# Patient Record
Sex: FEMALE | Race: WHITE | Employment: OTHER | URBAN - METROPOLITAN AREA
[De-identification: names, ages, dates, MRNs, and addresses within clinical notes are randomized per-mention and may not be internally consistent; named-entity substitution may affect disease eponyms.]

---

## 2017-11-22 ENCOUNTER — CARE COORDINATION (OUTPATIENT)
Dept: CARE COORDINATION | Age: 57
End: 2017-11-22

## 2017-11-30 ENCOUNTER — TELEPHONE (OUTPATIENT)
Dept: CASE MANAGEMENT | Age: 57
End: 2017-11-30

## 2017-11-30 NOTE — TELEPHONE ENCOUNTER
No call was made, encounter was opened for documentation in the lung nodule navigator flow sheet. Patient has suggested follow up per [facility] for management of incidental pulmonary nodules. Patients with nodules measuring under 0.8cm are not automatically enrolled into incidental pulmonary nodule program.   Notification of nodules letter faxed to [address] [place] [address] Street, MD 11/30/2017 1:25 PM.  Receipt verified. Letter mailed to patients home stating she should contact her primary care physician to discuss any follow up recommendations.    Myrle Phalen, Hipolito Maze., R.T.(R)(T), Radiology Patient Navigator

## 2017-11-30 NOTE — LETTER
CT Lung Screening/ Lung Nodule Program  [address] [place] [address]. [place], [address]       TO:   Selene Marte MD    FAX:   [phone]        FROM:   [place], [place] [facility] Scre[facility]    DATE:   11/30/2017    PHONE:  [phone]    FAX:    [phone]      Comments:  Incidental Pulmonary Nodule Notification    RE:  Maria Fernanda Melgar  1960        The information contained in this fax message is intended only for Personal and Confidential use of the designated recipient(s) names above. This information is to be handled as Privileged and Confidential. If the reader of this message is not the intended recipient, you are hereby notified that you have received this document in error, and that any review, dissemination, distribution, or copying of this message is strictly prohibited. If you receive this communication in error, please notify us immediately at the above number and return the original message to us by mail. Thank you. Member of [facility]. Medical Imaging Services                              11/30/2017           Selene Marte, [address] [place] [address], [address] [place] [address]  [address] [facility] [address] [address]  Fax: [phone]      Dear  Selene Marte MD :                                      Your patient, Maria Fernanda Melgar (1960) had an imaging study (11/21/17) that noted an incidental pulmonary nodule/s. Follow up was recommended according to [facility] for Management of Pulmonary Nodules If this patient is not currently active within your practice, please contact us immediately so we can update records. This patient will not be monitored by our incidental pulmonary nodule program this letter serves as a notification only. If you have any additional questions or concerns regarding our Lung Nodule Program or our CT Lung Cancer Screening Program,  please don't hesitate to call. Sincerely,    Pulmonary Nodule Program  [address]:  [phone]  F:  [phone]                            Medical Imaging Services            11/30/2017      [address] [place] [address] [address] [address]              Dear Alfred Ashford,    [facility] records indicate that over the past year you had an imaging study/studies done while a patient at a [facility] facility. Because your health is our primary concern, we want to make sure we have the correct primary care physician on file. We currently have Michael Soriano MD noted as your primary care physician. If this information is not accurate, please call [phone] and provide us with the correct information. If this information is correct, please make sure you have discussed your visit with your physician and understand all results and any follow up recommendations that may or may not be needed now or in the future.            Sincerely,    [address]:  [phone]  F:  [phone]

## 2018-01-22 ENCOUNTER — CARE COORDINATION (OUTPATIENT)
Dept: CARE COORDINATION | Age: 58
End: 2018-01-22

## 2018-03-13 ENCOUNTER — HOSPITAL ENCOUNTER (OUTPATIENT)
Age: 58
Discharge: HOME | End: 2018-03-13
Payer: MEDICARE

## 2018-03-13 LAB
ALBUMIN SERPL-MCNC: 4.4 G/DL (ref 3.5–5.2)
ALP BLD-CCNC: 106 U/L (ref 35–104)
ALT SERPL-CCNC: 18 U/L (ref 0–32)
AMYLASE: 60 U/L (ref 20–100)
ANION GAP SERPL CALCULATED.3IONS-SCNC: 11 MMOL/L (ref 7–16)
AST SERPL-CCNC: 20 U/L (ref 0–31)
BASOPHILS ABSOLUTE: 0.05 E9/L (ref 0–0.2)
BASOPHILS RELATIVE PERCENT: 0.6 % (ref 0–2)
BILIRUB SERPL-MCNC: 0.2 MG/DL (ref 0–1.2)
BUN BLDV-MCNC: 16 MG/DL (ref 6–20)
CALCIUM SERPL-MCNC: 9.2 MG/DL (ref 8.6–10.2)
CEA: 6.4 NG/ML (ref 0–5.2)
CHLORIDE BLD-SCNC: 101 MMOL/L (ref 98–107)
CO2: 28 MMOL/L (ref 22–29)
CREAT SERPL-MCNC: 0.7 MG/DL (ref 0.5–1)
EOSINOPHILS ABSOLUTE: 0.13 E9/L (ref 0.05–0.5)
EOSINOPHILS RELATIVE PERCENT: 1.5 % (ref 0–6)
GFR AFRICAN AMERICAN: >60
GFR NON-AFRICAN AMERICAN: >60 ML/MIN/1.73
GLUCOSE BLD-MCNC: 95 MG/DL (ref 74–109)
HCT VFR BLD CALC: 42.8 % (ref 34–48)
HEMOGLOBIN: 13.7 G/DL (ref 11.5–15.5)
IMMATURE GRANULOCYTES #: 0.09 E9/L
IMMATURE GRANULOCYTES %: 1 % (ref 0–5)
LIPASE: 29 U/L (ref 13–60)
LYMPHOCYTES ABSOLUTE: 3.23 E9/L (ref 1.5–4)
LYMPHOCYTES RELATIVE PERCENT: 36.5 % (ref 20–42)
MCH RBC QN AUTO: 30.2 PG (ref 26–35)
MCHC RBC AUTO-ENTMCNC: 32 % (ref 32–34.5)
MCV RBC AUTO: 94.3 FL (ref 80–99.9)
MONOCYTES ABSOLUTE: 0.62 E9/L (ref 0.1–0.95)
MONOCYTES RELATIVE PERCENT: 7 % (ref 2–12)
NEUTROPHILS ABSOLUTE: 4.73 E9/L (ref 1.8–7.3)
NEUTROPHILS RELATIVE PERCENT: 53.4 % (ref 43–80)
PDW BLD-RTO: 13.2 FL (ref 11.5–15)
PLATELET # BLD: 267 E9/L (ref 130–450)
PMV BLD AUTO: 10.8 FL (ref 7–12)
POTASSIUM SERPL-SCNC: 5.2 MMOL/L (ref 3.5–5)
RBC # BLD: 4.54 E12/L (ref 3.5–5.5)
SODIUM BLD-SCNC: 140 MMOL/L (ref 132–146)
TOTAL PROTEIN: 7.1 G/DL (ref 6.4–8.3)
WBC # BLD: 8.9 E9/L (ref 4.5–11.5)

## 2018-03-13 PROCEDURE — 86301 IMMUNOASSAY TUMOR CA 19-9: CPT

## 2018-03-13 PROCEDURE — 36415 COLL VENOUS BLD VENIPUNCTURE: CPT

## 2018-03-13 PROCEDURE — 80053 COMPREHEN METABOLIC PANEL: CPT

## 2018-03-13 PROCEDURE — 83690 ASSAY OF LIPASE: CPT

## 2018-03-13 PROCEDURE — 82105 ALPHA-FETOPROTEIN SERUM: CPT

## 2018-03-13 PROCEDURE — 82150 ASSAY OF AMYLASE: CPT

## 2018-03-13 PROCEDURE — 82378 CARCINOEMBRYONIC ANTIGEN: CPT

## 2018-03-13 PROCEDURE — 85025 COMPLETE CBC W/AUTO DIFF WBC: CPT

## 2018-03-14 ENCOUNTER — HOSPITAL ENCOUNTER (OUTPATIENT)
Age: 58
Discharge: HOME | End: 2018-03-14
Payer: MEDICARE

## 2018-03-14 PROCEDURE — 82705 FATS/LIPIDS FECES QUAL: CPT

## 2018-03-14 PROCEDURE — 87045 FECES CULTURE AEROBIC BACT: CPT

## 2018-03-14 PROCEDURE — 87324 CLOSTRIDIUM AG IA: CPT

## 2018-03-14 PROCEDURE — 83520 IMMUNOASSAY QUANT NOS NONAB: CPT

## 2018-03-15 LAB
AFP-TUMOR MARKER: 3 NG/ML (ref 0–9)
C DIFFICILE TOXIN, EIA: NORMAL
CA 19-9: 18 U/ML (ref 0–37)

## 2018-03-16 LAB
CULTURE, STOOL: NORMAL
FECAL NEUTRAL FAT: ABNORMAL
FECAL SPLIT FATS: ABNORMAL

## 2018-03-20 LAB
Lab: NORMAL
REPORT: NORMAL
THIS TEST SENT TO: NORMAL

## 2018-03-21 ENCOUNTER — OFFICE VISIT (OUTPATIENT)
Dept: FAMILY MEDICINE CLINIC | Age: 58
End: 2018-03-21
Payer: MEDICARE

## 2018-03-21 VITALS
RESPIRATION RATE: 18 BRPM | HEIGHT: 62 IN | OXYGEN SATURATION: 98 % | BODY MASS INDEX: 19.88 KG/M2 | WEIGHT: 108 LBS | TEMPERATURE: 101.9 F | SYSTOLIC BLOOD PRESSURE: 108 MMHG | HEART RATE: 68 BPM | DIASTOLIC BLOOD PRESSURE: 64 MMHG

## 2018-03-21 DIAGNOSIS — J11.1 INFLUENZA: Primary | ICD-10-CM

## 2018-03-21 PROCEDURE — 99213 OFFICE O/P EST LOW 20 MIN: CPT | Performed by: PHYSICIAN ASSISTANT

## 2018-03-21 RX ORDER — BROMPHENIRAMINE MALEATE, PSEUDOEPHEDRINE HYDROCHLORIDE, AND DEXTROMETHORPHAN HYDROBROMIDE 2; 30; 10 MG/5ML; MG/5ML; MG/5ML
10 SYRUP ORAL 4 TIMES DAILY PRN
Qty: 120 ML | Refills: 0 | Status: SHIPPED | OUTPATIENT
Start: 2018-03-21 | End: 2018-04-17 | Stop reason: ALTCHOICE

## 2018-03-21 RX ORDER — DICYCLOMINE HYDROCHLORIDE 10 MG/1
CAPSULE ORAL
Refills: 2 | COMMUNITY
Start: 2018-03-06 | End: 2021-03-09

## 2018-03-21 RX ORDER — OSELTAMIVIR PHOSPHATE 75 MG/1
75 CAPSULE ORAL 2 TIMES DAILY
Qty: 10 CAPSULE | Refills: 0 | Status: SHIPPED | OUTPATIENT
Start: 2018-03-21 | End: 2018-03-26

## 2018-03-21 NOTE — PROGRESS NOTES
HPI: Mat Jiménez is a 65 y.o. female with a past medical history of  has a past medical history of Anxiety; Arthritis; Blood circulation, collateral; Chronic back pain; Colitis; Complex regional pain syndrome type 1 of right lower extremity; Depression; Elevated liver function tests; GERD (gastroesophageal reflux disease); Headache(784.0); Hyperlipidemia; Kidney stone; Movement disorder; Neuromuscular disorder ([address] Osteoarthritis; Other disorders of kidney and ureter in diseases classified elsewhere; Pneumonia; Pre-diabetes; Psychiatric problem; RSD (reflex sympathetic dystrophy); and Spinal cord injuries. presenting with complaints of a cough with nasal congestion. The patient states that these symptoms began gradually. The history is obtained from the patient. The patient states that he has had some subjective chills at home. Patient's symptoms began before yesterday. Came on rapidly. Patient complains of head to toe body aches as well as cough. Patient does complain of a mild cough associated with it that is nonproductive The patient also denies difficulty breathing, hemoptysis, neck pain/stiffness, or blurry vision. Sx have persisted and are mildly worse which is what prompted the visit today. POSITIVE exposure to sick contacts        Review of Systems:   Pertinent positives and negatives are stated within HPI, all other systems reviewed and are negative.    --------------------------------------------- PAST HISTORY ---------------------------------------------  Past Medical History:  has a past medical history of Anxiety; Arthritis; Blood circulation, collateral; Chronic back pain; Colitis; Complex regional pain syndrome type 1 of right lower extremity; Depression; Elevated liver function tests; GERD (gastroesophageal reflux disease); Headache(784.0); Hyperlipidemia; Kidney stone; Movement disorder; Neuromuscular disorder ([address] Osteoarthritis;  Other disorders of kidney and ureter in diseases classified

## 2018-03-23 ENCOUNTER — OFFICE VISIT (OUTPATIENT)
Dept: FAMILY MEDICINE CLINIC | Age: 58
End: 2018-03-23
Payer: MEDICARE

## 2018-03-23 ENCOUNTER — HOSPITAL ENCOUNTER (OUTPATIENT)
Age: 58
Discharge: HOME | End: 2018-03-25
Payer: MEDICARE

## 2018-03-23 ENCOUNTER — HOSPITAL ENCOUNTER (OUTPATIENT)
Dept: GENERAL RADIOLOGY | Age: 58
Discharge: HOME | End: 2018-03-25
Payer: MEDICARE

## 2018-03-23 VITALS
SYSTOLIC BLOOD PRESSURE: 128 MMHG | HEART RATE: 84 BPM | RESPIRATION RATE: 12 BRPM | WEIGHT: 106 LBS | HEIGHT: 62 IN | OXYGEN SATURATION: 95 % | TEMPERATURE: 97.9 F | BODY MASS INDEX: 19.51 KG/M2 | DIASTOLIC BLOOD PRESSURE: 70 MMHG

## 2018-03-23 DIAGNOSIS — J20.9 ACUTE BRONCHITIS, UNSPECIFIED ORGANISM: Primary | ICD-10-CM

## 2018-03-23 DIAGNOSIS — J20.9 ACUTE BRONCHITIS, UNSPECIFIED ORGANISM: ICD-10-CM

## 2018-03-23 PROCEDURE — 99213 OFFICE O/P EST LOW 20 MIN: CPT | Performed by: PHYSICIAN ASSISTANT

## 2018-03-23 PROCEDURE — 96372 THER/PROPH/DIAG INJ SC/IM: CPT | Performed by: PHYSICIAN ASSISTANT

## 2018-03-23 PROCEDURE — 71046 X-RAY EXAM CHEST 2 VIEWS: CPT

## 2018-03-23 RX ORDER — PREDNISONE 10 MG/1
TABLET ORAL
Qty: 20 TABLET | Refills: 0 | Status: SHIPPED | OUTPATIENT
Start: 2018-03-23 | End: 2018-03-29

## 2018-03-23 RX ORDER — PREDNISONE 10 MG/1
TABLET ORAL
Qty: 20 TABLET | Refills: 0 | Status: SHIPPED | OUTPATIENT
Start: 2018-03-23 | End: 2018-03-23 | Stop reason: SDUPTHER

## 2018-03-23 RX ORDER — DEXAMETHASONE SODIUM PHOSPHATE 100 MG/10ML
10 INJECTION INTRAMUSCULAR; INTRAVENOUS ONCE
Status: COMPLETED | OUTPATIENT
Start: 2018-03-23 | End: 2018-03-23

## 2018-03-23 RX ORDER — DOXYCYCLINE HYCLATE 100 MG
100 TABLET ORAL 2 TIMES DAILY
Qty: 20 TABLET | Refills: 0 | Status: SHIPPED | OUTPATIENT
Start: 2018-03-23 | End: 2018-03-23 | Stop reason: SDUPTHER

## 2018-03-23 RX ORDER — DOXYCYCLINE HYCLATE 100 MG
100 TABLET ORAL 2 TIMES DAILY
Qty: 20 TABLET | Refills: 0 | Status: SHIPPED | OUTPATIENT
Start: 2018-03-23 | End: 2018-04-02

## 2018-03-23 RX ADMIN — DEXAMETHASONE SODIUM PHOSPHATE 10 MG: 100 INJECTION INTRAMUSCULAR; INTRAVENOUS at 15:46

## 2018-03-23 NOTE — PROGRESS NOTES
tonsillar hypertrophy. Airway patient. Neck:  Normal ROM. Supple. Lungs:  Clear to auscultation and breath sounds equal.  Heart:  Regular rate and rhythm, normal. Few scattered wheezes heard on expiration on lung exam  Abdomen:  Soft, nontender, good bowel sounds. No firm or pulsatile mass. Back:  No costovertebral tenderness. Skin:  Normal turgor. Warm, dry, without visible rash, unless noted elsewhere. Neurological:  Alert and oriented. Motor functions intact.      ------------------------------------------Test Results Section----------------------------------------------  (All laboratory and radiology results have been personally reviewed by myself)  Laboratory:      Radiology: All Radiology results interpreted by Radiologist unless otherwise noted. -------------------------------------Impression & Disposition Section-----------------------------------  Impression(s):  1. Acute bronchitis, unspecified organism      Disposition:  Disposition: Discharge to home      Vitals are stable. We'll send patient for outpatient chest x-ray. We'll treat with doxycycline and prednisone. Advised to continue the Tamiflu. Advised if she develops any worsening symptoms over the weekend she will need to go to the ER. She is in agreement with this plan. No chest pain. Pt advised to f/u with PCP in 5-7 days for continued management and further care. ER if changes or worse. Pt advised to take all medications as directed.     Administrations This Visit     dexamethasone (DECADRON) injection 10 mg     Admin Date  03/23/2018 Action  Given Dose  10 mg Route  Intramuscular Administered By  Rock Jain

## 2018-03-29 ENCOUNTER — APPOINTMENT (OUTPATIENT)
Dept: GENERAL RADIOLOGY | Age: 58
End: 2018-03-29
Payer: MEDICARE

## 2018-03-29 ENCOUNTER — HOSPITAL ENCOUNTER (EMERGENCY)
Age: 58
Discharge: HOME | End: 2018-03-29
Attending: EMERGENCY MEDICINE
Payer: MEDICARE

## 2018-03-29 ENCOUNTER — OFFICE VISIT (OUTPATIENT)
Dept: FAMILY MEDICINE CLINIC | Age: 58
End: 2018-03-29
Payer: MEDICARE

## 2018-03-29 VITALS
HEIGHT: 62 IN | BODY MASS INDEX: 21.16 KG/M2 | WEIGHT: 115 LBS | RESPIRATION RATE: 18 BRPM | OXYGEN SATURATION: 94 % | TEMPERATURE: 98 F | DIASTOLIC BLOOD PRESSURE: 61 MMHG | SYSTOLIC BLOOD PRESSURE: 133 MMHG | HEART RATE: 95 BPM

## 2018-03-29 VITALS
DIASTOLIC BLOOD PRESSURE: 86 MMHG | RESPIRATION RATE: 18 BRPM | WEIGHT: 107 LBS | BODY MASS INDEX: 19.69 KG/M2 | SYSTOLIC BLOOD PRESSURE: 98 MMHG | HEIGHT: 62 IN | OXYGEN SATURATION: 91 % | TEMPERATURE: 98.2 F | HEART RATE: 81 BPM

## 2018-03-29 DIAGNOSIS — J44.9 CHRONIC OBSTRUCTIVE PULMONARY DISEASE, UNSPECIFIED COPD TYPE (HCC): Primary | ICD-10-CM

## 2018-03-29 DIAGNOSIS — J06.9 VIRAL URI WITH COUGH: Primary | ICD-10-CM

## 2018-03-29 LAB
EKG ATRIAL RATE: 82 BPM
EKG P AXIS: 80 DEGREES
EKG P-R INTERVAL: 164 MS
EKG Q-T INTERVAL: 356 MS
EKG QRS DURATION: 80 MS
EKG QTC CALCULATION (BAZETT): 415 MS
EKG R AXIS: -27 DEGREES
EKG T AXIS: 59 DEGREES
EKG VENTRICULAR RATE: 82 BPM
INFLUENZA A BY PCR: NOT DETECTED
INFLUENZA B BY PCR: NOT DETECTED

## 2018-03-29 PROCEDURE — 71046 X-RAY EXAM CHEST 2 VIEWS: CPT

## 2018-03-29 PROCEDURE — 99284 EMERGENCY DEPT VISIT MOD MDM: CPT

## 2018-03-29 PROCEDURE — 94640 AIRWAY INHALATION TREATMENT: CPT | Performed by: PHYSICIAN ASSISTANT

## 2018-03-29 PROCEDURE — 93005 ELECTROCARDIOGRAM TRACING: CPT

## 2018-03-29 PROCEDURE — 87502 INFLUENZA DNA AMP PROBE: CPT

## 2018-03-29 PROCEDURE — 99214 OFFICE O/P EST MOD 30 MIN: CPT | Performed by: PHYSICIAN ASSISTANT

## 2018-03-29 RX ORDER — ALBUTEROL SULFATE 2.5 MG/3ML
2.5 SOLUTION RESPIRATORY (INHALATION) ONCE
Status: COMPLETED | OUTPATIENT
Start: 2018-03-29 | End: 2018-03-29

## 2018-03-29 RX ORDER — BENZONATATE 100 MG/1
100 CAPSULE ORAL 3 TIMES DAILY PRN
Qty: 21 CAPSULE | Refills: 0 | Status: SHIPPED | OUTPATIENT
Start: 2018-03-29 | End: 2018-04-05

## 2018-03-29 RX ADMIN — Medication 0.5 MG: at 13:40

## 2018-03-29 RX ADMIN — ALBUTEROL SULFATE 2.5 MG: 2.5 SOLUTION RESPIRATORY (INHALATION) at 13:39

## 2018-03-29 NOTE — PROGRESS NOTES
Chief Complaint:  Sinusitis; Chest Congestion; Cough (productive); and Fatigue      History of Present Illness:  Source of history provided by:  patient. Elen Guzman is a 65 y.o. female who  has a past medical history of Anxiety; Arthritis; Blood circulation, collateral; Chronic back pain; Colitis; Complex regional pain syndrome type 1 of right lower extremity; Depression; Elevated liver function tests; GERD (gastroesophageal reflux disease); Headache(784.0); Hyperlipidemia; Kidney stone; Movement disorder; Neuromuscular disorder ([address] Osteoarthritis; Other disorders of kidney and ureter in diseases classified elsewhere; Pneumonia; Pre-diabetes; Psychiatric problem; RSD (reflex sympathetic dystrophy); and Spinal cord injuries. , presents to the [facility] care for COPD exacerbation. Patient has been seen 3 times this month for bronchitis and COPD. She was seen in the ER on March 5, walk-in clinic on March 21 and the walk in clinic on March 23. Patient has been on azithromycin, doxycycline, and 2 rounds of prednisone with no improvement. She does have an albuterol inhaler at home. She is a smoker. Patient continues to have a cough and sinus congestion. She reports fatigue. She has a follow-up plan with her family doctor in one week. She states that none of these medicines have made her any better. She did have a chest x-ray done on March 23 that showed no pneumonia.     Review of Systems:     Unless otherwise stated in this report or unable to obtain because of the patient's clinical or mental status as evidenced by the medical record, this patients's positive and negative responses for Review of Systems, constitutional, psych, eyes, ENT, cardiovascular, respiratory, gastrointestinal, neurological, genitourinary, musculoskeletal, integument systems and systems related to the presenting problem are either stated in the preceding or were not pertinent or were negative for the symptoms and/or complaints related Throat:  Pharynx without injection, exudate, or tonsillar hypertrophy. Airway patient. Neck:  Normal ROM. Supple. Lungs:  Diffuse coarse breath sounds heard bilaterally  Heart:  Regular rate and rhythm, normal heart sounds, without pathological murmurs, ectopy, gallops, or rubs. Abdomen:  Soft, nontender, good bowel sounds. No firm or pulsatile mass. Back:  No costovertebral tenderness. Skin:  Normal turgor. Warm, dry, without visible rash, unless noted elsewhere. Neurological:  Alert and oriented. Motor functions intact.      ------------------------------------------Test Results Section----------------------------------------------  (All laboratory and radiology results have been personally reviewed by myself)  Laboratory:      Radiology: All Radiology results interpreted by Radiologist unless otherwise noted. -------------------------------------Impression & Disposition Section-----------------------------------  Impression(s):  1. Chronic obstructive pulmonary disease, unspecified COPD type ([facility]      Disposition:  Disposition: Go TO ED      In the last month patient has been on 2 rounds of prednisone, azithromycin, doxycycline and Tamiflu. She has had no improvement in her symptoms. She was given a breathing treatment here and her oxygen level is 90-91% at rest. I advised she has failed OP treatment and I advised she go to the ED for further management.  She is in agreement with this plan and will go directly there    Administrations This Visit     albuterol (PROVENTIL) nebulizer solution 2.5 mg     Admin Date  03/29/2018 Action  Given Dose  2.5 mg Route  Nebulization Administered By  Pablo Hodgkins, MA          ipratropium (ATROVENT) 0.02 % nebulizer solution 0.5 mg     Admin Date  03/29/2018 Action  Given Dose  0.5 mg Route  Nebulization Administered By  Pablo Hodgkins, MA

## 2018-04-18 ENCOUNTER — HOSPITAL ENCOUNTER (OUTPATIENT)
Age: 58
Discharge: HOME | End: 2018-04-20

## 2018-04-18 PROCEDURE — 87081 CULTURE SCREEN ONLY: CPT

## 2018-04-18 PROCEDURE — 88305 TISSUE EXAM BY PATHOLOGIST: CPT

## 2018-04-19 ENCOUNTER — HOSPITAL ENCOUNTER (OUTPATIENT)
Age: 58
Discharge: HOME | End: 2018-04-19
Payer: COMMERCIAL

## 2018-04-19 LAB
APTT: 32.8 SEC (ref 24.5–35.1)
BILIRUBIN URINE: NEGATIVE
BLOOD, URINE: NEGATIVE
CLARITY: CLEAR
CLOTEST: NORMAL
COLOR: YELLOW
GLUCOSE URINE: NEGATIVE MG/DL
HCT VFR BLD CALC: 40.7 % (ref 34–48)
HEMOGLOBIN: 13.4 G/DL (ref 11.5–15.5)
INR BLD: 1.1
KETONES, URINE: NEGATIVE MG/DL
LEUKOCYTE ESTERASE, URINE: NEGATIVE
MCH RBC QN AUTO: 30.9 PG (ref 26–35)
MCHC RBC AUTO-ENTMCNC: 32.9 % (ref 32–34.5)
MCV RBC AUTO: 94 FL (ref 80–99.9)
NITRITE, URINE: NEGATIVE
PDW BLD-RTO: 14.1 FL (ref 11.5–15)
PH UA: 6.5 (ref 5–9)
PLATELET # BLD: 177 E9/L (ref 130–450)
PMV BLD AUTO: 11.7 FL (ref 7–12)
PROTEIN UA: NEGATIVE MG/DL
PROTHROMBIN TIME: 12.5 SEC (ref 9.3–12.4)
RBC # BLD: 4.33 E12/L (ref 3.5–5.5)
SPECIFIC GRAVITY UA: 1.02 (ref 1–1.03)
UROBILINOGEN, URINE: 0.2 E.U./DL
WBC # BLD: 5.4 E9/L (ref 4.5–11.5)

## 2018-04-19 PROCEDURE — 85730 THROMBOPLASTIN TIME PARTIAL: CPT

## 2018-04-19 PROCEDURE — 87147 CULTURE TYPE IMMUNOLOGIC: CPT

## 2018-04-19 PROCEDURE — 85610 PROTHROMBIN TIME: CPT

## 2018-04-19 PROCEDURE — 85027 COMPLETE CBC AUTOMATED: CPT

## 2018-04-19 PROCEDURE — 87088 URINE BACTERIA CULTURE: CPT

## 2018-04-19 PROCEDURE — 81003 URINALYSIS AUTO W/O SCOPE: CPT

## 2018-04-19 PROCEDURE — 36415 COLL VENOUS BLD VENIPUNCTURE: CPT

## 2018-04-19 PROCEDURE — 87186 SC STD MICRODIL/AGAR DIL: CPT

## 2018-04-21 LAB
ORGANISM: ABNORMAL
URINE CULTURE, ROUTINE: ABNORMAL
URINE CULTURE, ROUTINE: ABNORMAL

## 2018-04-24 ENCOUNTER — ANESTHESIA EVENT (OUTPATIENT)
Dept: OPERATING ROOM | Age: 58
End: 2018-04-24
Payer: COMMERCIAL

## 2018-04-24 ASSESSMENT — LIFESTYLE VARIABLES: SMOKING_STATUS: 1

## 2018-04-25 ENCOUNTER — HOSPITAL ENCOUNTER (OUTPATIENT)
Dept: OPERATING ROOM | Age: 58
Setting detail: OUTPATIENT SURGERY
Discharge: HOME | End: 2018-04-25
Attending: ANESTHESIOLOGY
Payer: COMMERCIAL

## 2018-04-25 ENCOUNTER — ANESTHESIA (OUTPATIENT)
Dept: OPERATING ROOM | Age: 58
End: 2018-04-25
Payer: COMMERCIAL

## 2018-04-25 ENCOUNTER — HOSPITAL ENCOUNTER (OUTPATIENT)
Age: 58
Setting detail: OUTPATIENT SURGERY
Discharge: HOME | End: 2018-04-25
Attending: ANESTHESIOLOGY | Admitting: ANESTHESIOLOGY
Payer: COMMERCIAL

## 2018-04-25 VITALS
DIASTOLIC BLOOD PRESSURE: 76 MMHG | OXYGEN SATURATION: 97 % | SYSTOLIC BLOOD PRESSURE: 120 MMHG | HEIGHT: 62 IN | TEMPERATURE: 98 F | HEART RATE: 77 BPM | WEIGHT: 109 LBS | RESPIRATION RATE: 16 BRPM | BODY MASS INDEX: 20.06 KG/M2

## 2018-04-25 VITALS
SYSTOLIC BLOOD PRESSURE: 94 MMHG | DIASTOLIC BLOOD PRESSURE: 53 MMHG | TEMPERATURE: 97.2 F | OXYGEN SATURATION: 99 % | RESPIRATION RATE: 20 BRPM

## 2018-04-25 DIAGNOSIS — Z45.42 BATTERY END OF LIFE OF SPINAL CORD STIMULATOR: ICD-10-CM

## 2018-04-25 DIAGNOSIS — G90.511 COMPLEX REGIONAL PAIN SYNDROME TYPE 1 OF RIGHT UPPER EXTREMITY: Primary | Chronic | ICD-10-CM

## 2018-04-25 PROCEDURE — 7100000001 HC PACU RECOVERY - ADDTL 15 MIN: Performed by: ANESTHESIOLOGY

## 2018-04-25 PROCEDURE — C1787 PATIENT PROGR, NEUROSTIM: HCPCS | Performed by: ANESTHESIOLOGY

## 2018-04-25 PROCEDURE — 6360000002 HC RX W HCPCS: Performed by: NURSE ANESTHETIST, CERTIFIED REGISTERED

## 2018-04-25 PROCEDURE — C1820 GENERATOR NEURO RECHG BAT SY: HCPCS | Performed by: ANESTHESIOLOGY

## 2018-04-25 PROCEDURE — 7100000000 HC PACU RECOVERY - FIRST 15 MIN: Performed by: ANESTHESIOLOGY

## 2018-04-25 PROCEDURE — 7100000010 HC PHASE II RECOVERY - FIRST 15 MIN: Performed by: ANESTHESIOLOGY

## 2018-04-25 PROCEDURE — 2709999900 HC NON-CHARGEABLE SUPPLY: Performed by: ANESTHESIOLOGY

## 2018-04-25 PROCEDURE — 2580000003 HC RX 258: Performed by: ANESTHESIOLOGY

## 2018-04-25 PROCEDURE — 3209999900 FLUORO FOR SURGICAL PROCEDURES

## 2018-04-25 PROCEDURE — 3600000015 HC SURGERY LEVEL 5 ADDTL 15MIN: Performed by: ANESTHESIOLOGY

## 2018-04-25 PROCEDURE — 2500000003 HC RX 250 WO HCPCS: Performed by: ANESTHESIOLOGY

## 2018-04-25 PROCEDURE — 3600000005 HC SURGERY LEVEL 5 BASE: Performed by: ANESTHESIOLOGY

## 2018-04-25 PROCEDURE — L8689 EXTERNAL RECHARG SYS INTERN: HCPCS | Performed by: ANESTHESIOLOGY

## 2018-04-25 PROCEDURE — 6360000002 HC RX W HCPCS: Performed by: ANESTHESIOLOGY

## 2018-04-25 PROCEDURE — 7100000011 HC PHASE II RECOVERY - ADDTL 15 MIN: Performed by: ANESTHESIOLOGY

## 2018-04-25 PROCEDURE — 3700000001 HC ADD 15 MINUTES (ANESTHESIA): Performed by: ANESTHESIOLOGY

## 2018-04-25 PROCEDURE — 3700000000 HC ANESTHESIA ATTENDED CARE: Performed by: ANESTHESIOLOGY

## 2018-04-25 PROCEDURE — 2500000003 HC RX 250 WO HCPCS: Performed by: NURSE ANESTHETIST, CERTIFIED REGISTERED

## 2018-04-25 PROCEDURE — L0625 LO FLEX L1-BELOW L5 PRE OTS: HCPCS | Performed by: ANESTHESIOLOGY

## 2018-04-25 DEVICE — DEVICE NEUROSTIMULATOR 13.9CC W1.9XH2.2IN 29.1GM RECHRG: Type: IMPLANTABLE DEVICE | Site: HIP | Status: FUNCTIONAL

## 2018-04-25 RX ORDER — LIDOCAINE HYDROCHLORIDE 20 MG/ML
INJECTION, SOLUTION INFILTRATION; PERINEURAL PRN
Status: DISCONTINUED | OUTPATIENT
Start: 2018-04-25 | End: 2018-04-25 | Stop reason: SDUPTHER

## 2018-04-25 RX ORDER — HYDROMORPHONE HYDROCHLORIDE 4 MG/1
TABLET ORAL
Qty: 30 TABLET | Refills: 0 | Status: SHIPPED | OUTPATIENT
Start: 2018-04-25 | End: 2018-05-05

## 2018-04-25 RX ORDER — SODIUM CHLORIDE, SODIUM LACTATE, POTASSIUM CHLORIDE, CALCIUM CHLORIDE 600; 310; 30; 20 MG/100ML; MG/100ML; MG/100ML; MG/100ML
INJECTION, SOLUTION INTRAVENOUS CONTINUOUS
Status: DISCONTINUED | OUTPATIENT
Start: 2018-04-25 | End: 2018-04-25 | Stop reason: HOSPADM

## 2018-04-25 RX ORDER — FENTANYL CITRATE 50 UG/ML
INJECTION, SOLUTION INTRAMUSCULAR; INTRAVENOUS PRN
Status: DISCONTINUED | OUTPATIENT
Start: 2018-04-25 | End: 2018-04-25 | Stop reason: SDUPTHER

## 2018-04-25 RX ORDER — PROPOFOL 10 MG/ML
INJECTION, EMULSION INTRAVENOUS CONTINUOUS PRN
Status: DISCONTINUED | OUTPATIENT
Start: 2018-04-25 | End: 2018-04-25 | Stop reason: SDUPTHER

## 2018-04-25 RX ORDER — MIDAZOLAM HYDROCHLORIDE 1 MG/ML
INJECTION INTRAMUSCULAR; INTRAVENOUS PRN
Status: DISCONTINUED | OUTPATIENT
Start: 2018-04-25 | End: 2018-04-25 | Stop reason: SDUPTHER

## 2018-04-25 RX ORDER — KETAMINE HYDROCHLORIDE 50 MG/ML
INJECTION, SOLUTION, CONCENTRATE INTRAMUSCULAR; INTRAVENOUS PRN
Status: DISCONTINUED | OUTPATIENT
Start: 2018-04-25 | End: 2018-04-25 | Stop reason: SDUPTHER

## 2018-04-25 RX ORDER — LIDOCAINE HYDROCHLORIDE 10 MG/ML
INJECTION, SOLUTION EPIDURAL; INFILTRATION; INTRACAUDAL; PERINEURAL PRN
Status: DISCONTINUED | OUTPATIENT
Start: 2018-04-25 | End: 2018-04-25 | Stop reason: HOSPADM

## 2018-04-25 RX ADMIN — FENTANYL CITRATE 50 MCG: 50 INJECTION, SOLUTION INTRAMUSCULAR; INTRAVENOUS at 07:41

## 2018-04-25 RX ADMIN — SODIUM CHLORIDE, POTASSIUM CHLORIDE, SODIUM LACTATE AND CALCIUM CHLORIDE: 600; 310; 30; 20 INJECTION, SOLUTION INTRAVENOUS at 06:48

## 2018-04-25 RX ADMIN — FENTANYL CITRATE 50 MCG: 50 INJECTION, SOLUTION INTRAMUSCULAR; INTRAVENOUS at 07:43

## 2018-04-25 RX ADMIN — MIDAZOLAM HYDROCHLORIDE 2 MG: 1 INJECTION INTRAMUSCULAR; INTRAVENOUS at 07:40

## 2018-04-25 RX ADMIN — PROPOFOL 50 MCG/KG/MIN: 10 INJECTION, EMULSION INTRAVENOUS at 07:41

## 2018-04-25 RX ADMIN — LIDOCAINE HYDROCHLORIDE 50 MG: 20 INJECTION, SOLUTION INFILTRATION; PERINEURAL at 07:41

## 2018-04-25 RX ADMIN — KETAMINE HYDROCHLORIDE 50 MG: 50 INJECTION, SOLUTION INTRAMUSCULAR; INTRAVENOUS at 07:41

## 2018-04-25 RX ADMIN — VANCOMYCIN HYDROCHLORIDE 1000 MG: 1 INJECTION, POWDER, LYOPHILIZED, FOR SOLUTION INTRAVENOUS at 07:30

## 2018-04-25 ASSESSMENT — PULMONARY FUNCTION TESTS
PIF_VALUE: 0

## 2018-04-25 ASSESSMENT — PAIN DESCRIPTION - PAIN TYPE
TYPE: SURGICAL PAIN

## 2018-04-25 ASSESSMENT — PAIN SCALES - GENERAL
PAINLEVEL_OUTOF10: 4

## 2018-04-25 ASSESSMENT — PAIN DESCRIPTION - LOCATION
LOCATION: BACK

## 2018-04-25 ASSESSMENT — PAIN DESCRIPTION - DESCRIPTORS
DESCRIPTORS: ACHING
DESCRIPTORS: DISCOMFORT
DESCRIPTORS: SORE
DESCRIPTORS: SORE

## 2018-04-25 ASSESSMENT — PAIN - FUNCTIONAL ASSESSMENT: PAIN_FUNCTIONAL_ASSESSMENT: 0-10

## 2018-07-06 ENCOUNTER — HOSPITAL ENCOUNTER (OUTPATIENT)
Dept: CT IMAGING | Age: 58
Discharge: HOME | End: 2018-07-08
Payer: COMMERCIAL

## 2018-07-06 DIAGNOSIS — R52 PAIN: ICD-10-CM

## 2018-07-06 PROCEDURE — 72128 CT CHEST SPINE W/O DYE: CPT

## 2018-10-06 ENCOUNTER — HOSPITAL ENCOUNTER (OUTPATIENT)
Dept: CT IMAGING | Age: 58
Discharge: HOME | End: 2018-10-08
Payer: COMMERCIAL

## 2018-10-06 DIAGNOSIS — G90.511 COMPLEX REGIONAL PAIN SYNDROME TYPE 1 OF RIGHT UPPER EXTREMITY: ICD-10-CM

## 2018-10-06 PROCEDURE — 6360000004 HC RX CONTRAST MEDICATION: Performed by: RADIOLOGY

## 2018-10-06 PROCEDURE — 72132 CT LUMBAR SPINE W/DYE: CPT

## 2018-10-06 RX ADMIN — IOPAMIDOL 100 ML: 755 INJECTION, SOLUTION INTRAVENOUS at 10:55

## 2019-06-16 ENCOUNTER — HOSPITAL ENCOUNTER (EMERGENCY)
Age: 59
Discharge: HOME | End: 2019-06-16
Payer: MEDICARE

## 2019-06-16 ENCOUNTER — APPOINTMENT (OUTPATIENT)
Dept: GENERAL RADIOLOGY | Age: 59
End: 2019-06-16
Payer: MEDICARE

## 2019-06-16 VITALS
WEIGHT: 105 LBS | HEIGHT: 62 IN | OXYGEN SATURATION: 98 % | RESPIRATION RATE: 18 BRPM | SYSTOLIC BLOOD PRESSURE: 115 MMHG | DIASTOLIC BLOOD PRESSURE: 54 MMHG | TEMPERATURE: 99 F | BODY MASS INDEX: 19.32 KG/M2 | HEART RATE: 85 BPM

## 2019-06-16 DIAGNOSIS — S20.212A CONTUSION OF RIB ON LEFT SIDE, INITIAL ENCOUNTER: Primary | ICD-10-CM

## 2019-06-16 PROCEDURE — 71100 X-RAY EXAM RIBS UNI 2 VIEWS: CPT

## 2019-06-16 PROCEDURE — 6370000000 HC RX 637 (ALT 250 FOR IP): Performed by: NURSE PRACTITIONER

## 2019-06-16 PROCEDURE — 99284 EMERGENCY DEPT VISIT MOD MDM: CPT

## 2019-06-16 RX ORDER — OXYCODONE HYDROCHLORIDE AND ACETAMINOPHEN 5; 325 MG/1; MG/1
1 TABLET ORAL ONCE
Status: COMPLETED | OUTPATIENT
Start: 2019-06-16 | End: 2019-06-16

## 2019-06-16 RX ORDER — IBUPROFEN 800 MG/1
800 TABLET ORAL ONCE
Status: COMPLETED | OUTPATIENT
Start: 2019-06-16 | End: 2019-06-16

## 2019-06-16 RX ORDER — NAPROXEN 500 MG/1
500 TABLET ORAL 2 TIMES DAILY PRN
Qty: 28 TABLET | Refills: 0 | Status: SHIPPED | OUTPATIENT
Start: 2019-06-16 | End: 2021-03-26

## 2019-06-16 RX ADMIN — OXYCODONE HYDROCHLORIDE AND ACETAMINOPHEN 1 TABLET: 5; 325 TABLET ORAL at 18:17

## 2019-06-16 RX ADMIN — IBUPROFEN 800 MG: 800 TABLET, FILM COATED ORAL at 18:17

## 2019-06-16 ASSESSMENT — PAIN DESCRIPTION - LOCATION: LOCATION: RIB CAGE

## 2019-06-16 ASSESSMENT — PAIN SCALES - GENERAL
PAINLEVEL_OUTOF10: 10
PAINLEVEL_OUTOF10: 10

## 2019-06-16 NOTE — ED PROVIDER NOTES
independent    HPI:  6/16/19, Time: 6:07 PM         Betty Javier is a 63 y.o. female presenting to the ED for left rib pain. Patient reports she was carrying blankets and went to bend down and lost her balance while carrying the blankets and fell onto the couch striking the wooden couch armrest.  Patient expresses discomfort to left lower lateral ribs #7 and 8. Denies shortness of breath. There is equal chest wall excursion. No tracheal deviation. Review of Systems:   Pertinent positives and negatives are stated within HPI, all other systems reviewed and are negative.          --------------------------------------------- PAST HISTORY ---------------------------------------------  Past Medical History:  has a past medical history of Anxiety, Arthritis, Blood circulation, collateral, Cancer (HCC), Chronic back pain, Colitis, Complex regional pain syndrome type 1 of right lower extremity, COPD (chronic obstructive pulmonary disease) ([facility] Depression, Elevated liver function tests, GERD (gastroesophageal reflux disease), Headache(784.0), Hyperlipidemia, Kidney stone, Movement disorder, Neuromuscular disorder ([facility] Osteoarthritis, Other disorders of kidney and ureter in diseases classified elsewhere, Pneumonia, Psychiatric problem, and RSD (reflex sympathetic dystrophy). Past Surgical History:  has a past surgical history that includes Hysterectomy; laminectomy; Finger amputation; back surgery (2005); other surgical history (09/18/13); other surgical history (N/A, 2/3/2014); fracture surgery; Endoscopy, colon, diagnostic; Carpal tunnel release; Colonoscopy; other surgical history (Right, 11/11/14); other surgical history (04/08/15); Nerve Block (N/A, 8 19 15); Nerve Block (Left, 08 26 2015); Nerve Block (9 2 15); Nerve Block (Right, 9/17/15); other surgical history (04/25/2018); and pr revise/remove neurostim/ (N/A, 4/25/2018). Social History:  reports that she has been smoking cigarettes. She has a 8.75 pack-year smoking history. She has never used smokeless tobacco. She reports that she does not drink alcohol or use drugs. Family History: family history includes Other in her mother. The patients home medications have been reviewed. Allergies: Sulfamethazine and Ancef [cefazolin sodium]    -------------------------------------------------- RESULTS -------------------------------------------------  All laboratory and radiology results have been personally reviewed by myself   LABS:  No results found for this visit on 06/16/19. RADIOLOGY:  Interpreted by Radiologist.  XR RIBS LEFT (2 VIEWS)   Final Result      NO ACUTE FRACTURE OR BONY ABNORMALITY LEFT RIBS.          ------------------------- NURSING NOTES AND VITALS REVIEWED ---------------------------   The nursing notes within the ED encounter and vital signs as below have been reviewed. BP (!) 115/54   Pulse 85   Temp 99 °F (37.2 °C) (Oral)   Resp 18   Ht 5' 2\" (1.575 m)   Wt 105 lb (47.6 kg)   LMP  (LMP Unknown)   SpO2 98%   BMI 19.20 kg/m²   Oxygen Saturation Interpretation: Normal      ---------------------------------------------------PHYSICAL EXAM--------------------------------------      Constitutional/General: Alert and oriented x3, well appearing, non toxic in NAD  Head: Normocephalic and atraumatic  Eyes: PERRL, EOMI  Mouth: Oropharynx clear, handling secretions, no trismus  Neck: Supple, full ROM,   Pulmonary: Lungs clear to auscultation bilaterally, no wheezes, rales, or rhonchi. Not in respiratory distress, point tenderness to left lower lateral rib cage mid axilla #7 and 8 region. No tracheal deviation. Equal chest wall excursion. Cardiovascular:  Regular rate and rhythm, no murmurs, gallops, or rubs. 2+ distal pulses  Abdomen: Soft, non tender, non distended,   Extremities: Moves all extremities x 4.  Warm and well perfused  Skin: warm and dry without rash  Neurologic: GCS 15,  Psych: Normal

## 2019-06-17 ENCOUNTER — TELEPHONE (OUTPATIENT)
Dept: FAMILY MEDICINE CLINIC | Age: 59
End: 2019-06-17

## 2019-06-17 ENCOUNTER — CARE COORDINATION (OUTPATIENT)
Dept: CARE COORDINATION | Age: 59
End: 2019-06-17

## 2019-06-17 NOTE — TELEPHONE ENCOUNTER
Left message with , he will have patient call.   - per list: patient needs scheduled to establish with

## 2019-06-17 NOTE — CARE COORDINATION
Ambulatory Care Coordination ED Follow up Call       Reason for ED Visit:  Fall, rib pain  Care Management Risk Score: CMRS 2  How are you feeling? :     not changed  Patient Reports the following:  Patient reports she is hurting really bad. Hurts to breath. Patient declined to review medications at this time. Preparing to go to a doctor appointment. Did you call your PCP prior to going to the ED? No          Post Discharge Status:  What health concerns since you left the Emergency Room? None    Do you have wounds that you are caring for at home? No    Do you have a follow up appt scheduled? yes    Review of Instructions:                                 Do you have any questions regarding your discharge instructions?:  No  Medications:    What questions do you have about your medications? None  Are you taking your medications as directed? If not - why? Yes   Can you afford your medications? yes  ADLS:  Do you need assistance of any kind at home? No   What assistance is needed? None      FU appts/Provider:    No future appointments. Health Maintenance Due   Topic Date Due    DTaP/Tdap/Td vaccine (1 - Tdap) 06/01/1980    Shingles Vaccine (1 of 2) 06/01/2011    Pneumococcal 0-64 years Vaccine (1 of 1 - PPSV23) 11/13/2015    Cervical cancer screen  10/17/2017    Colon Cancer Screen FIT/FOBT  05/01/2018    A1C test (Diabetic or Prediabetic)  12/19/2018    Breast cancer screen  04/25/2019     Patient advised to contact PCP office to have HM items/records faxed to PCP Office directly?   N/A

## 2019-09-13 ENCOUNTER — TELEPHONE (OUTPATIENT)
Dept: ADMINISTRATIVE | Age: 59
End: 2019-09-13

## 2021-02-24 ENCOUNTER — APPOINTMENT (OUTPATIENT)
Dept: GENERAL RADIOLOGY | Age: 61
DRG: 948 | End: 2021-02-24
Payer: MEDICARE

## 2021-02-24 ENCOUNTER — HOSPITAL ENCOUNTER (INPATIENT)
Age: 61
LOS: 1 days | Discharge: HOME | DRG: 948 | End: 2021-02-25
Attending: EMERGENCY MEDICINE | Admitting: SURGERY
Payer: MEDICARE

## 2021-02-24 ENCOUNTER — APPOINTMENT (OUTPATIENT)
Dept: CT IMAGING | Age: 61
DRG: 948 | End: 2021-02-24
Payer: MEDICARE

## 2021-02-24 DIAGNOSIS — F41.9 ANXIETY: ICD-10-CM

## 2021-02-24 DIAGNOSIS — G90.521 COMPLEX REGIONAL PAIN SYNDROME TYPE 1 OF RIGHT LOWER EXTREMITY: Chronic | ICD-10-CM

## 2021-02-24 DIAGNOSIS — J98.11 ATELECTASIS OF BOTH LUNGS: Chronic | ICD-10-CM

## 2021-02-24 DIAGNOSIS — Z72.0 TOBACCO ABUSE: ICD-10-CM

## 2021-02-24 DIAGNOSIS — Z96.89 SPINAL CORD STIMULATOR STATUS: Chronic | ICD-10-CM

## 2021-02-24 DIAGNOSIS — F33.1 MAJOR DEPRESSIVE DISORDER, RECURRENT EPISODE, MODERATE (HCC): ICD-10-CM

## 2021-02-24 DIAGNOSIS — M54.2 NECK PAIN: ICD-10-CM

## 2021-02-24 DIAGNOSIS — M89.00 ALGODYSTROPHIC SYNDROME: Chronic | ICD-10-CM
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
## 2021-02-24 DIAGNOSIS — F41.1 GENERALIZED ANXIETY DISORDER: Chronic | ICD-10-CM

## 2021-02-24 PROCEDURE — 72170 X-RAY EXAM OF PELVIS: CPT

## 2021-02-24 PROCEDURE — 1200000000 HC SEMI PRIVATE

## 2021-02-24 PROCEDURE — 99222 1ST HOSP IP/OBS MODERATE 55: CPT | Performed by: NEUROLOGICAL SURGERY

## 2021-02-24 PROCEDURE — 99285 EMERGENCY DEPT VISIT HI MDM: CPT

## 2021-02-24 PROCEDURE — 72125 CT NECK SPINE W/O DYE: CPT

## 2021-02-24 PROCEDURE — 70450 CT HEAD/BRAIN W/O DYE: CPT

## 2021-02-24 PROCEDURE — 6360000002 HC RX W HCPCS: Performed by: STUDENT IN AN ORGANIZED HEALTH CARE EDUCATION/TRAINING PROGRAM

## 2021-02-24 PROCEDURE — 96374 THER/PROPH/DIAG INJ IV PUSH: CPT

## 2021-02-24 PROCEDURE — 6370000000 HC RX 637 (ALT 250 FOR IP): Performed by: STUDENT IN AN ORGANIZED HEALTH CARE EDUCATION/TRAINING PROGRAM

## 2021-02-24 PROCEDURE — 72131 CT LUMBAR SPINE W/O DYE: CPT

## 2021-02-24 PROCEDURE — 71045 X-RAY EXAM CHEST 1 VIEW: CPT

## 2021-02-24 PROCEDURE — 70486 CT MAXILLOFACIAL W/O DYE: CPT

## 2021-02-24 PROCEDURE — 73090 X-RAY EXAM OF FOREARM: CPT

## 2021-02-24 RX ORDER — METHOCARBAMOL 500 MG/1
1000 TABLET, FILM COATED ORAL 4 TIMES DAILY
Status: DISCONTINUED | OUTPATIENT
Start: 2021-02-24 | End: 2021-02-25 | Stop reason: HOSPADM

## 2021-02-24 RX ORDER — SODIUM CHLORIDE 0.9 % (FLUSH) 0.9 %
10 SYRINGE (ML) INJECTION EVERY 12 HOURS SCHEDULED
Status: DISCONTINUED | OUTPATIENT
Start: 2021-02-24 | End: 2021-02-25 | Stop reason: HOSPADM

## 2021-02-24 RX ORDER — SENNA AND DOCUSATE SODIUM 50; 8.6 MG/1; MG/1
2 TABLET, FILM COATED ORAL 2 TIMES DAILY
Status: DISCONTINUED | OUTPATIENT
Start: 2021-02-24 | End: 2021-02-25 | Stop reason: HOSPADM

## 2021-02-24 RX ORDER — FENTANYL CITRATE 50 UG/ML
50 INJECTION, SOLUTION INTRAMUSCULAR; INTRAVENOUS ONCE
Status: COMPLETED | OUTPATIENT
Start: 2021-02-24 | End: 2021-02-24

## 2021-02-24 RX ORDER — ONDANSETRON 4 MG/1
4 TABLET, ORALLY DISINTEGRATING ORAL EVERY 8 HOURS PRN
Status: DISCONTINUED | OUTPATIENT
Start: 2021-02-24 | End: 2021-02-25 | Stop reason: HOSPADM

## 2021-02-24 RX ORDER — OXYCODONE HYDROCHLORIDE 10 MG/1
10 TABLET ORAL EVERY 4 HOURS PRN
Status: DISCONTINUED | OUTPATIENT
Start: 2021-02-24 | End: 2021-02-25 | Stop reason: HOSPADM

## 2021-02-24 RX ORDER — SODIUM CHLORIDE 0.9 % (FLUSH) 0.9 %
10 SYRINGE (ML) INJECTION PRN
Status: DISCONTINUED | OUTPATIENT
Start: 2021-02-24 | End: 2021-02-25 | Stop reason: HOSPADM

## 2021-02-24 RX ORDER — OXYCODONE HYDROCHLORIDE 5 MG/1
5 TABLET ORAL EVERY 4 HOURS PRN
Status: DISCONTINUED | OUTPATIENT
Start: 2021-02-24 | End: 2021-02-25 | Stop reason: HOSPADM

## 2021-02-24 RX ORDER — ACETAMINOPHEN 325 MG/1
650 TABLET ORAL EVERY 4 HOURS
Status: DISCONTINUED | OUTPATIENT
Start: 2021-02-24 | End: 2021-02-25 | Stop reason: HOSPADM

## 2021-02-24 RX ORDER — ONDANSETRON 2 MG/ML
4 INJECTION INTRAMUSCULAR; INTRAVENOUS EVERY 6 HOURS PRN
Status: DISCONTINUED | OUTPATIENT
Start: 2021-02-24 | End: 2021-02-25 | Stop reason: HOSPADM

## 2021-02-24 RX ADMIN — ACETAMINOPHEN 650 MG: 325 TABLET ORAL at 20:14

## 2021-02-24 RX ADMIN — OXYCODONE HYDROCHLORIDE 10 MG: 10 TABLET ORAL at 20:14

## 2021-02-24 RX ADMIN — FENTANYL CITRATE 50 MCG: 50 INJECTION, SOLUTION INTRAMUSCULAR; INTRAVENOUS at 15:34

## 2021-02-24 ASSESSMENT — PAIN SCALES - GENERAL
PAINLEVEL_OUTOF10: 0
PAINLEVEL_OUTOF10: 9

## 2021-02-24 ASSESSMENT — PAIN DESCRIPTION - PAIN TYPE: TYPE: ACUTE PAIN
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
## 2021-02-24 NOTE — ED NOTES
Pt still at tests. Pt's  @ bed side.  RN brought pt's  some water     Johana Mo, [address] [facility]  02/24/21 [id number]
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
## 2021-02-25 VITALS
DIASTOLIC BLOOD PRESSURE: 57 MMHG | HEIGHT: 62 IN | TEMPERATURE: 96.9 F | RESPIRATION RATE: 18 BRPM | WEIGHT: 115 LBS | HEART RATE: 69 BPM | OXYGEN SATURATION: 93 % | BODY MASS INDEX: 21.16 KG/M2 | SYSTOLIC BLOOD PRESSURE: 117 MMHG

## 2021-02-25 LAB
ANION GAP SERPL CALCULATED.3IONS-SCNC: 8 MMOL/L (ref 7–16)
BASOPHILS ABSOLUTE: 0.04 E9/L (ref 0–0.2)
BASOPHILS RELATIVE PERCENT: 0.5 % (ref 0–2)
BUN BLDV-MCNC: 13 MG/DL (ref 6–20)
CALCIUM SERPL-MCNC: 9.2 MG/DL (ref 8.6–10.2)
CHLORIDE BLD-SCNC: 104 MMOL/L (ref 98–107)
CO2: 24 MMOL/L (ref 22–29)
CREAT SERPL-MCNC: 0.6 MG/DL (ref 0.5–1)
EOSINOPHILS ABSOLUTE: 0.05 E9/L (ref 0.05–0.5)
EOSINOPHILS RELATIVE PERCENT: 0.6 % (ref 0–6)
GFR AFRICAN AMERICAN: >60
GFR NON-AFRICAN AMERICAN: >60 ML/MIN/1.73
GLUCOSE BLD-MCNC: 107 MG/DL (ref 74–99)
HCT VFR BLD CALC: 42.2 % (ref 34–48)
HEMOGLOBIN: 13.7 G/DL (ref 11.5–15.5)
IMMATURE GRANULOCYTES #: 0.03 E9/L
IMMATURE GRANULOCYTES %: 0.4 % (ref 0–5)
LYMPHOCYTES ABSOLUTE: 1.12 E9/L (ref 1.5–4)
LYMPHOCYTES RELATIVE PERCENT: 14.2 % (ref 20–42)
MCH RBC QN AUTO: 29.7 PG (ref 26–35)
MCHC RBC AUTO-ENTMCNC: 32.5 % (ref 32–34.5)
MCV RBC AUTO: 91.5 FL (ref 80–99.9)
MONOCYTES ABSOLUTE: 0.54 E9/L (ref 0.1–0.95)
MONOCYTES RELATIVE PERCENT: 6.9 % (ref 2–12)
NEUTROPHILS ABSOLUTE: 6.09 E9/L (ref 1.8–7.3)
NEUTROPHILS RELATIVE PERCENT: 77.4 % (ref 43–80)
PDW BLD-RTO: 13.7 FL (ref 11.5–15)
PLATELET # BLD: 199 E9/L (ref 130–450)
PMV BLD AUTO: 10.9 FL (ref 7–12)
POTASSIUM REFLEX MAGNESIUM: 4.1 MMOL/L (ref 3.5–5)
RBC # BLD: 4.61 E12/L (ref 3.5–5.5)
SODIUM BLD-SCNC: 136 MMOL/L (ref 132–146)
WBC # BLD: 7.9 E9/L (ref 4.5–11.5)

## 2021-02-25 PROCEDURE — 99239 HOSP IP/OBS DSCHRG MGMT >30: CPT | Performed by: SURGERY

## 2021-02-25 PROCEDURE — 2580000003 HC RX 258: Performed by: STUDENT IN AN ORGANIZED HEALTH CARE EDUCATION/TRAINING PROGRAM

## 2021-02-25 PROCEDURE — 97535 SELF CARE MNGMENT TRAINING: CPT

## 2021-02-25 PROCEDURE — 85025 COMPLETE CBC W/AUTO DIFF WBC: CPT

## 2021-02-25 PROCEDURE — 96375 TX/PRO/DX INJ NEW DRUG ADDON: CPT

## 2021-02-25 PROCEDURE — 97530 THERAPEUTIC ACTIVITIES: CPT

## 2021-02-25 PROCEDURE — 36415 COLL VENOUS BLD VENIPUNCTURE: CPT

## 2021-02-25 PROCEDURE — 6370000000 HC RX 637 (ALT 250 FOR IP): Performed by: STUDENT IN AN ORGANIZED HEALTH CARE EDUCATION/TRAINING PROGRAM

## 2021-02-25 PROCEDURE — 97161 PT EVAL LOW COMPLEX 20 MIN: CPT

## 2021-02-25 PROCEDURE — L0172 CERV COL SR FOAM 2PC PRE OTS: HCPCS

## 2021-02-25 PROCEDURE — 6360000002 HC RX W HCPCS: Performed by: STUDENT IN AN ORGANIZED HEALTH CARE EDUCATION/TRAINING PROGRAM

## 2021-02-25 PROCEDURE — 80048 BASIC METABOLIC PNL TOTAL CA: CPT

## 2021-02-25 PROCEDURE — 97166 OT EVAL MOD COMPLEX 45 MIN: CPT

## 2021-02-25 RX ORDER — OXYCODONE HYDROCHLORIDE 5 MG/1
5 TABLET ORAL EVERY 6 HOURS PRN
Qty: 28 TABLET | Refills: 0 | Status: SHIPPED | OUTPATIENT
Start: 2021-02-25 | End: 2021-03-04

## 2021-02-25 RX ORDER — METHOCARBAMOL 500 MG/1
1000 TABLET, FILM COATED ORAL 4 TIMES DAILY
Qty: 80 TABLET | Refills: 0 | Status: SHIPPED | OUTPATIENT
Start: 2021-02-25 | End: 2021-03-07

## 2021-02-25 RX ADMIN — ONDANSETRON 4 MG: 2 INJECTION INTRAMUSCULAR; INTRAVENOUS at 11:32

## 2021-02-25 RX ADMIN — METHOCARBAMOL TABLETS 1000 MG: 500 TABLET, COATED ORAL at 09:03

## 2021-02-25 RX ADMIN — ACETAMINOPHEN 650 MG: 325 TABLET ORAL at 14:18

## 2021-02-25 RX ADMIN — ACETAMINOPHEN 650 MG: 325 TABLET ORAL at 05:52

## 2021-02-25 RX ADMIN — OXYCODONE HYDROCHLORIDE 10 MG: 10 TABLET ORAL at 03:50

## 2021-02-25 RX ADMIN — OXYCODONE HYDROCHLORIDE 10 MG: 10 TABLET ORAL at 09:03

## 2021-02-25 RX ADMIN — SODIUM CHLORIDE, PRESERVATIVE FREE 10 ML: 5 INJECTION INTRAVENOUS at 09:04

## 2021-02-25 RX ADMIN — OXYCODONE HYDROCHLORIDE 10 MG: 10 TABLET ORAL at 14:15

## 2021-02-25 RX ADMIN — METHOCARBAMOL TABLETS 1000 MG: 500 TABLET, COATED ORAL at 14:15

## 2021-02-25 ASSESSMENT — PAIN DESCRIPTION - PAIN TYPE
TYPE: ACUTE PAIN
TYPE: ACUTE PAIN

## 2021-02-25 ASSESSMENT — PAIN DESCRIPTION - LOCATION
LOCATION: HEAD
LOCATION: HEAD

## 2021-02-25 ASSESSMENT — PAIN DESCRIPTION - DESCRIPTORS
DESCRIPTORS: ACHING;DISCOMFORT;HEADACHE
DESCRIPTORS: HEADACHE;ACHING;SORE
DESCRIPTORS: ACHING;CONSTANT;DISCOMFORT

## 2021-02-25 ASSESSMENT — PAIN SCALES - GENERAL
PAINLEVEL_OUTOF10: 0
PAINLEVEL_OUTOF10: 8
PAINLEVEL_OUTOF10: 7

## 2021-02-25 ASSESSMENT — PAIN DESCRIPTION - ORIENTATION: ORIENTATION: POSTERIOR

## 2021-02-25 ASSESSMENT — PAIN DESCRIPTION - FREQUENCY
FREQUENCY: CONTINUOUS
FREQUENCY: CONTINUOUS

## 2021-02-25 ASSESSMENT — PAIN DESCRIPTION - PROGRESSION
CLINICAL_PROGRESSION: NOT CHANGED
CLINICAL_PROGRESSION: NOT CHANGED

## 2021-02-25 ASSESSMENT — PAIN DESCRIPTION - ONSET: ONSET: ON-GOING
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
## 2021-02-25 NOTE — CARE COORDINATION
2/25/2021 social work transition of care planning  Pt is from home with family and had been independent. Pt sees a pcp at [facility]. E's clinic. Pt pharmacy is [facility] on [address]. Explained sw role in transition of care planning. Pt plan is home,once medically stable. Pt will call for a ride at discharge.   Electronically signed by MARLON Hanson on 2/25/2021 at 12:14 PM
Subjective Complaints:  Historian:       Consult requested by ER doctor:                  Attending:DR Rivera      HPI:  80 year old male presents today stephanie accompanied with his daughter who reports that he did have seizure activitiy this morning, she states that he was sitting at the table when he started to lean over, his hands started shaking and his eyes twitching lasting ten minutes, during this he was making sense at times other times not, (-) urinary or bowel incontinence (-) tongue bites (13 May 2019 13:49)    JOSÉ MIGUEL CABAN    PAST MEDICAL & SURGICAL HISTORY:  Ulcer of foot  Hyperlipidemia  BPH (benign prostatic hypertrophy)  Obesity  GERD (gastroesophageal reflux disease)  Hypertension  Diabetes mellitus: Type 2 NIDDM  PVD (peripheral vascular disease)  Varicose veins of left lower extremity with ulcer: laser surgery right LE  80yMale    MEDICATIONS  (STANDING):  aspirin  chewable 81 milliGRAM(s) Oral daily  ATENolol  Tablet 50 milliGRAM(s) Oral daily  cilostazol 100 milliGRAM(s) Oral two times a day  dextrose 5%. 1000 milliLiter(s) (50 mL/Hr) IV Continuous <Continuous>  dextrose 50% Injectable 12.5 Gram(s) IV Push once  dextrose 50% Injectable 25 Gram(s) IV Push once  dextrose 50% Injectable 25 Gram(s) IV Push once  donepezil 10 milliGRAM(s) Oral at bedtime  furosemide    Tablet 40 milliGRAM(s) Oral two times a day  heparin  Injectable 5000 Unit(s) SubCutaneous every 12 hours  insulin glargine Injectable (LANTUS) 15 Unit(s) SubCutaneous at bedtime  insulin lispro (HumaLOG) corrective regimen sliding scale   SubCutaneous three times a day before meals  insulin lispro (HumaLOG) corrective regimen sliding scale   SubCutaneous at bedtime  losartan 100 milliGRAM(s) Oral daily  oxybutynin 5 milliGRAM(s) Oral two times a day  pantoprazole    Tablet 40 milliGRAM(s) Oral before breakfast  tamsulosin 0.4 milliGRAM(s) Oral at bedtime    MEDICATIONS  (PRN):  dextrose 40% Gel 15 Gram(s) Oral once PRN Blood Glucose LESS THAN 70 milliGRAM(s)/deciliter  glucagon  Injectable 1 milliGRAM(s) IntraMuscular once PRN Glucose LESS THAN 70 milligrams/deciliter      Allergies    Cipro (Hives)  codeine (Other)  penicillins (Unknown)    Intolerances      FAMILY HISTORY:  Family history of schizophrenia (Mother)      REVIEW OF SYSTEMS:  General:  No wt loss, fevers, chills, night sweats  Eyes:  Good vision, no reported pain  ENT:  No sore throat, pain, runny nose, dysphagia  CV:  No pain, palpitatioins, hypo/hypertension  Resp:  No dyspnea, cough, tachypnea, wheezing  GI:  No pain, nausea, vomiting, diarrhea, constipatiion  :  No pain, bleeding, incontinence, nocturia  Muscle:  No pain, weakness  Breast:  No pain, abscess, mass, discharge  Neuro:  No weakness, tingling, memory problems  Psych:  No fatigue, insomnia, mood problems, depression  Endocrine:  No polyuria, polydypsia, cold/heat intolerance  Heme:  No petechiae, ecchymosis, easy bruisability  Skin:  No rash, tattoos, scars, edema      Vital Signs Last 24 Hrs  T(C): 36.2 (14 May 2019 05:09), Max: 36.8 (13 May 2019 11:23)  T(F): 97.1 (14 May 2019 05:09), Max: 98.3 (13 May 2019 11:23)  HR: 56 (14 May 2019 05:09) (50 - 74)  BP: 127/65 (14 May 2019 05:09) (110/58 - 143/84)  BP(mean): --  RR: 16 (14 May 2019 05:09) (15 - 24)  SpO2: 98% (14 May 2019 05:09) (97% - 99%)    GENERAL PHYSICAL EXAM:  General:  Appears stated age, well-groomed, well-nourished, no distress  HEENT:  NC/AT, patent nares w/ pink mucosa, OP clear w/o lesions, PERRL, EOMI, conjunctivae clear, no thyromegaly, nodules, adenopathy, no JVD  Chest:  Full & symmetric excursion, no increased effort, breath sounds clear  Cardiovascular:  Regular rhythm, S1, S2, no murmur/rub/S3/S4, no carotid/femoral/abdominal bruit, radial/pedal pulses 2+, no edema  Abdomen:  Soft, non-tender, non-distended, normoactive bowel sounds, no HSM  Extremities:  Gait & station:   Digits:   Nails:   Joints, Bones, Muscles:   ROM:   Stability:  Skin:  No rash/erythema/ecchymoses/petechiae/wounds/abscess/warm/dry  Musculoskeletal:  Full ROM in all joints w/o swelling/tenderness/effusion    NEUROLOGICAL EXAM:  HENT:  Normocephalic head; atraumatic head.  Neck supple.  ENT: normal looking.  Mental State:    Alert.  Fully oriented to person, place .  Speech clear and intact.  Cooperative.  Responds appropriately.    Cranial Nerves:  II-XII:   Pupils round and reactive to light and accommodation.  Extraocular movements full.  Visual fields full (no homonymous hemianopsia).  Visual acuity wnl.  Facial symmetry intact.  Tongue midline.  Motor Functions:  Moves all extremities. motor strength is 4/5   Sensory Functions:   Intact to touch and pinprick to face and extremities.    Reflexes:  Deep tendon reflexes normoactive to biceps, knees and ankles.  Babinski absent (present).  Cerebellar Testing:    Finger to nose intact.  Nystagmus absent.  Gait : hesitant     LABS:                        11.4   6.43  )-----------( 275      ( 14 May 2019 06:19 )             33.6         141  |  103  |  27<H>  ----------------------------<  67<L>  3.8   |  30  |  2.25<H>    Ca    9.8      14 May 2019 06:19  Phos  4.2       Mg     2.1         TPro  8.0  /  Alb  3.3  /  TBili  0.3  /  DBili  x   /  AST  13<L>  /  ALT  13  /  AlkPhos  85          Urinalysis Basic - ( 13 May 2019 16:01 )    Color: Yellow / Appearance: Clear / S.005 / pH: x  Gluc: x / Ketone: Negative  / Bili: Negative / Urobili: Negative mg/dL   Blood: x / Protein: Negative mg/dL / Nitrite: Negative   Leuk Esterase: Negative / RBC: x / WBC x   Sq Epi: x / Non Sq Epi: x / Bacteria: x        RADIOLOGY & ADDITIONAL STUDIES:    Consult- ED Provider Requested:    Consultant Specialty: Family Medicine   Team Name: Private MD   Status: Attempted   Initial Date/Time Called: 13-May-2019 at 11:35   Provider Notified: Dr Rivera ( @ 11:39)  Admit to Inpatient Level of Care:     Service:  TELEMETRY    Physician:  Kenneth Rivera    Additional Instructions:  Diagnosis: Seizure  Isolation: None  Special Consideration: None ( @ 13:38)  Admit to Inpatient Level of Care:     LVS 2D    Service:  Telemetry    Physician:  Dr Rivera (:49)  heparin  Injectable:   5000 Unit(s), SubCutaneous, every 12 hours  Provider's Contact #: [phone] ( 13:49)  Notify Provider For:     Additional Instructions:  Seizure. (:49)  Seizure Precautions:     Time/Priority:  Routine ( @ :49)  Fall Risk Protocol:     Time/Priority:  Routine (:49)  Pulse Oximetry:   Frequency: <Continuous>    Additional Instructions:  Monitor pulse oximetry after seizure occurence until oxygen saturation is greater than 95%. ( @ 13:49)  Diet, Regular (:49)  Complete Blood Count: AM Sched. Collection: 14-May-2019 05:00 ( @ 13:49)  Basic Metabolic Panel: AM Sched. Collection: 14-May-2019 05:00 ( @ 13:49)  Admit from ED ( @ 13:52)  aspirin  chewable:   81 milliGRAM(s), Oral, daily  Provider's Contact #: [phone] ( @ 13:53)  losartan: [Known as COZAAR]  100 milliGRAM(s), Oral, daily  Special Instructions: hold for SBP<120  Provider's Contact #: [phone] ( @ 13:53)  insulin glargine Injectable (LANTUS):   15 Unit(s), SubCutaneous, at bedtime  Administration Instructions: NOT to be mixed with other insulins  Dispose unused medication in BLACK bin.  Provider's Contact #: [phone] ( @ 13:53)  ATENolol  Tablet: [Known as TENORMIN]  50 milliGRAM(s), Oral, daily  Special Instructions: hold for sbp < 120 and HR < 60  Administration Instructions: This is a Look-alike/Sound-alike Medication  Provider's Contact #: [phone] ( @ 13:53)  tamsulosin: [Known as FLOMAX]  0.4 milliGRAM(s), Oral, at bedtime  Indication: bph  Administration Instructions: Swallow whole * don't crush/chew  This is a Look-alike/Sound-alike Medication  Provider's Contact #: [phone] ( @ 13:53)  donepezil: [Known as ARICEPT]  10 milliGRAM(s), Oral, at bedtime  Provider's Contact #: [phone] ( @ 13:53)  furosemide    Tablet: [Known as LASIX]  40 milliGRAM(s), Oral, two times a day  Special Instructions: hold for SBP<120  Provider's Contact #: [phone] ( @ 13:53)  cilostazol: [Known as PLETAL]  100 milliGRAM(s), Oral, two times a day  Provider's Contact #: [phone] ( @ 13:53)  pantoprazole    Tablet: [Known as PROTONIX   DR]  40 milliGRAM(s), Oral, before breakfast  Administration Instructions: swallow whole * don't crush/chew ( @ 13:53)  oxybutynin: [Known as DITROPAN]  10 milliGRAM(s), Oral, daily  Provider's Contact #: [phone] ( @ 13:53)  Remote Telemetry/EKG EXCEPT Off Unit Tests:     Time/Priority:  Routine ( @ 13:56)  Provider to RN:       pt can go to the floor ( @ 13:56)  oxybutynin: [Known as DITROPAN]  5 milliGRAM(s), Oral, two times a day  Provider's Contact #: [phone] ( @ 15:11)  Hemoglobin A1C, Whole Blood: AM Sched. Collection: 14-May-2019 05:00 ( @ 19:36)  Blood Glucose Point Of Care Testing:     Frequency:  Before meals and at bedtime    Additional Instructions:  Before meals and at bedtime ( @ 19:36)  Blood Glucose Point Of Care Testing:     Frequency:  every 15 minutes    Additional Instructions:  After carbohydrate administration for hypoglycemia, repeat every 15 minute(s) until blood glucose is GREATER THAN or EQUAL  milliGRAM(s)/deciLiter twice consecutively ( @ 19:36)  Notify Provider For:     Additional Instructions:  Blood glucose LESS THAN 70 milliGRAM(s)/deciLiter or GREATER THAN 400 milliGRAM(s)/deciLiter ( @ 19:36)  Education:     Diabetes    Other: Diet, Exercise    Additional Instructions: Diet, Exercise, Diabetes ( @ 19:36)  insulin lispro (HumaLOG) corrective regimen sliding scale:       2 Unit(s) if Glucose 151 - 200      4 Unit(s) if Glucose 201 - 250      6 Unit(s) if Glucose 251 - 300      8 Unit(s) if Glucose 301 - 350      10 Unit(s) if Glucose 351 - 400      12 Unit(s) if Glucose Greater Than 400 + Contact MD  SubCutaneous, three times a day before meals  Special Instructions: Give correctional scale insulin REGARDLESS of PO status NOTIFY Provider for blood glucose LESS THAN 70 milliGRAM(s)/deciLiter or above 400 milliGRAM(s)/deciLiter.  Administration Instructions: *Per Sliding Scale*  Dispose unused medication in BLACK bin.  This is a Look-alike/Sound-alike Medication  Provider's Contact #: [phone] ( @ 19:36)  insulin lispro (HumaLOG) corrective regimen sliding scale:       0 Unit(s) if Glucose 61 - 250      2 Unit(s) if Glucose 251 - 300      4 Unit(s) if Glucose 301 - 350      6 Unit(s) if Glucose 351 - 400      8 Unit(s) if Glucose Greater Than 400 + Contact MD  SubCutaneous, at bedtime  Special Instructions: Give correctional scale insulin REGARDLESS of PO status NOTIFY Provider for blood glucose LESS THAN 70 milliGRAM(s)/deciLiter or above 400 milliGRAM(s)/deciLiter.  Administration Instructions: *Per Sliding Scale*  Dispose unused medication in BLACK bin.  This is a Look-alike/Sound-alike Medication  Provider's Contact #: [phone] ( @ 19:36)  dextrose 5%.: Solution, 1000 milliLiter(s) infuse at 50 mL/Hr  Special Instructions: Conditional Order: HYPOGLYCEMIA PROTOCOL  Provider's Contact #: [phone] ( @ 19:36)  Administer Carbohydrates:     Additional Instructions:  STAT RESPONSIVE patient and Blood Glucose is LESS THAN 70 milliGRAM(s)/deciLiter: Administer 15 grams of fast acting carbohydrate [e.g., Give 4 ounces of APPLE Juice OR 6 ounces of NON-DIET soda OR 1 tube (15 grams) oral glucose gel (available in Automated Dispensing Machine)] and recheck capillary blood glucose in 15 minutes.  Repeat treatment and recheck glucose every 15 minutes until Blood Glucose is GREATER THAN or EQUAL  milliGRAM(s)/deciLiter and NOTIFY Provider.  HYPOGLYCEMIA PROTOCOL ( 19:36)  dextrose 40% Gel: [Known as GLUTOSE 15]  15 Gram(s), Oral, once, PRN for Blood Glucose LESS THAN 70 milliGRAM(s)/deciliter, Stop After 1 Doses  Special Instructions: Conditional Order: HYPOGLYCEMIA PROTOCOL  Administration Instructions: Contents of 37.5 Gram(s) tube delivers 15 Gram(s) dextrose  Provider's Contact #: [phone] ( @ 19:36)  dextrose 50% Injectable:   12.5 Gram(s), IV Push, once, Stop After 1 Doses  Special Instructions: Conditional Order: HYPOGLYCEMIA PROTOCOL  Provider's Contact #: [phone] ( @ 19:36)  dextrose 50% Injectable:   25 Gram(s), IV Push, once, Stop After 1 Doses  Special Instructions: Conditional Order: HYPOGLYCEMIA PROTOCOL  Provider's Contact #: [phone] ( @ 19:36)  dextrose 50% Injectable:   25 Gram(s), IV Push, once, Stop After 1 Doses  Special Instructions: Conditional Order: HYPOGLYCEMIA PROTOCOL  Provider's Contact #: [phone] ( @ 19:36)  glucagon  Injectable:   1 milliGRAM(s), IntraMuscular, once, PRN for Glucose LESS THAN 70 milligrams/deciliter, Stop After 1 Doses  Special Instructions: Conditional Order: HYPOGLYCEMIA PROTOCOL  Provider's Contact #: [phone] ( @ 19:36)  Provider to RN:       UNRESPONSIVE patient and Blood Glucose LESS THAN 70 milliGRAM(s)/deciLiter call Rapid Response.  HYPOGLYCEMIA PROTOCOL ( 19:36)  Diet, Regular:   Consistent Carbohydrate Evening Snack  DASH/TLC Sodium & Cholesterol Restricted ( 19:36)  POCT  Blood Glucose: 21:21 ( @ 21:23)  Thyroid Stimulating Hormone, Serum: Routine ( @ 01:28)  Phosphorus Level, Serum: Routine ( @ 01:28)  Magnesium, Serum: Routine ( @ :28)  dextrose 50% Injectable:   12.5 Gram(s), IV Push, once, Stop After 1 Doses  Special Instructions: Conditional Order: HYPOGLYCEMIA PROTOCOL  Provider's Contact #: [phone] ( @ 08:04)  POCT  Blood Glucose: 08:03 ( @ 08:05)  POCT  Blood Glucose: 08:06 ( @ 08:07)  (ADM OVERRIDE):   Qty Removed: 1 each  Route - Dose Given <see task> ( @ 08:08)  POCT  Blood Glucose: 08:12 ( @ 08:34)      Assessment & Opinion:80 y o man seen for evaluation because of an episode of seizure   DX Seizure disorder     Recommendations:  Brain MRI.  EEG.   DVT prophylaxis as ordered.  Medications:  keppra

## 2021-02-25 NOTE — PROGRESS NOTES
Reviewed AVS with patient and her . Reviewed viewed new medication, robaxin and oxy. Discussed what each was for, when and how much to take, possible side effects.

## 2021-02-26 ASSESSMENT — ENCOUNTER SYMPTOMS
PHOTOPHOBIA: 0
SHORTNESS OF BREATH: 0
ABDOMINAL DISTENTION: 0
NAUSEA: 0
BACK PAIN: 0
DIARRHEA: 0
ABDOMINAL PAIN: 0
VOMITING: 0

## 2021-02-26 NOTE — PROGRESS NOTES
Dr Brianna Chaves notified of admission    Electronically signed by AYESHA Gómez CNP on 2/26/2021 at 7:48 AM

## 2021-03-01 ENCOUNTER — TELEPHONE (OUTPATIENT)
Dept: SURGERY | Age: 61
End: 2021-03-01

## 2021-03-01 DIAGNOSIS — M54.2 NECK PAIN: Primary | ICD-10-CM

## 2021-03-05 ENCOUNTER — IMMUNIZATION (OUTPATIENT)
Dept: PRIMARY CARE CLINIC | Age: 61
End: 2021-03-05
Payer: MEDICARE

## 2021-03-05 PROCEDURE — 91300 COVID-19, PFIZER VACCINE 30MCG/0.3ML DOSE: CPT | Performed by: PHYSICIAN ASSISTANT

## 2021-03-05 PROCEDURE — 0001A COVID-19, PFIZER VACCINE 30MCG/0.3ML DOSE: CPT | Performed by: PHYSICIAN ASSISTANT

## 2021-03-08 ENCOUNTER — HOSPITAL ENCOUNTER (OUTPATIENT)
Dept: GENERAL RADIOLOGY | Age: 61
Discharge: HOME | End: 2021-03-10
Payer: MEDICARE

## 2021-03-08 ENCOUNTER — HOSPITAL ENCOUNTER (OUTPATIENT)
Age: 61
Discharge: HOME | End: 2021-03-10
Payer: MEDICARE

## 2021-03-08 DIAGNOSIS — M54.2 NECK PAIN: ICD-10-CM

## 2021-03-08 PROCEDURE — 72040 X-RAY EXAM NECK SPINE 2-3 VW: CPT

## 2021-03-09 ENCOUNTER — OFFICE VISIT (OUTPATIENT)
Dept: SURGERY | Age: 61
End: 2021-03-09
Payer: MEDICARE

## 2021-03-09 VITALS
WEIGHT: 115 LBS | SYSTOLIC BLOOD PRESSURE: 99 MMHG | HEART RATE: 94 BPM | RESPIRATION RATE: 16 BRPM | HEIGHT: 62 IN | OXYGEN SATURATION: 94 % | BODY MASS INDEX: 21.16 KG/M2 | TEMPERATURE: 98.1 F | DIASTOLIC BLOOD PRESSURE: 63 MMHG

## 2021-03-09 DIAGNOSIS — S06.0X1D CONCUSSION WITH LOSS OF CONSCIOUSNESS OF 30 MINUTES OR LESS, SUBSEQUENT ENCOUNTER: Primary | ICD-10-CM

## 2021-03-09 PROCEDURE — 99212 OFFICE O/P EST SF 10 MIN: CPT | Performed by: NURSE PRACTITIONER

## 2021-03-09 RX ORDER — MECLIZINE HCL 12.5 MG/1
12.5 TABLET ORAL 3 TIMES DAILY PRN
Qty: 15 TABLET | Refills: 1 | Status: SHIPPED
Start: 2021-03-09 | End: 2021-05-14 | Stop reason: SDUPTHER

## 2021-03-09 RX ORDER — METHOCARBAMOL 500 MG/1
500 TABLET, FILM COATED ORAL 4 TIMES DAILY
COMMUNITY
End: 2021-03-11 | Stop reason: ALTCHOICE
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
## 2021-03-09 NOTE — PATIENT INSTRUCTIONS
Any questions or concerns, please contact Ayanna at [phone].   Follow up with Dr. Mag Cuevas (Concussion Specialist)

## 2021-03-11 ENCOUNTER — TELEPHONE (OUTPATIENT)
Dept: PHARMACY | Facility: CLINIC | Age: 61
End: 2021-03-11
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
## 2021-03-11 PROCEDURE — 1111F DSCHRG MED/CURRENT MED MERGE: CPT | Performed by: PHARMACIST

## 2021-03-11 NOTE — TELEPHONE ENCOUNTER
CLINICAL PHARMACY NOTE  Post-Discharge Transitions of Care (CÉSAR)    Patient appears to have been discharged from [facility]   on 2/25/21. Please follow-up with patient to review medications.       30 days since discharge = 3/26/21     [address]  [phone] or [phone] (Option 7)
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
## 2021-03-15 ENCOUNTER — OFFICE VISIT (OUTPATIENT)
Dept: NEUROSURGERY | Age: 61
End: 2021-03-15
Payer: MEDICARE

## 2021-03-15 VITALS
WEIGHT: 115 LBS | TEMPERATURE: 96.1 F | SYSTOLIC BLOOD PRESSURE: 108 MMHG | BODY MASS INDEX: 21.16 KG/M2 | HEART RATE: 93 BPM | HEIGHT: 62 IN | DIASTOLIC BLOOD PRESSURE: 72 MMHG

## 2021-03-15 DIAGNOSIS — M54.2 NECK PAIN: Primary | ICD-10-CM

## 2021-03-15 PROCEDURE — 99213 OFFICE O/P EST LOW 20 MIN: CPT | Performed by: PHYSICIAN ASSISTANT
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
## 2021-03-19 ENCOUNTER — TELEPHONE (OUTPATIENT)
Dept: NEUROSURGERY | Age: 61
End: 2021-03-19

## 2021-03-19 ENCOUNTER — TELEPHONE (OUTPATIENT)
Dept: ADMINISTRATIVE | Age: 61
End: 2021-03-19

## 2021-03-19 NOTE — TELEPHONE ENCOUNTER
Patient requested to speak with CAMRYN Sanchez regarding removal of collar and pain.   Pt#  [phone]

## 2021-03-19 NOTE — TELEPHONE ENCOUNTER
Pt called in to schedule appt for concussion. She has a referral in the que, that has been called on twice, but the number was wrong.   She asked that you please call her @ the number updated in her chart.  ([phone])

## 2021-03-26 ENCOUNTER — OFFICE VISIT (OUTPATIENT)
Dept: FAMILY MEDICINE CLINIC | Age: 61
End: 2021-03-26
Payer: MEDICARE

## 2021-03-26 VITALS
RESPIRATION RATE: 14 BRPM | HEART RATE: 85 BPM | DIASTOLIC BLOOD PRESSURE: 74 MMHG | WEIGHT: 114.2 LBS | BODY MASS INDEX: 21.02 KG/M2 | OXYGEN SATURATION: 98 % | TEMPERATURE: 97.2 F | SYSTOLIC BLOOD PRESSURE: 112 MMHG | HEIGHT: 62 IN

## 2021-03-26 DIAGNOSIS — M54.42 ACUTE MIDLINE LOW BACK PAIN WITH LEFT-SIDED SCIATICA: ICD-10-CM

## 2021-03-26 DIAGNOSIS — M54.2 NECK PAIN: Primary | ICD-10-CM

## 2021-03-26 DIAGNOSIS — F07.81 POST CONCUSSION SYNDROME: ICD-10-CM

## 2021-03-26 DIAGNOSIS — F80.81 STUTTERING: ICD-10-CM

## 2021-03-26 PROCEDURE — 99213 OFFICE O/P EST LOW 20 MIN: CPT | Performed by: FAMILY MEDICINE

## 2021-03-26 RX ORDER — ORPHENADRINE CITRATE 100 MG/1
100 TABLET, EXTENDED RELEASE ORAL 2 TIMES DAILY
Qty: 20 TABLET | Refills: 0 | Status: SHIPPED
Start: 2021-03-26 | End: 2021-03-26 | Stop reason: ALTCHOICE

## 2021-03-26 RX ORDER — CYCLOBENZAPRINE HCL 10 MG
10 TABLET ORAL 3 TIMES DAILY PRN
Qty: 30 TABLET | Refills: 0 | Status: SHIPPED | OUTPATIENT
Start: 2021-03-26 | End: 2021-04-05

## 2021-03-26 ASSESSMENT — ENCOUNTER SYMPTOMS
DIARRHEA: 0
CONSTIPATION: 0
SHORTNESS OF BREATH: 0
BACK PAIN: 1
RHINORRHEA: 0
WHEEZING: 0
ABDOMINAL PAIN: 0
SORE THROAT: 0
NAUSEA: 0
VOMITING: 0
COUGH: 0
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
## 2021-03-31 ENCOUNTER — IMMUNIZATION (OUTPATIENT)
Dept: PRIMARY CARE CLINIC | Age: 61
End: 2021-03-31
Payer: MEDICARE

## 2021-03-31 PROCEDURE — 0002A COVID-19, PFIZER VACCINE 30MCG/0.3ML DOSE: CPT | Performed by: NURSE PRACTITIONER

## 2021-03-31 PROCEDURE — 91300 COVID-19, PFIZER VACCINE 30MCG/0.3ML DOSE: CPT | Performed by: NURSE PRACTITIONER

## 2021-04-06 ENCOUNTER — HOSPITAL ENCOUNTER (OUTPATIENT)
Dept: SPEECH THERAPY | Age: 61
Setting detail: THERAPIES SERIES
Discharge: HOME | End: 2021-04-06
Payer: MEDICARE

## 2021-04-06 PROCEDURE — 92523 SPEECH SOUND LANG COMPREHEN: CPT
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
## 2021-04-08 ENCOUNTER — OFFICE VISIT (OUTPATIENT)
Dept: FAMILY MEDICINE CLINIC | Age: 61
End: 2021-04-08
Payer: MEDICARE

## 2021-04-08 VITALS
BODY MASS INDEX: 20.54 KG/M2 | DIASTOLIC BLOOD PRESSURE: 67 MMHG | TEMPERATURE: 97 F | HEART RATE: 60 BPM | HEIGHT: 62 IN | RESPIRATION RATE: 16 BRPM | OXYGEN SATURATION: 98 % | SYSTOLIC BLOOD PRESSURE: 96 MMHG | WEIGHT: 111.6 LBS

## 2021-04-08 DIAGNOSIS — M54.2 NECK PAIN: Primary | ICD-10-CM

## 2021-04-08 DIAGNOSIS — G89.29 CHRONIC INTRACTABLE HEADACHE, UNSPECIFIED HEADACHE TYPE: ICD-10-CM

## 2021-04-08 DIAGNOSIS — R51.9 CHRONIC INTRACTABLE HEADACHE, UNSPECIFIED HEADACHE TYPE: ICD-10-CM

## 2021-04-08 DIAGNOSIS — F80.81 STUTTERING: ICD-10-CM

## 2021-04-08 DIAGNOSIS — F07.81 POST CONCUSSION SYNDROME: ICD-10-CM

## 2021-04-08 PROCEDURE — 99213 OFFICE O/P EST LOW 20 MIN: CPT | Performed by: FAMILY MEDICINE

## 2021-04-08 RX ORDER — ORPHENADRINE CITRATE 100 MG/1
100 TABLET, EXTENDED RELEASE ORAL 2 TIMES DAILY
Qty: 30 TABLET | Refills: 0 | Status: SHIPPED | OUTPATIENT
Start: 2021-04-08 | End: 2021-04-23

## 2021-04-08 NOTE — PROGRESS NOTES
S: 62 y.o. female with   Chief Complaint   Patient presents with    Neck Pain     per patient not better     Back Pain     not better        Neck and back pains - she has seen speech therapy. Planing on seeing Dr Roberto Carlos Jaquez for post concussive sx    O: VS:  height is 5' 2\" (1.575 m) and weight is 111 lb 9.6 oz (50.6 kg). Her temporal temperature is 97 °F (36.1 °C). Her blood pressure is 96/67 and her pulse is 60. Her respiration is 16 and oxygen saturation is 98%. BP Readings from Last 3 Encounters:   04/08/21 96/67   03/26/21 112/74   03/15/21 108/72     See resident note      Impression/Plan:   1) Neck/Back Pain - refer to neuro          Health Maintenance Due   Topic Date Due    Shingles Vaccine (1 of 2) Never done    Pneumococcal 0-64 years Vaccine (1 of 1 - PPSV23) 11/13/2015    Cervical cancer screen  10/17/2017    Breast cancer screen  04/25/2018    Colon Cancer Screen FIT/FOBT  05/01/2018    A1C test (Diabetic or Prediabetic)  12/19/2018         Attending Physician Statement  I have discussed the case, including pertinent history and exam findings with the resident. I agree with the documented assessment and plan.       Augusto Tomlinson MD
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
## 2021-04-09 ASSESSMENT — ENCOUNTER SYMPTOMS
SHORTNESS OF BREATH: 0
DIARRHEA: 0
CONSTIPATION: 0
VOMITING: 0
COUGH: 0
BACK PAIN: 1
ABDOMINAL PAIN: 0
NAUSEA: 0

## 2021-04-15 ENCOUNTER — TELEPHONE (OUTPATIENT)
Dept: FAMILY MEDICINE CLINIC | Age: 61
End: 2021-04-15

## 2021-04-15 DIAGNOSIS — F98.5 ADULT STUTTERING: Primary | ICD-10-CM

## 2021-04-15 DIAGNOSIS — R41.841 COGNITIVE COMMUNICATION DEFICIT: ICD-10-CM

## 2021-04-22 ENCOUNTER — HOSPITAL ENCOUNTER (OUTPATIENT)
Dept: SPEECH THERAPY | Age: 61
Setting detail: THERAPIES SERIES
Discharge: HOME | End: 2021-04-22
Payer: MEDICARE
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
## 2021-04-29 ENCOUNTER — APPOINTMENT (OUTPATIENT)
Dept: SPEECH THERAPY | Age: 61
End: 2021-04-29
Payer: MEDICARE

## 2021-04-30 ENCOUNTER — OFFICE VISIT (OUTPATIENT)
Dept: PHYSICAL MEDICINE AND REHAB | Age: 61
End: 2021-04-30
Payer: MEDICARE

## 2021-04-30 VITALS
BODY MASS INDEX: 20.98 KG/M2 | DIASTOLIC BLOOD PRESSURE: 66 MMHG | HEIGHT: 62 IN | HEART RATE: 66 BPM | WEIGHT: 114 LBS | SYSTOLIC BLOOD PRESSURE: 108 MMHG

## 2021-04-30 DIAGNOSIS — S06.0X1S CONCUSSION WITH LOSS OF CONSCIOUSNESS OF 30 MINUTES OR LESS, SEQUELA (HCC): Primary | ICD-10-CM

## 2021-04-30 PROCEDURE — 96116 NUBHVL XM PHYS/QHP 1ST HR: CPT | Performed by: PHYSICAL MEDICINE & REHABILITATION

## 2021-04-30 PROCEDURE — 99204 OFFICE O/P NEW MOD 45 MIN: CPT | Performed by: PHYSICAL MEDICINE & REHABILITATION

## 2021-04-30 RX ORDER — NORTRIPTYLINE HYDROCHLORIDE 10 MG/1
10 CAPSULE ORAL NIGHTLY
Qty: 30 CAPSULE | Refills: 0 | Status: SHIPPED
Start: 2021-04-30 | End: 2021-06-01

## 2021-04-30 RX ORDER — SUMATRIPTAN 50 MG/1
50 TABLET, FILM COATED ORAL
Qty: 9 TABLET | Refills: 2 | Status: SHIPPED | OUTPATIENT
Start: 2021-04-30 | End: 2021-06-15
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
## 2021-05-04 ENCOUNTER — HOSPITAL ENCOUNTER (OUTPATIENT)
Dept: PHYSICAL THERAPY | Age: 61
Setting detail: THERAPIES SERIES
Discharge: HOME | End: 2021-05-04
Payer: MEDICARE

## 2021-05-04 PROCEDURE — 97161 PT EVAL LOW COMPLEX 20 MIN: CPT | Performed by: PHYSICAL THERAPIST

## 2021-05-04 ASSESSMENT — PAIN DESCRIPTION - PAIN TYPE: TYPE: ACUTE PAIN

## 2021-05-04 ASSESSMENT — PAIN - FUNCTIONAL ASSESSMENT: PAIN_FUNCTIONAL_ASSESSMENT: PREVENTS OR INTERFERES WITH ALL ACTIVE AND SOME PASSIVE ACTIVITIES

## 2021-05-04 ASSESSMENT — PAIN SCALES - GENERAL: PAINLEVEL_OUTOF10: 8
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
## 2021-05-04 NOTE — PROGRESS NOTES
[address] [facility]                Phone: [phone]   Fax: [phone]    Physical Therapy Daily Treatment Note  Date:  2021    Patient Name:  Anthony Stanton    :  1960  MRN: [id number]    Evaluating therapist:  LAWRENCE Knowles                (21)  Restrictions/Precautions:    Diagnosis:  concussion  Treatment Diagnosis:    Insurance/Certification information:  [employer]                cert dates:  05  to  21            ICD-10:  O76.3R0Z  Referring Physician:  Monica Neely. Plan of care signed (Y/N):    Visit# / total visits:  -  Pain level: 8/10   Time In:  Time Out:    Subjective:      Exercises:  Exercise/Equipment Resistance/Repetitions Other comments   UBE              cervical AROM:  flex/ext                                rot     shrugs      scap ret            smooth pursuits     saccades     gaze     VOR              trunk sway:  A/P                         M/L                                                        Other Therapeutic Activities:      Home Exercise Program:      Manual Treatments:      Modalities:  IFC/MH B sides neck/upper back    Timed Code Treatment Minutes: Total Treatment Minutes:      Treatment/Activity Tolerance:  [] Patient tolerated treatment well [] Patient limited by fatique  [] Patient limited by pain  [] Patient limited by other medical complications  [] Other:     Prognosis: [] Good [] Fair  [] Poor    Patient Requires Follow-up: [] Yes  [] No    Plan:   [] Continue per plan of care [] Alter current plan (see comments)  [] Plan of care initiated [] Hold pending MD visit [] Discharge  Plan for Next Session:      See Weekly Progress Note: []  Yes  []  No  Next due:        Electronically signed by:   Cassidy Springer

## 2021-05-06 ENCOUNTER — APPOINTMENT (OUTPATIENT)
Dept: SPEECH THERAPY | Age: 61
End: 2021-05-06
Payer: MEDICARE

## 2021-05-10 ENCOUNTER — HOSPITAL ENCOUNTER (OUTPATIENT)
Dept: PHYSICAL THERAPY | Age: 61
Setting detail: THERAPIES SERIES
Discharge: HOME | End: 2021-05-10
Payer: MEDICARE

## 2021-05-10 PROCEDURE — 97110 THERAPEUTIC EXERCISES: CPT | Performed by: PHYSICAL THERAPIST

## 2021-05-10 PROCEDURE — 97530 THERAPEUTIC ACTIVITIES: CPT | Performed by: PHYSICAL THERAPIST

## 2021-05-10 NOTE — PROGRESS NOTES
S:  pt presents to therapy for only scheduled visit for the week; at this time she continues to c/o dizziness and aching across her neck and upper back; reports having a migraine today and did not take her MEDS; no c/o buckling or LOB over last week's time and she continues to use st cane to get around    O:  performed the exercises/tasks/treatments as written in the flowsheet for the week ending; 5/14/21; initiated HEP for home management of condition; cervical AROM grossly 20%WNL for all ranges; L UE strength grossly 5/5 and 3/5 across  R UE for all planes     A:  brandie tx well; pt able to perform all requested tasks with good form and very slow pacing, requiring extended rest periods at times;cervical AROM/B UE strength grossly stable since eval; gait slow/guarded with st cane with short equal strides and occassional listing off R side; static/dynamic balance is FAIR+; eye are stable and pt c/o light sensitivity; c/o dizziness with all prolonged movement of head and trunk     P:  cont with POC of stretching/strengthening for neck/upper back, vestibular activities, and modalities as needed

## 2021-05-10 NOTE — PROGRESS NOTES
[address] [facility]                Phone: [phone]   Fax: [phone]    Physical Therapy Daily Treatment Note  Date:  5/10/2021    Patient Name:  Sun Reyez    :  1960  MRN: [id number]    Evaluating therapist:  LAWRENCE Ramirez                (21)  Restrictions/Precautions:    Diagnosis:  concussion  Treatment Diagnosis:    Insurance/Certification information:  [facility] Medicare                cert dates:  78  to  21            ICD-10:  E64.1C9L  Referring Physician:  Addy Blue. Plan of care signed (Y/N):    Visit# / total visits:  -  Pain level: 8/10   Time In:  1050  Time Out:  1157    Subjective:      Exercises:  Exercise/Equipment Resistance/Repetitions Other comments   cervical AROM:  flex/ext 15x3s                               rot 15x3s    shrugs  15x3s    scap ret 15x3s           smooth pursuits 3x10    saccades 3x10    gaze 3x20s    VOR 3x10             trunk sway:  A/P 3x10                        M/L 3x10                                                       Other Therapeutic Activities:      Home Exercise Program:      Manual Treatments:      Modalities:  MH B sides neck/upper back 15 min     Timed Code Treatment Minutes: Total Treatment Minutes:      Treatment/Activity Tolerance:  [] Patient tolerated treatment well [] Patient limited by fatique  [] Patient limited by pain  [] Patient limited by other medical complications  [] Other:     Prognosis: [] Good [] Fair  [] Poor    Patient Requires Follow-up: [] Yes  [] No    Plan:   [] Continue per plan of care [] Alter current plan (see comments)  [] Plan of care initiated [] Hold pending MD visit [] Discharge  Plan for Next Session:      See Weekly Progress Note: []  Yes  []  No  Next due:        Electronically signed by:   Arelis Sanchez

## 2021-05-13 ENCOUNTER — APPOINTMENT (OUTPATIENT)
Dept: SPEECH THERAPY | Age: 61
End: 2021-05-13
Payer: MEDICARE

## 2021-05-13 ENCOUNTER — HOSPITAL ENCOUNTER (OUTPATIENT)
Dept: SPEECH THERAPY | Age: 61
Setting detail: THERAPIES SERIES
Discharge: HOME | End: 2021-05-13
Payer: MEDICARE

## 2021-05-13 NOTE — PROGRESS NOTES
SPEECH CANCELLATION / NO SHOW          [x] Cancelled by Patient/Family  [] Cancelled by SLP  [] Rescheduled  [] No Show    Appointment Status:Reason:  [x] Illness  [] Conflicting Appointment  [] No Transportation  [] Conflict with Work  [] Insurance Pending  [] [address] [place] [address]  [] No Reason Given  [x] Other  Comments:pt called to cancel due to uti, confirmed for next week. SEAN Cevallos [phone]  Speech Language Pathologist

## 2021-05-14 ENCOUNTER — OFFICE VISIT (OUTPATIENT)
Dept: FAMILY MEDICINE CLINIC | Age: 61
End: 2021-05-14
Payer: MEDICARE

## 2021-05-14 VITALS
OXYGEN SATURATION: 96 % | TEMPERATURE: 98.7 F | HEART RATE: 94 BPM | SYSTOLIC BLOOD PRESSURE: 120 MMHG | HEIGHT: 62 IN | DIASTOLIC BLOOD PRESSURE: 67 MMHG | BODY MASS INDEX: 20.43 KG/M2 | WEIGHT: 111 LBS | RESPIRATION RATE: 16 BRPM

## 2021-05-14 DIAGNOSIS — R31.9 URINARY TRACT INFECTION WITH HEMATURIA, SITE UNSPECIFIED: ICD-10-CM

## 2021-05-14 DIAGNOSIS — H83.2X9 VESTIBULAR DISEQUILIBRIUM, UNSPECIFIED LATERALITY: ICD-10-CM

## 2021-05-14 DIAGNOSIS — R30.0 DYSURIA: Primary | ICD-10-CM

## 2021-05-14 DIAGNOSIS — N39.0 URINARY TRACT INFECTION WITH HEMATURIA, SITE UNSPECIFIED: ICD-10-CM

## 2021-05-14 DIAGNOSIS — R11.0 NAUSEA: ICD-10-CM

## 2021-05-14 DIAGNOSIS — E46 PROTEIN MALNUTRITION (HCC): ICD-10-CM

## 2021-05-14 LAB
BILIRUBIN, POC: NEGATIVE
BLOOD URINE, POC: NORMAL
CLARITY, POC: NORMAL
COLOR, POC: YELLOW
GLUCOSE URINE, POC: NEGATIVE
KETONES, POC: NEGATIVE
LEUKOCYTE EST, POC: NORMAL
NITRITE, POC: NEGATIVE
PH, POC: 5.5
PROTEIN, POC: 100
SPECIFIC GRAVITY, POC: 1.02
UROBILINOGEN, POC: 0.2

## 2021-05-14 PROCEDURE — 99213 OFFICE O/P EST LOW 20 MIN: CPT | Performed by: FAMILY MEDICINE

## 2021-05-14 PROCEDURE — 81002 URINALYSIS NONAUTO W/O SCOPE: CPT | Performed by: FAMILY MEDICINE

## 2021-05-14 RX ORDER — ONDANSETRON 4 MG/1
4 TABLET, ORALLY DISINTEGRATING ORAL 3 TIMES DAILY PRN
Qty: 21 TABLET | Refills: 0 | Status: SHIPPED | OUTPATIENT
Start: 2021-05-14

## 2021-05-14 RX ORDER — NITROFURANTOIN 25; 75 MG/1; MG/1
100 CAPSULE ORAL 2 TIMES DAILY
Qty: 6 CAPSULE | Refills: 0 | Status: SHIPPED | OUTPATIENT
Start: 2021-05-14 | End: 2021-05-17

## 2021-05-14 RX ORDER — MECLIZINE HCL 12.5 MG/1
12.5 TABLET ORAL 3 TIMES DAILY PRN
Qty: 15 TABLET | Refills: 1 | Status: SHIPPED | OUTPATIENT
Start: 2021-05-14 | End: 2021-05-24

## 2021-05-14 NOTE — PROGRESS NOTES
[facility]  Precepting Note    Subjective:  Concussion  Seeing concussion clinic  ongoign SSx    UTI  Increased frequencyand incontinence  Dysuria  No hematuria     ROS otherwise negative     Past medical, surgical, family and social history were reviewed, non-contributory, and unchanged unless otherwise stated. Objective:    /67   Pulse 94   Temp 98.7 °F (37.1 °C) (Temporal)   Resp 16   Ht 5' 2\" (1.575 m)   Wt 111 lb (50.3 kg)   LMP  (LMP Unknown)   SpO2 96%   BMI 20.30 kg/m²     Exam is as noted by resident with the following changes, additions or corrections:    General:  NAD; alert & oriented x 3   Cervical tone increased  No CVAT     Data: UA in chart suggestive of infeciton    Assessment/Plan:  Post concussion syndrome   Per neuro  UTI   macrobid empirically   Check Ucx      Attending Physician Statement  I have reviewed the chart, including any radiology or labs. I have discussed the case, including pertinent history and exam findings with the resident. I agree with the assessment, plan and orders as documented by the resident. Please refer to the resident note for additional information.       Electronically signed by Kylee Connor MD on 5/14/2021 at 3:35 PM

## 2021-05-14 NOTE — PROGRESS NOTES
5/14/21    Sajan Ahumada is a 62 y.o. female here for:    HPI:    UTI symptoms  Patient states that she started experiencing symptoms 1 week ago. She states that she has been having accidents due to increased urinary frequency. She states that with the increased frequency she has been having smaller urine amounts as well as abdominal pressure and burning with urination. She denies any overt blood. She denies any fevers or chills. Has been having some intermittent nausea with her symptoms. BP Readings from Last 3 Encounters:   05/14/21 120/67   04/30/21 108/66   04/08/21 96/67     Current Outpatient Medications   Medication Sig Dispense Refill    meclizine (ANTIVERT) 12.5 MG tablet Take 1 tablet by mouth 3 times daily as needed for Dizziness or Nausea 15 tablet 1    ondansetron (ZOFRAN-ODT) 4 MG disintegrating tablet Take 1 tablet by mouth 3 times daily as needed for Nausea or Vomiting 21 tablet 0    Nutritional Supplements (ENSURE COMPLETE SHAKE) LIQD Take 1 Can by mouth 2 times daily 60 Bottle 0    nitrofurantoin, macrocrystal-monohydrate, (MACROBID) 100 MG capsule Take 1 capsule by mouth 2 times daily for 3 days 6 capsule 0    nortriptyline (PAMELOR) 10 MG capsule Take 1 capsule by mouth nightly 30 capsule 0    sertraline (ZOLOFT) 100 MG tablet Take 100 mg by mouth nightly   0    vitamin D (CHOLECALCIFEROL) 1000 UNIT TABS tablet Take 1,000 Units by mouth daily      Multiple Vitamins-Minerals (MULTIVITAMIN PO) Take by mouth      clonazePAM (KLONOPIN) 1 MG tablet Take 1 mg by mouth 2 times daily as needed. Yoselin Patel 0    pregabalin (LYRICA) 75 MG capsule Take 75 mg by mouth 3 times daily. Leonidas Moritz zolpidem (AMBIEN) 10 MG tablet   Take by mouth nightly       HYDROmorphone (DILAUDID) 4 MG tablet Take 4 mg by mouth every 4 hours as needed for Pain.       SUMAtriptan (IMITREX) 50 MG tablet Take 1 tablet by mouth once as needed for Migraine 9 tablet 2     No current facility-administered medications for this visit.       Allergies   Allergen Reactions    Sulfamethazine Anaphylaxis    Ancef [Cefazolin Sodium] Other (See Comments)     convulsions       Past Medical & Surgical History:      Diagnosis Date    Anxiety     Arthritis     Blood circulation, collateral     Cancer (HCC)     SKIN    Chronic back pain     Colitis     recent    Complex regional pain syndrome type 1 of right lower extremity 9/11/2015    COPD [facility]ch[facility]     Depression     Diabetes mellitus ([facility]     Elevated liver function tests 10/17/2014    GERD (gastroesophageal reflux disease)     Headache(784.0)     Hyperlipidemia 10/17/2014    Kidney stone     Movement disorder     Neuromuscular disorder ([facility]     Osteoarthritis     Other disorders of kidney and ureter in diseases classified elsewhere     Pneumonia     Psychiatric problem     RSD (reflex sympathetic dystrophy)      Past Surgical History:   Procedure Laterality Date    BACK SURGERY  2005    had cerv SCS at [facility] then had staph infec 2003  then it was changed to a dual SCS by Dr Kate Dias 2005 approx then has had several battery changes and rechargeable put in 2009   [address]      bryant hands    COLONOSCOPY      ENDOSCOPY, COLON, DIAGNOSTIC      FINGER AMPUTATION      index right hand multiple surgeries    FRACTURE SURGERY      HYSTERECTOMY      LAMINECTOMY      cervical    NERVE BLOCK N/A 8 19 15    cerv facet #1 with iv anesthesia    NERVE BLOCK Left 08 26 2015    left cervical paravertebral facet block #2 c4 5 c5 6 c6 7 under iv sedation    NERVE BLOCK  9 2 15    lumbar parasympathetic #1    NERVE BLOCK Right 9/17/15    right sympathetic block #2    OTHER SURGICAL HISTORY  09/18/13    surgical replacement of medtronic cervical spinal cord stimulator electrode and connect to existing battery right hip    OTHER SURGICAL HISTORY N/A 2/3/2014    Surgical revision spinal cord stimulator scar at anchor site due to wound  dehisence    OTHER SURGICAL HISTORY Right 11/11/14    EXCISION OF CYST RIGHT SHOULDER    OTHER SURGICAL HISTORY  04/08/15    surgical revision medtronic cervical spinal cord stimulator scar at anchor site due to wound dehisance    OTHER SURGICAL HISTORY  04/25/2018    spinal cord stimulator battery replacement due to end of life    NE REVISE/REMOVE NEUROSTIM/ N/A 4/25/2018    SPINAL CORD STIMULATOR BATTERY REPLACEMENT DUE TO END OF LIFE performed by Harlan Altman DO at [address] [place] [address]       Family History:      Problem Relation Age of Onset    Other Mother        Social History:  Social History     Tobacco Use    Smoking status: Current Every Day Smoker     Packs/day: 0.50     Years: 42.00     Pack years: 21.00     Types: Cigarettes    Smokeless tobacco: Never Used   Substance Use Topics    Alcohol use: No     Alcohol/week: 0.0 standard drinks       Immunization History   Administered Date(s) Administered    COVID-19, Pfizer, PF, 30mcg/0.3mL 03/05/2021, 03/31/2021    Influenza A (G3W1-20) Vaccine PF IM 10/26/2009    Influenza Virus Vaccine 08/31/2013, 09/17/2014, 09/08/2015, 09/06/2016, 11/01/2017, 11/15/2017, 10/15/2020    Influenza, MDCK Quadv, IM, PF (Flucelvax 4 yrs and older) 09/03/2019    Influenza, Quadv, IM, PF (6 mo and older Fluzone, Flulaval, Fluarix, and 3 yrs and older Afluria) 09/23/2018, 10/15/2020    Pneumococcal Conjugate 13-valent ([id number]) 09/18/2015    Tdap (Boostrix, Adacel) 01/23/2013       Review of Systems   Constitutional: Negative for activity change, appetite change and fatigue. HENT: Negative for congestion, rhinorrhea, sneezing and sore throat. Respiratory: Negative for cough, shortness of breath and wheezing. Cardiovascular: Negative for chest pain. Gastrointestinal: Positive for nausea. Negative for abdominal pain, constipation, diarrhea and vomiting. Endocrine: Negative for cold intolerance.    Genitourinary: Positive for decreased urine volume, dysuria, frequency and urgency. Musculoskeletal: Negative for arthralgias and back pain. Neurological: Negative for dizziness, numbness and headaches. Psychiatric/Behavioral: The patient is not nervous/anxious. VS:  /67   Pulse 94   Temp 98.7 °F (37.1 °C) (Temporal)   Resp 16   Ht 5' 2\" (1.575 m)   Wt 111 lb (50.3 kg)   LMP  (LMP Unknown)   SpO2 96%   BMI 20.30 kg/m²     Physical Exam  Vitals reviewed. Constitutional:       Appearance: Normal appearance. She is cachectic. She is not ill-appearing. HENT:      Mouth/Throat:      Mouth: Mucous membranes are moist.   Eyes:      Pupils: Pupils are equal, round, and reactive to light. Cardiovascular:      Rate and Rhythm: Normal rate and regular rhythm. Heart sounds: Normal heart sounds. No murmur heard. Pulmonary:      Effort: Pulmonary effort is normal.      Breath sounds: Normal breath sounds. No wheezing or rhonchi. Abdominal:      General: Bowel sounds are normal.      Palpations: Abdomen is soft. Tenderness: There is abdominal tenderness in the suprapubic area. Musculoskeletal:      Right lower leg: No edema. Left lower leg: No edema. Skin:     General: Skin is warm. Capillary Refill: Capillary refill takes less than 2 seconds. Coloration: Skin is not pale. Neurological:      Mental Status: She is alert. Mental status is at baseline. Comments: Stuttering present     Psychiatric:         Mood and Affect: Mood normal.         Behavior: Behavior normal.         Thought Content: Thought content normal.         Judgment: Judgment normal.         Assessment/Plan:  1. Dysuria  - POCT Urinalysis no Micro showed moderate blood and LE    2. Urinary tract infection with hematuria, site unspecified  Will treat for UTI  - nitrofurantoin, macrocrystal-monohydrate, (MACROBID) 100 MG capsule; Take 1 capsule by mouth 2 times daily for 3 days  Dispense: 6 capsule;  Refill: 0  - Culture, Urine; Future    3. Protein malnutrition ([facility] [address] [facility]  Patient is cachetic; will start ensure. - Nutritional Supplements (ENSURE COMPLETE SHAKE) LIQD; Take 1 Can by mouth 2 times daily  Dispense: 60 Bottle; Refill: 0    4. Nausea  - ondansetron (ZOFRAN-ODT) 4 MG disintegrating tablet; Take 1 tablet by mouth 3 times daily as needed for Nausea or Vomiting  Dispense: 21 tablet; Refill: 0    5. Vestibular disequilibrium, unspecified laterality  Due to concussion  - meclizine (ANTIVERT) 12.5 MG tablet; Take 1 tablet by mouth 3 times daily as needed for Dizziness or Nausea  Dispense: 15 tablet; Refill: 1      Return to office: Return in about 4 weeks (around 6/11/2021). Patient agrees with the above stated plan. Patt Ball received counseling on the following healthy behaviors: medication adherence. As above. Call or go to ED immediately if symptoms worsen or persist.  Follow up in 4 weeks, or sooner if necessary. Counseled regarding above diagnosis, including possible risks and complications, especially if left uncontrolled. I encourage you to do some reading and education about your health. The American Academy of [employer] provides information on many health conditions:http://familydoctor. org     Counseled regarding the possible side effects, risks, benefits and alternatives to treatment; patient and/or guardian verbalizes understanding, agrees, feels comfortable with and wishes to proceed with above treatment plan. Advised patient to call with any new medication issues, and read all Rx info from pharmacy to assure aware of all possible risks and side effects of medication before taking. Reviewed age and gender appropriate health screening exams and vaccinations.   Advised patient regarding importance of keeping up with recommended health maintenance and to schedule as soon as possible if overdue, as this is important in assessing for undiagnosed pathology, especially cancer, as well as protecting against potentially harmful/life threatening disease. Patient and/or guardian verbalizes understanding and agrees with above counseling, assessment and plan.      Nevaehl Sana RINCON  PGY-2 Family Medicine

## 2021-05-16 ASSESSMENT — ENCOUNTER SYMPTOMS
RHINORRHEA: 0
SORE THROAT: 0
VOMITING: 0
ABDOMINAL PAIN: 0
BACK PAIN: 0
DIARRHEA: 0
CONSTIPATION: 0
NAUSEA: 1
SHORTNESS OF BREATH: 0
WHEEZING: 0
COUGH: 0

## 2021-05-17 ENCOUNTER — HOSPITAL ENCOUNTER (OUTPATIENT)
Dept: PHYSICAL THERAPY | Age: 61
Setting detail: THERAPIES SERIES
Discharge: HOME | End: 2021-05-17
Payer: MEDICARE

## 2021-05-17 LAB
ORGANISM: ABNORMAL
URINE CULTURE, ROUTINE: ABNORMAL

## 2021-05-17 NOTE — PROGRESS NOTES
[address] [facility]                Phone: [phone]  Fax: [phone]    Physical Therapy  Cancellation/No-show Note  Patient Name:  Kesha Ling  :  1960   Date:  2021    For today's appointment patient:  [x]  Cancelled  []  Rescheduled appointment  []  No-show     Reason given by patient:  [x]  Patient ill  []  Conflicting appointment  []  No transportation    []  Conflict with work  []  No reason given  []  Other:     Comments:      Electronically signed by:   Moncho Barrett, PT

## 2021-05-20 ENCOUNTER — HOSPITAL ENCOUNTER (OUTPATIENT)
Dept: SPEECH THERAPY | Age: 61
Setting detail: THERAPIES SERIES
Discharge: HOME | End: 2021-05-20
Payer: MEDICARE

## 2021-05-20 PROCEDURE — 97129 THER IVNTJ 1ST 15 MIN: CPT

## 2021-05-20 PROCEDURE — 97130 THER IVNTJ EA ADDL 15 MIN: CPT

## 2021-05-20 NOTE — PROGRESS NOTES
SPEECH/LANGUAGE PATHOLOGY  DAILY PROGRESS NOTE      SUMMARY OF SESSION:  Session in minutes:  60+        Family present/Observed:          Home practice given:  Relaxation exercises with laryngeal message and diaphragmatic breathing with no initiation of voice. SUBJECTIVE:   Pt is emotional at times about her memory disorder and the social stigma of stuttering. OBJECTIVE:   Training and instruction provided to improve cognition and speech fluency. Patient is making gains towards short term goals. Patient demonstrates improving ability to use visualization to assist with short term memory as a result of skilled intervention as evidenced by her ability to remember 1/6 trials. Education provided on remembering instructions for home assignment using social media for additional practice. Pt is to do laryngeal message with Teena Sheets/ you tube and abdominal breathing exercises. Difficulty continues for memory for directions and recalling information in a narrative. Recommended continued skilled therapy to return patient to prior level of functioning and/or maximum level of rehabilitation potential prior to d/c.       ASSESSMENT:   Client continues to make progress toward achieving goals. PLAN:   Continue plan of care.      Ana Laura Vale M.Ed   Speech Language Pathologist        CPT CODE:       13739  therapeutic interventions that focus on cognitive function , initial  15 min  [id number]  therapeutic interventions that focus on cognitive function, each additional 15 minx3

## 2021-06-01 RX ORDER — NORTRIPTYLINE HYDROCHLORIDE 10 MG/1
CAPSULE ORAL
Qty: 30 CAPSULE | Refills: 1 | Status: SHIPPED
Start: 2021-06-01 | End: 2022-08-29

## 2021-06-03 ENCOUNTER — HOSPITAL ENCOUNTER (OUTPATIENT)
Dept: SPEECH THERAPY | Age: 61
Setting detail: THERAPIES SERIES
Discharge: HOME | End: 2021-06-03
Payer: MEDICARE

## 2021-06-03 NOTE — PROGRESS NOTES
SPEECH CANCELLATION / NO SHOW          [] Cancelled by Patient/Family  [] Cancelled by SLP  [] Rescheduled  [x] No Show    Appointment Status:Reason:  [] Illness  [] Conflicting Appointment  [] No Transportation  [] Conflict with Work  [] Insurance Pending  [] [facility]  [] No Reason Given  [x] Other  Comments:called pt.  answered phone.  stated that pt did not remember that she had an appointment. He wrote down the time for the next visits and stated that he will get her to the appt. The time has been changed to 1 pm for the next approved visits. ERMA Galicia. [address] [employer] [phone]  Speech Language Pathologist

## 2021-06-10 ENCOUNTER — HOSPITAL ENCOUNTER (OUTPATIENT)
Dept: SPEECH THERAPY | Age: 61
Setting detail: THERAPIES SERIES
Discharge: HOME | End: 2021-06-10
Payer: MEDICARE

## 2021-06-10 ENCOUNTER — APPOINTMENT (OUTPATIENT)
Dept: SPEECH THERAPY | Age: 61
End: 2021-06-10
Payer: MEDICARE

## 2021-06-10 PROCEDURE — 92507 TX SP LANG VOICE COMM INDIV: CPT
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
## 2021-06-15 ENCOUNTER — OFFICE VISIT (OUTPATIENT)
Dept: FAMILY MEDICINE CLINIC | Age: 61
End: 2021-06-15
Payer: MEDICARE

## 2021-06-15 VITALS
RESPIRATION RATE: 16 BRPM | SYSTOLIC BLOOD PRESSURE: 135 MMHG | DIASTOLIC BLOOD PRESSURE: 74 MMHG | TEMPERATURE: 98.7 F | HEIGHT: 62 IN | BODY MASS INDEX: 20.61 KG/M2 | WEIGHT: 112 LBS | HEART RATE: 88 BPM | OXYGEN SATURATION: 96 %

## 2021-06-15 DIAGNOSIS — G44.309 POST-CONCUSSION HEADACHE: Primary | ICD-10-CM

## 2021-06-15 PROCEDURE — 99212 OFFICE O/P EST SF 10 MIN: CPT | Performed by: FAMILY MEDICINE

## 2021-06-15 SDOH — ECONOMIC STABILITY: FOOD INSECURITY: WITHIN THE PAST 12 MONTHS, YOU WORRIED THAT YOUR FOOD WOULD RUN OUT BEFORE YOU GOT MONEY TO BUY MORE.: NEVER TRUE

## 2021-06-15 SDOH — ECONOMIC STABILITY: FOOD INSECURITY: WITHIN THE PAST 12 MONTHS, THE FOOD YOU BOUGHT JUST DIDN'T LAST AND YOU DIDN'T HAVE MONEY TO GET MORE.: NEVER TRUE

## 2021-06-15 ASSESSMENT — SOCIAL DETERMINANTS OF HEALTH (SDOH): HOW HARD IS IT FOR YOU TO PAY FOR THE VERY BASICS LIKE FOOD, HOUSING, MEDICAL CARE, AND HEATING?: NOT HARD AT ALL

## 2021-06-15 NOTE — PROGRESS NOTES
S: 62 y.o. female with   Chief Complaint   Patient presents with    Headache     patient states she is still having       Headache - imtrex helping this. This was started from concussion management. O: VS:  height is 5' 2\" (1.575 m) and weight is 112 lb (50.8 kg). Her temporal temperature is 98.7 °F (37.1 °C). Her blood pressure is 135/74 and her pulse is 88. Her respiration is 16 and oxygen saturation is 96%. BP Readings from Last 3 Encounters:   06/15/21 135/74   05/14/21 120/67   04/30/21 108/66     See resident note      Impression/Plan:   1) Headache - cont with specialists - obtain records - there is a significant amount of polypharmacy here as well. Health Maintenance Due   Topic Date Due    Shingles Vaccine (1 of 2) Never done    Pneumococcal 0-64 years Vaccine (1 of 2 - PPSV23) 11/13/2015    Cervical cancer screen  10/17/2017    Breast cancer screen  04/25/2018    Colon Cancer Screen FIT/FOBT  05/01/2018    A1C test (Diabetic or Prediabetic)  12/19/2018         Attending Physician Statement  I have discussed the case, including pertinent history and exam findings with the resident. I agree with the documented assessment and plan.       Hernan Loredo MD
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
## 2021-06-16 ASSESSMENT — ENCOUNTER SYMPTOMS
NAUSEA: 0
ABDOMINAL PAIN: 0
VOMITING: 0
DIARRHEA: 0
CONSTIPATION: 0
SHORTNESS OF BREATH: 0
COUGH: 0

## 2021-06-17 ENCOUNTER — APPOINTMENT (OUTPATIENT)
Dept: SPEECH THERAPY | Age: 61
End: 2021-06-17
Payer: MEDICARE

## 2021-06-17 ENCOUNTER — HOSPITAL ENCOUNTER (OUTPATIENT)
Dept: SPEECH THERAPY | Age: 61
Setting detail: THERAPIES SERIES
Discharge: HOME | End: 2021-06-17
Payer: MEDICARE

## 2021-06-17 ENCOUNTER — HOSPITAL ENCOUNTER (OUTPATIENT)
Dept: PHYSICAL THERAPY | Age: 61
Setting detail: THERAPIES SERIES
Discharge: HOME | End: 2021-06-17
Payer: COMMERCIAL

## 2021-06-17 PROCEDURE — 92507 TX SP LANG VOICE COMM INDIV: CPT

## 2021-06-17 NOTE — PROGRESS NOTES
SPEECH/LANGUAGE PATHOLOGY  DAILY PROGRESS NOTE      SUMMARY OF SESSION:  Session in minutes:  39        Family present/Observed:          Home practice given:  Sentence practice with easy onset, elongate vowels, practice pull outs when needed, sing song speech if needed    SUBJECTIVE:   Good motivation and cooperation. OBJECTIVE:   Training and instruction provided to improve fluency for neurogenic stuttering. Patient is making gains towards short term goals. Patient demonstrates improving use of gentle onset and pull outs as well as laryngeal message as a result of skilled intervention as evidenced by her ability to speak in more phrases that include repetitions, but less blocks and extreme tension. Education provided on using a /h/ to assist with gentle onset and elongate vowels. Difficulty continues for \"strangled speech\" due to hard glottal attack. Recommended continued skilled therapy to return patient to prior level of functioning and/or maximum level of rehabilitation potential prior to d/c.       ASSESSMENT:   Client continues to make progress toward achieving goals. PLAN:   Continue plan of care.      Kina Lopez M.Ed   Speech Language Pathologist    CPT CODE:       [id number]  speech/language tx

## 2021-06-17 NOTE — PROGRESS NOTES
Date:  6/17/2021          Dear Dr. Anaya Snowball: This is to inform you that, as per [facility] Physical Therapy department policy, your patient Mirna Winter is as of todays date being discharged from Physical Therapy secondary to the following reasons:    1. If a Physical Therapy outpatient misses three consecutive scheduled appointments   without any prior notification, he or she will be discharged from physical therapy services. A new prescription will be necessary to resume physical therapy. 2. If a client is absent for 50% of scheduled visits during a one-month period, he or she will be discharged from physical therapy services. A new prescription will be necessary to resume physical therapy. Last date of service:  5/10/21      If you have any questions, please feel free to call at [phone]      Thank you          Chaya Ventura, PT    [phone]    The information contained in this Fax the designated recipients intend message only for the personal and confidential use named above. This information is to be handled as privileged and confidential.  If the reader of this message is not the intended recipient, you are hereby notified that you have received this document in error and that any review, dissemination, distribution or copying of this message is strictly prohibited. If you receive this communication in error, please notify us immediately at the above number and return the original to us by mail.   Thank you      [address] [place] [address] [phone]

## 2021-06-24 ENCOUNTER — APPOINTMENT (OUTPATIENT)
Dept: SPEECH THERAPY | Age: 61
End: 2021-06-24
Payer: MEDICARE

## 2021-06-24 ENCOUNTER — HOSPITAL ENCOUNTER (OUTPATIENT)
Dept: SPEECH THERAPY | Age: 61
Setting detail: THERAPIES SERIES
Discharge: HOME | End: 2021-06-24
Payer: MEDICARE

## 2021-06-24 PROCEDURE — 92507 TX SP LANG VOICE COMM INDIV: CPT

## 2021-07-01 ENCOUNTER — HOSPITAL ENCOUNTER (OUTPATIENT)
Dept: SPEECH THERAPY | Age: 61
Setting detail: THERAPIES SERIES
Discharge: HOME | End: 2021-07-01
Payer: MEDICARE

## 2021-07-01 NOTE — PROGRESS NOTES
SPEECH LANGUAGE PATHOLOGY  CANCELLATION / NO SHOW NOTE      For today's appointment patient:  Reason given by patient:  []  Cancelled                []  Patient ill  []  Rescheduled appointment  []  Conflicting appointment  []  No show     []  No transportation        []  Conflict with work        []  No reason given        [x]  Other  Pending insurance approval  Comments:      Continue as per established POC

## 2021-07-08 ENCOUNTER — HOSPITAL ENCOUNTER (OUTPATIENT)
Dept: SPEECH THERAPY | Age: 61
Setting detail: THERAPIES SERIES
Discharge: HOME | End: 2021-07-08
Payer: MEDICARE

## 2021-07-08 NOTE — PROGRESS NOTES
SPEECH CANCELLATION / NO SHOW          [] Cancelled by Patient/Family  [] Cancelled by SLP  [] Rescheduled  [x] No Show    Appointment Status:Reason:  [] Illness  [] Conflicting Appointment  [] No Transportation  [] Conflict with Work  [x] Insurance Pending? [] Inclement Weather  [] No Reason Given  [] Other  Comments: slp left message last week that insurance was approved for additional visits. Possible that pt did not get message? SEAN Olivo [address] [employer] [phone]  Speech Language Pathologist

## 2021-07-15 ENCOUNTER — HOSPITAL ENCOUNTER (OUTPATIENT)
Dept: SPEECH THERAPY | Age: 61
Setting detail: THERAPIES SERIES
Discharge: HOME | End: 2021-07-15
Payer: MEDICARE

## 2021-07-15 PROCEDURE — 92507 TX SP LANG VOICE COMM INDIV: CPT

## 2021-07-22 ENCOUNTER — HOSPITAL ENCOUNTER (OUTPATIENT)
Dept: SPEECH THERAPY | Age: 61
Setting detail: THERAPIES SERIES
Discharge: HOME | End: 2021-07-22
Payer: MEDICARE

## 2021-07-22 NOTE — PROGRESS NOTES
SPEECH LANGUAGE PATHOLOGY  CANCELLATION / NO SHOW NOTE      For today's appointment patient:  Reason given by patient:  [x]  Cancelled                []  Patient ill  []  Rescheduled appointment  []  Conflicting appointment  []  No show     []  No transportation        []  Conflict with work        []  No reason given        [x]  Other      Comments: pt's daughter currently has Covid 23 and is living in the home with the pt. tx cancelled on the side of caution.       Continue as per established POC

## 2021-07-29 ENCOUNTER — HOSPITAL ENCOUNTER (OUTPATIENT)
Dept: SPEECH THERAPY | Age: 61
Setting detail: THERAPIES SERIES
Discharge: HOME | End: 2021-07-29
Payer: MEDICARE

## 2021-07-29 NOTE — PROGRESS NOTES
SPEECH CANCELLATION / NO SHOW          [] Cancelled by Patient/Family  [] Cancelled by SLP  [] Rescheduled  [x] No Show    Appointment Status:Reason:  [] Illness  [] Conflicting Appointment  [] No Transportation  [] Conflict with Work  [] Insurance Pending  [] [facility]  [] No Reason Given  [x] Other- slp callled to check on patient. Patient's daughter was ill with Covid 19 last week. Pt is  for daughter's young children. There was no answer. slp left message voicing concern for pt's daughter and to inform pt about a time change for tx with Taylor Lozano. The times offered were w 830, th 3 pm. Explained that new slp will be using a new technique/DAF that has had good results in the past with other pts. Pt is to call to reschedule. Comments:    SEAN Khanna [phone]  Speech Language Pathologist

## 2021-08-05 NOTE — TELEPHONE ENCOUNTER
2ND attempt to contact the patient. Called and left message on patient voicemail that I was calling to see if she needed refill on medication. Will wait for return call from patient.

## 2021-08-06 RX ORDER — NORTRIPTYLINE HYDROCHLORIDE 10 MG/1
CAPSULE ORAL
Qty: 30 CAPSULE | Refills: 1 | OUTPATIENT
Start: 2021-08-06 | End: 2021-09-05

## 2022-07-24 ENCOUNTER — HOSPITAL ENCOUNTER (EMERGENCY)
Age: 62
Discharge: HOME | End: 2022-07-24
Attending: STUDENT IN AN ORGANIZED HEALTH CARE EDUCATION/TRAINING PROGRAM | Admitting: STUDENT IN AN ORGANIZED HEALTH CARE EDUCATION/TRAINING PROGRAM
Payer: MEDICARE

## 2022-07-24 ENCOUNTER — APPOINTMENT (OUTPATIENT)
Dept: CT IMAGING | Age: 62
End: 2022-07-24
Payer: MEDICARE

## 2022-07-24 ENCOUNTER — APPOINTMENT (OUTPATIENT)
Dept: GENERAL RADIOLOGY | Age: 62
End: 2022-07-24
Payer: MEDICARE

## 2022-07-24 VITALS
HEART RATE: 98 BPM | DIASTOLIC BLOOD PRESSURE: 60 MMHG | OXYGEN SATURATION: 95 % | BODY MASS INDEX: 20.61 KG/M2 | WEIGHT: 112 LBS | SYSTOLIC BLOOD PRESSURE: 118 MMHG | TEMPERATURE: 98.7 F | HEIGHT: 62 IN | RESPIRATION RATE: 14 BRPM

## 2022-07-24 DIAGNOSIS — J18.9 PNEUMONIA OF RIGHT MIDDLE LOBE DUE TO INFECTIOUS ORGANISM: Primary | ICD-10-CM

## 2022-07-24 LAB
ACETAMINOPHEN LEVEL: <5 MCG/ML (ref 10–30)
ALBUMIN SERPL-MCNC: 4.1 G/DL (ref 3.5–5.2)
ALP BLD-CCNC: 92 U/L (ref 35–104)
ALT SERPL-CCNC: 19 U/L (ref 0–32)
AMMONIA: 26.4 UMOL/L (ref 11–51)
AMPHETAMINE SCREEN, URINE: NOT DETECTED
ANION GAP SERPL CALCULATED.3IONS-SCNC: 12 MMOL/L (ref 7–16)
APTT: 29.5 SEC (ref 24.5–35.1)
AST SERPL-CCNC: 26 U/L (ref 0–31)
BACTERIA: ABNORMAL /HPF
BARBITURATE SCREEN URINE: NOT DETECTED
BASOPHILS ABSOLUTE: 0.05 E9/L (ref 0–0.2)
BASOPHILS RELATIVE PERCENT: 0.6 % (ref 0–2)
BENZODIAZEPINE SCREEN, URINE: NOT DETECTED
BILIRUB SERPL-MCNC: 0.6 MG/DL (ref 0–1.2)
BILIRUBIN URINE: NEGATIVE
BLOOD, URINE: NEGATIVE
BUN BLDV-MCNC: 13 MG/DL (ref 6–23)
CALCIUM SERPL-MCNC: 9.6 MG/DL (ref 8.6–10.2)
CANNABINOID SCREEN URINE: NOT DETECTED
CHLORIDE BLD-SCNC: 101 MMOL/L (ref 98–107)
CLARITY: CLEAR
CO2: 24 MMOL/L (ref 22–29)
COCAINE METABOLITE SCREEN URINE: NOT DETECTED
COLOR: YELLOW
CREAT SERPL-MCNC: 0.5 MG/DL (ref 0.5–1)
EKG ATRIAL RATE: 75 BPM
EKG P AXIS: 86 DEGREES
EKG P-R INTERVAL: 182 MS
EKG Q-T INTERVAL: 370 MS
EKG QRS DURATION: 82 MS
EKG QTC CALCULATION (BAZETT): 413 MS
EKG R AXIS: -47 DEGREES
EKG T AXIS: 61 DEGREES
EKG VENTRICULAR RATE: 75 BPM
EOSINOPHILS ABSOLUTE: 0.05 E9/L (ref 0.05–0.5)
EOSINOPHILS RELATIVE PERCENT: 0.6 % (ref 0–6)
EPITHELIAL CELLS, UA: ABNORMAL /HPF
ETHANOL: <10 MG/DL (ref 0–0.08)
FENTANYL SCREEN, URINE: NOT DETECTED
GFR AFRICAN AMERICAN: >60
GFR NON-AFRICAN AMERICAN: >60 ML/MIN/1.73
GLUCOSE BLD-MCNC: 110 MG/DL (ref 74–99)
GLUCOSE BLD-MCNC: 116 MG/DL
GLUCOSE URINE: NEGATIVE MG/DL
HCT VFR BLD CALC: 39.7 % (ref 34–48)
HEMOGLOBIN: 13.1 G/DL (ref 11.5–15.5)
IMMATURE GRANULOCYTES #: 0.02 E9/L
IMMATURE GRANULOCYTES %: 0.2 % (ref 0–5)
INR BLD: 1.1
KETONES, URINE: NEGATIVE MG/DL
LACTIC ACID: 0.8 MMOL/L (ref 0.5–2.2)
LEUKOCYTE ESTERASE, URINE: ABNORMAL
LIPASE: 22 U/L (ref 13–60)
LYMPHOCYTES ABSOLUTE: 1.14 E9/L (ref 1.5–4)
LYMPHOCYTES RELATIVE PERCENT: 13.9 % (ref 20–42)
Lab: ABNORMAL
MAGNESIUM: 2 MG/DL (ref 1.6–2.6)
MCH RBC QN AUTO: 31.3 PG (ref 26–35)
MCHC RBC AUTO-ENTMCNC: 33 % (ref 32–34.5)
MCV RBC AUTO: 94.7 FL (ref 80–99.9)
METER GLUCOSE: 116 MG/DL (ref 74–99)
METHADONE SCREEN, URINE: NOT DETECTED
MONOCYTES ABSOLUTE: 0.51 E9/L (ref 0.1–0.95)
MONOCYTES RELATIVE PERCENT: 6.2 % (ref 2–12)
NEUTROPHILS ABSOLUTE: 6.46 E9/L (ref 1.8–7.3)
NEUTROPHILS RELATIVE PERCENT: 78.5 % (ref 43–80)
NITRITE, URINE: NEGATIVE
OPIATE SCREEN URINE: POSITIVE
OXYCODONE URINE: NOT DETECTED
PDW BLD-RTO: 14 FL (ref 11.5–15)
PH UA: 6.5 (ref 5–9)
PHENCYCLIDINE SCREEN URINE: NOT DETECTED
PLATELET # BLD: 226 E9/L (ref 130–450)
PMV BLD AUTO: 10.7 FL (ref 7–12)
POTASSIUM SERPL-SCNC: 4.3 MMOL/L (ref 3.5–5)
PROTEIN UA: NEGATIVE MG/DL
PROTHROMBIN TIME: 12.3 SEC (ref 9.3–12.4)
RBC # BLD: 4.19 E12/L (ref 3.5–5.5)
RBC UA: ABNORMAL /HPF (ref 0–2)
SALICYLATE, SERUM: <0.3 MG/DL (ref 0–30)
SODIUM BLD-SCNC: 137 MMOL/L (ref 132–146)
SPECIFIC GRAVITY UA: 1.01 (ref 1–1.03)
TOTAL CK: 180 U/L (ref 20–180)
TOTAL PROTEIN: 6.9 G/DL (ref 6.4–8.3)
TRICYCLIC ANTIDEPRESSANTS SCREEN SERUM: NEGATIVE NG/ML
TROPONIN, HIGH SENSITIVITY: 18 NG/L (ref 0–9)
TROPONIN, HIGH SENSITIVITY: 19 NG/L (ref 0–9)
UROBILINOGEN, URINE: 0.2 E.U./DL
WBC # BLD: 8.2 E9/L (ref 4.5–11.5)
WBC UA: ABNORMAL /HPF (ref 0–5)
YEAST: PRESENT /HPF

## 2022-07-24 PROCEDURE — 85610 PROTHROMBIN TIME: CPT

## 2022-07-24 PROCEDURE — 70450 CT HEAD/BRAIN W/O DYE: CPT

## 2022-07-24 PROCEDURE — 81001 URINALYSIS AUTO W/SCOPE: CPT

## 2022-07-24 PROCEDURE — 85025 COMPLETE CBC W/AUTO DIFF WBC: CPT

## 2022-07-24 PROCEDURE — 6360000004 HC RX CONTRAST MEDICATION: Performed by: RADIOLOGY

## 2022-07-24 PROCEDURE — 6370000000 HC RX 637 (ALT 250 FOR IP): Performed by: STUDENT IN AN ORGANIZED HEALTH CARE EDUCATION/TRAINING PROGRAM

## 2022-07-24 PROCEDURE — 82550 ASSAY OF CK (CPK): CPT

## 2022-07-24 PROCEDURE — 85730 THROMBOPLASTIN TIME PARTIAL: CPT

## 2022-07-24 PROCEDURE — 83605 ASSAY OF LACTIC ACID: CPT

## 2022-07-24 PROCEDURE — 80053 COMPREHEN METABOLIC PANEL: CPT

## 2022-07-24 PROCEDURE — 82962 GLUCOSE BLOOD TEST: CPT

## 2022-07-24 PROCEDURE — 93005 ELECTROCARDIOGRAM TRACING: CPT | Performed by: PHYSICIAN ASSISTANT

## 2022-07-24 PROCEDURE — 83690 ASSAY OF LIPASE: CPT

## 2022-07-24 PROCEDURE — 84484 ASSAY OF TROPONIN QUANT: CPT

## 2022-07-24 PROCEDURE — 80179 DRUG ASSAY SALICYLATE: CPT

## 2022-07-24 PROCEDURE — 2580000003 HC RX 258: Performed by: STUDENT IN AN ORGANIZED HEALTH CARE EDUCATION/TRAINING PROGRAM

## 2022-07-24 PROCEDURE — 80307 DRUG TEST PRSMV CHEM ANLYZR: CPT

## 2022-07-24 PROCEDURE — 82077 ASSAY SPEC XCP UR&BREATH IA: CPT

## 2022-07-24 PROCEDURE — 99285 EMERGENCY DEPT VISIT HI MDM: CPT

## 2022-07-24 PROCEDURE — 74177 CT ABD & PELVIS W/CONTRAST: CPT

## 2022-07-24 PROCEDURE — 82140 ASSAY OF AMMONIA: CPT

## 2022-07-24 PROCEDURE — 71045 X-RAY EXAM CHEST 1 VIEW: CPT

## 2022-07-24 PROCEDURE — 80143 DRUG ASSAY ACETAMINOPHEN: CPT

## 2022-07-24 PROCEDURE — 83735 ASSAY OF MAGNESIUM: CPT

## 2022-07-24 RX ORDER — DOXYCYCLINE HYCLATE 100 MG
100 TABLET ORAL 2 TIMES DAILY
Qty: 14 TABLET | Refills: 0 | Status: SHIPPED | OUTPATIENT
Start: 2022-07-24 | End: 2022-07-31

## 2022-07-24 RX ORDER — 0.9 % SODIUM CHLORIDE 0.9 %
1000 INTRAVENOUS SOLUTION INTRAVENOUS ONCE
Status: COMPLETED | OUTPATIENT
Start: 2022-07-24 | End: 2022-07-24

## 2022-07-24 RX ORDER — DOXYCYCLINE HYCLATE 100 MG/1
100 CAPSULE ORAL ONCE
Status: COMPLETED | OUTPATIENT
Start: 2022-07-24 | End: 2022-07-24

## 2022-07-24 RX ADMIN — SODIUM CHLORIDE 1000 ML: 9 INJECTION, SOLUTION INTRAVENOUS at 05:22

## 2022-07-24 RX ADMIN — DOXYCYCLINE HYCLATE 100 MG: 100 CAPSULE ORAL at 09:35

## 2022-07-24 RX ADMIN — IOPAMIDOL 50 ML: 755 INJECTION, SOLUTION INTRAVENOUS at 07:08

## 2022-07-24 NOTE — ED NOTES
was notified that patient is on discharge,  states he will be here in 15 minutes.      Anirudh Prince RN  07/24/22 1125

## 2022-07-24 NOTE — ED PROVIDER NOTES
Department of Emergency Medicine   ED  Provider Note  Admit Date/RoomTime: 7/24/2022  3:16 AM  ED Room: 06/06          History of Present Illness:  7/24/22, Time: 7:48 AM EDT  Chief Complaint   Patient presents with    Altered Mental Status     Per ems they were called for altered mental,  didn't stay to talk to them. Pt states she is impacted and just needs to get it out before all her organs shut down          Anabella Andersen is a 65 y.o. female presenting to the ED for altered mental status, beginning today. The complaint has been persistent, moderate in severity, and worsened by nothing. The patient is a 61-year-old female who presents to the emergency department complaining of altered mental status. Patient arrives to the emergency department from home via EMS for altered mentation. The patient symptoms are sudden onset today, persistent, moderate in severity, nothing makes it better or worse. Hemostasis were called for altered mental status and the  did not stay to talk to him so history is somewhat limited. Patient states that she feels like she is impacted and just needs to get it out is with a triage complaint states and that her organs are going to shut down. However, when I went to evaluate the patient she did not complain of any of these things and states that she was just not feeling well in general.  She denies any fever, chills, lightheadedness, dizziness, syncope, chest pain, shortness of breath, hemoptysis, dysuria, hematuria, fall, trauma, or other acute symptoms or concerns. Did call her  he states that she just was acting like she not feel well this morning she was really not confused from her baseline upon further discussion with him.     Review of Systems:   A complete review of systems was performed and pertinent positives and negatives are stated within HPI, all other systems reviewed and are negative.        --------------------------------------------- PAST HISTORY ---------------------------------------------  Past Medical History:  has a past medical history of Anxiety, Arthritis, Blood circulation, collateral, Cancer (HCC), Chronic back pain, Colitis, Complex regional pain syndrome type 1 of right lower extremity, COPD (chronic obstructive pulmonary disease) ([facility] Depression, Diabetes mellitus ([facility] Elevated liver function tests, GERD (gastroesophageal reflux disease), Headache(784.0), Hyperlipidemia, Kidney stone, Movement disorder, Neuromuscular disorder ([facility] [address] Osteoarthritis, Other disorders of kidney and ureter in diseases classified elsewhere, Pneumonia, Psychiatric problem, and RSD (reflex sympathetic dystrophy). Past Surgical History:  has a past surgical history that includes Hysterectomy; laminectomy; Finger amputation; back surgery (2005); other surgical history (09/18/13); other surgical history (N/A, 2/3/2014); fracture surgery; Endoscopy, colon, diagnostic; Carpal tunnel release; Colonoscopy; other surgical history (Right, 11/11/14); other surgical history (04/08/15); Nerve Block (N/A, 8 19 15); Nerve Block (Left, 08 26 2015); Nerve Block (9 2 15); Nerve Block (Right, 9/17/15); other surgical history (04/25/2018); and pr revise/remove neurostim/ (N/A, 4/25/2018). Social History:  reports that she has been smoking cigarettes. She started smoking about 52 years ago. She has a 21.00 pack-year smoking history. She has been exposed to tobacco smoke. She has never used smokeless tobacco. She reports that she does not drink alcohol and does not use drugs. Family History: family history includes Other in her mother. . Unless otherwise noted, family history is non contributory    The patients home medications have been reviewed.     Allergies: Sulfamethazine and Ancef [cefazolin sodium]    I have reviewed the past medical history, past surgical history, social history, and family history    ---------------------------------------------------PHYSICAL EXAM--------------------------------------    Constitutional/General: Alert and oriented x3  Head: Normocephalic and atraumatic  Eyes:  EOMI, sclera non icteric  ENT: Oropharynx clear, handling secretions, no trismus, no asymmetry of the posterior oropharynx or uvular edema  Neck: Supple, full ROM, no stridor, no meningeal signs  Respiratory: Lungs clear to auscultation bilaterally, no wheezes, rales, or rhonchi. Not in respiratory distress  Cardiovascular:  Regular rate. Regular rhythm. No murmurs, no gallops, no rubs. 2+ distal pulses. Equal extremity pulses. Gastrointestinal:  Abdomen Soft, Non tender, Non distended. No rebound, guarding, or rigidity. No pulsatile masses. Musculoskeletal: Moves all extremities x 4. Warm and well perfused, no clubbing, no cyanosis, no edema. Capillary refill <3 seconds  Skin: skin warm and dry. No rashes. Neurologic: GCS 15, no focal deficits, symmetric strength 5/5 in the upper and lower extremities bilaterally  Psychiatric: Normal Affect    -------------------------------------------------- RESULTS -------------------------------------------------  I have personally reviewed all laboratory and imaging results for this patient. Results are listed below.      LABS: (Lab results interpreted by me)  Results for orders placed or performed during the hospital encounter of 07/24/22   CBC with Auto Differential   Result Value Ref Range    WBC 8.2 4.5 - 11.5 E9/L    RBC 4.19 3.50 - 5.50 E12/L    Hemoglobin 13.1 11.5 - 15.5 g/dL    Hematocrit 39.7 34.0 - 48.0 %    MCV 94.7 80.0 - 99.9 fL    MCH 31.3 26.0 - 35.0 pg    MCHC 33.0 32.0 - 34.5 %    RDW 14.0 11.5 - 15.0 fL    Platelets 529 377 - 189 E9/L    MPV 10.7 7.0 - 12.0 fL    Neutrophils % 78.5 43.0 - 80.0 %    Immature Granulocytes % 0.2 0.0 - 5.0 %    Lymphocytes % 13.9 (L) 20.0 - 42.0 %    Monocytes % 6.2 2.0 - 12.0 %    Eosinophils % 0.6 0.0 - 6.0 % Basophils % 0.6 0.0 - 2.0 %    Neutrophils Absolute 6.46 1.80 - 7.30 E9/L    Immature Granulocytes # 0.02 E9/L    Lymphocytes Absolute 1.14 (L) 1.50 - 4.00 E9/L    Monocytes Absolute 0.51 0.10 - 0.95 E9/L    Eosinophils Absolute 0.05 0.05 - 0.50 E9/L    Basophils Absolute 0.05 0.00 - 0.20 E9/L   Comprehensive Metabolic Panel   Result Value Ref Range    Sodium 137 132 - 146 mmol/L    Potassium 4.3 3.5 - 5.0 mmol/L    Chloride 101 98 - 107 mmol/L    CO2 24 22 - 29 mmol/L    Anion Gap 12 7 - 16 mmol/L    Glucose 110 (H) 74 - 99 mg/dL    BUN 13 6 - 23 mg/dL    Creatinine 0.5 0.5 - 1.0 mg/dL    GFR Non-African American >60 >=60 mL/min/1.73    GFR African American >60     Calcium 9.6 8.6 - 10.2 mg/dL    Total Protein 6.9 6.4 - 8.3 g/dL    Albumin 4.1 3.5 - 5.2 g/dL    Total Bilirubin 0.6 0.0 - 1.2 mg/dL    Alkaline Phosphatase 92 35 - 104 U/L    ALT 19 0 - 32 U/L    AST 26 0 - 31 U/L   URINE DRUG SCREEN   Result Value Ref Range    Amphetamine Screen, Urine NOT DETECTED Negative <1000 ng/mL    Barbiturate Screen, Ur NOT DETECTED Negative < 200 ng/mL    Benzodiazepine Screen, Urine NOT DETECTED Negative < 200 ng/mL    Cannabinoid Scrn, Ur NOT DETECTED Negative < 50ng/mL    Cocaine Metabolite Screen, Urine NOT DETECTED Negative < 300 ng/mL    Opiate Scrn, Ur POSITIVE (A) Negative < 300ng/mL    PCP Screen, Urine NOT DETECTED Negative < 25 ng/mL    Methadone Screen, Urine NOT DETECTED Negative <300 ng/mL    Oxycodone Urine NOT DETECTED Negative <100 ng/mL    FENTANYL SCREEN, URINE NOT DETECTED Negative <1 ng/mL    Drug Screen Comment: see below    Serum Drug Screen   Result Value Ref Range    Ethanol Lvl <10 mg/dL    Acetaminophen Level <5.0 (L) 10.0 - 14.3 mcg/mL    Salicylate, Serum <5.4 0.0 - 30.0 mg/dL    TCA Scrn NEGATIVE Cutoff:300 ng/mL   Urinalysis with Microscopic   Result Value Ref Range    Color, UA Yellow Straw/Yellow    Clarity, UA Clear Clear    Glucose, Ur Negative Negative mg/dL    Bilirubin Urine Negative Negative    Ketones, Urine Negative Negative mg/dL    Specific Gravity, UA 1.010 1.005 - 1.030    Blood, Urine Negative Negative    pH, UA 6.5 5.0 - 9.0    Protein, UA Negative Negative mg/dL    Urobilinogen, Urine 0.2 <2.0 E.U./dL    Nitrite, Urine Negative Negative    Leukocyte Esterase, Urine SMALL (A) Negative    WBC, UA 5-10 (A) 0 - 5 /HPF    RBC, UA NONE 0 - 2 /HPF    Epithelial Cells, UA MODERATE /HPF    Bacteria, UA FEW (A) None Seen /HPF    Yeast, UA Present (A) None Seen /HPF   CK   Result Value Ref Range    Total  20 - 180 U/L   Ammonia   Result Value Ref Range    Ammonia 26.4 11.0 - 51.0 umol/L   Troponin   Result Value Ref Range    Troponin, High Sensitivity 19 (H) 0 - 9 ng/L   Protime-INR   Result Value Ref Range    Protime 12.3 9.3 - 12.4 sec    INR 1.1    APTT   Result Value Ref Range    aPTT 29.5 24.5 - 35.1 sec   Lactic Acid   Result Value Ref Range    Lactic Acid 0.8 0.5 - 2.2 mmol/L   Lipase   Result Value Ref Range    Lipase 22 13 - 60 U/L   Magnesium   Result Value Ref Range    Magnesium 2.0 1.6 - 2.6 mg/dL   Troponin   Result Value Ref Range    Troponin, High Sensitivity 18 (H) 0 - 9 ng/L   POCT glucose   Result Value Ref Range    Glucose 116 mg/dL   POCT Glucose   Result Value Ref Range    Meter Glucose 116 (H) 74 - 99 mg/dL   EKG 12 Lead   Result Value Ref Range    Ventricular Rate 75 BPM    Atrial Rate 75 BPM    P-R Interval 182 ms    QRS Duration 82 ms    Q-T Interval 370 ms    QTc Calculation (Bazett) 413 ms    P Axis 86 degrees    R Axis -47 degrees    T Axis 61 degrees   ,       RADIOLOGY:  Interpreted by Radiologist unless otherwise specified  CT HEAD WO CONTRAST   Final Result   No acute intracranial abnormality. CT ABDOMEN PELVIS W IV CONTRAST Additional Contrast? None   Final Result   No obvious etiology for the patient's abdominal pain. Large volume of stool throughout the colon.       Minimal intrahepatic biliary ductal dilatation without obvious obstructing process. Correlation with liver function test suggested. Bilateral nonobstructing renal calculi. XR CHEST PORTABLE   Final Result   Hazy right basilar opacity concerning for developing pneumonia. EKG Interpretation  Interpreted by emergency department physician, Dr. Emi Kay     EKG: This EKG is signed and interpreted by me. Rate: 75  Rhythm: Sinus  Interpretation: Normal sinus rhythm, left axis deviation left atrial enlargement, nonspecific ST changes throughout, QTC is 413  Comparison: stable as compared to patient's most recent EKG         ------------------------- NURSING NOTES AND VITALS REVIEWED ---------------------------   The nursing notes within the ED encounter and vital signs as below have been reviewed by myself  /60   Pulse 98   Temp 98.7 °F (37.1 °C) (Oral)   Resp 14   Ht 5' 2\" (1.575 m)   Wt 112 lb (50.8 kg)   LMP  (LMP Unknown)   SpO2 95%   BMI 20.49 kg/m²     Oxygen Saturation Interpretation: Normal    The patients available past medical records and past encounters were reviewed. ------------------------------ ED COURSE/MEDICAL DECISION MAKING----------------------  Medications   0.9 % sodium chloride bolus (0 mLs IntraVENous Stopped 7/24/22 0701)   iopamidol (ISOVUE-370) 76 % injection 50 mL (50 mLs IntraVENous Given 7/24/22 0708)   doxycycline hyclate (VIBRAMYCIN) capsule 100 mg (100 mg Oral Given 7/24/22 0935)           The cardiac monitor revealed NSR with a heart rate in the 90s as interpreted by me. The cardiac monitor was ordered secondary to the patient's altered mentation and to monitor the patient for dysrhythmia. CPT [id number]       I, Dr. Emi Kay, am the primary provider of record    Medical Decision Making:   The patient is a 28-year-old female presents the emergency department complaining of altered mentation. However, the patient is alert and oriented x3 and there are no focal neurological deficits on assessment.   She is at her baseline mentation after further discussion with the . X-ray does show evidence of pneumonia. Labs are reassuring. Patient ambulated without any difficulties. Discussed results in depth with . He states that she is at her baseline mentation and patient was discharged home when he came to pick her up with strict return precautions given. Patient and  verbalized understanding agreement to treatment plan and discharge. Oxygen Saturation Interpretation: 95 % on room air. Re-Evaluations:  ED Course as of 08/07/22 1331   Sun Jul 24, 2022   8102 Confirm with  she is at her baseline mentation. Used shared decision making and she wants to go home. [KG]      ED Course User Index  [KG] Fay Cogan, DO       Patient was resting comfortably on reassessment and in no distress. This patient's ED course included: a personal history and physicial examination, re-evaluation prior to disposition, multiple bedside re-evaluations, IV medications, cardiac monitoring, continuous pulse oximetry, and complex medical decision making and emergency management    This patient has remained hemodynamically stable during their ED course. Counseling: The emergency provider has spoken with the patient and discussed todays results, in addition to providing specific details for the plan of care and counseling regarding the diagnosis and prognosis. Questions are answered at this time and they are agreeable with the plan.       --------------------------------- IMPRESSION AND DISPOSITION ---------------------------------    IMPRESSION  1. Pneumonia of right middle lobe due to infectious organism        DISPOSITION  Disposition: Discharge to home  Patient condition is stable        NOTE: This report was transcribed using voice recognition software.  Every effort was made to ensure accuracy; however, inadvertent computerized transcription errors may be present       Yohan Calero DO Tammy  08/07/22 6780

## 2022-07-24 NOTE — ED NOTES
Pt will not stay in bed or room, pt keeps walking around stating that \"it has to come out or all my organs will shut down\" unable to obtain labs or EKG at this time      Edmund Tracey, DARSHANA  07/24/22 0359

## 2022-08-01 ENCOUNTER — OFFICE VISIT (OUTPATIENT)
Dept: FAMILY MEDICINE CLINIC | Age: 62
End: 2022-08-01
Payer: MEDICARE

## 2022-08-01 VITALS
OXYGEN SATURATION: 99 % | BODY MASS INDEX: 16.23 KG/M2 | WEIGHT: 88.2 LBS | TEMPERATURE: 98.8 F | DIASTOLIC BLOOD PRESSURE: 74 MMHG | HEART RATE: 88 BPM | HEIGHT: 62 IN | SYSTOLIC BLOOD PRESSURE: 136 MMHG | RESPIRATION RATE: 18 BRPM

## 2022-08-01 DIAGNOSIS — R63.4 UNINTENTIONAL WEIGHT LOSS: ICD-10-CM

## 2022-08-01 DIAGNOSIS — U07.1 PNEUMONIA DUE TO COVID-19 VIRUS: Primary | ICD-10-CM

## 2022-08-01 DIAGNOSIS — Z72.0 TOBACCO ABUSE: ICD-10-CM

## 2022-08-01 DIAGNOSIS — J12.82 PNEUMONIA DUE TO COVID-19 VIRUS: Primary | ICD-10-CM

## 2022-08-01 PROCEDURE — 99213 OFFICE O/P EST LOW 20 MIN: CPT

## 2022-08-01 RX ORDER — NICOTINE 21 MG/24HR
1 PATCH, TRANSDERMAL 24 HOURS TRANSDERMAL DAILY
Qty: 42 PATCH | Refills: 0 | Status: ON HOLD
Start: 2022-08-01 | End: 2022-08-29 | Stop reason: HOSPADM

## 2022-08-01 SDOH — ECONOMIC STABILITY: FOOD INSECURITY: WITHIN THE PAST 12 MONTHS, YOU WORRIED THAT YOUR FOOD WOULD RUN OUT BEFORE YOU GOT MONEY TO BUY MORE.: NEVER TRUE

## 2022-08-01 SDOH — ECONOMIC STABILITY: FOOD INSECURITY: WITHIN THE PAST 12 MONTHS, THE FOOD YOU BOUGHT JUST DIDN'T LAST AND YOU DIDN'T HAVE MONEY TO GET MORE.: NEVER TRUE

## 2022-08-01 ASSESSMENT — PATIENT HEALTH QUESTIONNAIRE - PHQ9
4. FEELING TIRED OR HAVING LITTLE ENERGY: 0
9. THOUGHTS THAT YOU WOULD BE BETTER OFF DEAD, OR OF HURTING YOURSELF: 0
7. TROUBLE CONCENTRATING ON THINGS, SUCH AS READING THE NEWSPAPER OR WATCHING TELEVISION: 0
1. LITTLE INTEREST OR PLEASURE IN DOING THINGS: 0
5. POOR APPETITE OR OVEREATING: 0
SUM OF ALL RESPONSES TO PHQ QUESTIONS 1-9: 0
3. TROUBLE FALLING OR STAYING ASLEEP: 0
8. MOVING OR SPEAKING SO SLOWLY THAT OTHER PEOPLE COULD HAVE NOTICED. OR THE OPPOSITE, BEING SO FIGETY OR RESTLESS THAT YOU HAVE BEEN MOVING AROUND A LOT MORE THAN USUAL: 0
6. FEELING BAD ABOUT YOURSELF - OR THAT YOU ARE A FAILURE OR HAVE LET YOURSELF OR YOUR FAMILY DOWN: 0
SUM OF ALL RESPONSES TO PHQ QUESTIONS 1-9: 0
SUM OF ALL RESPONSES TO PHQ QUESTIONS 1-9: 0
2. FEELING DOWN, DEPRESSED OR HOPELESS: 0
SUM OF ALL RESPONSES TO PHQ9 QUESTIONS 1 & 2: 0
SUM OF ALL RESPONSES TO PHQ QUESTIONS 1-9: 0
10. IF YOU CHECKED OFF ANY PROBLEMS, HOW DIFFICULT HAVE THESE PROBLEMS MADE IT FOR YOU TO DO YOUR WORK, TAKE CARE OF THINGS AT HOME, OR GET ALONG WITH OTHER PEOPLE: 0

## 2022-08-01 ASSESSMENT — ENCOUNTER SYMPTOMS
SORE THROAT: 1
SHORTNESS OF BREATH: 1
COUGH: 1
WHEEZING: 1

## 2022-08-01 ASSESSMENT — SOCIAL DETERMINANTS OF HEALTH (SDOH): HOW HARD IS IT FOR YOU TO PAY FOR THE VERY BASICS LIKE FOOD, HOUSING, MEDICAL CARE, AND HEATING?: NOT HARD AT ALL

## 2022-08-01 NOTE — PROGRESS NOTES
[facility]  [facility]    Subjective:  ER follow up   Pneumonia- on course of Doxycycline, had covid prior  Lost over 20lbs since end of July due to poor appetite  Tobacco use- interested in quitting     ROS otherwise negative    Past medical, surgical, family and social history were reviewed, non-contributory, and unchanged unless otherwise stated. Objective:    /74 (Site: Left Lower Arm, Position: Sitting, Cuff Size: Child)   Pulse 88   Temp 98.8 °F (37.1 °C) (Temporal)   Resp 18   Ht 5' 2\" (1.575 m)   Wt 88 lb 3.2 oz (40 kg)   LMP  (LMP Unknown)   SpO2 99%   BMI 16.13 kg/m²     Exam is as noted by resident with the following changes, additions or corrections:    General:  NAD; alert & oriented x 3   Heart:  RRR, no murmurs, gallops, or rubs. Lungs:  CTA bilaterally, no wheeze, rales or rhonchi  Abd: bowel sounds present, nontender, nondistended, no masses  Extrem:  No clubbing, cyanosis, or edema    Assessment/Plan:    Pneumonia- Superimposed after Covid infxn. Completed course of Doxycyline. Improving, however recommend close follow given recent weight loss  2. Unintentional weight loss- likely secondary to recent illness. Follow up at next visit  3. Tobacco abuse- Agrees to start Nicotine patch      Attending Physician Statement  I have reviewed the chart, including any radiology or labs, and have seen the patient with the resident(s). I personally reviewed and performed key elements of the history and exam.  I agree with the assessment, plan and orders as documented by the resident. Please refer to the resident note for additional information.       Electronically signed by Lucio Rocha MD on 8/1/2022 at 11:14 AM

## 2022-08-01 NOTE — PROGRESS NOTES
Subjective:  Yvon Reaves is a 65 y.o. female with chief complaint of Follow-Up from Hospital, Chest Congestion, and Cough      HPI:  Patient is following up today after presenting to ED with Pneumonia. Patient went to ED on 7/24 after developing a Covid infection and presented with AMS. Patient was worked up and discharged with a Pneumonia diagnosis and was given Doxycycline 100 mg BID for 7 days. Patient finished antibiotic course yesterday 7/31. Patient came today feeling better. She reports there is still some congestion and sputum production that is whitish in color and teaspoonfuls are experienced daily. Patient reports of sore throat. Patient had stool impaction during course, and was managed with stool softeners. Reports feeling better after BM. Cough  This is a new problem. Episode onset: 2 weeks. The problem has been gradually improving. The cough is Productive of sputum (daily teaspoonful of whitish sputum). Associated symptoms include chills, a fever, a sore throat, shortness of breath and wheezing. The symptoms are aggravated by cold air. She has tried nothing for the symptoms. ROS:  Review of Systems   Constitutional:  Positive for chills and fever. HENT:  Positive for sore throat. Respiratory:  Positive for cough, shortness of breath and wheezing. Objective:  Vitals:    08/01/22 1045   BP: 136/74   Site: Left Lower Arm   Position: Sitting   Cuff Size: Child   Pulse: 88   Resp: 18   Temp: 98.8 °F (37.1 °C)   TempSrc: Temporal   SpO2: 99%   Weight: 88 lb 3.2 oz (40 kg)   Height: 5' 2\" (1.575 m)     Physical Exam  Vitals and nursing note reviewed. Constitutional:       General: She is not in acute distress. Appearance: Normal appearance. HENT:      Head: Normocephalic and atraumatic. Mouth/Throat:      Pharynx: No posterior oropharyngeal erythema. Eyes:      General:         Right eye: No discharge. Left eye: No discharge.    Cardiovascular:      Rate and Rhythm: Normal rate and regular rhythm. Heart sounds: No murmur heard. Pulmonary:      Effort: Pulmonary effort is normal.      Breath sounds: Normal breath sounds. No wheezing. Abdominal:      General: Bowel sounds are normal.      Palpations: Abdomen is soft. Tenderness: There is no abdominal tenderness. Musculoskeletal:         General: No swelling. Normal range of motion. Right lower leg: No edema. Left lower leg: No edema. Skin:     General: Skin is warm and dry. Neurological:      General: No focal deficit present. Mental Status: She is oriented to person, place, and time. Assessment:  1. Pneumonia due to COVID-19 virus     Patient presented to the ED with fever and AMS on 7/24. Patient was diagnosed with Pneumonia and discharged home with Doxycycline 100 mg BID for 7 days. Finished antibiotics on 7/31. Patient came in today showing improvement of condition. She still experiences a cough that is productive for whitish sputum in the amount of teaspoonfuls daily. Patient had stool impaction during course, which has resolved with stool softeners. 2.  Tobacco Abuse     Patient has a 21 pack year smoking history. She was counseled on smoking cessation. Patient is interested in quitting. 3.  Unintentional weight loss    Patient reported losing up to 20 lbs. She is attributing the weight loss to the Covid/Pneumonia course of infection. Patient reports not eating or having an appetite during infection and noticed weight changes then. Today, patient's appetite has returned and is eager to gain back the weight that she has lost.        Plan:   Diagnosis Orders   1. Pneumonia due to COVID-19 virus       - resolved  - counseled on symptomatic relief for cough  - follow up in 1 month for annual well visit     2. Tobacco Abuse      - patient education   - counseled on smoking cessation  - Nicoderm CQ 21 mg/24hr  - follow up in 1 month     3.   Unintentional weight loss - BMI 16.13  - Patient counseled on weight gain and nutrition  - Patient will follow up in 1 month       Medication indications,directions, and side effects were discussed. Return in about 4 weeks (around 8/29/2022) for Dr. Monie Mckeon.     Electronically signed by Keke Agustin MD on 8/1/22 at 10:56 AM EDT

## 2022-08-25 ENCOUNTER — HOSPITAL ENCOUNTER (INPATIENT)
Age: 62
LOS: 4 days | Discharge: HOME | DRG: 885 | End: 2022-08-29
Attending: STUDENT IN AN ORGANIZED HEALTH CARE EDUCATION/TRAINING PROGRAM | Admitting: PSYCHIATRY & NEUROLOGY
Payer: MEDICARE

## 2022-08-25 DIAGNOSIS — F48.9 MENTAL HEALTH PROBLEM: Primary | ICD-10-CM

## 2022-08-25 DIAGNOSIS — F22 PARANOID BEHAVIOR (HCC): ICD-10-CM

## 2022-08-25 LAB
ACETAMINOPHEN LEVEL: <5 MCG/ML (ref 10–30)
ALBUMIN SERPL-MCNC: 4 G/DL (ref 3.5–5.2)
ALP BLD-CCNC: 126 U/L (ref 35–104)
ALT SERPL-CCNC: 55 U/L (ref 0–32)
AMORPHOUS: ABNORMAL
AMPHETAMINE SCREEN, URINE: NOT DETECTED
ANION GAP SERPL CALCULATED.3IONS-SCNC: 11 MMOL/L (ref 7–16)
AST SERPL-CCNC: 34 U/L (ref 0–31)
BACTERIA: ABNORMAL /HPF
BARBITURATE SCREEN URINE: NOT DETECTED
BASOPHILS ABSOLUTE: 0.05 E9/L (ref 0–0.2)
BASOPHILS RELATIVE PERCENT: 0.7 % (ref 0–2)
BENZODIAZEPINE SCREEN, URINE: POSITIVE
BILIRUB SERPL-MCNC: 0.2 MG/DL (ref 0–1.2)
BILIRUBIN URINE: NEGATIVE
BLOOD, URINE: NEGATIVE
BUN BLDV-MCNC: 15 MG/DL (ref 6–23)
CALCIUM SERPL-MCNC: 9.3 MG/DL (ref 8.6–10.2)
CANNABINOID SCREEN URINE: NOT DETECTED
CHLORIDE BLD-SCNC: 106 MMOL/L (ref 98–107)
CLARITY: CLEAR
CO2: 24 MMOL/L (ref 22–29)
COCAINE METABOLITE SCREEN URINE: NOT DETECTED
COLOR: YELLOW
CREAT SERPL-MCNC: 0.5 MG/DL (ref 0.5–1)
EKG ATRIAL RATE: 89 BPM
EKG P AXIS: 83 DEGREES
EKG P-R INTERVAL: 168 MS
EKG Q-T INTERVAL: 352 MS
EKG QRS DURATION: 88 MS
EKG QTC CALCULATION (BAZETT): 428 MS
EKG R AXIS: -45 DEGREES
EKG T AXIS: 71 DEGREES
EKG VENTRICULAR RATE: 89 BPM
EOSINOPHILS ABSOLUTE: 0.06 E9/L (ref 0.05–0.5)
EOSINOPHILS RELATIVE PERCENT: 0.8 % (ref 0–6)
EPITHELIAL CELLS, UA: ABNORMAL /HPF
ETHANOL: <10 MG/DL (ref 0–0.08)
FENTANYL SCREEN, URINE: NOT DETECTED
GFR AFRICAN AMERICAN: >60
GFR NON-AFRICAN AMERICAN: >60 ML/MIN/1.73
GLUCOSE BLD-MCNC: 93 MG/DL (ref 74–99)
GLUCOSE URINE: NEGATIVE MG/DL
HCT VFR BLD CALC: 37.9 % (ref 34–48)
HEMOGLOBIN: 12.6 G/DL (ref 11.5–15.5)
IMMATURE GRANULOCYTES #: 0.03 E9/L
IMMATURE GRANULOCYTES %: 0.4 % (ref 0–5)
INFLUENZA A: NOT DETECTED
INFLUENZA B: NOT DETECTED
KETONES, URINE: NEGATIVE MG/DL
LEUKOCYTE ESTERASE, URINE: ABNORMAL
LYMPHOCYTES ABSOLUTE: 1.91 E9/L (ref 1.5–4)
LYMPHOCYTES RELATIVE PERCENT: 25.4 % (ref 20–42)
Lab: ABNORMAL
MCH RBC QN AUTO: 31.9 PG (ref 26–35)
MCHC RBC AUTO-ENTMCNC: 33.2 % (ref 32–34.5)
MCV RBC AUTO: 95.9 FL (ref 80–99.9)
METHADONE SCREEN, URINE: NOT DETECTED
MONOCYTES ABSOLUTE: 0.46 E9/L (ref 0.1–0.95)
MONOCYTES RELATIVE PERCENT: 6.1 % (ref 2–12)
NEUTROPHILS ABSOLUTE: 5.02 E9/L (ref 1.8–7.3)
NEUTROPHILS RELATIVE PERCENT: 66.6 % (ref 43–80)
NITRITE, URINE: NEGATIVE
OPIATE SCREEN URINE: POSITIVE
OXYCODONE URINE: NOT DETECTED
PDW BLD-RTO: 14.1 FL (ref 11.5–15)
PH UA: 6 (ref 5–9)
PHENCYCLIDINE SCREEN URINE: NOT DETECTED
PLATELET # BLD: 233 E9/L (ref 130–450)
PMV BLD AUTO: 10.8 FL (ref 7–12)
POTASSIUM REFLEX MAGNESIUM: 4 MMOL/L (ref 3.5–5)
PROTEIN UA: NEGATIVE MG/DL
RBC # BLD: 3.95 E12/L (ref 3.5–5.5)
RBC UA: ABNORMAL /HPF (ref 0–2)
SALICYLATE, SERUM: <0.3 MG/DL (ref 0–30)
SARS-COV-2 RNA, RT PCR: NOT DETECTED
SODIUM BLD-SCNC: 141 MMOL/L (ref 132–146)
SPECIFIC GRAVITY UA: 1.02 (ref 1–1.03)
TOTAL PROTEIN: 6.7 G/DL (ref 6.4–8.3)
TRICYCLIC ANTIDEPRESSANTS SCREEN SERUM: NEGATIVE NG/ML
UROBILINOGEN, URINE: 0.2 E.U./DL
WBC # BLD: 7.5 E9/L (ref 4.5–11.5)
WBC UA: ABNORMAL /HPF (ref 0–5)

## 2022-08-25 PROCEDURE — 1240000000 HC EMOTIONAL WELLNESS R&B

## 2022-08-25 PROCEDURE — 80179 DRUG ASSAY SALICYLATE: CPT

## 2022-08-25 PROCEDURE — 82077 ASSAY SPEC XCP UR&BREATH IA: CPT

## 2022-08-25 PROCEDURE — 80143 DRUG ASSAY ACETAMINOPHEN: CPT

## 2022-08-25 PROCEDURE — 36415 COLL VENOUS BLD VENIPUNCTURE: CPT

## 2022-08-25 PROCEDURE — 87636 SARSCOV2 & INF A&B AMP PRB: CPT

## 2022-08-25 PROCEDURE — 99285 EMERGENCY DEPT VISIT HI MDM: CPT

## 2022-08-25 PROCEDURE — 80307 DRUG TEST PRSMV CHEM ANLYZR: CPT

## 2022-08-25 PROCEDURE — 85025 COMPLETE CBC W/AUTO DIFF WBC: CPT

## 2022-08-25 PROCEDURE — 81001 URINALYSIS AUTO W/SCOPE: CPT

## 2022-08-25 PROCEDURE — 80053 COMPREHEN METABOLIC PANEL: CPT

## 2022-08-25 PROCEDURE — 93005 ELECTROCARDIOGRAM TRACING: CPT | Performed by: STUDENT IN AN ORGANIZED HEALTH CARE EDUCATION/TRAINING PROGRAM

## 2022-08-25 RX ORDER — MAGNESIUM HYDROXIDE/ALUMINUM HYDROXICE/SIMETHICONE 120; 1200; 1200 MG/30ML; MG/30ML; MG/30ML
30 SUSPENSION ORAL PRN
Status: DISCONTINUED | OUTPATIENT
Start: 2022-08-25 | End: 2022-08-29 | Stop reason: HOSPADM

## 2022-08-25 RX ORDER — ACETAMINOPHEN 325 MG/1
650 TABLET ORAL EVERY 4 HOURS PRN
Status: DISCONTINUED | OUTPATIENT
Start: 2022-08-25 | End: 2022-08-29 | Stop reason: HOSPADM

## 2022-08-25 RX ORDER — HYDROXYZINE PAMOATE 50 MG/1
50 CAPSULE ORAL 3 TIMES DAILY PRN
Status: DISCONTINUED | OUTPATIENT
Start: 2022-08-25 | End: 2022-08-29 | Stop reason: HOSPADM

## 2022-08-25 RX ORDER — HALOPERIDOL 5 MG/ML
3 INJECTION INTRAMUSCULAR EVERY 6 HOURS PRN
Status: DISCONTINUED | OUTPATIENT
Start: 2022-08-25 | End: 2022-08-29 | Stop reason: HOSPADM

## 2022-08-25 RX ORDER — NICOTINE 21 MG/24HR
1 PATCH, TRANSDERMAL 24 HOURS TRANSDERMAL DAILY
Status: DISCONTINUED | OUTPATIENT
Start: 2022-08-26 | End: 2022-08-29 | Stop reason: HOSPADM

## 2022-08-25 RX ORDER — ENOXAPARIN SODIUM 100 MG/ML
30 INJECTION SUBCUTANEOUS DAILY
Status: DISCONTINUED | OUTPATIENT
Start: 2022-08-26 | End: 2022-08-26

## 2022-08-25 RX ORDER — LANOLIN ALCOHOL/MO/W.PET/CERES
3 CREAM (GRAM) TOPICAL NIGHTLY
Status: DISCONTINUED | OUTPATIENT
Start: 2022-08-26 | End: 2022-08-26

## 2022-08-25 RX ORDER — HALOPERIDOL 1 MG/1
3 TABLET ORAL EVERY 6 HOURS PRN
Status: DISCONTINUED | OUTPATIENT
Start: 2022-08-25 | End: 2022-08-29 | Stop reason: HOSPADM

## 2022-08-25 ASSESSMENT — ENCOUNTER SYMPTOMS
ABDOMINAL PAIN: 0
DIARRHEA: 0
PHOTOPHOBIA: 0
ABDOMINAL DISTENTION: 0
VOMITING: 0
CHEST TIGHTNESS: 0
COUGH: 0
SHORTNESS OF BREATH: 0
NAUSEA: 0

## 2022-08-25 NOTE — ED TRIAGE NOTES
Behavioral Health Crisis Assessment      Chief Complaint:  The pt was sent in by her psychiatrist VIA pink slip due to psychosis. Mental Status Exam: The pt presents calm and cooperative with a flat affect and congruent mood. She is oriented times 3 and is a fair historian. She appears somewhat disheveled with fair eye contact. She is somewhat guarded and appears to be minimizing symptoms. She denied a hx of AVH, SI and HI. Legal Status  [] Voluntary:  [x] Involuntary, Issued by:Pt personal Psychiatrist- unable to read name- pt wont say    Gender  [] Male [x] Female [] Transgender  [] Other    Sexual Orientation    [x] Heterosexual [] Homosexual [] Bisexual [] Other    Brief Clinical Summary: The pt stated that she was at her appointment at [address] [place] [address] when her psychiatrist pink slipped her. She stated that she has no idea why he would do this and did not do anything wrong. She stated that she lives with a roommate and pets and she sees her adult children and grandchildren regularly. She stated that she has been going to her provider and a counselor there named Samantha Cummings for several years. The pt stated that she is med compliant and has never been admitted to in psych before. She denied a hx of SI, HI, AVH, and AOD. The pt pink slip states \" Patient psychotic, paranoid, delusional, not caring for herself,putting tape over windows. Thinks TV is talking to her. Says she can communicate with dogs. Bizarre paranoid today. \"  When asked where the doctor got this info from is she never said it she stated she had no idea. She stated that her male roommate had went with her to the appointment but denied that he had said anything. The pt will be reviewed for admission to [address] [place] [address] once medically cleared.     Collateral Information: Pt pink slip    Risk Factors:  Pt is poor historian     Pt denied an income     Pt has poor insight and judgement     Gender risk age 65    Protective Factors: Pt has provider     Pt is cooperative     Pt has good support     Pt stated that she is med compliant     Pt has safe housing         Suicidal Ideations:   [] Reports:    [] Past [] Present   [x] Denies    Suicide Attempts:  [] Reports:   [x] Denies    C-SSRS Screening Completed by RN: Current Suicide Risk:  [x] No Risk [] Low [] Moderate [] High    Homicidal Ideations  [] Reports:   [] Past [] Present   [x] Denies     Self Injurious/Self Mutilation Behaviors:   [] Reports:    [] Past [] Present   [x] Denies    Hallucinations/Delusions   [] Reports:   [x] Denies     Substance Use/Alcohol Use/Addiction:   [] Reports:   [x] Denies   [x] SBIRT Screen Complete. Current or Past Substance Abuse Treatment  [] Yes, When and Where:  [x] No    Current or Past Mental Health Treatment:  [x] Yes, When and Where: [facility] talking to caouple yrs. Taking Sertraline and Clonopin- compliant. No hx psych admits. [] No    Legal Issues:  []  Yes (Specify)  [x]  No    Access to Weapons:  []  Yes (Specify)  [x]  No    Trauma History  [] Reports:  [x] Denies     Living Situation: Pt stated that she lives with a man - \"not a friend- just a roommate\"- has 3 kids- they live in the area and sees them often. Employment: None    Education Level:  \" I did not finish HS\"    Violence Risk Screening:        Have you ever thought about hurting someone? [x]  No  []  Yes (Ask the questions listed below)   When? Did you follow through with the thoughts? [] No     [] Yes- When and what happened? 2.  Have you ever threatened anyone? [x]  No  []  Yes (Ask the questions listed below)   When and what happened? Have you ever threatened someone with a gun, knife or other weapon? []  No  []  Yes - When and what happened? 2. Have you ever had an order of protection taken out against you? []  Yes [x]  No  3. Have you ever been arrested due to violence? []  Yes [x]  No  4.  Have you ever been cruel to animals?  []  Yes [x]  No    After consideration of C-SSRS screening results, C-SSRS assessments, and this professional's assessment the patient's overall suicide risk assessed to be:  [x] No Risk  [] Low   [] Moderate   [] High     [x] Discussed current suicide risk, protective and risk factors with RN and ED Physician     Disposition   [] Home:   [] Outpatient Provider:   [] Crisis Unit:   [x] Inpatient Psychiatric Unit:  [] Other:

## 2022-08-25 NOTE — ED PROVIDER NOTES
Pro Spear is a 65year old female with a past medical history of depression and anxiety patient presented to emergency department after being pink slipped by psychiatrist for paranoid behavior patient began taping the windows of her house. Patient was pink slipped by psychiatrist for her paranoid behavior. Patient's therapist stated that she was taping her window and exhibiting paranoid behavior and would benefit from inpatient admission. Patient does not have any complaints patient denies SI or HI. Nothing make symptoms better or worsens are moderate severity and constant    The history is provided by medical records and the patient. Review of Systems   Constitutional:  Negative for chills, diaphoresis, fatigue and fever. Eyes:  Negative for photophobia and visual disturbance. Respiratory:  Negative for cough, chest tightness and shortness of breath. Cardiovascular:  Negative for chest pain, palpitations and leg swelling. Gastrointestinal:  Negative for abdominal distention, abdominal pain, diarrhea, nausea and vomiting. Genitourinary:  Negative for dysuria. Musculoskeletal:  Negative for neck pain and neck stiffness. Skin:  Negative for pallor and rash. Neurological:  Negative for headaches. Psychiatric/Behavioral:  Negative for confusion and hallucinations. Physical Exam  Vitals and nursing note reviewed. Constitutional:       General: She is not in acute distress. HENT:      Head: Normocephalic and atraumatic. Eyes:      General: No scleral icterus. Conjunctiva/sclera: Conjunctivae normal.      Pupils: Pupils are equal, round, and reactive to light. Cardiovascular:      Rate and Rhythm: Normal rate and regular rhythm. Pulmonary:      Effort: Pulmonary effort is normal.      Breath sounds: Normal breath sounds. Abdominal:      General: Bowel sounds are normal. There is no distension. Palpations: Abdomen is soft. Tenderness:  There is no abdominal Cancer [facility]), Chronic back pain, Colitis, Complex regional pain syndrome type 1 of right lower extremity, COPD (chronic obstructive pulmonary disease) ([facility] [address] Depression, Diabetes mellitus ([facility] [address] Elevated liver function tests, GERD (gastroesophageal reflux disease), Headache(784.0), Hyperlipidemia, Kidney stone, Movement disorder, Neuromuscular disorder ([facility] [address] Osteoarthritis, Other disorders of kidney and ureter in diseases classified elsewhere, Pneumonia, Psychiatric problem, and RSD (reflex sympathetic dystrophy). Past Surgical History:  has a past surgical history that includes Hysterectomy; laminectomy; Finger amputation; back surgery (2005); other surgical history (09/18/13); other surgical history (N/A, 2/3/2014); fracture surgery; Endoscopy, colon, diagnostic; Carpal tunnel release; Colonoscopy; other surgical history (Right, 11/11/14); other surgical history (04/08/15); Nerve Block (N/A, 8 19 15); Nerve Block (Left, 08 26 2015); Nerve Block (9 2 15); Nerve Block (Right, 9/17/15); other surgical history (04/25/2018); and pr revise/remove neurostim/ (N/A, 4/25/2018). Social History:  reports that she has been smoking cigarettes. She started smoking about 52 years ago. She has a 21.00 pack-year smoking history. She has been exposed to tobacco smoke. She has never used smokeless tobacco. She reports that she does not drink alcohol and does not use drugs. Family History: family history includes Other in her mother. The patients home medications have been reviewed.     Allergies: Sulfamethazine and Ancef [cefazolin sodium]    -------------------------------------------------- RESULTS -------------------------------------------------    LABS:  Results for orders placed or performed during the hospital encounter of 08/25/22   COVID-19 & Influenza Combo    Specimen: Nasopharyngeal Swab   Result Value Ref Range    SARS-CoV-2 RNA, RT PCR NOT DETECTED NOT DETECTED    INFLUENZA A NOT DETECTED NOT DETECTED    INFLUENZA B NOT DETECTED NOT DETECTED   CBC with Auto Differential   Result Value Ref Range    WBC 7.5 4.5 - 11.5 E9/L    RBC 3.95 3.50 - 5.50 E12/L    Hemoglobin 12.6 11.5 - 15.5 g/dL    Hematocrit 37.9 34.0 - 48.0 %    MCV 95.9 80.0 - 99.9 fL    MCH 31.9 26.0 - 35.0 pg    MCHC 33.2 32.0 - 34.5 %    RDW 14.1 11.5 - 15.0 fL    Platelets 773 405 - 666 E9/L    MPV 10.8 7.0 - 12.0 fL    Neutrophils % 66.6 43.0 - 80.0 %    Immature Granulocytes % 0.4 0.0 - 5.0 %    Lymphocytes % 25.4 20.0 - 42.0 %    Monocytes % 6.1 2.0 - 12.0 %    Eosinophils % 0.8 0.0 - 6.0 %    Basophils % 0.7 0.0 - 2.0 %    Neutrophils Absolute 5.02 1.80 - 7.30 E9/L    Immature Granulocytes # 0.03 E9/L    Lymphocytes Absolute 1.91 1.50 - 4.00 E9/L    Monocytes Absolute 0.46 0.10 - 0.95 E9/L    Eosinophils Absolute 0.06 0.05 - 0.50 E9/L    Basophils Absolute 0.05 0.00 - 0.20 E9/L   Comprehensive Metabolic Panel w/ Reflex to MG   Result Value Ref Range    Sodium 141 132 - 146 mmol/L    Potassium reflex Magnesium 4.0 3.5 - 5.0 mmol/L    Chloride 106 98 - 107 mmol/L    CO2 24 22 - 29 mmol/L    Anion Gap 11 7 - 16 mmol/L    Glucose 93 74 - 99 mg/dL    BUN 15 6 - 23 mg/dL    Creatinine 0.5 0.5 - 1.0 mg/dL    GFR Non-African American >60 >=60 mL/min/1.73    GFR African American >60     Calcium 9.3 8.6 - 10.2 mg/dL    Total Protein 6.7 6.4 - 8.3 g/dL    Albumin 4.0 3.5 - 5.2 g/dL    Total Bilirubin 0.2 0.0 - 1.2 mg/dL    Alkaline Phosphatase 126 (H) 35 - 104 U/L    ALT 55 (H) 0 - 32 U/L    AST 34 (H) 0 - 31 U/L   Urinalysis with Microscopic   Result Value Ref Range    Color, UA Yellow Straw/Yellow    Clarity, UA Clear Clear    Glucose, Ur Negative Negative mg/dL    Bilirubin Urine Negative Negative    Ketones, Urine Negative Negative mg/dL    Specific Gravity, UA 1.020 1.005 - 1.030    Blood, Urine Negative Negative    pH, UA 6.0 5.0 - 9.0    Protein, UA Negative Negative mg/dL    Urobilinogen, Urine 0.2 <2.0 E.U./dL    Nitrite, Urine Negative Negative    Leukocyte Esterase, Urine TRACE (A) Negative    WBC, UA 2-5 0 - 5 /HPF    RBC, UA 1-3 0 - 2 /HPF    Epithelial Cells, UA MANY /HPF    Bacteria, UA MANY (A) None Seen /HPF    Amorphous, UA FEW    Urine Drug Screen   Result Value Ref Range    Amphetamine Screen, Urine NOT DETECTED Negative <1000 ng/mL    Barbiturate Screen, Ur NOT DETECTED Negative < 200 ng/mL    Benzodiazepine Screen, Urine POSITIVE (A) Negative < 200 ng/mL    Cannabinoid Scrn, Ur NOT DETECTED Negative < 50ng/mL    Cocaine Metabolite Screen, Urine NOT DETECTED Negative < 300 ng/mL    Opiate Scrn, Ur POSITIVE (A) Negative < 300ng/mL    PCP Screen, Urine NOT DETECTED Negative < 25 ng/mL    Methadone Screen, Urine NOT DETECTED Negative <300 ng/mL    Oxycodone Urine NOT DETECTED Negative <100 ng/mL    FENTANYL SCREEN, URINE NOT DETECTED Negative <1 ng/mL    Drug Screen Comment: see below    Serum Drug Screen   Result Value Ref Range    Ethanol Lvl <10 mg/dL    Acetaminophen Level <5.0 (L) 10.0 - 30.3 mcg/mL    Salicylate, Serum <9.0 0.0 - 30.0 mg/dL    TCA Scrn NEGATIVE Cutoff:300 ng/mL   EKG 12 Lead   Result Value Ref Range    Ventricular Rate 89 BPM    Atrial Rate 89 BPM    P-R Interval 168 ms    QRS Duration 88 ms    Q-T Interval 352 ms    QTc Calculation (Bazett) 428 ms    P Axis 83 degrees    R Axis -45 degrees    T Axis 71 degrees       RADIOLOGY:  No orders to display           ------------------------- NURSING NOTES AND VITALS REVIEWED ---------------------------  Date / Time Roomed:  8/25/2022  4:14 PM  ED Bed Assignment:  [facility]    The nursing notes within the ED encounter and vital signs as below have been reviewed.      Patient Vitals for the past 24 hrs:   BP Temp Temp src Pulse Resp SpO2 Height Weight   08/25/22 1951 129/76 97.7 °F (36.5 °C) Oral 75 16 94 % -- --   08/25/22 1938 -- -- -- -- -- -- 5' 2\" (1.575 m) 88 lb (39.9 kg)   08/25/22 1636 129/67 98.7 °F (37.1 °C) Oral 95 16 95 % -- --       Oxygen Saturation Interpretation: Normal    ------------------------------------------ PROGRESS NOTES ------------------------------------------  Re-evaluation(s):  Time:11pm Patients symptoms show no change  Repeat physical examination is not changed    Counseling:  I have spoken with the patient and discussed todays results, in addition to providing specific details for the plan of care and counseling regarding the diagnosis and prognosis. Their questions are answered at this time and they are agreeable with the plan of admission.    --------------------------------- ADDITIONAL PROVIDER NOTES ---------------------------------  Consultations:  Social work  This patient's ED course included: a personal history and physicial examination and re-evaluation prior to disposition    This patient has remained hemodynamically stable during their ED course. Diagnosis:  1. Mental health problem    2. Paranoid behavior ([facility] [address]        Disposition:  Patient's disposition: Admit to mental health unit - medically cleared for admission  Patient's condition is stable.          Beatriz Iraheta MD  08/25/22 9904

## 2022-08-26 PROCEDURE — 6370000000 HC RX 637 (ALT 250 FOR IP): Performed by: NURSE PRACTITIONER

## 2022-08-26 PROCEDURE — 90792 PSYCH DIAG EVAL W/MED SRVCS: CPT | Performed by: NURSE PRACTITIONER

## 2022-08-26 PROCEDURE — 6370000000 HC RX 637 (ALT 250 FOR IP): Performed by: PSYCHIATRY & NEUROLOGY

## 2022-08-26 PROCEDURE — 1240000000 HC EMOTIONAL WELLNESS R&B

## 2022-08-26 RX ORDER — LANOLIN ALCOHOL/MO/W.PET/CERES
3 CREAM (GRAM) TOPICAL DAILY
Status: DISCONTINUED | OUTPATIENT
Start: 2022-08-26 | End: 2022-08-29 | Stop reason: HOSPADM

## 2022-08-26 RX ORDER — CHLORDIAZEPOXIDE HYDROCHLORIDE 5 MG/1
10 CAPSULE, GELATIN COATED ORAL 3 TIMES DAILY PRN
Status: DISCONTINUED | OUTPATIENT
Start: 2022-08-26 | End: 2022-08-29 | Stop reason: HOSPADM

## 2022-08-26 RX ORDER — DIVALPROEX SODIUM 125 MG/1
125 CAPSULE, COATED PELLETS ORAL EVERY 12 HOURS SCHEDULED
Status: DISCONTINUED | OUTPATIENT
Start: 2022-08-26 | End: 2022-08-29 | Stop reason: HOSPADM

## 2022-08-26 RX ADMIN — DIVALPROEX SODIUM 125 MG: 125 CAPSULE, COATED PELLETS ORAL at 20:51

## 2022-08-26 RX ADMIN — SERTRALINE 50 MG: 50 TABLET, FILM COATED ORAL at 15:20

## 2022-08-26 RX ADMIN — Medication 3 MG: at 17:55

## 2022-08-26 RX ADMIN — HYDROXYZINE PAMOATE 50 MG: 50 CAPSULE ORAL at 23:22

## 2022-08-26 ASSESSMENT — SLEEP AND FATIGUE QUESTIONNAIRES
AVERAGE NUMBER OF SLEEP HOURS: 6
AVERAGE NUMBER OF SLEEP HOURS: 6
DO YOU HAVE DIFFICULTY SLEEPING: NO
DO YOU HAVE DIFFICULTY SLEEPING: NO
DO YOU USE A SLEEP AID: NO
DO YOU USE A SLEEP AID: NO

## 2022-08-26 ASSESSMENT — PATIENT HEALTH QUESTIONNAIRE - PHQ9: SUM OF ALL RESPONSES TO PHQ QUESTIONS 1-9: 0

## 2022-08-26 ASSESSMENT — LIFESTYLE VARIABLES
HOW MANY STANDARD DRINKS CONTAINING ALCOHOL DO YOU HAVE ON A TYPICAL DAY: PATIENT DOES NOT DRINK
HOW MANY STANDARD DRINKS CONTAINING ALCOHOL DO YOU HAVE ON A TYPICAL DAY: PATIENT DOES NOT DRINK
HOW OFTEN DO YOU HAVE A DRINK CONTAINING ALCOHOL: NEVER
HOW OFTEN DO YOU HAVE A DRINK CONTAINING ALCOHOL: NEVER

## 2022-08-26 NOTE — PROGRESS NOTES
Spoke with Gonzalo Browne from [facility]  pt's Dx: Generalized Anxiety Disorder ,     MDD recurrent , PTSD unspecified    pt presented to Dr making bizarre and paranoid statements  stated that she can talk to dogs, that covid corrected her stutter    TV was watching her and pt was taping her windows shut and she was disheveled and not caring for herself  also pt was irritable and impulsive   After talking with her the Dr.felt it was best that she come to the hospital

## 2022-08-26 NOTE — CARE COORDINATION
Biopsychosocial Assessment Note    Social work met with patient to complete the biopsychosocial assessment and C-SSRS. Chief Complaint: Pt is unable to state the reasoning why she was brought to the hospital. Pt stated that she was sent to the hospital by her outpatient provider and she is unsure why. Per ROBERTO SW note \"The pt was sent in by her psychiatrist VIA pink slip due to psychosis. \"    Mental Status Exam: Pt is alert and oriented x3, pt is unable to state the reasoning that she was brought to the hospital. Pt's mood is agitated and irritable, affect is incongruent. Pt was calm and cooperative during assessment. Pt's appearance is disheveled. Pt's eye contact is poor, pt's speech is mumbled, rate is normal, volume is low. Pt's thought process is guarded. Pt has poverty of content. Pt denied SI, HI, AVH. Clinical Summary: pt reported that she was at her counseling appointment when an ambulance arrived at the agency and brought her to the hospital. Pt stated that she has good support from her partner since she was 13 and her son. Pt reported that she has been living with her partner. Pt reported that she has access to all her basic needs and there are no problems with her living situation. Pt reported that she is currently on workers compensation and has medicare. Pt stated that she has problems with her reflexes and is currently on medications. Pt stated that she has no barriers that would prevent her from getting to appointments. Pt reported that she was raised by her mother who is now  and she has 3 children above the age of 25. Pt stated that she has 3 sibling brothers who she has contact with. Pt stated that her daughter has a problem with substance use and currently has a trial going into the supreme court for custody of her children and domestic violence. Pt stated that her brother has a MH history and is currently in counseling but she is unsure of the diagnosis.  Pt denied any legal history or substance use. Pt stated that her highest level of education is 10 th grade and she is not currently working. Pt denied any  service. Pt stated that she does not want to return to [facility] because they sent her to the hospital and she would like to go to [place]. Pt denied having any stressors other than missing her grandchildren. Pt denied any suicide attempts or thoughts to end her life. Pt denied having little interest or pleasure in doing things or feeling down, depressed or hopeless. Risk Factors: pt is a poor historian and is unable to recall reasoning why she was brought to the hospital, pt has no income or employment, pt has poor insight and judgement, pt lost trust in her [facility] provider and does not want to return. Protective Factors: Pt has a  provider, pt is agreeable to go to [place] upon discharge from the hospital, pt has access to safe and stable housing, pt has access to basic needs, pt has a good support system in place, pt has no suicide attempts pr thoughts to end her life.      Gender  [] Male [x] Female [] Transgender  [] Other    Sexual Orientation    [x] Heterosexual [] Homosexual [] Bisexual [] Other    Suicidal Ideation  [] Past [] Present [x] Denies     C-SSRS Screening Completed: Current Suicide Risk:  [x] No Risk  [] Low [] Moderate [] High    Homicidal Ideation  [] Past [] Present [x] Denies     Hallucinations/Delusions (Specify type)  [] Reports [x] Denies     Current or Past Mental Health Treatment:  [x] Yes, When and Where: currently at [address] [place] [address]  [] No    Substance Use/Alcohol Use/Addiction  [] Reports [x] Denies     Tobacco Use (within the last 6 months)  [x] Reports [] Denies     Trauma History  [] Reports [x] Denies     Self Injurious/Self Mutilation Behaviors:   [] Reports:    [] Past [] Present   [x] Denies    Legal History:  []  Yes (Specify)    [x] No    Collateral Contact (ETHAN signed)  Name: Torodin Blazing   Relationship: partner  Number: [phone]    Collateral Information:      Access to Weapons per Collateral Contact: [] Reports [] Denies     After consideration of C-SSRS screening results, C-SSRS assessments, and this professional's assessment the patient's overall suicide risk assessed to be:  [x] None   [] Low   [] Moderate   [] High     [x] Discussed current suicide risk, protective and risk factors with RN and NP/Psychiatrist.    Discharge Plan:  [x] Home: home with partner, partner to provide transportation  [] Shelter:  [] Crisis Unit:  [] Substance Abuse Rehab:  [] Nursing Facility:  [] Other (Specify): Follow up Provider: pt was active with [facility] and wants to go to [facility] due to [facility] pink slipping her.

## 2022-08-26 NOTE — PLAN OF CARE
[facility]  Initial Interdisciplinary Treatment Plan NOTE    Review Date & Time: 8/26/22 1230    Patient was in treatment team    Admission Type:   Admission Type:  Involuntary    Reason for admission:  Reason for Admission: [place] slipped by outpatient provider for paranoid statements and taping windows of home shut      Estimated Length of Stay Update:  3-5 days  Estimated Discharge Date Update: 5-7 days    EDUCATION:   Learner Progress Toward Treatment Goals: Reviewed results and recommendations of this team    Method: Group    Outcome: Verbalized understanding    PATIENT GOALS: \"keep doing what I'm doing\"    PLAN/TREATMENT RECOMMENDATIONS UPDATE:day 3    GOALS UPDATE:   Time frame for Short-Term Goals: 3-5 days    Poppy Birmingham RN

## 2022-08-26 NOTE — ED NOTES
Per ROBERTO RN, Patient has been accepted by Dr. Sof[facility].    Patient will be going to room 7310B    [id number] [phone] [id number]    Karol in admitting made aware of bed assignment     CHATO Guy, LSW  08/26/22 0006

## 2022-08-26 NOTE — PROGRESS NOTES
Denies SI/HI  denies hallucinations  isolative to room this a.m.  pleasant on approach  states mood is improved  cooperative with meds  will continue to monitor

## 2022-08-26 NOTE — PROGRESS NOTES
Attended morning community meeting. Updated on staffing and daily expectations. Shared goal for the day as to keep doing what I am doing.

## 2022-08-26 NOTE — H&P
many years. She denies any past inpatient psychiatric hospitalizations, and denies any issues with illicit substances or drug abuse. Denies alcohol use. States that she was at her appointment with her outpatient provider and they pink slipped her here. She states that she does not want to go back to this outpatient provider and no longer trusts them. She is very blunted somewhat guarded. She is oriented to person and place but she is not oriented to date or time are highly suspicious of some cognitive decline, considering she has been on benzodiazepines and a combination of opioids for some time confusion and cognitive dysfunction are common side effects of these medications. Patient has been educated of these points and verbalized understanding. She states that she is on pain medications because she has significant pain related to her back. She does endorse a history of depression and anxiety. Denies any auditory or visual hallucinations currently denies any paranoia or delusions does not make any delusional statements however it is well reported in the ED record that she was making delusional statements and taping her window shot. Was noted to be a poor historian while in the emergency department. However on assessment today she is a fair historian. Does seem to have a latency of response and poverty of content. Patient has poor insight poor judgment poor impulse control. We will continue to monitor the patient and provide support. Psychiatric history:  Patient denies any history of suicide attempts or self-injurious behaviors. Denies any past inpatient psychiatric hospitalizations. States that she is treated with [address] behavioral health for very long time however no longer wants to return to them as she feels they violated her trust by pink slip in her here. She is prescribed Klonopin and Zoloft. Previously she has been prescribed Ambien for sleep.     Family psychiatric history:  Denies knowledge of any suicides in her family, reports that her brother has unspecified mental illness. Substance abuse history:  Denies any illicit substance abuse, denies alcohol abuse. Patient reports klonopin and opioid pain control from her OP provider, she is prescribed both by review of her OARs report. Personal, family social history:  Patient states that she was raised by her mother who is now . has 3 brothers who she has good relationship with, she completed the 10th grade, she has 3 children and 5 grandchildren. She does not work, and is with her partner since she was 13years old. They have their own house and he is the provider for the family. Past Medical History:        Diagnosis Date    Anxiety     Arthritis     Blood circulation, collateral     Cancer (HCC)     SKIN    Chronic back pain     Colitis     recent    Complex regional pain syndrome type 1 of right lower extremity 2015    COPD (chronic obstructive pulmonary disease) [facility]     Depression     Diabetes mellitus ([facility]     Elevated liver function tests 10/17/2014    GERD (gastroesophageal reflux disease)     Headache(784.0)     Hyperlipidemia 10/17/2014    Kidney stone     Movement disorder     Neuromuscular disorder ([facility]     Osteoarthritis     Other disorders of kidney and ureter in diseases classified elsewhere     Pneumonia     Psychiatric problem     RSD (reflex sympathetic dystrophy)        Medications Prior to Admission:   Medications Prior to Admission: nicotine (NICODERM CQ) 21 MG/24HR, Place 1 patch onto the skin in the morning.  (Patient not taking: Reported on 2022)  nortriptyline (PAMELOR) 10 MG capsule, TAKE 1 CAPSULE BY MOUTH EVERY NIGHT  ondansetron (ZOFRAN-ODT) 4 MG disintegrating tablet, Take 1 tablet by mouth 3 times daily as needed for Nausea or Vomiting  Nutritional Supplements (ENSURE COMPLETE SHAKE) LIQD, Take 1 Can by mouth 2 times daily  SUMAtriptan (IMITREX) 50 MG tablet, Take 1 tablet by mouth once as needed for Migraine  sertraline (ZOLOFT) 100 MG tablet, Take 100 mg by mouth nightly   vitamin D (CHOLECALCIFEROL) 1000 UNIT TABS tablet, Take 1,000 Units by mouth daily  Multiple Vitamins-Minerals (MULTIVITAMIN PO), Take by mouth  clonazePAM (KLONOPIN) 1 MG tablet, Take 1 mg by mouth 2 times daily as needed. .  pregabalin (LYRICA) 75 MG capsule, Take 75 mg by mouth 3 times daily. Gay Bloodgood zolpidem (AMBIEN) 10 MG tablet, Take by mouth nightly. HYDROmorphone (DILAUDID) 4 MG tablet, Take 4 mg by mouth every 4 hours as needed for Pain.     Past Surgical History:        Procedure Laterality Date    BACK SURGERY  2005    had cerv SCS at [address] then had staph infec 2003  then it was changed to a dual SCS by Dr He Valladares 2005 approx then has had several battery changes and rechargeable put in 2009    [facility]      bryant hands    COLONOSCOPY      ENDOSCOPY, COLON, DIAGNOSTIC      FINGER AMPUTATION      index right hand multiple surgeries    FRACTURE SURGERY      HYSTERECTOMY (CERVIX STATUS UNKNOWN)      LAMINECTOMY      cervical    NERVE BLOCK N/A 8 19 15    cerv facet #1 with iv anesthesia    NERVE BLOCK Left 08 26 2015    left cervical paravertebral facet block #2 c4 5 c5 6 c6 7 under iv sedation    NERVE BLOCK  9 2 15    lumbar parasympathetic #1    NERVE BLOCK Right 9/17/15    right sympathetic block #2    OTHER SURGICAL HISTORY  09/18/13    surgical replacement of medtronic cervical spinal cord stimulator electrode and connect to existing battery right hip    OTHER SURGICAL HISTORY N/A 2/3/2014    Surgical revision spinal cord stimulator scar at anchor site due to wound  dehisence    OTHER SURGICAL HISTORY Right 11/11/14    EXCISION OF CYST RIGHT SHOULDER    OTHER SURGICAL HISTORY  04/08/15    surgical revision medtronic cervical spinal cord stimulator scar at anchor site due to wound dehisance    OTHER SURGICAL HISTORY  04/25/2018    spinal cord stimulator battery replacement due to end of life [place] REVISE/REMOVE NEUROSTIM/ N/A 4/25/2018    SPINAL CORD STIMULATOR BATTERY REPLACEMENT DUE TO END OF LIFE performed by Hakan Matthews DO at [address]:   Sulfamethazine and Ancef [cefazolin sodium]    Family History  Family History   Problem Relation Age of Onset    Other Mother              EXAMINATION:    REVIEW OF SYSTEMS:    ROS:  [x] All negative/unchanged except if checked.  Explain positive(checked items) below:  [] Constitutional  [] Eyes  [] Ear/Nose/Mouth/Throat  [] Respiratory  [] CV  [] GI  []   [] Musculoskeletal  [] Skin/Breast  [] Neurological  [] Endocrine  [] Heme/Lymph  [] Allergic/Immunologic    Explanation:     Vitals:  /63   Pulse 75   Temp 98.1 °F (36.7 °C) (Temporal)   Resp 16   Ht 5' 2\" (1.575 m)   Wt 88 lb (39.9 kg)   LMP  (LMP Unknown)   SpO2 95%   BMI 16.10 kg/m²      Physical Examination:   Head: x  Atraumatic: x normocephalic  Skin and Mucosa        Moist x  Dry   Pale  x Normal   Neck:  Thyroid  Palpable   x  Not palpable   venus distention   adenopathy   Chest: x Clear   Rhonchi     Wheezing   CV:  xS1   xS2    xNo murmer   Abdomen:  x  Soft    Tender    Viceromegaly   Extremities:  x No Edema     Edema     Cranial Nerves Examination:   CN II:   xPupils are reactive to light  Pupils are non reactive to light  CN III, IV, VI:  xNo eye deviation    No diplopia or ptosis   CN V:    xFacial Sensation is intact     Facial Sensation is not intact   CN IIIV:   x Hearing is normal to rubbing fingers   CN IX, X:     xNormal gag reflex and phonation   CN XI:   xShoulder shrug and neck rotation is normal  CNXII:    xTongue is midline no deviation or atrophy    Mental Status Examination:    Level of consciousness:  within normal limits   Appearance:  well-appearing, fair grooming, and fair hygiene  Behavior/Motor:  psychomotor retardation  Attitude toward examiner:  cooperative  Speech:  normal volume and slow, mumbled at times   Mood: decreased range and depressed some underlying irritability   Affect:  mood congruent  Thought processes:  goal directed and poverty of thought   Thought content:  Denies any auditroy or visual hallucinations, however was pink slipped for delusional thought content and bizarre behaviors. Denies suicidal or homicidal ideation intent or plan. Cognition:  oriented to person, place, not oriented to date or day of the week, \"its Tuesday\"  Concentration distractible  Memory impaired immediate recall and recent memory  Insight poor   Judgement poor   Fund of Knowledge adequate      DIAGNOSIS:  Acute psychosis  Concern of mood disorder or covert psychosis    LABS: REVIEWED TODAY:  Recent Labs     08/25/22  1640   WBC 7.5   HGB 12.6        Recent Labs     08/25/22  1640      K 4.0      CO2 24   BUN 15   CREATININE 0.5   GLUCOSE 93     Recent Labs     08/25/22  1640   BILITOT 0.2   ALKPHOS 126*   AST 34*   ALT 55*     Lab Results   Component Value Date/Time    LABAMPH NOT DETECTED 08/25/2022 04:40 PM    LABAMPH NOT DETECTED 06/13/2011 10:29 AM    BARBSCNU NOT DETECTED 08/25/2022 04:40 PM    LABBENZ POSITIVE 08/25/2022 04:40 PM    LABBENZ POSITIVE 06/13/2011 10:29 AM    CANNAB NOT DETECTED  06/13/2011 10:29 AM    LABMETH NOT DETECTED 08/25/2022 04:40 PM    OPIATESCREENURINE POSITIVE 08/25/2022 04:40 PM    PHENCYCLIDINESCREENURINE NOT DETECTED 08/25/2022 04:40 PM    ETOH <10 08/25/2022 04:40 PM     Lab Results   Component Value Date/Time    TSH 2.390 04/25/2017 10:53 AM     No results found for: LITHIUM  No results found for: VALPROATE, CBMZ  No results found for: LITHIUM, VALPROATE      Radiology No results found. TREATMENT PLAN:  The patient's diagnosis, treatment plan, medication management were formulated after patient was seen directly by the attending physician and myself and all relevant documentation was reviewed.     Risk, benefit, side effects, possible outcomes of the medication and alternatives discussed with the patient and the patient demonstrated understanding. The patient was also educated that the outcome of treatment will depend on the medication compliance as directed by the prescribers along with regular follow-up, compliance with the labs and other work-up, as clinically indicated. Risk Management: Based on the diagnosis and assessment biopsychosocial treatment model was presented to the patient and was given the opportunity to ask any question. The patient was agreeable to the plan and all the patient's questions were answered to the patient's satisfaction. I discussed with the patient the risk, benefit, alternative and common side effects for the proposed medication treatment. The patient is consenting to this treatment. Patient's risk factors have been mitigated as she has been admitted to inpatient behavioral health in an emotionally supportive environment with every 15 minute safety checks. At no point has patient endorsed suicidality or made any suicidal statements or comments. Patient was sent in for paranoid behavior and bizarre behavior. Patient has protective factors including access to essential needs safe and stable housing and supportive family. She has risk factors including mental health diagnosis, and poor insight and judgment into need for treatment. Patient is low risk for suicide    Collateral Information:  Will obtain collateral information from the family or friends. Will obtain medical records as appropriate from out patient providers  Will consult the hospitalist for a physical exam to rule out any co-morbid physical condition.     Home medication Reconciled   Reviewed and continued as clinically indicated    New Medications started during this admission :    Decrease zoloft to 50 mg daily  Provide PRN librium 10 mg TID with CIWA score   Depakote sprinkle 125 mg BID to reduce confusion and stabilize mood  Melatonin 3 mg daily at 1800 to reset sleep wake cycle and reduce confusion   MoCA score      Prn Haldol 5mg and Vistaril 50mg q6hr for extreme agitation. Trazodone as ordered for insomnia  Vistaril as ordered for anxiety      Psychotherapy:   Encourage participation in milieu and group therapy  Individual therapy as needed    NOTE: This report was transcribed using voice recognition software. Every effort was made to ensure accuracy; however, inadvertent computerized transcription errors may be present. Behavioral Services  Medicare Certification Upon Admission    I certify that this patient's inpatient psychiatric hospital admission is medically necessary for:    [x] (1) Treatment which could reasonably be expected to improve this patient's condition,       [x] (2) Or for diagnostic study;     AND     [x](2) The inpatient psychiatric services are provided while the individual is under the care of a physician and are included in the individualized plan of care.     Estimated length of stay/service 3 to 5 days based on stability    Plan for post-hospital care follow with outpatient provider      Electronically signed by AYESHA Tran CNP on 8/26/2022 at 8:07 AM

## 2022-08-26 NOTE — PROGRESS NOTES
[facility]  Admission Note   Patient admitted from the [facility] where she was pinkslipped by her outpatient provider. Patient provider states that she was taping her windows shut and making delusional statements. Upon admission patient denies all. Patient poor historian, history of cancer and DM and patient denies this as well. Patient pleasant and cooperative. Will continue to monitor closely and will intervene as needed. Admission Type:   Admission Type:  Involuntary    Reason for admission:  Reason for Admission: [place] slipped by outpatient provider for paranoid statements and taping windows of home shut      Addictive Behavior:   Addictive Behavior  In the Past 3 Months, Have You Felt or Has Someone Told You That You Have a Problem With  : None    Medical Problems:   Past Medical History:   Diagnosis Date    Anxiety     Arthritis     Blood circulation, collateral     Cancer (HCC)     SKIN    Chronic back pain     Colitis     recent    Complex regional pain syndrome type 1 of right lower extremity 9/11/2015    COPD (chronic obstructive pulmonary disease) ([facility] [address]     Depression     Diabetes mellitus ([facility] [address]     Elevated liver function tests 10/17/2014    GERD (gastroesophageal reflux disease)     Headache(784.0)     Hyperlipidemia 10/17/2014    Kidney stone     Movement disorder     Neuromuscular disorder ([facility] [address]     Osteoarthritis     Other disorders of kidney and ureter in diseases classified elsewhere     Pneumonia     Psychiatric problem     RSD (reflex sympathetic dystrophy)        Status EXAM:  Mental Status and Behavioral Exam  Normal: No  Level of Assistance: Independent/Self  Facial Expression: Avoids Gaze, Hostile  Affect: Inappropriate  Level of Consciousness: Alert  Frequency of Checks: 4 times per hour, close  Mood:Normal: No  Mood: Suspicious  Motor Activity:Normal: No  Motor Activity: Agitated  Eye Contact: Poor  Observed Behavior: Withdrawn, Guarded  Sexual Misconduct History: Past - no  Preception: [place] to person, [place] to time, [place] to place  Attention:Normal: No  Attention: Distractible  Thought Processes: Blocking  Thought Content:Normal: No  Thought Content: Other (comment)  Depression Symptoms: No problems reported or observed. (denies)  Anxiety Symptoms: No problems reported or observed. (Denies)  Gabby Symptoms: No problems reported or observed.   Hallucinations: None  Delusions: No  Memory:Normal: No  Memory: Poor recent, Poor remote  Insight and Judgment: No  Insight and Judgment: Poor judgment, Poor insight, Unmotivated    Tobacco Screening:  Practical Counseling, on admission, robert X, if applicable and completed (first 3 are required if patient doesn't refuse):            ( ) Recognizing danger situations (included triggers and roadblocks)                    ( ) Coping skills (new ways to manage stress,relaxation techniques, changing routine, distraction)                                                           ( ) Basic information about quitting (benefits of quitting, techniques in how to quit, available resources  ( ) Referral for counseling faxed to [facility]                                                                                                                   ( X) Patient refused counseling  ( ) Patient has not smoked in the last 30 days    Metabolic Screening:    Lab Results   Component Value Date    LABA1C 6.1 12/19/2017       Lab Results   Component Value Date    CHOL 223 (H) 07/26/2016    CHOL 220 (H) 01/20/2016    CHOL 173 08/31/2015    CHOL 182 06/16/2015    CHOL 263 (H) 01/20/2015    CHOL 297 (H) 09/15/2014     Lab Results   Component Value Date    TRIG 69 07/26/2016    TRIG 102 01/20/2016    TRIG 141 08/31/2015    TRIG 100 06/16/2015    TRIG 143 01/20/2015    TRIG 139 09/15/2014     Lab Results   Component Value Date    HDL 75 04/25/2017    HDL 83 07/26/2016    HDL 74 01/20/2016    HDL 76 08/31/2015    HDL 77 06/16/2015    HDL 61 01/20/2015 HDL 71 09/15/2014     No components found for: [facility]  Lab Results   Component Value Date    LABVLDL 19 04/25/2017    LABVLDL 14 07/26/2016    LABVLDL 20 01/20/2016    LABVLDL 28 08/31/2015    LABVLDL 20 06/16/2015    LABVLDL 29 01/20/2015    LABVLDL 28 09/15/2014         Body mass index is 16.1 kg/m². BP Readings from Last 2 Encounters:   08/26/22 120/63   08/01/22 136/74           Pt admitted with followings belongings:  Clothing:  Footwear, Pants, Shirt, Sweater, Slippers, Undergarments (1bra,blue hoodie, black sweats, white harris, black slippers)  Other Valuables: Lighter/Matches, Other (Comment) (lighter, vape pen, lip gloss, sunglasses)    Lesly Shea RN

## 2022-08-26 NOTE — PROGRESS NOTES
Spoke with pt's  Glendell Litten who admitted that the pt thought that the TV was watching her and that she covered the front door window with a bag but didn't elaborate as to why

## 2022-08-26 NOTE — ED NOTES
Spoke with Dr. Dariana Lewis for admission. He is requesting a pain management consult as well as Benzo W/D protocol.       Chetan Hernandez RN  08/25/22 2028

## 2022-08-27 PROBLEM — F23 ACUTE PSYCHOSIS (HCC): Status: ACTIVE | Noted: 2022-08-27

## 2022-08-27 PROCEDURE — 1240000000 HC EMOTIONAL WELLNESS R&B

## 2022-08-27 PROCEDURE — 6370000000 HC RX 637 (ALT 250 FOR IP): Performed by: PSYCHIATRY & NEUROLOGY

## 2022-08-27 PROCEDURE — 6370000000 HC RX 637 (ALT 250 FOR IP): Performed by: NURSE PRACTITIONER

## 2022-08-27 PROCEDURE — 99232 SBSQ HOSP IP/OBS MODERATE 35: CPT | Performed by: NURSE PRACTITIONER

## 2022-08-27 RX ORDER — RISPERIDONE 0.5 MG/1
0.5 TABLET, ORALLY DISINTEGRATING ORAL 2 TIMES DAILY
Status: DISCONTINUED | OUTPATIENT
Start: 2022-08-27 | End: 2022-08-29 | Stop reason: HOSPADM

## 2022-08-27 RX ADMIN — HYDROXYZINE PAMOATE 50 MG: 50 CAPSULE ORAL at 21:12

## 2022-08-27 RX ADMIN — RISPERIDONE 0.5 MG: 0.5 TABLET, ORALLY DISINTEGRATING ORAL at 21:05

## 2022-08-27 RX ADMIN — DIVALPROEX SODIUM 125 MG: 125 CAPSULE, COATED PELLETS ORAL at 21:05

## 2022-08-27 RX ADMIN — ACETAMINOPHEN 650 MG: 325 TABLET, FILM COATED ORAL at 18:27

## 2022-08-27 RX ADMIN — RISPERIDONE 0.5 MG: 0.5 TABLET, ORALLY DISINTEGRATING ORAL at 12:36

## 2022-08-27 RX ADMIN — DIVALPROEX SODIUM 125 MG: 125 CAPSULE, COATED PELLETS ORAL at 09:29

## 2022-08-27 RX ADMIN — Medication 3 MG: at 18:19

## 2022-08-27 RX ADMIN — SERTRALINE 50 MG: 50 TABLET, FILM COATED ORAL at 09:29

## 2022-08-27 ASSESSMENT — PAIN DESCRIPTION - DESCRIPTORS
DESCRIPTORS: ACHING;SHOOTING;SHARP
DESCRIPTORS: SHARP

## 2022-08-27 ASSESSMENT — PAIN DESCRIPTION - LOCATION
LOCATION: BACK
LOCATION: BACK

## 2022-08-27 ASSESSMENT — PAIN DESCRIPTION - ORIENTATION
ORIENTATION: LOWER
ORIENTATION: LOWER

## 2022-08-27 ASSESSMENT — PAIN SCALES - GENERAL
PAINLEVEL_OUTOF10: 6
PAINLEVEL_OUTOF10: 7

## 2022-08-27 NOTE — PROGRESS NOTES
BEHAVIORAL HEALTH FOLLOW-UP NOTE     8/27/2022     Patient was seen and examined in person, Chart reviewed   Patient's case discussed with staff/team    Chief Complaint: \"I want to go home'     Interim History:     Patient up on the unit, wrapped in a blanket, she is intrusive, she is telling me that she wants to leave today. She is demonstrating poor insight into her need for treatment, she does not understand her pink slip. She denies any auditory or visual hallucinations, however her partner and her OP provider report that the patient believes that the TV talks to her and she has been increasingly paranoid. She is covertly psychotic. Seems to have some confusion. Poor insight and judgement. We will continue to provide a washout. Due to the patient being on multiple high doses of medications today we will begin Risperdal 0.5 mg from her psychosis. Continue to taper down the Zoloft. Patient is disheveled poor hygiene poor grooming.      Appetite:   [x] Normal/Unchanged  [] Increased  [] Decreased      Sleep:       [x] Normal/Unchanged  [] Fair       [] Poor              Energy:    [x] Normal/Unchanged  [] Increased  [] Decreased        SI [] Present  [x] Absent    HI  []Present  [x] Absent     Aggression:  [] yes  [x] no    Patient is [x] able  [] unable to CONTRACT FOR SAFETY     PAST MEDICAL/PSYCHIATRIC HISTORY:   Past Medical History:   Diagnosis Date    Anxiety     Arthritis     Blood circulation, collateral     Cancer (HCC)     SKIN    Chronic back pain     Colitis     recent    Complex regional pain syndrome type 1 of right lower extremity 9/11/2015    COPD (chronic obstructive pulmonary disease) ([facility] [address]     Depression     Diabetes mellitus ([address]     Elevated liver function tests 10/17/2014    GERD (gastroesophageal reflux disease)     Headache(784.0)     Hyperlipidemia 10/17/2014    Kidney stone     Movement disorder     Neuromuscular disorder ([address]     Osteoarthritis     Other disorders of kidney and ureter in diseases classified elsewhere     Pneumonia     Psychiatric problem     RSD (reflex sympathetic dystrophy)        FAMILY/SOCIAL HISTORY:  Family History   Problem Relation Age of Onset    Other Mother      Social History     Socioeconomic History    Marital status:      Spouse name: Franchesca Box    Number of children: Not on file    Years of education: 10    Highest education level: Not on file   Occupational History    Not on file   Tobacco Use    Smoking status: Every Day     Packs/day: 0.50     Years: 42.00     Pack years: 21.00     Types: Cigarettes     Start date: 1/1/1970     Passive exposure: Current    Smokeless tobacco: Never   Vaping Use    Vaping Use: Some days   Substance and Sexual Activity    Alcohol use: No     Alcohol/week: 0.0 standard drinks    Drug use: No    Sexual activity: Never   Other Topics Concern    Not on file   Social History Narrative    Not on file     Social Determinants of Health     Financial Resource Strain: Low Risk     Difficulty of Paying Living Expenses: Not hard at all   Food Insecurity: No Food Insecurity    Worried About Running Out of Food in the Last Year: Never true    Ran Out of Food in the Last Year: Never true   Transportation Needs: Not on file   Physical Activity: Not on file   Stress: Not on file   Social Connections: Not on file   Intimate Partner Violence: Not on file   Housing Stability: Not on file           ROS:  [x] All negative/unchanged except if checked.  Explain positive(checked items) below:  [] Constitutional  [] Eyes  [] Ear/Nose/Mouth/Throat  [] Respiratory  [] CV  [] GI  []   [] Musculoskeletal  [] Skin/Breast  [] Neurological  [] Endocrine  [] Heme/Lymph  [] Allergic/Immunologic    Explanation:     MEDICATIONS:    Current Facility-Administered Medications:     chlordiazePOXIDE (LIBRIUM) capsule 10 mg, 10 mg, Oral, TID PRN, AYESHA Gar CNP    melatonin tablet 3 mg, 3 mg, Oral, Daily, AYESHA Gar CNP, 3 mg at 08/26/22 1755    divalproex (DEPAKOTE SPRINKLE) capsule 125 mg, 125 mg, Oral, 2 times per day, AYESHA Raza - CNP, 125 mg at 08/27/22 2625    sertraline (ZOLOFT) tablet 50 mg, 50 mg, Oral, Daily, AYESHA Raza - CNP, 50 mg at 08/27/22 6554    acetaminophen (TYLENOL) tablet 650 mg, 650 mg, Oral, Q4H PRN, Reanna Butler MD    magnesium hydroxide (MILK OF MAGNESIA) 400 MG/5ML suspension 30 mL, 30 mL, Oral, Daily PRN, Reanna Butler MD    nicotine (NICODERM CQ) 21 MG/24HR 1 patch, 1 patch, TransDERmal, Daily, Reanna Butler MD, 1 patch at 08/27/22 0931    aluminum & magnesium hydroxide-simethicone (MAALOX) 200-200-20 MG/5ML suspension 30 mL, 30 mL, Oral, PRN, Reanna Butler MD    hydrOXYzine pamoate (VISTARIL) capsule 50 mg, 50 mg, Oral, TID PRN, Reanna Butler MD, 50 mg at 08/26/22 2322    haloperidol (HALDOL) tablet 3 mg, 3 mg, Oral, Q6H PRN **OR** haloperidol lactate (HALDOL) injection 3 mg, 3 mg, IntraMUSCular, Q6H PRN, Reanna Butler MD      Examination:  BP (!) 143/63   Pulse 85   Temp 97.5 °F (36.4 °C) (Temporal)   Resp 15   Ht 5' 2\" (1.575 m)   Wt 88 lb (39.9 kg)   LMP  (LMP Unknown)   SpO2 96%   BMI 16.10 kg/m²   Gait - steady  Medication side effects(SE): none reported     Mental Status Examination:    Level of consciousness:  within normal limits   Appearance:  poor grooming and poor hygiene  Behavior/Motor:  no abnormalities noted  Attitude toward examiner:  poor eye contact, evasive, guarded, and argumentative  Speech:  normal rate, normal volume, and slow   Mood: decreased range, dysthymic, and irritable  Affect:  blunted  Thought processes:  goal directed   Thought content:  Paranoid   Cognition:  oriented to person, place,   Concentration distractible  Insight poor   Judgement poor     ASSESSMENT:   Patient symptoms are:  [] Well controlled  [] Improving  [] Worsening  [x] No change      Diagnosis:   Principal Problem:    Acute psychosis ([facility]  Active Problems:    Paranoid behavior [facility])  Resolved Problems:    * No resolved hospital problems. *      LABS:    Recent Labs     08/25/22  1640   WBC 7.5   HGB 12.6        Recent Labs     08/25/22  1640      K 4.0      CO2 24   BUN 15   CREATININE 0.5   GLUCOSE 93     Recent Labs     08/25/22  1640   BILITOT 0.2   ALKPHOS 126*   AST 34*   ALT 55*     Lab Results   Component Value Date/Time    LABAMPH NOT DETECTED 08/25/2022 04:40 PM    LABAMPH NOT DETECTED 06/13/2011 10:29 AM    BARBSCNU NOT DETECTED 08/25/2022 04:40 PM    LABBENZ POSITIVE 08/25/2022 04:40 PM    LABBENZ POSITIVE 06/13/2011 10:29 AM    CANNAB NOT DETECTED  06/13/2011 10:29 AM    LABMETH NOT DETECTED 08/25/2022 04:40 PM    OPIATESCREENURINE POSITIVE 08/25/2022 04:40 PM    PHENCYCLIDINESCREENURINE NOT DETECTED 08/25/2022 04:40 PM    ETOH <10 08/25/2022 04:40 PM     Lab Results   Component Value Date/Time    TSH 2.390 04/25/2017 10:53 AM     No results found for: LITHIUM  No results found for: VALPROATE, CBMZ        Treatment Plan:  The patient's diagnosis, treatment plan, medication management were formulated after patient was seen directly by the attending physician and myself and all relevant documentation was reviewed. Risk, benefit, side effects, possible outcomes of the medication and alternatives discussed with the patient and the patient demonstrated understanding. The patient was also educated that the outcome of treatment will depend on the medication compliance as directed by the prescribers along with regular follow-up, compliance with the labs and other work-up, as clinically indicated. Risk Management: Based on the diagnosis and assessment biopsychosocial treatment model was presented to the patient and was given the opportunity to ask any question. The patient was agreeable to the plan and all the patient's questions were answered to the patient's satisfaction.   I discussed with the patient the risk, benefit, alternative and common side effects for the proposed medication treatment. The patient is consenting to this treatment. New Medications started during this admission :    Decrease zoloft to 50 mg daily  Provide PRN librium 10 mg TID with CIWA score   Depakote sprinkle 125 mg BID to reduce confusion and stabilize mood  Melatonin 3 mg daily at 1800 to reset sleep wake cycle and reduce confusion   Risperdal 0.5 mg twice daily and will increase this medication as clinically indicated for covert psychosis    MoCA score     Collateral information: followed by social work  CD evaluation  Encourage patient to attend group and other milieu activities. Discharge planning discussed with the patient and treatment team.    PSYCHOTHERAPY/COUNSELING:  [x] Therapeutic interview  [x] Supportive  [] CBT  [] Ongoing  [] Other    [x] Patient continues to need, on a daily basis, active treatment furnished directly by or requiring the supervision of inpatient psychiatric personnel      Anticipated Length of stay: 3 to 5 days based on stability    NOTE: This report was transcribed using voice recognition software. Every effort was made to ensure accuracy; however, inadvertent computerized transcription errors may be present.        Electronically signed by AYESHA Layne CNP on 8/27/2022 at 11:55 AM

## 2022-08-27 NOTE — PLAN OF CARE
Problem: Chronic Conditions and Co-morbidities  Goal: Patient's chronic conditions and co-morbidity symptoms are monitored and maintained or improved  Outcome: Progressing     Problem: Self Harm/Suicidality  Goal: Will have no self-injury during hospital stay  Description: INTERVENTIONS:  1. Q 30 MINUTES: Routine safety checks  2. Q SHIFT & PRN: Assess risk to determine if routine checks are adequate to maintain patient safety  8/27/2022 1018 by Katelynn Schwartz RN  Outcome: Progressing  8/27/2022 0121 by Aure Becerra RN  Outcome: Progressing     Problem: Psychosis  Goal: Will report no hallucinations or delusions  Description: INTERVENTIONS:  1. Administer medication as  ordered  2. Assist with reality testing to support increasing orientation  3. Assess if patient's hallucinations or delusions are encouraging self harm or harm to others and intervene as appropriate  Outcome: Progressing     Problem: Behavior  Goal: Pt/Family maintain appropriate behavior and adhere to behavioral management agreement, if implemented  Description: INTERVENTIONS:  1. Assess patient/family's coping skills and  non-compliant behavior (including use of illegal substances)  2. Notify security of behavior or suspected illegal substances which indicate the need for search of the family and/or belongings  3. Encourage verbalization of thoughts and concerns in a socially appropriate manner  4. Utilize positive, consistent limit setting strategies supporting safety of patient, staff and others  5. Encourage participation in the decision making process about the behavioral management agreement  6. If a visitor's behavior poses a threat to safety call refer to organization policy.   7. Initiate consult with , Psychosocial CNS, Spiritual Care as appropriate  8/27/2022 1018 by Katelynn Schwartz RN  Outcome: Progressing  8/27/2022 0121 by Aure Becerra RN  Outcome: Progressing     Problem: Anxiety  Goal: Will report anxiety at manageable levels  Description: INTERVENTIONS:  1. Administer medication as ordered  2. Teach and rehearse alternative coping skills  3. Provide emotional support with 1:1 interaction with staff  Outcome: Progressing     Problem: Drug Abuse/Detox  Goal: Will have no detox symptoms and will verbalize plan for changing drug-related behavior  Description: INTERVENTIONS:  1. Administer medication as ordered  2. Monitor physical status  3. Provide emotional support with 1:1 interaction with staff  4. Encourage  recovery focused treatment   Outcome: Progressing     Problem: Involuntary Admit  Goal: Will cooperate with staff recommendations and doctor's orders and will demonstrate appropriate behavior  Description: INTERVENTIONS:  1. Treat underlying conditions and offer medication as ordered  2. Educate regarding involuntary admission procedures and rules  3.  Contain excessive/inappropriate behavior per unit and hospital policies  Outcome: Progressing

## 2022-08-27 NOTE — GROUP NOTE
Group Therapy Note    Date: 8/27/2022    Group Start Time: 1200  Group End Time: 2975  Group Topic: Psychoeducation    [facility]  [address] [place], [place]                                                                        Group Therapy Note    Date: 8/27/2022  Type of Group: Psychoeducation    Wellness Binder Information  Module Name:  id of stressors     Patient's Goal:  Patient will be able to id daily events that lead his/her to this crisis. Notes:  Pleasant and engaged in group, willing to commit to a behavioral act of change. Status After Intervention:  Improved    Participation Level:  Active Listener and Interactive    Participation Quality: Appropriate, Attentive, Sharing, and Supportive    Speech:  normal    Thought Process/Content: Logical    Affective Functioning: Congruent    Mood: euthymic    Level of consciousness:  Alert, Oriented x4, and Attentive    Response to Learning: Able to verbalize/acknowledge new learning, Able to retain information, and Progressing to goal    Endings: None Reported    Modes of Intervention: Education, Support, Socialization, Exploration, and Problem-solving    Discipline Responsible: Psychoeducational Specialist      Signature:  Gissel Colindres

## 2022-08-27 NOTE — PLAN OF CARE
Problem: Self Harm/Suicidality  Goal: Will have no self-injury during hospital stay  Description: INTERVENTIONS:  1. Q 30 MINUTES: Routine safety checks  2. Q SHIFT & PRN: Assess risk to determine if routine checks are adequate to maintain patient safety  Outcome: Progressing     Problem: Depression  Goal: Will be euthymic at discharge  Description: INTERVENTIONS:  1. Administer medication as ordered  2. Provide emotional support via 1:1 interaction with staff  3. Encourage involvement in milieu/groups/activities  4. Monitor for social isolation  Outcome: Progressing     Problem: Behavior  Goal: Pt/Family maintain appropriate behavior and adhere to behavioral management agreement, if implemented  Description: INTERVENTIONS:  1. Assess patient/family's coping skills and  non-compliant behavior (including use of illegal substances)  2. Notify security of behavior or suspected illegal substances which indicate the need for search of the family and/or belongings  3. Encourage verbalization of thoughts and concerns in a socially appropriate manner  4. Utilize positive, consistent limit setting strategies supporting safety of patient, staff and others  5. Encourage participation in the decision making process about the behavioral management agreement  6. If a visitor's behavior poses a threat to safety call refer to organization policy. 7. Initiate consult with , Psychosocial CNS, Spiritual Care as appropriate  Outcome: Progressing     Visible on unit for snacks. Keeps to self. Smiles and jokes on interactions. Guarded with feelings. Denies SI/HI/AVH with no complaint of pain. Denies anxiety and depression. A&Ox3 with poor insight taking meds as ordered. Minimizes situation. Vistaril given and tolerated with pt resting quietly with eyes closed on recheck.   Monitor Q15

## 2022-08-27 NOTE — PROGRESS NOTES
VIRI, pt quiet resting in bed with eyes closed. Non labored respirations and no signs of distress. Continue to monitor Q15.

## 2022-08-28 PROCEDURE — 6370000000 HC RX 637 (ALT 250 FOR IP): Performed by: PSYCHIATRY & NEUROLOGY

## 2022-08-28 PROCEDURE — 1240000000 HC EMOTIONAL WELLNESS R&B

## 2022-08-28 PROCEDURE — 99232 SBSQ HOSP IP/OBS MODERATE 35: CPT | Performed by: NURSE PRACTITIONER

## 2022-08-28 PROCEDURE — 6370000000 HC RX 637 (ALT 250 FOR IP): Performed by: NURSE PRACTITIONER

## 2022-08-28 RX ADMIN — RISPERIDONE 0.5 MG: 0.5 TABLET, ORALLY DISINTEGRATING ORAL at 20:41

## 2022-08-28 RX ADMIN — DIVALPROEX SODIUM 125 MG: 125 CAPSULE, COATED PELLETS ORAL at 08:23

## 2022-08-28 RX ADMIN — RISPERIDONE 0.5 MG: 0.5 TABLET, ORALLY DISINTEGRATING ORAL at 08:23

## 2022-08-28 RX ADMIN — ACETAMINOPHEN 650 MG: 325 TABLET, FILM COATED ORAL at 08:23

## 2022-08-28 RX ADMIN — DIVALPROEX SODIUM 125 MG: 125 CAPSULE, COATED PELLETS ORAL at 20:41

## 2022-08-28 RX ADMIN — SERTRALINE 25 MG: 50 TABLET, FILM COATED ORAL at 08:23

## 2022-08-28 RX ADMIN — Medication 3 MG: at 17:16

## 2022-08-28 ASSESSMENT — PAIN DESCRIPTION - LOCATION
LOCATION: HEAD
LOCATION: HEAD

## 2022-08-28 ASSESSMENT — PAIN SCALES - GENERAL
PAINLEVEL_OUTOF10: 8
PAINLEVEL_OUTOF10: 8

## 2022-08-28 ASSESSMENT — PAIN DESCRIPTION - DESCRIPTORS
DESCRIPTORS: ACHING;SHARP;SHOOTING;STABBING
DESCRIPTORS: ACHING;SHARP;SHOOTING

## 2022-08-28 ASSESSMENT — PAIN DESCRIPTION - ORIENTATION
ORIENTATION: UPPER
ORIENTATION: UPPER

## 2022-08-28 NOTE — PROGRESS NOTES
[facility]  Day 3 Interdisciplinary Treatment Plan NOTE    Review Date & Time: 08/28/2022 0900    Patient was in treatment team    Estimated Length of Stay Update:  3-5  Estimated Discharge Date Update: 08/30/2022    EDUCATION:   Learner Progress Toward Treatment Goals: Reviewed results and recommendations of this team    Method: Small group    Outcome: Verbalized understanding    PATIENT GOALS: None at this time. PLAN/TREATMENT RECOMMENDATIONS UPDATE: Encourage patient to attend and participate in groups. Take medications as prescribed. GOALS UPDATE:   Time frame for Short-Term Goals: Prior to discharge.       Kailey Ureña RN

## 2022-08-28 NOTE — PROGRESS NOTES
Pt up and pleasant. Smiles and laughs and jokes with RN. Denies SI/HI/AVH. Denies Depression and anxiety. No signs of Distress.

## 2022-08-28 NOTE — PLAN OF CARE
Problem: Chronic Conditions and Co-morbidities  Goal: Patient's chronic conditions and co-morbidity symptoms are monitored and maintained or improved  Outcome: Progressing     Problem: Self Harm/Suicidality  Goal: Will have no self-injury during hospital stay  Description: INTERVENTIONS:  1. Q 30 MINUTES: Routine safety checks  2. Q SHIFT & PRN: Assess risk to determine if routine checks are adequate to maintain patient safety  8/28/2022 1058 by Ace Cornell RN  Outcome: Progressing     Problem: Depression  Goal: Will be euthymic at discharge  Description: INTERVENTIONS:  1. Administer medication as ordered  2. Provide emotional support via 1:1 interaction with staff  3. Encourage involvement in milieu/groups/activities  4. Monitor for social isolation  8/28/2022 1058 by Ace Cornell RN  Outcome: Progressing    Patient denies SI/HI and hallucinations. Patient can become irritable at times and is discharge focused. Patient is out on the unit and social with select peers. Patient takes prescribed medications without issue. Will continue to offer support and comfort to patient.

## 2022-08-28 NOTE — PLAN OF CARE
Visible on unit, interacts well with others, impulsive with needs. Constricted affect. Smiles on interactions. Denies SI/HI/AVH with pain 7/10 in her back not requesting prn analgesic. Denies anxiety and depression. No signs of distress, monitor Q15. Problem: Self Harm/Suicidality  Goal: Will have no self-injury during hospital stay  Description: INTERVENTIONS:  1. Q 30 MINUTES: Routine safety checks  2. Q SHIFT & PRN: Assess risk to determine if routine checks are adequate to maintain patient safety  8/27/2022 2217 by Nori Case RN  Outcome: Progressing     Problem: Depression  Goal: Will be euthymic at discharge  Description: INTERVENTIONS:  1. Administer medication as ordered  2. Provide emotional support via 1:1 interaction with staff  3. Encourage involvement in milieu/groups/activities  4. Monitor for social isolation  Outcome: Progressing     Problem: Anxiety  Goal: Will report anxiety at manageable levels  Description: INTERVENTIONS:  1. Administer medication as ordered  2. Teach and rehearse alternative coping skills  3.  Provide emotional support with 1:1 interaction with staff  8/27/2022 2217 by Nori Case RN  Outcome: Progressing

## 2022-08-28 NOTE — PROGRESS NOTES
Attended community meeting. Updated on staffing and daily expectations. Shared goal for the day as to go home if I could.

## 2022-08-28 NOTE — CARE COORDINATION
Collateral Contact (ETHAN signed)  Name: Marine Strong             Relationship: partner  Number: [phone]     Collateral Information: SW left a voicemail requesting a call back.           Access to Weapons per Collateral Contact: [] Reports [] Denies

## 2022-08-28 NOTE — GROUP NOTE
Group Therapy Note    Date: 8/28/2022    Group Start Time: 1200  Group End Time: 1955  Group Topic: Psychoeducation    [facility]    [place], [address]                                                                        Group Therapy Note    Date: 8/28/2022  Type of Group: Psychoeducation    Wellness Binder Information  Module Name:  coping skills for stress management  Patient's Goal:  Patient will be able to id what skills can help manage daily stressors. Notes:  Patient pleasant and engaged in group willing to share what helps them on a daily basis when he/she is well. Status After Intervention:  Improved    Participation Level:  Active Listener and Interactive    Participation Quality: Appropriate, Attentive, and Sharing      Speech:  normal      Thought Process/Content: Logical      Affective Functioning: Congruent      Mood: euthymic      Level of consciousness:  Alert, Oriented x4, and Attentive      Response to Learning: Able to verbalize/acknowledge new learning, Able to retain information, and Progressing to goal      Endings: None Reported    Modes of Intervention: Education, Support, Socialization, Exploration, and Problem-solving      Discipline Responsible: Psychoeducational Specialist      Signature:  Misty Mary

## 2022-08-29 VITALS
HEART RATE: 98 BPM | SYSTOLIC BLOOD PRESSURE: 118 MMHG | TEMPERATURE: 98.3 F | HEIGHT: 62 IN | RESPIRATION RATE: 16 BRPM | WEIGHT: 88 LBS | OXYGEN SATURATION: 98 % | DIASTOLIC BLOOD PRESSURE: 57 MMHG | BODY MASS INDEX: 16.2 KG/M2

## 2022-08-29 PROCEDURE — 6370000000 HC RX 637 (ALT 250 FOR IP): Performed by: PSYCHIATRY & NEUROLOGY

## 2022-08-29 PROCEDURE — 99239 HOSP IP/OBS DSCHRG MGMT >30: CPT | Performed by: NURSE PRACTITIONER

## 2022-08-29 PROCEDURE — 6370000000 HC RX 637 (ALT 250 FOR IP): Performed by: NURSE PRACTITIONER

## 2022-08-29 RX ORDER — RISPERIDONE 1 MG/1
0.5 TABLET, FILM COATED ORAL 2 TIMES DAILY
Qty: 30 TABLET | Refills: 0 | Status: SHIPPED | OUTPATIENT
Start: 2022-08-29 | End: 2022-10-28

## 2022-08-29 RX ORDER — DIVALPROEX SODIUM 125 MG/1
125 CAPSULE, COATED PELLETS ORAL EVERY 12 HOURS SCHEDULED
Qty: 60 CAPSULE | Refills: 0 | Status: SHIPPED | OUTPATIENT
Start: 2022-08-29 | End: 2022-09-28

## 2022-08-29 RX ORDER — NICOTINE 21 MG/24HR
1 PATCH, TRANSDERMAL 24 HOURS TRANSDERMAL DAILY
Qty: 30 PATCH | Refills: 0 | Status: SHIPPED | OUTPATIENT
Start: 2022-08-30 | End: 2022-09-29

## 2022-08-29 RX ORDER — LANOLIN ALCOHOL/MO/W.PET/CERES
3 CREAM (GRAM) TOPICAL DAILY
Refills: 3 | COMMUNITY
Start: 2022-08-29 | End: 2022-10-28

## 2022-08-29 RX ORDER — SERTRALINE HYDROCHLORIDE 25 MG/1
25 TABLET, FILM COATED ORAL DAILY
Qty: 30 TABLET | Refills: 0 | Status: SHIPPED | OUTPATIENT
Start: 2022-08-30 | End: 2022-10-28

## 2022-08-29 RX ADMIN — HYDROXYZINE PAMOATE 50 MG: 50 CAPSULE ORAL at 02:37

## 2022-08-29 RX ADMIN — RISPERIDONE 0.5 MG: 0.5 TABLET, ORALLY DISINTEGRATING ORAL at 10:20

## 2022-08-29 RX ADMIN — ACETAMINOPHEN 650 MG: 325 TABLET, FILM COATED ORAL at 02:37

## 2022-08-29 RX ADMIN — DIVALPROEX SODIUM 125 MG: 125 CAPSULE, COATED PELLETS ORAL at 10:19

## 2022-08-29 RX ADMIN — SERTRALINE 25 MG: 50 TABLET, FILM COATED ORAL at 10:19

## 2022-08-29 ASSESSMENT — PAIN DESCRIPTION - DESCRIPTORS: DESCRIPTORS: ACHING

## 2022-08-29 ASSESSMENT — PAIN SCALES - GENERAL: PAINLEVEL_OUTOF10: 10

## 2022-08-29 ASSESSMENT — PAIN DESCRIPTION - LOCATION: LOCATION: BACK

## 2022-08-29 ASSESSMENT — PAIN DESCRIPTION - ORIENTATION: ORIENTATION: LOWER

## 2022-08-29 NOTE — PROGRESS NOTES
[facility]  Discharge Note    Pt discharged with followings belongings:   Clothing: Footwear, Pants, Shirt, Sweater, Slippers, Undergarments (1bra,blue hoodie, black sweats, white harris, black slippers)  Other Valuables: Lighter/Matches, Other (Comment) (lighter, vape pen, lip gloss, sunglasses)   Valuables returned to patient. Patient education on aftercare instructions . Patient verbalize understanding of AVS:  .    Status EXAM upon discharge:  Mental Status and Behavioral Exam  Normal: No  Level of Assistance: Independent/Self  Facial Expression: Flat  Affect: Blunt  Level of Consciousness: Alert  Frequency of Checks: 4 times per hour, close  Mood:Normal: No  Mood: Sad, Anxious, Irritable  Motor Activity:Normal: No  Motor Activity: Decreased  Eye Contact: Good  Observed Behavior: Guarded  Sexual Misconduct History: Current - no  Preception: [place] to person, [place] to time, [place] to place, [place] to situation  Attention:Normal: No  Attention: Distractible  Thought Processes: Circumstantial  Thought Content:Normal: No  Thought Content: Preoccupations  Depression Symptoms: Impaired concentration  Anxiety Symptoms: Generalized  Gabby Symptoms: No problems reported or observed. Hallucinations: None  Delusions: No  Memory:Normal: No  Memory: Poor recent, Poor remote  Insight and Judgment: No  Insight and Judgment: Poor judgment, Poor insight    Tobacco Screening:  Practical Counseling, on admission, robert X, if applicable and completed (first 3 are required if patient doesn't refuse):             ( x) Recognizing danger situations (included triggers and roadblocks)                    ( x) Coping skills (new ways to manage stress,relaxation techniques, changing routine, distraction)                                                           (x ) Basic information about quitting (benefits of quitting, techniques in how to quit, available resources  (x ) [facility]                                                                                                                   ( ) Patient refused counseling  ( ) Patient refused referral  ( ) Patient refused prescription upon discharge  ( ) Patient has not smoked in the last 30 days    Metabolic Screening:    Lab Results   Component Value Date    LABA1C 6.1 12/19/2017       Lab Results   Component Value Date    CHOL 223 (H) 07/26/2016    CHOL 220 (H) 01/20/2016    CHOL 173 08/31/2015    CHOL 182 06/16/2015    CHOL 263 (H) 01/20/2015    CHOL 297 (H) 09/15/2014     Lab Results   Component Value Date    TRIG 69 07/26/2016    TRIG 102 01/20/2016    TRIG 141 08/31/2015    TRIG 100 06/16/2015    TRIG 143 01/20/2015    TRIG 139 09/15/2014     Lab Results   Component Value Date    HDL 75 04/25/2017    HDL 83 07/26/2016    HDL 74 01/20/2016    HDL 76 08/31/2015    HDL 77 06/16/2015    HDL 61 01/20/2015    HDL 71 09/15/2014     No components found for: LDLCAL  Lab Results   Component Value Date    LABVLDL 19 04/25/2017    LABVLDL 14 07/26/2016    LABVLDL 20 01/20/2016    LABVLDL 28 08/31/2015    LABVLDL 20 06/16/2015    LABVLDL 29 01/20/2015    LABVLDL 28 09/15/2014       Veronica Garcia RN

## 2022-08-29 NOTE — CARE COORDINATION
Griffin scheduled follow up appointment for pt at [facility] on 9/9 at [address].  Jae Silvestre    Phone: [phone]   Fax [phone]     In order to ensure appropriate transition and discharge planning is in place, the following documents have been transmitted to [facility], as the new outpatient provider:    The d/c diagnosis was transmitted to the next care provider  The reason for hospitalization was transmitted to the next care provider  The d/c medications (dosage and indication) were transmitted to the next care provider   The continuing care plan was transmitted to the next care provider

## 2022-08-29 NOTE — PROGRESS NOTES
BEHAVIORAL HEALTH FOLLOW-UP NOTE     8/29/2022     Patient was seen and examined in person, Chart reviewed   Patient's case discussed with staff/team    Chief Complaint: \"I want to go home'     Interim History:     Patient seen up on the unit argumentative easily agitated very irritable affect she is focused on discharge she is difficult to assess because the conversation only focuses around discharge not believe that she should be here has no insight or judgment she angrily denies SI/HI intent or plan denies auditory or visual hallucinations        Appetite:   [x] Normal/Unchanged  [] Increased  [] Decreased      Sleep:       [x] Normal/Unchanged  [] Fair       [] Poor              Energy:    [x] Normal/Unchanged  [] Increased  [] Decreased        SI [] Present  [x] Absent    HI  []Present  [x] Absent     Aggression:  [] yes  [x] no    Patient is [x] able  [] unable to CONTRACT FOR SAFETY     PAST MEDICAL/PSYCHIATRIC HISTORY:   Past Medical History:   Diagnosis Date    Anxiety     Arthritis     Blood circulation, collateral     Cancer ([facility]     SKIN    Chronic back pain     Colitis     recent    Complex regional pain syndrome type 1 of right lower extremity 9/11/2015    COPD (chronic obstructive pulmonary disease) ([facility]     Depression     Diabetes mellitus ([facility]     Elevated liver function tests 10/17/2014    GERD (gastroesophageal reflux disease)     Headache(784.0)     Hyperlipidemia 10/17/2014    Kidney stone     Movement disorder     Neuromuscular disorder ([facility]     Osteoarthritis     Other disorders of kidney and ureter in diseases classified elsewhere     Pneumonia     Psychiatric problem     RSD (reflex sympathetic dystrophy)        FAMILY/SOCIAL HISTORY:  Family History   Problem Relation Age of Onset    Other Mother      Social History     Socioeconomic History    Marital status:      Spouse name: Alanna Dom    Number of children: Not on file    Years of education: 10    Highest education level: Not on file   Occupational History    Not on file   Tobacco Use    Smoking status: Every Day     Packs/day: 0.50     Years: 42.00     Pack years: 21.00     Types: Cigarettes     Start date: 1/1/1970     Passive exposure: Current    Smokeless tobacco: Never   Vaping Use    Vaping Use: Some days   Substance and Sexual Activity    Alcohol use: No     Alcohol/week: 0.0 standard drinks    Drug use: No    Sexual activity: Never   Other Topics Concern    Not on file   Social History Narrative    Not on file     Social Determinants of Health     Financial Resource Strain: Low Risk     Difficulty of Paying Living Expenses: Not hard at all   Food Insecurity: No Food Insecurity    Worried About Running Out of Food in the Last Year: Never true    Ran Out of Food in the Last Year: Never true   Transportation Needs: Not on file   Physical Activity: Not on file   Stress: Not on file   Social Connections: Not on file   Intimate Partner Violence: Not on file   Housing Stability: Not on file           ROS:  [x] All negative/unchanged except if checked.  Explain positive(checked items) below:  [] Constitutional  [] Eyes  [] Ear/Nose/Mouth/Throat  [] Respiratory  [] CV  [] GI  []   [] Musculoskeletal  [] Skin/Breast  [] Neurological  [] Endocrine  [] Heme/Lymph  [] Allergic/Immunologic    Explanation:     MEDICATIONS:    Current Facility-Administered Medications:     sertraline (ZOLOFT) tablet 25 mg, 25 mg, Oral, Daily, AYESHA Díaz CNP, 25 mg at 08/28/22 1060    risperiDONE (RISPERDAL M-TABS) disintegrating tablet 0.5 mg, 0.5 mg, Oral, BID, AYESHA Díaz CNP, 0.5 mg at 08/28/22 2041    chlordiazePOXIDE (LIBRIUM) capsule 10 mg, 10 mg, Oral, TID PRN, AYESHA Díaz CNP    melatonin tablet 3 mg, 3 mg, Oral, Daily, AYESHA Díaz CNP, 3 mg at 08/28/22 1716    divalproex (DEPAKOTE SPRINKLE) capsule 125 mg, 125 mg, Oral, 2 times per day, AYESHA Díaz - CNP, 125 mg at 08/28/22 2041    acetaminophen (TYLENOL) tablet 650 mg, 650 mg, Oral, Q4H PRN, Walt Cox MD, 650 mg at 08/29/22 3187    magnesium hydroxide (MILK OF MAGNESIA) 400 MG/5ML suspension 30 mL, 30 mL, Oral, Daily PRN, Walt Cox MD    nicotine (NICODERM CQ) 21 MG/24HR 1 patch, 1 patch, TransDERmal, Daily, Walt Cox MD, 1 patch at 08/28/22 5775    aluminum & magnesium hydroxide-simethicone (MAALOX) 200-200-20 MG/5ML suspension 30 mL, 30 mL, Oral, PRN, Walt Cox MD    hydrOXYzine pamoate (VISTARIL) capsule 50 mg, 50 mg, Oral, TID PRN, Walt Cox MD, 50 mg at 08/29/22 3312    haloperidol (HALDOL) tablet 3 mg, 3 mg, Oral, Q6H PRN **OR** haloperidol lactate (HALDOL) injection 3 mg, 3 mg, IntraMUSCular, Q6H PRN, Walt Cox MD      Examination:  /78   Pulse 82   Temp 97.6 °F (36.4 °C)   Resp 17   Ht 5' 2\" (1.575 m)   Wt 88 lb (39.9 kg)   LMP  (LMP Unknown)   SpO2 97%   BMI 16.10 kg/m²   Gait - steady  Medication side effects(SE): none reported     Mental Status Examination:    Level of consciousness:  within normal limits   Appearance:  poor grooming and poor hygiene  Behavior/Motor:  no abnormalities noted  Attitude toward examiner:  poor eye contact, evasive, guarded, and argumentative  Speech:  normal rate, normal volume, and slow   Mood: decreased range, dysthymic, and irritable  Affect:  blunted  Thought processes:  goal directed   Thought content:  Paranoid   Cognition:  oriented to person, place,   Concentration distractible  Insight poor   Judgement poor     ASSESSMENT:   Patient symptoms are:  [] Well controlled  [] Improving  [] Worsening  [x] No change      Diagnosis:   Principal Problem:    Acute psychosis ([facility]  Active Problems:    Paranoid behavior ([facility]  Resolved Problems:    * No resolved hospital problems. *      LABS:    No results for input(s): WBC, HGB, PLT in the last 72 hours. No results for input(s): NA, K, CL, CO2, BUN, CREATININE, GLUCOSE in the last 72 hours.     No results for input(s): BILITOT, ALKPHOS, AST, ALT in the last 72 hours. Lab Results   Component Value Date/Time    LABAMPH NOT DETECTED 08/25/2022 04:40 PM    LABAMPH NOT DETECTED 06/13/2011 10:29 AM    BARBSCNU NOT DETECTED 08/25/2022 04:40 PM    LABBENZ POSITIVE 08/25/2022 04:40 PM    LABBENZ POSITIVE 06/13/2011 10:29 AM    CANNAB NOT DETECTED  06/13/2011 10:29 AM    LABMETH NOT DETECTED 08/25/2022 04:40 PM    OPIATESCREENURINE POSITIVE 08/25/2022 04:40 PM    PHENCYCLIDINESCREENURINE NOT DETECTED 08/25/2022 04:40 PM    ETOH <10 08/25/2022 04:40 PM     Lab Results   Component Value Date/Time    TSH 2.390 04/25/2017 10:53 AM     No results found for: LITHIUM  No results found for: VALPROATE, CBMZ        Treatment Plan:  The patient's diagnosis, treatment plan, medication management were formulated after patient was seen directly by the attending physician and myself and all relevant documentation was reviewed. Risk, benefit, side effects, possible outcomes of the medication and alternatives discussed with the patient and the patient demonstrated understanding. The patient was also educated that the outcome of treatment will depend on the medication compliance as directed by the prescribers along with regular follow-up, compliance with the labs and other work-up, as clinically indicated. Risk Management: Based on the diagnosis and assessment biopsychosocial treatment model was presented to the patient and was given the opportunity to ask any question. The patient was agreeable to the plan and all the patient's questions were answered to the patient's satisfaction. I discussed with the patient the risk, benefit, alternative and common side effects for the proposed medication treatment. The patient is consenting to this treatment.      New Medications started during this admission :    Decrease zoloft to 50 mg daily  Provide PRN librium 10 mg TID with CIWA score   Depakote sprinkle 125 mg BID to reduce confusion and stabilize mood  Melatonin 3 mg daily at 1800 to reset sleep wake cycle and reduce confusion   Risperdal 0.5 mg twice daily and will increase this medication as clinically indicated for covert psychosis    MoCA score     Collateral information: followed by social work  CD evaluation  Encourage patient to attend group and other milieu activities. Discharge planning discussed with the patient and treatment team.    PSYCHOTHERAPY/COUNSELING:  [x] Therapeutic interview  [x] Supportive  [] CBT  [] Ongoing  [] Other    [x] Patient continues to need, on a daily basis, active treatment furnished directly by or requiring the supervision of inpatient psychiatric personnel      Anticipated Length of stay: 3 to 5 days based on stability    NOTE: This report was transcribed using voice recognition software. Every effort was made to ensure accuracy; however, inadvertent computerized transcription errors may be present.        Electronically signed by AYESHA Hill CNP on 7/09/4436 at 7:46 AM

## 2022-08-29 NOTE — PLAN OF CARE
Problem: Depression  Goal: Will be euthymic at discharge  Description: INTERVENTIONS:  1. Administer medication as ordered  2. Provide emotional support via 1:1 interaction with staff  3. Encourage involvement in milieu/groups/activities  4. Monitor for social isolation  8/28/2022 2137 by Robert Grayson RN  Outcome: Progressing     Problem: Psychosis  Goal: Will report no hallucinations or delusions  Description: INTERVENTIONS:  1. Administer medication as  ordered  2. Assist with reality testing to support increasing orientation  3. Assess if patient's hallucinations or delusions are encouraging self harm or harm to others and intervene as appropriate  Outcome: Progressing     Goal: Will be euthymic at discharge  Description: INTERVENTIONS:  1. Administer medication as ordered  2. Provide emotional support via 1:1 interaction with staff  3. Encourage involvement in milieu/groups/activities  4. Monitor for social isolation  8/28/2022 2137 by Robert Grayson RN  Outcome: Progressing     Problem: Psychosis  Goal: Will report no hallucinations or delusions  Description: INTERVENTIONS:  1. Administer medication as  ordered  2. Assist with reality testing to support increasing orientation  3. Assess if patient's hallucinations or delusions are encouraging self harm or harm to others and intervene as appropriate  Outcome: Progressing    Pt is compliant, pleasant and social. Denies SI/HI and AVH and rates anxiety and depression as a 3/10. No s/s of distress noted, will continue to monitor.

## 2022-08-29 NOTE — PLAN OF CARE
alternative coping skills  3.  Provide emotional support with 1:1 interaction with staff  Outcome: Progressing

## 2022-08-29 NOTE — CARE COORDINATION
SW met with pt to discuss discharge for today. Pt stated that she is feeling really good and she is ready to go home. Pt stated that she is looking forward to going to [employer] to get a fish sandwich and a milkshake on her way home from the hospital. Pt stated that she will be returning home with Glennrock Norris and he will be the one providing her with transportation. Pt is alert and oriented x4. Pt's mood is brightened, affect is congruent. Pt's eye contact is good, Pt's speech is clear, rate and volume is normal, pt's thought process is logical. Pt's insight and judgement is improved. Pt denied any anxiety or depression. Pt denied SI, HI, AVH. Pt reported that she would like to have follow up appointmetns scheduled at [facility] for outpatient services. Collateral Contact (ETHAN signed)  Name: Keily Norris             Relationship: partner  Number: [phone]     Collateral Information: Keily Norris stated that when the pt was at home the pt was thinking that people on the TV having been watching her and this was not normal behavior for her. Glennrock Norris stated that he called and scheduled an appointment at the outpatient provider and informed them what was going on and requested medications to be changed and told them they didn't want her to get admitted. When pt was at the appointment  she was pink slipped the brought to the hospital which upset the pt and [place]. He spoke with pt this morning and he stated that she is doing better and she sounds like herself. Glennsathishsukhjinder Jr stated that the pt is ready to come home and he will be able to provide transportation. Keily Norris stated that his main concern is the prescriptions and what to give her/ time to give her medications. Glennsathishsukhjinder Norris has been managing all medications for the pt and ensures that the pt takes the correct medications. Pt has an appointment at a pain clinic tomorrow. Glennsathishsukhjinder Jr denied access to any weapons.  Keily Norris reported that he would like the pt to be scheduled with a new outpatient provider due to the circumstances that happened that brought the pt to the hospital.        Access to Weapons per Collateral Contact: [] Reports [x] Denies    SW called [facility] to schedule follow up appointments. LAVELL spoke with Jerald Lam who stated that they are unable to accept clients who have Medicare at this time. LAVELL met with pt to discuss alternative options, pt reported that she would like to go to [facility] for follow up services if her insurance is accepted. LAVELL called [facility] [phone], SW left a voicemail requesting a call back.

## 2022-09-06 ENCOUNTER — OFFICE VISIT (OUTPATIENT)
Dept: FAMILY MEDICINE CLINIC | Age: 62
End: 2022-09-06
Payer: MEDICARE

## 2022-09-06 VITALS
HEART RATE: 95 BPM | RESPIRATION RATE: 14 BRPM | WEIGHT: 96.6 LBS | BODY MASS INDEX: 17.78 KG/M2 | TEMPERATURE: 98 F | DIASTOLIC BLOOD PRESSURE: 60 MMHG | OXYGEN SATURATION: 97 % | SYSTOLIC BLOOD PRESSURE: 108 MMHG | HEIGHT: 62 IN

## 2022-09-06 DIAGNOSIS — R73.03 PRE-DIABETES: ICD-10-CM

## 2022-09-06 DIAGNOSIS — E78.5 HYPERLIPIDEMIA, UNSPECIFIED HYPERLIPIDEMIA TYPE: ICD-10-CM

## 2022-09-06 DIAGNOSIS — Z72.0 TOBACCO ABUSE: Primary | ICD-10-CM

## 2022-09-06 DIAGNOSIS — K59.03 DRUG-INDUCED CONSTIPATION: ICD-10-CM

## 2022-09-06 PROCEDURE — 99214 OFFICE O/P EST MOD 30 MIN: CPT

## 2022-09-06 RX ORDER — PREGABALIN 75 MG/1
CAPSULE ORAL
COMMUNITY
Start: 2022-08-30

## 2022-09-06 RX ORDER — HYDROMORPHONE HYDROCHLORIDE 4 MG/1
TABLET ORAL
COMMUNITY
Start: 2022-08-30

## 2022-09-06 RX ORDER — DOCUSATE SODIUM 100 MG/1
100 CAPSULE, LIQUID FILLED ORAL 2 TIMES DAILY
Qty: 60 CAPSULE | Refills: 0 | Status: SHIPPED | OUTPATIENT
Start: 2022-09-06 | End: 2022-10-06

## 2022-09-06 RX ORDER — POLYETHYLENE GLYCOL 3350 17 G/17G
17 POWDER, FOR SOLUTION ORAL DAILY
Qty: 1530 G | Refills: 1 | Status: CANCELLED | OUTPATIENT
Start: 2022-09-06 | End: 2022-10-06

## 2022-09-06 ASSESSMENT — ENCOUNTER SYMPTOMS
SORE THROAT: 0
SHORTNESS OF BREATH: 0
EYE PAIN: 0
WHEEZING: 0
DIARRHEA: 0
CONSTIPATION: 1
NAUSEA: 0
RHINORRHEA: 0

## 2022-09-06 NOTE — Clinical Note
Looks like she refused blood work and vaccine as per nurse. I documented and will offer it again on follow up.

## 2022-09-06 NOTE — PROGRESS NOTES
Subjective:  Kay Irby is a 65 y.o. female with chief complaint of Post-COVID Symptoms, Ear Fullness, and Constipation (MOM not effective)      HPI:  Patient presented for follow-up  Last visit discussed tobacco abuse and low BMI  Today her tobacco use is decreasing from 2-3 packs to 1/2 ppd. She is using an e-pen, using the patches but is seldom working. Today her weight is 17.67 BMI She is taking Boost shakes. She also was recently in the hospital after being pink slipped by her Psychiatrist on 8/25 for acute psychosis and paranoia behavior. She was admitted and remained there until 8/29 being discharged on stable condition  She is following up with new Psychiatrist next month. Patient has complained about constipation the last 4 days and has been intermittent and increasing in frequency this past year. Patient has chronic opioid use for chronic pain. Last colonoscopy was 4/18 by Dr. Alma Rosa Smith, repeat in 5 years. ROS:  Review of Systems   Constitutional:  Negative for chills and fever. HENT:  Negative for congestion, rhinorrhea and sore throat. Eyes:  Negative for pain and visual disturbance. Respiratory:  Negative for shortness of breath and wheezing. Cardiovascular:  Negative for chest pain and palpitations. Gastrointestinal:  Positive for constipation (x 4 days). Negative for diarrhea and nausea. Genitourinary:  Negative for dysuria and hematuria. Musculoskeletal:  Negative for arthralgias and myalgias. Skin:  Negative for rash. Neurological:  Negative for dizziness and headaches. Objective:  Vitals:    09/06/22 1304   BP: 108/60   Site: Left Upper Arm   Position: Sitting   Cuff Size: Small Adult   Pulse: 95   Resp: 14   Temp: 98 °F (36.7 °C)   TempSrc: Temporal   SpO2: 97%   Weight: 96 lb 9.6 oz (43.8 kg)   Height: 5' 2\" (1.575 m)     Physical Exam  Vitals and nursing note reviewed. Constitutional:       General: She is not in acute distress.      Appearance: Normal appearance. HENT:      Head: Normocephalic and atraumatic. Ears:      Comments: Cerumen bilaterally, not impacted  Eyes:      General:         Right eye: No discharge. Left eye: No discharge. Cardiovascular:      Rate and Rhythm: Normal rate and regular rhythm. Heart sounds: No murmur heard. Pulmonary:      Effort: Pulmonary effort is normal.      Breath sounds: No wheezing. Abdominal:      General: Bowel sounds are normal.      Palpations: Abdomen is soft. Tenderness: There is no abdominal tenderness. Musculoskeletal:         General: No swelling. Normal range of motion. Right lower leg: No edema. Left lower leg: No edema. Skin:     General: Skin is warm and dry. Neurological:      General: No focal deficit present. Mental Status: She is oriented to person, place, and time. Psychiatric:         Mood and Affect: Mood normal.         Behavior: Behavior normal.         Thought Content: Thought content normal.         Judgment: Judgment normal.       Assessment:  1. Drug-induced constipation     Patient has opioid induced constipation and has increasing discomfort for the last 4 days due to the constipation. This has been an intermittent issue this past year. Patient has been eating more vegetables and better meals, but has not been drinking enough water. She has tried milk of mag but has not helped improve her bowel function     2. Tobacco abuse     Patient has improved her smoking habits, changing from 2-3 ppd to 1/2 ppd. She is still attempting to use the nicotine patches but is inconsistent with its use. She also tried using the e-pen in exchange from the cigarettes. Patient is living with partner who is a heavy smoker and reports it has been challenging resisting going out to have a smoke with him. 3. Adult body mass index less than 19     Patient is gaining weight by eating healthier meals, less junk food. She also has boost shakes.  She is walking everyday for exercise. Patient is motivated to keep gaining weight. 4. Pre-diabetes    5. Hyperlipidemia, unspecified hyperlipidemia type         Plan:   Diagnosis Orders   1. Drug-induced constipation  docusate sodium (COLACE) 100 MG capsule              - Patient education on OIC           - Will try bowel regimen of Colace and Miralax           - Increase fiber, increase water intake           - RTC in 1-2 months to monitor progression     2. Tobacco abuse                - Patient encouraged to continue quitting smoking           - Nicotine patches more consistently           - Discontinue e-pen            3. Adult body mass index less than 19                - Patient encouraged to continue lifestyle modifications           - Boost shakes     4. Pre-diabetes  Hemoglobin A1C                   - patient refused blood work - will try again on follow-up      5. Hyperlipidemia, unspecified hyperlipidemia type  LIPID PANEL              - patient refused blood work - will try again on follow-up           - Offered Pneumovax - patient refused        Counseled regarding above diagnosis, including possible risks and complications,  especially if left uncontrolled. Counseled regarding the possible side effects, risks, benefits and alternatives to treatment;patient and/or guardian verbalizes understanding, agrees, feels comfortable with and wishes to proceed with above treatment plan. Call or go to ED immediately if symptoms worsen or persist. Advised patient to call with any new medication issues, and, as applicable, read all Rx info from pharmacy to assure aware of all possible risks and side effects of medication before taking. Patient and/or guardian given opportunity to ask questions/raise concerns. The patient verbalized comfort and understanding of instructions. Reviewed age and gender appropriate health screening exams and vaccinations.   Advised patient regarding importance of keeping up with recommended health maintenance and to schedule as soon as possible if overdue, as this is important in assessing for undiagnosed pathology, especially cancer, as well as protecting against potentially harmful/life threatening disease. I encourage further reading and education about your health conditions . Information on many healthconditions is provided by the American Academy of Family Physicians: https://familydoctor. org/  Please bring any questions to me at your next visit. This document may have been prepared at least partially through the use of voice recognition software. Although effort is taken to assure the accuracy of this document, it is possible that grammatical, syntax,  or spelling errors may occur. Return in about 2 months (around 11/6/2022) for Dr. Edgar Ricks.     Electronically signed by Afia Sotelo MD on 9/6/22 at 9:51 AM EDT

## 2022-09-06 NOTE — PROGRESS NOTES
S: 65 y.o. female with   Chief Complaint   Patient presents with    Post-COVID Symptoms    Ear Fullness    Constipation     MOM not effective       Seen 1 month ago for f/u BMI  Weight gain and smoking cessation encouraged last visit  Gained 8 pounds, +eating more, +boost  Down from 2-3 ppd to 0.5 ppd  Hospitalized 2 weeks ago by psych, +AMS and paranoia, +med changes, +f/u [facility]  Pain management, +opiates, +constipation (trying MOM, not taking anything consistent)  Colonoscopy 4/18 (repeat 5 years, Dr. Rupali Chu)    O: VS:  height is 5' 2\" (1.575 m) and weight is 96 lb 9.6 oz (43.8 kg). Her temporal temperature is 98 °F (36.7 °C). Her blood pressure is 108/60 and her pulse is 95. Her respiration is 14 and oxygen saturation is 97%. BP Readings from Last 3 Encounters:   09/06/22 108/60   08/01/22 136/74   07/24/22 118/60     See resident note  Lungs CTAB    Impression/Plan:   1) Underweight: Continue increase in caloric intake and decrease in smoking  2) Tobacco use: Continue with goal of cessation, doing great job! 3) Constipation: +chronic opiates, start bowel regimen with miralax and colace, prn mag citrate/MOM  4) HM: Screening lipids and hgb A1C, +pneumovax    RTC 1-2 months--continue to work on [employer] Maintenance Due   Topic Date Due    Shingles vaccine (1 of 2) Never done    Low dose CT lung screening  Never done    Pneumococcal 0-64 years Vaccine (2 - PPSV23 or PCV20) 09/18/2016    Breast cancer screen  04/25/2018    A1C test (Diabetic or Prediabetic)  12/19/2018    COVID-19 Vaccine (3 - Booster for [employer] series) 08/31/2021    Lipids  04/25/2022    Flu vaccine (1) 09/01/2022         Attending Physician Statement  I have discussed the case, including pertinent history and exam findings with the resident. I also have seen the patient and performed key portions of the examination. I agree with the documented assessment and plan.       Jama Wolfe MD

## 2022-09-15 ENCOUNTER — TELEPHONE (OUTPATIENT)
Dept: FAMILY MEDICINE CLINIC | Age: 62
End: 2022-09-15

## 2022-09-15 NOTE — TELEPHONE ENCOUNTER
Patient called in with c/o feet swelling. Advised no availability today but suggested Walk In. States will wait to see how they are the next day or two. Did advise if symptoms worse then needs to go to Walk In. Also states ears are clogged. Appt made with you 9/20.

## 2022-09-20 ENCOUNTER — OFFICE VISIT (OUTPATIENT)
Dept: FAMILY MEDICINE CLINIC | Age: 62
End: 2022-09-20
Payer: MEDICARE

## 2022-09-20 ENCOUNTER — HOSPITAL ENCOUNTER (OUTPATIENT)
Age: 62
Discharge: HOME | End: 2022-09-20
Payer: MEDICARE

## 2022-09-20 ENCOUNTER — TELEPHONE (OUTPATIENT)
Dept: FAMILY MEDICINE CLINIC | Age: 62
End: 2022-09-20

## 2022-09-20 VITALS
RESPIRATION RATE: 18 BRPM | DIASTOLIC BLOOD PRESSURE: 76 MMHG | OXYGEN SATURATION: 98 % | HEART RATE: 83 BPM | WEIGHT: 105 LBS | HEIGHT: 62 IN | BODY MASS INDEX: 19.32 KG/M2 | SYSTOLIC BLOOD PRESSURE: 118 MMHG

## 2022-09-20 DIAGNOSIS — E07.9 THYROID CONDITION: ICD-10-CM

## 2022-09-20 DIAGNOSIS — H60.501 ACUTE OTITIS EXTERNA OF RIGHT EAR, UNSPECIFIED TYPE: ICD-10-CM

## 2022-09-20 DIAGNOSIS — R22.43 LOCALIZED SWELLING OF BOTH LOWER LEGS: Primary | ICD-10-CM

## 2022-09-20 DIAGNOSIS — R22.43 LOCALIZED SWELLING OF BOTH LOWER LEGS: ICD-10-CM

## 2022-09-20 LAB
ALBUMIN SERPL-MCNC: 4.3 G/DL (ref 3.5–5.2)
ALP BLD-CCNC: 118 U/L (ref 35–104)
ALT SERPL-CCNC: 35 U/L (ref 0–32)
ANION GAP SERPL CALCULATED.3IONS-SCNC: 10 MMOL/L (ref 7–16)
AST SERPL-CCNC: 48 U/L (ref 0–31)
BILIRUB SERPL-MCNC: 0.3 MG/DL (ref 0–1.2)
BUN BLDV-MCNC: 14 MG/DL (ref 6–23)
CALCIUM SERPL-MCNC: 9.1 MG/DL (ref 8.6–10.2)
CHLORIDE BLD-SCNC: 101 MMOL/L (ref 98–107)
CO2: 27 MMOL/L (ref 22–29)
CREAT SERPL-MCNC: 0.5 MG/DL (ref 0.5–1)
D DIMER: <200 NG/ML DDU
GFR AFRICAN AMERICAN: >60
GFR NON-AFRICAN AMERICAN: >60 ML/MIN/1.73
GLUCOSE BLD-MCNC: 101 MG/DL (ref 74–99)
POTASSIUM SERPL-SCNC: 4.7 MMOL/L (ref 3.5–5)
SODIUM BLD-SCNC: 138 MMOL/L (ref 132–146)
TOTAL PROTEIN: 7 G/DL (ref 6.4–8.3)
TSH SERPL DL<=0.05 MIU/L-ACNC: 0.87 UIU/ML (ref 0.27–4.2)

## 2022-09-20 PROCEDURE — 80053 COMPREHEN METABOLIC PANEL: CPT

## 2022-09-20 PROCEDURE — 99214 OFFICE O/P EST MOD 30 MIN: CPT

## 2022-09-20 PROCEDURE — 85378 FIBRIN DEGRADE SEMIQUANT: CPT

## 2022-09-20 PROCEDURE — 36415 COLL VENOUS BLD VENIPUNCTURE: CPT

## 2022-09-20 PROCEDURE — 84443 ASSAY THYROID STIM HORMONE: CPT

## 2022-09-20 RX ORDER — CIPROFLOXACIN/HYDROCORTISONE 0.2 %-1 %
3 SUSPENSION, DROPS(FINAL DOSAGE FORM)(ML) OTIC (EAR) 2 TIMES DAILY
Qty: 1 EACH | Refills: 0 | Status: SHIPPED | OUTPATIENT
Start: 2022-09-20 | End: 2022-09-20

## 2022-09-20 RX ORDER — OFLOXACIN 3 MG/ML
5 SOLUTION AURICULAR (OTIC) 2 TIMES DAILY
Qty: 3.5 ML | Refills: 0 | Status: SHIPPED | OUTPATIENT
Start: 2022-09-20 | End: 2022-09-21

## 2022-09-20 ASSESSMENT — ENCOUNTER SYMPTOMS
DIARRHEA: 0
WHEEZING: 0
SORE THROAT: 0
RHINORRHEA: 0
SHORTNESS OF BREATH: 0
EYE PAIN: 0
NAUSEA: 0

## 2022-09-20 NOTE — PROGRESS NOTES
Subjective:  Renita Del Cid is a 65 y.o. female with chief complaint of Ear Fullness and Leg Swelling (Started 5 days ago, elevated at night while sleeping, left foot ad ankle more swollen )      HPI:  Patient is presenting today complaining about leg swelling. Started 5 days ago. Patient states she is walking much more and has noticed the swelling since then. Progression is the same  Duration - constant  Location - bilateral leg  Intensity - none  Quality - swollen  Radiation - none   Aggravated - moving on feet  Alleviated - lying down  Associated - no CP, no SOB, no wheezing      Ear Fullness   There is pain in the right ear. This is a chronic problem. The current episode started more than 1 month ago. The problem occurs constantly. The problem has been gradually worsening. There has been no fever. The pain is at a severity of 8/10. The pain is moderate. Pertinent negatives include no diarrhea, headaches, hearing loss, rash, rhinorrhea or sore throat. She has tried nothing for the symptoms. ROS:  Review of Systems   Constitutional:  Negative for chills and fever. HENT:  Positive for ear pain (Right ear pain). Negative for congestion, hearing loss, rhinorrhea and sore throat. Eyes:  Negative for pain and visual disturbance. Respiratory:  Negative for shortness of breath and wheezing. Cardiovascular:  Positive for leg swelling (bilateral). Negative for chest pain and palpitations. Gastrointestinal:  Negative for diarrhea and nausea. Genitourinary:  Negative for dysuria and hematuria. Musculoskeletal:  Negative for arthralgias and myalgias. Skin:  Negative for rash. Neurological:  Negative for dizziness and headaches. Objective:  Vitals:    09/20/22 0935   BP: 118/76   Pulse: 83   Resp: 18   SpO2: 98%   Weight: 105 lb (47.6 kg)   Height: 5' 2\" (1.575 m)     Physical Exam  Vitals and nursing note reviewed. Constitutional:       General: She is not in acute distress.      Appearance: Normal appearance. HENT:      Head: Normocephalic and atraumatic. Ears:      Comments: Right ear tenderness on manipulation. Ear canal is erythematous. TM intact, no perforations, no discharge. Eyes:      General:         Right eye: No discharge. Left eye: No discharge. Cardiovascular:      Rate and Rhythm: Normal rate and regular rhythm. Heart sounds: Murmur (S3 noted) heard. Pulmonary:      Effort: Pulmonary effort is normal. No respiratory distress. Breath sounds: Normal breath sounds. No stridor. No wheezing or rhonchi. Abdominal:      General: Bowel sounds are normal.      Palpations: Abdomen is soft. Tenderness: There is no abdominal tenderness. Musculoskeletal:         General: Tenderness (left calf mildy tender to palpation) present. No swelling. Normal range of motion. Right lower leg: No edema. Left lower leg: No edema. Comments: Left calf 33 cm  Right calf 30.5 cm  +1 pitting edema bilaterally   Skin:     General: Skin is warm and dry. Neurological:      General: No focal deficit present. Mental Status: She is oriented to person, place, and time. Assessment:  1. Localized swelling of both lower legs     Patient presents with bilateral leg swelling that began 5 days ago. Since then, the progression has stayed the same. There is some limited ROM. Patient complained of some calf tenderness on the left side. There is a leg discrepancy of 33 cm on the left calf and 31 cm on the right calf. Patient has not complained of chest pain or SOB. The swelling may be due to medication side effects. 2. Acute otitis externa of right ear, unspecified type       Patient has had right ear pain over 1 month now. Progressively gotten worse. Tender to manipulation during physical exam. Ear canal looks erythematous. TM is intact without perforation or discharge. Plan:   Diagnosis Orders   1.  Localized swelling of both lower legs  Compression Stockings MISC    D-Dimer, Quantitative    Comprehensive Metabolic Panel  TSH                - Counseled on leg swelling              - Will try compression stocking             - limit salt, elevate legs at night             - D-dimer, to rule out clot             - CMP, TSH             - Go to ER if sxs worsens     2. Acute otitis externa of right ear, unspecified type Ofloxacin (Floxin Otic) 0.3% otic solution         - 5 drops in the right ear BID x 7 days  - RTC in one week to reassess           Counseled regarding above diagnosis, including possible risks and complications,  especially if left uncontrolled. Counseled regarding the possible side effects, risks, benefits and alternatives to treatment;patient and/or guardian verbalizes understanding, agrees, feels comfortable with and wishes to proceed with above treatment plan. Call or go to ED immediately if symptoms worsen or persist. Advised patient to call with any new medication issues, and, as applicable, read all Rx info from pharmacy to assure aware of all possible risks and side effects of medicationbefore taking. Patient and/or guardian given opportunity to ask questions/raise concerns. The patient verbalized comfort and understanding ofinstructions. Reviewed age and gender appropriate health screening exams and vaccinations. Advised patient regarding importance of keeping up with recommended health maintenance and to schedule as soon as possible if overdue, as this is important in assessing for undiagnosed pathology, especially cancer, as well as protecting against potentially harmful/life threatening disease. I encourage further reading and education about your health conditions . Information on many healthconditions is provided by the American Academy of [employer]icians: https://familydoctor. org/  Please bring any questions to me at your next visit.     This document may have been prepared at least partially through the use of voice recognition software. Although effort is taken to assure the accuracy of this document, it is possible that grammatical, syntax,  or spelling errors may occur. Return in about 1 week (around 9/27/2022) for Dr. Ronit Beasley.     Electronically signed by Lionel Camilo MD on 9/20/22 at 9:43 AM EDT

## 2022-09-20 NOTE — PROGRESS NOTES
[facility]  Precepting Note    Subjective:  66 yo F with leg swellng, started 5 days ago  Has been walking more often. Wt Readings from Last 3 Encounters:   09/20/22 105 lb (47.6 kg)   09/06/22 96 lb 9.6 oz (43.8 kg)   08/01/22 88 lb 3.2 oz (40 kg)   No associated pain  Aggravated by standing on her feet    ROS otherwise as per resident note    Past medical, surgical, family and social history were reviewed, non-contributory, and unchanged unless otherwise stated. Objective:    /76   Pulse 83   Resp 18   Ht 5' 2\" (1.575 m)   Wt 105 lb (47.6 kg)   LMP  (LMP Unknown)   SpO2 98%   BMI 19.20 kg/m²     Exam is as noted by resident     Assessment/Plan:    Leg swelling - ddx includes venous insufficiency, cardiomyopathy; medication side effect, renal/ liver disease, dvt  Asymmetry of calves but negative renetta - check d dimer to r/o DVT; repeat CMP - note abnormal lfts with last check  Start compression stockings, limit salt   Suspect depakote and lyrica may be contributing     Attending Physician Statement  I have reviewed the chart, including any radiology or labs, and have seen the patient with the resident(s). I personally reviewed and performed key elements of the history and exam.  I agree with the assessment, plan and orders as documented by the resident. Please refer to the resident note for additional information.       Electronically signed by Ingrid Clark MD on 9/20/2022 at 10:05 AM

## 2022-09-21 ENCOUNTER — TELEPHONE (OUTPATIENT)
Dept: FAMILY MEDICINE CLINIC | Age: 62
End: 2022-09-21

## 2022-09-21 DIAGNOSIS — H60.501 ACUTE OTITIS EXTERNA OF RIGHT EAR, UNSPECIFIED TYPE: Primary | ICD-10-CM

## 2022-09-21 RX ORDER — CIPROFLOXACIN AND DEXAMETHASONE 3; 1 MG/ML; MG/ML
4 SUSPENSION/ DROPS AURICULAR (OTIC) 2 TIMES DAILY
Qty: 1 EACH | Refills: 0 | Status: SHIPPED | OUTPATIENT
Start: 2022-09-21 | End: 2022-09-28

## 2022-09-21 NOTE — TELEPHONE ENCOUNTER
Ofloxacin non covered they can try cipro dex, has dexa instead of hydrocordzone, if that is ok please send it in  Phone #[phone]

## 2022-09-22 ENCOUNTER — TELEPHONE (OUTPATIENT)
Dept: FAMILY MEDICINE CLINIC | Age: 62
End: 2022-09-22

## 2022-09-22 NOTE — TELEPHONE ENCOUNTER
----- Message from [facility] sent at 9/22/2022  8:40 AM EDT -----  Subject: Medication Problem    Medication: ciprofloxacin-dexamethasone (CIPRODEX) 0.3-0.1 % otic   suspension  Dosage: Place 4 drops into the right ear 2 times daily for 7 days  Ordering Provider: Jerry Harding    Question/Problem: patient got this medication for an ear infection but her   insurance wont cover it so it is $400. She would like a different   medication for her insurance to cover it.   Additional Information for Provider:     [facility]  [address] [place] [address]   [phone] Shon Iraheta [phone]    ---------------------------------------------------------------------------  --------------  Alexander CRAIG  [phone]; OK to leave message on voicemail  ---------------------------------------------------------------------------  --------------    SCRIPT ANSWERS  Relationship to Patient: Self

## 2022-09-27 ENCOUNTER — OFFICE VISIT (OUTPATIENT)
Dept: FAMILY MEDICINE CLINIC | Age: 62
End: 2022-09-27
Payer: MEDICARE

## 2022-09-27 VITALS
SYSTOLIC BLOOD PRESSURE: 92 MMHG | WEIGHT: 102 LBS | DIASTOLIC BLOOD PRESSURE: 56 MMHG | HEIGHT: 62 IN | OXYGEN SATURATION: 93 % | TEMPERATURE: 98 F | HEART RATE: 80 BPM | RESPIRATION RATE: 14 BRPM | BODY MASS INDEX: 18.77 KG/M2

## 2022-09-27 DIAGNOSIS — R11.0 NAUSEA WITHOUT VOMITING: ICD-10-CM

## 2022-09-27 DIAGNOSIS — R22.43 LOCALIZED SWELLING OF BOTH LOWER LEGS: Primary | ICD-10-CM

## 2022-09-27 DIAGNOSIS — H60.501 ACUTE OTITIS EXTERNA OF RIGHT EAR, UNSPECIFIED TYPE: ICD-10-CM

## 2022-09-27 PROCEDURE — 99214 OFFICE O/P EST MOD 30 MIN: CPT

## 2022-09-27 RX ORDER — OMEPRAZOLE 20 MG/1
20 CAPSULE, DELAYED RELEASE ORAL
Qty: 90 CAPSULE | Refills: 0 | Status: SHIPPED
Start: 2022-09-27 | End: 2022-10-28

## 2022-09-27 ASSESSMENT — ENCOUNTER SYMPTOMS
EYE PAIN: 0
SORE THROAT: 0
WHEEZING: 0
COUGH: 1
VOMITING: 0
ABDOMINAL PAIN: 1
NAUSEA: 1
DIARRHEA: 0
CHANGE IN BOWEL HABIT: 0
RHINORRHEA: 0
SHORTNESS OF BREATH: 0

## 2022-09-27 NOTE — PROGRESS NOTES
[facility]  Precepting Note    Subjective:  1 week fu on BL leg swelling. Worse over past month, better with elevation, worse with walking/legs down. Thought maybe SE of Depakote and lyrica   Labs - d-dimer negative. LFTs initially high, normal on repeat. TSH also normal.   Compression stockings rec. Was too tight. Using diabetic socks. Has seen improvement in the swelling. No CP or SOB previously or now. Ear pain - last week  Canal red   Tm intact. On day 5 of abx drops - ciprodex. Ear pain worsened. On exam appears worse today. Nausea  Dec appetite. Only drinking water and boost.   No vomiting. + burning sensation, + chronic epigastric pain. Duration for about 1 month. Worse over the past week. Lost about 10 pounds concurrently with psychiatric deterioration. ROS otherwise negative    Past medical, surgical, family and social history were reviewed, non-contributory, and unchanged unless otherwise stated. Objective:    BP (!) 92/56 (Site: Left Upper Arm, Position: Sitting, Cuff Size: Medium Adult)   Pulse 80   Temp 98 °F (36.7 °C) (Temporal)   Resp 14   Ht 5' 2\" (1.575 m)   Wt 102 lb (46.3 kg)   LMP  (LMP Unknown)   SpO2 93%   BMI 18.66 kg/m²   Wt Readings from Last 3 Encounters:   09/27/22 102 lb (46.3 kg)   09/20/22 105 lb (47.6 kg)   09/06/22 96 lb 9.6 oz (43.8 kg)       Exam is as noted by resident with the following changes, additions or corrections:    General:  NAD; alert & oriented x 3   Heart:  RRR, no murmurs, gallops, or rubs. Lungs:  CTA bilaterally, no wheeze, rales or rhonchi  Abd: bowel sounds present, nontender, nondistended, no masses  Extrem:  No clubbing, cyanosis, or edema    Assessment/Plan:    BL leg swelling, improving. Elevate as possible, limit salt. Compression as tolerated. AOE  Complete ciprodex drops as originally prescribed previously.    My exam is essentially normal. She reports this pain has been going on for a long time - months to years. Doubt AOM. ? TMJ. Doubt mastoiditis given long history. Ref to ENT for further opinion. Nausea, epigastric pain  Start PPI. Attending Physician Statement  I have reviewed the chart, including any radiology or labs, and have seen the patient with the resident(s). I personally reviewed and performed key elements of the history and exam.  I agree with the assessment, plan and orders as documented by the resident. Please refer to the resident note for additional information.       Electronically signed by Hanna Bautista MD on 9/27/2022 at 11:11 AM

## 2022-09-27 NOTE — PROGRESS NOTES
Subjective:  Lilliana Dumont is a 65 y.o. female with chief complaint of Otalgia and Taste Change (Times 1+ month)      HPI:  Patient has 1 week follow up    Bilateral leg swelling most likely due to medication side effect profile  Onset - 2 weeks ago  Progression - improving  Duration - most of the day  Frequency - Daily  Location - bilateral lower extremities  Quality - swelling  Radiation - none  Aggravated - walking and staying on feet   Alleviated - compression stockings, elevating legs at night, limiting salt  Asymmetry in legs (~2.5 cm) but negative renetta's -   D-dimer ordered - negative  Previous abnormal LFT's - reordered labs unremarkable  TSH - normal      Otitis externa - prescribed abx about five days in  Progression - unchanged  Intensity -  9/10  Quality - ache  Aggravated - ear manipulation  Alleviated - ear drops  Associated - some nausea      Nausea & Vomiting  This is a new problem. The current episode started 1 to 4 weeks ago. The problem occurs daily. The problem has been gradually worsening. Associated symptoms include abdominal pain, coughing and nausea. Pertinent negatives include no arthralgias, change in bowel habit, chest pain, chills, congestion, fever, headaches, myalgias, rash, sore throat, vertigo or vomiting. The symptoms are aggravated by eating. She has tried nothing for the symptoms. The treatment provided no relief. ROS:  Review of Systems   Constitutional:  Negative for chills and fever. HENT:  Positive for ear pain (in and around the ear). Negative for congestion, rhinorrhea and sore throat. Eyes:  Negative for pain and visual disturbance. Respiratory:  Positive for cough. Negative for shortness of breath and wheezing. Cardiovascular:  Negative for chest pain and palpitations. Gastrointestinal:  Positive for abdominal pain and nausea. Negative for change in bowel habit, diarrhea and vomiting. Genitourinary:  Negative for dysuria and hematuria. Musculoskeletal:  Negative for arthralgias and myalgias. Skin:  Negative for rash. Neurological:  Negative for dizziness, vertigo and headaches. Objective:  Vitals:    09/27/22 1040   BP: (!) 92/56   Site: Left Upper Arm   Position: Sitting   Cuff Size: Medium Adult   Pulse: 80   Resp: 14   Temp: 98 °F (36.7 °C)   TempSrc: Temporal   SpO2: 93%   Weight: 102 lb (46.3 kg)   Height: 5' 2\" (1.575 m)     Physical Exam  Vitals and nursing note reviewed. Constitutional:       General: She is not in acute distress. Appearance: Normal appearance. HENT:      Head: Normocephalic and atraumatic. Ears:      Comments: Right ear slightly erythematous. TM intact. No discharge. Mastoid slightly tender to palpation. TMJ has some discomfort  Eyes:      General:         Right eye: No discharge. Left eye: No discharge. Cardiovascular:      Rate and Rhythm: Normal rate and regular rhythm. Heart sounds: No murmur heard. Pulmonary:      Effort: Pulmonary effort is normal.      Breath sounds: No wheezing. Abdominal:      General: Bowel sounds are normal.      Palpations: Abdomen is soft. Tenderness: There is no abdominal tenderness. Musculoskeletal:         General: No swelling. Normal range of motion. Right lower leg: No edema. Left lower leg: No edema. Skin:     General: Skin is warm and dry. Neurological:      General: No focal deficit present. Mental Status: She is oriented to person, place, and time. Assessment:  1. Localized swelling of both lower legs     Patient had a 1 week follow-up for bilateral leg swelling. May mostly be contributed with Depakote and Lyrica side effects. Ruled out clot with D-dimer, and repeat LFT's and TSH were unremarkable. Patient was prescribed compression stockings. Patient did not tolerate the stockings and returned them. Patient is now using Diabetic socks which she reports is improving her symptoms of swelling.  She has also began elevating her legs at night and limiting her salt intake. 2. Acute otitis externa of right ear, unspecified type     Patient was prescribed Ciprodex and was on day 5 during her follow-up visit. There was no improvement with the pain and that it has gotten worse. On physical exam, the right ear canal looks erythematous but not severe. The TM is intact, not bulging, and without discharge. Patient complains of intense pain on manipulation. Initially, we were going to try switching to fungal coverage after the trial of abx but the symptoms do not correspond with patient presentation. 3. Nausea without vomiting     Patient complains of epigastric pain that has affected her desire to eat. Patient has not been able to tolerate solid foods and has only tolerated liquids like Boost. Patient has noticed the pain for a few months now and is worse at night and lying down. Plan:   Diagnosis Orders   1. Localized swelling of both lower legs               - Improved          - Continue to monitor          - Elevate legs at night, limit salt intake, diabetic socks     2. Acute otitis externa of right ear, unspecified type  [facility] - Damari Cisneros MD, Otolaryngology, [place]             - Getting worse without relief of abx          - Refer to ENT           - Go to ER if sxs worsens     3. Nausea without vomiting               - Trial of omeprazole (PRILOSEC) 20 MG delayed release capsule          - RTC in 1 month for reassessment         Counseled regarding above diagnosis, including possible risks and complications,  especially if left uncontrolled. Counseled regarding the possible side effects, risks, benefits and alternatives to treatment;patient and/or guardian verbalizes understanding, agrees, feels comfortable with and wishes to proceed with above treatment plan.     Call or go to ED immediately if symptoms worsen or persist. Advised patient to call with any new medication issues, and, as applicable, read all Rx info from pharmacy to assure aware of all possible risks and side effects of medicationbefore taking. Patient and/or guardian given opportunity to ask questions/raise concerns. The patient verbalized comfort and understanding ofinstructions. Reviewed age and gender appropriate health screening exams and vaccinations. Advised patient regarding importance of keeping up with recommended health maintenance and to schedule as soon as possible if overdue, as this is important in assessing for undiagnosed pathology, especially cancer, as well as protecting against potentially harmful/life threatening disease. I encourage further reading and education about your health conditions . Information on many healthconditions is provided by the [employer] Academy [employer]icians: https://familydoctor. org/  Please bring any questions to me at your next visit. This document may have been prepared at least partially through the use of voice recognition software. Although effort is taken to assure the accuracy of this document, it is possible that grammatical, syntax,  or spelling errors may occur. Return in about 4 weeks (around 10/25/2022) for Dr. Stephani Cuadra.     Electronically signed by Deepak Woodward MD on 9/27/22 at 10:10 AM EDT

## 2022-10-04 RX ORDER — OMEPRAZOLE 20 MG/1
CAPSULE, DELAYED RELEASE ORAL
Qty: 90 CAPSULE | Refills: 0 | OUTPATIENT
Start: 2022-10-04

## 2022-10-13 ENCOUNTER — APPOINTMENT (OUTPATIENT)
Dept: CT IMAGING | Age: 62
End: 2022-10-13
Payer: MEDICARE

## 2022-10-13 ENCOUNTER — APPOINTMENT (OUTPATIENT)
Dept: GENERAL RADIOLOGY | Age: 62
End: 2022-10-13
Payer: MEDICARE

## 2022-10-13 ENCOUNTER — HOSPITAL ENCOUNTER (EMERGENCY)
Age: 62
Discharge: HOME | End: 2022-10-13
Attending: EMERGENCY MEDICINE
Payer: MEDICARE

## 2022-10-13 VITALS
TEMPERATURE: 97.7 F | DIASTOLIC BLOOD PRESSURE: 55 MMHG | BODY MASS INDEX: 18.58 KG/M2 | RESPIRATION RATE: 16 BRPM | OXYGEN SATURATION: 90 % | WEIGHT: 101 LBS | HEIGHT: 62 IN | HEART RATE: 82 BPM | SYSTOLIC BLOOD PRESSURE: 118 MMHG

## 2022-10-13 DIAGNOSIS — R06.00 DYSPNEA, UNSPECIFIED TYPE: ICD-10-CM

## 2022-10-13 DIAGNOSIS — J44.1 COPD EXACERBATION (HCC): Primary | ICD-10-CM

## 2022-10-13 LAB
ALBUMIN SERPL-MCNC: 4.3 G/DL (ref 3.5–5.2)
ALP BLD-CCNC: 121 U/L (ref 35–104)
ALT SERPL-CCNC: 35 U/L (ref 0–32)
ANION GAP SERPL CALCULATED.3IONS-SCNC: 8 MMOL/L (ref 7–16)
AST SERPL-CCNC: 33 U/L (ref 0–31)
BACTERIA: NORMAL /HPF
BASOPHILS ABSOLUTE: 0.06 E9/L (ref 0–0.2)
BASOPHILS RELATIVE PERCENT: 0.8 % (ref 0–2)
BILIRUB SERPL-MCNC: 0.2 MG/DL (ref 0–1.2)
BILIRUBIN URINE: NEGATIVE
BLOOD, URINE: NEGATIVE
BUN BLDV-MCNC: 20 MG/DL (ref 6–23)
CALCIUM SERPL-MCNC: 10 MG/DL (ref 8.6–10.2)
CHLORIDE BLD-SCNC: 99 MMOL/L (ref 98–107)
CLARITY: CLEAR
CO2: 30 MMOL/L (ref 22–29)
COLOR: YELLOW
CREAT SERPL-MCNC: 0.5 MG/DL (ref 0.5–1)
EKG ATRIAL RATE: 80 BPM
EKG P AXIS: 81 DEGREES
EKG P-R INTERVAL: 164 MS
EKG Q-T INTERVAL: 358 MS
EKG QRS DURATION: 82 MS
EKG QTC CALCULATION (BAZETT): 412 MS
EKG R AXIS: -58 DEGREES
EKG T AXIS: 68 DEGREES
EKG VENTRICULAR RATE: 80 BPM
EOSINOPHILS ABSOLUTE: 0.16 E9/L (ref 0.05–0.5)
EOSINOPHILS RELATIVE PERCENT: 2 % (ref 0–6)
EPITHELIAL CELLS, UA: NORMAL /HPF
GFR AFRICAN AMERICAN: >60
GFR NON-AFRICAN AMERICAN: >60 ML/MIN/1.73
GLUCOSE BLD-MCNC: 103 MG/DL (ref 74–99)
GLUCOSE URINE: NEGATIVE MG/DL
HCT VFR BLD CALC: 41.8 % (ref 34–48)
HEMOGLOBIN: 13.8 G/DL (ref 11.5–15.5)
IMMATURE GRANULOCYTES #: 0.03 E9/L
IMMATURE GRANULOCYTES %: 0.4 % (ref 0–5)
KETONES, URINE: NEGATIVE MG/DL
LACTIC ACID: 1.5 MMOL/L (ref 0.5–2.2)
LEUKOCYTE ESTERASE, URINE: NEGATIVE
LIPASE: 34 U/L (ref 13–60)
LYMPHOCYTES ABSOLUTE: 1.37 E9/L (ref 1.5–4)
LYMPHOCYTES RELATIVE PERCENT: 17.5 % (ref 20–42)
MCH RBC QN AUTO: 31.9 PG (ref 26–35)
MCHC RBC AUTO-ENTMCNC: 33 % (ref 32–34.5)
MCV RBC AUTO: 96.5 FL (ref 80–99.9)
MONOCYTES ABSOLUTE: 0.7 E9/L (ref 0.1–0.95)
MONOCYTES RELATIVE PERCENT: 9 % (ref 2–12)
NEUTROPHILS ABSOLUTE: 5.49 E9/L (ref 1.8–7.3)
NEUTROPHILS RELATIVE PERCENT: 70.3 % (ref 43–80)
NITRITE, URINE: NEGATIVE
PDW BLD-RTO: 13.3 FL (ref 11.5–15)
PH UA: 7 (ref 5–9)
PLATELET # BLD: 251 E9/L (ref 130–450)
PMV BLD AUTO: 10.6 FL (ref 7–12)
POTASSIUM REFLEX MAGNESIUM: 4.3 MMOL/L (ref 3.5–5)
PRO-BNP: 59 PG/ML (ref 0–125)
PROTEIN UA: NEGATIVE MG/DL
RBC # BLD: 4.33 E12/L (ref 3.5–5.5)
RBC UA: NORMAL /HPF (ref 0–2)
SODIUM BLD-SCNC: 137 MMOL/L (ref 132–146)
SPECIFIC GRAVITY UA: 1.02 (ref 1–1.03)
TOTAL PROTEIN: 7.4 G/DL (ref 6.4–8.3)
TROPONIN, HIGH SENSITIVITY: 18 NG/L (ref 0–9)
TROPONIN, HIGH SENSITIVITY: 20 NG/L (ref 0–9)
UROBILINOGEN, URINE: 0.2 E.U./DL
WBC # BLD: 7.8 E9/L (ref 4.5–11.5)
WBC UA: NORMAL /HPF (ref 0–5)

## 2022-10-13 PROCEDURE — 81001 URINALYSIS AUTO W/SCOPE: CPT

## 2022-10-13 PROCEDURE — 94664 DEMO&/EVAL PT USE INHALER: CPT

## 2022-10-13 PROCEDURE — 99285 EMERGENCY DEPT VISIT HI MDM: CPT

## 2022-10-13 PROCEDURE — 6370000000 HC RX 637 (ALT 250 FOR IP)

## 2022-10-13 PROCEDURE — 36415 COLL VENOUS BLD VENIPUNCTURE: CPT

## 2022-10-13 PROCEDURE — 71045 X-RAY EXAM CHEST 1 VIEW: CPT

## 2022-10-13 PROCEDURE — 93005 ELECTROCARDIOGRAM TRACING: CPT

## 2022-10-13 PROCEDURE — 80053 COMPREHEN METABOLIC PANEL: CPT

## 2022-10-13 PROCEDURE — 6360000004 HC RX CONTRAST MEDICATION: Performed by: RADIOLOGY

## 2022-10-13 PROCEDURE — 83605 ASSAY OF LACTIC ACID: CPT

## 2022-10-13 PROCEDURE — 84484 ASSAY OF TROPONIN QUANT: CPT

## 2022-10-13 PROCEDURE — 94640 AIRWAY INHALATION TREATMENT: CPT

## 2022-10-13 PROCEDURE — 71275 CT ANGIOGRAPHY CHEST: CPT

## 2022-10-13 PROCEDURE — 83690 ASSAY OF LIPASE: CPT

## 2022-10-13 PROCEDURE — 85025 COMPLETE CBC W/AUTO DIFF WBC: CPT

## 2022-10-13 PROCEDURE — 83880 ASSAY OF NATRIURETIC PEPTIDE: CPT

## 2022-10-13 RX ORDER — PREDNISONE 50 MG/1
50 TABLET ORAL DAILY
Qty: 5 TABLET | Refills: 0 | Status: SHIPPED | OUTPATIENT
Start: 2022-10-13 | End: 2022-10-18

## 2022-10-13 RX ORDER — IPRATROPIUM BROMIDE AND ALBUTEROL SULFATE 2.5; .5 MG/3ML; MG/3ML
3 SOLUTION RESPIRATORY (INHALATION) ONCE
Status: COMPLETED | OUTPATIENT
Start: 2022-10-13 | End: 2022-10-13

## 2022-10-13 RX ORDER — IPRATROPIUM BROMIDE AND ALBUTEROL SULFATE 2.5; .5 MG/3ML; MG/3ML
1 SOLUTION RESPIRATORY (INHALATION) ONCE
Status: COMPLETED | OUTPATIENT
Start: 2022-10-13 | End: 2022-10-13

## 2022-10-13 RX ADMIN — IPRATROPIUM BROMIDE AND ALBUTEROL SULFATE 1 AMPULE: .5; 2.5 SOLUTION RESPIRATORY (INHALATION) at 05:45

## 2022-10-13 RX ADMIN — IPRATROPIUM BROMIDE AND ALBUTEROL SULFATE 3 AMPULE: .5; 2.5 SOLUTION RESPIRATORY (INHALATION) at 03:54

## 2022-10-13 RX ADMIN — IOPAMIDOL 60 ML: 755 INJECTION, SOLUTION INTRAVENOUS at 07:07

## 2022-10-13 ASSESSMENT — ENCOUNTER SYMPTOMS
CHEST TIGHTNESS: 0
BACK PAIN: 0
COUGH: 0
SHORTNESS OF BREATH: 1
NAUSEA: 0
BLOOD IN STOOL: 0
WHEEZING: 0
VOMITING: 0
CONSTIPATION: 0
DIARRHEA: 0
ABDOMINAL PAIN: 1

## 2022-10-13 ASSESSMENT — PAIN - FUNCTIONAL ASSESSMENT: PAIN_FUNCTIONAL_ASSESSMENT: NONE - DENIES PAIN

## 2022-10-13 NOTE — ED PROVIDER NOTES
[address]      Pt Name: Ruiz Horton  MRN: [id number]  [place] 1960  Date of evaluation: 10/13/2022      CHIEF COMPLAINT       Chief Complaint   Patient presents with    Shortness of Breath     Going on for 2 hours - sob  Denies oxygen use at home-current smoker        HPI  Ruiz Horton is a 65 y.o. female  with PMHx of Depression,  presents with SOB onset 2 hours ago. Patient states she was sitting down when symptoms started. Associated with abdominal pain, burning,constant, rated 6 out 10, periumbilical region. Describes symptoms moderate in severity with no alleviating or exacerbating factors. Denies any fever, chills, n/v, headache, dizziness, vision changes, neck tenderness or stiffness, weakness, cp, palpitations, leg swelling/tenderness, abd pain, dysuria, hematuria, diarrhea, constipation. Smoker: since 12 yo, 1 pack per day. Except as noted above the remainder of the review of systems was reviewed and negative. Review of Systems   Constitutional:  Negative for activity change, appetite change, chills, fatigue and fever. HENT:  Negative for congestion, nosebleeds and tinnitus. Eyes:  Negative for visual disturbance. Respiratory:  Positive for shortness of breath. Negative for cough, chest tightness and wheezing. Cardiovascular:  Negative for chest pain, palpitations and leg swelling. Gastrointestinal:  Positive for abdominal pain. Negative for blood in stool, constipation, diarrhea, nausea and vomiting. Endocrine: Negative for polydipsia and polyphagia. Genitourinary:  Negative for dysuria, flank pain, hematuria, vaginal bleeding, vaginal discharge and vaginal pain. Musculoskeletal:  Negative for back pain, gait problem, joint swelling and myalgias. Skin:  Negative for rash. Allergic/Immunologic: Negative for immunocompromised state.    Neurological:  Negative for dizziness, syncope, weakness, numbness and headaches. Hematological:  Negative for adenopathy. Psychiatric/Behavioral:  Negative for behavioral problems, confusion and hallucinations. Physical Exam  Constitutional:       General: She is not in acute distress. Appearance: Normal appearance. She is normal weight. She is not ill-appearing, toxic-appearing or diaphoretic. HENT:      Head: Normocephalic and atraumatic. Right Ear: External ear normal.      Left Ear: External ear normal.      Nose: Nose normal. No congestion or rhinorrhea. Mouth/Throat:      Mouth: Mucous membranes are moist.      Pharynx: Oropharynx is clear. No oropharyngeal exudate or posterior oropharyngeal erythema. Eyes:      Conjunctiva/sclera: Conjunctivae normal.      Pupils: Pupils are equal, round, and reactive to light. Cardiovascular:      Rate and Rhythm: Normal rate and regular rhythm. Pulses: Normal pulses. Heart sounds: Normal heart sounds. Pulmonary:      Effort: Pulmonary effort is normal.      Breath sounds: Normal breath sounds. Abdominal:      General: Abdomen is flat. Bowel sounds are normal. There is no distension. Palpations: Abdomen is soft. There is no mass. Tenderness: There is no abdominal tenderness. There is no right CVA tenderness, left CVA tenderness or guarding. Hernia: No hernia is present. Musculoskeletal:      Cervical back: Normal range of motion. Skin:     General: Skin is warm and dry. Capillary Refill: Capillary refill takes less than 2 seconds. Neurological:      General: No focal deficit present. Mental Status: She is alert and oriented to person, place, and time. Mental status is at baseline. Psychiatric:         Mood and Affect: Mood normal.         Behavior: Behavior normal.         Thought Content:  Thought content normal.        Procedures     MDM     Amount and/or Complexity of Data Reviewed  Decide to obtain previous medical records or to obtain history from someone other than the patient: yes           65 y.o. female  with PMHx of Depression,  presents with SOB onset 2 hours ago. While in the ED patient was hemodynamically stable, afebrile, nontoxic-appearing, in no respiratory distress. EKG  Normal sinus rhythm  Possible Left atrial enlargement Left anterior fascicular block, Incomplete right bundle branch block with no sign of acute ischemia. CXR negative for any acute pathologies. CTA No evidence of pulmonary embolism or acute pulmonary abnormality. Patient received duonebs with significant symptomatic relief. She ambulated with no oxygen requirement. Patient feels comfortable going home with prednisone prescription and will follow up outpatient with PCP. Patient understands to return to the ED in case of worsening symptoms. ED Course as of 10/15/22 1534   Thu Oct 13, 2022   0535    IMPRESSION:  No acute process. [TC]   Sat Oct 15, 2022   1531 EKG: This EKG is signed by emergency department physician.     Rate: 80  Rhythm: Sinus  Interpretation: Normal sinus rhythm  Possible Left atrial enlargement  Left anterior fascicular block  Incomplete right bundle branch block  Comparison: stable as compared to patient's most recent EKG      [TC]      ED Course User Index  [TC] Rj Dominguez MD       --------------------------------------------- PAST HISTORY ---------------------------------------------  Past Medical History:  has a past medical history of Anxiety, Arthritis, Blood circulation, collateral, Cancer ([address] Chronic back pain, Colitis, Complex regional pain syndrome type 1 of right lower extremity, COPD (chronic obstructive pulmonary disease) ([address] Depression, Diabetes mellitus ([address] Elevated liver function tests, GERD (gastroesophageal reflux disease), Headache(784.0), Hyperlipidemia, Kidney stone, Movement disorder, Neuromuscular disorder ([address] Osteoarthritis, Other disorders of kidney and ureter in diseases classified elsewhere, Pneumonia, Psychiatric problem, and RSD (reflex sympathetic dystrophy). Past Surgical History:  has a past surgical history that includes Hysterectomy; laminectomy; Finger amputation; back surgery (2005); other surgical history (09/18/13); other surgical history (N/A, 2/3/2014); fracture surgery; Endoscopy, colon, diagnostic; Carpal tunnel release; Colonoscopy; other surgical history (Right, 11/11/14); other surgical history (04/08/15); Nerve Block (N/A, 8 19 15); Nerve Block (Left, 08 26 2015); Nerve Block (9 2 15); Nerve Block (Right, 9/17/15); other surgical history (04/25/2018); and pr revise/remove neurostim/ (N/A, 4/25/2018). Social History:  reports that she has been smoking cigarettes. She started smoking about 52 years ago. She has a 42.00 pack-year smoking history. She has been exposed to tobacco smoke. She has never used smokeless tobacco. She reports that she does not drink alcohol and does not use drugs. Family History: family history includes Other in her mother. The patients home medications have been reviewed.     Allergies: Sulfamethazine and Ancef [cefazolin sodium]    -------------------------------------------------- RESULTS -------------------------------------------------  Labs:  Results for orders placed or performed during the hospital encounter of 10/13/22   CBC with Auto Differential   Result Value Ref Range    WBC 7.8 4.5 - 11.5 E9/L    RBC 4.33 3.50 - 5.50 E12/L    Hemoglobin 13.8 11.5 - 15.5 g/dL    Hematocrit 41.8 34.0 - 48.0 %    MCV 96.5 80.0 - 99.9 fL    MCH 31.9 26.0 - 35.0 pg    MCHC 33.0 32.0 - 34.5 %    RDW 13.3 11.5 - 15.0 fL    Platelets 368 587 - 086 E9/L    MPV 10.6 7.0 - 12.0 fL    Neutrophils % 70.3 43.0 - 80.0 %    Immature Granulocytes % 0.4 0.0 - 5.0 %    Lymphocytes % 17.5 (L) 20.0 - 42.0 %    Monocytes % 9.0 2.0 - 12.0 %    Eosinophils % 2.0 0.0 - 6.0 %    Basophils % 0.8 0.0 - 2.0 %    Neutrophils Absolute 5.49 1.80 - 7.30 E9/L    Immature Granulocytes # 0.03 E9/L    Lymphocytes Absolute 1.37 (L) 1.50 - 4.00 E9/L    Monocytes Absolute 0.70 0.10 - 0.95 E9/L    Eosinophils Absolute 0.16 0.05 - 0.50 E9/L    Basophils Absolute 0.06 0.00 - 0.20 E9/L   Comprehensive Metabolic Panel w/ Reflex to MG   Result Value Ref Range    Sodium 137 132 - 146 mmol/L    Potassium reflex Magnesium 4.3 3.5 - 5.0 mmol/L    Chloride 99 98 - 107 mmol/L    CO2 30 (H) 22 - 29 mmol/L    Anion Gap 8 7 - 16 mmol/L    Glucose 103 (H) 74 - 99 mg/dL    BUN 20 6 - 23 mg/dL    Creatinine 0.5 0.5 - 1.0 mg/dL    GFR Non-African American >60 >=60 mL/min/1.73    GFR African American >60     Calcium 10.0 8.6 - 10.2 mg/dL    Total Protein 7.4 6.4 - 8.3 g/dL    Albumin 4.3 3.5 - 5.2 g/dL    Total Bilirubin 0.2 0.0 - 1.2 mg/dL    Alkaline Phosphatase 121 (H) 35 - 104 U/L    ALT 35 (H) 0 - 32 U/L    AST 33 (H) 0 - 31 U/L   Lactic Acid   Result Value Ref Range    Lactic Acid 1.5 0.5 - 2.2 mmol/L   Troponin   Result Value Ref Range    Troponin, High Sensitivity 20 (H) 0 - 9 ng/L   Lipase   Result Value Ref Range    Lipase 34 13 - 60 U/L   Brain Natriuretic Peptide   Result Value Ref Range    Pro-BNP 59 0 - 125 pg/mL   Urinalysis with Microscopic   Result Value Ref Range    Color, UA Yellow Straw/Yellow    Clarity, UA Clear Clear    Glucose, Ur Negative Negative mg/dL    Bilirubin Urine Negative Negative    Ketones, Urine Negative Negative mg/dL    Specific Gravity, UA 1.020 1.005 - 1.030    Blood, Urine Negative Negative    pH, UA 7.0 5.0 - 9.0    Protein, UA Negative Negative mg/dL    Urobilinogen, Urine 0.2 <2.0 E.U./dL    Nitrite, Urine Negative Negative    Leukocyte Esterase, Urine Negative Negative    WBC, UA 0-1 0 - 5 /HPF    RBC, UA 0-1 0 - 2 /HPF    Epithelial Cells, UA FEW /HPF    Bacteria, UA NONE SEEN None Seen /HPF   Troponin   Result Value Ref Range    Troponin, High Sensitivity 18 (H) 0 - 9 ng/L   EKG 12 Lead   Result Value Ref Range    Ventricular Rate 80 BPM    Atrial Rate 80 BPM taking these medications    Details   predniSONE (DELTASONE) 50 MG tablet Take 1 tablet by mouth daily for 5 days, Disp-5 tablet, R-0Print             Diagnosis:  1. COPD exacerbation ([facility] [address]    2. Dyspnea, unspecified type        Disposition:  Patient's disposition: Discharge to home  Patient's condition is stable.          Era Gallegos MD  Resident  10/15/22 2128

## 2022-10-13 NOTE — ED NOTES
Pt ambulated with pulse ox. SpO2 remained at 97% for duration of walk, and her HR was 106. Pt yelled out \"those aren't right! \" Pt reported she didn't know what the numbers meant, but that they couldn't be normal based on how she feels. Dr. Nusrat Lopez was notified.      Stacey Ortiz RN  10/13/22 0806

## 2022-10-13 NOTE — ED PROVIDER NOTES
8:55 AM EDT  I received this patient at sign out from Dr. Johanna Good  I have discussed the patient's initial exam, treatment and plan of care with the out going physician. I have introduced my self to the patient / family and have answered their questions to this point. I have examined the patient myself and reviewed ordered tests / medications and  reviewed any available results to this point. If a resident is involved in the Emergency Department care, I have discussed my findings and plan with them as well. CTA showed no PE, she was in no respiratory distress.  Ambulated and had oxygen above 90%, given steroids for home, return precautions and discussed importance of tobacco cessation     Mika Hodge, DO  10/13/22 8722

## 2022-10-28 ENCOUNTER — OFFICE VISIT (OUTPATIENT)
Dept: FAMILY MEDICINE CLINIC | Age: 62
End: 2022-10-28
Payer: MEDICARE

## 2022-10-28 VITALS
SYSTOLIC BLOOD PRESSURE: 117 MMHG | TEMPERATURE: 97.1 F | HEART RATE: 98 BPM | BODY MASS INDEX: 20.02 KG/M2 | OXYGEN SATURATION: 97 % | HEIGHT: 62 IN | WEIGHT: 108.8 LBS | DIASTOLIC BLOOD PRESSURE: 56 MMHG

## 2022-10-28 DIAGNOSIS — Z12.2 ENCOUNTER FOR SCREENING FOR LUNG CANCER: ICD-10-CM

## 2022-10-28 DIAGNOSIS — Z12.39 ENCOUNTER FOR SCREENING FOR MALIGNANT NEOPLASM OF BREAST, UNSPECIFIED SCREENING MODALITY: ICD-10-CM

## 2022-10-28 DIAGNOSIS — F17.210 SMOKING GREATER THAN 40 PACK YEARS: ICD-10-CM

## 2022-10-28 DIAGNOSIS — K21.9 GASTROESOPHAGEAL REFLUX DISEASE WITHOUT ESOPHAGITIS: Primary | ICD-10-CM

## 2022-10-28 DIAGNOSIS — Z12.31 ENCOUNTER FOR SCREENING MAMMOGRAM FOR MALIGNANT NEOPLASM OF BREAST: ICD-10-CM

## 2022-10-28 DIAGNOSIS — Z87.891 PERSONAL HISTORY OF TOBACCO USE: ICD-10-CM

## 2022-10-28 PROCEDURE — 99213 OFFICE O/P EST LOW 20 MIN: CPT

## 2022-10-28 RX ORDER — OMEPRAZOLE 40 MG/1
40 CAPSULE, DELAYED RELEASE ORAL
Qty: 30 CAPSULE | Refills: 0 | Status: ON HOLD
Start: 2022-10-28 | End: 2022-11-08 | Stop reason: HOSPADM

## 2022-10-28 RX ORDER — ALBUTEROL SULFATE 90 UG/1
2 AEROSOL, METERED RESPIRATORY (INHALATION) 4 TIMES DAILY PRN
Qty: 18 G | Refills: 0 | Status: ON HOLD
Start: 2022-10-28 | End: 2022-11-08 | Stop reason: SDUPTHER

## 2022-10-28 ASSESSMENT — ENCOUNTER SYMPTOMS
SHORTNESS OF BREATH: 0
SORE THROAT: 0
WHEEZING: 0
NAUSEA: 0
EYE PAIN: 0
DIARRHEA: 0
RHINORRHEA: 0

## 2022-10-28 NOTE — PATIENT INSTRUCTIONS

## 2022-10-28 NOTE — PROGRESS NOTES
S: 65 y.o. female with   Chief Complaint   Patient presents with    1 Month Follow-Up     Legs are better        Follow up for LE edema - improved. Ear pain improved with debrox  GERD has been on H2RA and poor diet habits. Still has sx. O: VS:  height is 5' 2\" (1.575 m) and weight is 108 lb 12.8 oz (49.4 kg). Her temporal temperature is 97.1 °F (36.2 °C). Her blood pressure is 117/56 (abnormal) and her pulse is 98. Her oxygen saturation is 97%. BP Readings from Last 3 Encounters:   10/28/22 (!) 117/56   10/13/22 (!) 118/55   09/27/22 (!) 92/56     See resident note      Impression/Plan:   1) Edema - improving   2) Otalgia - improved   3) GERD - recommend upgrading to PPI      Health Maintenance Due   Topic Date Due    Shingles vaccine (1 of 2) Never done    Low dose CT lung screening  Never done    Pneumococcal 0-64 years Vaccine (2 - PPSV23 if available, else PCV20) 09/18/2016    Breast cancer screen  04/25/2018    A1C test (Diabetic or Prediabetic)  12/19/2018    COVID-19 Vaccine (3 - Booster for Pfizer series) 05/26/2021    Lipids  04/25/2022    Flu vaccine (1) 08/01/2022    Annual Wellness Visit (AWV)  10/13/2022         Attending Physician Statement  I have discussed the case, including pertinent history and exam findings with the resident. I also have seen the patient and performed key portions of the examination. I agree with the documented assessment and plan.       Theresa Angel MD

## 2022-10-28 NOTE — PROGRESS NOTES
Subjective:  Wanda Young is a [address] 89 y.o. female with chief complaint of 1 Month Follow-Up (Legs are better )    Swelling - improved with stockings. Otitis Externa - improved with Debrox  Nausea and epigastric pain - prescribed omeprazole but did not take it. Complaining for a year now. Progressively getting worse. Last EGD 2018 - no pathology    ROS:  Review of Systems   Constitutional:  Negative for chills and fever. HENT:  Negative for congestion, rhinorrhea and sore throat. Eyes:  Negative for pain and visual disturbance. Respiratory:  Negative for shortness of breath and wheezing. Cardiovascular:  Negative for chest pain and palpitations. Gastrointestinal:  Negative for diarrhea and nausea. Genitourinary:  Negative for dysuria and hematuria. Musculoskeletal:  Negative for arthralgias and myalgias. Skin:  Negative for rash. Neurological:  Negative for dizziness and headaches. Objective:  Vitals:    10/28/22 1512 10/28/22 1517   BP: (!) 127/56 (!) 117/56   Pulse: 100 98   Temp: 97.1 °F (36.2 °C)    TempSrc: Temporal    SpO2: 97%    Weight: 108 lb 12.8 oz (49.4 kg)    Height: 5' 2\" (1.575 m)      Physical Exam  Vitals and nursing note reviewed. Constitutional:       General: She is not in acute distress. Appearance: Normal appearance. HENT:      Head: Normocephalic and atraumatic. Eyes:      General:         Right eye: No discharge. Left eye: No discharge. Cardiovascular:      Rate and Rhythm: Normal rate and regular rhythm. Heart sounds: No murmur heard. Pulmonary:      Effort: Pulmonary effort is normal.      Breath sounds: No stridor. No wheezing or rhonchi. Abdominal:      General: Bowel sounds are normal.      Palpations: Abdomen is soft. Tenderness: There is no abdominal tenderness. Comments: Epigastric region mildly tender to palpation   Musculoskeletal:         General: No swelling. Normal range of motion. Right lower leg: No edema. Left lower leg: No edema. Skin:     General: Skin is warm and dry. Neurological:      General: No focal deficit present. Mental Status: She is oriented to person, place, and time. Assessment/Plan:   Diagnosis Orders   1. Gastroesophageal reflux disease without esophagitis  omeprazole (PRILOSEC) 40 MG delayed release capsule    Evonne Mendoza Asp, MD, General Surgery, [place]                 -Epigastric pain for the past year progressively getting worse. -Trial omeprazole 40 mg once a day for the next month            -Return to clinic in 4 to 6 weeks to reassess            -Referral to surgery to schedule for EGD     2. Encounter for screening mammogram for malignant neoplasm of breast  BRI DIGITAL SCREEN BILATERAL PER PROTOCOL              -Last mammogram [id number]        4. Personal history of tobacco use                 - Encouraged smoking cessation.            - Down to 1ppd            - offered pneumo vaccine/flu vaccine - pt refused           5. Smoking greater than 40 pack years  Full PFT Study With Bronchodilator    albuterol sulfate HFA (VENTOLIN HFA) 108 (90 Base) MCG/ACT inhaler                - Complaint of nausea, some SOB, epigastric discomfort             - PFT ordered to establish baseline             - Albuterol for relief prn               Counseled regarding above diagnosis, including possible risks and complications,  especially if left uncontrolled. Counseled regarding the possible side effects, risks, benefits and alternatives to treatment;patient and/or guardian verbalizes understanding, agrees, feels comfortable with and wishes to proceed with above treatment plan. Call or go to ED immediately if symptoms worsen or persist. Advised patient to call with any new medication issues, and, as applicable, read all Rx info from pharmacy to assure aware of all possible risks and side effects of medicationbefore taking.     Patient and/or guardian given opportunity to ask questions/raise concerns. The patient verbalized comfort and understanding of instructions. Reviewed age and gender appropriate health screening exams and vaccinations. Advised patient regarding importance of keeping up with recommended health maintenance and to schedule as soon as possible if overdue, as this is important in assessing for undiagnosed pathology, especially cancer, as well as protecting against potentially harmful/life threatening disease. I encourage further reading and education about your health conditions . Information on many healthconditions is provided by the [employer] Academy [employer]: https://familydoctor. org/  Please bring any questions to me at your next visit. This document may have been prepared at least partially through the use of voice recognition software. Although effort is taken to assure the accuracy of this document, it is possible that grammatical, syntax,  or spelling errors may occur. No follow-ups on file.     Electronically signed by Hilda Rodriguez MD on 10/28/22 at 3:24 PM EDT

## 2022-11-06 ENCOUNTER — HOSPITAL ENCOUNTER (EMERGENCY)
Age: 62
Discharge: HOME | DRG: 191 | End: 2022-11-06
Attending: STUDENT IN AN ORGANIZED HEALTH CARE EDUCATION/TRAINING PROGRAM
Payer: MEDICARE

## 2022-11-06 ENCOUNTER — APPOINTMENT (OUTPATIENT)
Dept: GENERAL RADIOLOGY | Age: 62
DRG: 191 | End: 2022-11-06
Payer: MEDICARE

## 2022-11-06 VITALS
TEMPERATURE: 97.8 F | OXYGEN SATURATION: 97 % | HEIGHT: 62 IN | BODY MASS INDEX: 19.88 KG/M2 | SYSTOLIC BLOOD PRESSURE: 127 MMHG | WEIGHT: 108 LBS | DIASTOLIC BLOOD PRESSURE: 74 MMHG | HEART RATE: 90 BPM | RESPIRATION RATE: 16 BRPM

## 2022-11-06 DIAGNOSIS — R07.9 CHEST PAIN, UNSPECIFIED TYPE: ICD-10-CM

## 2022-11-06 DIAGNOSIS — J44.1 COPD EXACERBATION (HCC): Primary | ICD-10-CM

## 2022-11-06 DIAGNOSIS — J06.9 VIRAL URI WITH COUGH: ICD-10-CM

## 2022-11-06 LAB
ALBUMIN SERPL-MCNC: 4.1 G/DL (ref 3.5–5.2)
ALP BLD-CCNC: 124 U/L (ref 35–104)
ALT SERPL-CCNC: 29 U/L (ref 0–32)
ANION GAP SERPL CALCULATED.3IONS-SCNC: 8 MMOL/L (ref 7–16)
AST SERPL-CCNC: 30 U/L (ref 0–31)
BILIRUB SERPL-MCNC: <0.2 MG/DL (ref 0–1.2)
BUN BLDV-MCNC: 13 MG/DL (ref 6–23)
CALCIUM SERPL-MCNC: 9.3 MG/DL (ref 8.6–10.2)
CHLORIDE BLD-SCNC: 97 MMOL/L (ref 98–107)
CO2: 28 MMOL/L (ref 22–29)
CREAT SERPL-MCNC: 0.5 MG/DL (ref 0.5–1)
EKG ATRIAL RATE: 90 BPM
EKG P AXIS: 61 DEGREES
EKG P-R INTERVAL: 156 MS
EKG Q-T INTERVAL: 336 MS
EKG QRS DURATION: 84 MS
EKG QTC CALCULATION (BAZETT): 411 MS
EKG R AXIS: -50 DEGREES
EKG T AXIS: 61 DEGREES
EKG VENTRICULAR RATE: 90 BPM
GFR SERPL CREATININE-BSD FRML MDRD: >60 ML/MIN/1.73
GLUCOSE BLD-MCNC: 130 MG/DL (ref 74–99)
INFLUENZA A BY PCR: NOT DETECTED
INFLUENZA B BY PCR: NOT DETECTED
LACTIC ACID: 1.6 MMOL/L (ref 0.5–2.2)
LIPASE: 58 U/L (ref 13–60)
POTASSIUM SERPL-SCNC: 4.3 MMOL/L (ref 3.5–5)
PRO-BNP: 126 PG/ML (ref 0–125)
RSV BY PCR: NEGATIVE
SARS-COV-2, NAAT: NOT DETECTED
SODIUM BLD-SCNC: 133 MMOL/L (ref 132–146)
TOTAL PROTEIN: 6.5 G/DL (ref 6.4–8.3)
TROPONIN, HIGH SENSITIVITY: 15 NG/L (ref 0–9)
TROPONIN, HIGH SENSITIVITY: 17 NG/L (ref 0–9)
TROPONIN, HIGH SENSITIVITY: 17 NG/L (ref 0–9)

## 2022-11-06 PROCEDURE — 6360000002 HC RX W HCPCS: Performed by: STUDENT IN AN ORGANIZED HEALTH CARE EDUCATION/TRAINING PROGRAM

## 2022-11-06 PROCEDURE — 6370000000 HC RX 637 (ALT 250 FOR IP): Performed by: STUDENT IN AN ORGANIZED HEALTH CARE EDUCATION/TRAINING PROGRAM

## 2022-11-06 PROCEDURE — 83690 ASSAY OF LIPASE: CPT

## 2022-11-06 PROCEDURE — 2580000003 HC RX 258: Performed by: STUDENT IN AN ORGANIZED HEALTH CARE EDUCATION/TRAINING PROGRAM

## 2022-11-06 PROCEDURE — 94640 AIRWAY INHALATION TREATMENT: CPT

## 2022-11-06 PROCEDURE — 96374 THER/PROPH/DIAG INJ IV PUSH: CPT

## 2022-11-06 PROCEDURE — 87502 INFLUENZA DNA AMP PROBE: CPT

## 2022-11-06 PROCEDURE — 93005 ELECTROCARDIOGRAM TRACING: CPT | Performed by: STUDENT IN AN ORGANIZED HEALTH CARE EDUCATION/TRAINING PROGRAM

## 2022-11-06 PROCEDURE — 80053 COMPREHEN METABOLIC PANEL: CPT

## 2022-11-06 PROCEDURE — 71045 X-RAY EXAM CHEST 1 VIEW: CPT

## 2022-11-06 PROCEDURE — 96375 TX/PRO/DX INJ NEW DRUG ADDON: CPT

## 2022-11-06 PROCEDURE — 99285 EMERGENCY DEPT VISIT HI MDM: CPT

## 2022-11-06 PROCEDURE — 2500000003 HC RX 250 WO HCPCS: Performed by: STUDENT IN AN ORGANIZED HEALTH CARE EDUCATION/TRAINING PROGRAM

## 2022-11-06 PROCEDURE — 83880 ASSAY OF NATRIURETIC PEPTIDE: CPT

## 2022-11-06 PROCEDURE — A4216 STERILE WATER/SALINE, 10 ML: HCPCS | Performed by: STUDENT IN AN ORGANIZED HEALTH CARE EDUCATION/TRAINING PROGRAM

## 2022-11-06 PROCEDURE — 87807 RSV ASSAY W/OPTIC: CPT

## 2022-11-06 PROCEDURE — 94664 DEMO&/EVAL PT USE INHALER: CPT

## 2022-11-06 PROCEDURE — 36415 COLL VENOUS BLD VENIPUNCTURE: CPT

## 2022-11-06 PROCEDURE — 87635 SARS-COV-2 COVID-19 AMP PRB: CPT

## 2022-11-06 PROCEDURE — 83605 ASSAY OF LACTIC ACID: CPT

## 2022-11-06 PROCEDURE — 84484 ASSAY OF TROPONIN QUANT: CPT

## 2022-11-06 RX ORDER — KETOROLAC TROMETHAMINE 30 MG/ML
15 INJECTION, SOLUTION INTRAMUSCULAR; INTRAVENOUS ONCE
Status: COMPLETED | OUTPATIENT
Start: 2022-11-06 | End: 2022-11-06

## 2022-11-06 RX ORDER — IPRATROPIUM BROMIDE AND ALBUTEROL SULFATE 2.5; .5 MG/3ML; MG/3ML
3 SOLUTION RESPIRATORY (INHALATION) ONCE
Status: COMPLETED | OUTPATIENT
Start: 2022-11-06 | End: 2022-11-06

## 2022-11-06 RX ORDER — METHYLPREDNISOLONE 4 MG/1
2 TABLET ORAL DAILY
Qty: 3 TABLET | Refills: 0 | Status: ON HOLD | OUTPATIENT
Start: 2022-11-06 | End: 2022-11-07

## 2022-11-06 RX ORDER — SOFT LENS DISINFECTANT
1 SOLUTION, NON-ORAL MISCELLANEOUS DAILY
Qty: 1 EACH | Refills: 0 | Status: SHIPPED | OUTPATIENT
Start: 2022-11-06

## 2022-11-06 RX ORDER — GUAIFENESIN 400 MG/1
400 TABLET ORAL ONCE
Status: COMPLETED | OUTPATIENT
Start: 2022-11-06 | End: 2022-11-06

## 2022-11-06 RX ORDER — METHYLPREDNISOLONE SODIUM SUCCINATE 125 MG/2ML
125 INJECTION, POWDER, LYOPHILIZED, FOR SOLUTION INTRAMUSCULAR; INTRAVENOUS ONCE
Status: COMPLETED | OUTPATIENT
Start: 2022-11-06 | End: 2022-11-06

## 2022-11-06 RX ADMIN — KETOROLAC TROMETHAMINE 15 MG: 30 INJECTION, SOLUTION INTRAMUSCULAR at 18:47

## 2022-11-06 RX ADMIN — GUAIFENESIN 400 MG: 400 TABLET ORAL at 19:26

## 2022-11-06 RX ADMIN — IPRATROPIUM BROMIDE AND ALBUTEROL SULFATE 3 AMPULE: .5; 2.5 SOLUTION RESPIRATORY (INHALATION) at 17:46

## 2022-11-06 RX ADMIN — FAMOTIDINE 20 MG: 10 INJECTION INTRAVENOUS at 18:46

## 2022-11-06 RX ADMIN — LIDOCAINE HYDROCHLORIDE: 20 SOLUTION ORAL; TOPICAL at 18:08

## 2022-11-06 RX ADMIN — ALBUTEROL SULFATE 2.5 MG: 2.5 SOLUTION RESPIRATORY (INHALATION) at 21:24

## 2022-11-06 RX ADMIN — METHYLPREDNISOLONE SODIUM SUCCINATE 125 MG: 125 INJECTION, POWDER, FOR SOLUTION INTRAMUSCULAR; INTRAVENOUS at 19:26

## 2022-11-06 ASSESSMENT — ENCOUNTER SYMPTOMS
NAUSEA: 0
VOMITING: 0
EYE PAIN: 0
COUGH: 1
ABDOMINAL PAIN: 1
CHOKING: 1
SHORTNESS OF BREATH: 1
DIARRHEA: 0
BACK PAIN: 0
SORE THROAT: 0

## 2022-11-06 ASSESSMENT — PAIN DESCRIPTION - LOCATION
LOCATION: CHEST
LOCATION: CHEST
LOCATION: ABDOMEN
LOCATION: CHEST
LOCATION: CHEST

## 2022-11-06 ASSESSMENT — PAIN - FUNCTIONAL ASSESSMENT
PAIN_FUNCTIONAL_ASSESSMENT: 0-10

## 2022-11-06 ASSESSMENT — PAIN SCALES - GENERAL
PAINLEVEL_OUTOF10: 9
PAINLEVEL_OUTOF10: 8
PAINLEVEL_OUTOF10: 9
PAINLEVEL_OUTOF10: 9
PAINLEVEL_OUTOF10: 10
PAINLEVEL_OUTOF10: 9

## 2022-11-06 ASSESSMENT — PAIN DESCRIPTION - DESCRIPTORS
DESCRIPTORS: BURNING
DESCRIPTORS: BURNING;SHARP
DESCRIPTORS: BURNING;SHARP
DESCRIPTORS: BURNING

## 2022-11-06 ASSESSMENT — PAIN DESCRIPTION - ORIENTATION
ORIENTATION: MID;ANTERIOR
ORIENTATION: MID

## 2022-11-06 NOTE — ED PROVIDER NOTES
Chief Complaint   Patient presents with    Shortness of Breath    Chest Pain    Abdominal Pain       HPI   Radha Houston is a 65 y.o. old female presenting to the emergency department by EMS with a complaint of shortness of breath, chest pain, and abdominal pain that started 2 to 3 hours prior to arrival.  Her chest pain is substernal and described as a burning sensation with no radiation. She has burning in her abdomen located in the epigastric region. She denies any previous similar episodes. Symptoms are moderate in severity, persistent, with no relieving or provoking factors. Patient is a daily smoker. She reported improvement of her shortness of breath with her inhaler. She is not on oxygen at home. Patient was not hypoxic on arrival of EMS. She reports a nonproductive cough. She denies any myalgias, fevers, chills, nausea, vomiting, back pain, numbness, tingling, diarrhea, constipation, dysuria, or leg swelling. Review of Systems   Constitutional:  Negative for chills and fever. HENT:  Negative for ear pain and sore throat. Eyes:  Negative for pain. Respiratory:  Positive for cough, choking and shortness of breath. Cardiovascular:  Positive for chest pain. Negative for palpitations and leg swelling. Gastrointestinal:  Positive for abdominal pain. Negative for diarrhea, nausea and vomiting. Genitourinary:  Negative for flank pain. Musculoskeletal:  Negative for back pain and neck pain. Skin:  Negative for rash. Neurological:  Negative for headaches. Psychiatric/Behavioral:  Negative for behavioral problems. The patient is not nervous/anxious. Physical Exam  Constitutional:       General: She is not in acute distress. Appearance: Normal appearance. She is not ill-appearing. HENT:      Head: Normocephalic and atraumatic.       Right Ear: External ear normal.      Left Ear: External ear normal.      Nose: Nose normal.      Mouth/Throat:      Mouth: Mucous membranes are moist.      Pharynx: No oropharyngeal exudate. Eyes:      Extraocular Movements: Extraocular movements intact. Conjunctiva/sclera: Conjunctivae normal.      Pupils: Pupils are equal, round, and reactive to light. Cardiovascular:      Rate and Rhythm: Normal rate. Pulses: Normal pulses. Pulmonary:      Effort: No respiratory distress. Breath sounds: Examination of the right-lower field reveals rales. Examination of the left-lower field reveals rales. Rales present. Chest:      Chest wall: No tenderness. Abdominal:      Palpations: Abdomen is soft. Tenderness: There is no abdominal tenderness. There is no guarding or rebound. Musculoskeletal:         General: No deformity or signs of injury. Cervical back: Neck supple. No rigidity. Right lower leg: No edema. Left lower leg: No edema. Skin:     General: Skin is warm and dry. Neurological:      General: No focal deficit present. Mental Status: She is alert and oriented to person, place, and time. Mental status is at baseline. Psychiatric:         Mood and Affect: Mood normal.        Procedures     MDM     Amount and/or Complexity of Data Reviewed  Clinical lab tests: reviewed  Tests in the radiology section of CPT®: reviewed  Tests in the medicine section of CPT®: reviewed  Decide to obtain previous medical records or to obtain history from someone other than the patient: yes       This is a 59-year-old female with shortness of breath and chest pain. Patient seen in no acute distress on arrival.  Cardiac work-up was initiated. Troponin was 17 with repeat of 17 that appear to be at her baseline. COVID and influenza were negative. Her lab work showed no abnormalities. Chest x-ray showed signs of COPD without pneumonia. She was treated with duo nebs, GI cocktail, Pepcid, Toradol, Solu-Medrol, and Mucinex. Low risk heart score. Patient agreeable with plan for discharge home.     ED Course as of 11/07/22 1407   Sun Nov 06, 2022   1856 EKG read and interpreted by me. Rate 98, normal sinus rhythm, left axis deviation, left anterior fascicular block, no ST elevation, stable compared to previous EKG. [TO]   1915 Delta troponin of 0 [TO]   2052 Patient seen and reevaluated. She reports continued burning sensation in her chest and postnasal drip. She is seen resting comfortably in no acute distress. [TO]   2230 Patient re-evaluated. Laying in bed In no acute distress. States that she feels improved at this time. Dissused today's results and reasons to return. [BB]      ED Course User Index  [BB] Yanelis Paredes,   [TO] Kristin Brush, DO       --------------------------------------------- PAST HISTORY ---------------------------------------------  Past Medical History:  has a past medical history of Anxiety, Arthritis, Blood circulation, collateral, Cancer (HCC), Chronic back pain, Colitis, Complex regional pain syndrome type 1 of right lower extremity, COPD (chronic obstructive pulmonary disease) ([facility] [address] Depression, Diabetes mellitus ([facility] [address] Elevated liver function tests, GERD (gastroesophageal reflux disease), Headache(784.0), Hyperlipidemia, Kidney stone, Movement disorder, Neuromuscular disorder ([facility] [address] Osteoarthritis, Other disorders of kidney and ureter in diseases classified elsewhere, Pneumonia, Psychiatric problem, and RSD (reflex sympathetic dystrophy). Past Surgical History:  has a past surgical history that includes Hysterectomy; laminectomy; Finger amputation; back surgery (2005); other surgical history (09/18/13); other surgical history (N/A, 2/3/2014); fracture surgery; Endoscopy, colon, diagnostic; Carpal tunnel release; Colonoscopy; other surgical history (Right, 11/11/14); other surgical history (04/08/15); Nerve Block (N/A, 8 19 15); Nerve Block (Left, 08 26 2015); Nerve Block (9 2 15);  Nerve Block (Right, 9/17/15); other surgical history (04/25/2018); and pr revise/remove neurostim/ (N/A, 4/25/2018). Social History:  reports that she has been smoking cigarettes. She started smoking about 52 years ago. She has a 42.00 pack-year smoking history. She has been exposed to tobacco smoke. She has never used smokeless tobacco. She reports that she does not drink alcohol and does not use drugs. Family History: family history includes Other in her mother. The patients home medications have been reviewed.     Allergies: Sulfamethazine and Ancef [cefazolin sodium]    -------------------------------------------------- RESULTS -------------------------------------------------  Labs:  Results for orders placed or performed during the hospital encounter of 11/06/22   COVID-19, Rapid    Specimen: Nasopharyngeal Swab   Result Value Ref Range    SARS-CoV-2, NAAT Not Detected Not Detected   Rapid RSV Antigen    Specimen: Nasopharyngeal Swab   Result Value Ref Range    RSV by PCR Negative Negative   RAPID INFLUENZA A/B ANTIGENS    Specimen: Nasopharyngeal   Result Value Ref Range    Influenza A by PCR Not Detected Not Detected    Influenza B by PCR Not Detected Not Detected   Comprehensive Metabolic Panel   Result Value Ref Range    Sodium 133 132 - 146 mmol/L    Potassium 4.3 3.5 - 5.0 mmol/L    Chloride 97 (L) 98 - 107 mmol/L    CO2 28 22 - 29 mmol/L    Anion Gap 8 7 - 16 mmol/L    Glucose 130 (H) 74 - 99 mg/dL    BUN 13 6 - 23 mg/dL    Creatinine 0.5 0.5 - 1.0 mg/dL    Est, Glom Filt Rate >60 >=60 mL/min/1.73    Calcium 9.3 8.6 - 10.2 mg/dL    Total Protein 6.5 6.4 - 8.3 g/dL    Albumin 4.1 3.5 - 5.2 g/dL    Total Bilirubin <0.2 0.0 - 1.2 mg/dL    Alkaline Phosphatase 124 (H) 35 - 104 U/L    ALT 29 0 - 32 U/L    AST 30 0 - 31 U/L   Troponin   Result Value Ref Range    Troponin, High Sensitivity 17 (H) 0 - 9 ng/L   Brain Natriuretic Peptide   Result Value Ref Range    Pro- (H) 0 - 125 pg/mL   Troponin   Result Value Ref Range    Troponin, High Sensitivity 17 (H) 0 - 9 ng/L Lipase   Result Value Ref Range    Lipase 58 13 - 60 U/L   Lactic Acid   Result Value Ref Range    Lactic Acid 1.6 0.5 - 2.2 mmol/L   Troponin   Result Value Ref Range    Troponin, High Sensitivity 15 (H) 0 - 9 ng/L   EKG 12 Lead   Result Value Ref Range    Ventricular Rate 90 BPM    Atrial Rate 90 BPM    P-R Interval 156 ms    QRS Duration 84 ms    Q-T Interval 336 ms    QTc Calculation (Bazett) 411 ms    P Axis 61 degrees    R Axis -50 degrees    T Axis 61 degrees       Radiology:  XR CHEST PORTABLE   Final Result   COPD. There is no evidence of pneumonia or failure.             ------------------------- NURSING NOTES AND VITALS REVIEWED ---------------------------  Date / Time Roomed:  11/6/2022  4:57 PM  ED Bed Assignment:  01/01    The nursing notes within the ED encounter and vital signs as below have been reviewed. /74   Pulse 90   Temp 97.8 °F (36.6 °C)   Resp 16   Ht 5' 2\" (1.575 m)   Wt 108 lb (49 kg)   LMP  (LMP Unknown)   SpO2 97%   BMI 19.75 kg/m²   Oxygen Saturation Interpretation: Normal      ------------------------------------------ PROGRESS NOTES ------------------------------------------  2:07 PM EST  I have spoken with the Patient and/or Family and discussed todays results, in addition to providing specific details for the plan of care and counseling regarding the diagnosis and prognosis. Labs and Imaging discussed with patient including appropriate follow- up and re-evaluation. Their questions are answered at this time and they are agreeable with the plan. I discussed at length with them reasons for immediate return here for re evaluation. They will followup with PCP by calling their office Next Business Day      --------------------------------- ADDITIONAL PROVIDER NOTES ---------------------------------  At this time the patient is without objective evidence of an acute process requiring hospitalization or inpatient management.   They have remained hemodynamically stable throughout their entire ED visit and are stable for discharge with outpatient follow-up. The plan has been discussed in detail and they are aware of the specific conditions for emergent return, as well as the importance of follow-up. Discharge Medication List as of 11/6/2022 10:35 PM        START taking these medications    Details   albuterol (PROVENTIL) (5 MG/ML) 0.5% nebulizer solution Take 0.5 mLs by nebulization every 6 hours as needed for Wheezing, Disp-24 each, R-0Print      Respiratory Therapy Supplies (NEBULIZER) TANIKA DAILY Starting Sun 11/6/2022, Disp-1 each, R-0, Print      methylPREDNISolone (MEDROL) 4 MG tablet Take 0.5 tablets by mouth daily for 6 days, Disp-3 tablet, R-0Print             Diagnosis:  1. COPD exacerbation (HCC)    2. Chest pain, unspecified type    3. Viral URI with cough        Disposition:  Patient's disposition: Discharge to home  Patient's condition is stable.          Yoan Pak, DO  Resident  11/07/22 4504

## 2022-11-06 NOTE — ED NOTES
PT ambulated while monitoring her pulse/ox. Her O2 % remains consistent at 96%. Her pulse goes up from 88 to 96 BPM.  She ambulates well without assistance and has a steady gait.        Ivonne Bennett RN  11/06/22 [id number]

## 2022-11-07 ENCOUNTER — APPOINTMENT (OUTPATIENT)
Dept: CT IMAGING | Age: 62
DRG: 191 | End: 2022-11-07
Payer: MEDICARE

## 2022-11-07 ENCOUNTER — HOSPITAL ENCOUNTER (INPATIENT)
Age: 62
LOS: 1 days | Discharge: HOME | DRG: 191 | End: 2022-11-08
Attending: EMERGENCY MEDICINE | Admitting: FAMILY MEDICINE
Payer: MEDICARE

## 2022-11-07 DIAGNOSIS — J44.1 COPD EXACERBATION (HCC): Primary | ICD-10-CM

## 2022-11-07 DIAGNOSIS — J44.1 CHRONIC OBSTRUCTIVE PULMONARY DISEASE WITH ACUTE EXACERBATION (HCC): ICD-10-CM

## 2022-11-07 DIAGNOSIS — F17.210 SMOKING GREATER THAN 40 PACK YEARS: ICD-10-CM

## 2022-11-07 PROBLEM — R10.13 EPIGASTRIC PAIN: Status: ACTIVE | Noted: 2022-11-07

## 2022-11-07 PROBLEM — R07.89 CHEST PAIN, ATYPICAL: Status: ACTIVE | Noted: 2022-11-07

## 2022-11-07 LAB
ANION GAP SERPL CALCULATED.3IONS-SCNC: 8 MMOL/L (ref 7–16)
BASOPHILS ABSOLUTE: 0.03 E9/L (ref 0–0.2)
BASOPHILS RELATIVE PERCENT: 0.2 % (ref 0–2)
BUN BLDV-MCNC: 16 MG/DL (ref 6–23)
CALCIUM SERPL-MCNC: 9.5 MG/DL (ref 8.6–10.2)
CHLORIDE BLD-SCNC: 98 MMOL/L (ref 98–107)
CO2: 28 MMOL/L (ref 22–29)
CREAT SERPL-MCNC: 0.5 MG/DL (ref 0.5–1)
EOSINOPHILS ABSOLUTE: 0 E9/L (ref 0.05–0.5)
EOSINOPHILS RELATIVE PERCENT: 0 % (ref 0–6)
GFR SERPL CREATININE-BSD FRML MDRD: >60 ML/MIN/1.73
GLUCOSE BLD-MCNC: 126 MG/DL (ref 74–99)
HCT VFR BLD CALC: 39.5 % (ref 34–48)
HEMOGLOBIN: 12.9 G/DL (ref 11.5–15.5)
IMMATURE GRANULOCYTES #: 0.06 E9/L
IMMATURE GRANULOCYTES %: 0.4 % (ref 0–5)
LYMPHOCYTES ABSOLUTE: 0.93 E9/L (ref 1.5–4)
LYMPHOCYTES RELATIVE PERCENT: 6.8 % (ref 20–42)
MCH RBC QN AUTO: 31.3 PG (ref 26–35)
MCHC RBC AUTO-ENTMCNC: 32.7 % (ref 32–34.5)
MCV RBC AUTO: 95.9 FL (ref 80–99.9)
MONOCYTES ABSOLUTE: 1.09 E9/L (ref 0.1–0.95)
MONOCYTES RELATIVE PERCENT: 8 % (ref 2–12)
NEUTROPHILS ABSOLUTE: 11.58 E9/L (ref 1.8–7.3)
NEUTROPHILS RELATIVE PERCENT: 84.6 % (ref 43–80)
PDW BLD-RTO: 13.9 FL (ref 11.5–15)
PLATELET # BLD: 318 E9/L (ref 130–450)
PMV BLD AUTO: 9.8 FL (ref 7–12)
POTASSIUM REFLEX MAGNESIUM: 4.5 MMOL/L (ref 3.5–5)
RBC # BLD: 4.12 E12/L (ref 3.5–5.5)
SODIUM BLD-SCNC: 134 MMOL/L (ref 132–146)
WBC # BLD: 13.7 E9/L (ref 4.5–11.5)

## 2022-11-07 PROCEDURE — 2580000003 HC RX 258

## 2022-11-07 PROCEDURE — 94640 AIRWAY INHALATION TREATMENT: CPT

## 2022-11-07 PROCEDURE — 36415 COLL VENOUS BLD VENIPUNCTURE: CPT

## 2022-11-07 PROCEDURE — G0378 HOSPITAL OBSERVATION PER HR: HCPCS

## 2022-11-07 PROCEDURE — 99222 1ST HOSP IP/OBS MODERATE 55: CPT | Performed by: FAMILY MEDICINE

## 2022-11-07 PROCEDURE — 99285 EMERGENCY DEPT VISIT HI MDM: CPT

## 2022-11-07 PROCEDURE — 6360000004 HC RX CONTRAST MEDICATION: Performed by: RADIOLOGY

## 2022-11-07 PROCEDURE — 2580000003 HC RX 258: Performed by: RADIOLOGY

## 2022-11-07 PROCEDURE — C9113 INJ PANTOPRAZOLE SODIUM, VIA: HCPCS

## 2022-11-07 PROCEDURE — 6370000000 HC RX 637 (ALT 250 FOR IP)

## 2022-11-07 PROCEDURE — 96374 THER/PROPH/DIAG INJ IV PUSH: CPT

## 2022-11-07 PROCEDURE — 96375 TX/PRO/DX INJ NEW DRUG ADDON: CPT

## 2022-11-07 PROCEDURE — 71275 CT ANGIOGRAPHY CHEST: CPT

## 2022-11-07 PROCEDURE — 94664 DEMO&/EVAL PT USE INHALER: CPT

## 2022-11-07 PROCEDURE — 80048 BASIC METABOLIC PNL TOTAL CA: CPT

## 2022-11-07 PROCEDURE — 85025 COMPLETE CBC W/AUTO DIFF WBC: CPT

## 2022-11-07 PROCEDURE — 84145 PROCALCITONIN (PCT): CPT

## 2022-11-07 PROCEDURE — 2060000000 HC ICU INTERMEDIATE R&B

## 2022-11-07 PROCEDURE — 74177 CT ABD & PELVIS W/CONTRAST: CPT

## 2022-11-07 PROCEDURE — 6360000002 HC RX W HCPCS

## 2022-11-07 RX ORDER — IPRATROPIUM BROMIDE AND ALBUTEROL SULFATE 2.5; .5 MG/3ML; MG/3ML
1 SOLUTION RESPIRATORY (INHALATION)
Status: DISCONTINUED | OUTPATIENT
Start: 2022-11-07 | End: 2022-11-08 | Stop reason: HOSPADM

## 2022-11-07 RX ORDER — ACETAMINOPHEN 325 MG/1
650 TABLET ORAL EVERY 6 HOURS PRN
Status: DISCONTINUED | OUTPATIENT
Start: 2022-11-07 | End: 2022-11-08 | Stop reason: HOSPADM

## 2022-11-07 RX ORDER — POLYETHYLENE GLYCOL 3350 17 G/17G
17 POWDER, FOR SOLUTION ORAL DAILY PRN
Status: DISCONTINUED | OUTPATIENT
Start: 2022-11-07 | End: 2022-11-08 | Stop reason: HOSPADM

## 2022-11-07 RX ORDER — ONDANSETRON 2 MG/ML
4 INJECTION INTRAMUSCULAR; INTRAVENOUS ONCE
Status: COMPLETED | OUTPATIENT
Start: 2022-11-07 | End: 2022-11-07

## 2022-11-07 RX ORDER — PREGABALIN 75 MG/1
75 CAPSULE ORAL 3 TIMES DAILY
Status: DISCONTINUED | OUTPATIENT
Start: 2022-11-07 | End: 2022-11-08 | Stop reason: HOSPADM

## 2022-11-07 RX ORDER — ACETAMINOPHEN 650 MG/1
650 SUPPOSITORY RECTAL EVERY 6 HOURS PRN
Status: DISCONTINUED | OUTPATIENT
Start: 2022-11-07 | End: 2022-11-08 | Stop reason: HOSPADM

## 2022-11-07 RX ORDER — ONDANSETRON 2 MG/ML
4 INJECTION INTRAMUSCULAR; INTRAVENOUS EVERY 6 HOURS PRN
Status: DISCONTINUED | OUTPATIENT
Start: 2022-11-07 | End: 2022-11-08 | Stop reason: HOSPADM

## 2022-11-07 RX ORDER — NICOTINE 21 MG/24HR
1 PATCH, TRANSDERMAL 24 HOURS TRANSDERMAL DAILY
Status: DISCONTINUED | OUTPATIENT
Start: 2022-11-07 | End: 2022-11-08 | Stop reason: HOSPADM

## 2022-11-07 RX ORDER — SODIUM CHLORIDE 0.9 % (FLUSH) 0.9 %
5-40 SYRINGE (ML) INJECTION PRN
Status: DISCONTINUED | OUTPATIENT
Start: 2022-11-07 | End: 2022-11-08 | Stop reason: HOSPADM

## 2022-11-07 RX ORDER — DIVALPROEX SODIUM 125 MG/1
125 CAPSULE, COATED PELLETS ORAL 2 TIMES DAILY
Status: ON HOLD | COMMUNITY
End: 2022-11-08 | Stop reason: SDUPTHER

## 2022-11-07 RX ORDER — RISPERIDONE 1 MG/1
1 TABLET ORAL 2 TIMES DAILY
Status: DISCONTINUED | OUTPATIENT
Start: 2022-11-07 | End: 2022-11-08 | Stop reason: HOSPADM

## 2022-11-07 RX ORDER — AZITHROMYCIN 250 MG/1
500 TABLET, FILM COATED ORAL DAILY
Status: COMPLETED | OUTPATIENT
Start: 2022-11-07 | End: 2022-11-07

## 2022-11-07 RX ORDER — SERTRALINE HYDROCHLORIDE 25 MG/1
25 TABLET, FILM COATED ORAL DAILY
COMMUNITY

## 2022-11-07 RX ORDER — PREDNISONE 20 MG/1
40 TABLET ORAL
Status: DISCONTINUED | OUTPATIENT
Start: 2022-11-07 | End: 2022-11-07

## 2022-11-07 RX ORDER — PANTOPRAZOLE SODIUM 40 MG/10ML
40 INJECTION, POWDER, LYOPHILIZED, FOR SOLUTION INTRAVENOUS DAILY
Status: DISCONTINUED | OUTPATIENT
Start: 2022-11-07 | End: 2022-11-08 | Stop reason: HOSPADM

## 2022-11-07 RX ORDER — SODIUM CHLORIDE 9 MG/ML
INJECTION, SOLUTION INTRAVENOUS PRN
Status: DISCONTINUED | OUTPATIENT
Start: 2022-11-07 | End: 2022-11-08 | Stop reason: HOSPADM

## 2022-11-07 RX ORDER — NICOTINE 21 MG/24HR
1 PATCH, TRANSDERMAL 24 HOURS TRANSDERMAL EVERY 24 HOURS
COMMUNITY

## 2022-11-07 RX ORDER — ONDANSETRON 4 MG/1
4 TABLET, ORALLY DISINTEGRATING ORAL EVERY 8 HOURS PRN
Status: DISCONTINUED | OUTPATIENT
Start: 2022-11-07 | End: 2022-11-08 | Stop reason: HOSPADM

## 2022-11-07 RX ORDER — PREDNISONE 20 MG/1
40 TABLET ORAL
Status: DISCONTINUED | OUTPATIENT
Start: 2022-11-07 | End: 2022-11-08 | Stop reason: HOSPADM

## 2022-11-07 RX ORDER — AZITHROMYCIN 250 MG/1
250 TABLET, FILM COATED ORAL DAILY
Status: DISCONTINUED | OUTPATIENT
Start: 2022-11-08 | End: 2022-11-08 | Stop reason: HOSPADM

## 2022-11-07 RX ORDER — ENOXAPARIN SODIUM 100 MG/ML
30 INJECTION SUBCUTANEOUS DAILY
Status: DISCONTINUED | OUTPATIENT
Start: 2022-11-07 | End: 2022-11-08 | Stop reason: HOSPADM

## 2022-11-07 RX ORDER — RISPERIDONE 1 MG/1
1 TABLET ORAL 2 TIMES DAILY
COMMUNITY

## 2022-11-07 RX ORDER — SODIUM CHLORIDE 0.9 % (FLUSH) 0.9 %
5-40 SYRINGE (ML) INJECTION EVERY 12 HOURS SCHEDULED
Status: DISCONTINUED | OUTPATIENT
Start: 2022-11-07 | End: 2022-11-08 | Stop reason: HOSPADM

## 2022-11-07 RX ORDER — DIVALPROEX SODIUM 125 MG/1
250 CAPSULE, COATED PELLETS ORAL 2 TIMES DAILY
Status: DISCONTINUED | OUTPATIENT
Start: 2022-11-07 | End: 2022-11-08 | Stop reason: HOSPADM

## 2022-11-07 RX ORDER — SODIUM CHLORIDE 0.9 % (FLUSH) 0.9 %
10 SYRINGE (ML) INJECTION PRN
Status: COMPLETED | OUTPATIENT
Start: 2022-11-07 | End: 2022-11-07

## 2022-11-07 RX ADMIN — ONDANSETRON 4 MG: 4 TABLET, ORALLY DISINTEGRATING ORAL at 21:30

## 2022-11-07 RX ADMIN — Medication 10 ML: at 10:06

## 2022-11-07 RX ADMIN — SODIUM CHLORIDE, PRESERVATIVE FREE 10 ML: 5 INJECTION INTRAVENOUS at 06:03

## 2022-11-07 RX ADMIN — DIVALPROEX SODIUM 250 MG: 125 CAPSULE, COATED PELLETS ORAL at 12:41

## 2022-11-07 RX ADMIN — PANTOPRAZOLE SODIUM 40 MG: 40 INJECTION, POWDER, FOR SOLUTION INTRAVENOUS at 11:21

## 2022-11-07 RX ADMIN — PREDNISONE 40 MG: 20 TABLET ORAL at 11:22

## 2022-11-07 RX ADMIN — DIVALPROEX SODIUM 250 MG: 125 CAPSULE, COATED PELLETS ORAL at 21:29

## 2022-11-07 RX ADMIN — IPRATROPIUM BROMIDE AND ALBUTEROL SULFATE 1 AMPULE: .5; 2.5 SOLUTION RESPIRATORY (INHALATION) at 12:12

## 2022-11-07 RX ADMIN — PREGABALIN 75 MG: 75 CAPSULE ORAL at 14:23

## 2022-11-07 RX ADMIN — IPRATROPIUM BROMIDE AND ALBUTEROL SULFATE 1 AMPULE: .5; 2.5 SOLUTION RESPIRATORY (INHALATION) at 20:37

## 2022-11-07 RX ADMIN — AZITHROMYCIN 500 MG: 250 TABLET, FILM COATED ORAL at 11:22

## 2022-11-07 RX ADMIN — ONDANSETRON 4 MG: 2 INJECTION INTRAMUSCULAR; INTRAVENOUS at 06:30

## 2022-11-07 RX ADMIN — Medication 10 ML: at 21:31

## 2022-11-07 RX ADMIN — RISPERIDONE 1 MG: 1 TABLET ORAL at 21:29

## 2022-11-07 RX ADMIN — ACETAMINOPHEN 650 MG: 325 TABLET ORAL at 21:30

## 2022-11-07 RX ADMIN — RISPERIDONE 1 MG: 1 TABLET ORAL at 12:41

## 2022-11-07 RX ADMIN — IPRATROPIUM BROMIDE AND ALBUTEROL SULFATE 1 AMPULE: .5; 2.5 SOLUTION RESPIRATORY (INHALATION) at 16:06

## 2022-11-07 RX ADMIN — PREGABALIN 75 MG: 75 CAPSULE ORAL at 10:06

## 2022-11-07 RX ADMIN — PREGABALIN 75 MG: 75 CAPSULE ORAL at 21:30

## 2022-11-07 RX ADMIN — IOPAMIDOL 60 ML: 755 INJECTION, SOLUTION INTRAVENOUS at 06:08

## 2022-11-07 RX ADMIN — SERTRALINE HYDROCHLORIDE 25 MG: 50 TABLET ORAL at 12:41

## 2022-11-07 ASSESSMENT — ENCOUNTER SYMPTOMS
DIARRHEA: 0
SHORTNESS OF BREATH: 1
TROUBLE SWALLOWING: 1
BLOOD IN STOOL: 0
WHEEZING: 0
ABDOMINAL PAIN: 1
EYE DISCHARGE: 0
COUGH: 1
ABDOMINAL DISTENTION: 0
TROUBLE SWALLOWING: 0
RHINORRHEA: 0
NAUSEA: 0
NAUSEA: 1
SORE THROAT: 0
EYE REDNESS: 0
PHOTOPHOBIA: 0
SINUS PRESSURE: 0
CHOKING: 1
ABDOMINAL PAIN: 0
VOMITING: 0
CONSTIPATION: 0
COLOR CHANGE: 0
BACK PAIN: 0

## 2022-11-07 ASSESSMENT — PAIN DESCRIPTION - ORIENTATION: ORIENTATION: MID

## 2022-11-07 ASSESSMENT — PAIN DESCRIPTION - LOCATION
LOCATION: ABDOMEN
LOCATION: ABDOMEN

## 2022-11-07 ASSESSMENT — PAIN DESCRIPTION - DESCRIPTORS
DESCRIPTORS: ACHING
DESCRIPTORS: ACHING

## 2022-11-07 ASSESSMENT — PAIN - FUNCTIONAL ASSESSMENT: PAIN_FUNCTIONAL_ASSESSMENT: 0-10

## 2022-11-07 ASSESSMENT — PAIN SCALES - GENERAL
PAINLEVEL_OUTOF10: 8
PAINLEVEL_OUTOF10: 8

## 2022-11-07 NOTE — CARE COORDINATION
Social Work / Discharge PLanning :Patient  admitted with COPD . SW met with patient and explained role as discharge planner/ transition of care. Patient currently on RA. Patient independent from HOME with spouse and plan to return. . Patient has PCP  that is DR Hammad Sagastume and she uses [facility] in [place]. Patient currently denies any home needs for SW. Await treatment plan. Patient spouse can transport at discharge. SW to follow.  Electronically signed by MARLON Ling on 11/7/22 at 11:21 AM EST

## 2022-11-07 NOTE — Clinical Note
Discharge Plan[de-identified] Other/[facility])   Telemetry/Cardiac Monitoring Required?: No

## 2022-11-07 NOTE — PROGRESS NOTES
[facility]  Progress Note    Chief complaint :  Chief Complaint   Patient presents with    Shortness of Breath     Pt states she is having a hard time breathing and that \"fluid\" keeps building up in her mouth that she has to spit out        Subjective:    Patient attempted to leave the floor several times overnight in an attempt to smoke. She is currently on 2L O2 which helps with her shortness of breath. She continues to have sputum production. Patient describes feeling improved otherwise. Tolerating diet. Past medical, surgical, family and social history were reviewed, non-contributory, and unchanged unless otherwise stated. Review of Systems   Constitutional:  Negative for chills and fever. Respiratory:  Positive for cough and shortness of breath. Cardiovascular:  Negative for chest pain and palpitations. Gastrointestinal:  Positive for abdominal pain (epigastric). Negative for constipation and diarrhea. Genitourinary:  Negative for difficulty urinating and dysuria. Neurological:  Negative for dizziness and headaches. Objective:  /60   Pulse (!) 104   Temp 98.3 °F (36.8 °C) (Oral)   Resp 18   Ht 5' 2\" (1.575 m)   Wt 112 lb 6 oz (51 kg)   LMP  (LMP Unknown)   SpO2 93%   BMI 20.55 kg/m²     Physical Exam  Constitutional:       Appearance: Normal appearance. She is normal weight. HENT:      Head: Normocephalic and atraumatic. Mouth/Throat:      Mouth: Mucous membranes are moist.      Pharynx: Oropharynx is clear. Eyes:      Extraocular Movements: Extraocular movements intact. Pupils: Pupils are equal, round, and reactive to light. Cardiovascular:      Rate and Rhythm: Normal rate and regular rhythm. Pulses: Normal pulses. Heart sounds: No murmur heard. Pulmonary:      Effort: Pulmonary effort is normal.      Breath sounds: Normal breath sounds. No wheezing. Abdominal:      General: Abdomen is flat.  Bowel sounds are normal.      Tenderness: There is no abdominal tenderness. Musculoskeletal:      Right lower leg: No edema. Left lower leg: No edema. Skin:     General: Skin is warm and dry. Coloration: Skin is not jaundiced. Neurological:      General: No focal deficit present. Mental Status: She is alert. Mental status is at baseline.        Labs:  Recent Results (from the past 24 hour(s))   Basic Metabolic Panel w/ Reflex to MG    Collection Time: 11/07/22 10:15 AM   Result Value Ref Range    Sodium 134 132 - 146 mmol/L    Potassium reflex Magnesium 4.5 3.5 - 5.0 mmol/L    Chloride 98 98 - 107 mmol/L    CO2 28 22 - 29 mmol/L    Anion Gap 8 7 - 16 mmol/L    Glucose 126 (H) 74 - 99 mg/dL    BUN 16 6 - 23 mg/dL    Creatinine 0.5 0.5 - 1.0 mg/dL    Est, Glom Filt Rate >60 >=60 mL/min/1.73    Calcium 9.5 8.6 - 10.2 mg/dL   CBC with Auto Differential    Collection Time: 11/07/22 10:15 AM   Result Value Ref Range    WBC 13.7 (H) 4.5 - 11.5 E9/L    RBC 4.12 3.50 - 5.50 E12/L    Hemoglobin 12.9 11.5 - 15.5 g/dL    Hematocrit 39.5 34.0 - 48.0 %    MCV 95.9 80.0 - 99.9 fL    MCH 31.3 26.0 - 35.0 pg    MCHC 32.7 32.0 - 34.5 %    RDW 13.9 11.5 - 15.0 fL    Platelets 520 344 - 416 E9/L    MPV 9.8 7.0 - 12.0 fL    Neutrophils % 84.6 (H) 43.0 - 80.0 %    Immature Granulocytes % 0.4 0.0 - 5.0 %    Lymphocytes % 6.8 (L) 20.0 - 42.0 %    Monocytes % 8.0 2.0 - 12.0 %    Eosinophils % 0.0 0.0 - 6.0 %    Basophils % 0.2 0.0 - 2.0 %    Neutrophils Absolute 11.58 (H) 1.80 - 7.30 E9/L    Immature Granulocytes # 0.06 E9/L    Lymphocytes Absolute 0.93 (L) 1.50 - 4.00 E9/L    Monocytes Absolute 1.09 (H) 0.10 - 0.95 E9/L    Eosinophils Absolute 0.00 (L) 0.05 - 0.50 E9/L    Basophils Absolute 0.03 0.00 - 0.20 U0/C   Basic Metabolic Panel w/ Reflex to MG    Collection Time: 11/08/22  1:45 AM   Result Value Ref Range    Sodium 135 132 - 146 mmol/L    Potassium reflex Magnesium 4.6 3.5 - 5.0 mmol/L    Chloride 97 (L) 98 - 107 mmol/L CO2 28 22 - 29 mmol/L    Anion Gap 10 7 - 16 mmol/L    Glucose 106 (H) 74 - 99 mg/dL    BUN 21 6 - 23 mg/dL    Creatinine 0.5 0.5 - 1.0 mg/dL    Est, Glom Filt Rate >60 >=60 mL/min/1.73    Calcium 9.6 8.6 - 10.2 mg/dL   CBC with Auto Differential    Collection Time: 11/08/22  1:45 AM   Result Value Ref Range    WBC 11.6 (H) 4.5 - 11.5 E9/L    RBC 3.90 3.50 - 5.50 E12/L    Hemoglobin 12.2 11.5 - 15.5 g/dL    Hematocrit 37.3 34.0 - 48.0 %    MCV 95.6 80.0 - 99.9 fL    MCH 31.3 26.0 - 35.0 pg    MCHC 32.7 32.0 - 34.5 %    RDW 14.1 11.5 - 15.0 fL    Platelets 941 369 - 301 E9/L    MPV 9.7 7.0 - 12.0 fL    Neutrophils % 72.4 43.0 - 80.0 %    Immature Granulocytes % 0.6 0.0 - 5.0 %    Lymphocytes % 18.8 (L) 20.0 - 42.0 %    Monocytes % 7.6 2.0 - 12.0 %    Eosinophils % 0.3 0.0 - 6.0 %    Basophils % 0.3 0.0 - 2.0 %    Neutrophils Absolute 8.37 (H) 1.80 - 7.30 E9/L    Immature Granulocytes # 0.07 E9/L    Lymphocytes Absolute 2.17 1.50 - 4.00 E9/L    Monocytes Absolute 0.88 0.10 - 0.95 E9/L    Eosinophils Absolute 0.03 (L) 0.05 - 0.50 E9/L    Basophils Absolute 0.03 0.00 - 0.20 E9/L   Lipid Panel    Collection Time: 11/08/22  1:45 AM   Result Value Ref Range    Cholesterol, Total 190 0 - 199 mg/dL    Triglycerides 96 0 - 149 mg/dL    HDL 80 >40 mg/dL    LDL Calculated 91 0 - 99 mg/dL    VLDL Cholesterol Calculated 19 mg/dL       Radiology and other tests reviewed:  CTA PULMONARY W CONTRAST   Final Result   No evidence of pulmonary embolism or acute pulmonary abnormality. RECOMMENDATIONS:   Careful clinical correlation and follow up recommended. Unavailable         CT ABDOMEN PELVIS W IV CONTRAST Additional Contrast? None   Final Result   No acute disease. RECOMMENDATIONS:   Careful clinical correlation and follow up recommended.              Assessment:  Active Hospital Problems    Diagnosis Date Noted    [facility] exacerbation [facility] [J44.1] 11/07/2022     Priority: Medium    Epigastric pain [R10.13] 11/07/2022 Priority: Medium    Chest pain, atypical [R07.89] 11/07/2022     Priority: Medium    Degenerative Osteoarthritis of cervical spine with facet syndrome [M47.812] 08/19/2015     Priority: Medium    Complex regional pain syndrome type 1 of right lower extremity [G90.521] 09/17/2015    Tobacco abuse [Z72.0] 11/07/2014    Spinal cord stimulator status post cervical [Z96.89] 09/18/2013       Plan:  COPD Exacerbation  PFT study ordered and pending by PCP, therefore no certain diagnosis of COPD but it is likely. 42 year 1 PPD smoking history. Previous exacerbation 10/13, seen in ED and discharged with steroids. Started from vaping 2 months ago.  Meets 2/3 GOLD criteria for COPD exacerbation: increased dyspnea and sputum purulence.   -Azithromycin  250 mg daily for 4 days, today day 1   -Prednisone 40 mg day 2 of 5 day taper  -Duonebs Q5H while awake  -O2 2L as needed     GERD  Maalox did not alleviate in ED  -Protonix 40 mg IV QD  -Zofran PRN for nausea  -Will continue to monitor  -Prednisone will exacerbate    Paranoid Schizophrenia  - Continue home Depakote 250 mg BID  - Continue home Risperidone 1 mg BID     Mood Disorder  - Continue home Zoloft 25 mg QD     Tobacco Dependence   Currently smokes 1 PPD.  - Nicotine patch 21 mg/24hr QD        DVT ppx: Lovenox  GI ppx: Protonix  Code Status: Full         Amy Lance MD MD  Family Medicine Resident PGY-1  11/08/22   6:03 AM

## 2022-11-07 NOTE — H&P
[facility]  Resident History and Physical      Chief Complaint:    Chief Complaint   Patient presents with    Shortness of Breath     Pt states she is having a hard time breathing and that \"fluid\" keeps building up in her mouth that she has to spit out         History of Present Illness:   Mraio Navarro  is a 65 y.o. female patient of Rekha Razo MD with a pertinent PMHx of COPD, GERD, regional pain syndrome, reflex sympathetic dystrophy, cervical spinal stimulator who presented to the ED from home with chief complaint of SOB, chest pain, nausea. The symptoms started last night and progressively worsened. She states that she has had increased sputum production described as brown occasionally chokes due to it. She was recently prescribed Albuterol and she used it for the first time last night, but was not able to alleviate her symptoms continue to have trouble breathing. Presented 10/13 for COPD exacerbation but had no sputum production at that time; was discharged with steroids. he states that she feels that her stomach burning with radiation up to her throat, has been present for the past couple days. States that she does not take any antacids at home. States that this is the burning sensation she feels in her chest. She has had loss of appetite for 1 month with decreased PO intake. She does state that she occasionally gets sweaty and feels hot has occurred intermittently for the past month. Denies fevers, chills, bloody BM, changes in urination. In the ED vitals were remarkable for . Lab work remarkable for . Troponin 17>17>15. Influenza, COVID, RSV testing negative. CXR, CTA Pulm not indicative of any acute disease process. CT Abd negative for acute process. EKG negative for acute process. Patient was admitted under ED observation with decision to admit due to patient's inadequate improvement.  Patient was treated with Duonebs x1 and stated that it significantly improved her breathing. Maalox did not alleviate GERD. Not documented but ED note states patient received Pepcid, Toradol, Solu-Medrol, Mucinex.       Past Medical History:   Diagnosis Date    Anxiety     Arthritis     Blood circulation, collateral     Cancer (HCC)     SKIN    Chronic back pain     Colitis     recent    Complex regional pain syndrome type 1 of right lower extremity 9/11/2015    COPD (chronic obstructive pulmonary disease) ([facility] [address]     Depression     Diabetes mellitus ([facility] [address]     Elevated liver function tests 10/17/2014    GERD (gastroesophageal reflux disease)     Headache(784.0)     Hyperlipidemia 10/17/2014    Kidney stone     Movement disorder     Neuromuscular disorder ([facility] [address]     Osteoarthritis     Other disorders of kidney and ureter in diseases classified elsewhere     Pneumonia     Psychiatric problem     RSD (reflex sympathetic dystrophy)          Past Surgical History:   Procedure Laterality Date    BACK SURGERY  2005    had cerv SCS at [address] then had staph infec 2003  then it was changed to a dual SCS by Dr Bandar Jordan 2005 approx then has had several battery changes and rechargeable put in 2009    [address]      bryant hands    COLONOSCOPY      ENDOSCOPY, COLON, DIAGNOSTIC      FINGER AMPUTATION      index right hand multiple surgeries    FRACTURE SURGERY      HYSTERECTOMY (CERVIX STATUS UNKNOWN)      LAMINECTOMY      cervical    NERVE BLOCK N/A 8 19 15    cerv facet #1 with iv anesthesia    NERVE BLOCK Left 08 26 2015    left cervical paravertebral facet block #2 c4 5 c5 6 c6 7 under iv sedation    NERVE BLOCK  9 2 15    lumbar parasympathetic #1    NERVE BLOCK Right 9/17/15    right sympathetic block #2    OTHER SURGICAL HISTORY  09/18/13    surgical replacement of medtronic cervical spinal cord stimulator electrode and connect to existing battery right hip    OTHER SURGICAL HISTORY N/A 2/3/2014    Surgical revision spinal cord stimulator scar at anchor site due to wound  dehisence    OTHER SURGICAL HISTORY Right 11/11/14    EXCISION OF CYST RIGHT SHOULDER    OTHER SURGICAL HISTORY  04/08/15    surgical revision medtronic cervical spinal cord stimulator scar at anchor site due to wound dehisance    OTHER SURGICAL HISTORY  04/25/2018    spinal cord stimulator battery replacement due to end of life    SD REVISE/REMOVE NEUROSTIM/ N/A 4/25/2018    SPINAL CORD STIMULATOR BATTERY REPLACEMENT DUE TO END OF LIFE performed by Alex Ahn DO at [address] [place] [address]       Medications Prior to Admission:    Prior to Admission medications    Medication Sig Start Date End Date Taking?  Authorizing Provider   divalproex (DEPAKOTE SPRINKLE) 125 MG capsule Take 125 mg by mouth 2 times daily   Yes Historical Provider, MD   risperiDONE (RISPERDAL) 1 MG tablet Take 1 mg by mouth 2 times daily   Yes Historical Provider, MD   sertraline (ZOLOFT) 25 MG tablet Take 25 mg by mouth daily   Yes Historical Provider, MD   nicotine (NICODERM CQ) 21 MG/24HR Place 1 patch onto the skin every 24 hours   Yes Historical Provider, MD   albuterol sulfate HFA (VENTOLIN HFA) 108 (90 Base) MCG/ACT inhaler Inhale 2 puffs into the lungs 4 times daily as needed for Wheezing Substitute as needed 10/28/22  Yes Lidia Guillaume MD   HYDROmorphone (DILAUDID) 4 MG tablet TAKE 1 TABLET BY MOUTH EVERY 6 HOURS AS NEEDED FOR PAIN 8/30/22  Yes Historical Provider, MD   pregabalin (LYRICA) 75 MG capsule TAKE 1 CAPSULE BY MOUTH THREE TIMES DAILY 8/30/22  Yes Historical Provider, MD   albuterol (PROVENTIL) (5 MG/ML) 0.5% nebulizer solution Take 0.5 mLs by nebulization every 6 hours as needed for Wheezing 11/6/22 11/13/22  Irina Parra DO   Respiratory Therapy Supplies (NEBULIZER) TANIKA 1 Device by Does not apply route daily  Patient not taking: Reported on 11/7/2022 11/6/22   Irina Parra DO   omeprazole (PRILOSEC) 40 MG delayed release capsule Take 1 capsule by mouth every morning (before breakfast)  Patient not taking: Reported on 11/7/2022 10/28/22   Pamella Lacy MD   Compression Stockings MISC by Does not apply route Below knee, 20 mmHg bilaterally 9/20/22   Pamella Lacy MD   melatonin 3 MG TABS tablet Take 1 tablet by mouth daily 8/29/22 10/28/22  AYESHA Tidwell - CNP   nortriptyline (PAMELOR) 10 MG capsule TAKE 1 CAPSULE BY MOUTH EVERY NIGHT 6/1/21 8/29/22  Anita Perez DO   ondansetron (ZOFRAN-ODT) 4 MG disintegrating tablet Take 1 tablet by mouth 3 times daily as needed for Nausea or Vomiting  Patient not taking: Reported on 11/7/2022 5/14/21   Steve Villareal MD   Nutritional Supplements (ENSURE COMPLETE SHAKE) LIQD Take 1 Can by mouth 2 times daily 5/14/21   Steve Villareal MD   SUMAtriptan (IMITREX) 50 MG tablet Take 1 tablet by mouth once as needed for Migraine 4/30/21 6/15/21  Arturo Perez DO   vitamin D (CHOLECALCIFEROL) 1000 UNIT TABS tablet Take 1,000 Units by mouth daily  Patient not taking: No sig reported    Historical Provider, MD   Multiple Vitamins-Minerals (MULTIVITAMIN PO) Take by mouth    Historical Provider, MD        Allergies:   Sulfamethazine and Ancef [cefazolin sodium]    Social History:    reports that she has been smoking cigarettes. She started smoking about 52 years ago. She has a 42.00 pack-year smoking history. She has been exposed to tobacco smoke. She has never used smokeless tobacco. She reports that she does not drink alcohol and does not use drugs. Family History:   family history includes Other in her mother. ROS:   Review of Systems   Constitutional:  Positive for appetite change. Negative for fatigue. HENT:  Negative for sore throat and trouble swallowing. Respiratory:  Positive for cough, choking and shortness of breath. Cardiovascular:  Negative for chest pain, palpitations and leg swelling. Gastrointestinal:  Positive for abdominal pain and nausea. Negative for constipation, diarrhea and vomiting.    Genitourinary:  Negative for difficulty urinating and dysuria. Musculoskeletal:  Positive for myalgias. Negative for arthralgias. Skin:  Negative for color change and rash. Neurological:  Negative for dizziness and headaches. Psychiatric/Behavioral:  Negative for confusion and decreased concentration. Physical Exam:    Vitals:  BP (!) 119/58   Pulse 76   Temp 99 °F (37.2 °C) (Oral)   Resp 18   Ht 5' 2\" (1.575 m)   Wt 112 lb 6 oz (51 kg)   LMP  (LMP Unknown)   SpO2 96%   BMI 20.55 kg/m²     Physical Exam  Constitutional:       Appearance: Normal appearance. HENT:      Head: Normocephalic and atraumatic. Mouth/Throat:      Mouth: Mucous membranes are moist.   Eyes:      Extraocular Movements: Extraocular movements intact. Conjunctiva/sclera: Conjunctivae normal.   Cardiovascular:      Rate and Rhythm: Normal rate and regular rhythm. Pulses: Normal pulses. Heart sounds: Normal heart sounds. No murmur heard. Pulmonary:      Effort: Pulmonary effort is normal.      Breath sounds: No stridor. No wheezing. Abdominal:      General: Abdomen is flat. Bowel sounds are normal.      Palpations: Abdomen is soft. Tenderness: There is abdominal tenderness (epigastric, LLQ). Musculoskeletal:         General: No swelling. Normal range of motion. Cervical back: Normal range of motion and neck supple. Skin:     General: Skin is warm and dry. Neurological:      General: No focal deficit present. Mental Status: She is alert and oriented to person, place, and time.    Psychiatric:         Mood and Affect: Mood normal.         Behavior: Behavior normal.        Labs:  Recent Results (from the past 24 hour(s))   EKG 12 Lead    Collection Time: 11/06/22  5:20 PM   Result Value Ref Range    Ventricular Rate 90 BPM    Atrial Rate 90 BPM    P-R Interval 156 ms    QRS Duration 84 ms    Q-T Interval 336 ms    QTc Calculation (Bazett) 411 ms    P Axis 61 degrees    R Axis -50 degrees    T Axis 61 degrees   COVID-19, Rapid Collection Time: 11/06/22  5:23 PM    Specimen: Nasopharyngeal Swab   Result Value Ref Range    SARS-CoV-2, NAAT Not Detected Not Detected   Rapid RSV Antigen    Collection Time: 11/06/22  5:23 PM    Specimen: Nasopharyngeal Swab   Result Value Ref Range    RSV by PCR Negative Negative   RAPID INFLUENZA A/B ANTIGENS    Collection Time: 11/06/22  5:23 PM    Specimen: Nasopharyngeal   Result Value Ref Range    Influenza A by PCR Not Detected Not Detected    Influenza B by PCR Not Detected Not Detected   Comprehensive Metabolic Panel    Collection Time: 11/06/22  5:23 PM   Result Value Ref Range    Sodium 133 132 - 146 mmol/L    Potassium 4.3 3.5 - 5.0 mmol/L    Chloride 97 (L) 98 - 107 mmol/L    CO2 28 22 - 29 mmol/L    Anion Gap 8 7 - 16 mmol/L    Glucose 130 (H) 74 - 99 mg/dL    BUN 13 6 - 23 mg/dL    Creatinine 0.5 0.5 - 1.0 mg/dL    Est, Glom Filt Rate >60 >=60 mL/min/1.73    Calcium 9.3 8.6 - 10.2 mg/dL    Total Protein 6.5 6.4 - 8.3 g/dL    Albumin 4.1 3.5 - 5.2 g/dL    Total Bilirubin <0.2 0.0 - 1.2 mg/dL    Alkaline Phosphatase 124 (H) 35 - 104 U/L    ALT 29 0 - 32 U/L    AST 30 0 - 31 U/L   Troponin    Collection Time: 11/06/22  5:23 PM   Result Value Ref Range    Troponin, High Sensitivity 17 (H) 0 - 9 ng/L   Brain Natriuretic Peptide    Collection Time: 11/06/22  5:23 PM   Result Value Ref Range    Pro- (H) 0 - 125 pg/mL   Troponin    Collection Time: 11/06/22  6:23 PM   Result Value Ref Range    Troponin, High Sensitivity 17 (H) 0 - 9 ng/L   Lipase    Collection Time: 11/06/22  6:23 PM   Result Value Ref Range    Lipase 58 13 - 60 U/L   Lactic Acid    Collection Time: 11/06/22  6:23 PM   Result Value Ref Range    Lactic Acid 1.6 0.5 - 2.2 mmol/L   Troponin    Collection Time: 11/06/22  9:18 PM   Result Value Ref Range    Troponin, High Sensitivity 15 (H) 0 - 9 ng/L   Basic Metabolic Panel w/ Reflex to MG    Collection Time: 11/07/22 10:15 AM   Result Value Ref Range    Sodium 134 132 - 146 mmol/L Potassium reflex Magnesium 4.5 3.5 - 5.0 mmol/L    Chloride 98 98 - 107 mmol/L    CO2 28 22 - 29 mmol/L    Anion Gap 8 7 - 16 mmol/L    Glucose 126 (H) 74 - 99 mg/dL    BUN 16 6 - 23 mg/dL    Creatinine 0.5 0.5 - 1.0 mg/dL    Est, Glom Filt Rate >60 >=60 mL/min/1.73    Calcium 9.5 8.6 - 10.2 mg/dL   CBC with Auto Differential    Collection Time: 11/07/22 10:15 AM   Result Value Ref Range    WBC 13.7 (H) 4.5 - 11.5 E9/L    RBC 4.12 3.50 - 5.50 E12/L    Hemoglobin 12.9 11.5 - 15.5 g/dL    Hematocrit 39.5 34.0 - 48.0 %    MCV 95.9 80.0 - 99.9 fL    MCH 31.3 26.0 - 35.0 pg    MCHC 32.7 32.0 - 34.5 %    RDW 13.9 11.5 - 15.0 fL    Platelets 310 395 - 283 E9/L    MPV 9.8 7.0 - 12.0 fL    Neutrophils % 84.6 (H) 43.0 - 80.0 %    Immature Granulocytes % 0.4 0.0 - 5.0 %    Lymphocytes % 6.8 (L) 20.0 - 42.0 %    Monocytes % 8.0 2.0 - 12.0 %    Eosinophils % 0.0 0.0 - 6.0 %    Basophils % 0.2 0.0 - 2.0 %    Neutrophils Absolute 11.58 (H) 1.80 - 7.30 E9/L    Immature Granulocytes # 0.06 E9/L    Lymphocytes Absolute 0.93 (L) 1.50 - 4.00 E9/L    Monocytes Absolute 1.09 (H) 0.10 - 0.95 E9/L    Eosinophils Absolute 0.00 (L) 0.05 - 0.50 E9/L    Basophils Absolute 0.03 0.00 - 0.20 E9/L       CTA PULMONARY W CONTRAST   Final Result   No evidence of pulmonary embolism or acute pulmonary abnormality. RECOMMENDATIONS:   Careful clinical correlation and follow up recommended. Unavailable         CT ABDOMEN PELVIS W IV CONTRAST Additional Contrast? None   Final Result   No acute disease. RECOMMENDATIONS:   Careful clinical correlation and follow up recommended.              Assessment/Plan:      Active Hospital Problems    Diagnosis Date Noted    COPD exacerbation ([address] [J44.1] 11/07/2022    Complex regional pain syndrome type 1 of right lower extremity [G90.521] 09/17/2015    Degenerative Osteoarthritis of cervical spine with facet syndrome [M47.812] 08/19/2015    Tobacco abuse [Z72.0] 11/07/2014    Spinal cord stimulator status post cervical [Z96.89] 09/18/2013         COPD Exacerbation  PFT study ordered and pending by PCP, therefore no certain diagnosis of COPD but it is likely. 42 year 1 PPD smoking history. Previous exacerbation 10/13, seen in ED and discharged with steroids. Started from vaping 2 months ago. Meets 2/3 GOLD criteria for COPD exacerbation: increased dyspnea and sputum purulence. - Start Azithromycin 500 mg, followed by 250 mg daily for 4 days, 5 days total  - Start Prednisone 40 mg, 5 day course  - Duonebs Q4H while awake    GERD  Maalox did not alleviate in ED.  - Protonix 40 mg IV QD  - Zofran PRN for nausea  - Will continue to monitor  - Prednisone will exacerbate    Paranoid Schizophrenia  - Continue home Depakote 250 mg BID  - Continue home Risperidone 1 mg BID    Mood Disorder  - Continue home Zoloft 25 mg QD    Tobacco Dependence   Currently smokes 1 PPD.  - Nicotine patch 21 mg/24hr QD      DVT ppx: Lovenox  GI ppx: Protonix  Code Status: Full      Case discussed with attending on call Dr. Ender Flores.     Ainsley Castro MD   Family Medicine Resident PGY-2  11/7/2022

## 2022-11-07 NOTE — ED PROVIDER NOTES
[facility]  Department of Emergency Medicine     Written by: Sharath Mauricio MD  Patient Name: Lester Norris  Attending Provider: Joselin Pickard DO  Admit Date: 2022  4:28 AM  MRN: [id number]                   : 1960        Chief Complaint   Patient presents with    Shortness of Breath     Pt states she is having a hard time breathing and that \"fluid\" keeps building up in her mouth that she has to spit out     - Chief complaint    Patient is a 61-year-old female with a past medical history of COPD not on home oxygen, depression, generalized anxiety disorder, diabetes, hyperlipidemia, and multiple prior surgeries presenting with shortness of breath and \"fluid buildup in her esophagus\". Patient symptoms include shortness of breath worse with exertion and somewhat improved with rest with associated nonproductive cough. Patient has a sensation which she is unable to swallow her saliva. Patient was seen in the emergency room earlier this evening. Patient states that she had no improvement of her symptoms and that she never left the emergency department waiting room because she did not have a ride home as her  was sleeping. Eventually, the patient had resurgence of her symptoms and felt worse and ultimately decided to check back into the emergency room. Chart review reveals that the patient was treated with breathing treatments and steroids for her symptoms. Troponins were stable x3, chest x-ray revealed evidence of COPD without pneumonia or congestive heart failure. Patient's viral panel was negative. Patient denies anticoagulant use. Patient is a current everyday smoker of a pack a day and admits to vaping, denies alcohol use, denies drug use.   Patient is denies headache, fever, sore throat, chest pain, lightheadedness, dizziness, numbness, weakness, tingling, loss of sensation, vomiting, abdominal pain, hematuria, dysuria, increasing or decreasing urinary frequency, blood in the stool, constipation, diarrhea, vaginal bleeding/discharge, leg pain, leg swelling, recent travel, recent illness, recent surgery, or sick contacts. Review of Systems   Constitutional:  Negative for activity change, chills, fatigue and fever. HENT:  Positive for trouble swallowing. Negative for congestion, postnasal drip, rhinorrhea, sinus pressure and sore throat. Eyes:  Negative for photophobia, discharge, redness and visual disturbance. Respiratory:  Positive for cough and shortness of breath. Negative for wheezing. Cardiovascular:  Negative for chest pain, palpitations and leg swelling. Gastrointestinal:  Negative for abdominal distention, abdominal pain, blood in stool, constipation, diarrhea, nausea and vomiting. Endocrine: Negative for cold intolerance and heat intolerance. Genitourinary:  Negative for decreased urine volume, difficulty urinating, dysuria, flank pain, frequency, hematuria and urgency. Musculoskeletal:  Negative for arthralgias, back pain, joint swelling and myalgias. Skin:  Negative for rash and wound. Allergic/Immunologic: Negative for immunocompromised state. Neurological:  Negative for dizziness, syncope, facial asymmetry, weakness, light-headedness, numbness and headaches. Hematological:  Does not bruise/bleed easily. Psychiatric/Behavioral:  Negative for behavioral problems and hallucinations. Physical Exam  Constitutional:       General: She is not in acute distress. Appearance: Normal appearance. She is not ill-appearing, toxic-appearing or diaphoretic. HENT:      Head: Normocephalic and atraumatic. Right Ear: Hearing normal.      Left Ear: Hearing normal.      Nose: Nose normal.      Mouth/Throat:      Lips: Pink. Mouth: Mucous membranes are moist.      Pharynx: Oropharynx is clear. Eyes:      Extraocular Movements: Extraocular movements intact.       Conjunctiva/sclera: Conjunctivae normal.      Pupils: Pupils are equal, round, and reactive to light. Cardiovascular:      Rate and Rhythm: Normal rate and regular rhythm. Pulses: Normal pulses. Radial pulses are 2+ on the right side and 2+ on the left side. Posterior tibial pulses are 2+ on the right side and 2+ on the left side. Heart sounds: S1 normal and S2 normal.   Pulmonary:      Effort: Pulmonary effort is normal. No tachypnea, accessory muscle usage or respiratory distress. Breath sounds: Normal air entry. Examination of the right-middle field reveals decreased breath sounds. Examination of the left-middle field reveals decreased breath sounds. Examination of the right-lower field reveals decreased breath sounds. Examination of the left-lower field reveals decreased breath sounds. Decreased breath sounds present. No wheezing, rhonchi or rales. Chest:      Chest wall: No mass, deformity, tenderness, crepitus or edema. Abdominal:      General: Abdomen is flat. Bowel sounds are normal. There is no distension. Palpations: Abdomen is soft. There is no fluid wave or mass. Tenderness: There is no abdominal tenderness. There is no right CVA tenderness, left CVA tenderness or guarding. Musculoskeletal:         General: No swelling or tenderness. Normal range of motion. Cervical back: Normal range of motion and neck supple. No rigidity. Right lower leg: No edema. Left lower leg: No edema. Lymphadenopathy:      Cervical: No cervical adenopathy. Skin:     General: Skin is warm and dry. Capillary Refill: Capillary refill takes less than 2 seconds. Findings: No bruising, lesion or rash. Neurological:      General: No focal deficit present. Mental Status: She is alert and oriented to person, place, and time. Mental status is at baseline. Motor: No weakness.    Psychiatric:         Attention and Perception: Attention normal.         Mood and Affect: Mood and affect normal. Behavior: Behavior is cooperative. Procedures       MDM  Number of Diagnoses or Management Options  COPD exacerbation ([facility] [address]  Diagnosis management comments: Patient is a 71-year-old female with a past medical history of COPD not on home oxygen, depression, generalized anxiety disorder, diabetes, hyperlipidemia, and multiple prior surgeries presenting with shortness of breath and \"fluid buildup in her esophagus\". At the time of initial examination the patient is tachycardic but otherwise hemodynamically stable. Given patient's prior work-up and current symptomatology, we will obtain CT imaging to rule out having a embolism, esophageal pathology, or abdominal pathology. Patient was given Zofran for her nausea. Imaging reviewed as below. Patient was reevaluated and her heart rate has improved and she has not become hypoxic during her emergency room visit. Patient was explained the results of imaging and demonstrated good understanding. Patient is experiencing relief of her nausea at this time. Given patient's recent emergency room visits and symptoms, decision was made to admit the patient for COPD exacerbation. Case was discussed with Dr. April Apple, hospitalist, who agrees to admit the patient for observation.   At the time of admission, patient was hemodynamically stable, demonstrated good understanding of her condition, and had no questions.                --------------------------------------------- PAST HISTORY ---------------------------------------------  Past Medical History:  has a past medical history of Anxiety, Arthritis, Blood circulation, collateral, Cancer (HCC), Chronic back pain, Colitis, Complex regional pain syndrome type 1 of right lower extremity, COPD (chronic obstructive pulmonary disease) ([facility] [address] Depression, Diabetes mellitus ([facility] [address] Elevated liver function tests, GERD (gastroesophageal reflux disease), Headache(784.0), Hyperlipidemia, Kidney stone, Movement disorder, Neuromuscular disorder ([facility] [address] Osteoarthritis, Other disorders of kidney and ureter in diseases classified elsewhere, Pneumonia, Psychiatric problem, and RSD (reflex sympathetic dystrophy). Past Surgical History:  has a past surgical history that includes Hysterectomy; laminectomy; Finger amputation; back surgery (2005); other surgical history (09/18/13); other surgical history (N/A, 2/3/2014); fracture surgery; Endoscopy, colon, diagnostic; Carpal tunnel release; Colonoscopy; other surgical history (Right, 11/11/14); other surgical history (04/08/15); Nerve Block (N/A, 8 19 15); Nerve Block (Left, 08 26 2015); Nerve Block (9 2 15); Nerve Block (Right, 9/17/15); other surgical history (04/25/2018); and pr revise/remove neurostim/ (N/A, 4/25/2018). Social History:  reports that she has been smoking cigarettes. She started smoking about 52 years ago. She has a 42.00 pack-year smoking history. She has been exposed to tobacco smoke. She has never used smokeless tobacco. She reports that she does not drink alcohol and does not use drugs. Family History: family history includes Other in her mother. The patients home medications have been reviewed.     Allergies: Sulfamethazine and Ancef [cefazolin sodium]    -------------------------------------------------- RESULTS -------------------------------------------------    LABS:  Results for orders placed or performed during the hospital encounter of 54/19/11   Basic Metabolic Panel w/ Reflex to MG   Result Value Ref Range    Sodium 134 132 - 146 mmol/L    Potassium reflex Magnesium 4.5 3.5 - 5.0 mmol/L    Chloride 98 98 - 107 mmol/L    CO2 28 22 - 29 mmol/L    Anion Gap 8 7 - 16 mmol/L    Glucose 126 (H) 74 - 99 mg/dL    BUN 16 6 - 23 mg/dL    Creatinine 0.5 0.5 - 1.0 mg/dL    Est, Glom Filt Rate >60 >=60 mL/min/1.73    Calcium 9.5 8.6 - 10.2 mg/dL   CBC with Auto Differential   Result Value Ref Range    WBC 13.7 (H) 4.5 - 11.5 E9/L    RBC 4.12 3.50 - 5.50 E12/L Hemoglobin 12.9 11.5 - 15.5 g/dL    Hematocrit 39.5 34.0 - 48.0 %    MCV 95.9 80.0 - 99.9 fL    MCH 31.3 26.0 - 35.0 pg    MCHC 32.7 32.0 - 34.5 %    RDW 13.9 11.5 - 15.0 fL    Platelets 257 803 - 574 E9/L    MPV 9.8 7.0 - 12.0 fL    Neutrophils % 84.6 (H) 43.0 - 80.0 %    Immature Granulocytes % 0.4 0.0 - 5.0 %    Lymphocytes % 6.8 (L) 20.0 - 42.0 %    Monocytes % 8.0 2.0 - 12.0 %    Eosinophils % 0.0 0.0 - 6.0 %    Basophils % 0.2 0.0 - 2.0 %    Neutrophils Absolute 11.58 (H) 1.80 - 7.30 E9/L    Immature Granulocytes # 0.06 E9/L    Lymphocytes Absolute 0.93 (L) 1.50 - 4.00 E9/L    Monocytes Absolute 1.09 (H) 0.10 - 0.95 E9/L    Eosinophils Absolute 0.00 (L) 0.05 - 0.50 E9/L    Basophils Absolute 0.03 0.00 - 0.20 E9/L       RADIOLOGY:  CTA PULMONARY W CONTRAST   Final Result   No evidence of pulmonary embolism or acute pulmonary abnormality. RECOMMENDATIONS:   Careful clinical correlation and follow up recommended. Unavailable         CT ABDOMEN PELVIS W IV CONTRAST Additional Contrast? None   Final Result   No acute disease. RECOMMENDATIONS:   Careful clinical correlation and follow up recommended. ------------------------- NURSING NOTES AND VITALS REVIEWED ---------------------------  Date / Time Roomed:  11/7/2022  4:28 AM  ED Bed Assignment:  26/26    The nursing notes within the ED encounter and vital signs as below have been reviewed.      Patient Vitals for the past 24 hrs:   BP Temp Temp src Pulse Resp SpO2 Height Weight   11/07/22 0800 (!) 119/58 99 °F (37.2 °C) Oral 76 18 96 % 5' 2\" (1.575 m) 112 lb 6 oz (51 kg)   11/07/22 0642 132/62 97.4 °F (36.3 °C) -- 87 16 92 % -- --   11/07/22 0426 127/64 97 °F (36.1 °C) Tympanic (!) 106 16 98 % 5' 2\" (1.575 m) 108 lb (49 kg)       Oxygen Saturation Interpretation: Normal    ------------------------------------------ PROGRESS NOTES ------------------------------------------  Re-evaluation(s):  Time: multiple  Patients symptoms are improving  Repeat physical examination is not changed    Counseling:  I have spoken with the patient and discussed todays results, in addition to providing specific details for the plan of care and counseling regarding the diagnosis and prognosis. Their questions are answered at this time and they are agreeable with the plan of admission.    --------------------------------- ADDITIONAL PROVIDER NOTES ---------------------------------  Consultations:  Spoke with Dr. Barbara Noyola, hospitalist.  Discussed case. They will admit the patient. This patient's ED course included: a personal history and physicial examination, re-evaluation prior to disposition, multiple bedside re-evaluations, cardiac monitoring, and continuous pulse oximetry    This patient has remained hemodynamically stable during their ED course. Diagnosis:  1. COPD exacerbation ([address]        Disposition:  Patient's disposition: Admit to med/surg floor  Patient's condition is stable. Patient was seen and evaluated by myself and my attending Claudia Gongora DO. Assessment and Plan discussed with attending provider, please see attestation for final plan of care.      MD Fabian Mcdonald MD  Resident  11/07/22 5763

## 2022-11-08 VITALS
RESPIRATION RATE: 20 BRPM | HEART RATE: 96 BPM | HEIGHT: 62 IN | DIASTOLIC BLOOD PRESSURE: 54 MMHG | SYSTOLIC BLOOD PRESSURE: 111 MMHG | OXYGEN SATURATION: 98 % | TEMPERATURE: 97.5 F | WEIGHT: 112.38 LBS | BODY MASS INDEX: 20.68 KG/M2

## 2022-11-08 PROBLEM — R06.02 SHORTNESS OF BREATH: Status: ACTIVE | Noted: 2022-11-08

## 2022-11-08 PROBLEM — R06.02 SHORTNESS OF BREATH: Status: RESOLVED | Noted: 2022-11-08 | Resolved: 2022-11-08

## 2022-11-08 PROBLEM — J44.1 COPD EXACERBATION (HCC): Status: RESOLVED | Noted: 2022-11-07 | Resolved: 2022-11-08

## 2022-11-08 PROBLEM — R07.89 CHEST PAIN, ATYPICAL: Status: RESOLVED | Noted: 2022-11-07 | Resolved: 2022-11-08

## 2022-11-08 PROBLEM — J44.1 COPD EXACERBATION (HCC): Status: ACTIVE | Noted: 2022-11-08

## 2022-11-08 LAB
ANION GAP SERPL CALCULATED.3IONS-SCNC: 10 MMOL/L (ref 7–16)
BASOPHILS ABSOLUTE: 0.03 E9/L (ref 0–0.2)
BASOPHILS RELATIVE PERCENT: 0.3 % (ref 0–2)
BUN BLDV-MCNC: 21 MG/DL (ref 6–23)
CALCIUM SERPL-MCNC: 9.6 MG/DL (ref 8.6–10.2)
CHLORIDE BLD-SCNC: 97 MMOL/L (ref 98–107)
CHOLESTEROL, TOTAL: 190 MG/DL (ref 0–199)
CO2: 28 MMOL/L (ref 22–29)
CREAT SERPL-MCNC: 0.5 MG/DL (ref 0.5–1)
EOSINOPHILS ABSOLUTE: 0.03 E9/L (ref 0.05–0.5)
EOSINOPHILS RELATIVE PERCENT: 0.3 % (ref 0–6)
GFR SERPL CREATININE-BSD FRML MDRD: >60 ML/MIN/1.73
GLUCOSE BLD-MCNC: 106 MG/DL (ref 74–99)
HCT VFR BLD CALC: 37.3 % (ref 34–48)
HDLC SERPL-MCNC: 80 MG/DL
HEMOGLOBIN: 12.2 G/DL (ref 11.5–15.5)
IMMATURE GRANULOCYTES #: 0.07 E9/L
IMMATURE GRANULOCYTES %: 0.6 % (ref 0–5)
LDL CHOLESTEROL CALCULATED: 91 MG/DL (ref 0–99)
LYMPHOCYTES ABSOLUTE: 2.17 E9/L (ref 1.5–4)
LYMPHOCYTES RELATIVE PERCENT: 18.8 % (ref 20–42)
MCH RBC QN AUTO: 31.3 PG (ref 26–35)
MCHC RBC AUTO-ENTMCNC: 32.7 % (ref 32–34.5)
MCV RBC AUTO: 95.6 FL (ref 80–99.9)
MONOCYTES ABSOLUTE: 0.88 E9/L (ref 0.1–0.95)
MONOCYTES RELATIVE PERCENT: 7.6 % (ref 2–12)
NEUTROPHILS ABSOLUTE: 8.37 E9/L (ref 1.8–7.3)
NEUTROPHILS RELATIVE PERCENT: 72.4 % (ref 43–80)
PDW BLD-RTO: 14.1 FL (ref 11.5–15)
PLATELET # BLD: 292 E9/L (ref 130–450)
PMV BLD AUTO: 9.7 FL (ref 7–12)
POTASSIUM REFLEX MAGNESIUM: 4.6 MMOL/L (ref 3.5–5)
PROCALCITONIN: 0.05 NG/ML (ref 0–0.08)
RBC # BLD: 3.9 E12/L (ref 3.5–5.5)
SODIUM BLD-SCNC: 135 MMOL/L (ref 132–146)
TRIGL SERPL-MCNC: 96 MG/DL (ref 0–149)
VLDLC SERPL CALC-MCNC: 19 MG/DL
WBC # BLD: 11.6 E9/L (ref 4.5–11.5)

## 2022-11-08 PROCEDURE — G0378 HOSPITAL OBSERVATION PER HR: HCPCS

## 2022-11-08 PROCEDURE — 85025 COMPLETE CBC W/AUTO DIFF WBC: CPT

## 2022-11-08 PROCEDURE — 94640 AIRWAY INHALATION TREATMENT: CPT

## 2022-11-08 PROCEDURE — 36415 COLL VENOUS BLD VENIPUNCTURE: CPT

## 2022-11-08 PROCEDURE — 80061 LIPID PANEL: CPT

## 2022-11-08 PROCEDURE — 99238 HOSP IP/OBS DSCHRG MGMT 30/<: CPT | Performed by: FAMILY MEDICINE

## 2022-11-08 PROCEDURE — 6360000002 HC RX W HCPCS

## 2022-11-08 PROCEDURE — C9113 INJ PANTOPRAZOLE SODIUM, VIA: HCPCS

## 2022-11-08 PROCEDURE — 6370000000 HC RX 637 (ALT 250 FOR IP)

## 2022-11-08 PROCEDURE — 80048 BASIC METABOLIC PNL TOTAL CA: CPT

## 2022-11-08 PROCEDURE — 2580000003 HC RX 258

## 2022-11-08 PROCEDURE — 96376 TX/PRO/DX INJ SAME DRUG ADON: CPT

## 2022-11-08 RX ORDER — ALBUTEROL SULFATE 2.5 MG/3ML
2.5 SOLUTION RESPIRATORY (INHALATION) EVERY 6 HOURS PRN
Qty: 120 EACH | Refills: 0 | Status: SHIPPED | OUTPATIENT
Start: 2022-11-08

## 2022-11-08 RX ORDER — ALBUTEROL SULFATE 90 UG/1
2 AEROSOL, METERED RESPIRATORY (INHALATION) 4 TIMES DAILY PRN
Qty: 18 G | Refills: 0 | Status: SHIPPED | OUTPATIENT
Start: 2022-11-08 | End: 2022-11-18 | Stop reason: SDUPTHER

## 2022-11-08 RX ORDER — AZITHROMYCIN 250 MG/1
250 TABLET, FILM COATED ORAL DAILY
Qty: 3 TABLET | Refills: 0 | Status: SHIPPED | OUTPATIENT
Start: 2022-11-09 | End: 2022-11-12

## 2022-11-08 RX ORDER — ALBUTEROL SULFATE 90 UG/1
2 AEROSOL, METERED RESPIRATORY (INHALATION) 4 TIMES DAILY PRN
Qty: 18 G | Refills: 0 | Status: SHIPPED | OUTPATIENT
Start: 2022-11-08 | End: 2022-11-08 | Stop reason: SDUPTHER

## 2022-11-08 RX ORDER — PREDNISONE 20 MG/1
20 TABLET ORAL DAILY
Qty: 3 TABLET | Refills: 0 | Status: SHIPPED | OUTPATIENT
Start: 2022-11-09 | End: 2022-11-12

## 2022-11-08 RX ORDER — IPRATROPIUM BROMIDE AND ALBUTEROL SULFATE 2.5; .5 MG/3ML; MG/3ML
3 SOLUTION RESPIRATORY (INHALATION)
Qty: 360 ML | Refills: 0 | Status: SHIPPED | OUTPATIENT
Start: 2022-11-08 | End: 2022-11-18 | Stop reason: SDUPTHER

## 2022-11-08 RX ORDER — AZITHROMYCIN 250 MG/1
250 TABLET, FILM COATED ORAL DAILY
Qty: 3 TABLET | Refills: 0 | Status: SHIPPED | OUTPATIENT
Start: 2022-11-09 | End: 2022-11-08 | Stop reason: SDUPTHER

## 2022-11-08 RX ORDER — ALBUTEROL SULFATE 2.5 MG/3ML
2.5 SOLUTION RESPIRATORY (INHALATION) EVERY 6 HOURS PRN
Qty: 120 EACH | Refills: 0 | Status: SHIPPED | OUTPATIENT
Start: 2022-11-08 | End: 2022-11-08 | Stop reason: SDUPTHER

## 2022-11-08 RX ORDER — DIVALPROEX SODIUM 125 MG/1
250 CAPSULE, COATED PELLETS ORAL 2 TIMES DAILY
Qty: 60 CAPSULE | Refills: 3 | Status: SHIPPED
Start: 2022-11-08

## 2022-11-08 RX ORDER — PANTOPRAZOLE SODIUM 40 MG/1
40 TABLET, DELAYED RELEASE ORAL
Qty: 30 TABLET | Refills: 0 | Status: SHIPPED | OUTPATIENT
Start: 2022-11-08 | End: 2022-11-18 | Stop reason: SDUPTHER

## 2022-11-08 RX ADMIN — DIVALPROEX SODIUM 250 MG: 125 CAPSULE, COATED PELLETS ORAL at 07:56

## 2022-11-08 RX ADMIN — PREGABALIN 75 MG: 75 CAPSULE ORAL at 07:56

## 2022-11-08 RX ADMIN — AZITHROMYCIN 250 MG: 250 TABLET, FILM COATED ORAL at 07:56

## 2022-11-08 RX ADMIN — RISPERIDONE 1 MG: 1 TABLET ORAL at 07:56

## 2022-11-08 RX ADMIN — PANTOPRAZOLE SODIUM 40 MG: 40 INJECTION, POWDER, FOR SOLUTION INTRAVENOUS at 07:55

## 2022-11-08 RX ADMIN — Medication 10 ML: at 07:59

## 2022-11-08 RX ADMIN — IPRATROPIUM BROMIDE AND ALBUTEROL SULFATE 1 AMPULE: .5; 2.5 SOLUTION RESPIRATORY (INHALATION) at 09:01

## 2022-11-08 RX ADMIN — SERTRALINE HYDROCHLORIDE 25 MG: 50 TABLET ORAL at 07:56

## 2022-11-08 RX ADMIN — PREDNISONE 40 MG: 20 TABLET ORAL at 11:10

## 2022-11-08 ASSESSMENT — ENCOUNTER SYMPTOMS
CONSTIPATION: 0
SHORTNESS OF BREATH: 1
ABDOMINAL PAIN: 1
DIARRHEA: 0
COUGH: 1

## 2022-11-08 NOTE — PROGRESS NOTES
CLINICAL PHARMACY NOTE: MEDS TO BEDS    Total # of Prescriptions Filled: 5   The following medications were delivered to the patient:  Prednisone 20mg  Azithromycin 250mg  Pantoprazole 40mg  Albuterol sulfate   Duo-neb     Additional Documentation:

## 2022-11-08 NOTE — DISCHARGE SUMMARY
Discharge Summary    Date:11/8/2022  Patient Name: Hang Mg     YOB: 1960     Age: 65 y.o. MRN: [id number]    Admit Date:11/7/2022    Discharge Date: 11/08/22  Discharged Condition: stable    Discharge Diagnoses   Principal Problem:    Chronic obstructive pulmonary disease with acute exacerbation ([address]  Active Problems:    Degenerative Osteoarthritis of cervical spine with facet syndrome    Epigastric pain    COPD exacerbation (HCC)    Spinal cord stimulator status post cervical    Tobacco abuse    Complex regional pain syndrome type 1 of right lower extremity  Resolved Problems:    Chest pain, atypical    Shortness of breath      Hospital Stay   Narrative of Hospital Course:  Hang Mg was admitted on 11/7/2022 with shortness of breath and abdominal pain. She has a past medical history of COPD GERD regional pain syndrome and mood disorders. The shortness of breath started the night prior to her admission and worsened over the day. She had been recently prescribed albuterol which she took; this did not help. The abdominal pain started several days prior to admission; she did not take anything for this pain. In the emergency room she tested negative for influenza COVID and RSV. CTA of the lungs did not show any acute process. She was treated with 1 round of DuoNeb's which did not significantly improve her breathing. Maalox did not improve the epigastric pain. During her hospital stay she was treated with duo nebs prednisone and a Z-Cyril. She also received supplemental oxygen for comfort. Treatment did improve her symptoms. She was started on Protonix. Ms. Mica Pearson was discharged in stable condition and told to follow-up with her PCP. She was advised that she may need an EGD in the near future for her epigastric pain.     Consultants:   None    Surgeries/Procedures Performed:       Treatments:   antibiotics: azithromycin, steroids: prednisone, and respiratory therapy: O2 and albuterol/atropine nebulizer    Significant Diagnostic Studies:   Recent Labs:  CBC:   Lab Results   Component Value Date/Time    WBC 11.6 11/08/2022 01:45 AM    RBC 3.90 11/08/2022 01:45 AM    HGB 12.2 11/08/2022 01:45 AM    HCT 37.3 11/08/2022 01:45 AM    MCV 95.6 11/08/2022 01:45 AM    MCH 31.3 11/08/2022 01:45 AM    MCHC 32.7 11/08/2022 01:45 AM    RDW 14.1 11/08/2022 01:45 AM     11/08/2022 01:45 AM     CMP:    Lab Results   Component Value Date/Time    GLUCOSE 106 11/08/2022 01:45 AM    GLUCOSE 130 06/13/2011 09:18 AM     11/08/2022 01:45 AM    K 4.6 11/08/2022 01:45 AM    CL 97 11/08/2022 01:45 AM    CO2 28 11/08/2022 01:45 AM    BUN 21 11/08/2022 01:45 AM    CREATININE 0.5 11/08/2022 01:45 AM    ANIONGAP 10 11/08/2022 01:45 AM    ALKPHOS 124 11/06/2022 05:23 PM    ALT 29 11/06/2022 05:23 PM    AST 30 11/06/2022 05:23 PM    BILITOT <0.2 11/06/2022 05:23 PM    LABALBU 4.1 11/06/2022 05:23 PM    LABGLOM >60 11/08/2022 01:45 AM    GFRAA >60 10/13/2022 03:07 AM    PROT 6.5 11/06/2022 05:23 PM    CALCIUM 9.6 11/08/2022 01:45 AM       Radiology Last 7 Days:  CT ABDOMEN PELVIS W IV CONTRAST Additional Contrast? None    Result Date: 11/7/2022  No acute disease. RECOMMENDATIONS: Careful clinical correlation and follow up recommended. XR CHEST PORTABLE    Result Date: 11/6/2022  COPD. There is no evidence of pneumonia or failure. CTA PULMONARY W CONTRAST    Result Date: 11/7/2022  No evidence of pulmonary embolism or acute pulmonary abnormality. RECOMMENDATIONS: Careful clinical correlation and follow up recommended. Unavailable       Discharge Physical Exam:   BP (!) 111/54   Pulse 96   Temp 97.5 °F (36.4 °C) (Oral)   Resp 20   Ht 5' 2\" (1.575 m)   Wt 112 lb 6 oz (51 kg)   LMP  (LMP Unknown)   SpO2 98%   BMI 20.55 kg/m²    Physical Exam   Constitutional:       Appearance: Normal appearance. She is normal weight. HENT:      Head: Normocephalic and atraumatic.       Mouth/Throat: Mouth: Mucous membranes are moist.      Pharynx: Oropharynx is clear. Eyes:      Extraocular Movements: Extraocular movements intact. Pupils: Pupils are equal, round, and reactive to light. Cardiovascular:      Rate and Rhythm: Normal rate and regular rhythm. Pulses: Normal pulses. Heart sounds: No murmur heard. Pulmonary:      Effort: Pulmonary effort is normal.      Breath sounds: Normal breath sounds. No wheezing. Abdominal:      General: Abdomen is flat. Bowel sounds are normal.      Tenderness: There is no abdominal tenderness. Musculoskeletal:      Right lower leg: No edema. Left lower leg: No edema. Skin:     General: Skin is warm and dry. Coloration: Skin is not jaundiced. Neurological:      General: No focal deficit present. Mental Status: She is alert. Mental status is at baseline.       Discharge Plan   Disposition: Home    Provider Follow-Up:   Deena Jackson DO  [address] [place] [address] [address]  [phone]    Go on 11/16/2022  at 2:00 pm, Hosptial Follow-up       Hospital/Incidental Findings Requiring Follow-Up:  None    Patient Instructions   Diet: regular diet    Activity: activity as tolerated    Other Instructions:   None    Discharge Medications         Medication List        START taking these medications      azithromycin 250 MG tablet  Commonly known as: ZITHROMAX  Take 1 tablet by mouth daily for 3 doses  Start taking on: November 9, 2022     ipratropium-albuterol 0.5-2.5 (3) MG/3ML Soln nebulizer solution  Commonly known as: DUONEB  Inhale 3 mLs into the lungs every 4 hours (while awake)     pantoprazole 40 MG tablet  Commonly known as: PROTONIX  Take 1 tablet by mouth every morning (before breakfast)     predniSONE 20 MG tablet  Commonly known as: DELTASONE  Take 1 tablet by mouth daily for 3 days  Start taking on: November 9, 2022            CHANGE how you take these medications      * albuterol sulfate  (90 Base) MCG/ACT inhaler  Commonly known as: Ventolin HFA  Inhale 2 puffs into the lungs 4 times daily as needed for Wheezing Substitute as needed  What changed:   Another medication with the same name was added. Make sure you understand how and when to take each. Another medication with the same name was removed. Continue taking this medication, and follow the directions you see here. * albuterol (2.5 MG/3ML) 0.083% nebulizer solution  Commonly known as: PROVENTIL  Take 3 mLs by nebulization every 6 hours as needed for Wheezing  What changed: You were already taking a medication with the same name, and this prescription was added. Make sure you understand how and when to take each. Replaces: albuterol (5 MG/ML) 0.5% nebulizer solution     divalproex 125 MG capsule  Commonly known as: DEPAKOTE SPRINKLE  Take 2 capsules by mouth 2 times daily  What changed: how much to take           * This list has 2 medication(s) that are the same as other medications prescribed for you. Read the directions carefully, and ask your doctor or other care provider to review them with you.                 CONTINUE taking these medications      Compression Stockings Misc  by Does not apply route Below knee, 20 mmHg bilaterally     Ensure Complete Shake Liqd  Take 1 Can by mouth 2 times daily     HYDROmorphone 4 MG tablet  Commonly known as: DILAUDID     melatonin 3 MG Tabs tablet  Take 1 tablet by mouth daily     MULTIVITAMIN PO     Nebulizer Suzanne  1 Device by Does not apply route daily     nicotine 21 MG/24HR  Commonly known as: NICODERM CQ     pregabalin 75 MG capsule  Commonly known as: LYRICA     risperiDONE 1 MG tablet  Commonly known as: RISPERDAL     sertraline 25 MG tablet  Commonly known as: ZOLOFT     vitamin D 1000 UNIT Tabs tablet  Commonly known as: CHOLECALCIFEROL            STOP taking these medications      albuterol (5 MG/ML) 0.5% nebulizer solution  Commonly known as: PROVENTIL  Replaced by: albuterol (2.5 MG/3ML) 0.083% nebulizer solution  You also have another medication with the same name that you need to continue taking as instructed.      nortriptyline 10 MG capsule  Commonly known as: PAMELOR     omeprazole 40 MG delayed release capsule  Commonly known as: PRILOSEC     SUMAtriptan 50 MG tablet  Commonly known as: Imitrex            ASK your doctor about these medications      ondansetron 4 MG disintegrating tablet  Commonly known as: ZOFRAN-ODT  Take 1 tablet by mouth 3 times daily as needed for Nausea or Vomiting               Where to Get Your Medications        These medications were sent to [facility] [phone]  [facility], [address] [address]      Phone: [phone]   albuterol sulfate  (90 Base) MCG/ACT inhaler  ipratropium-albuterol 0.5-2.5 (3) MG/3ML Soln nebulizer solution  pantoprazole 40 MG tablet  predniSONE 20 MG tablet       You can get these medications from any pharmacy    Bring a paper prescription for each of these medications  albuterol (2.5 MG/3ML) 0.083% nebulizer solution  azithromycin 250 MG tablet       Information about where to get these medications is not yet available    Ask your nurse or doctor about these medications  divalproex 125 MG capsule         Electronically signed by Elizabeth Lee MD on 11/8/22 at 1:47 PM EST

## 2022-11-08 NOTE — PROGRESS NOTES
During shift report, day shift stated patient had left unit to go to the ED to attempt to smoke. At time of shift assessment, this RN educated patient on safety risks of leaving floor. Patient expressed understanding and stated if she felt she wanted to leave the unit, she would speak with floor staff first. At approx 0030, patient left room, and entered nutrition room. Shortly after, call from ED triage stated patient was downstairs wanting to go outside. Patient returned to room safely, remains aaox4. Patient again educated on the need to remain on the unit, and patient again expressed clear understanding that she can not leave the unit. Patient ensured RN that she would not leave the unit again. charge RN notified, Scottie Garcia placed in patients room to further ensure patient safety. 0150 Patient resting comfortably in bed, no attempts to leave the unit made since diaz initiation. No c/o or requests voiced at this time.

## 2022-11-10 ENCOUNTER — HOSPITAL ENCOUNTER (OUTPATIENT)
Age: 62
Discharge: HOME | End: 2022-11-10
Payer: MEDICARE

## 2022-11-10 LAB
ALBUMIN SERPL-MCNC: 4.1 G/DL (ref 3.5–5.2)
ALP BLD-CCNC: 111 U/L (ref 35–104)
ALT SERPL-CCNC: 30 U/L (ref 0–32)
ANION GAP SERPL CALCULATED.3IONS-SCNC: 10 MMOL/L (ref 7–16)
AST SERPL-CCNC: 30 U/L (ref 0–31)
BASOPHILS ABSOLUTE: 0.02 E9/L (ref 0–0.2)
BASOPHILS RELATIVE PERCENT: 0.2 % (ref 0–2)
BILIRUB SERPL-MCNC: 0.3 MG/DL (ref 0–1.2)
BUN BLDV-MCNC: 13 MG/DL (ref 6–23)
CALCIUM SERPL-MCNC: 9.4 MG/DL (ref 8.6–10.2)
CHLORIDE BLD-SCNC: 97 MMOL/L (ref 98–107)
CHOLESTEROL, FASTING: 194 MG/DL (ref 0–199)
CO2: 29 MMOL/L (ref 22–29)
CREAT SERPL-MCNC: 0.5 MG/DL (ref 0.5–1)
EOSINOPHILS ABSOLUTE: 0.15 E9/L (ref 0.05–0.5)
EOSINOPHILS RELATIVE PERCENT: 1.9 % (ref 0–6)
GFR SERPL CREATININE-BSD FRML MDRD: >60 ML/MIN/1.73
GLUCOSE BLD-MCNC: 108 MG/DL (ref 74–99)
HCT VFR BLD CALC: 43.9 % (ref 34–48)
HDLC SERPL-MCNC: 76 MG/DL
HEMOGLOBIN: 14.3 G/DL (ref 11.5–15.5)
IMMATURE GRANULOCYTES #: 0.03 E9/L
IMMATURE GRANULOCYTES %: 0.4 % (ref 0–5)
LDL CHOLESTEROL CALCULATED: 92 MG/DL (ref 0–99)
LYMPHOCYTES ABSOLUTE: 1.29 E9/L (ref 1.5–4)
LYMPHOCYTES RELATIVE PERCENT: 16.1 % (ref 20–42)
MCH RBC QN AUTO: 30.8 PG (ref 26–35)
MCHC RBC AUTO-ENTMCNC: 32.6 % (ref 32–34.5)
MCV RBC AUTO: 94.6 FL (ref 80–99.9)
MONOCYTES ABSOLUTE: 0.63 E9/L (ref 0.1–0.95)
MONOCYTES RELATIVE PERCENT: 7.8 % (ref 2–12)
NEUTROPHILS ABSOLUTE: 5.91 E9/L (ref 1.8–7.3)
NEUTROPHILS RELATIVE PERCENT: 73.6 % (ref 43–80)
PDW BLD-RTO: 13.6 FL (ref 11.5–15)
PLATELET # BLD: 306 E9/L (ref 130–450)
PMV BLD AUTO: 9.5 FL (ref 7–12)
POTASSIUM SERPL-SCNC: 4.3 MMOL/L (ref 3.5–5)
RBC # BLD: 4.64 E12/L (ref 3.5–5.5)
SODIUM BLD-SCNC: 136 MMOL/L (ref 132–146)
T3 TOTAL: 90.72 NG/DL (ref 80–200)
T4 FREE: 1.32 NG/DL (ref 0.93–1.7)
TOTAL PROTEIN: 6.8 G/DL (ref 6.4–8.3)
TRIGLYCERIDE, FASTING: 131 MG/DL (ref 0–149)
TSH SERPL DL<=0.05 MIU/L-ACNC: 1.29 UIU/ML (ref 0.27–4.2)
VALPROIC ACID LEVEL: 24 MCG/ML (ref 50–100)
VLDLC SERPL CALC-MCNC: 26 MG/DL
WBC # BLD: 8 E9/L (ref 4.5–11.5)

## 2022-11-10 PROCEDURE — 36415 COLL VENOUS BLD VENIPUNCTURE: CPT

## 2022-11-10 PROCEDURE — 85025 COMPLETE CBC W/AUTO DIFF WBC: CPT

## 2022-11-10 PROCEDURE — 80164 ASSAY DIPROPYLACETIC ACD TOT: CPT

## 2022-11-10 PROCEDURE — 84443 ASSAY THYROID STIM HORMONE: CPT

## 2022-11-10 PROCEDURE — 80053 COMPREHEN METABOLIC PANEL: CPT

## 2022-11-10 PROCEDURE — 84439 ASSAY OF FREE THYROXINE: CPT

## 2022-11-10 PROCEDURE — 84480 ASSAY TRIIODOTHYRONINE (T3): CPT

## 2022-11-10 PROCEDURE — 80061 LIPID PANEL: CPT

## 2022-11-11 ENCOUNTER — HOSPITAL ENCOUNTER (EMERGENCY)
Age: 62
Discharge: HOME | End: 2022-11-11
Attending: EMERGENCY MEDICINE
Payer: MEDICARE

## 2022-11-11 ENCOUNTER — APPOINTMENT (OUTPATIENT)
Dept: GENERAL RADIOLOGY | Age: 62
End: 2022-11-11
Payer: MEDICARE

## 2022-11-11 ENCOUNTER — TELEPHONE (OUTPATIENT)
Dept: OTHER | Facility: CLINIC | Age: 62
End: 2022-11-11

## 2022-11-11 VITALS
RESPIRATION RATE: 14 BRPM | WEIGHT: 115 LBS | BODY MASS INDEX: 21.16 KG/M2 | OXYGEN SATURATION: 95 % | TEMPERATURE: 98.6 F | HEIGHT: 62 IN | SYSTOLIC BLOOD PRESSURE: 114 MMHG | HEART RATE: 87 BPM | DIASTOLIC BLOOD PRESSURE: 67 MMHG

## 2022-11-11 DIAGNOSIS — R06.02 SHORTNESS OF BREATH: Primary | ICD-10-CM

## 2022-11-11 LAB
ANION GAP SERPL CALCULATED.3IONS-SCNC: 9 MMOL/L (ref 7–16)
BASOPHILS ABSOLUTE: 0.04 E9/L (ref 0–0.2)
BASOPHILS RELATIVE PERCENT: 0.3 % (ref 0–2)
BUN BLDV-MCNC: 18 MG/DL (ref 6–23)
CALCIUM SERPL-MCNC: 9.2 MG/DL (ref 8.6–10.2)
CHLORIDE BLD-SCNC: 95 MMOL/L (ref 98–107)
CO2: 30 MMOL/L (ref 22–29)
CREAT SERPL-MCNC: 0.5 MG/DL (ref 0.5–1)
EKG ATRIAL RATE: 81 BPM
EKG P AXIS: 72 DEGREES
EKG P-R INTERVAL: 182 MS
EKG Q-T INTERVAL: 348 MS
EKG QRS DURATION: 58 MS
EKG QTC CALCULATION (BAZETT): 404 MS
EKG R AXIS: -50 DEGREES
EKG T AXIS: 51 DEGREES
EKG VENTRICULAR RATE: 81 BPM
EOSINOPHILS ABSOLUTE: 0.11 E9/L (ref 0.05–0.5)
EOSINOPHILS RELATIVE PERCENT: 0.9 % (ref 0–6)
GFR SERPL CREATININE-BSD FRML MDRD: >60 ML/MIN/1.73
GLUCOSE BLD-MCNC: 124 MG/DL (ref 74–99)
HCT VFR BLD CALC: 39.8 % (ref 34–48)
HEMOGLOBIN: 12.8 G/DL (ref 11.5–15.5)
IMMATURE GRANULOCYTES #: 0.09 E9/L
IMMATURE GRANULOCYTES %: 0.8 % (ref 0–5)
LYMPHOCYTES ABSOLUTE: 1.21 E9/L (ref 1.5–4)
LYMPHOCYTES RELATIVE PERCENT: 10.3 % (ref 20–42)
MCH RBC QN AUTO: 30.6 PG (ref 26–35)
MCHC RBC AUTO-ENTMCNC: 32.2 % (ref 32–34.5)
MCV RBC AUTO: 95.2 FL (ref 80–99.9)
MONOCYTES ABSOLUTE: 0.83 E9/L (ref 0.1–0.95)
MONOCYTES RELATIVE PERCENT: 7.1 % (ref 2–12)
NEUTROPHILS ABSOLUTE: 9.48 E9/L (ref 1.8–7.3)
NEUTROPHILS RELATIVE PERCENT: 80.6 % (ref 43–80)
PDW BLD-RTO: 13.6 FL (ref 11.5–15)
PLATELET # BLD: 302 E9/L (ref 130–450)
PMV BLD AUTO: 9.9 FL (ref 7–12)
POTASSIUM SERPL-SCNC: 3.9 MMOL/L (ref 3.5–5)
RBC # BLD: 4.18 E12/L (ref 3.5–5.5)
SODIUM BLD-SCNC: 134 MMOL/L (ref 132–146)
TROPONIN, HIGH SENSITIVITY: 22 NG/L (ref 0–9)
TROPONIN, HIGH SENSITIVITY: 23 NG/L (ref 0–9)
WBC # BLD: 11.8 E9/L (ref 4.5–11.5)

## 2022-11-11 PROCEDURE — 71045 X-RAY EXAM CHEST 1 VIEW: CPT

## 2022-11-11 PROCEDURE — 80048 BASIC METABOLIC PNL TOTAL CA: CPT

## 2022-11-11 PROCEDURE — 6370000000 HC RX 637 (ALT 250 FOR IP): Performed by: EMERGENCY MEDICINE

## 2022-11-11 PROCEDURE — 94664 DEMO&/EVAL PT USE INHALER: CPT

## 2022-11-11 PROCEDURE — 93010 ELECTROCARDIOGRAM REPORT: CPT | Performed by: INTERNAL MEDICINE

## 2022-11-11 PROCEDURE — 6360000002 HC RX W HCPCS: Performed by: EMERGENCY MEDICINE

## 2022-11-11 PROCEDURE — 96374 THER/PROPH/DIAG INJ IV PUSH: CPT

## 2022-11-11 PROCEDURE — 93005 ELECTROCARDIOGRAM TRACING: CPT | Performed by: EMERGENCY MEDICINE

## 2022-11-11 PROCEDURE — 99285 EMERGENCY DEPT VISIT HI MDM: CPT

## 2022-11-11 PROCEDURE — 36415 COLL VENOUS BLD VENIPUNCTURE: CPT

## 2022-11-11 PROCEDURE — 84484 ASSAY OF TROPONIN QUANT: CPT

## 2022-11-11 PROCEDURE — 85025 COMPLETE CBC W/AUTO DIFF WBC: CPT

## 2022-11-11 RX ORDER — IPRATROPIUM BROMIDE AND ALBUTEROL SULFATE 2.5; .5 MG/3ML; MG/3ML
3 SOLUTION RESPIRATORY (INHALATION) ONCE
Status: COMPLETED | OUTPATIENT
Start: 2022-11-11 | End: 2022-11-11

## 2022-11-11 RX ORDER — METHYLPREDNISOLONE SODIUM SUCCINATE 125 MG/2ML
125 INJECTION, POWDER, LYOPHILIZED, FOR SOLUTION INTRAMUSCULAR; INTRAVENOUS ONCE
Status: COMPLETED | OUTPATIENT
Start: 2022-11-11 | End: 2022-11-11

## 2022-11-11 RX ADMIN — LIDOCAINE HYDROCHLORIDE: 20 SOLUTION ORAL; TOPICAL at 04:32

## 2022-11-11 RX ADMIN — METHYLPREDNISOLONE SODIUM SUCCINATE 125 MG: 125 INJECTION, POWDER, FOR SOLUTION INTRAMUSCULAR; INTRAVENOUS at 04:32

## 2022-11-11 RX ADMIN — IPRATROPIUM BROMIDE AND ALBUTEROL SULFATE 3 ML: .5; 2.5 SOLUTION RESPIRATORY (INHALATION) at 03:02

## 2022-11-11 ASSESSMENT — ENCOUNTER SYMPTOMS
VOMITING: 0
ABDOMINAL PAIN: 0
SHORTNESS OF BREATH: 1
EYE REDNESS: 0
NAUSEA: 0

## 2022-11-11 ASSESSMENT — PAIN - FUNCTIONAL ASSESSMENT: PAIN_FUNCTIONAL_ASSESSMENT: NONE - DENIES PAIN

## 2022-11-11 NOTE — ED NOTES
The following labs were labeled with appropriate pt sticker and tubed to lab:     [] Blue     [] Lavender   [] on ice  [x] Green/yellow  [] Green/black [] on ice  [] Cathren Aland  [] on ice  [] Yellow  [] Red  [] Type/ Screen  [] ABG  [] VBG    [] COVID-19 swab    [] Rapid  [] PCR  [] Flu swab  [] Peds Viral Panel     [] Urine Sample  [] Pelvic Cultures  [] Blood Cultures  [] X 2  [] STREP Cultures         Beverly Carpenter RN  11/11/22 7264

## 2022-11-11 NOTE — ED PROVIDER NOTES
Chief complaint: Shortness of breath      HPI:  11/11/22, Time: 4:04 AM EST    HPI       Cruz Sicard is a 65 y.o. female presenting to the ED for shortness of breath. The history is obtained from the patient as well as the patient's medical record. The patient is presenting emergency department the chief complaint of shortness of breath. This began 2 days ago. Moderate in severity. Nothing makes better. Nothing makes worse. Patient did try her albuterol prior to arrival.  She states that she feels that she is \"choking on spit. \"She denies any fevers, chills, nausea, vomiting, abdominal pain, dysuria, diarrhea or constipation. ROS:   Review of Systems   Constitutional:  Negative for chills and fatigue. HENT:  Negative for congestion. Eyes:  Negative for redness. Respiratory:  Positive for shortness of breath. Cardiovascular:  Negative for chest pain. Gastrointestinal:  Negative for abdominal pain, nausea and vomiting. Genitourinary:  Negative for dysuria. Musculoskeletal:  Negative for arthralgias. Skin:  Negative for rash. Neurological:  Negative for light-headedness. Psychiatric/Behavioral:  Negative for confusion.     All other systems reviewed and are negative.    --------------------------------------------- PAST HISTORY ---------------------------------------------  Past Medical History:  has a past medical history of Anxiety, Arthritis, Blood circulation, collateral, Cancer (HCC), Chronic back pain, Colitis, Complex regional pain syndrome type 1 of right lower extremity, COPD (chronic obstructive pulmonary disease) ([facility] [address] Depression, Diabetes mellitus ([facility] [address] Elevated liver function tests, GERD (gastroesophageal reflux disease), Headache(784.0), Hyperlipidemia, Kidney stone, Movement disorder, Neuromuscular disorder ([facility] [address] Osteoarthritis, Other disorders of kidney and ureter in diseases classified elsewhere, Pneumonia, Psychiatric problem, and RSD (reflex sympathetic dystrophy). Past Surgical History:  has a past surgical history that includes Hysterectomy; laminectomy; Finger amputation; back surgery (2005); other surgical history (09/18/13); other surgical history (N/A, 2/3/2014); fracture surgery; Endoscopy, colon, diagnostic; Carpal tunnel release; Colonoscopy; other surgical history (Right, 11/11/14); other surgical history (04/08/15); Nerve Block (N/A, 8 19 15); Nerve Block (Left, 08 26 2015); Nerve Block (9 2 15); Nerve Block (Right, 9/17/15); other surgical history (04/25/2018); and pr revise/remove neurostim/ (N/A, 4/25/2018). Social History:  reports that she has been smoking cigarettes. She started smoking about 52 years ago. She has a 42.00 pack-year smoking history. She has been exposed to tobacco smoke. She has never used smokeless tobacco. She reports that she does not drink alcohol and does not use drugs. Family History: family history includes Other in her mother. The patients home medications have been reviewed. Allergies: Sulfamethazine and Ancef [cefazolin sodium]    ---------------------------------------------------PHYSICAL EXAM--------------------------------------  Constitutional/General: Alert and oriented x3, well appearing, non toxic in NAD  Head: Normocephalic and atraumatic  Mouth: Oropharynx clear, handling secretions, no trismus  Neck: Supple, full ROM,  Pulmonary: Lungs clear to auscultation bilaterally, no wheezes, rales, or rhonchi. Not in respiratory distress  Cardiovascular:  Regular rate. Regular rhythm. No murmurs  Chest: no chest wall tenderness  Abdomen: Soft. Non tender. Non distended. No rebound, guarding, or rigidity. No pulsatile masses appreciated. Musculoskeletal: Moves all extremities x 4. Warm and well perfused, no clubbing, cyanosis, or edema. Capillary refill <3 seconds  Skin: warm and dry. No rashes.    Neurologic: GCS 15, no gross focal neurologic deficits  Psych: Normal Affect    -------------------------------------------------- RESULTS -------------------------------------------------  I have personally reviewed all laboratory and imaging results for this patient. Results are listed below.      LABS:  Results for orders placed or performed during the hospital encounter of 11/11/22   CBC with Auto Differential   Result Value Ref Range    WBC 11.8 (H) 4.5 - 11.5 E9/L    RBC 4.18 3.50 - 5.50 E12/L    Hemoglobin 12.8 11.5 - 15.5 g/dL    Hematocrit 39.8 34.0 - 48.0 %    MCV 95.2 80.0 - 99.9 fL    MCH 30.6 26.0 - 35.0 pg    MCHC 32.2 32.0 - 34.5 %    RDW 13.6 11.5 - 15.0 fL    Platelets 338 008 - 448 E9/L    MPV 9.9 7.0 - 12.0 fL    Neutrophils % 80.6 (H) 43.0 - 80.0 %    Immature Granulocytes % 0.8 0.0 - 5.0 %    Lymphocytes % 10.3 (L) 20.0 - 42.0 %    Monocytes % 7.1 2.0 - 12.0 %    Eosinophils % 0.9 0.0 - 6.0 %    Basophils % 0.3 0.0 - 2.0 %    Neutrophils Absolute 9.48 (H) 1.80 - 7.30 E9/L    Immature Granulocytes # 0.09 E9/L    Lymphocytes Absolute 1.21 (L) 1.50 - 4.00 E9/L    Monocytes Absolute 0.83 0.10 - 0.95 E9/L    Eosinophils Absolute 0.11 0.05 - 0.50 E9/L    Basophils Absolute 0.04 0.00 - 0.20 E9/L   BMP   Result Value Ref Range    Sodium 134 132 - 146 mmol/L    Potassium 3.9 3.5 - 5.0 mmol/L    Chloride 95 (L) 98 - 107 mmol/L    CO2 30 (H) 22 - 29 mmol/L    Anion Gap 9 7 - 16 mmol/L    Glucose 124 (H) 74 - 99 mg/dL    BUN 18 6 - 23 mg/dL    Creatinine 0.5 0.5 - 1.0 mg/dL    Est, Glom Filt Rate >60 >=60 mL/min/1.73    Calcium 9.2 8.6 - 10.2 mg/dL   Troponin   Result Value Ref Range    Troponin, High Sensitivity 23 (H) 0 - 9 ng/L   Troponin   Result Value Ref Range    Troponin, High Sensitivity 22 (H) 0 - 9 ng/L   EKG 12 Lead   Result Value Ref Range    Ventricular Rate 81 BPM    Atrial Rate 81 BPM    P-R Interval 182 ms    QRS Duration 58 ms    Q-T Interval 348 ms    QTc Calculation (Bazett) 404 ms    P Axis 72 degrees    R Axis -50 degrees    T Axis 51 degrees RADIOLOGY:  Interpreted by Radiologist.  XR CHEST PORTABLE   Final Result   No acute disease. RECOMMENDATION:   Careful clinical correlation and follow up recommended. EKG:  This EKG is signed and interpreted by me. Normal sinus rhythm rate of 81, no ST segment elevation or depression, MN interval 118 MS, QRS 88 MS, QTc 404 MS  Interpreted by me      ------------------------- NURSING NOTES AND VITALS REVIEWED ---------------------------   The nursing notes within the ED encounter and vital signs as below have been reviewed by myself. /67   Pulse 87   Temp 98.6 °F (37 °C)   Resp 14   Ht 5' 2\" (1.575 m)   Wt 115 lb (52.2 kg)   LMP  (LMP Unknown)   SpO2 95%   BMI 21.03 kg/m²   Oxygen Saturation Interpretation: Normal    The patients available past medical records and past encounters were reviewed. ------------------------------ ED COURSE/MEDICAL DECISION MAKING----------------------  Medications   ipratropium-albuterol (DUONEB) nebulizer solution 3 mL (3 mLs Inhalation Given 11/11/22 0302)   methylPREDNISolone sodium (SOLU-MEDROL) injection 125 mg (125 mg IntraVENous Given 11/11/22 0432)   aluminum & magnesium hydroxide-simethicone (MAALOX) 30 mL, lidocaine viscous hcl (XYLOCAINE) 5 mL (GI COCKTAIL) ( Oral Given 11/11/22 0432)             Medical Decision Making:   IDr. Ambika am the primary physician of record. Talat Kamara is a 65 y.o. female who presents to the ED for shortness of breath. The patient did have labs and imaging which were reviewed. Work-up reassuring. The patient was able to be ambulated without difficulty. Patient will be discharged and follow-up outpatient. Re-Evaluations/Consultations: The patient is in the bed results of today were discussed. The patient will be discharged and follow-up outpatient.             This patient's ED course included: History, physical examination, reevaluation prior to disposition, labs, imaging, telemetry monitoring, EKG      This patient has remained hemodynamically stable during their ED course. Counseling: The emergency provider has spoken with the patient and discussed todays results, in addition to providing specific details for the plan of care and counseling regarding the diagnosis and prognosis. Questions are answered at this time and they are agreeable with the plan.       --------------------------------- IMPRESSION AND DISPOSITION ---------------------------------    IMPRESSION  1. Shortness of breath        DISPOSITION  Disposition: Discharge to home  Patient condition is stable        NOTE: This report was transcribed using voice recognition software.  Every effort was made to ensure accuracy; however, inadvertent computerized transcription errors may be present          Fe Gonzales DO  11/12/22 1975

## 2022-11-17 ENCOUNTER — OFFICE VISIT (OUTPATIENT)
Dept: SURGERY | Age: 62
End: 2022-11-17
Payer: MEDICARE

## 2022-11-17 VITALS
HEART RATE: 74 BPM | OXYGEN SATURATION: 98 % | SYSTOLIC BLOOD PRESSURE: 128 MMHG | HEIGHT: 62 IN | BODY MASS INDEX: 20.7 KG/M2 | DIASTOLIC BLOOD PRESSURE: 66 MMHG | TEMPERATURE: 97.3 F | WEIGHT: 112.5 LBS | RESPIRATION RATE: 18 BRPM

## 2022-11-17 DIAGNOSIS — R10.13 EPIGASTRIC PAIN: ICD-10-CM

## 2022-11-17 DIAGNOSIS — K29.00 OTHER ACUTE GASTRITIS WITHOUT HEMORRHAGE: Primary | ICD-10-CM

## 2022-11-17 PROCEDURE — 99204 OFFICE O/P NEW MOD 45 MIN: CPT | Performed by: SURGERY

## 2022-11-17 RX ORDER — SUCRALFATE 1 G/1
1 TABLET ORAL 3 TIMES DAILY
Qty: 120 TABLET | Refills: 3 | Status: SHIPPED | OUTPATIENT
Start: 2022-11-17

## 2022-11-17 NOTE — PROGRESS NOTES
[address] [facility] Note    Assessment/Plan:      Diagnosis Orders   1. Other acute gastritis without hemorrhage  sucralfate (CARAFATE) 1 GM tablet    Recommend continue her PPI. We will add Carafate. We will plan for EGD evaluation. 2. Epigastric pain  US GALLBLADDER RUQ    As above. We will also check a gallbladder ultrasound although it does not sound biliary            Return for EGD. Chief Complaint   Patient presents with    New Patient     GERD       PCP: Hilda Rodriguez MD    HPI: Keven Bolanos is a 65 y.o. female who presents in consultation for dilation of GI issues. She says there are \"so many foods that I cannot eat. \"  She says \"I think it is a mental issue. \"  She says she can only take mostly just shakes and things like mashed potatoes. She says she does not like \"the taste of stuff like meat and other foods. \"  She denies any nausea or vomiting but says she will \"spit up\" her saliva. She does complain of some epigastric pain. This been ongoing for several months. She says it is \"just there. \"  It is not food related. She is 1/2 pack/day smoker. She is under alcohol. She is never had EGD previously. She denies any prior abdominal surgeries. She does have PPI listed in her medications but she does not know what she is actually taking she states. She says her  gives her her pills. She went to the ER last week she says because she was \"choking on spit. \"  Suggested she try some daily antihistamine. For some reason she had a CT of her abdomen presumably she was complaining of abdominal pain. That was negative on review.       Past Medical History:   Diagnosis Date    Anxiety     Arthritis     Blood circulation, collateral     Cancer (HCC)     SKIN    Chronic back pain     Colitis     recent    Complex regional pain syndrome type 1 of right lower extremity 9/11/2015    COPD (chronic obstructive pulmonary disease) ([facility] [address]     Depression     Diabetes mellitus ([facility] [address] Elevated liver function tests 10/17/2014    GERD (gastroesophageal reflux disease)     Headache(784.0)     Hyperlipidemia 10/17/2014    Kidney stone     Movement disorder     Neuromuscular disorder ([facility] [address]     Osteoarthritis     Other disorders of kidney and ureter in diseases classified elsewhere     Pneumonia     Psychiatric problem     RSD (reflex sympathetic dystrophy)        Past Surgical History:   Procedure Laterality Date    BACK SURGERY  2005    had cerv SCS at [address] then had staph infec 2003  then it was changed to a dual SCS by Dr Rashard Sánchez 2005 approx then has had several battery changes and rechargeable put in 2009    [address]      bryant hands    COLONOSCOPY      ENDOSCOPY, COLON, DIAGNOSTIC      FINGER AMPUTATION      index right hand multiple surgeries    FRACTURE SURGERY      HYSTERECTOMY (CERVIX STATUS UNKNOWN)      LAMINECTOMY      cervical    NERVE BLOCK N/A 8 19 15    cerv facet #1 with iv anesthesia    NERVE BLOCK Left 08 26 2015    left cervical paravertebral facet block #2 c4 5 c5 6 c6 7 under iv sedation    NERVE BLOCK  9 2 15    lumbar parasympathetic #1    NERVE BLOCK Right 9/17/15    right sympathetic block #2    OTHER SURGICAL HISTORY  09/18/13    surgical replacement of medtronic cervical spinal cord stimulator electrode and connect to existing battery right hip    OTHER SURGICAL HISTORY N/A 2/3/2014    Surgical revision spinal cord stimulator scar at anchor site due to wound  dehisence    OTHER SURGICAL HISTORY Right 11/11/14    EXCISION OF CYST RIGHT SHOULDER    OTHER SURGICAL HISTORY  04/08/15    surgical revision medtronic cervical spinal cord stimulator scar at anchor site due to wound dehisance    OTHER SURGICAL HISTORY  04/25/2018    spinal cord stimulator battery replacement due to end of life    PA REVISE/REMOVE NEUROSTIM/ N/A 4/25/2018    SPINAL CORD STIMULATOR BATTERY REPLACEMENT DUE TO END OF LIFE performed by João Milan DO at [address] [place] [address] Prior to Admission medications    Medication Sig Start Date End Date Taking?  Authorizing Provider   sucralfate (CARAFATE) 1 GM tablet Take 1 tablet by mouth in the morning, at noon, and at bedtime 11/17/22  Yes Rony Negrete MD   divalproex (DEPAKOTE SPRINKLE) 125 MG capsule Take 2 capsules by mouth 2 times daily 11/8/22  Yes Harmony Olivera MD   albuterol (PROVENTIL) (2.5 MG/3ML) 0.083% nebulizer solution Take 3 mLs by nebulization every 6 hours as needed for Wheezing 11/8/22  Yes Harmony Olivera MD   risperiDONE (RISPERDAL) 1 MG tablet Take 1 mg by mouth 2 times daily   Yes Historical Provider, MD   sertraline (ZOLOFT) 25 MG tablet Take 25 mg by mouth daily   Yes Historical Provider, MD   nicotine (NICODERM CQ) 21 MG/24HR Place 1 patch onto the skin every 24 hours   Yes Historical Provider, MD   Respiratory Therapy Supplies (NEBULIZER) TANIKA 1 Device by Does not apply route daily 11/6/22  Yes Sai Bansal, DO   Compression Stockings [address] [facility] by Does not apply route Below knee, 20 mmHg bilaterally 9/20/22  Yes Boo Morales MD   HYDROmorphone (DILAUDID) 4 MG tablet TAKE 1 TABLET BY MOUTH EVERY 6 HOURS AS NEEDED FOR PAIN 8/30/22  Yes Historical Provider, MD   pregabalin (LYRICA) 75 MG capsule TAKE 1 CAPSULE BY MOUTH THREE TIMES DAILY 8/30/22  Yes Historical Provider, MD   ondansetron (ZOFRAN-ODT) 4 MG disintegrating tablet Take 1 tablet by mouth 3 times daily as needed for Nausea or Vomiting 5/14/21  Yes Chelsea Mendiola MD   Nutritional Supplements (ENSURE COMPLETE SHAKE) LIQD Take 1 Can by mouth 2 times daily 5/14/21  Yes Chelsea Mendiola MD   vitamin D (CHOLECALCIFEROL) 1000 UNIT TABS tablet Take 1,000 Units by mouth daily   Yes Historical Provider, MD   Multiple Vitamins-Minerals (MULTIVITAMIN PO) Take by mouth   Yes Historical Provider, MD   sertraline (ZOLOFT) 100 MG tablet  9/28/22   Historical Provider, MD   zolpidem (AMBIEN) 10 MG tablet  11/4/22   Historical Provider, MD   divalproex (DEPAKOTE) 250 MG  tablet  11/15/22   Historical Provider, MD   pantoprazole (PROTONIX) 40 MG tablet Take 1 tablet by mouth every morning (before breakfast) 11/18/22   Joaquin Dolan MD   albuterol sulfate HFA (VENTOLIN HFA) 108 (90 Base) MCG/ACT inhaler Inhale 2 puffs into the lungs 4 times daily as needed for Wheezing Substitute as needed 11/18/22   Joaquin Dolan MD   ipratropium-albuterol (DUONEB) 0.5-2.5 (3) MG/3ML SOLN nebulizer solution Inhale 3 mLs into the lungs every 4 hours (while awake) 11/18/22   Joaquin Dolan MD   melatonin 3 MG TABS tablet Take 1 tablet by mouth daily 8/29/22 10/28/22  AYESHA Oden - CNP   nortriptyline (PAMELOR) 10 MG capsule TAKE 1 CAPSULE BY MOUTH EVERY NIGHT 6/1/21 8/29/22  Anita Perez,        Allergies   Allergen Reactions    Sulfamethazine Anaphylaxis    Ancef [Cefazolin Sodium] Other (See Comments)     convulsions       Social History     Socioeconomic History    Marital status:      Spouse name: Ladonna Jimenez    Number of children: None    Years of education: 10    Highest education level: None   Tobacco Use    Smoking status: Every Day     Packs/day: 0.50     Years: 42.00     Pack years: 21.00     Types: Cigarettes     Start date: 1/1/1970     Passive exposure: Current    Smokeless tobacco: Never   Vaping Use    Vaping Use: Some days   Substance and Sexual Activity    Alcohol use: No     Alcohol/week: 0.0 standard drinks    Drug use: No    Sexual activity: Not Currently     Social Determinants of Health     Financial Resource Strain: Low Risk     Difficulty of Paying Living Expenses: Not hard at all   Food Insecurity: No Food Insecurity    Worried About Running Out of Food in the Last Year: Never true    Ran Out of Food in the Last Year: Never true       Family History   Problem Relation Age of Onset    Other Mother        Review of Systems   All other systems reviewed and are negative.             Objective:  Vitals:    11/17/22 1435   BP: 128/66   Pulse: 74   Resp: 18   Temp: 97.3 °F (36.3 °C)   TempSrc: Temporal   SpO2: 98%   Weight: 112 lb 8 oz (51 kg)   Height: 5' 2\" (1.575 m)          Physical Exam  HENT:      Head: Normocephalic and atraumatic. Eyes:      General:         Right eye: No discharge. Left eye: No discharge. Neck:      Trachea: No tracheal deviation. Cardiovascular:      Rate and Rhythm: Normal rate. Pulmonary:      Effort: Pulmonary effort is normal. No respiratory distress. Abdominal:      General: There is no distension. Palpations: Abdomen is soft. Tenderness: There is no guarding or rebound. Skin:     General: Skin is warm and dry. Neurological:      Mental Status: She is alert and oriented to person, place, and time. Ananya Grady MD    I have examined the patient and performed the key aspects of physical exam, reviewed the record (including all pertinent and new radiology images and laboratory findings, referring provider notes and results), and discussed the case with the primary and referring provider where applicable. NOTE: This report, in part or full,may have been transcribed using voice recognition software. Every effort was made to ensure accuracy; however, inadvertent computerized transcription errors may be present. Please excuse any transcriptional grammatical or spelling errors that may have escaped my editorial review.     CC: Dai Rmaos MD

## 2022-11-18 ENCOUNTER — OFFICE VISIT (OUTPATIENT)
Dept: FAMILY MEDICINE CLINIC | Age: 62
End: 2022-11-18
Payer: MEDICARE

## 2022-11-18 ENCOUNTER — TELEPHONE (OUTPATIENT)
Dept: SURGERY | Age: 62
End: 2022-11-18

## 2022-11-18 VITALS
HEIGHT: 62 IN | SYSTOLIC BLOOD PRESSURE: 112 MMHG | RESPIRATION RATE: 16 BRPM | HEART RATE: 104 BPM | BODY MASS INDEX: 20.43 KG/M2 | TEMPERATURE: 98.2 F | WEIGHT: 111 LBS | DIASTOLIC BLOOD PRESSURE: 68 MMHG | OXYGEN SATURATION: 98 %

## 2022-11-18 DIAGNOSIS — K29.50 CHRONIC GASTRITIS WITHOUT BLEEDING, UNSPECIFIED GASTRITIS TYPE: ICD-10-CM

## 2022-11-18 DIAGNOSIS — F17.210 SMOKING GREATER THAN 40 PACK YEARS: ICD-10-CM

## 2022-11-18 DIAGNOSIS — Z09 HOSPITAL DISCHARGE FOLLOW-UP: ICD-10-CM

## 2022-11-18 DIAGNOSIS — J44.9 CHRONIC OBSTRUCTIVE PULMONARY DISEASE, UNSPECIFIED COPD TYPE (HCC): Primary | ICD-10-CM

## 2022-11-18 PROCEDURE — 1111F DSCHRG MED/CURRENT MED MERGE: CPT

## 2022-11-18 PROCEDURE — 99214 OFFICE O/P EST MOD 30 MIN: CPT

## 2022-11-18 RX ORDER — ZOLPIDEM TARTRATE 10 MG/1
TABLET ORAL
COMMUNITY
Start: 2022-11-04

## 2022-11-18 RX ORDER — PANTOPRAZOLE SODIUM 40 MG/1
40 TABLET, DELAYED RELEASE ORAL
Qty: 30 TABLET | Refills: 0 | Status: SHIPPED | OUTPATIENT
Start: 2022-11-18

## 2022-11-18 RX ORDER — SERTRALINE HYDROCHLORIDE 100 MG/1
TABLET, FILM COATED ORAL
COMMUNITY
Start: 2022-09-28

## 2022-11-18 RX ORDER — DIVALPROEX SODIUM 250 MG/1
TABLET, DELAYED RELEASE ORAL
COMMUNITY
Start: 2022-11-15

## 2022-11-18 RX ORDER — ALBUTEROL SULFATE 90 UG/1
2 AEROSOL, METERED RESPIRATORY (INHALATION) 4 TIMES DAILY PRN
Qty: 18 G | Refills: 0 | Status: SHIPPED | OUTPATIENT
Start: 2022-11-18

## 2022-11-18 RX ORDER — IPRATROPIUM BROMIDE AND ALBUTEROL SULFATE 2.5; .5 MG/3ML; MG/3ML
3 SOLUTION RESPIRATORY (INHALATION)
Qty: 360 ML | Refills: 0 | Status: CANCELLED | OUTPATIENT
Start: 2022-11-18

## 2022-11-18 RX ORDER — IPRATROPIUM BROMIDE AND ALBUTEROL SULFATE 2.5; .5 MG/3ML; MG/3ML
3 SOLUTION RESPIRATORY (INHALATION)
Qty: 360 ML | Refills: 0 | Status: SHIPPED | OUTPATIENT
Start: 2022-11-18

## 2022-11-18 ASSESSMENT — ENCOUNTER SYMPTOMS
SORE THROAT: 0
RHINORRHEA: 0
SHORTNESS OF BREATH: 1
CONSTIPATION: 0
CHEST TIGHTNESS: 0
DIARRHEA: 0
VOMITING: 0
NAUSEA: 0
COUGH: 1
EYE PAIN: 0
ABDOMINAL PAIN: 0

## 2022-11-18 NOTE — PROGRESS NOTES
[facility]  Precepting Note    Subjective:  64 yo F here for hospital f/u. She went in with dyspnea and atypical chest pain. Was treated with azthromycin and prednisone. She is now using nebx 4 times daily plus inhaler 3 times daliy. She reports long history of COPD with no improvement in spiriva though she has not had PFts done. She is still getting a sourc taste in hre mouth. Has EGD planned. Recent visit with Dr Jamie Rosas. ROS otherwise negative    Past medical, surgical, family and social history were reviewed, non-contributory, and unchanged unless otherwise stated. Objective:    /68   Pulse (!) 104   Temp 98.2 °F (36.8 °C) (Temporal)   Resp 16   Ht 5' 2\" (1.575 m)   Wt 111 lb (50.3 kg)   LMP  (LMP Unknown)   SpO2 98%   BMI 20.30 kg/m²     Exam is as noted by resident with the following changes, additions or corrections:    General:  NAD; alert & oriented x 3   Heart:  RRR, no murmurs, gallops, or rubs. Lungs:  CTA bilaterally, no wheeze, rales or rhonchi  Extrem:  No clubbing, cyanosis, or edema    Assessment/Plan:    Dyspnea - COPD likely though poor respiratory effort and no wheezing noted; okay to cont duonebs, needs PFts, hyperinflation noted on CXR; anxiety may be contributing. Encouraged smoking cessation  GERD - with age and smokign history needsEGD, cont PPI - take daily before a meal     Attending Physician Statement  I have reviewed the chart, including any radiology or labs, and have seen the patient with the resident(s). I personally reviewed and performed key elements of the history and exam.  I agree with the assessment, plan and orders as documented by the resident. Please refer to the resident note for additional information.       Electronically signed by Phi Canada MD on 11/18/2022 at 3:38 PM

## 2022-11-18 NOTE — TELEPHONE ENCOUNTER
MA scheduled patient US for 12/5/22 at 11:30am. Patient needs to arrive at 11am. Patient is to be npo 8 hours prior. This is at [facility].   Electronically signed by Silvia Bansal on 11/18/2022 at 2:20 PM

## 2022-11-18 NOTE — PROGRESS NOTES
[facility]  Department of Family Medicine  Family Medicine Residency Program      Patient: Sera Calderon 65 y.o. female     Date of Service: 11/18/22      Chief complaint:   Chief Complaint   Patient presents with    ED Follow-up     Copd. Health Maintenance     Declined flu shot       HISTORY OF PRESENTING ILLNESS     65 y.o. female presented to the clinic for a follow up after ED visit. She is taking Duoneb 4 times a day and she is taking inhaler 3 times a a day. She is still complaining of having shortness of breath sometimes. Denies loss of consciousness, Cyanosis. She has cut down her smoking from 1 pack a day to half pack a day but is not willing to quit her smoking at all. She is also complaining of having epigastric pain and heartburn. She is taking pantoprazole occasionally and is taking with meals or after meals.   She is also complaining of having bitter taste in mouth in the morning    Health Maintenance:  Health Maintenance Due   Topic Date Due    Shingles vaccine (1 of 2) Never done    Low dose CT lung screening  Never done    Pneumococcal 0-64 years Vaccine (2 - PPSV23 if available, else PCV20) 09/18/2016    Breast cancer screen  04/25/2018    A1C test (Diabetic or Prediabetic)  12/19/2018    COVID-19 Vaccine (3 - Booster for Pfizer series) 05/26/2021    Flu vaccine (1) 08/01/2022    Annual Wellness Visit (AWV)  11/11/2022     Past Medical History:      Diagnosis Date    Anxiety     Arthritis     Blood circulation, collateral     Cancer (HCC)     SKIN    Chronic back pain     Colitis     recent    Complex regional pain syndrome type 1 of right lower extremity 9/11/2015    COPD (chronic obstructive pulmonary disease) ([address]     Depression     Diabetes mellitus (HCC)     Elevated liver function tests 10/17/2014    GERD (gastroesophageal reflux disease)     Headache(784.0)     Hyperlipidemia 10/17/2014    Kidney stone     Movement disorder     Neuromuscular disorder ([facility] [address]     Osteoarthritis     Other disorders of kidney and ureter in diseases classified elsewhere     Pneumonia     Psychiatric problem     RSD (reflex sympathetic dystrophy)      Past Surgical History:        Procedure Laterality Date    BACK SURGERY  2005    had cerv SCS at [address] then had staph infec 2003  then it was changed to a dual SCS by Dr Brina Kuhn 2005 approx then has had several battery changes and rechargeable put in 2009    [address]      bryant hands    COLONOSCOPY      ENDOSCOPY, COLON, DIAGNOSTIC      FINGER AMPUTATION      index right hand multiple surgeries    FRACTURE SURGERY      HYSTERECTOMY (CERVIX STATUS UNKNOWN)      LAMINECTOMY      cervical    NERVE BLOCK N/A 8 19 15    cerv facet #1 with iv anesthesia    NERVE BLOCK Left 08 26 2015    left cervical paravertebral facet block #2 c4 5 c5 6 c6 7 under iv sedation    NERVE BLOCK  9 2 15    lumbar parasympathetic #1    NERVE BLOCK Right 9/17/15    right sympathetic block #2    OTHER SURGICAL HISTORY  09/18/13    surgical replacement of medtronic cervical spinal cord stimulator electrode and connect to existing battery right hip    OTHER SURGICAL HISTORY N/A 2/3/2014    Surgical revision spinal cord stimulator scar at anchor site due to wound  dehisence    OTHER SURGICAL HISTORY Right 11/11/14    EXCISION OF CYST RIGHT SHOULDER    OTHER SURGICAL HISTORY  04/08/15    surgical revision medtronic cervical spinal cord stimulator scar at anchor site due to wound dehisance    OTHER SURGICAL HISTORY  04/25/2018    spinal cord stimulator battery replacement due to end of life    MS REVISE/REMOVE NEUROSTIM/ N/A 4/25/2018    SPINAL CORD STIMULATOR BATTERY REPLACEMENT DUE TO END OF LIFE performed by Alejandra Lindsey DO at [facility]     Allergies:    Sulfamethazine and Ancef [cefazolin sodium]  Social History:   Social History     Socioeconomic History    Marital status:      Spouse name: Krupa Cardenas    Number of children: Not on file    Years of education: 10    Highest education level: Not on file   Occupational History    Not on file   Tobacco Use    Smoking status: Every Day     Packs/day: 0.50     Years: 42.00     Pack years: 21.00     Types: Cigarettes     Start date: 1/1/1970     Passive exposure: Current    Smokeless tobacco: Never   Vaping Use    Vaping Use: Some days   Substance and Sexual Activity    Alcohol use: No     Alcohol/week: 0.0 standard drinks    Drug use: No    Sexual activity: Not Currently   Other Topics Concern    Not on file   Social History Narrative    Not on file     Social Determinants of Health     Financial Resource Strain: Low Risk     Difficulty of Paying Living Expenses: Not hard at all   Food Insecurity: No Food Insecurity    Worried About Running Out of Food in the Last Year: Never true    Ran Out of Food in the Last Year: Never true   Transportation Needs: Not on file   Physical Activity: Not on file   Stress: Not on file   Social Connections: Not on file   Intimate Partner Violence: Not on file   Housing Stability: Not on file      Family History:       Problem Relation Age of Onset    Other Mother      Review of Systems:   Review of Systems   Constitutional:  Negative for chills, fatigue and fever. HENT:  Negative for congestion, ear discharge, ear pain, rhinorrhea and sore throat. Eyes:  Negative for pain. Respiratory:  Positive for cough and shortness of breath. Negative for chest tightness. Cardiovascular:  Negative for chest pain and palpitations. Gastrointestinal:  Negative for abdominal pain, constipation, diarrhea, nausea and vomiting. Heart burn   Genitourinary:  Negative for dysuria and frequency. Neurological:  Negative for dizziness and light-headedness. All other systems reviewed and are negative.     PHYSICAL EXAM   Vitals: /68   Pulse (!) 104   Temp 98.2 °F (36.8 °C) (Temporal)   Resp 16   Ht 5' 2\" (1.575 m)   Wt 111 lb (50.3 kg)   LMP (LMP Unknown)   SpO2 98%   BMI 20.30 kg/m²   Physical Exam  Constitutional:       General: She is not in acute distress. Appearance: Normal appearance. HENT:      Head: Normocephalic and atraumatic. Mouth/Throat:      Mouth: Mucous membranes are moist.      Pharynx: Oropharynx is clear. Eyes:      Extraocular Movements: Extraocular movements intact. Conjunctiva/sclera: Conjunctivae normal.   Cardiovascular:      Rate and Rhythm: Normal rate and regular rhythm. Pulses: Normal pulses. Heart sounds: Normal heart sounds. No murmur heard. Pulmonary:      Effort: Pulmonary effort is normal.      Breath sounds: Normal breath sounds. No wheezing. Abdominal:      Tenderness: There is no abdominal tenderness. Musculoskeletal:      Cervical back: Normal range of motion. Right lower leg: No edema. Left lower leg: No edema. Skin:     General: Skin is warm and dry. Neurological:      General: No focal deficit present. Mental Status: She is alert and oriented to person, place, and time. Psychiatric:         Attention and Perception: Attention normal.      Comments: Flat affect         ASSESSMENT AND PLAN     1. Chronic obstructive pulmonary disease, unspecified COPD type ([facility] [address]  -Educated her about smoking cessation and encouraged her to quit smoking. Also encouraged her to use inhaler and nebulizer as needed. She has a referral for pulmonary function test in past but she did not go to therapy, encouraged her to do pulmonary function test.    - albuterol sulfate HFA (VENTOLIN HFA) 108 (90 Base) MCG/ACT inhaler; Inhale 2 puffs into the lungs 4 times daily as needed for Wheezing Substitute as needed  Dispense: 18 g; Refill: 0  - Full PFT Study With Bronchodilator; Future  - ipratropium-albuterol (DUONEB) 0.5-2.5 (3) MG/3ML SOLN nebulizer solution; Inhale 3 mLs into the lungs every 4 hours (while awake)  Dispense: 360 mL; Refill: 0    2.  Chronic gastritis without bleeding, unspecified gastritis type  -Was prescribed with Protonix but she was not taking it regularly and as prescribed. Educated her about to take medications 30 minutes before breakfast and take regularly. Handed over her some handouts regarding that also and educated her about healthy diet and avoid spicy and fatty foods. - pantoprazole (PROTONIX) 40 MG tablet; Take 1 tablet by mouth every morning (before breakfast)  Dispense: 30 tablet; Refill: 0    3. Smoking greater than 40 pack years    - albuterol sulfate HFA (VENTOLIN HFA) 108 (90 Base) MCG/ACT inhaler; Inhale 2 puffs into the lungs 4 times daily as needed for Wheezing Substitute as needed  Dispense: 18 g; Refill: 0  - Full PFT Study With Bronchodilator; Future  - ipratropium-albuterol (DUONEB) 0.5-2.5 (3) MG/3ML SOLN nebulizer solution; Inhale 3 mLs into the lungs every 4 hours (while awake)  Dispense: 360 mL; Refill: 0    Counseled regarding above diagnosis, including possible risks and complications, especially if left uncontrolled. Counseled regarding the possible side effects, risks, benefits and alternatives to treatment; patient and/or guardian verbalizes understanding, agrees, feels comfortable with and wishes to proceed with above treatment plan. Call or go to ED immediately if symptoms worsen or persist. Advised patient to call with any new medication issues, and, as applicable, read all Rx info from pharmacy to assure aware of all possible risks and side effects of medication before taking. Patient and/or guardian given opportunity to ask questions/raise concerns. The patient verbalized comfort and understanding of instructions. I encourage further reading and education about your health conditions. Information on many health conditions is provided by the American Academy of [employer]icians: https://familydoctor. org/  Please bring any questions to me at your next visit.     Medication List:    Current Outpatient Medications   Medication Sig Dispense Refill    pantoprazole (PROTONIX) 40 MG tablet Take 1 tablet by mouth every morning (before breakfast) 30 tablet 0    albuterol sulfate HFA (VENTOLIN HFA) 108 (90 Base) MCG/ACT inhaler Inhale 2 puffs into the lungs 4 times daily as needed for Wheezing Substitute as needed 18 g 0    ipratropium-albuterol (DUONEB) 0.5-2.5 (3) MG/3ML SOLN nebulizer solution Inhale 3 mLs into the lungs every 4 hours (while awake) 360 mL 0    sertraline (ZOLOFT) 100 MG tablet       zolpidem (AMBIEN) 10 MG tablet       divalproex (DEPAKOTE) 250 MG DR tablet       sucralfate (CARAFATE) 1 GM tablet Take 1 tablet by mouth in the morning, at noon, and at bedtime 120 tablet 3    divalproex (DEPAKOTE SPRINKLE) 125 MG capsule Take 2 capsules by mouth 2 times daily 60 capsule 3    albuterol (PROVENTIL) (2.5 MG/3ML) 0.083% nebulizer solution Take 3 mLs by nebulization every 6 hours as needed for Wheezing 120 each 0    risperiDONE (RISPERDAL) 1 MG tablet Take 1 mg by mouth 2 times daily      sertraline (ZOLOFT) 25 MG tablet Take 25 mg by mouth daily      nicotine (NICODERM CQ) 21 MG/24HR Place 1 patch onto the skin every 24 hours      Respiratory Therapy Supplies (NEBULIZER) TANIKA 1 Device by Does not apply route daily 1 each 0    Compression Stockings MISC by Does not apply route Below knee, 20 mmHg bilaterally 1 each 0    HYDROmorphone (DILAUDID) 4 MG tablet TAKE 1 TABLET BY MOUTH EVERY 6 HOURS AS NEEDED FOR PAIN      pregabalin (LYRICA) 75 MG capsule TAKE 1 CAPSULE BY MOUTH THREE TIMES DAILY      melatonin 3 MG TABS tablet Take 1 tablet by mouth daily  3    ondansetron (ZOFRAN-ODT) 4 MG disintegrating tablet Take 1 tablet by mouth 3 times daily as needed for Nausea or Vomiting 21 tablet 0    Nutritional Supplements (ENSURE COMPLETE SHAKE) LIQD Take 1 Can by mouth 2 times daily 60 Bottle 0    vitamin D (CHOLECALCIFEROL) 1000 UNIT TABS tablet Take 1,000 Units by mouth daily      Multiple Vitamins-Minerals (MULTIVITAMIN PO) Take by mouth No current facility-administered medications for this visit. Return to Office: Return in about 1 month (around 12/18/2022) for COPD. This document may have been prepared at least partially through the use of voice recognition software. Although effort is taken to assure the accuracy of this document, it is possible that grammatical, syntax,  or spelling errors may occur.     Mayank Godinez MD

## 2022-11-23 ENCOUNTER — TELEPHONE (OUTPATIENT)
Dept: SURGERY | Age: 62
End: 2022-11-23

## 2022-11-23 ENCOUNTER — PREP FOR PROCEDURE (OUTPATIENT)
Dept: SURGERY | Age: 62
End: 2022-11-23

## 2022-11-23 PROBLEM — K29.00 ACUTE GASTRITIS: Status: ACTIVE | Noted: 2022-11-23

## 2022-11-23 NOTE — TELEPHONE ENCOUNTER
Héctor Batista is scheduled for EGD with Dr Durga Jarquin on 01-13-23 at [facility] at 1:30 pm. Patient was told to arrive at 12:30 pm. Patient needs to be NPO after midnight the night before procedure. All surgery instructions were explained to the patient and a surgery letter was also mailed out. MA informed patient that PAT will also be calling to review pre-op instructions and medications. Patient verbalized understanding.   Electronically signed by Shashi Joiner MA on 11/23/2022 at 2:00 PM

## 2022-11-30 DIAGNOSIS — K21.9 GASTROESOPHAGEAL REFLUX DISEASE WITHOUT ESOPHAGITIS: ICD-10-CM

## 2022-11-30 RX ORDER — OMEPRAZOLE 40 MG/1
CAPSULE, DELAYED RELEASE ORAL
Qty: 30 CAPSULE | Refills: 0 | OUTPATIENT
Start: 2022-11-30

## 2022-12-13 ENCOUNTER — HOSPITAL ENCOUNTER (EMERGENCY)
Age: 62
Discharge: HOME | End: 2022-12-13
Payer: MEDICARE

## 2022-12-13 ENCOUNTER — APPOINTMENT (OUTPATIENT)
Dept: GENERAL RADIOLOGY | Age: 62
DRG: 190 | End: 2022-12-13
Payer: MEDICARE

## 2022-12-13 ENCOUNTER — APPOINTMENT (OUTPATIENT)
Dept: CT IMAGING | Age: 62
DRG: 190 | End: 2022-12-13
Payer: MEDICARE

## 2022-12-13 ENCOUNTER — HOSPITAL ENCOUNTER (INPATIENT)
Age: 62
LOS: 5 days | Discharge: HOME | DRG: 190 | End: 2022-12-18
Attending: EMERGENCY MEDICINE | Admitting: INTERNAL MEDICINE
Payer: MEDICARE

## 2022-12-13 VITALS
TEMPERATURE: 97.1 F | OXYGEN SATURATION: 96 % | HEART RATE: 100 BPM | SYSTOLIC BLOOD PRESSURE: 121 MMHG | RESPIRATION RATE: 16 BRPM | DIASTOLIC BLOOD PRESSURE: 57 MMHG

## 2022-12-13 DIAGNOSIS — R09.02 HYPOXIA: Primary | ICD-10-CM

## 2022-12-13 DIAGNOSIS — J18.9 PNEUMONIA DUE TO INFECTIOUS ORGANISM, UNSPECIFIED LATERALITY, UNSPECIFIED PART OF LUNG: ICD-10-CM

## 2022-12-13 PROBLEM — R06.02 SOB (SHORTNESS OF BREATH): Status: ACTIVE | Noted: 2022-12-13

## 2022-12-13 LAB
ACETAMINOPHEN LEVEL: <5 MCG/ML (ref 10–30)
ALBUMIN SERPL-MCNC: 4 G/DL (ref 3.5–5.2)
ALP BLD-CCNC: 92 U/L (ref 35–104)
ALT SERPL-CCNC: 31 U/L (ref 0–32)
AMPHETAMINE SCREEN, URINE: NOT DETECTED
ANION GAP SERPL CALCULATED.3IONS-SCNC: 17 MMOL/L (ref 7–16)
ANISOCYTOSIS: ABNORMAL
AST SERPL-CCNC: 50 U/L (ref 0–31)
B.E.: 3.6 MMOL/L (ref -3–3)
BACTERIA: ABNORMAL /HPF
BARBITURATE SCREEN URINE: NOT DETECTED
BASOPHILS ABSOLUTE: 0 E9/L (ref 0–0.2)
BASOPHILS RELATIVE PERCENT: 0.3 % (ref 0–2)
BENZODIAZEPINE SCREEN, URINE: NOT DETECTED
BILIRUB SERPL-MCNC: 0.4 MG/DL (ref 0–1.2)
BILIRUBIN URINE: NEGATIVE
BLOOD, URINE: ABNORMAL
BUN BLDV-MCNC: 17 MG/DL (ref 6–23)
CALCIUM SERPL-MCNC: 9.1 MG/DL (ref 8.6–10.2)
CANNABINOID SCREEN URINE: NOT DETECTED
CHLORIDE BLD-SCNC: 99 MMOL/L (ref 98–107)
CLARITY: CLEAR
CO2: 19 MMOL/L (ref 22–29)
COCAINE METABOLITE SCREEN URINE: NOT DETECTED
COHB: 4.3 % (ref 0–1.5)
COLOR: YELLOW
COMMENT: ABNORMAL
CREAT SERPL-MCNC: 0.6 MG/DL (ref 0.5–1)
CRITICAL: ABNORMAL
D DIMER: 428 NG/ML DDU
DATE ANALYZED: ABNORMAL
DATE OF COLLECTION: ABNORMAL
EOSINOPHILS ABSOLUTE: 0 E9/L (ref 0.05–0.5)
EOSINOPHILS RELATIVE PERCENT: 0 % (ref 0–6)
ETHANOL: <10 MG/DL (ref 0–0.08)
FENTANYL SCREEN, URINE: NOT DETECTED
GFR SERPL CREATININE-BSD FRML MDRD: >60 ML/MIN/1.73
GLUCOSE BLD-MCNC: 120 MG/DL (ref 74–99)
GLUCOSE URINE: NEGATIVE MG/DL
HCO3: 30.6 MMOL/L (ref 22–26)
HCT VFR BLD CALC: 40.9 % (ref 34–48)
HEMOGLOBIN: 13 G/DL (ref 11.5–15.5)
HHB: 2.7 % (ref 0–5)
INFLUENZA A BY PCR: NOT DETECTED
INFLUENZA B BY PCR: NOT DETECTED
KETONES, URINE: NEGATIVE MG/DL
LAB: ABNORMAL
LEUKOCYTE ESTERASE, URINE: NEGATIVE
LYMPHOCYTES ABSOLUTE: 0.58 E9/L (ref 1.5–4)
LYMPHOCYTES RELATIVE PERCENT: 4.4 % (ref 20–42)
Lab: ABNORMAL
Lab: ABNORMAL
MAGNESIUM: 2.1 MG/DL (ref 1.6–2.6)
MCH RBC QN AUTO: 31.3 PG (ref 26–35)
MCHC RBC AUTO-ENTMCNC: 31.8 % (ref 32–34.5)
MCV RBC AUTO: 98.6 FL (ref 80–99.9)
METHADONE SCREEN, URINE: NOT DETECTED
METHB: 0.3 % (ref 0–1.5)
MODE: ABNORMAL
MONOCYTES ABSOLUTE: 0.72 E9/L (ref 0.1–0.95)
MONOCYTES RELATIVE PERCENT: 5.2 % (ref 2–12)
NEUTROPHILS ABSOLUTE: 12.96 E9/L (ref 1.8–7.3)
NEUTROPHILS RELATIVE PERCENT: 90.4 % (ref 43–80)
NITRITE, URINE: NEGATIVE
O2 SATURATION: 97.2 % (ref 92–98.5)
O2HB: 92.7 % (ref 94–97)
OPERATOR ID: 2067
OPIATE SCREEN URINE: POSITIVE
OVALOCYTES: ABNORMAL
OXYCODONE URINE: NOT DETECTED
PATIENT TEMP: 37 C
PCO2: 56.6 MMHG (ref 35–45)
PDW BLD-RTO: 13.8 FL (ref 11.5–15)
PH BLOOD GAS: 7.35 (ref 7.35–7.45)
PH UA: 5.5 (ref 5–9)
PHENCYCLIDINE SCREEN URINE: NOT DETECTED
PLATELET # BLD: 284 E9/L (ref 130–450)
PMV BLD AUTO: 10.2 FL (ref 7–12)
PO2: 105.1 MMHG (ref 75–100)
POIKILOCYTES: ABNORMAL
POTASSIUM SERPL-SCNC: 4.6 MMOL/L (ref 3.5–5)
PRO-BNP: 426 PG/ML (ref 0–125)
PROTEIN UA: ABNORMAL MG/DL
RBC # BLD: 4.15 E12/L (ref 3.5–5.5)
RBC UA: ABNORMAL /HPF (ref 0–2)
SALICYLATE, SERUM: <0.3 MG/DL (ref 0–30)
SARS-COV-2, NAAT: NOT DETECTED
SODIUM BLD-SCNC: 135 MMOL/L (ref 132–146)
SOURCE, BLOOD GAS: ABNORMAL
SPECIFIC GRAVITY UA: >=1.03 (ref 1–1.03)
THB: 14 G/DL (ref 11.5–16.5)
TIME ANALYZED: 1304
TOTAL PROTEIN: 7.1 G/DL (ref 6.4–8.3)
TRICYCLIC ANTIDEPRESSANTS SCREEN SERUM: NEGATIVE NG/ML
TROPONIN, HIGH SENSITIVITY: 32 NG/L (ref 0–9)
TROPONIN, HIGH SENSITIVITY: 36 NG/L (ref 0–9)
UROBILINOGEN, URINE: 0.2 E.U./DL
WBC # BLD: 14.4 E9/L (ref 4.5–11.5)
WBC UA: ABNORMAL /HPF (ref 0–5)

## 2022-12-13 PROCEDURE — 80143 DRUG ASSAY ACETAMINOPHEN: CPT

## 2022-12-13 PROCEDURE — 2060000000 HC ICU INTERMEDIATE R&B

## 2022-12-13 PROCEDURE — 96361 HYDRATE IV INFUSION ADD-ON: CPT

## 2022-12-13 PROCEDURE — 2580000003 HC RX 258: Performed by: EMERGENCY MEDICINE

## 2022-12-13 PROCEDURE — 80179 DRUG ASSAY SALICYLATE: CPT

## 2022-12-13 PROCEDURE — 71045 X-RAY EXAM CHEST 1 VIEW: CPT

## 2022-12-13 PROCEDURE — 85378 FIBRIN DEGRADE SEMIQUANT: CPT

## 2022-12-13 PROCEDURE — 96365 THER/PROPH/DIAG IV INF INIT: CPT

## 2022-12-13 PROCEDURE — 87502 INFLUENZA DNA AMP PROBE: CPT

## 2022-12-13 PROCEDURE — 80053 COMPREHEN METABOLIC PANEL: CPT

## 2022-12-13 PROCEDURE — 87635 SARS-COV-2 COVID-19 AMP PRB: CPT

## 2022-12-13 PROCEDURE — 84484 ASSAY OF TROPONIN QUANT: CPT

## 2022-12-13 PROCEDURE — 82077 ASSAY SPEC XCP UR&BREATH IA: CPT

## 2022-12-13 PROCEDURE — 80307 DRUG TEST PRSMV CHEM ANLYZR: CPT

## 2022-12-13 PROCEDURE — 71275 CT ANGIOGRAPHY CHEST: CPT

## 2022-12-13 PROCEDURE — 85025 COMPLETE CBC W/AUTO DIFF WBC: CPT

## 2022-12-13 PROCEDURE — 6360000002 HC RX W HCPCS: Performed by: EMERGENCY MEDICINE

## 2022-12-13 PROCEDURE — 83880 ASSAY OF NATRIURETIC PEPTIDE: CPT

## 2022-12-13 PROCEDURE — 99285 EMERGENCY DEPT VISIT HI MDM: CPT

## 2022-12-13 PROCEDURE — 2500000003 HC RX 250 WO HCPCS: Performed by: EMERGENCY MEDICINE

## 2022-12-13 PROCEDURE — 93005 ELECTROCARDIOGRAM TRACING: CPT | Performed by: PHYSICIAN ASSISTANT

## 2022-12-13 PROCEDURE — 94640 AIRWAY INHALATION TREATMENT: CPT

## 2022-12-13 PROCEDURE — 83735 ASSAY OF MAGNESIUM: CPT

## 2022-12-13 PROCEDURE — 99281 EMR DPT VST MAYX REQ PHY/QHP: CPT

## 2022-12-13 PROCEDURE — 96375 TX/PRO/DX INJ NEW DRUG ADDON: CPT

## 2022-12-13 PROCEDURE — 82805 BLOOD GASES W/O2 SATURATION: CPT

## 2022-12-13 PROCEDURE — 2580000003 HC RX 258: Performed by: INTERNAL MEDICINE

## 2022-12-13 PROCEDURE — 6370000000 HC RX 637 (ALT 250 FOR IP): Performed by: EMERGENCY MEDICINE

## 2022-12-13 PROCEDURE — 6360000004 HC RX CONTRAST MEDICATION: Performed by: RADIOLOGY

## 2022-12-13 PROCEDURE — 96366 THER/PROPH/DIAG IV INF ADDON: CPT

## 2022-12-13 PROCEDURE — 81001 URINALYSIS AUTO W/SCOPE: CPT

## 2022-12-13 RX ORDER — SODIUM CHLORIDE 9 MG/ML
INJECTION, SOLUTION INTRAVENOUS PRN
Status: DISCONTINUED | OUTPATIENT
Start: 2022-12-13 | End: 2022-12-18 | Stop reason: HOSPADM

## 2022-12-13 RX ORDER — ENOXAPARIN SODIUM 100 MG/ML
30 INJECTION SUBCUTANEOUS DAILY
Status: DISCONTINUED | OUTPATIENT
Start: 2022-12-14 | End: 2022-12-15

## 2022-12-13 RX ORDER — LANOLIN ALCOHOL/MO/W.PET/CERES
3 CREAM (GRAM) TOPICAL DAILY
Status: DISCONTINUED | OUTPATIENT
Start: 2022-12-14 | End: 2022-12-18 | Stop reason: HOSPADM

## 2022-12-13 RX ORDER — METHYLPREDNISOLONE SODIUM SUCCINATE 40 MG/ML
40 INJECTION, POWDER, LYOPHILIZED, FOR SOLUTION INTRAMUSCULAR; INTRAVENOUS DAILY
Status: DISCONTINUED | OUTPATIENT
Start: 2022-12-14 | End: 2022-12-14

## 2022-12-13 RX ORDER — SUCRALFATE 1 G/1
1 TABLET ORAL
Status: DISCONTINUED | OUTPATIENT
Start: 2022-12-13 | End: 2022-12-18 | Stop reason: HOSPADM

## 2022-12-13 RX ORDER — HYDROMORPHONE HYDROCHLORIDE 2 MG/1
4 TABLET ORAL EVERY 6 HOURS PRN
Status: DISCONTINUED | OUTPATIENT
Start: 2022-12-13 | End: 2022-12-18 | Stop reason: HOSPADM

## 2022-12-13 RX ORDER — IPRATROPIUM BROMIDE AND ALBUTEROL SULFATE 2.5; .5 MG/3ML; MG/3ML
1 SOLUTION RESPIRATORY (INHALATION)
Status: DISCONTINUED | OUTPATIENT
Start: 2022-12-14 | End: 2022-12-18 | Stop reason: HOSPADM

## 2022-12-13 RX ORDER — DIVALPROEX SODIUM 250 MG/1
250 TABLET, DELAYED RELEASE ORAL 3 TIMES DAILY
Status: DISCONTINUED | OUTPATIENT
Start: 2022-12-13 | End: 2022-12-18 | Stop reason: HOSPADM

## 2022-12-13 RX ORDER — 0.9 % SODIUM CHLORIDE 0.9 %
1000 INTRAVENOUS SOLUTION INTRAVENOUS ONCE
Status: COMPLETED | OUTPATIENT
Start: 2022-12-13 | End: 2022-12-13

## 2022-12-13 RX ORDER — IPRATROPIUM BROMIDE AND ALBUTEROL SULFATE 2.5; .5 MG/3ML; MG/3ML
3 SOLUTION RESPIRATORY (INHALATION) ONCE
Status: COMPLETED | OUTPATIENT
Start: 2022-12-13 | End: 2022-12-13

## 2022-12-13 RX ORDER — ACETAMINOPHEN 650 MG/1
650 SUPPOSITORY RECTAL EVERY 6 HOURS PRN
Status: DISCONTINUED | OUTPATIENT
Start: 2022-12-13 | End: 2022-12-18 | Stop reason: HOSPADM

## 2022-12-13 RX ORDER — SODIUM CHLORIDE 0.9 % (FLUSH) 0.9 %
10 SYRINGE (ML) INJECTION EVERY 12 HOURS SCHEDULED
Status: DISCONTINUED | OUTPATIENT
Start: 2022-12-13 | End: 2022-12-18 | Stop reason: HOSPADM

## 2022-12-13 RX ORDER — RISPERIDONE 1 MG/1
1 TABLET ORAL 3 TIMES DAILY
Status: DISCONTINUED | OUTPATIENT
Start: 2022-12-13 | End: 2022-12-18 | Stop reason: HOSPADM

## 2022-12-13 RX ORDER — METHYLPREDNISOLONE SODIUM SUCCINATE 125 MG/2ML
125 INJECTION, POWDER, LYOPHILIZED, FOR SOLUTION INTRAMUSCULAR; INTRAVENOUS ONCE
Status: COMPLETED | OUTPATIENT
Start: 2022-12-13 | End: 2022-12-13

## 2022-12-13 RX ORDER — PREGABALIN 75 MG/1
75 CAPSULE ORAL 3 TIMES DAILY
Status: DISCONTINUED | OUTPATIENT
Start: 2022-12-13 | End: 2022-12-18 | Stop reason: HOSPADM

## 2022-12-13 RX ORDER — PANTOPRAZOLE SODIUM 40 MG/1
40 TABLET, DELAYED RELEASE ORAL
Status: DISCONTINUED | OUTPATIENT
Start: 2022-12-14 | End: 2022-12-18 | Stop reason: HOSPADM

## 2022-12-13 RX ORDER — ONDANSETRON 2 MG/ML
4 INJECTION INTRAMUSCULAR; INTRAVENOUS EVERY 6 HOURS PRN
Status: DISCONTINUED | OUTPATIENT
Start: 2022-12-13 | End: 2022-12-18 | Stop reason: HOSPADM

## 2022-12-13 RX ORDER — SODIUM CHLORIDE 9 MG/ML
INJECTION, SOLUTION INTRAVENOUS CONTINUOUS
Status: DISCONTINUED | OUTPATIENT
Start: 2022-12-13 | End: 2022-12-14

## 2022-12-13 RX ORDER — ONDANSETRON 4 MG/1
4 TABLET, ORALLY DISINTEGRATING ORAL EVERY 8 HOURS PRN
Status: DISCONTINUED | OUTPATIENT
Start: 2022-12-13 | End: 2022-12-18 | Stop reason: HOSPADM

## 2022-12-13 RX ORDER — SODIUM CHLORIDE 0.9 % (FLUSH) 0.9 %
10 SYRINGE (ML) INJECTION PRN
Status: DISCONTINUED | OUTPATIENT
Start: 2022-12-13 | End: 2022-12-18 | Stop reason: HOSPADM

## 2022-12-13 RX ORDER — ACETAMINOPHEN 325 MG/1
650 TABLET ORAL EVERY 6 HOURS PRN
Status: DISCONTINUED | OUTPATIENT
Start: 2022-12-13 | End: 2022-12-18 | Stop reason: HOSPADM

## 2022-12-13 RX ADMIN — IPRATROPIUM BROMIDE AND ALBUTEROL SULFATE 3 AMPULE: .5; 3 SOLUTION RESPIRATORY (INHALATION) at 13:00

## 2022-12-13 RX ADMIN — DOXYCYCLINE 100 MG: 100 INJECTION, POWDER, LYOPHILIZED, FOR SOLUTION INTRAVENOUS at 15:00

## 2022-12-13 RX ADMIN — CEFTRIAXONE 1000 MG: 1 INJECTION, POWDER, FOR SOLUTION INTRAMUSCULAR; INTRAVENOUS at 14:54

## 2022-12-13 RX ADMIN — METHYLPREDNISOLONE SODIUM SUCCINATE 125 MG: 125 INJECTION, POWDER, FOR SOLUTION INTRAMUSCULAR; INTRAVENOUS at 13:31

## 2022-12-13 RX ADMIN — SODIUM CHLORIDE: 9 INJECTION, SOLUTION INTRAVENOUS at 13:36

## 2022-12-13 RX ADMIN — IPRATROPIUM BROMIDE AND ALBUTEROL SULFATE 3 AMPULE: .5; 3 SOLUTION RESPIRATORY (INHALATION) at 14:21

## 2022-12-13 RX ADMIN — SODIUM CHLORIDE 1000 ML: 9 INJECTION, SOLUTION INTRAVENOUS at 13:32

## 2022-12-13 RX ADMIN — IOPAMIDOL 70 ML: 755 INJECTION, SOLUTION INTRAVENOUS at 16:29

## 2022-12-13 RX ADMIN — SODIUM CHLORIDE, PRESERVATIVE FREE 10 ML: 5 INJECTION INTRAVENOUS at 22:05

## 2022-12-13 ASSESSMENT — PAIN DESCRIPTION - DESCRIPTORS: DESCRIPTORS: STABBING

## 2022-12-13 ASSESSMENT — PAIN DESCRIPTION - ORIENTATION: ORIENTATION: MID

## 2022-12-13 ASSESSMENT — PAIN - FUNCTIONAL ASSESSMENT
PAIN_FUNCTIONAL_ASSESSMENT: PREVENTS OR INTERFERES SOME ACTIVE ACTIVITIES AND ADLS
PAIN_FUNCTIONAL_ASSESSMENT: NONE - DENIES PAIN

## 2022-12-13 ASSESSMENT — PAIN DESCRIPTION - PAIN TYPE: TYPE: ACUTE PAIN

## 2022-12-13 ASSESSMENT — PAIN SCALES - GENERAL: PAINLEVEL_OUTOF10: 8

## 2022-12-13 ASSESSMENT — PAIN DESCRIPTION - LOCATION: LOCATION: CHEST

## 2022-12-13 ASSESSMENT — PAIN DESCRIPTION - FREQUENCY: FREQUENCY: CONTINUOUS

## 2022-12-13 ASSESSMENT — PAIN DESCRIPTION - ONSET: ONSET: ON-GOING

## 2022-12-13 NOTE — H&P
Hospitalist History & Physical      PATIENT NAME:  Lilliana Dumont    MRN:  [id number]  SERVICE DATE:  12/13/22    Primary Care Physician: Vincent Coley MD       SUBJECTIVE  CHIEF COMPLAINT:  had concerns including Shortness of Breath (Sob, anxiety, not sleeping for 2 days). HPI:  Ms. Lilliana Dumont, a 65y.o. year old female  who  has a past medical history of Anxiety, Arthritis, Blood circulation, collateral, Cancer ([address] Chronic back pain, Colitis, Complex regional pain syndrome type 1 of right lower extremity, COPD (chronic obstructive pulmonary disease) ([address] Depression, Diabetes mellitus ([address] Elevated liver function tests, GERD (gastroesophageal reflux disease), Headache(784.0), Hyperlipidemia, Kidney stone, Movement disorder, Neuromuscular disorder ([address] Osteoarthritis, Other disorders of kidney and ureter in diseases classified elsewhere, Pneumonia, Psychiatric problem, and RSD (reflex sympathetic dystrophy). presents with SOB and anxiety for couple days, was given duoneb x3 and solumedrol in ER but still labored breathing. No chest pain or nausea or vomiting no fever or chills, abdominal pain or diarrhea.      PAST MEDICAL HISTORY:    Past Medical History:   Diagnosis Date    Anxiety     Arthritis     Blood circulation, collateral     Cancer (HCC)     SKIN    Chronic back pain     Colitis     recent    Complex regional pain syndrome type 1 of right lower extremity 9/11/2015    COPD (chronic obstructive pulmonary disease) ([address]     Depression     Diabetes mellitus ([address]     Elevated liver function tests 10/17/2014    GERD (gastroesophageal reflux disease)     Headache(784.0)     Hyperlipidemia 10/17/2014    Kidney stone     Movement disorder     Neuromuscular disorder (HCC)     Osteoarthritis     Other disorders of kidney and ureter in diseases classified elsewhere     Pneumonia     Psychiatric problem     RSD (reflex sympathetic dystrophy)      PAST SURGICAL HISTORY:    Past Surgical History:   Procedure Laterality Date    BACK SURGERY  2005    had cerv SCS at [address] then had staph infec 2003  then it was changed to a dual SCS by Dr Jodi Kilgore 2005 approx then has had several battery changes and rechargeable put in 2009    [address] hands    COLONOSCOPY      ENDOSCOPY, COLON, DIAGNOSTIC      FINGER AMPUTATION      index right hand multiple surgeries    FRACTURE SURGERY      HYSTERECTOMY (CERVIX STATUS UNKNOWN)      LAMINECTOMY      cervical    NERVE BLOCK N/A 8 19 15    cerv facet #1 with iv anesthesia    NERVE BLOCK Left 08 26 2015    left cervical paravertebral facet block #2 c4 5 c5 6 c6 7 under iv sedation    NERVE BLOCK  9 2 15    lumbar parasympathetic #1    NERVE BLOCK Right 9/17/15    right sympathetic block #2    OTHER SURGICAL HISTORY  09/18/13    surgical replacement of medtronic cervical spinal cord stimulator electrode and connect to existing battery right hip    OTHER SURGICAL HISTORY N/A 2/3/2014    Surgical revision spinal cord stimulator scar at anchor site due to wound  dehisence    OTHER SURGICAL HISTORY Right 11/11/14    EXCISION OF CYST RIGHT SHOULDER    OTHER SURGICAL HISTORY  04/08/15    surgical revision medtronic cervical spinal cord stimulator scar at anchor site due to wound dehisance    OTHER SURGICAL HISTORY  04/25/2018    spinal cord stimulator battery replacement due to end of life    NC REVISE/REMOVE NEUROSTIM/ N/A 4/25/2018    SPINAL CORD STIMULATOR BATTERY REPLACEMENT DUE TO END OF LIFE performed by Christel Ash DO at [address]:    Family History   Problem Relation Age of Onset    Other Mother      SOCIAL HISTORY:    Social History     Socioeconomic History    Marital status:      Spouse name: Harshal Carrasco    Number of children: Not on file    Years of education: 10    Highest education level: Not on file   Occupational History    Not on file   Tobacco Use    Smoking status: Every Day     Packs/day: 0.50     Years: 42.00     Pack years: 21.00     Types: Cigarettes     Start date: 1/1/1970     Passive exposure: Current    Smokeless tobacco: Never   Vaping Use    Vaping Use: Some days   Substance and Sexual Activity    Alcohol use: No     Alcohol/week: 0.0 standard drinks    Drug use: No    Sexual activity: Not Currently   Other Topics Concern    Not on file   Social History Narrative    Not on file     Social Determinants of Health     Financial Resource Strain: Low Risk     Difficulty of Paying Living Expenses: Not hard at all   Food Insecurity: No Food Insecurity    Worried About Running Out of Food in the Last Year: Never true    Ran Out of Food in the Last Year: Never true   Transportation Needs: Not on file   Physical Activity: Not on file   Stress: Not on file   Social Connections: Not on file   Intimate Partner Violence: Not on file   Housing Stability: Not on file    TOBACCO:   reports that she has been smoking cigarettes. She started smoking about 52 years ago. She has a 21.00 pack-year smoking history. She has been exposed to tobacco smoke. She has never used smokeless tobacco.  ETOH:   reports no history of alcohol use. MEDICATIONS:   Prior to Admission medications    Medication Sig Start Date End Date Taking?  Authorizing Provider   sertraline (ZOLOFT) 100 MG tablet  9/28/22   Historical Provider, MD   zolpidem (AMBIEN) 10 MG tablet  11/4/22   Historical Provider, MD   divalproex (DEPAKOTE) 250 MG DR tablet  11/15/22   Historical Provider, MD   pantoprazole (PROTONIX) 40 MG tablet Take 1 tablet by mouth every morning (before breakfast) 11/18/22   Tamera Ling MD   albuterol sulfate HFA (VENTOLIN HFA) 108 (90 Base) MCG/ACT inhaler Inhale 2 puffs into the lungs 4 times daily as needed for Wheezing Substitute as needed 11/18/22   Tamera Ling MD   ipratropium-albuterol (DUONEB) 0.5-2.5 (3) MG/3ML SOLN nebulizer solution Inhale 3 mLs into the lungs every 4 hours (while awake) 11/18/22   Martha Matos MD   sucralfate (CARAFATE) 1 GM tablet Take 1 tablet by mouth in the morning, at noon, and at bedtime 11/17/22   Airam Joseph MD   divalproex (DEPAKOTE SPRINKLE) 125 MG capsule Take 2 capsules by mouth 2 times daily 11/8/22   Elizabeth Lee MD   albuterol (PROVENTIL) (2.5 MG/3ML) 0.083% nebulizer solution Take 3 mLs by nebulization every 6 hours as needed for Wheezing 11/8/22   Elizabeth Lee MD   risperiDONE (RISPERDAL) 1 MG tablet Take 1 mg by mouth 2 times daily    Historical Provider, MD   sertraline (ZOLOFT) 25 MG tablet Take 25 mg by mouth daily    Historical Provider, MD   nicotine (NICODERM CQ) 21 MG/24HR Place 1 patch onto the skin every 24 hours    Historical Provider, MD   Respiratory Therapy Supplies (NEBULIZER) TANIKA 1 Device by Does not apply route daily 11/6/22   Steve Caldwell, DO   Compression Stockings MISC by Does not apply route Below knee, 20 mmHg bilaterally 9/20/22   Anthony Perez MD   HYDROmorphone (DILAUDID) 4 MG tablet TAKE 1 TABLET BY MOUTH EVERY 6 HOURS AS NEEDED FOR PAIN 8/30/22   Historical Provider, MD   pregabalin (LYRICA) 75 MG capsule TAKE 1 CAPSULE BY MOUTH THREE TIMES DAILY 8/30/22   Historical Provider, MD   melatonin 3 MG TABS tablet Take 1 tablet by mouth daily 8/29/22 10/28/22  AYESHA Naik - CNP   nortriptyline (PAMELOR) 10 MG capsule TAKE 1 CAPSULE BY MOUTH EVERY NIGHT 6/1/21 8/29/22  Anita Perez,    ondansetron (ZOFRAN-ODT) 4 MG disintegrating tablet Take 1 tablet by mouth 3 times daily as needed for Nausea or Vomiting 5/14/21   Maria L Sosa MD   Nutritional Supplements (ENSURE COMPLETE SHAKE) LIQD Take 1 Can by mouth 2 times daily 5/14/21   Maria L Sosa MD   vitamin D (CHOLECALCIFEROL) 1000 UNIT TABS tablet Take 1,000 Units by mouth daily    Historical Provider, MD   Multiple Vitamins-Minerals (MULTIVITAMIN PO) Take by mouth    Historical Provider, MD        ALLERGIES: Sulfamethazine and Ancef [cefazolin sodium]    REVIEW OF SYSTEM:   ROS as noted in HPI, 12 point ROS reviewed and otherwise negative. OBJECTIVE  PHYSICAL EXAM:   Vitals:    12/13/22 1041 12/13/22 1228 12/13/22 1336 12/13/22 1500   BP: 112/68 (!) 96/59 117/71 (!) 133/59   Pulse:  100 99 100   Resp:  20 22 20   Temp:       SpO2: 93% 93% 98% 95%   Weight:           General appearance: alert, appears stated age and cooperative  CONSTITUTIONAL:  no apparent distress  ENT:  normocephalic, without obvious abnormality, atraumatic  NECK:  supple, symmetrical, trachea midline  Heart: regular rate and rhythm, S1, S2 normal   Lungs: diminished and scattered wheezes. Abdomen: soft lax, not tender, not distended, positive bowel sounds  Extremities: extremities normal, atraumatic, no cyanosis, edema  Skin: Normal skin color. No rashes or lesions. Neurologic:  Neurovascularly intact without any focal sensory/motor deficits. Cranial nerves: II-XII intact, grossly non-focal.  Psychiatric: Alert and oriented, thought content appropriate, normal insight, affect      DATA:     Diagnostic tests reviewed for today's visit:    Most recent labs and imaging results reviewed.    Labs:   Recent Results (from the past 72 hour(s))   CBC with Auto Differential    Collection Time: 12/13/22 11:01 AM   Result Value Ref Range    WBC 14.4 (H) 4.5 - 11.5 E9/L    RBC 4.15 3.50 - 5.50 E12/L    Hemoglobin 13.0 11.5 - 15.5 g/dL    Hematocrit 40.9 34.0 - 48.0 %    MCV 98.6 80.0 - 99.9 fL    MCH 31.3 26.0 - 35.0 pg    MCHC 31.8 (L) 32.0 - 34.5 %    RDW 13.8 11.5 - 15.0 fL    Platelets 419 951 - 291 E9/L    MPV 10.2 7.0 - 12.0 fL    Neutrophils % 90.4 (H) 43.0 - 80.0 %    Lymphocytes % 4.4 (L) 20.0 - 42.0 %    Monocytes % 5.2 2.0 - 12.0 %    Eosinophils % 0.0 0.0 - 6.0 %    Basophils % 0.3 0.0 - 2.0 %    Neutrophils Absolute 12.96 (H) 1.80 - 7.30 E9/L    Lymphocytes Absolute 0.58 (L) 1.50 - 4.00 E9/L    Monocytes Absolute 0.72 0.10 - 0.95 E9/L    Eosinophils Absolute 0.00 (L) 0.05 - 0.50 E9/L    Basophils Absolute 0.00 0.00 - 0.20 E9/L    Anisocytosis 1+     Poikilocytes 1+     Ovalocytes 1+    Comprehensive Metabolic Panel    Collection Time: 12/13/22 11:01 AM   Result Value Ref Range    Sodium 135 132 - 146 mmol/L    Potassium 4.6 3.5 - 5.0 mmol/L    Chloride 99 98 - 107 mmol/L    CO2 19 (L) 22 - 29 mmol/L    Anion Gap 17 (H) 7 - 16 mmol/L    Glucose 120 (H) 74 - 99 mg/dL    BUN 17 6 - 23 mg/dL    Creatinine 0.6 0.5 - 1.0 mg/dL    Est, Glom Filt Rate >60 >=60 mL/min/1.73    Calcium 9.1 8.6 - 10.2 mg/dL    Total Protein 7.1 6.4 - 8.3 g/dL    Albumin 4.0 3.5 - 5.2 g/dL    Total Bilirubin 0.4 0.0 - 1.2 mg/dL    Alkaline Phosphatase 92 35 - 104 U/L    ALT 31 0 - 32 U/L    AST 50 (H) 0 - 31 U/L   Troponin    Collection Time: 12/13/22 11:01 AM   Result Value Ref Range    Troponin, High Sensitivity 32 (H) 0 - 9 ng/L   Urinalysis with Microscopic    Collection Time: 12/13/22 11:01 AM   Result Value Ref Range    Color, UA Yellow Straw/Yellow    Clarity, UA Clear Clear    Glucose, Ur Negative Negative mg/dL    Bilirubin Urine Negative Negative    Ketones, Urine Negative Negative mg/dL    Specific Gravity, UA >=1.030 1.005 - 1.030    Blood, Urine SMALL (A) Negative    pH, UA 5.5 5.0 - 9.0    Protein, UA TRACE Negative mg/dL    Urobilinogen, Urine 0.2 <2.0 E.U./dL    Nitrite, Urine Negative Negative    Leukocyte Esterase, Urine Negative Negative    WBC, UA NONE 0 - 5 /HPF    RBC, UA 2-5 0 - 2 /HPF    Bacteria, UA NONE SEEN None Seen /HPF   URINE DRUG SCREEN    Collection Time: 12/13/22 11:01 AM   Result Value Ref Range    Amphetamine Screen, Urine NOT DETECTED Negative <1000 ng/mL    Barbiturate Screen, Ur NOT DETECTED Negative < 200 ng/mL    Benzodiazepine Screen, Urine NOT DETECTED Negative < 200 ng/mL    Cannabinoid Scrn, Ur NOT DETECTED Negative < 50ng/mL    Cocaine Metabolite Screen, Urine NOT DETECTED Negative < 300 ng/mL    Opiate Scrn, Ur POSITIVE (A) Negative < 300ng/mL    PCP Screen, Urine NOT DETECTED Negative < 25 ng/mL    Methadone Screen, Urine NOT DETECTED Negative <300 ng/mL    Oxycodone Urine NOT DETECTED Negative <100 ng/mL    FENTANYL SCREEN, URINE NOT DETECTED Negative <1 ng/mL    Drug Screen Comment: see below    Serum Drug Screen    Collection Time: 12/13/22 11:01 AM   Result Value Ref Range    Ethanol Lvl <10 mg/dL    Acetaminophen Level <5.0 (L) 10.0 - 18.1 mcg/mL    Salicylate, Serum <6.7 0.0 - 30.0 mg/dL    TCA Scrn NEGATIVE Cutoff:300 ng/mL   COVID-19, Rapid    Collection Time: 12/13/22 11:01 AM    Specimen: Nasopharyngeal Swab   Result Value Ref Range    SARS-CoV-2, NAAT Not Detected Not Detected   RAPID INFLUENZA A/B ANTIGENS    Collection Time: 12/13/22 11:01 AM    Specimen: Nasopharyngeal   Result Value Ref Range    Influenza A by PCR Not Detected Not Detected    Influenza B by PCR Not Detected Not Detected   EKG 12 Lead    Collection Time: 12/13/22 11:23 AM   Result Value Ref Range    Ventricular Rate 99 BPM    Atrial Rate 99 BPM    P-R Interval 180 ms    QRS Duration 72 ms    Q-T Interval 316 ms    QTc Calculation (Bazett) 405 ms    P Axis 80 degrees    R Axis -51 degrees    T Axis 52 degrees   Brain Natriuretic Peptide    Collection Time: 12/13/22 12:56 PM   Result Value Ref Range    Pro- (H) 0 - 125 pg/mL   Magnesium    Collection Time: 12/13/22 12:56 PM   Result Value Ref Range    Magnesium 2.1 1.6 - 2.6 mg/dL   D-Dimer, Quantitative    Collection Time: 12/13/22 12:56 PM   Result Value Ref Range    D-Dimer, Quant 428 ng/mL DDU   Blood Gas, Arterial    Collection Time: 12/13/22  1:04 PM   Result Value Ref Range    Date Analyzed [id number]     Time Analyzed 1084     Source: Blood Arterial     pH, Blood Gas 7.351 7.350 - 7.450    PCO2 56.6 (H) 35.0 - 45.0 mmHg    PO2 105.1 (H) 75.0 - 100.0 mmHg    HCO3 30.6 (H) 22.0 - 26.0 mmol/L    B.E. 3.6 (H) -3.0 - 3.0 mmol/L    O2 Sat 97.2 92.0 - 98.5 %    O2Hb 92.7 (L) 94.0 - 97.0 %    COHb 4.3 (H) 0.0 - 1.5 %    MetHb 0.3 0.0 - 1.5 %    HHb 2.7 0.0 - 5.0 %    tHb (est) 14.0 11.5 - 16.5 g/dL    Mode NC-6  L     Comment       Edited to add no critical values and sent to patient chart 12/13/22 @ 13:16 (mms)    Date Of Collection      Time Collected      Pt Temp 37.0 C     ID [id number]     Lab [id number]     Critical(s) Notified . No Critical Values    Troponin    Collection Time: 12/13/22  2:31 PM   Result Value Ref Range    Troponin, High Sensitivity 36 (H) 0 - 9 ng/L     Oupatient labs:  Lab Results   Component Value Date    CHOL 190 11/08/2022    TRIG 96 11/08/2022    HDL 76 11/10/2022    LDLCALC 92 11/10/2022    TSH 1.290 11/10/2022    INR 1.1 07/24/2022    LABA1C 6.1 12/19/2017       Urinalysis:    Lab Results   Component Value Date/Time    NITRU Negative 12/13/2022 11:01 AM    WBCUA NONE 12/13/2022 11:01 AM    WBCUA 1-3 06/13/2011 10:29 AM    BACTERIA NONE SEEN 12/13/2022 11:01 AM    RBCUA 2-5 12/13/2022 11:01 AM    RBCUA 0-1 01/29/2014 02:50 PM    BLOODU SMALL 12/13/2022 11:01 AM    SPECGRAV >=1.030 12/13/2022 11:01 AM    GLUCOSEU Negative 12/13/2022 11:01 AM    GLUCOSEU NEGATIVE 06/13/2011 10:29 AM       Imaging:  XR CHEST PORTABLE    Result Date: 12/13/2022  EXAMINATION: ONE XRAY VIEW OF THE CHEST 12/13/2022 12:58 pm COMPARISON: None. HISTORY: ORDERING SYSTEM PROVIDED HISTORY: shortness of breath TECHNOLOGIST PROVIDED HISTORY: Reason for exam:->shortness of breath What reading provider will be dictating this exam?->CRC FINDINGS: Mild opacity in the right mid lung and left lung base. The heart is not enlarged. There is no pleural effusion or pneumothorax. Mild patchy infiltrates in the right mid and left lower lung. CTA PULMONARY W CONTRAST    Result Date: 12/13/2022  EXAMINATION: CTA OF THE CHEST 12/13/2022 4:16 pm TECHNIQUE: CTA of the chest was performed after the administration of intravenous contrast.  Multiplanar reformatted images are provided for review. MIP images are provided for review.  Automated exposure control, iterative reconstruction, and/or weight based adjustment of the mA/kV was utilized to reduce the radiation dose to as low as reasonably achievable. COMPARISON: None. HISTORY: ORDERING SYSTEM PROVIDED HISTORY: pe TECHNOLOGIST PROVIDED HISTORY: Reason for exam:->pe Decision Support Exception - unselect if not a suspected or confirmed emergency medical condition->Emergency Medical Condition (MA) What reading provider will be dictating this exam?->CRC FINDINGS: Pulmonary Arteries: Pulmonary arteries are adequately opacified for evaluation. No evidence of intraluminal filling defect to suggest pulmonary embolism. Main pulmonary artery is normal in caliber. Mediastinum: No evidence of mediastinal lymphadenopathy. Cardiomegaly. There is no acute abnormality of the thoracic aorta. Lungs/pleura: Diffuse right lung opacities. Probable left lower lobe atelectasis. .  No evidence of pleural effusion or pneumothorax. Upper Abdomen: Limited images of the upper abdomen are unremarkable. Soft Tissues/Bones: No acute bone or soft tissue abnormality. Degenerative changes thoracic spine. No evidence of pulmonary embolism. Diffuse right lung opacities worrisome for pneumonia. Cardiomegaly. CTA PULMONARY W CONTRAST   Final Result   No evidence of pulmonary embolism. Diffuse right lung opacities worrisome for pneumonia. Cardiomegaly. XR CHEST PORTABLE   Final Result   Mild patchy infiltrates in the right mid and left lower lung. ASSESSMENT AND PLAN  Active Problems:    * No active hospital problems. *  Resolved Problems:    * No resolved hospital problems.  *    - COPD exacerbation  - Pneumonia   - Elevated troponin   - GERD     Plan:  - admit to tele monitoring   - serial troponin and EKG  - check Echo  - started IV ceftriaxone/Doxycycline in ER, will continue the same and follow cultures   - solumedrol   - Duoneb   - Oxygen supplement        VTE Prophylaxis: low molecular weight heparin -    DVT Prophylaxis: [x]Lovenox []Heparin []PCD [] [address] []Encouraged ambulation    Diet: No diet orders on file  Code Status: Prior  Surrogate decision maker confirmed with patient:  Primary Emergency Contact: Lisa Schmidt, Home Phone: [phone]      Disposition: []Med/Surg [x] Intermediate [] ICU/CCU   Admit status: [] Observation [x] Inpatient       Additional work up or/and treatment plan may be added today or thereafter based on clinical progression. I am managing a portion of pt care. Some medical issues are handled by other specialists. Additional work up and treatment should be done by my colleague hospitalist and at out pt setting by pt PCP and other out pt providers.      Bg Pedraza MD  DATE: December 13, 2022

## 2022-12-13 NOTE — ED NOTES
Department of Emergency Medicine  FIRST PROVIDER TRIAGE NOTE             Independent MLP           12/13/22  10:38 AM EST    Date of Encounter: 12/13/22   MRN: [id number]      HPI: Le Hu is a 65 y.o. female who presents to the ED for Shortness of Breath (Sob, anxiety, not sleeping for 2 days)     Pt presenting with AMS, sob, anxiety, multiple falls, unable to sleep x 2 days. New psych med last week    ROS: Negative for cp or abd pain. PE: Gen Appearance/Constitutional: alert  HEENT: NC/NT. PERRLA,  Airway patent. Initial Plan of Care: All treatment areas with department are currently occupied. Plan to order/Initiate the following while awaiting opening in ED: labs, EKG, and imaging studies.   Initiate Treatment-Testing, Proceed toTreatment Area When Bed Available for ED Attending/MLP to Continue Care    Electronically signed by Jose Luis Uribe PA-C   DD: 12/13/22      Jose Luis Uribe PA-C  12/13/22 1046

## 2022-12-14 DIAGNOSIS — F17.210 SMOKING GREATER THAN 40 PACK YEARS: ICD-10-CM

## 2022-12-14 DIAGNOSIS — J44.9 CHRONIC OBSTRUCTIVE PULMONARY DISEASE, UNSPECIFIED COPD TYPE (HCC): ICD-10-CM

## 2022-12-14 LAB
ALBUMIN SERPL-MCNC: 3.4 G/DL (ref 3.5–5.2)
ALP BLD-CCNC: 74 U/L (ref 35–104)
ALT SERPL-CCNC: 28 U/L (ref 0–32)
ANION GAP SERPL CALCULATED.3IONS-SCNC: 12 MMOL/L (ref 7–16)
ANISOCYTOSIS: ABNORMAL
AST SERPL-CCNC: 50 U/L (ref 0–31)
BASOPHILS ABSOLUTE: 0.03 E9/L (ref 0–0.2)
BASOPHILS RELATIVE PERCENT: 0.2 % (ref 0–2)
BILIRUB SERPL-MCNC: 0.4 MG/DL (ref 0–1.2)
BUN BLDV-MCNC: 14 MG/DL (ref 6–23)
BURR CELLS: ABNORMAL
CALCIUM SERPL-MCNC: 8.6 MG/DL (ref 8.6–10.2)
CHLORIDE BLD-SCNC: 101 MMOL/L (ref 98–107)
CO2: 26 MMOL/L (ref 22–29)
CREAT SERPL-MCNC: 0.5 MG/DL (ref 0.5–1)
EOSINOPHILS ABSOLUTE: 0 E9/L (ref 0.05–0.5)
EOSINOPHILS RELATIVE PERCENT: 0 % (ref 0–6)
GFR SERPL CREATININE-BSD FRML MDRD: >60 ML/MIN/1.73
GLUCOSE BLD-MCNC: 116 MG/DL (ref 74–99)
HCT VFR BLD CALC: 36.5 % (ref 34–48)
HEMOGLOBIN: 11.7 G/DL (ref 11.5–15.5)
IMMATURE GRANULOCYTES #: 0.04 E9/L
IMMATURE GRANULOCYTES %: 0.3 % (ref 0–5)
LYMPHOCYTES ABSOLUTE: 0.44 E9/L (ref 1.5–4)
LYMPHOCYTES RELATIVE PERCENT: 3.6 % (ref 20–42)
MCH RBC QN AUTO: 31.3 PG (ref 26–35)
MCHC RBC AUTO-ENTMCNC: 32.1 % (ref 32–34.5)
MCV RBC AUTO: 97.6 FL (ref 80–99.9)
MONOCYTES ABSOLUTE: 0.7 E9/L (ref 0.1–0.95)
MONOCYTES RELATIVE PERCENT: 5.8 % (ref 2–12)
NEUTROPHILS ABSOLUTE: 10.86 E9/L (ref 1.8–7.3)
NEUTROPHILS RELATIVE PERCENT: 90.1 % (ref 43–80)
OVALOCYTES: ABNORMAL
PDW BLD-RTO: 14 FL (ref 11.5–15)
PLATELET # BLD: 240 E9/L (ref 130–450)
PMV BLD AUTO: 10.5 FL (ref 7–12)
POIKILOCYTES: ABNORMAL
POTASSIUM REFLEX MAGNESIUM: 4.3 MMOL/L (ref 3.5–5)
RBC # BLD: 3.74 E12/L (ref 3.5–5.5)
SODIUM BLD-SCNC: 139 MMOL/L (ref 132–146)
TOTAL PROTEIN: 6 G/DL (ref 6.4–8.3)
TROPONIN, HIGH SENSITIVITY: 36 NG/L (ref 0–9)
TROPONIN, HIGH SENSITIVITY: 41 NG/L (ref 0–9)
WBC # BLD: 12.1 E9/L (ref 4.5–11.5)

## 2022-12-14 PROCEDURE — 6370000000 HC RX 637 (ALT 250 FOR IP): Performed by: FAMILY MEDICINE

## 2022-12-14 PROCEDURE — 94640 AIRWAY INHALATION TREATMENT: CPT

## 2022-12-14 PROCEDURE — 80053 COMPREHEN METABOLIC PANEL: CPT

## 2022-12-14 PROCEDURE — 6360000002 HC RX W HCPCS: Performed by: FAMILY MEDICINE

## 2022-12-14 PROCEDURE — 85025 COMPLETE CBC W/AUTO DIFF WBC: CPT

## 2022-12-14 PROCEDURE — 93306 TTE W/DOPPLER COMPLETE: CPT

## 2022-12-14 PROCEDURE — 6360000002 HC RX W HCPCS: Performed by: INTERNAL MEDICINE

## 2022-12-14 PROCEDURE — 6370000000 HC RX 637 (ALT 250 FOR IP): Performed by: INTERNAL MEDICINE

## 2022-12-14 PROCEDURE — 36415 COLL VENOUS BLD VENIPUNCTURE: CPT

## 2022-12-14 PROCEDURE — 93005 ELECTROCARDIOGRAM TRACING: CPT | Performed by: INTERNAL MEDICINE

## 2022-12-14 PROCEDURE — 2500000003 HC RX 250 WO HCPCS: Performed by: INTERNAL MEDICINE

## 2022-12-14 PROCEDURE — 2060000000 HC ICU INTERMEDIATE R&B

## 2022-12-14 PROCEDURE — 2700000000 HC OXYGEN THERAPY PER DAY

## 2022-12-14 PROCEDURE — 2580000003 HC RX 258: Performed by: INTERNAL MEDICINE

## 2022-12-14 PROCEDURE — 84484 ASSAY OF TROPONIN QUANT: CPT

## 2022-12-14 RX ORDER — GUAIFENESIN 600 MG/1
600 TABLET, EXTENDED RELEASE ORAL 2 TIMES DAILY
Status: DISCONTINUED | OUTPATIENT
Start: 2022-12-14 | End: 2022-12-14 | Stop reason: CLARIF

## 2022-12-14 RX ORDER — METHYLPREDNISOLONE SODIUM SUCCINATE 40 MG/ML
40 INJECTION, POWDER, LYOPHILIZED, FOR SOLUTION INTRAMUSCULAR; INTRAVENOUS EVERY 8 HOURS
Status: DISCONTINUED | OUTPATIENT
Start: 2022-12-14 | End: 2022-12-16

## 2022-12-14 RX ORDER — ALBUTEROL SULFATE 90 UG/1
AEROSOL, METERED RESPIRATORY (INHALATION)
Qty: 18 G | Refills: 0 | Status: SHIPPED | OUTPATIENT
Start: 2022-12-14

## 2022-12-14 RX ORDER — GUAIFENESIN 400 MG/1
400 TABLET ORAL 3 TIMES DAILY
Status: DISCONTINUED | OUTPATIENT
Start: 2022-12-14 | End: 2022-12-18 | Stop reason: HOSPADM

## 2022-12-14 RX ADMIN — SUCRALFATE 1 G: 1 TABLET ORAL at 11:58

## 2022-12-14 RX ADMIN — ONDANSETRON 4 MG: 4 TABLET, ORALLY DISINTEGRATING ORAL at 23:53

## 2022-12-14 RX ADMIN — RISPERIDONE 1 MG: 1 TABLET, FILM COATED ORAL at 08:12

## 2022-12-14 RX ADMIN — RISPERIDONE 1 MG: 1 TABLET, FILM COATED ORAL at 21:01

## 2022-12-14 RX ADMIN — PANTOPRAZOLE SODIUM 40 MG: 40 TABLET, DELAYED RELEASE ORAL at 05:30

## 2022-12-14 RX ADMIN — PREGABALIN 75 MG: 75 CAPSULE ORAL at 14:26

## 2022-12-14 RX ADMIN — RISPERIDONE 1 MG: 1 TABLET, FILM COATED ORAL at 14:26

## 2022-12-14 RX ADMIN — PREGABALIN 75 MG: 75 CAPSULE ORAL at 08:11

## 2022-12-14 RX ADMIN — DOXYCYCLINE 100 MG: 100 INJECTION, POWDER, LYOPHILIZED, FOR SOLUTION INTRAVENOUS at 05:32

## 2022-12-14 RX ADMIN — SUCRALFATE 1 G: 1 TABLET ORAL at 15:44

## 2022-12-14 RX ADMIN — METHYLPREDNISOLONE SODIUM SUCCINATE 40 MG: 40 INJECTION, POWDER, FOR SOLUTION INTRAMUSCULAR; INTRAVENOUS at 21:01

## 2022-12-14 RX ADMIN — DIVALPROEX SODIUM 250 MG: 250 TABLET, DELAYED RELEASE ORAL at 21:01

## 2022-12-14 RX ADMIN — IPRATROPIUM BROMIDE AND ALBUTEROL SULFATE 1 AMPULE: .5; 2.5 SOLUTION RESPIRATORY (INHALATION) at 15:55

## 2022-12-14 RX ADMIN — HYDROMORPHONE HYDROCHLORIDE 4 MG: 2 TABLET ORAL at 15:44

## 2022-12-14 RX ADMIN — IPRATROPIUM BROMIDE AND ALBUTEROL SULFATE 1 AMPULE: .5; 2.5 SOLUTION RESPIRATORY (INHALATION) at 11:34

## 2022-12-14 RX ADMIN — PREGABALIN 75 MG: 75 CAPSULE ORAL at 21:01

## 2022-12-14 RX ADMIN — SODIUM CHLORIDE, PRESERVATIVE FREE 10 ML: 5 INJECTION INTRAVENOUS at 08:15

## 2022-12-14 RX ADMIN — SODIUM CHLORIDE, PRESERVATIVE FREE 10 ML: 5 INJECTION INTRAVENOUS at 21:01

## 2022-12-14 RX ADMIN — DOXYCYCLINE 100 MG: 100 INJECTION, POWDER, LYOPHILIZED, FOR SOLUTION INTRAVENOUS at 14:40

## 2022-12-14 RX ADMIN — HYDROMORPHONE HYDROCHLORIDE 4 MG: 2 TABLET ORAL at 23:47

## 2022-12-14 RX ADMIN — SERTRALINE 25 MG: 50 TABLET, FILM COATED ORAL at 08:11

## 2022-12-14 RX ADMIN — METHYLPREDNISOLONE SODIUM SUCCINATE 40 MG: 40 INJECTION, POWDER, FOR SOLUTION INTRAMUSCULAR; INTRAVENOUS at 08:14

## 2022-12-14 RX ADMIN — SUCRALFATE 1 G: 1 TABLET ORAL at 05:30

## 2022-12-14 RX ADMIN — GUAIFENESIN 400 MG: 400 TABLET ORAL at 21:01

## 2022-12-14 RX ADMIN — MELATONIN 3 MG ORAL TABLET 3 MG: 3 TABLET ORAL at 21:01

## 2022-12-14 RX ADMIN — DIVALPROEX SODIUM 250 MG: 250 TABLET, DELAYED RELEASE ORAL at 08:13

## 2022-12-14 RX ADMIN — DIVALPROEX SODIUM 250 MG: 250 TABLET, DELAYED RELEASE ORAL at 14:26

## 2022-12-14 RX ADMIN — IPRATROPIUM BROMIDE AND ALBUTEROL SULFATE 1 AMPULE: .5; 2.5 SOLUTION RESPIRATORY (INHALATION) at 20:08

## 2022-12-14 RX ADMIN — IPRATROPIUM BROMIDE AND ALBUTEROL SULFATE 1 AMPULE: .5; 2.5 SOLUTION RESPIRATORY (INHALATION) at 07:37

## 2022-12-14 RX ADMIN — CEFTRIAXONE 1000 MG: 1 INJECTION, POWDER, FOR SOLUTION INTRAMUSCULAR; INTRAVENOUS at 14:29

## 2022-12-14 ASSESSMENT — PAIN DESCRIPTION - FREQUENCY
FREQUENCY: CONTINUOUS
FREQUENCY: INTERMITTENT

## 2022-12-14 ASSESSMENT — PAIN DESCRIPTION - LOCATION
LOCATION: CHEST
LOCATION: ABDOMEN;CHEST

## 2022-12-14 ASSESSMENT — PAIN DESCRIPTION - DESCRIPTORS
DESCRIPTORS: STABBING
DESCRIPTORS: STABBING;ACHING;BURNING

## 2022-12-14 ASSESSMENT — PAIN SCALES - GENERAL
PAINLEVEL_OUTOF10: 9
PAINLEVEL_OUTOF10: 8
PAINLEVEL_OUTOF10: 9
PAINLEVEL_OUTOF10: 9

## 2022-12-14 ASSESSMENT — PAIN - FUNCTIONAL ASSESSMENT: PAIN_FUNCTIONAL_ASSESSMENT: ACTIVITIES ARE NOT PREVENTED

## 2022-12-14 ASSESSMENT — PAIN DESCRIPTION - ONSET: ONSET: ON-GOING

## 2022-12-14 ASSESSMENT — PAIN DESCRIPTION - PAIN TYPE: TYPE: ACUTE PAIN

## 2022-12-14 NOTE — CARE COORDINATION
Spoke with patient. She lives at home with spouse. She uses a cane. She also tells me that her spouse provides hands on assistance for mobility at times. She has 4-5 steps to enter the home. She has a counselor named Atif Barrera unsure what agency he is at, previous documentation noted appointments at [facility]. Spouse is able to provide 24 hour support. PCP is Dr. Kiha Felipe. She plans to return home when released. For questions I can be reached at [phone].  Kristi Snowden, [place]

## 2022-12-14 NOTE — ACP (ADVANCE CARE PLANNING)
Advance Care Planning   Healthcare Decision Maker:    Primary Decision Maker: Shamar Davenportll - [phone]    Click here to complete Healthcare Decision Makers including selection of the Healthcare Decision Maker Relationship (ie \"Primary\").

## 2022-12-14 NOTE — PROGRESS NOTES
Hospitalist Progress Note      SYNOPSIS: Patient admitted on 2022 for SOB (shortness of breath)  65y.o. year old female  who  has a past medical history of Anxiety, Arthritis, Blood circulation, collateral, Cancer (HCC), Chronic back pain, Colitis, Complex regional pain syndrome type 1 of right lower extremity, COPD (chronic obstructive pulmonary disease) ([facility] [address] Depression, Diabetes mellitus ([facility] [address] Elevated liver function tests, GERD (gastroesophageal reflux disease), Headache(784.0), Hyperlipidemia, Kidney stone, Movement disorder, Neuromuscular disorder ([facility] [address] Osteoarthritis, Other disorders of kidney and ureter in diseases classified elsewhere, Pneumonia, Psychiatric problem, and RSD (reflex sympathetic dystrophy). presents with SOB     SUBJECTIVE:  Patient still reports still coughing greenish sputum    Temp (24hrs), Av.4 °F (36.9 °C), Min:97.9 °F (36.6 °C), Max:98.9 °F (37.2 °C)    DIET: ADULT DIET; Dysphagia - Soft and Bite Sized  ADULT ORAL NUTRITION SUPPLEMENT; Breakfast, Lunch, Dinner; Standard High Calorie/High Protein Oral Supplement  CODE: Full Code    Intake/Output Summary (Last 24 hours) at 2022 1841  Last data filed at 2022 0807  Gross per 24 hour   Intake 1724.09 ml   Output --   Net 1724.09 ml       OBJECTIVE:    BP (!) 118/57   Pulse 96   Temp 98.7 °F (37.1 °C) (Temporal)   Resp 18   Ht 5' 2\" (1.575 m)   Wt 121 lb 4.1 oz (55 kg)   LMP  (LMP Unknown)   SpO2 95%   BMI 22.18 kg/m²     General appearance: No apparent distress, appears stated age and cooperative. HEENT:  Conjunctivae/corneas clear. Neck: Supple. No jugular venous distention. Respiratory: Coarse breath sounds bilaterally with rhonchi, no wheezes or rales appreciated. Cardiovascular: Regular rate rhythm, normal S1-S2  Abdomen: Soft, nontender, nondistended  Musculoskeletal: No clubbing, cyanosis, no bilateral lower extremity edema. Brisk capillary refill.    Skin:  No rashes  on visible skin  Neurologic: awake, alert and following commands     ASSESSMENT:  Acute COPD with exacerbation  Bacterial pneumonia  Elevated troponin  Gastroesophageal reflux disease         PLAN:  Patient continues to have cough with congestion. I will increase IV Solu-Medrol to every 8 hours. I will add Mucinex 600 mg twice daily. Keep oxygen saturation above 92%. Continue IV Rocephin and doxycycline. Continue Lovenox for DVT prophylaxis  Continue Protonix for GI prophylaxis. DISPOSITION: Plan is to discharge home when clinically stable. Anticipate in 48 to 72 hours. Medications:  REVIEWED DAILY    Infusion Medications    sodium chloride 100 mL/hr at 12/14/22 0807    sodium chloride       Scheduled Medications    ipratropium-albuterol  1 ampule Inhalation Q4H WA    divalproex  250 mg Oral TID    melatonin  3 mg Oral Daily    pantoprazole  40 mg Oral QAM AC    pregabalin  75 mg Oral TID    risperiDONE  1 mg Oral TID    sertraline  25 mg Oral Daily    sucralfate  1 g Oral TID AC    sodium chloride flush  10 mL IntraVENous 2 times per day    enoxaparin  30 mg SubCUTAneous Daily    cefTRIAXone (ROCEPHIN) IV  1,000 mg IntraVENous Q24H    doxycycline (VIBRAMYCIN) IV  100 mg IntraVENous Q12H    methylPREDNISolone  40 mg IntraVENous Daily     PRN Meds: HYDROmorphone, sodium chloride flush, sodium chloride, ondansetron **OR** ondansetron, magnesium hydroxide, acetaminophen **OR** acetaminophen    Labs:     Recent Labs     12/13/22  1101 12/14/22  0449   WBC 14.4* 12.1*   HGB 13.0 11.7   HCT 40.9 36.5    240       Recent Labs     12/13/22  1101 12/14/22  0449    139   K 4.6 4.3   CL 99 101   CO2 19* 26   BUN 17 14   CREATININE 0.6 0.5   CALCIUM 9.1 8.6       Recent Labs     12/13/22  1101 12/14/22  0449   PROT 7.1 6.0*   ALKPHOS 92 74   ALT 31 28   AST 50* 50*   BILITOT 0.4 0.4       No results for input(s): INR in the last 72 hours.     No results for input(s): Pamla Clines in the last 72 hours.    Chronic labs:    Lab Results   Component Value Date    CHOL 190 11/08/2022    TRIG 96 11/08/2022    HDL 76 11/10/2022    LDLCALC 92 11/10/2022    TSH 1.290 11/10/2022    INR 1.1 07/24/2022    LABA1C 6.1 12/19/2017       Radiology: REVIEWED DAILY    +++++++++++++++++++++++++++++++++++++++++++++++++  Chris Ledesma MD  [address]  +++++++++++++++++++++++++++++++++++++++++++++++++  NOTE: This report was transcribed using voice recognition software. Every effort was made to ensure accuracy; however, inadvertent computerized transcription errors may be present.

## 2022-12-14 NOTE — PROGRESS NOTES
Pt refused all PO HS medications. Stated she will take PO medications tomorrow. She will also take all IV antibiotics as scheduled.  Electronically signed by Xiomara Hill RN on 22 at 10:06 PM EST

## 2022-12-15 LAB
ANION GAP SERPL CALCULATED.3IONS-SCNC: 8 MMOL/L (ref 7–16)
BASOPHILS ABSOLUTE: 0.01 E9/L (ref 0–0.2)
BASOPHILS RELATIVE PERCENT: 0.1 % (ref 0–2)
BUN BLDV-MCNC: 11 MG/DL (ref 6–23)
CALCIUM SERPL-MCNC: 9.1 MG/DL (ref 8.6–10.2)
CHLORIDE BLD-SCNC: 97 MMOL/L (ref 98–107)
CO2: 30 MMOL/L (ref 22–29)
CREAT SERPL-MCNC: 0.4 MG/DL (ref 0.5–1)
EKG ATRIAL RATE: 77 BPM
EKG ATRIAL RATE: 99 BPM
EKG P AXIS: 100 DEGREES
EKG P AXIS: 80 DEGREES
EKG P-R INTERVAL: 176 MS
EKG P-R INTERVAL: 180 MS
EKG Q-T INTERVAL: 316 MS
EKG Q-T INTERVAL: 350 MS
EKG QRS DURATION: 60 MS
EKG QRS DURATION: 72 MS
EKG QTC CALCULATION (BAZETT): 396 MS
EKG QTC CALCULATION (BAZETT): 405 MS
EKG R AXIS: -26 DEGREES
EKG R AXIS: -51 DEGREES
EKG T AXIS: 44 DEGREES
EKG T AXIS: 52 DEGREES
EKG VENTRICULAR RATE: 77 BPM
EKG VENTRICULAR RATE: 99 BPM
EOSINOPHILS ABSOLUTE: 0 E9/L (ref 0.05–0.5)
EOSINOPHILS RELATIVE PERCENT: 0 % (ref 0–6)
GFR SERPL CREATININE-BSD FRML MDRD: >60 ML/MIN/1.73
GLUCOSE BLD-MCNC: 151 MG/DL (ref 74–99)
HCT VFR BLD CALC: 36.1 % (ref 34–48)
HEMOGLOBIN: 12.2 G/DL (ref 11.5–15.5)
IMMATURE GRANULOCYTES #: 0.05 E9/L
IMMATURE GRANULOCYTES %: 0.5 % (ref 0–5)
LYMPHOCYTES ABSOLUTE: 0.32 E9/L (ref 1.5–4)
LYMPHOCYTES RELATIVE PERCENT: 3 % (ref 20–42)
MCH RBC QN AUTO: 31.4 PG (ref 26–35)
MCHC RBC AUTO-ENTMCNC: 33.8 % (ref 32–34.5)
MCV RBC AUTO: 92.8 FL (ref 80–99.9)
MONOCYTES ABSOLUTE: 0.15 E9/L (ref 0.1–0.95)
MONOCYTES RELATIVE PERCENT: 1.4 % (ref 2–12)
NEUTROPHILS ABSOLUTE: 9.98 E9/L (ref 1.8–7.3)
NEUTROPHILS RELATIVE PERCENT: 95 % (ref 43–80)
OVALOCYTES: ABNORMAL
PDW BLD-RTO: 13.8 FL (ref 11.5–15)
PLATELET # BLD: 255 E9/L (ref 130–450)
PMV BLD AUTO: 10.1 FL (ref 7–12)
POIKILOCYTES: ABNORMAL
POLYCHROMASIA: ABNORMAL
POTASSIUM SERPL-SCNC: 4.6 MMOL/L (ref 3.5–5)
RBC # BLD: 3.89 E12/L (ref 3.5–5.5)
SODIUM BLD-SCNC: 135 MMOL/L (ref 132–146)
WBC # BLD: 10.5 E9/L (ref 4.5–11.5)

## 2022-12-15 PROCEDURE — 6370000000 HC RX 637 (ALT 250 FOR IP): Performed by: FAMILY MEDICINE

## 2022-12-15 PROCEDURE — 2580000003 HC RX 258: Performed by: INTERNAL MEDICINE

## 2022-12-15 PROCEDURE — 85025 COMPLETE CBC W/AUTO DIFF WBC: CPT

## 2022-12-15 PROCEDURE — 2500000003 HC RX 250 WO HCPCS: Performed by: INTERNAL MEDICINE

## 2022-12-15 PROCEDURE — 6370000000 HC RX 637 (ALT 250 FOR IP): Performed by: INTERNAL MEDICINE

## 2022-12-15 PROCEDURE — 6360000002 HC RX W HCPCS: Performed by: INTERNAL MEDICINE

## 2022-12-15 PROCEDURE — 94640 AIRWAY INHALATION TREATMENT: CPT

## 2022-12-15 PROCEDURE — 2060000000 HC ICU INTERMEDIATE R&B

## 2022-12-15 PROCEDURE — 2700000000 HC OXYGEN THERAPY PER DAY

## 2022-12-15 PROCEDURE — 36415 COLL VENOUS BLD VENIPUNCTURE: CPT

## 2022-12-15 PROCEDURE — 6360000002 HC RX W HCPCS: Performed by: FAMILY MEDICINE

## 2022-12-15 PROCEDURE — 80048 BASIC METABOLIC PNL TOTAL CA: CPT

## 2022-12-15 PROCEDURE — 93010 ELECTROCARDIOGRAM REPORT: CPT | Performed by: INTERNAL MEDICINE

## 2022-12-15 RX ORDER — ENOXAPARIN SODIUM 100 MG/ML
40 INJECTION SUBCUTANEOUS DAILY
Status: DISCONTINUED | OUTPATIENT
Start: 2022-12-16 | End: 2022-12-18 | Stop reason: HOSPADM

## 2022-12-15 RX ADMIN — SERTRALINE 25 MG: 50 TABLET, FILM COATED ORAL at 08:38

## 2022-12-15 RX ADMIN — RISPERIDONE 1 MG: 1 TABLET, FILM COATED ORAL at 14:47

## 2022-12-15 RX ADMIN — SODIUM CHLORIDE, PRESERVATIVE FREE 10 ML: 5 INJECTION INTRAVENOUS at 21:12

## 2022-12-15 RX ADMIN — DIVALPROEX SODIUM 250 MG: 250 TABLET, DELAYED RELEASE ORAL at 08:38

## 2022-12-15 RX ADMIN — RISPERIDONE 1 MG: 1 TABLET, FILM COATED ORAL at 08:38

## 2022-12-15 RX ADMIN — PREGABALIN 75 MG: 75 CAPSULE ORAL at 08:38

## 2022-12-15 RX ADMIN — IPRATROPIUM BROMIDE AND ALBUTEROL SULFATE 1 AMPULE: .5; 2.5 SOLUTION RESPIRATORY (INHALATION) at 15:42

## 2022-12-15 RX ADMIN — SODIUM CHLORIDE, PRESERVATIVE FREE 10 ML: 5 INJECTION INTRAVENOUS at 09:00

## 2022-12-15 RX ADMIN — METHYLPREDNISOLONE SODIUM SUCCINATE 40 MG: 40 INJECTION, POWDER, FOR SOLUTION INTRAMUSCULAR; INTRAVENOUS at 11:23

## 2022-12-15 RX ADMIN — GUAIFENESIN 400 MG: 400 TABLET ORAL at 21:10

## 2022-12-15 RX ADMIN — GUAIFENESIN 400 MG: 400 TABLET ORAL at 08:38

## 2022-12-15 RX ADMIN — DIVALPROEX SODIUM 250 MG: 250 TABLET, DELAYED RELEASE ORAL at 21:11

## 2022-12-15 RX ADMIN — CEFTRIAXONE 1000 MG: 1 INJECTION, POWDER, FOR SOLUTION INTRAMUSCULAR; INTRAVENOUS at 14:47

## 2022-12-15 RX ADMIN — DIVALPROEX SODIUM 250 MG: 250 TABLET, DELAYED RELEASE ORAL at 14:47

## 2022-12-15 RX ADMIN — IPRATROPIUM BROMIDE AND ALBUTEROL SULFATE 1 AMPULE: .5; 2.5 SOLUTION RESPIRATORY (INHALATION) at 07:57

## 2022-12-15 RX ADMIN — SUCRALFATE 1 G: 1 TABLET ORAL at 11:23

## 2022-12-15 RX ADMIN — PANTOPRAZOLE SODIUM 40 MG: 40 TABLET, DELAYED RELEASE ORAL at 05:07

## 2022-12-15 RX ADMIN — PREGABALIN 75 MG: 75 CAPSULE ORAL at 14:47

## 2022-12-15 RX ADMIN — METHYLPREDNISOLONE SODIUM SUCCINATE 40 MG: 40 INJECTION, POWDER, FOR SOLUTION INTRAMUSCULAR; INTRAVENOUS at 21:10

## 2022-12-15 RX ADMIN — GUAIFENESIN 400 MG: 400 TABLET ORAL at 14:47

## 2022-12-15 RX ADMIN — IPRATROPIUM BROMIDE AND ALBUTEROL SULFATE 1 AMPULE: .5; 2.5 SOLUTION RESPIRATORY (INHALATION) at 20:08

## 2022-12-15 RX ADMIN — IPRATROPIUM BROMIDE AND ALBUTEROL SULFATE 1 AMPULE: .5; 2.5 SOLUTION RESPIRATORY (INHALATION) at 12:09

## 2022-12-15 RX ADMIN — METHYLPREDNISOLONE SODIUM SUCCINATE 40 MG: 40 INJECTION, POWDER, FOR SOLUTION INTRAMUSCULAR; INTRAVENOUS at 02:53

## 2022-12-15 RX ADMIN — DOXYCYCLINE 100 MG: 100 INJECTION, POWDER, LYOPHILIZED, FOR SOLUTION INTRAVENOUS at 02:51

## 2022-12-15 RX ADMIN — DOXYCYCLINE 100 MG: 100 INJECTION, POWDER, LYOPHILIZED, FOR SOLUTION INTRAVENOUS at 14:51

## 2022-12-15 RX ADMIN — PREGABALIN 75 MG: 75 CAPSULE ORAL at 21:10

## 2022-12-15 RX ADMIN — MELATONIN 3 MG ORAL TABLET 3 MG: 3 TABLET ORAL at 21:10

## 2022-12-15 RX ADMIN — SUCRALFATE 1 G: 1 TABLET ORAL at 05:07

## 2022-12-15 RX ADMIN — RISPERIDONE 1 MG: 1 TABLET, FILM COATED ORAL at 21:11

## 2022-12-15 ASSESSMENT — PAIN SCALES - GENERAL: PAINLEVEL_OUTOF10: 0

## 2022-12-15 NOTE — PROGRESS NOTES
DVT Prophylaxis Adjustment Policy (DVT Prophylaxis)     This patient is on DVT Prophylaxis medication that requires a dose adjustment      Date Body Weight IBW  Adjusted BW SCr  CrCl Dialysis status   12/15/2022 121 lb 4.1 oz (55 kg) Ideal body weight: 50.1 kg (110 lb 7.2 oz)  Adjusted ideal body weight: 52.1 kg (114 lb 12.3 oz) Serum creatinine: 0.4 mg/dL (L) 12/15/22 0557  Estimated creatinine clearance: 117 mL/min (A) N/a       Pharmacy has dose-adjusted the DVT Prophylaxis regimen to match   the recommendations from the following table        Ordered Medication:Lovenox 30mg daily    Order Changed/converted to: Lovenox 40mg daily      These changes were made per protocol according to the [facility] Pharmacist   Review for Appropriate Use and Automatic Dose Adjustments of   Subcutaneous Anticoagulants Policy     *Please note this dose may need readjusted if patient's condition changes. Please contact pharmacy with any questions regarding these changes.     Cheryle Skeen, [address] [place] [address]  12/15/2022  2:47 PM

## 2022-12-15 NOTE — ED PROVIDER NOTES
Department of Emergency Medicine   ED  Provider Note  Admit Date/RoomTime: 12/13/2022 12:04 PM  ED Room: 4504/4504-B          History of Present Illness:  12/14/22, Time: 7:19 PM EST  Chief Complaint   Patient presents with    Shortness of Breath     Sob, anxiety, not sleeping for 2 days                Sera Calderon is a 65 y.o. female presenting to the ED for shortness of breath. Patient's respiratory past couple of days. Came on gradually, the makes better worse, no associated pain. Does not wear oxygen chronically. Also complains of a cough. Denies any fevers or chills. Denies any change in bowel or bladder. Denies any neck pain, back pain,    Review of Systems:   Pertinent positives and negatives are stated within HPI, all other systems reviewed and are negative.        --------------------------------------------- PAST HISTORY ---------------------------------------------  Past Medical History:  has a past medical history of Anxiety, Arthritis, Blood circulation, collateral, Cancer (HCC), Chronic back pain, Colitis, Complex regional pain syndrome type 1 of right lower extremity, COPD (chronic obstructive pulmonary disease) ([facility] [address] Depression, Diabetes mellitus ([facility] [address] Elevated liver function tests, GERD (gastroesophageal reflux disease), Headache(784.0), Hyperlipidemia, Kidney stone, Movement disorder, Neuromuscular disorder ([address] Osteoarthritis, Other disorders of kidney and ureter in diseases classified elsewhere, Pneumonia, Psychiatric problem, and RSD (reflex sympathetic dystrophy). Past Surgical History:  has a past surgical history that includes Hysterectomy; laminectomy; Finger amputation; back surgery (2005); other surgical history (09/18/13); other surgical history (N/A, 2/3/2014); fracture surgery; Endoscopy, colon, diagnostic; Carpal tunnel release; Colonoscopy; other surgical history (Right, 11/11/14); other surgical history (04/08/15); Nerve Block (N/A, 8 19 15);  Nerve Block (Left, 08 26 2015); Nerve Block (9 2 15); Nerve Block (Right, 9/17/15); other surgical history (04/25/2018); and pr revise/remove neurostim/ (N/A, 4/25/2018). Social History:  reports that she has been smoking cigarettes. She started smoking about 52 years ago. She has a 21.00 pack-year smoking history. She has been exposed to tobacco smoke. She has never used smokeless tobacco. She reports that she does not drink alcohol and does not use drugs. Family History: family history includes Other in her mother. . Unless otherwise noted, family history is non contributory    The patients home medications have been reviewed. Allergies: Sulfamethazine and Ancef [cefazolin sodium]        ---------------------------------------------------PHYSICAL EXAM--------------------------------------    Constitutional/General: Alert and oriented x3  Head: Normocephalic and atraumatic  Eyes: PERRL, EOMI, sclera non icteric  Mouth: Oropharynx clear, handling secretions, no trismus, no asymmetry of the posterior oropharynx or uvular edema  Neck: Supple, full ROM, no stridor, no meningeal signs  Respiratory: Lungs clear to auscultation bilaterally, no wheezes, rales, or rhonchi. Not in respiratory distress  Cardiovascular:  Regular tachycardia. 2+ distal pulses. Equal extremity pulses. Chest: No chest wall tenderness  GI:  Abdomen Soft, Non tender, Non distended. No rebound, guarding, or rigidity. No pulsatile masses. Musculoskeletal: Moves all extremities x 4. Warm and well perfused, no clubbing, cyanosis, or edema. Capillary refill <3 seconds  Integument: skin warm and dry. No rashes. Neurologic: GCS 15, no focal deficits, symmetric strength 5/5 in the upper and lower extremities bilaterally  Psychiatric: Normal Affect          -------------------------------------------------- RESULTS -------------------------------------------------  I have personally reviewed all laboratory and imaging results for this patient.  Results are listed below.      LABS: (Lab results interpreted by me)  Results for orders placed or performed during the hospital encounter of 12/13/22   COVID-19, Rapid    Specimen: Nasopharyngeal Swab   Result Value Ref Range    SARS-CoV-2, NAAT Not Detected Not Detected   RAPID INFLUENZA A/B ANTIGENS    Specimen: Nasopharyngeal   Result Value Ref Range    Influenza A by PCR Not Detected Not Detected    Influenza B by PCR Not Detected Not Detected   CBC with Auto Differential   Result Value Ref Range    WBC 14.4 (H) 4.5 - 11.5 E9/L    RBC 4.15 3.50 - 5.50 E12/L    Hemoglobin 13.0 11.5 - 15.5 g/dL    Hematocrit 40.9 34.0 - 48.0 %    MCV 98.6 80.0 - 99.9 fL    MCH 31.3 26.0 - 35.0 pg    MCHC 31.8 (L) 32.0 - 34.5 %    RDW 13.8 11.5 - 15.0 fL    Platelets 083 253 - 406 E9/L    MPV 10.2 7.0 - 12.0 fL    Neutrophils % 90.4 (H) 43.0 - 80.0 %    Lymphocytes % 4.4 (L) 20.0 - 42.0 %    Monocytes % 5.2 2.0 - 12.0 %    Eosinophils % 0.0 0.0 - 6.0 %    Basophils % 0.3 0.0 - 2.0 %    Neutrophils Absolute 12.96 (H) 1.80 - 7.30 E9/L    Lymphocytes Absolute 0.58 (L) 1.50 - 4.00 E9/L    Monocytes Absolute 0.72 0.10 - 0.95 E9/L    Eosinophils Absolute 0.00 (L) 0.05 - 0.50 E9/L    Basophils Absolute 0.00 0.00 - 0.20 E9/L    Anisocytosis 1+     Poikilocytes 1+     Ovalocytes 1+    Comprehensive Metabolic Panel   Result Value Ref Range    Sodium 135 132 - 146 mmol/L    Potassium 4.6 3.5 - 5.0 mmol/L    Chloride 99 98 - 107 mmol/L    CO2 19 (L) 22 - 29 mmol/L    Anion Gap 17 (H) 7 - 16 mmol/L    Glucose 120 (H) 74 - 99 mg/dL    BUN 17 6 - 23 mg/dL    Creatinine 0.6 0.5 - 1.0 mg/dL    Est, Glom Filt Rate >60 >=60 mL/min/1.73    Calcium 9.1 8.6 - 10.2 mg/dL    Total Protein 7.1 6.4 - 8.3 g/dL    Albumin 4.0 3.5 - 5.2 g/dL    Total Bilirubin 0.4 0.0 - 1.2 mg/dL    Alkaline Phosphatase 92 35 - 104 U/L    ALT 31 0 - 32 U/L    AST 50 (H) 0 - 31 U/L   Troponin   Result Value Ref Range    Troponin, High Sensitivity 32 (H) 0 - 9 ng/L   Urinalysis with Microscopic   Result Value Ref Range    Color, UA Yellow Straw/Yellow    Clarity, UA Clear Clear    Glucose, Ur Negative Negative mg/dL    Bilirubin Urine Negative Negative    Ketones, Urine Negative Negative mg/dL    Specific Gravity, UA >=1.030 1.005 - 1.030    Blood, Urine SMALL (A) Negative    pH, UA 5.5 5.0 - 9.0    Protein, UA TRACE Negative mg/dL    Urobilinogen, Urine 0.2 <2.0 E.U./dL    Nitrite, Urine Negative Negative    Leukocyte Esterase, Urine Negative Negative    WBC, UA NONE 0 - 5 /HPF    RBC, UA 2-5 0 - 2 /HPF    Bacteria, UA NONE SEEN None Seen /HPF   URINE DRUG SCREEN   Result Value Ref Range    Amphetamine Screen, Urine NOT DETECTED Negative <1000 ng/mL    Barbiturate Screen, Ur NOT DETECTED Negative < 200 ng/mL    Benzodiazepine Screen, Urine NOT DETECTED Negative < 200 ng/mL    Cannabinoid Scrn, Ur NOT DETECTED Negative < 50ng/mL    Cocaine Metabolite Screen, Urine NOT DETECTED Negative < 300 ng/mL    Opiate Scrn, Ur POSITIVE (A) Negative < 300ng/mL    PCP Screen, Urine NOT DETECTED Negative < 25 ng/mL    Methadone Screen, Urine NOT DETECTED Negative <300 ng/mL    Oxycodone Urine NOT DETECTED Negative <100 ng/mL    FENTANYL SCREEN, URINE NOT DETECTED Negative <1 ng/mL    Drug Screen Comment: see below    Serum Drug Screen   Result Value Ref Range    Ethanol Lvl <10 mg/dL    Acetaminophen Level <5.0 (L) 10.0 - 03.9 mcg/mL    Salicylate, Serum <2.0 0.0 - 30.0 mg/dL    TCA Scrn NEGATIVE Cutoff:300 ng/mL   Brain Natriuretic Peptide   Result Value Ref Range    Pro- (H) 0 - 125 pg/mL   Magnesium   Result Value Ref Range    Magnesium 2.1 1.6 - 2.6 mg/dL   D-Dimer, Quantitative   Result Value Ref Range    D-Dimer, Quant 428 ng/mL DDU   Blood Gas, Arterial   Result Value Ref Range    Date Analyzed 20221213     Time Analyzed 1304     Source: Blood Arterial     pH, Blood Gas 7.351 7.350 - 7.450    PCO2 56.6 (H) 35.0 - 45.0 mmHg    PO2 105.1 (H) 75.0 - 100.0 mmHg    HCO3 30.6 (H) 22.0 - 26.0 mmol/L B.E. 3.6 (H) -3.0 - 3.0 mmol/L    O2 Sat 97.2 92.0 - 98.5 %    O2Hb 92.7 (L) 94.0 - 97.0 %    COHb 4.3 (H) 0.0 - 1.5 %    MetHb 0.3 0.0 - 1.5 %    HHb 2.7 0.0 - 5.0 %    tHb (est) 14.0 11.5 - 16.5 g/dL    Mode NC-6  L     Comment       Edited to add no critical values and sent to patient chart 12/13/22 @ 13:16 ([facility])    Date Of Collection      Time Collected      Pt Temp 37.0 C     ID [id number]     Lab [id number]     Critical(s) Notified .  No Critical Values    Troponin   Result Value Ref Range    Troponin, High Sensitivity 36 (H) 0 - 9 ng/L   Comprehensive Metabolic Panel w/ Reflex to MG   Result Value Ref Range    Sodium 139 132 - 146 mmol/L    Potassium reflex Magnesium 4.3 3.5 - 5.0 mmol/L    Chloride 101 98 - 107 mmol/L    CO2 26 22 - 29 mmol/L    Anion Gap 12 7 - 16 mmol/L    Glucose 116 (H) 74 - 99 mg/dL    BUN 14 6 - 23 mg/dL    Creatinine 0.5 0.5 - 1.0 mg/dL    Est, Glom Filt Rate >60 >=60 mL/min/1.73    Calcium 8.6 8.6 - 10.2 mg/dL    Total Protein 6.0 (L) 6.4 - 8.3 g/dL    Albumin 3.4 (L) 3.5 - 5.2 g/dL    Total Bilirubin 0.4 0.0 - 1.2 mg/dL    Alkaline Phosphatase 74 35 - 104 U/L    ALT 28 0 - 32 U/L    AST 50 (H) 0 - 31 U/L   CBC auto differential   Result Value Ref Range    WBC 12.1 (H) 4.5 - 11.5 E9/L    RBC 3.74 3.50 - 5.50 E12/L    Hemoglobin 11.7 11.5 - 15.5 g/dL    Hematocrit 36.5 34.0 - 48.0 %    MCV 97.6 80.0 - 99.9 fL    MCH 31.3 26.0 - 35.0 pg    MCHC 32.1 32.0 - 34.5 %    RDW 14.0 11.5 - 15.0 fL    Platelets 875 281 - 151 E9/L    MPV 10.5 7.0 - 12.0 fL    Neutrophils % 90.1 (H) 43.0 - 80.0 %    Immature Granulocytes % 0.3 0.0 - 5.0 %    Lymphocytes % 3.6 (L) 20.0 - 42.0 %    Monocytes % 5.8 2.0 - 12.0 %    Eosinophils % 0.0 0.0 - 6.0 %    Basophils % 0.2 0.0 - 2.0 %    Neutrophils Absolute 10.86 (H) 1.80 - 7.30 E9/L    Immature Granulocytes # 0.04 E9/L    Lymphocytes Absolute 0.44 (L) 1.50 - 4.00 E9/L    Monocytes Absolute 0.70 0.10 - 0.95 E9/L    Eosinophils Absolute 0.00 (L) 0.05 - 0.50 E9/L    Basophils Absolute 0.03 0.00 - 0.20 E9/L    Anisocytosis 1+     Poikilocytes 1+     [place] Cells 1+     Ovalocytes 1+    Troponin   Result Value Ref Range    Troponin, High Sensitivity 36 (H) 0 - 9 ng/L   Troponin   Result Value Ref Range    Troponin, High Sensitivity 41 (H) 0 - 9 ng/L   EKG 12 Lead   Result Value Ref Range    Ventricular Rate 99 BPM    Atrial Rate 99 BPM    P-R Interval 180 ms    QRS Duration 72 ms    Q-T Interval 316 ms    QTc Calculation (Bazett) 405 ms    P Axis 80 degrees    R Axis -51 degrees    T Axis 52 degrees   EKG 12 Lead   Result Value Ref Range    Ventricular Rate 77 BPM    Atrial Rate 77 BPM    P-R Interval 176 ms    QRS Duration 60 ms    Q-T Interval 350 ms    QTc Calculation (Bazett) 396 ms    P Axis 100 degrees    R Axis -26 degrees    T Axis 44 degrees   ,       RADIOLOGY:  Interpreted by Radiologist unless otherwise specified  CTA PULMONARY W CONTRAST   Final Result   No evidence of pulmonary embolism. Diffuse right lung opacities worrisome for pneumonia. Cardiomegaly. XR CHEST PORTABLE   Final Result   Mild patchy infiltrates in the right mid and left lower lung. EKG Interpretation  Interpreted by emergency department physician, Dr. Amy France     Sinus rhythm, rate 9 9, no STEMI, no ischemic change      ------------------------- NURSING NOTES AND VITALS REVIEWED ---------------------------   The nursing notes within the ED encounter and vital signs as below have been reviewed by myself  BP (!) 118/57   Pulse 96   Temp 98.7 °F (37.1 °C) (Temporal)   Resp 18   Ht 5' 2\" (1.575 m)   Wt 121 lb 4.1 oz (55 kg)   LMP  (LMP Unknown)   SpO2 95%   BMI 22.18 kg/m²     Oxygen Saturation Interpretation: Improved after treatment    The patients available past medical records and past encounters were reviewed.         ------------------------------ ED COURSE/MEDICAL DECISION MAKING----------------------  Medications   ipratropium-albuterol (DUONEB) nebulizer solution 1 ampule (1 ampule Inhalation Given 12/14/22 1555)   divalproex (DEPAKOTE) DR tablet 250 mg (250 mg Oral Given 12/14/22 1426)   HYDROmorphone (DILAUDID) tablet 4 mg (4 mg Oral Given 12/14/22 1544)   melatonin tablet 3 mg (3 mg Oral Not Given 12/14/22 0811)   pantoprazole (PROTONIX) tablet 40 mg (40 mg Oral Given 12/14/22 0530)   pregabalin (LYRICA) capsule 75 mg (75 mg Oral Given 12/14/22 1426)   risperiDONE (RISPERDAL) tablet 1 mg (1 mg Oral Given 12/14/22 1426)   sertraline (ZOLOFT) tablet 25 mg (25 mg Oral Given 12/14/22 0811)   sucralfate (CARAFATE) tablet 1 g (1 g Oral Given 12/14/22 1544)   sodium chloride flush 0.9 % injection 10 mL (10 mLs IntraVENous Given 12/14/22 0815)   sodium chloride flush 0.9 % injection 10 mL (has no administration in time range)   0.9 % sodium chloride infusion (has no administration in time range)   enoxaparin Sodium (LOVENOX) injection 30 mg (30 mg SubCUTAneous Not Given 12/14/22 0811)   ondansetron (ZOFRAN-ODT) disintegrating tablet 4 mg (has no administration in time range)     Or   ondansetron (ZOFRAN) injection 4 mg (has no administration in time range)   magnesium hydroxide (MILK OF MAGNESIA) 400 MG/5ML suspension 30 mL (has no administration in time range)   acetaminophen (TYLENOL) tablet 650 mg (has no administration in time range)     Or   acetaminophen (TYLENOL) suppository 650 mg (has no administration in time range)   cefTRIAXone (ROCEPHIN) 1,000 mg in sterile water 10 mL IV syringe (1,000 mg IntraVENous Given 12/14/22 1429)   doxycycline (VIBRAMYCIN) 100 mg in dextrose 5 % 100 mL IVPB ([id number]) (0 mg IntraVENous Stopped 12/14/22 1540)   methylPREDNISolone sodium (SOLU-MEDROL) injection 40 mg (has no administration in time range)   guaiFENesin (MUCINEX) extended release tablet 600 mg (has no administration in time range)   methylPREDNISolone sodium (SOLU-MEDROL) injection 125 mg (125 mg IntraVENous Given 12/13/22 1331)   ipratropium-albuterol (DUONEB) nebulizer solution 3 ampule (3 ampules Inhalation Given 12/13/22 1300)   0.9 % sodium chloride bolus (0 mLs IntraVENous Stopped 12/13/22 1942)   cefTRIAXone (ROCEPHIN) 1,000 mg in sterile water 10 mL IV syringe (1,000 mg IntraVENous Given 12/13/22 1454)   doxycycline (VIBRAMYCIN) 100 mg in dextrose 5 % 100 mL IVPB ([id number]) (0 mg IntraVENous Stopped 12/13/22 1942)   ipratropium-albuterol (DUONEB) nebulizer solution 3 ampule (3 ampules Inhalation Given 12/13/22 1421)   iopamidol (ISOVUE-370) 76 % injection 70 mL (70 mLs IntraVENous Given 12/13/22 1629)           The cardiac monitor revealed sinus with a heart rate in the 80s as interpreted by me. The cardiac monitor was ordered secondary to the patient's sob and to monitor the patient for dysrhythmia. CPT [id number]         Medical Decision Making:     patient was hypoxic on arrival.  Labs and imaging reviewed. She was placed on nasal cannula and responded. Blood cultures obtained, she did receive antibiotics. Discussed with the hospitalist, patient be admitted. Re-Evaluations:  ED Course as of 12/14/22 1919   Tue Dec 13, 2022   1354 Discussed plan for admission with patient and she is understanding, starting antibiotics for pneumonia. Breath sounds have improved bilaterally, not as diminished but she is diffusely wheezing, will do second rounds of duo nebs. [MM]   0427 Patient signed out to oncoming resident pending CTA. She will be admitted regardless for COPD exacerbation as well as community-acquired pneumonia. [MM]      ED Course User Index  [MM] Aron Flannery, DO       Please note that the withdrawal or failure to initiate urgent interventions for this patient would likely result in a life threatening deterioration or permanent disability. Accordingly this patient received 30 minutes of critical care time, excluding separately billable procedures. Counseling:    The emergency provider has spoken with the patient and discussed todays results, in addition to providing specific details for the plan of care and counseling regarding the diagnosis and prognosis. Questions are answered at this time and they are agreeable with the plan.       --------------------------------- IMPRESSION AND DISPOSITION ---------------------------------    IMPRESSION  1. Hypoxia    2. Pneumonia due to infectious organism, unspecified laterality, unspecified part of lung        DISPOSITION  Disposition: Admit to telemetry  Patient condition is stable        NOTE: This report was transcribed using voice recognition software.  Every effort was made to ensure accuracy; however, inadvertent computerized transcription errors may be present        Aleks Beltre MD  12/14/22 1924

## 2022-12-15 NOTE — PLAN OF CARE
Problem: Discharge Planning  Goal: Discharge to home or other facility with appropriate resources  Outcome: Progressing  Flowsheets (Taken 12/14/2022 1600)  Discharge to home or other facility with appropriate resources: Identify barriers to discharge with patient and caregiver     Problem: Pain  Goal: Verbalizes/displays adequate comfort level or baseline comfort level  Outcome: Not Progressing     Problem: Safety - Adult  Goal: Free from fall injury  Outcome: Progressing     Problem: Skin/Tissue Integrity  Goal: Absence of new skin breakdown  Description: 1. Monitor for areas of redness and/or skin breakdown  2. Assess vascular access sites hourly  3. Every 4-6 hours minimum:  Change oxygen saturation probe site  4. Every 4-6 hours:  If on nasal continuous positive airway pressure, respiratory therapy assess nares and determine need for appliance change or resting period.   Outcome: Progressing     Problem: Pain  Goal: Verbalizes/displays adequate comfort level or baseline comfort level  Outcome: Not Progressing

## 2022-12-15 NOTE — PROGRESS NOTES
Hospitalist Progress Note      SYNOPSIS: Patient admitted on 2022 for SOB (shortness of breath)  65y.o. year old female  who  has a past medical history of Anxiety, Arthritis, Blood circulation, collateral, Cancer (HCC), Chronic back pain, Colitis, Complex regional pain syndrome type 1 of right lower extremity, COPD (chronic obstructive pulmonary disease) ([facility] [address] Depression, Diabetes mellitus ([facility] [address] Elevated liver function tests, GERD (gastroesophageal reflux disease), Headache(784.0), Hyperlipidemia, Kidney stone, Movement disorder, Neuromuscular disorder ([facility] [address] Osteoarthritis, Other disorders of kidney and ureter in diseases classified elsewhere, Pneumonia, Psychiatric problem, and RSD (reflex sympathetic dystrophy). presents with SOB     SUBJECTIVE:  Patient still reports still coughing but improving. Temp (24hrs), Av.6 °F (37 °C), Min:98 °F (36.7 °C), Max:99.3 °F (37.4 °C)    DIET: ADULT DIET; Dysphagia - Soft and Bite Sized  ADULT ORAL NUTRITION SUPPLEMENT; Breakfast, Lunch, Dinner; Standard High Calorie/High Protein Oral Supplement  CODE: Full Code    Intake/Output Summary (Last 24 hours) at 12/15/2022 1444  Last data filed at 12/15/2022 0923  Gross per 24 hour   Intake 960 ml   Output --   Net 960 ml         OBJECTIVE:    /65   Pulse 99   Temp 98.5 °F (36.9 °C)   Resp 18   Ht 5' 2\" (1.575 m)   Wt 121 lb 4.1 oz (55 kg)   LMP  (LMP Unknown)   SpO2 91%   BMI 22.18 kg/m²     General appearance: No apparent distress, appears stated age and cooperative. HEENT:  Conjunctivae/corneas clear. Neck: Supple. No jugular venous distention. Respiratory: Coarse breath sounds bilaterally with rhonchi, no wheezes or rales appreciated. Cardiovascular: Regular rate rhythm, normal S1-S2  Abdomen: Soft, nontender, nondistended  Musculoskeletal: No clubbing, cyanosis, no bilateral lower extremity edema. Brisk capillary refill.    Skin:  No rashes  on visible skin  Neurologic: awake, alert and following commands     ASSESSMENT:  Acute COPD with exacerbation  Bacterial pneumonia  Elevated troponin  Gastroesophageal reflux disease         PLAN:  Patient continues to have cough with congestion. Continue IV Solu-Medrol to every 8 hours. Mucinex 600 mg twice daily. Keep oxygen saturation above 92%. Continue IV Rocephin and doxycycline. Continue Lovenox for DVT prophylaxis  Continue Protonix for GI prophylaxis. DISPOSITION: Plan is to discharge home when clinically stable. Anticipate in 48 to 72 hours. Medications:  REVIEWED DAILY    Infusion Medications    sodium chloride       Scheduled Medications    methylPREDNISolone  40 mg IntraVENous Q8H    guaiFENesin  400 mg Oral TID    ipratropium-albuterol  1 ampule Inhalation Q4H WA    divalproex  250 mg Oral TID    melatonin  3 mg Oral Daily    pantoprazole  40 mg Oral QAM AC    pregabalin  75 mg Oral TID    risperiDONE  1 mg Oral TID    sertraline  25 mg Oral Daily    sucralfate  1 g Oral TID AC    sodium chloride flush  10 mL IntraVENous 2 times per day    enoxaparin  30 mg SubCUTAneous Daily    cefTRIAXone (ROCEPHIN) IV  1,000 mg IntraVENous Q24H    doxycycline (VIBRAMYCIN) IV  100 mg IntraVENous Q12H     PRN Meds: HYDROmorphone, sodium chloride flush, sodium chloride, ondansetron **OR** ondansetron, magnesium hydroxide, acetaminophen **OR** acetaminophen    Labs:     Recent Labs     12/13/22  1101 12/14/22  0449 12/15/22  0557   WBC 14.4* 12.1* 10.5   HGB 13.0 11.7 12.2   HCT 40.9 36.5 36.1    240 255         Recent Labs     12/13/22  1101 12/14/22  0449 12/15/22  0557    139 135   K 4.6 4.3 4.6   CL 99 101 97*   CO2 19* 26 30*   BUN 17 14 11   CREATININE 0.6 0.5 0.4*   CALCIUM 9.1 8.6 9.1         Recent Labs     12/13/22  1101 12/14/22  0449   PROT 7.1 6.0*   ALKPHOS 92 74   ALT 31 28   AST 50* 50*   BILITOT 0.4 0.4         No results for input(s): INR in the last 72 hours.     No results for input(s): Lilliana Bañuelos in the last 72 hours. Chronic labs:    Lab Results   Component Value Date    CHOL 190 11/08/2022    TRIG 96 11/08/2022    HDL 76 11/10/2022    LDLCALC 92 11/10/2022    TSH 1.290 11/10/2022    INR 1.1 07/24/2022    LABA1C 6.1 12/19/2017       Radiology: REVIEWED DAILY    +++++++++++++++++++++++++++++++++++++++++++++++++  MD DALLAS Rhodes/ Efe Rudd [address] [place] [address]  +++++++++++++++++++++++++++++++++++++++++++++++++  NOTE: This report was transcribed using voice recognition software. Every effort was made to ensure accuracy; however, inadvertent computerized transcription errors may be present.

## 2022-12-16 LAB
ANION GAP SERPL CALCULATED.3IONS-SCNC: 11 MMOL/L (ref 7–16)
BASOPHILS ABSOLUTE: 0 E9/L (ref 0–0.2)
BASOPHILS RELATIVE PERCENT: 0.1 % (ref 0–2)
BUN BLDV-MCNC: 16 MG/DL (ref 6–23)
CALCIUM SERPL-MCNC: 9.5 MG/DL (ref 8.6–10.2)
CHLORIDE BLD-SCNC: 99 MMOL/L (ref 98–107)
CO2: 27 MMOL/L (ref 22–29)
CREAT SERPL-MCNC: 0.5 MG/DL (ref 0.5–1)
EOSINOPHILS ABSOLUTE: 0 E9/L (ref 0.05–0.5)
EOSINOPHILS RELATIVE PERCENT: 0 % (ref 0–6)
GFR SERPL CREATININE-BSD FRML MDRD: >60 ML/MIN/1.73
GLUCOSE BLD-MCNC: 134 MG/DL (ref 74–99)
HCT VFR BLD CALC: 39 % (ref 34–48)
HEMOGLOBIN: 12.9 G/DL (ref 11.5–15.5)
HYPOCHROMIA: ABNORMAL
LYMPHOCYTES ABSOLUTE: 0.77 E9/L (ref 1.5–4)
LYMPHOCYTES RELATIVE PERCENT: 6.1 % (ref 20–42)
MCH RBC QN AUTO: 31.2 PG (ref 26–35)
MCHC RBC AUTO-ENTMCNC: 33.1 % (ref 32–34.5)
MCV RBC AUTO: 94.4 FL (ref 80–99.9)
MONOCYTES ABSOLUTE: 0.51 E9/L (ref 0.1–0.95)
MONOCYTES RELATIVE PERCENT: 3.5 % (ref 2–12)
NEUTROPHILS ABSOLUTE: 11.52 E9/L (ref 1.8–7.3)
NEUTROPHILS RELATIVE PERCENT: 90.4 % (ref 43–80)
OVALOCYTES: ABNORMAL
PDW BLD-RTO: 13.5 FL (ref 11.5–15)
PLATELET # BLD: 283 E9/L (ref 130–450)
PMV BLD AUTO: 10.4 FL (ref 7–12)
POIKILOCYTES: ABNORMAL
POTASSIUM SERPL-SCNC: 4.3 MMOL/L (ref 3.5–5)
RBC # BLD: 4.13 E12/L (ref 3.5–5.5)
SODIUM BLD-SCNC: 137 MMOL/L (ref 132–146)
WBC # BLD: 12.8 E9/L (ref 4.5–11.5)

## 2022-12-16 PROCEDURE — 80048 BASIC METABOLIC PNL TOTAL CA: CPT

## 2022-12-16 PROCEDURE — 2700000000 HC OXYGEN THERAPY PER DAY

## 2022-12-16 PROCEDURE — 2580000003 HC RX 258: Performed by: INTERNAL MEDICINE

## 2022-12-16 PROCEDURE — 94640 AIRWAY INHALATION TREATMENT: CPT

## 2022-12-16 PROCEDURE — 1200000000 HC SEMI PRIVATE

## 2022-12-16 PROCEDURE — 6370000000 HC RX 637 (ALT 250 FOR IP): Performed by: FAMILY MEDICINE

## 2022-12-16 PROCEDURE — 2500000003 HC RX 250 WO HCPCS: Performed by: INTERNAL MEDICINE

## 2022-12-16 PROCEDURE — 6370000000 HC RX 637 (ALT 250 FOR IP): Performed by: INTERNAL MEDICINE

## 2022-12-16 PROCEDURE — 6360000002 HC RX W HCPCS: Performed by: INTERNAL MEDICINE

## 2022-12-16 PROCEDURE — 36415 COLL VENOUS BLD VENIPUNCTURE: CPT

## 2022-12-16 PROCEDURE — 6360000002 HC RX W HCPCS: Performed by: FAMILY MEDICINE

## 2022-12-16 PROCEDURE — 85025 COMPLETE CBC W/AUTO DIFF WBC: CPT

## 2022-12-16 RX ORDER — METHYLPREDNISOLONE SODIUM SUCCINATE 40 MG/ML
40 INJECTION, POWDER, LYOPHILIZED, FOR SOLUTION INTRAMUSCULAR; INTRAVENOUS EVERY 12 HOURS
Status: DISCONTINUED | OUTPATIENT
Start: 2022-12-16 | End: 2022-12-18 | Stop reason: HOSPADM

## 2022-12-16 RX ADMIN — MELATONIN 3 MG ORAL TABLET 3 MG: 3 TABLET ORAL at 20:38

## 2022-12-16 RX ADMIN — IPRATROPIUM BROMIDE AND ALBUTEROL SULFATE 1 AMPULE: .5; 2.5 SOLUTION RESPIRATORY (INHALATION) at 16:42

## 2022-12-16 RX ADMIN — GUAIFENESIN 400 MG: 400 TABLET ORAL at 20:38

## 2022-12-16 RX ADMIN — DIVALPROEX SODIUM 250 MG: 250 TABLET, DELAYED RELEASE ORAL at 10:38

## 2022-12-16 RX ADMIN — PREGABALIN 75 MG: 75 CAPSULE ORAL at 10:38

## 2022-12-16 RX ADMIN — DOXYCYCLINE 100 MG: 100 INJECTION, POWDER, LYOPHILIZED, FOR SOLUTION INTRAVENOUS at 04:13

## 2022-12-16 RX ADMIN — CEFTRIAXONE 1000 MG: 1 INJECTION, POWDER, FOR SOLUTION INTRAMUSCULAR; INTRAVENOUS at 15:22

## 2022-12-16 RX ADMIN — RISPERIDONE 1 MG: 1 TABLET, FILM COATED ORAL at 20:38

## 2022-12-16 RX ADMIN — GUAIFENESIN 400 MG: 400 TABLET ORAL at 15:21

## 2022-12-16 RX ADMIN — DIVALPROEX SODIUM 250 MG: 250 TABLET, DELAYED RELEASE ORAL at 15:21

## 2022-12-16 RX ADMIN — DIVALPROEX SODIUM 250 MG: 250 TABLET, DELAYED RELEASE ORAL at 20:38

## 2022-12-16 RX ADMIN — RISPERIDONE 1 MG: 1 TABLET, FILM COATED ORAL at 10:40

## 2022-12-16 RX ADMIN — IPRATROPIUM BROMIDE AND ALBUTEROL SULFATE 1 AMPULE: .5; 2.5 SOLUTION RESPIRATORY (INHALATION) at 20:20

## 2022-12-16 RX ADMIN — SERTRALINE 25 MG: 50 TABLET, FILM COATED ORAL at 10:38

## 2022-12-16 RX ADMIN — ENOXAPARIN SODIUM 40 MG: 100 INJECTION SUBCUTANEOUS at 10:39

## 2022-12-16 RX ADMIN — DOXYCYCLINE 100 MG: 100 INJECTION, POWDER, LYOPHILIZED, FOR SOLUTION INTRAVENOUS at 15:26

## 2022-12-16 RX ADMIN — SODIUM CHLORIDE, PRESERVATIVE FREE 10 ML: 5 INJECTION INTRAVENOUS at 10:43

## 2022-12-16 RX ADMIN — GUAIFENESIN 400 MG: 400 TABLET ORAL at 10:38

## 2022-12-16 RX ADMIN — PREGABALIN 75 MG: 75 CAPSULE ORAL at 15:21

## 2022-12-16 RX ADMIN — METHYLPREDNISOLONE SODIUM SUCCINATE 40 MG: 40 INJECTION, POWDER, FOR SOLUTION INTRAMUSCULAR; INTRAVENOUS at 10:39

## 2022-12-16 RX ADMIN — PREGABALIN 75 MG: 75 CAPSULE ORAL at 20:38

## 2022-12-16 RX ADMIN — METHYLPREDNISOLONE SODIUM SUCCINATE 40 MG: 40 INJECTION, POWDER, FOR SOLUTION INTRAMUSCULAR; INTRAVENOUS at 04:15

## 2022-12-16 RX ADMIN — IPRATROPIUM BROMIDE AND ALBUTEROL SULFATE 1 AMPULE: .5; 2.5 SOLUTION RESPIRATORY (INHALATION) at 13:59

## 2022-12-16 RX ADMIN — IPRATROPIUM BROMIDE AND ALBUTEROL SULFATE 1 AMPULE: .5; 2.5 SOLUTION RESPIRATORY (INHALATION) at 10:15

## 2022-12-16 RX ADMIN — SODIUM CHLORIDE, PRESERVATIVE FREE 10 ML: 5 INJECTION INTRAVENOUS at 20:38

## 2022-12-16 RX ADMIN — SUCRALFATE 1 G: 1 TABLET ORAL at 10:38

## 2022-12-16 NOTE — CARE COORDINATION
12/16/2022 social work transition of care planning  Pt plan is return home,family to provide transport home at discharge. Electronically signed by MARLON Sr on 12/16/2022 at 9:01 AM    Addendum: Sw spoke with pt's spouse,plan is home. Please call spouse when pt is discharged.   Electronically signed by MARLON Sr on 12/16/2022 at 11:24 AM

## 2022-12-17 LAB
ANION GAP SERPL CALCULATED.3IONS-SCNC: 16 MMOL/L (ref 7–16)
BASOPHILS ABSOLUTE: 0.01 E9/L (ref 0–0.2)
BASOPHILS RELATIVE PERCENT: 0.1 % (ref 0–2)
BUN BLDV-MCNC: 22 MG/DL (ref 6–23)
CALCIUM SERPL-MCNC: 9.5 MG/DL (ref 8.6–10.2)
CHLORIDE BLD-SCNC: 100 MMOL/L (ref 98–107)
CO2: 26 MMOL/L (ref 22–29)
CREAT SERPL-MCNC: 0.5 MG/DL (ref 0.5–1)
EOSINOPHILS ABSOLUTE: 0 E9/L (ref 0.05–0.5)
EOSINOPHILS RELATIVE PERCENT: 0 % (ref 0–6)
GFR SERPL CREATININE-BSD FRML MDRD: >60 ML/MIN/1.73
GLUCOSE BLD-MCNC: 142 MG/DL (ref 74–99)
HCT VFR BLD CALC: 40.7 % (ref 34–48)
HEMOGLOBIN: 13.9 G/DL (ref 11.5–15.5)
IMMATURE GRANULOCYTES #: 0.06 E9/L
IMMATURE GRANULOCYTES %: 0.6 % (ref 0–5)
LYMPHOCYTES ABSOLUTE: 1.03 E9/L (ref 1.5–4)
LYMPHOCYTES RELATIVE PERCENT: 9.5 % (ref 20–42)
MCH RBC QN AUTO: 31 PG (ref 26–35)
MCHC RBC AUTO-ENTMCNC: 34.2 % (ref 32–34.5)
MCV RBC AUTO: 90.8 FL (ref 80–99.9)
MONOCYTES ABSOLUTE: 0.78 E9/L (ref 0.1–0.95)
MONOCYTES RELATIVE PERCENT: 7.2 % (ref 2–12)
NEUTROPHILS ABSOLUTE: 8.99 E9/L (ref 1.8–7.3)
NEUTROPHILS RELATIVE PERCENT: 82.6 % (ref 43–80)
PDW BLD-RTO: 13.7 FL (ref 11.5–15)
PLATELET # BLD: 331 E9/L (ref 130–450)
PMV BLD AUTO: 9.9 FL (ref 7–12)
POTASSIUM SERPL-SCNC: 4.5 MMOL/L (ref 3.5–5)
PROCALCITONIN: 0.08 NG/ML (ref 0–0.08)
RBC # BLD: 4.48 E12/L (ref 3.5–5.5)
SODIUM BLD-SCNC: 142 MMOL/L (ref 132–146)
WBC # BLD: 10.9 E9/L (ref 4.5–11.5)

## 2022-12-17 PROCEDURE — 6370000000 HC RX 637 (ALT 250 FOR IP): Performed by: STUDENT IN AN ORGANIZED HEALTH CARE EDUCATION/TRAINING PROGRAM

## 2022-12-17 PROCEDURE — 6370000000 HC RX 637 (ALT 250 FOR IP): Performed by: FAMILY MEDICINE

## 2022-12-17 PROCEDURE — 6370000000 HC RX 637 (ALT 250 FOR IP): Performed by: INTERNAL MEDICINE

## 2022-12-17 PROCEDURE — 85025 COMPLETE CBC W/AUTO DIFF WBC: CPT

## 2022-12-17 PROCEDURE — 6360000002 HC RX W HCPCS: Performed by: INTERNAL MEDICINE

## 2022-12-17 PROCEDURE — 94640 AIRWAY INHALATION TREATMENT: CPT

## 2022-12-17 PROCEDURE — 2700000000 HC OXYGEN THERAPY PER DAY

## 2022-12-17 PROCEDURE — 2580000003 HC RX 258: Performed by: INTERNAL MEDICINE

## 2022-12-17 PROCEDURE — 36415 COLL VENOUS BLD VENIPUNCTURE: CPT

## 2022-12-17 PROCEDURE — 84145 PROCALCITONIN (PCT): CPT

## 2022-12-17 PROCEDURE — 1200000000 HC SEMI PRIVATE

## 2022-12-17 PROCEDURE — 2500000003 HC RX 250 WO HCPCS: Performed by: INTERNAL MEDICINE

## 2022-12-17 PROCEDURE — 6360000002 HC RX W HCPCS: Performed by: STUDENT IN AN ORGANIZED HEALTH CARE EDUCATION/TRAINING PROGRAM

## 2022-12-17 PROCEDURE — 80048 BASIC METABOLIC PNL TOTAL CA: CPT

## 2022-12-17 RX ORDER — PREDNISONE 20 MG/1
40 TABLET ORAL DAILY
Status: DISCONTINUED | OUTPATIENT
Start: 2022-12-17 | End: 2022-12-18 | Stop reason: HOSPADM

## 2022-12-17 RX ADMIN — ACETAMINOPHEN 650 MG: 325 TABLET ORAL at 03:44

## 2022-12-17 RX ADMIN — IPRATROPIUM BROMIDE AND ALBUTEROL SULFATE 1 AMPULE: .5; 2.5 SOLUTION RESPIRATORY (INHALATION) at 09:39

## 2022-12-17 RX ADMIN — RISPERIDONE 1 MG: 1 TABLET, FILM COATED ORAL at 20:18

## 2022-12-17 RX ADMIN — SERTRALINE 25 MG: 50 TABLET, FILM COATED ORAL at 08:39

## 2022-12-17 RX ADMIN — PREDNISONE 40 MG: 20 TABLET ORAL at 17:12

## 2022-12-17 RX ADMIN — SUCRALFATE 1 G: 1 TABLET ORAL at 14:56

## 2022-12-17 RX ADMIN — IPRATROPIUM BROMIDE AND ALBUTEROL SULFATE 1 AMPULE: .5; 2.5 SOLUTION RESPIRATORY (INHALATION) at 21:44

## 2022-12-17 RX ADMIN — PREGABALIN 75 MG: 75 CAPSULE ORAL at 08:39

## 2022-12-17 RX ADMIN — IPRATROPIUM BROMIDE AND ALBUTEROL SULFATE 1 AMPULE: .5; 2.5 SOLUTION RESPIRATORY (INHALATION) at 12:36

## 2022-12-17 RX ADMIN — DIVALPROEX SODIUM 250 MG: 250 TABLET, DELAYED RELEASE ORAL at 08:38

## 2022-12-17 RX ADMIN — ACETAMINOPHEN 650 MG: 325 TABLET ORAL at 20:18

## 2022-12-17 RX ADMIN — RISPERIDONE 1 MG: 1 TABLET, FILM COATED ORAL at 08:38

## 2022-12-17 RX ADMIN — GUAIFENESIN 400 MG: 400 TABLET ORAL at 14:56

## 2022-12-17 RX ADMIN — DIVALPROEX SODIUM 250 MG: 250 TABLET, DELAYED RELEASE ORAL at 14:56

## 2022-12-17 RX ADMIN — DOXYCYCLINE 100 MG: 100 INJECTION, POWDER, LYOPHILIZED, FOR SOLUTION INTRAVENOUS at 03:43

## 2022-12-17 RX ADMIN — DIVALPROEX SODIUM 250 MG: 250 TABLET, DELAYED RELEASE ORAL at 20:18

## 2022-12-17 RX ADMIN — RISPERIDONE 1 MG: 1 TABLET, FILM COATED ORAL at 17:13

## 2022-12-17 RX ADMIN — CEFTRIAXONE 1000 MG: 1 INJECTION, POWDER, FOR SOLUTION INTRAMUSCULAR; INTRAVENOUS at 14:56

## 2022-12-17 RX ADMIN — PANTOPRAZOLE SODIUM 40 MG: 40 TABLET, DELAYED RELEASE ORAL at 05:17

## 2022-12-17 RX ADMIN — SUCRALFATE 1 G: 1 TABLET ORAL at 05:17

## 2022-12-17 RX ADMIN — SODIUM CHLORIDE, PRESERVATIVE FREE 10 ML: 5 INJECTION INTRAVENOUS at 20:21

## 2022-12-17 RX ADMIN — SODIUM CHLORIDE, PRESERVATIVE FREE 10 ML: 5 INJECTION INTRAVENOUS at 08:39

## 2022-12-17 RX ADMIN — GUAIFENESIN 400 MG: 400 TABLET ORAL at 08:39

## 2022-12-17 RX ADMIN — PREGABALIN 75 MG: 75 CAPSULE ORAL at 14:56

## 2022-12-17 RX ADMIN — MELATONIN 3 MG ORAL TABLET 3 MG: 3 TABLET ORAL at 20:17

## 2022-12-17 RX ADMIN — DOXYCYCLINE 100 MG: 100 INJECTION, POWDER, LYOPHILIZED, FOR SOLUTION INTRAVENOUS at 15:02

## 2022-12-17 RX ADMIN — IPRATROPIUM BROMIDE AND ALBUTEROL SULFATE 1 AMPULE: .5; 2.5 SOLUTION RESPIRATORY (INHALATION) at 16:47

## 2022-12-17 RX ADMIN — GUAIFENESIN 400 MG: 400 TABLET ORAL at 20:18

## 2022-12-17 RX ADMIN — SUCRALFATE 1 G: 1 TABLET ORAL at 10:39

## 2022-12-17 RX ADMIN — METHYLPREDNISOLONE SODIUM SUCCINATE 40 MG: 40 INJECTION, POWDER, FOR SOLUTION INTRAMUSCULAR; INTRAVENOUS at 00:41

## 2022-12-17 RX ADMIN — PREGABALIN 75 MG: 75 CAPSULE ORAL at 20:17

## 2022-12-17 RX ADMIN — HYDROMORPHONE HYDROCHLORIDE 4 MG: 2 TABLET ORAL at 10:39

## 2022-12-17 ASSESSMENT — PAIN SCALES - GENERAL
PAINLEVEL_OUTOF10: 4
PAINLEVEL_OUTOF10: 9
PAINLEVEL_OUTOF10: 2

## 2022-12-17 ASSESSMENT — PAIN DESCRIPTION - ORIENTATION: ORIENTATION: RIGHT;LEFT

## 2022-12-17 ASSESSMENT — PAIN DESCRIPTION - DESCRIPTORS: DESCRIPTORS: ACHING;DISCOMFORT;TENDER

## 2022-12-17 ASSESSMENT — PAIN DESCRIPTION - LOCATION: LOCATION: CHEST

## 2022-12-17 ASSESSMENT — PAIN - FUNCTIONAL ASSESSMENT: PAIN_FUNCTIONAL_ASSESSMENT: PREVENTS OR INTERFERES SOME ACTIVE ACTIVITIES AND ADLS

## 2022-12-17 NOTE — PROGRESS NOTES
Hospitalist Progress Note      SYNOPSIS: Patient admitted on 2022 for SOB (shortness of breath)  65y.o. year old female  who  has a past medical history of Anxiety, Arthritis, Blood circulation, collateral, Cancer (HCC), Chronic back pain, Colitis, Complex regional pain syndrome type 1 of right lower extremity, COPD (chronic obstructive pulmonary disease) ([facility] [address] Depression, Diabetes mellitus ([address] Elevated liver function tests, GERD (gastroesophageal reflux disease), Headache(784.0), Hyperlipidemia, Kidney stone, Movement disorder, Neuromuscular disorder ([address] Osteoarthritis, Other disorders of kidney and ureter in diseases classified elsewhere, Pneumonia, Psychiatric problem, and RSD (reflex sympathetic dystrophy). presents with SOB     SUBJECTIVE:    Patient seen at bedside. No acute events overnight. Feeling much better today. Requesting for possible discharge. Still on 4 L nasal cannula oxygen supplementation. Temp (24hrs), Av.1 °F (36.2 °C), Min:96.9 °F (36.1 °C), Max:97.3 °F (36.3 °C)    DIET: ADULT DIET; Dysphagia - Soft and Bite Sized  ADULT ORAL NUTRITION SUPPLEMENT; Breakfast, Lunch, Dinner; Standard High Calorie/High Protein Oral Supplement  CODE: Full Code    Intake/Output Summary (Last 24 hours) at 2022 1421  Last data filed at 2022 0930  Gross per 24 hour   Intake 120 ml   Output --   Net 120 ml       OBJECTIVE:    /71   Pulse 86   Temp 97.3 °F (36.3 °C) (Temporal)   Resp 16   Ht 5' 2\" (1.575 m)   Wt 121 lb 4.1 oz (55 kg)   LMP  (LMP Unknown)   SpO2 94%   BMI 22.18 kg/m²     General appearance: No apparent distress, appears stated age and cooperative. HEENT:  Conjunctivae/corneas clear. Neck: Supple. No jugular venous distention. Respiratory: Coarse breath sounds bilaterally with rhonchi, no wheezes or rales appreciated.     Cardiovascular: Regular rate rhythm, normal S1-S2  Abdomen: Soft, nontender, nondistended  Musculoskeletal: No clubbing, 12/15/22  0557 12/16/22  0507 12/17/22  0538   WBC 10.5 12.8* 10.9   HGB 12.2 12.9 13.9   HCT 36.1 39.0 40.7    283 331       Recent Labs     12/15/22  0557 12/16/22  0507 12/17/22  0538    137 142   K 4.6 4.3 4.5   CL 97* 99 100   CO2 30* 27 26   BUN 11 16 22   CREATININE 0.4* 0.5 0.5   CALCIUM 9.1 9.5 9.5       No results for input(s): PROT, ALB, ALKPHOS, ALT, AST, BILITOT, AMYLASE, LIPASE in the last 72 hours. No results for input(s): INR in the last 72 hours. No results for input(s): Doyne Knock in the last 72 hours. Chronic labs:    Lab Results   Component Value Date    CHOL 190 11/08/2022    TRIG 96 11/08/2022    HDL 76 11/10/2022    LDLCALC 92 11/10/2022    TSH 1.290 11/10/2022    INR 1.1 07/24/2022    LABA1C 6.1 12/19/2017       Radiology: REVIEWED DAILY    +++++++++++++++++++++++++++++++++++++++++++++++++  Viviane Graham MD  [address]  +++++++++++++++++++++++++++++++++++++++++++++++++  NOTE: This report was transcribed using voice recognition software. Every effort was made to ensure accuracy; however, inadvertent computerized transcription errors may be present.

## 2022-12-18 VITALS
SYSTOLIC BLOOD PRESSURE: 119 MMHG | HEART RATE: 110 BPM | RESPIRATION RATE: 17 BRPM | TEMPERATURE: 97.4 F | WEIGHT: 121.25 LBS | BODY MASS INDEX: 22.31 KG/M2 | DIASTOLIC BLOOD PRESSURE: 79 MMHG | OXYGEN SATURATION: 96 % | HEIGHT: 62 IN

## 2022-12-18 PROCEDURE — 6370000000 HC RX 637 (ALT 250 FOR IP): Performed by: INTERNAL MEDICINE

## 2022-12-18 PROCEDURE — 2500000003 HC RX 250 WO HCPCS: Performed by: INTERNAL MEDICINE

## 2022-12-18 PROCEDURE — 2700000000 HC OXYGEN THERAPY PER DAY

## 2022-12-18 PROCEDURE — 2580000003 HC RX 258: Performed by: INTERNAL MEDICINE

## 2022-12-18 PROCEDURE — 94640 AIRWAY INHALATION TREATMENT: CPT

## 2022-12-18 PROCEDURE — 6370000000 HC RX 637 (ALT 250 FOR IP): Performed by: FAMILY MEDICINE

## 2022-12-18 PROCEDURE — 6370000000 HC RX 637 (ALT 250 FOR IP): Performed by: STUDENT IN AN ORGANIZED HEALTH CARE EDUCATION/TRAINING PROGRAM

## 2022-12-18 RX ORDER — PREDNISONE 20 MG/1
40 TABLET ORAL DAILY
Qty: 6 TABLET | Refills: 0 | Status: SHIPPED | OUTPATIENT
Start: 2022-12-19 | End: 2022-12-22

## 2022-12-18 RX ORDER — GUAIFENESIN 400 MG/1
400 TABLET ORAL 3 TIMES DAILY
Qty: 56 TABLET | Refills: 0 | Status: SHIPPED | OUTPATIENT
Start: 2022-12-18

## 2022-12-18 RX ORDER — AMOXICILLIN AND CLAVULANATE POTASSIUM 875; 125 MG/1; MG/1
1 TABLET, FILM COATED ORAL 2 TIMES DAILY
Qty: 8 TABLET | Refills: 0 | Status: SHIPPED | OUTPATIENT
Start: 2022-12-18 | End: 2022-12-22

## 2022-12-18 RX ORDER — DOXYCYCLINE HYCLATE 100 MG
100 TABLET ORAL 2 TIMES DAILY
Qty: 8 TABLET | Refills: 0 | Status: SHIPPED | OUTPATIENT
Start: 2022-12-18 | End: 2022-12-22

## 2022-12-18 RX ORDER — DIVALPROEX SODIUM 250 MG/1
250 TABLET, DELAYED RELEASE ORAL 3 TIMES DAILY
Qty: 90 TABLET | Refills: 3 | Status: SHIPPED | OUTPATIENT
Start: 2022-12-18

## 2022-12-18 RX ADMIN — SERTRALINE 25 MG: 50 TABLET, FILM COATED ORAL at 09:48

## 2022-12-18 RX ADMIN — SODIUM CHLORIDE, PRESERVATIVE FREE 10 ML: 5 INJECTION INTRAVENOUS at 09:49

## 2022-12-18 RX ADMIN — ACETAMINOPHEN 650 MG: 325 TABLET ORAL at 05:53

## 2022-12-18 RX ADMIN — PREGABALIN 75 MG: 75 CAPSULE ORAL at 09:48

## 2022-12-18 RX ADMIN — RISPERIDONE 1 MG: 1 TABLET, FILM COATED ORAL at 14:37

## 2022-12-18 RX ADMIN — RISPERIDONE 1 MG: 1 TABLET, FILM COATED ORAL at 09:49

## 2022-12-18 RX ADMIN — DOXYCYCLINE 100 MG: 100 INJECTION, POWDER, LYOPHILIZED, FOR SOLUTION INTRAVENOUS at 05:53

## 2022-12-18 RX ADMIN — DIVALPROEX SODIUM 250 MG: 250 TABLET, DELAYED RELEASE ORAL at 14:37

## 2022-12-18 RX ADMIN — PREGABALIN 75 MG: 75 CAPSULE ORAL at 14:37

## 2022-12-18 RX ADMIN — GUAIFENESIN 400 MG: 400 TABLET ORAL at 09:48

## 2022-12-18 RX ADMIN — SUCRALFATE 1 G: 1 TABLET ORAL at 12:32

## 2022-12-18 RX ADMIN — IPRATROPIUM BROMIDE AND ALBUTEROL SULFATE 1 AMPULE: .5; 2.5 SOLUTION RESPIRATORY (INHALATION) at 09:06

## 2022-12-18 RX ADMIN — PANTOPRAZOLE SODIUM 40 MG: 40 TABLET, DELAYED RELEASE ORAL at 05:53

## 2022-12-18 RX ADMIN — IPRATROPIUM BROMIDE AND ALBUTEROL SULFATE 1 AMPULE: .5; 2.5 SOLUTION RESPIRATORY (INHALATION) at 13:32

## 2022-12-18 RX ADMIN — DIVALPROEX SODIUM 250 MG: 250 TABLET, DELAYED RELEASE ORAL at 09:48

## 2022-12-18 RX ADMIN — SUCRALFATE 1 G: 1 TABLET ORAL at 05:53

## 2022-12-18 RX ADMIN — HYDROMORPHONE HYDROCHLORIDE 4 MG: 2 TABLET ORAL at 09:52

## 2022-12-18 RX ADMIN — PREDNISONE 40 MG: 20 TABLET ORAL at 09:49

## 2022-12-18 ASSESSMENT — PAIN DESCRIPTION - ONSET: ONSET: ON-GOING

## 2022-12-18 ASSESSMENT — PAIN DESCRIPTION - DESCRIPTORS: DESCRIPTORS: ACHING;DISCOMFORT;SORE

## 2022-12-18 ASSESSMENT — PAIN DESCRIPTION - PAIN TYPE: TYPE: CHRONIC PAIN

## 2022-12-18 ASSESSMENT — PAIN SCALES - GENERAL
PAINLEVEL_OUTOF10: 7
PAINLEVEL_OUTOF10: 0
PAINLEVEL_OUTOF10: 4

## 2022-12-18 ASSESSMENT — PAIN DESCRIPTION - LOCATION: LOCATION: GENERALIZED

## 2022-12-18 ASSESSMENT — PAIN DESCRIPTION - FREQUENCY: FREQUENCY: CONTINUOUS

## 2022-12-18 ASSESSMENT — PAIN - FUNCTIONAL ASSESSMENT: PAIN_FUNCTIONAL_ASSESSMENT: ACTIVITIES ARE NOT PREVENTED

## 2022-12-18 NOTE — DISCHARGE INSTRUCTIONS
Patient is hemodynamically stable today and will be discharged home  Please follow-up with primary care provider within a week of discharge  Please continue medications as prescribed including oral antibiotics for possible pneumonia  Please go to the nearest ER for worsening of symptoms

## 2022-12-18 NOTE — PROGRESS NOTES
Discharge instructions reviewed with patient and  at bedside. All questions answered. Prescriptions given. Patient 92-93% on room air.

## 2022-12-18 NOTE — DISCHARGE SUMMARY
Hospital Medicine Discharge Summary    Patient ID: Eri Gaming      Patient's PCP: Debby Bassett MD    Admit Date: 12/13/2022     Discharge Date:   12/18/2022    Admitting Physician: Vipul Arroyo MD     Discharge Physician: Femi Johnson MD     Discharge condition: Stable    Discharge Diagnoses:    Possible community-acquired pneumonia [right lung infiltrate]         Active Hospital Problems    Diagnosis Date Noted    SOB (shortness of breath) [R06.02] 12/13/2022     Priority: Medium       The patient was seen and examined on day of discharge and this discharge summary is in conjunction with any daily progress note from day of discharge. Hospital Course:   62-year-old female who presented to the hospital with shortness of breath. Imaging showing right lung infiltrates. Started on oxygen supplementation on IV Rocephin and doxycycline on steroids. Bronchodilators initiated. Patient's monitored closely on the floors. CBC BMP within normal limits prior to discharge. Patient is hemodynamically stable. She has achieved maximum benefit and will be discharged home today on p.o. antibiotics and steroids. Plan of care discussed in detail with patient. She will follow-up with primary care physician on discharge. Exam:     /79   Pulse (!) 110   Temp 97.4 °F (36.3 °C) (Temporal)   Resp 17   Ht 5' 2\" (1.575 m)   Wt 121 lb 4.1 oz (55 kg)   LMP  (LMP Unknown)   SpO2 96%   BMI 22.18 kg/m²     General appearance: No apparent distress, appears stated age and cooperative. HEENT:  Conjunctivae/corneas clear. Neck: Supple. No jugular venous distention. Respiratory: Coarse breath sounds bilaterally with rhonchi, no wheezes or rales appreciated. Cardiovascular: Regular rate rhythm, normal S1-S2  Abdomen: Soft, nontender, nondistended  Musculoskeletal: No clubbing, cyanosis, no bilateral lower extremity edema. Brisk capillary refill.    Skin:  No rashes  on visible skin  Neurologic: awake, alert and following commands       Consults:     IP CONSULT TO HOSPITALIST    Significant Diagnostic Studies:   Reviewed    Disposition: Home    Discharge Instructions/Follow-up:    Follow-up with primary care provider    Code Status:  Full Code     Activity: activity as tolerated    Diet: cardiac diet    Labs: For convenience and continuity at follow-up the following most recent labs are provided:      CBC:    Lab Results   Component Value Date/Time    WBC 10.9 12/17/2022 05:38 AM    HGB 13.9 12/17/2022 05:38 AM    HCT 40.7 12/17/2022 05:38 AM     12/17/2022 05:38 AM       Renal:    Lab Results   Component Value Date/Time     12/17/2022 05:38 AM    K 4.5 12/17/2022 05:38 AM    K 4.3 12/14/2022 04:49 AM     12/17/2022 05:38 AM    CO2 26 12/17/2022 05:38 AM    BUN 22 12/17/2022 05:38 AM    CREATININE 0.5 12/17/2022 05:38 AM    CALCIUM 9.5 12/17/2022 05:38 AM       Discharge Medications:     Current Discharge Medication List             Details   predniSONE (DELTASONE) 20 MG tablet Take 2 tablets by mouth daily for 3 doses  Qty: 6 tablet, Refills: 0      guaiFENesin 400 MG tablet Take 1 tablet by mouth in the morning, at noon, and at bedtime  Qty: 56 tablet, Refills: 0      amoxicillin-clavulanate (AUGMENTIN) 875-125 MG per tablet Take 1 tablet by mouth 2 times daily for 4 days  Qty: 8 tablet, Refills: 0      doxycycline hyclate (VIBRA-TABS) 100 MG tablet Take 1 tablet by mouth 2 times daily for 4 days  Qty: 8 tablet, Refills: 0                Details   divalproex (DEPAKOTE) 250 MG DR tablet Take 1 tablet by mouth 3 times daily  Qty: 90 tablet, Refills: 3                Details   albuterol sulfate HFA (PROVENTIL;VENTOLIN;PROAIR) 108 (90 Base) MCG/ACT inhaler INHALE 2 PUFFS INTO THE LUNGS FOUR TIMES DAILY AS NEEDED FOR WHEEZING  Qty: 18 g, Refills: 0    Associated Diagnoses: Chronic obstructive pulmonary disease, unspecified COPD type ([facility] [address]  Smoking greater than 40 pack years zolpidem (AMBIEN) 10 MG tablet       pantoprazole (PROTONIX) 40 MG tablet Take 1 tablet by mouth every morning (before breakfast)  Qty: 30 tablet, Refills: 0    Associated Diagnoses: Chronic gastritis without bleeding, unspecified gastritis type      ipratropium-albuterol (DUONEB) 0.5-2.5 (3) MG/3ML SOLN nebulizer solution Inhale 3 mLs into the lungs every 4 hours (while awake)  Qty: 360 mL, Refills: 0    Associated Diagnoses: Chronic obstructive pulmonary disease, unspecified [facility] type [facility] Smoking greater than 40 pack years      sucralfate (CARAFATE) 1 GM tablet Take 1 tablet by mouth in the morning, at noon, and at bedtime  Qty: 120 tablet, Refills: 3    Associated Diagnoses: Other acute gastritis without hemorrhage      albuterol (PROVENTIL) (2.5 MG/3ML) 0.083% nebulizer solution Take 3 mLs by nebulization every 6 hours as needed for Wheezing  Qty: 120 each, Refills: 0      risperiDONE (RISPERDAL) 1 MG tablet Take 1 mg by mouth 3 times daily      sertraline (ZOLOFT) 25 MG tablet Take 25 mg by mouth daily      nicotine (NICODERM CQ) 21 MG/24HR Place 1 patch onto the skin every 24 hours      HYDROmorphone (DILAUDID) 4 MG tablet Take 4 mg by mouth every 6 hours as needed. pregabalin (LYRICA) 75 MG capsule Take 75 mg by mouth 3 times daily.       melatonin 3 MG TABS tablet Take 1 tablet by mouth daily  Refills: 3      ondansetron (ZOFRAN-ODT) 4 MG disintegrating tablet Take 1 tablet by mouth 3 times daily as needed for Nausea or Vomiting  Qty: 21 tablet, Refills: 0    Associated Diagnoses: Nausea      Nutritional Supplements (ENSURE COMPLETE SHAKE) LIQD Take 1 Can by mouth 2 times daily  Qty: 60 Bottle, Refills: 0    Associated Diagnoses: Protein malnutrition (HCC)      vitamin D (CHOLECALCIFEROL) 1000 UNIT TABS tablet Take 1,000 Units by mouth daily      Multiple Vitamins-Minerals (MULTIVITAMIN PO) Take by mouth             Time Spent on discharge is more than 45 minutes in the examination, evaluation, counseling and review of medications and discharge plan. Signed:     Nikki Gutierrez MD   12/18/2022

## 2022-12-18 NOTE — PLAN OF CARE
Problem: Discharge Planning  Goal: Discharge to home or other facility with appropriate resources  12/18/2022 1615 by Bozena Chaves RN  Outcome: Adequate for Discharge  12/18/2022 1615 by Bozena Chaves RN  Outcome: Progressing     Problem: Pain  Goal: Verbalizes/displays adequate comfort level or baseline comfort level  12/18/2022 1615 by Bozena Chaves RN  Outcome: Adequate for Discharge  12/18/2022 1615 by Bozena Chaves RN  Outcome: Progressing     Problem: Safety - Adult  Goal: Free from fall injury  12/18/2022 1615 by Bozena Chaves RN  Outcome: Adequate for Discharge  12/18/2022 1615 by Bozena Chaves RN  Outcome: Progressing     Problem: Skin/Tissue Integrity  Goal: Absence of new skin breakdown  Description: 1. Monitor for areas of redness and/or skin breakdown  2. Assess vascular access sites hourly  3. Every 4-6 hours minimum:  Change oxygen saturation probe site  4. Every 4-6 hours:  If on nasal continuous positive airway pressure, respiratory therapy assess nares and determine need for appliance change or resting period.   12/18/2022 1615 by Bozena Chaves RN  Outcome: Adequate for Discharge  12/18/2022 1615 by Bozena Chaves RN  Outcome: Progressing     Problem: Chronic Conditions and Co-morbidities  Goal: Patient's chronic conditions and co-morbidity symptoms are monitored and maintained or improved  12/18/2022 1615 by Bozena Chaves RN  Outcome: Adequate for Discharge  12/18/2022 1615 by Bozena Chaves RN  Outcome: Progressing

## 2022-12-27 ENCOUNTER — TELEPHONE (OUTPATIENT)
Dept: SURGERY | Age: 62
End: 2022-12-27

## 2022-12-27 ENCOUNTER — APPOINTMENT (OUTPATIENT)
Dept: CT IMAGING | Age: 62
DRG: 190 | End: 2022-12-27
Payer: MEDICARE

## 2022-12-27 ENCOUNTER — HOSPITAL ENCOUNTER (INPATIENT)
Age: 62
LOS: 3 days | Discharge: HOME | DRG: 190 | End: 2022-12-30
Attending: EMERGENCY MEDICINE | Admitting: FAMILY MEDICINE
Payer: MEDICARE

## 2022-12-27 ENCOUNTER — APPOINTMENT (OUTPATIENT)
Dept: GENERAL RADIOLOGY | Age: 62
DRG: 190 | End: 2022-12-27
Payer: MEDICARE

## 2022-12-27 ENCOUNTER — TELEPHONE (OUTPATIENT)
Dept: OTHER | Facility: CLINIC | Age: 62
End: 2022-12-27

## 2022-12-27 DIAGNOSIS — F51.04 CHRONIC INSOMNIA: ICD-10-CM

## 2022-12-27 DIAGNOSIS — J42 ACUTE EXACERBATION OF CHRONIC BRONCHITIS (HCC): Primary | ICD-10-CM

## 2022-12-27 DIAGNOSIS — K29.50 CHRONIC GASTRITIS WITHOUT BLEEDING, UNSPECIFIED GASTRITIS TYPE: ICD-10-CM

## 2022-12-27 DIAGNOSIS — R06.89 DYSPNEA AND RESPIRATORY ABNORMALITIES: ICD-10-CM

## 2022-12-27 DIAGNOSIS — G47.00 INSOMNIA, UNSPECIFIED TYPE: ICD-10-CM

## 2022-12-27 DIAGNOSIS — J20.9 ACUTE EXACERBATION OF CHRONIC BRONCHITIS (HCC): Primary | ICD-10-CM

## 2022-12-27 DIAGNOSIS — R06.00 DYSPNEA AND RESPIRATORY ABNORMALITIES: ICD-10-CM

## 2022-12-27 PROBLEM — R77.8 ELEVATED TROPONIN: Status: ACTIVE | Noted: 2022-12-27

## 2022-12-27 PROBLEM — R79.89 ELEVATED TROPONIN: Status: ACTIVE | Noted: 2022-12-27

## 2022-12-27 LAB
ADENOVIRUS BY PCR: NOT DETECTED
ALBUMIN SERPL-MCNC: 3.5 G/DL (ref 3.5–5.2)
ALP BLD-CCNC: 141 U/L (ref 35–104)
ALT SERPL-CCNC: 46 U/L (ref 0–32)
ANION GAP SERPL CALCULATED.3IONS-SCNC: 11 MMOL/L (ref 7–16)
AST SERPL-CCNC: 105 U/L (ref 0–31)
BACTERIA: ABNORMAL /HPF
BASOPHILS ABSOLUTE: 0.04 E9/L (ref 0–0.2)
BASOPHILS RELATIVE PERCENT: 0.2 % (ref 0–2)
BILIRUB SERPL-MCNC: 0.3 MG/DL (ref 0–1.2)
BILIRUBIN URINE: NEGATIVE
BLOOD, URINE: NEGATIVE
BORDETELLA PARAPERTUSSIS BY PCR: NOT DETECTED
BORDETELLA PERTUSSIS BY PCR: NOT DETECTED
BUN BLDV-MCNC: 15 MG/DL (ref 6–23)
C-REACTIVE PROTEIN: 14.5 MG/DL (ref 0–0.4)
CALCIUM SERPL-MCNC: 9.1 MG/DL (ref 8.6–10.2)
CHLAMYDOPHILIA PNEUMONIAE BY PCR: NOT DETECTED
CHLORIDE BLD-SCNC: 96 MMOL/L (ref 98–107)
CLARITY: CLEAR
CO2: 29 MMOL/L (ref 22–29)
COLOR: YELLOW
CORONAVIRUS 229E BY PCR: NOT DETECTED
CORONAVIRUS HKU1 BY PCR: NOT DETECTED
CORONAVIRUS NL63 BY PCR: NOT DETECTED
CORONAVIRUS OC43 BY PCR: NOT DETECTED
CREAT SERPL-MCNC: 0.5 MG/DL (ref 0.5–1)
EKG ATRIAL RATE: 95 BPM
EKG P AXIS: 61 DEGREES
EKG P-R INTERVAL: 124 MS
EKG Q-T INTERVAL: 336 MS
EKG QRS DURATION: 68 MS
EKG QTC CALCULATION (BAZETT): 422 MS
EKG R AXIS: -46 DEGREES
EKG T AXIS: 53 DEGREES
EKG VENTRICULAR RATE: 95 BPM
EOSINOPHILS ABSOLUTE: 0.04 E9/L (ref 0.05–0.5)
EOSINOPHILS RELATIVE PERCENT: 0.2 % (ref 0–6)
EPITHELIAL CELLS, UA: ABNORMAL /HPF
GFR SERPL CREATININE-BSD FRML MDRD: >60 ML/MIN/1.73
GLUCOSE BLD-MCNC: 117 MG/DL (ref 74–99)
GLUCOSE URINE: NEGATIVE MG/DL
HCT VFR BLD CALC: 39.2 % (ref 34–48)
HEMOGLOBIN: 13.3 G/DL (ref 11.5–15.5)
HUMAN METAPNEUMOVIRUS BY PCR: NOT DETECTED
HUMAN RHINOVIRUS/ENTEROVIRUS BY PCR: NOT DETECTED
IMMATURE GRANULOCYTES #: 0.1 E9/L
IMMATURE GRANULOCYTES %: 0.6 % (ref 0–5)
INFLUENZA A BY PCR: NOT DETECTED
INFLUENZA B BY PCR: NOT DETECTED
KETONES, URINE: 15 MG/DL
LEUKOCYTE ESTERASE, URINE: ABNORMAL
LYMPHOCYTES ABSOLUTE: 0.72 E9/L (ref 1.5–4)
LYMPHOCYTES RELATIVE PERCENT: 4.1 % (ref 20–42)
MCH RBC QN AUTO: 31.4 PG (ref 26–35)
MCHC RBC AUTO-ENTMCNC: 33.9 % (ref 32–34.5)
MCV RBC AUTO: 92.7 FL (ref 80–99.9)
MONOCYTES ABSOLUTE: 1.08 E9/L (ref 0.1–0.95)
MONOCYTES RELATIVE PERCENT: 6.2 % (ref 2–12)
MYCOPLASMA PNEUMONIAE BY PCR: NOT DETECTED
NEUTROPHILS ABSOLUTE: 15.54 E9/L (ref 1.8–7.3)
NEUTROPHILS RELATIVE PERCENT: 88.7 % (ref 43–80)
NITRITE, URINE: NEGATIVE
PARAINFLUENZA VIRUS 1 BY PCR: NOT DETECTED
PARAINFLUENZA VIRUS 2 BY PCR: NOT DETECTED
PARAINFLUENZA VIRUS 3 BY PCR: NOT DETECTED
PARAINFLUENZA VIRUS 4 BY PCR: NOT DETECTED
PDW BLD-RTO: 13.3 FL (ref 11.5–15)
PH UA: 7 (ref 5–9)
PLATELET # BLD: 258 E9/L (ref 130–450)
PMV BLD AUTO: 9.5 FL (ref 7–12)
POTASSIUM REFLEX MAGNESIUM: 4 MMOL/L (ref 3.5–5)
PRO-BNP: 278 PG/ML (ref 0–125)
PROCALCITONIN: 0.32 NG/ML (ref 0–0.08)
PROTEIN UA: NEGATIVE MG/DL
RBC # BLD: 4.23 E12/L (ref 3.5–5.5)
RBC UA: ABNORMAL /HPF (ref 0–2)
RESPIRATORY SYNCYTIAL VIRUS BY PCR: NOT DETECTED
SARS-COV-2, PCR: NOT DETECTED
SEDIMENTATION RATE, ERYTHROCYTE: 19 MM/HR (ref 0–20)
SODIUM BLD-SCNC: 136 MMOL/L (ref 132–146)
SPECIFIC GRAVITY UA: 1.02 (ref 1–1.03)
TOTAL PROTEIN: 6.4 G/DL (ref 6.4–8.3)
TROPONIN, HIGH SENSITIVITY: 32 NG/L (ref 0–9)
TROPONIN, HIGH SENSITIVITY: 36 NG/L (ref 0–9)
TROPONIN, HIGH SENSITIVITY: 55 NG/L (ref 0–9)
UROBILINOGEN, URINE: 0.2 E.U./DL
WBC # BLD: 17.5 E9/L (ref 4.5–11.5)
WBC UA: ABNORMAL /HPF (ref 0–5)

## 2022-12-27 PROCEDURE — 93005 ELECTROCARDIOGRAM TRACING: CPT | Performed by: EMERGENCY MEDICINE

## 2022-12-27 PROCEDURE — 81001 URINALYSIS AUTO W/SCOPE: CPT

## 2022-12-27 PROCEDURE — 6370000000 HC RX 637 (ALT 250 FOR IP): Performed by: FAMILY MEDICINE

## 2022-12-27 PROCEDURE — 99222 1ST HOSP IP/OBS MODERATE 55: CPT | Performed by: FAMILY MEDICINE

## 2022-12-27 PROCEDURE — 6370000000 HC RX 637 (ALT 250 FOR IP)

## 2022-12-27 PROCEDURE — 87449 NOS EACH ORGANISM AG IA: CPT

## 2022-12-27 PROCEDURE — 71275 CT ANGIOGRAPHY CHEST: CPT

## 2022-12-27 PROCEDURE — 36415 COLL VENOUS BLD VENIPUNCTURE: CPT

## 2022-12-27 PROCEDURE — 6360000002 HC RX W HCPCS: Performed by: NURSE PRACTITIONER

## 2022-12-27 PROCEDURE — 93005 ELECTROCARDIOGRAM TRACING: CPT

## 2022-12-27 PROCEDURE — 6360000004 HC RX CONTRAST MEDICATION: Performed by: RADIOLOGY

## 2022-12-27 PROCEDURE — 80053 COMPREHEN METABOLIC PANEL: CPT

## 2022-12-27 PROCEDURE — 99285 EMERGENCY DEPT VISIT HI MDM: CPT

## 2022-12-27 PROCEDURE — 85651 RBC SED RATE NONAUTOMATED: CPT

## 2022-12-27 PROCEDURE — 6370000000 HC RX 637 (ALT 250 FOR IP): Performed by: INTERNAL MEDICINE

## 2022-12-27 PROCEDURE — 0202U NFCT DS 22 TRGT SARS-COV-2: CPT

## 2022-12-27 PROCEDURE — 2060000000 HC ICU INTERMEDIATE R&B

## 2022-12-27 PROCEDURE — 71045 X-RAY EXAM CHEST 1 VIEW: CPT

## 2022-12-27 PROCEDURE — 2580000003 HC RX 258: Performed by: FAMILY MEDICINE

## 2022-12-27 PROCEDURE — 85025 COMPLETE CBC W/AUTO DIFF WBC: CPT

## 2022-12-27 PROCEDURE — 84145 PROCALCITONIN (PCT): CPT

## 2022-12-27 PROCEDURE — 87040 BLOOD CULTURE FOR BACTERIA: CPT

## 2022-12-27 PROCEDURE — 87206 SMEAR FLUORESCENT/ACID STAI: CPT

## 2022-12-27 PROCEDURE — 83880 ASSAY OF NATRIURETIC PEPTIDE: CPT

## 2022-12-27 PROCEDURE — 94640 AIRWAY INHALATION TREATMENT: CPT

## 2022-12-27 PROCEDURE — 86140 C-REACTIVE PROTEIN: CPT

## 2022-12-27 PROCEDURE — 6360000002 HC RX W HCPCS: Performed by: EMERGENCY MEDICINE

## 2022-12-27 PROCEDURE — 6360000002 HC RX W HCPCS: Performed by: INTERNAL MEDICINE

## 2022-12-27 PROCEDURE — 96374 THER/PROPH/DIAG INJ IV PUSH: CPT

## 2022-12-27 PROCEDURE — 84484 ASSAY OF TROPONIN QUANT: CPT

## 2022-12-27 PROCEDURE — 6370000000 HC RX 637 (ALT 250 FOR IP): Performed by: EMERGENCY MEDICINE

## 2022-12-27 PROCEDURE — 94664 DEMO&/EVAL PT USE INHALER: CPT

## 2022-12-27 PROCEDURE — 87070 CULTURE OTHR SPECIMN AEROBIC: CPT

## 2022-12-27 PROCEDURE — 93010 ELECTROCARDIOGRAM REPORT: CPT | Performed by: INTERNAL MEDICINE

## 2022-12-27 RX ORDER — METHYLPREDNISOLONE SODIUM SUCCINATE 125 MG/2ML
125 INJECTION, POWDER, LYOPHILIZED, FOR SOLUTION INTRAMUSCULAR; INTRAVENOUS ONCE
Status: COMPLETED | OUTPATIENT
Start: 2022-12-27 | End: 2022-12-27

## 2022-12-27 RX ORDER — VITAMIN B COMPLEX
1000 TABLET ORAL DAILY
Status: DISCONTINUED | OUTPATIENT
Start: 2022-12-27 | End: 2022-12-30 | Stop reason: HOSPADM

## 2022-12-27 RX ORDER — GUAIFENESIN 400 MG/1
400 TABLET ORAL 4 TIMES DAILY PRN
Status: DISCONTINUED | OUTPATIENT
Start: 2022-12-27 | End: 2022-12-30 | Stop reason: HOSPADM

## 2022-12-27 RX ORDER — PREGABALIN 75 MG/1
75 CAPSULE ORAL 3 TIMES DAILY
Status: CANCELLED | OUTPATIENT
Start: 2022-12-27

## 2022-12-27 RX ORDER — LEVOFLOXACIN 5 MG/ML
750 INJECTION, SOLUTION INTRAVENOUS EVERY 24 HOURS
Status: DISCONTINUED | OUTPATIENT
Start: 2022-12-27 | End: 2022-12-30 | Stop reason: HOSPADM

## 2022-12-27 RX ORDER — SUCRALFATE 1 G/1
1 TABLET ORAL 3 TIMES DAILY
Status: DISCONTINUED | OUTPATIENT
Start: 2022-12-27 | End: 2022-12-30 | Stop reason: HOSPADM

## 2022-12-27 RX ORDER — ACETAMINOPHEN 325 MG/1
650 TABLET ORAL EVERY 6 HOURS PRN
Status: DISCONTINUED | OUTPATIENT
Start: 2022-12-27 | End: 2022-12-30 | Stop reason: HOSPADM

## 2022-12-27 RX ORDER — SODIUM CHLORIDE 9 MG/ML
INJECTION, SOLUTION INTRAVENOUS PRN
Status: DISCONTINUED | OUTPATIENT
Start: 2022-12-27 | End: 2022-12-30 | Stop reason: HOSPADM

## 2022-12-27 RX ORDER — ARFORMOTEROL TARTRATE 15 UG/2ML
15 SOLUTION RESPIRATORY (INHALATION) 2 TIMES DAILY
Status: DISCONTINUED | OUTPATIENT
Start: 2022-12-27 | End: 2022-12-30 | Stop reason: HOSPADM

## 2022-12-27 RX ORDER — NALOXONE HYDROCHLORIDE 0.4 MG/ML
0.4 INJECTION, SOLUTION INTRAMUSCULAR; INTRAVENOUS; SUBCUTANEOUS PRN
Status: DISCONTINUED | OUTPATIENT
Start: 2022-12-27 | End: 2022-12-30 | Stop reason: HOSPADM

## 2022-12-27 RX ORDER — ONDANSETRON 2 MG/ML
4 INJECTION INTRAMUSCULAR; INTRAVENOUS EVERY 6 HOURS PRN
Status: DISCONTINUED | OUTPATIENT
Start: 2022-12-27 | End: 2022-12-30 | Stop reason: HOSPADM

## 2022-12-27 RX ORDER — METHYLPREDNISOLONE SODIUM SUCCINATE 125 MG/2ML
60 INJECTION, POWDER, LYOPHILIZED, FOR SOLUTION INTRAMUSCULAR; INTRAVENOUS DAILY
Status: DISCONTINUED | OUTPATIENT
Start: 2022-12-28 | End: 2022-12-27

## 2022-12-27 RX ORDER — ACETAMINOPHEN 650 MG/1
650 SUPPOSITORY RECTAL EVERY 6 HOURS PRN
Status: DISCONTINUED | OUTPATIENT
Start: 2022-12-27 | End: 2022-12-30 | Stop reason: HOSPADM

## 2022-12-27 RX ORDER — SODIUM CHLORIDE 0.9 % (FLUSH) 0.9 %
5-40 SYRINGE (ML) INJECTION EVERY 12 HOURS SCHEDULED
Status: DISCONTINUED | OUTPATIENT
Start: 2022-12-27 | End: 2022-12-30 | Stop reason: HOSPADM

## 2022-12-27 RX ORDER — RISPERIDONE 1 MG/1
1 TABLET ORAL 3 TIMES DAILY
Status: CANCELLED | OUTPATIENT
Start: 2022-12-27

## 2022-12-27 RX ORDER — NICOTINE 21 MG/24HR
1 PATCH, TRANSDERMAL 24 HOURS TRANSDERMAL DAILY
Status: DISCONTINUED | OUTPATIENT
Start: 2022-12-27 | End: 2022-12-30 | Stop reason: HOSPADM

## 2022-12-27 RX ORDER — BENZONATATE 100 MG/1
100 CAPSULE ORAL 3 TIMES DAILY PRN
Status: DISCONTINUED | OUTPATIENT
Start: 2022-12-27 | End: 2022-12-30 | Stop reason: HOSPADM

## 2022-12-27 RX ORDER — IPRATROPIUM BROMIDE AND ALBUTEROL SULFATE 2.5; .5 MG/3ML; MG/3ML
1 SOLUTION RESPIRATORY (INHALATION)
Status: DISCONTINUED | OUTPATIENT
Start: 2022-12-27 | End: 2022-12-30 | Stop reason: HOSPADM

## 2022-12-27 RX ORDER — ONDANSETRON 4 MG/1
4 TABLET, ORALLY DISINTEGRATING ORAL EVERY 8 HOURS PRN
Status: DISCONTINUED | OUTPATIENT
Start: 2022-12-27 | End: 2022-12-30 | Stop reason: HOSPADM

## 2022-12-27 RX ORDER — PREGABALIN 75 MG/1
75 CAPSULE ORAL 3 TIMES DAILY
Status: DISCONTINUED | OUTPATIENT
Start: 2022-12-27 | End: 2022-12-30 | Stop reason: HOSPADM

## 2022-12-27 RX ORDER — SODIUM CHLORIDE 0.9 % (FLUSH) 0.9 %
5-40 SYRINGE (ML) INJECTION PRN
Status: DISCONTINUED | OUTPATIENT
Start: 2022-12-27 | End: 2022-12-30 | Stop reason: HOSPADM

## 2022-12-27 RX ORDER — BUDESONIDE 0.5 MG/2ML
0.5 INHALANT ORAL 2 TIMES DAILY
Status: DISCONTINUED | OUTPATIENT
Start: 2022-12-27 | End: 2022-12-30 | Stop reason: HOSPADM

## 2022-12-27 RX ORDER — METHYLPREDNISOLONE SODIUM SUCCINATE 40 MG/ML
40 INJECTION, POWDER, LYOPHILIZED, FOR SOLUTION INTRAMUSCULAR; INTRAVENOUS EVERY 8 HOURS
Status: DISCONTINUED | OUTPATIENT
Start: 2022-12-28 | End: 2022-12-28

## 2022-12-27 RX ORDER — ENOXAPARIN SODIUM 100 MG/ML
30 INJECTION SUBCUTANEOUS DAILY
Status: DISCONTINUED | OUTPATIENT
Start: 2022-12-27 | End: 2022-12-29

## 2022-12-27 RX ORDER — ZOLPIDEM TARTRATE 5 MG/1
10 TABLET ORAL ONCE
Status: CANCELLED | OUTPATIENT
Start: 2022-12-27

## 2022-12-27 RX ORDER — ZOLPIDEM TARTRATE 5 MG/1
5 TABLET ORAL NIGHTLY
Status: DISCONTINUED | OUTPATIENT
Start: 2022-12-27 | End: 2022-12-30 | Stop reason: HOSPADM

## 2022-12-27 RX ORDER — PANTOPRAZOLE SODIUM 40 MG/1
40 TABLET, DELAYED RELEASE ORAL
Status: DISCONTINUED | OUTPATIENT
Start: 2022-12-28 | End: 2022-12-28

## 2022-12-27 RX ORDER — HYDROMORPHONE HYDROCHLORIDE 2 MG/1
4 TABLET ORAL EVERY 6 HOURS PRN
Status: DISCONTINUED | OUTPATIENT
Start: 2022-12-27 | End: 2022-12-30 | Stop reason: HOSPADM

## 2022-12-27 RX ORDER — IPRATROPIUM BROMIDE AND ALBUTEROL SULFATE 2.5; .5 MG/3ML; MG/3ML
1 SOLUTION RESPIRATORY (INHALATION)
Status: COMPLETED | OUTPATIENT
Start: 2022-12-27 | End: 2022-12-27

## 2022-12-27 RX ORDER — POLYETHYLENE GLYCOL 3350 17 G/17G
17 POWDER, FOR SOLUTION ORAL DAILY PRN
Status: DISCONTINUED | OUTPATIENT
Start: 2022-12-27 | End: 2022-12-30 | Stop reason: HOSPADM

## 2022-12-27 RX ORDER — DIVALPROEX SODIUM 250 MG/1
250 TABLET, DELAYED RELEASE ORAL 3 TIMES DAILY
Status: DISCONTINUED | OUTPATIENT
Start: 2022-12-27 | End: 2022-12-30 | Stop reason: HOSPADM

## 2022-12-27 RX ORDER — RISPERIDONE 1 MG/1
1 TABLET ORAL 3 TIMES DAILY
Status: DISCONTINUED | OUTPATIENT
Start: 2022-12-27 | End: 2022-12-30 | Stop reason: HOSPADM

## 2022-12-27 RX ADMIN — BUDESONIDE 500 MCG: 0.5 SUSPENSION RESPIRATORY (INHALATION) at 21:42

## 2022-12-27 RX ADMIN — RISPERIDONE 1 MG: 1 TABLET ORAL at 20:47

## 2022-12-27 RX ADMIN — SUCRALFATE 1 G: 1 TABLET ORAL at 20:47

## 2022-12-27 RX ADMIN — LEVOFLOXACIN 750 MG: 5 INJECTION, SOLUTION INTRAVENOUS at 12:12

## 2022-12-27 RX ADMIN — LIDOCAINE HYDROCHLORIDE: 20 SOLUTION ORAL; TOPICAL at 22:48

## 2022-12-27 RX ADMIN — IPRATROPIUM BROMIDE AND ALBUTEROL SULFATE 1 AMPULE: .5; 2.5 SOLUTION RESPIRATORY (INHALATION) at 07:31

## 2022-12-27 RX ADMIN — IOPAMIDOL 75 ML: 755 INJECTION, SOLUTION INTRAVENOUS at 08:48

## 2022-12-27 RX ADMIN — PREGABALIN 75 MG: 75 CAPSULE ORAL at 16:36

## 2022-12-27 RX ADMIN — IPRATROPIUM BROMIDE AND ALBUTEROL SULFATE 1 AMPULE: .5; 2.5 SOLUTION RESPIRATORY (INHALATION) at 07:30

## 2022-12-27 RX ADMIN — NYSTATIN 500000 UNITS: 500000 SUSPENSION ORAL at 20:47

## 2022-12-27 RX ADMIN — ZOLPIDEM TARTRATE 5 MG: 5 TABLET ORAL at 20:47

## 2022-12-27 RX ADMIN — DIVALPROEX SODIUM 250 MG: 250 TABLET, DELAYED RELEASE ORAL at 16:32

## 2022-12-27 RX ADMIN — IPRATROPIUM BROMIDE AND ALBUTEROL SULFATE 1 AMPULE: .5; 2.5 SOLUTION RESPIRATORY (INHALATION) at 07:32

## 2022-12-27 RX ADMIN — METHYLPREDNISOLONE SODIUM SUCCINATE 40 MG: 40 INJECTION, POWDER, FOR SOLUTION INTRAMUSCULAR; INTRAVENOUS at 22:48

## 2022-12-27 RX ADMIN — RISPERIDONE 1 MG: 1 TABLET ORAL at 16:37

## 2022-12-27 RX ADMIN — METHYLPREDNISOLONE SODIUM SUCCINATE 125 MG: 125 INJECTION, POWDER, FOR SOLUTION INTRAMUSCULAR; INTRAVENOUS at 08:32

## 2022-12-27 RX ADMIN — ARFORMOTEROL TARTRATE 15 MCG: 15 SOLUTION RESPIRATORY (INHALATION) at 21:42

## 2022-12-27 RX ADMIN — HYDROMORPHONE HYDROCHLORIDE 4 MG: 2 TABLET ORAL at 22:19

## 2022-12-27 RX ADMIN — SERTRALINE HYDROCHLORIDE 25 MG: 50 TABLET ORAL at 16:34

## 2022-12-27 RX ADMIN — SODIUM CHLORIDE, PRESERVATIVE FREE 10 ML: 5 INJECTION INTRAVENOUS at 20:49

## 2022-12-27 RX ADMIN — DIVALPROEX SODIUM 250 MG: 250 TABLET, DELAYED RELEASE ORAL at 20:47

## 2022-12-27 RX ADMIN — IPRATROPIUM BROMIDE AND ALBUTEROL SULFATE 1 AMPULE: .5; 2.5 SOLUTION RESPIRATORY (INHALATION) at 21:42

## 2022-12-27 RX ADMIN — PREGABALIN 75 MG: 75 CAPSULE ORAL at 20:47

## 2022-12-27 ASSESSMENT — PAIN SCALES - GENERAL
PAINLEVEL_OUTOF10: 6
PAINLEVEL_OUTOF10: 9

## 2022-12-27 ASSESSMENT — ENCOUNTER SYMPTOMS
DIARRHEA: 0
SORE THROAT: 0
ABDOMINAL PAIN: 0
NAUSEA: 0
RHINORRHEA: 0
VOMITING: 0
EYE REDNESS: 0
SHORTNESS OF BREATH: 1
CHEST TIGHTNESS: 0
COUGH: 1

## 2022-12-27 ASSESSMENT — PAIN - FUNCTIONAL ASSESSMENT
PAIN_FUNCTIONAL_ASSESSMENT: NONE - DENIES PAIN
PAIN_FUNCTIONAL_ASSESSMENT: NONE - DENIES PAIN
PAIN_FUNCTIONAL_ASSESSMENT: 0-10

## 2022-12-27 ASSESSMENT — PAIN DESCRIPTION - LOCATION: LOCATION: CHEST

## 2022-12-27 ASSESSMENT — PAIN DESCRIPTION - ORIENTATION: ORIENTATION: LOWER;MID

## 2022-12-27 NOTE — PROGRESS NOTES
[facility] [address]  Progress Note    Chief complaint :  Chief Complaint   Patient presents with    Shortness of Breath     States she was 60% at home   pt 93% on room air in triage. Pt has Hx COPD, presents to ED with a styrofoam cup with yellow sputum she spit up       Subjective:  Overnight patient continued to complain of chest pain. A page was sent out to the resident who would cycle troponins and ordered an EKG. First transient opponent of the cycle was 36 followed by a repeat of 32 ~3 hours later. EKG showed a possible anterior infarct that was unchanged compared to her EKG yesterday. This morning patient states that this pain has been there for about 3 days. She states that the pain is a stabbing in the middle of her chest that radiates up to her jaw and out towards both of her shoulders. She states that there may be some relief by leaning forward but otherwise she has not found any pain relief. He has never experienced anything like this before and denies history of heart attack. She denies tachycardia, palpitations, shortness of breath. She states that her cough is at baseline and her sputum production is somewhat decreased from yesterday. She denies nausea, vomiting, and diaphoresis. She otherwise has no concerns or complaints this morning. States that her breathing is improved. She is tolerating diet although has a limited appetite. She is voiding and has had a bowel movement without issues. She denies fever, chill, and headaches. Past medical, surgical, family and social history were reviewed, non-contributory, and unchanged unless otherwise stated. Review of systems: Negative except for stated above. Objective:  /78   Pulse 90   Temp 98.3 °F (36.8 °C) (Oral)   Resp 18   Ht 5' 2\" (1.575 m)   Wt 119 lb 3.2 oz (54.1 kg) Comment: pcd hanging on bed  LMP  (LMP Unknown)   SpO2 96%   BMI 21.80 kg/m²     Physical Exam  Vitals reviewed. Constitutional:       General: She is in acute distress. Appearance: Normal appearance. She is ill-appearing. Comments: Patient did appear red and flushed   HENT:      Head: Normocephalic and atraumatic. Nose: Nose normal. No congestion or rhinorrhea. Eyes:      Extraocular Movements: Extraocular movements intact. Conjunctiva/sclera: Conjunctivae normal.   Cardiovascular:      Rate and Rhythm: Normal rate and regular rhythm. Heart sounds: Normal heart sounds. No murmur heard. Pulmonary:      Effort: Pulmonary effort is normal.      Breath sounds: Normal breath sounds. No wheezing. Abdominal:      General: Abdomen is flat. There is no distension. Palpations: Abdomen is soft. Tenderness: There is no abdominal tenderness. Musculoskeletal:         General: No swelling or deformity. Normal range of motion. Cervical back: Normal range of motion and neck supple. No tenderness. Skin:     General: Skin is warm and dry. Findings: No rash. Neurological:      General: No focal deficit present. Mental Status: She is alert and oriented to person, place, and time. Motor: No weakness.    Psychiatric:         Mood and Affect: Mood normal.         Behavior: Behavior normal.      Comments: Anxious       Labs:  Recent Results (from the past 24 hour(s))   Respiratory Panel, Molecular, with COVID-19 (Restricted: peds pts or suitable admitted adults)    Collection Time: 12/27/22  6:32 AM    Specimen: Nasopharyngeal   Result Value Ref Range    Adenovirus by PCR Not Detected Not Detected    Bordetella parapertussis by PCR Not Detected Not Detected    Bordetella pertussis by PCR Not Detected Not Detected    Chlamydophilia pneumoniae by PCR Not Detected Not Detected    Coronavirus 229E by PCR Not Detected Not Detected    Coronavirus HKU1 by PCR Not Detected Not Detected    Coronavirus NL63 by PCR Not Detected Not Detected    Coronavirus OC43 by PCR Not Detected Not Detected SARS-CoV-2, PCR Not Detected Not Detected    Human Metapneumovirus by PCR Not Detected Not Detected    Human Rhinovirus/Enterovirus by PCR Not Detected Not Detected    Influenza A by PCR Not Detected Not Detected    Influenza B by PCR Not Detected Not Detected    Mycoplasma pneumoniae by PCR Not Detected Not Detected    Parainfluenza Virus 1 by PCR Not Detected Not Detected    Parainfluenza Virus 2 by PCR Not Detected Not Detected    Parainfluenza Virus 3 by PCR Not Detected Not Detected    Parainfluenza Virus 4 by PCR Not Detected Not Detected    Respiratory Syncytial Virus by PCR Not Detected Not Detected   Urinalysis    Collection Time: 12/27/22  8:41 AM   Result Value Ref Range    Color, UA Yellow Straw/Yellow    Clarity, UA Clear Clear    Glucose, Ur Negative Negative mg/dL    Bilirubin Urine Negative Negative    Ketones, Urine 15 (A) Negative mg/dL    Specific Gravity, UA 1.020 1.005 - 1.030    Blood, Urine Negative Negative    pH, UA 7.0 5.0 - 9.0    Protein, UA Negative Negative mg/dL    Urobilinogen, Urine 0.2 <2.0 E.U./dL    Nitrite, Urine Negative Negative    Leukocyte Esterase, Urine SMALL (A) Negative   Microscopic Urinalysis    Collection Time: 12/27/22  8:41 AM   Result Value Ref Range    WBC, UA 1-3 0 - 5 /HPF    RBC, UA 0-1 0 - 2 /HPF    Epithelial Cells, UA MODERATE /HPF    Bacteria, UA MODERATE (A) None Seen /HPF   Troponin    Collection Time: 12/27/22  4:35 PM   Result Value Ref Range    Troponin, High Sensitivity 32 (H) 0 - 9 ng/L   EKG 12 Lead    Collection Time: 12/27/22  9:21 PM   Result Value Ref Range    Ventricular Rate 73 BPM    Atrial Rate 73 BPM    P-R Interval 184 ms    QRS Duration 78 ms    Q-T Interval 374 ms    QTc Calculation (Bazett) 412 ms    P Axis 71 degrees    R Axis -39 degrees    T Axis 51 degrees   Troponin    Collection Time: 12/27/22 10:56 PM   Result Value Ref Range    Troponin, High Sensitivity 36 (H) 0 - 9 ng/L   Comprehensive Metabolic Panel    Collection Time: 12/27/22 10:56 PM   Result Value Ref Range    Sodium 135 132 - 146 mmol/L    Potassium 4.0 3.5 - 5.0 mmol/L    Chloride 96 (L) 98 - 107 mmol/L    CO2 27 22 - 29 mmol/L    Anion Gap 12 7 - 16 mmol/L    Glucose 105 (H) 74 - 99 mg/dL    BUN 21 6 - 23 mg/dL    Creatinine 0.6 0.5 - 1.0 mg/dL    Est, Glom Filt Rate >60 >=60 mL/min/1.73    Calcium 9.3 8.6 - 10.2 mg/dL    Total Protein 6.5 6.4 - 8.3 g/dL    Albumin 3.5 3.5 - 5.2 g/dL    Total Bilirubin 0.3 0.0 - 1.2 mg/dL    Alkaline Phosphatase 113 (H) 35 - 104 U/L    ALT 45 (H) 0 - 32 U/L    AST 87 (H) 0 - 31 U/L   CBC with Auto Differential    Collection Time: 12/28/22  3:13 AM   Result Value Ref Range    WBC 12.9 (H) 4.5 - 11.5 E9/L    RBC 4.19 3.50 - 5.50 E12/L    Hemoglobin 12.9 11.5 - 15.5 g/dL    Hematocrit 39.4 34.0 - 48.0 %    MCV 94.0 80.0 - 99.9 fL    MCH 30.8 26.0 - 35.0 pg    MCHC 32.7 32.0 - 34.5 %    RDW 13.4 11.5 - 15.0 fL    Platelets 177 377 - 678 E9/L    MPV 9.8 7.0 - 12.0 fL   Comprehensive Metabolic Panel    Collection Time: 12/28/22  3:13 AM   Result Value Ref Range    Sodium 135 132 - 146 mmol/L    Potassium 4.7 3.5 - 5.0 mmol/L    Chloride 97 (L) 98 - 107 mmol/L    CO2 28 22 - 29 mmol/L    Anion Gap 10 7 - 16 mmol/L    Glucose 134 (H) 74 - 99 mg/dL    BUN 23 6 - 23 mg/dL    Creatinine 0.6 0.5 - 1.0 mg/dL    Est, Glom Filt Rate >60 >=60 mL/min/1.73    Calcium 9.1 8.6 - 10.2 mg/dL    Total Protein 6.5 6.4 - 8.3 g/dL    Albumin 3.3 (L) 3.5 - 5.2 g/dL    Total Bilirubin 0.3 0.0 - 1.2 mg/dL    Alkaline Phosphatase 120 (H) 35 - 104 U/L    ALT 45 (H) 0 - 32 U/L    AST 78 (H) 0 - 31 U/L   Troponin    Collection Time: 12/28/22  3:13 AM   Result Value Ref Range    Troponin, High Sensitivity 32 (H) 0 - 9 ng/L   EKG 12 Lead    Collection Time: 12/28/22  5:24 AM   Result Value Ref Range    Ventricular Rate 93 BPM    Atrial Rate 93 BPM    P-R Interval 158 ms    QRS Duration 86 ms    Q-T Interval 340 ms    QTc Calculation (Bazett) 422 ms    P Axis 74 degrees    R Axis -44 degrees    T Axis 56 degrees       Radiology and other tests reviewed:  CTA PULMONARY W CONTRAST   Final Result   No evidence of pulmonary embolism. Mildly increased consolidations in the lung bases, favoring worsened   atelectasis although difficult to exclude superimposed infection. Subtle new ground-glass opacities in the right perihilar region, may reflect   mild infectious or inflammatory process. Background of moderate emphysema. XR CHEST 1 VIEW   Final Result   No acute cardiopulmonary abnormality. Assessment:  Active Hospital Problems    Diagnosis Date Noted    Acute exacerbation of chronic bronchitis ([facility] [address] [J20.9, J42] 12/27/2022     Priority: Medium    Elevated troponin [R77.8] 12/27/2022     Priority: Medium    COPD exacerbation ([facility] [address] [J44.1] 11/08/2022     Priority: Medium       Plan:  Chest Pain vs Epigastric Pain:    Patient with constant radiating chest pain for 3 days. EKG in the ER showed possible lateral infarct with cycle troponins of 55 with a repeat of 32. May also be a component of epigastric pain as the symptoms resolved yesterday with GI cocktail.  - Troponins this morning returned 36 and 32 respectively. A.m. EKG again showed a possible lateral infarct with no changes from yesterday's EKG. - Give 325 of aspirin now  - Consider stress test  - Utox ordered    COPD exacerbation:   Patient was recently hospitalized in [facility] [place] 12/23 for hypoxia and pneumonia.   O2-sat in the 60s at home prior to admission  - Continue levofloxacin, IV, 750mg qDaily (today is day 2 of treatment)  - Continue Solumedrol, IV, 40mg TID (s/p single Solu-Medrol dose of 125 mg in the ER)   - Procal = pending  - CRP/ESR = pending/pending respectively  - DuoNebs every 4 hours  - Continue Brovana and Pulmicort twice daily  - Incentive spirometer use encouraged  - Mucinex, PO, 400mg QID PRN  - Tessalon Pearls, 100mg TID PRN  - Consult: Pulmonology (12 /27/22) - Dr. Braden Showers: [facility] oxygen as able. Continue Levaquin due to recent treatment with ceftriaxone and doxycycline. Solu-Medrol 40 mg every 8 hours. Follow-up strep and Legionella antigens and procalcitonin. Check IgE and alpha-1 antitrypsin levels. Outpatient PFTs.     Elevated troponin:  Troponins cycled in ER, 55 => 32 (delta = -23)  - Telemetry     Transaminitis:  Alkphos, ALT, and AST all elevated in the ER.  - AST = 78 (down from 87 yesterday)  - ALT = 45 (same as yesterday)  - AlkPhos = 120 (from from 141 yesterday)  - Consider RUQ ultrasound if transaminitis does not resolve or she develops abdominal pain    Leukocytosis:  WBC = 17.5 on admission, and she was tachycardic.  - WBC = 12.9 today (still elevated but decreased from 17.5 yesterday)  - ESR/CRP pending  - Procal = pending  - Follow-up blood cultures and respiratory cultures     Depression/anxiety:  - Continue home medications  -------- Zoloft 25 mg daily  -------- Risperidone 1 mg 3 times daily  -------- Ambien 5 mg nightly     Peripheral neuropathy:  - Continue home Lyrica 75 mg 3 times daily     Nicotine dependence:  - Nicotine patch 21 mg     Diabetes mellitus  Patient's last HbA1c was 6.1 on 12/2017,  she is not on any medications for diabetes  - HbA1c added onto today's labs  - Monitor glucose with BMP      Pain Control:  Dilaudid, Tylonel  DVT ppx: Lovenox 30mg qDaily  GI ppx: Protonix 40 mg daily  Code Status: Full  Diet: General diet with Oral Nutrition Supplementation      Disposition: Discharge to pending clinical course in 3 day(s)      Romana Ben, MD, PhD   Family Medicine Resident PGY-1  12/28/22   5:51 AM

## 2022-12-27 NOTE — PROGRESS NOTES
Database initiated pharmacy and medications verified with the patient. She is A&O from home with . States she uses a cane sometimes and is RA at baseline. She does have a nebulizer machine and has fallen recently.

## 2022-12-27 NOTE — CONSULTS
Pulmonary Consultation    Admit Date: 12/27/2022  Requesting Physician: Liz Plummer MD    CC:  COPD exacerbation    HPI:    65year old female ex smoker, quit 1 week ago, somewhat of a poor historian, unvaccinated for both COVID and flu with pmh of COPD, DM, colitis, anxiety and depression. She does not follow with a pulmonologist and reports that she is not on any inhalers at home. She states that her  brought her to the ER because she was short of breath at home and her oxygen saturations were reportedly 60% on room air, and was unable to swallow. She states she feels like her throat is closing and she is going to \"code\". Reports a cough with increased green/yellow sputum production. She denies any sick contacts, fever, chills, nausea or vomiting. Complaints of abdominal pain with diarrhea. In the ED chest x-ray showed no acute abnormality, CTA pulmonary with contrast showed no evidence of pulmonary embolism. Mild increased consolidation lung bases favoring worsening atelectasis, subtle new ground glass opacities. , HS troponin 55, alk phos 141, ALT 46, , WBC 17.5, Respiratory viral panel negative, procalcitonin, strep/legionella pending. Respiratory culture pending,  Blood cultures pending. Pulmonary was consulted for COPD exacerbation and further management. Patient seen in ED on 4L nc sats 96%. No family at bedside.        PMH:    Past Medical History:   Diagnosis Date    Anxiety     Arthritis     Blood circulation, collateral     Cancer (HCC)     SKIN    Chronic back pain     Colitis     recent    Complex regional pain syndrome type 1 of right lower extremity 9/11/2015    COPD (chronic obstructive pulmonary disease) ([facility] [address]     Depression     Diabetes mellitus ([facility] [address]     Elevated liver function tests 10/17/2014    GERD (gastroesophageal reflux disease)     Headache(784.0)     Hyperlipidemia 10/17/2014    Kidney stone     Movement disorder     Neuromuscular disorder ([facility] [address] Osteoarthritis     Other disorders of kidney and ureter in diseases classified elsewhere     Pneumonia     Psychiatric problem     RSD (reflex sympathetic dystrophy)      PSH:    Past Surgical History:   Procedure Laterality Date    BACK SURGERY  2005    had cerv SCS at [address] then had staph infec 2003  then it was changed to a dual SCS by Dr Álvaro Faith 2005 approx then has had several battery changes and rechargeable put in 2009    [address]      bryant hands    COLONOSCOPY      ENDOSCOPY, COLON, DIAGNOSTIC      FINGER AMPUTATION      index right hand multiple surgeries    FRACTURE SURGERY      HYSTERECTOMY (CERVIX STATUS UNKNOWN)      LAMINECTOMY      cervical    NERVE BLOCK N/A 8 19 15    cerv facet #1 with iv anesthesia    NERVE BLOCK Left 08 26 2015    left cervical paravertebral facet block #2 c4 5 c5 6 c6 7 under iv sedation    NERVE BLOCK  9 2 15    lumbar parasympathetic #1    NERVE BLOCK Right 9/17/15    right sympathetic block #2    OTHER SURGICAL HISTORY  09/18/13    surgical replacement of medtronic cervical spinal cord stimulator electrode and connect to existing battery right hip    OTHER SURGICAL HISTORY N/A 2/3/2014    Surgical revision spinal cord stimulator scar at anchor site due to wound  dehisence    OTHER SURGICAL HISTORY Right 11/11/14    EXCISION OF CYST RIGHT SHOULDER    OTHER SURGICAL HISTORY  04/08/15    surgical revision medtronic cervical spinal cord stimulator scar at anchor site due to wound dehisance    OTHER SURGICAL HISTORY  04/25/2018    spinal cord stimulator battery replacement due to end of life    UT REVISE/REMOVE NEUROSTIM/ N/A 4/25/2018    SPINAL CORD STIMULATOR BATTERY REPLACEMENT DUE TO END OF LIFE performed by Paul Naqvi DO at [address] [place] [address]          Respiratory ROS: Shortness of breath, cough, congestion. Otherwise, a complete review of systems is undertaken and is negative.     Social History:  Social History     Socioeconomic History Marital status:      Spouse name: Rudine Burkitt    Number of children: Not on file    Years of education: 10    Highest education level: Not on file   Occupational History    Not on file   Tobacco Use    Smoking status: Every Day     Packs/day: 0.50     Years: 42.00     Pack years: 21.00     Types: Cigarettes     Start date: 1/1/1970     Passive exposure: Current    Smokeless tobacco: Never   Vaping Use    Vaping Use: Some days   Substance and Sexual Activity    Alcohol use: No     Alcohol/week: 0.0 standard drinks    Drug use: No    Sexual activity: Not Currently   Other Topics Concern    Not on file   Social History Narrative    Not on file     Social Determinants of Health     Financial Resource Strain: Low Risk     Difficulty of Paying Living Expenses: Not hard at all   Food Insecurity: No Food Insecurity    Worried About Running Out of Food in the Last Year: Never true    Ran Out of Food in the Last Year: Never true   Transportation Needs: Not on file   Physical Activity: Not on file   Stress: Not on file   Social Connections: Not on file   Intimate Partner Violence: Not on file   Housing Stability: Not on file       Family History:  Family History   Problem Relation Age of Onset    Other Mother        Medications:     sodium chloride        levofloxacin  750 mg IntraVENous Q24H    divalproex  250 mg Oral TID    ipratropium-albuterol  1 ampule Inhalation Q4H WA    pregabalin  75 mg Oral TID    [START ON 12/28/2022] pantoprazole  40 mg Oral QAM AC    zolpidem  5 mg Oral Nightly    [START ON 12/28/2022] methylPREDNISolone  60 mg IntraVENous Daily    risperiDONE  1 mg Oral TID    sertraline  25 mg Oral Daily    sucralfate  1 g Oral TID    Vitamin D  1,000 Units Oral Daily    sodium chloride flush  5-40 mL IntraVENous 2 times per day    enoxaparin  30 mg SubCUTAneous Daily    nicotine  1 patch TransDERmal Daily         Vitals:    VITALS:  /63   Pulse 88   Temp 98.9 °F (37.2 °C)   Resp 27   Ht 5' 2\" (1.575 m)   Wt 108 lb (49 kg)   LMP  (LMP Unknown)   SpO2 92%   BMI 19.75 kg/m²   24HR INTAKE/OUTPUT:    Intake/Output Summary (Last 24 hours) at 2022 1450  Last data filed at 2022 1406  Gross per 24 hour   Intake 150 ml   Output --   Net 150 ml     CURRENT PULSE OXIMETRY:  SpO2: 92 %  24HR PULSE OXIMETRY RANGE:  SpO2  Av.2 %  Min: 88 %  Max: 97 %      EXAM:  General: No distress. On 4L nc  Eyes: PERRL. No sclera icterus. No conjunctival injection. ENT: No discharge. Pharynx clear. Neck: Trachea midline. Normal thyroid. Resp: No accessory muscle use. No crackles. No wheezing. No rhonchi. Diminished. CV: Regular rate. Regular rhythm. No mumur or rub. ABD: Non-tender. Non-distended. No masses. No organmegaly. Normal bowel sounds. Skin: Warm and dry. No nodule on exposed extremities. No rash on exposed extremities. Lymph: No cervical LAD. No supraclavicular LAD. Ext: No joint deformity. No clubbing. No cyanosis. No edema  Neuro: Awake. Follows commands     Lab Results:  CBC:   Recent Labs     22  05   WBC 17.5*   HGB 13.3   HCT 39.2   MCV 92.7        BMP:   Recent Labs     22  0524      K 4.0   CL 96*   CO2 29   BUN 15   CREATININE 0.5     LFT:   Recent Labs     22   ALKPHOS 141*   ALT 46*   *   PROT 6.4   BILITOT 0.3   LABALBU 3.5     PT/INR: No results for input(s): PROTIME, INR in the last 72 hours.     Cultures:   Latest Reference Range & Units 22 06:32   Influenza A by PCR Not Detected  Not Detected   Influenza B by PCR Not Detected  Not Detected   Adenovirus by PCR Not Detected  Not Detected   Coronavirus 229E by PCR Not Detected  Not Detected   Coronavirus HKU1 by PCR Not Detected  Not Detected   Coronavirus NL63 by PCR Not Detected  Not Detected   Coronavirus OC43 by PCR Not Detected  Not Detected   Human Metapneumovirus by PCR Not Detected  Not Detected   Human Rhinovirus/Enterovirus by PCR Not Detected  Not Detected   Parainfluenza Virus 1 by PCR Not Detected  Not Detected   Parainfluenza Virus 2 by PCR Not Detected  Not Detected   Parainfluenza Virus 3 by PCR Not Detected  Not Detected   Parainfluenza Virus 4 by PCR Not Detected  Not Detected   Respiratory Syncytial Virus by PCR Not Detected  Not Detected   Bordetella parapertussis by PCR Not Detected  Not Detected   Chlamydophilia pneumoniae by PCR Not Detected  Not Detected   Mycoplasma pneumoniae by PCR Not Detected  Not Detected   SARS-CoV-2, PCR Not Detected  Not Detected   Bordetella pertussis by PCR Not Detected  Not Detected       ABG:   No results for input(s): PH, PO2, PCO2, HCO3, BE, O2SAT in the last 72 hours. Films:  XR CHEST 1 VIEW    Result Date: 12/27/2022  EXAMINATION: ONE XRAY VIEW OF THE CHEST 12/27/2022 5:15 am COMPARISON: 11/11/2022 HISTORY: ORDERING SYSTEM PROVIDED HISTORY: sob TECHNOLOGIST PROVIDED HISTORY: Reason for exam:->sob FINDINGS: The cardiomediastinal silhouette is within normal limits. The lungs are clear except for bibasilar atelectasis. No pneumothorax or pleural effusion. No acute cardiopulmonary abnormality. CTA PULMONARY W CONTRAST    Result Date: 12/27/2022  EXAMINATION: CTA OF THE CHEST 12/27/2022 9:02 am TECHNIQUE: CTA of the chest was performed after the administration of intravenous contrast.  Multiplanar reformatted images are provided for review. MIP images are provided for review. Automated exposure control, iterative reconstruction, and/or weight based adjustment of the mA/kV was utilized to reduce the radiation dose to as low as reasonably achievable. COMPARISON: Radiograph from same day and CT of the chest from November 7, 2022.  HISTORY: ORDERING SYSTEM PROVIDED HISTORY: SOB low Sao2 at home TECHNOLOGIST PROVIDED HISTORY: Reason for exam:->SOB low Sao2 at home Decision Support Exception - unselect if not a suspected or confirmed emergency medical condition->Emergency Medical Condition (MA) FINDINGS: Pulmonary Arteries: Pulmonary arteries are adequately opacified for evaluation. No evidence of intraluminal filling defect to suggest pulmonary embolism. Main pulmonary artery is normal in caliber. Mediastinum: The heart is borderline in size. No acute findings of the thoracic aorta which is normal in caliber. Borderline size right hilar lymph node, unchanged. Lungs/pleura: Bibasilar consolidations favoring atelectasis. Difficult to exclude superimposed pneumonia. Subtle ground-glass opacities in the right perihilar region and middle lobe, favored to be mild infectious or inflammatory process, and new from prior study. Moderate emphysema. No pneumothorax. Upper Abdomen: Limited images of the upper abdomen are unremarkable. Soft Tissues/Bones: No acute bone or soft tissue abnormality. No evidence of pulmonary embolism. Mildly increased consolidations in the lung bases, favoring worsened atelectasis although difficult to exclude superimposed infection. Subtle new ground-glass opacities in the right perihilar region, may reflect mild infectious or inflammatory process. Background of moderate emphysema. Assessment:  COPD exacerbation- not on any inhalers   Emphysema  Leukocytosis  Elevated LFTs  DVT/PE prophylaxis on lovenox  Recent admissions [facility] 12/13- 12/18: right lung infiltrated treated for CAP with IV rocephin/doxy, supplemental oxygen. D/c with po steroids and abx on room air  11/7-11/8 COPD exacerbation treated with duonebs/azithromycin/ po steriods  Plan:  Currently on 4L nc, baseline is room air. Wean as able. Maintain pulse ox >90%  Recent admission for CAP s/p rocephin and doxy, now on Levaquin   Continue with duonebs  Solumedrol 40 q8 hours  Strep/legionella pending, respiratory viral panel negative, procal pending  IS  Will benefit with outpatient PFT's  Supportive care       Thank you for allowing us to see this patient in consultation. Please contact us with any questions.       Electronically signed by Traci Rivas Ambika Lopez - NP on 12/27/2022 at 2:50 PM     Attending Attestation Note:    Patient seen and examined with Hospital Staff, NP. I have extensively reviewed the chart lab work and imaging. I agree with above. Modifications and amendment of note made as necessary.     In addition, the following apply:    Current Facility-Administered Medications   Medication Dose Route Frequency Provider Last Rate Last Admin    levoFLOXacin (LEVAQUIN) 750 MG/150ML infusion 750 mg  750 mg IntraVENous Q24H Josie Durhamer V, DO   Stopped at 12/27/22 1406    divalproex (DEPAKOTE) DR tablet 250 mg  250 mg Oral TID Josie Herminioer V, DO        guaiFENesin tablet 400 mg  400 mg Oral 4x Daily PRN Josiecarmen Durhamer V, DO        HYDROmorphone (DILAUDID) tablet 4 mg  4 mg Oral Q6H PRN Josie Herminioer V, DO        ipratropium-albuterol (DUONEB) nebulizer solution 1 ampule  1 ampule Inhalation Q4H WA Jonathan Curry V, DO        pregabalin (LYRICA) capsule 75 mg  75 mg Oral TID Josie Castillo V, DO        [START ON 12/28/2022] pantoprazole (PROTONIX) tablet 40 mg  40 mg Oral QAM AC Jonathan Curry V, DO        zolpidem (AMBIEN) tablet 5 mg  5 mg Oral Nightly Josiecarmen Durhamer V, DO        risperiDONE (RISPERDAL) tablet 1 mg  1 mg Oral TID Josiecarmen Durhamer V, DO        sertraline (ZOLOFT) tablet 25 mg  25 mg Oral Daily Josie Herminioer V, DO        sucralfate (CARAFATE) tablet 1 g  1 g Oral TID Josie Durhamer V, DO        vitamin D (CHOLECALCIFEROL) tablet 1,000 Units  1,000 Units Oral Daily Josie Herminioer V, DO        sodium chloride flush 0.9 % injection 5-40 mL  5-40 mL IntraVENous 2 times per day Josie Herminioer V, DO        sodium chloride flush 0.9 % injection 5-40 mL  5-40 mL IntraVENous PRN Josie Herminioer V, DO        0.9 % sodium chloride infusion   IntraVENous PRN Josie Herminioer V, DO        enoxaparin Sodium (LOVENOX) injection 30 mg  30 mg SubCUTAneous Daily Josie Herminioer V, DO        ondansetron (ZOFRAN-ODT) disintegrating tablet 4 mg  4 mg Oral Q8H PRN Sae Mckinnon Patricio V, DO        Or    ondansetron (ZOFRAN) injection 4 mg  4 mg IntraVENous Q6H PRN Edna Pounds V, DO        polyethylene glycol (GLYCOLAX) packet 17 g  17 g Oral Daily PRN Edna Pounds V, DO        acetaminophen (TYLENOL) tablet 650 mg  650 mg Oral Q6H PRN Edna Pounds V, DO        Or    acetaminophen (TYLENOL) suppository 650 mg  650 mg Rectal Q6H PRN Edna Pounds V, DO        nicotine (NICODERM CQ) 21 MG/24HR 1 patch  1 patch TransDERmal Daily Edna Pounds V, DO        benzonatate (TESSALON) capsule 100 mg  100 mg Oral TID PRN Edna Pounds V, DO        naloxone (NARCAN) injection 0.4 mg  0.4 mg IntraVENous PRN Edna Pounds V, DO        [START ON 12/28/2022] methylPREDNISolone sodium (SOLU-MEDROL) injection 40 mg  40 mg IntraVENous Q8H AYESHA Wall NP         Current Outpatient Medications   Medication Sig Dispense Refill    divalproex (DEPAKOTE) 250 MG DR tablet Take 1 tablet by mouth 3 times daily 90 tablet 3    albuterol sulfate HFA (PROVENTIL;VENTOLIN;PROAIR) 108 (90 Base) MCG/ACT inhaler INHALE 2 PUFFS INTO THE LUNGS FOUR TIMES DAILY AS NEEDED FOR WHEEZING 18 g 0    zolpidem (AMBIEN) 10 MG tablet       pantoprazole (PROTONIX) 40 MG tablet Take 1 tablet by mouth every morning (before breakfast) 30 tablet 0    ipratropium-albuterol (DUONEB) 0.5-2.5 (3) MG/3ML SOLN nebulizer solution Inhale 3 mLs into the lungs every 4 hours (while awake) 360 mL 0    albuterol (PROVENTIL) (2.5 MG/3ML) 0.083% nebulizer solution Take 3 mLs by nebulization every 6 hours as needed for Wheezing 120 each 0    risperiDONE (RISPERDAL) 1 MG tablet Take 1 mg by mouth 3 times daily      sertraline (ZOLOFT) 25 MG tablet Take 25 mg by mouth daily      nicotine (NICODERM CQ) 21 MG/24HR Place 1 patch onto the skin every 24 hours      HYDROmorphone (DILAUDID) 4 MG tablet Take 4 mg by mouth every 6 hours as needed. pregabalin (LYRICA) 75 MG capsule Take 75 mg by mouth 3 times daily.       melatonin 3 MG TABS tablet Take 1 tablet by mouth daily  3    Nutritional Supplements (ENSURE COMPLETE SHAKE) LIQD Take 1 Can by mouth 2 times daily 60 Bottle 0      - outpatient PFT  - check IgE, A1AT  - DuoNeb, Pulmicort, Brovana  - steroids, levaquin  - O2, IS  - nystatin S/S due to oral thrush     Jenni Hunter MD  12/27/2022  4:36 PM

## 2022-12-27 NOTE — ED NOTES
ERIAR faxed to floor. Spoke with Rose who stated they received it. Pt ready.       Xiomara Roy RN  12/27/22 1980

## 2022-12-27 NOTE — TELEPHONE ENCOUNTER
MA called patient regarding US that she was a no show for.  Patient is currently admitted in hospital for pneumonia      Electronically signed by Suni Robertson on 12/27/2022 at 2:51 PM

## 2022-12-27 NOTE — H&P
[place] [address]  Resident History and Physical      CHIEF COMPLAINT:    Chief Complaint   Patient presents with    Shortness of Breath     States she was 60% at home   pt 93% on room air in triage. Pt has Hx COPD, presents to ED with a styrofoam cup with yellow sputum she spit up        History of Present Illness:   Brendan Mckeon  is a 65 y.o. female patient of Gemma Tobias MD  with a pertinent PMHx of COPD, anxiety, diabetes melitis, depression, colitis who presented to the ER with chief complaint of shortness of breath and cough. She was recently hospitalized in [facility] on 12/13 for hypoxia and pneumonia. She states that she originally started to feel better but since discharge she has been feeling worse. And today she was having some shortness of breath and her home pulse oximeter showed saturation of 60%. The patient mentioned that she is having cough associated with increased amount of sputum, green to yellow in color. She denies any fever, chills. She is also having some chest pain, located on the of her rib cage, denies any radiation to her arms or back, palpitations. She mentioned that the pain gets worse with coughing spells. She was using half a pack of cigarettes per day and stopped on last hospitalization, denies any alcohol, IV drug usage. She does not remember which medication she was on. In ER: Patient was complaining of having hypoxia and shifted to 2 L nasal cannula oxygen. Labs were significant for troponin 55, alkaline phosphatase 141, ALT 46, , WBC 17.5. Blood culture and urine culture is pending. UA shows small leukocyte esterase, with moderate bacteria  Patient was given Solu-Medrol 125 mg, DuoNeb, Levaquin 750 mg. RFA was negative, chest x-ray showed no acute abnormality, CTA pulmonary with contrast showed no evidence of pulmonary embolism.   Mild increased consolidation lung bases favoring worsening atelectasis, subtle new ground glass opacities. ROS:   Review of Systems   Constitutional:  Negative for activity change, chills and fever. HENT:  Negative for rhinorrhea, sneezing and sore throat. Eyes:  Negative for redness. Respiratory:  Positive for cough and shortness of breath. Negative for chest tightness. Cardiovascular:  Positive for chest pain. Negative for palpitations and leg swelling. Gastrointestinal:  Negative for abdominal pain, diarrhea, nausea and vomiting. Genitourinary:  Negative for decreased urine volume, dysuria, flank pain, frequency, hematuria and urgency. Musculoskeletal:  Positive for arthralgias. Negative for myalgias. Neurological:  Negative for dizziness, syncope, weakness and headaches. Psychiatric/Behavioral:  Negative for agitation. The patient is not nervous/anxious.       PMHx:  Past Medical History:   Diagnosis Date    Anxiety     Arthritis     Blood circulation, collateral     Cancer (HCC)     SKIN    Chronic back pain     Colitis     recent    Complex regional pain syndrome type 1 of right lower extremity 9/11/2015    COPD (chronic obstructive pulmonary disease) ([facility]     Depression     Diabetes mellitus ([facility]     Elevated liver function tests 10/17/2014    GERD (gastroesophageal reflux disease)     Headache(784.0)     Hyperlipidemia 10/17/2014    Kidney stone     Movement disorder     Neuromuscular disorder ([facility] [facility]     Osteoarthritis     Other disorders of kidney and ureter in diseases classified elsewhere     Pneumonia     Psychiatric problem     RSD (reflex sympathetic dystrophy)        PSHx  Past Surgical History:   Procedure Laterality Date    BACK SURGERY  2005    had cerv SCS at [address] then had staph infec 2003  then it was changed to a dual SCS by Dr Ivon Díaz 2005 approx then has had several battery changes and rechargeable put in 2009    [address]      bryant hands    COLONOSCOPY      ENDOSCOPY, COLON, DIAGNOSTIC      FINGER AMPUTATION      index right hand multiple surgeries    FRACTURE SURGERY      HYSTERECTOMY (CERVIX STATUS UNKNOWN)      LAMINECTOMY      cervical    NERVE BLOCK N/A 8 19 15    cerv facet #1 with iv anesthesia    NERVE BLOCK Left 08 26 2015    left cervical paravertebral facet block #2 c4 5 c5 6 c6 7 under iv sedation    NERVE BLOCK  9 2 15    lumbar parasympathetic #1    NERVE BLOCK Right 9/17/15    right sympathetic block #2    OTHER SURGICAL HISTORY  09/18/13    surgical replacement of medtronic cervical spinal cord stimulator electrode and connect to existing battery right hip    OTHER SURGICAL HISTORY N/A 2/3/2014    Surgical revision spinal cord stimulator scar at anchor site due to wound  dehisence    OTHER SURGICAL HISTORY Right 11/11/14    EXCISION OF CYST RIGHT SHOULDER    OTHER SURGICAL HISTORY  04/08/15    surgical revision medtronic cervical spinal cord stimulator scar at anchor site due to wound dehisance    OTHER SURGICAL HISTORY  04/25/2018    spinal cord stimulator battery replacement due to end of life    PA REVISE/REMOVE NEUROSTIM/ N/A 4/25/2018    SPINAL CORD STIMULATOR BATTERY REPLACEMENT DUE TO END OF LIFE performed by Francene Babinski, DO at [address] [place] [address]       Medications Prior to Admission:    Prior to Admission medications    Medication Sig Start Date End Date Taking?  Authorizing Provider   divalproex (DEPAKOTE) 250 MG DR tablet Take 1 tablet by mouth 3 times daily 12/18/22   Catrina Guy MD   guaiFENesin 400 MG tablet Take 1 tablet by mouth in the morning, at noon, and at bedtime 12/18/22   Matthew Burton MD   albuterol sulfate HFA (PROVENTIL;VENTOLIN;PROAIR) 108 (90 Base) MCG/ACT inhaler INHALE 2 PUFFS INTO THE LUNGS FOUR TIMES DAILY AS NEEDED FOR WHEEZING 12/14/22   Uzma Perez MD   zolpidem (AMBIEN) 10 MG tablet  11/4/22   Historical Provider, MD   pantoprazole (PROTONIX) 40 MG tablet Take 1 tablet by mouth every morning (before breakfast) 11/18/22   Joanna Soto MD   ipratropium-albuterol (DUONEB) 0.5-2.5 (3) MG/3ML SOLN nebulizer solution Inhale 3 mLs into the lungs every 4 hours (while awake) 11/18/22   Burt Leventhal, MD   sucralfate (CARAFATE) 1 GM tablet Take 1 tablet by mouth in the morning, at noon, and at bedtime 11/17/22   Juan Pablo Sanchez MD   albuterol (PROVENTIL) (2.5 MG/3ML) 0.083% nebulizer solution Take 3 mLs by nebulization every 6 hours as needed for Wheezing 11/8/22   Demar Dykes MD   risperiDONE (RISPERDAL) 1 MG tablet Take 1 mg by mouth 3 times daily    Historical Provider, MD   sertraline (ZOLOFT) 25 MG tablet Take 25 mg by mouth daily    Historical Provider, MD   nicotine (NICODERM CQ) 21 MG/24HR Place 1 patch onto the skin every 24 hours    Historical Provider, MD   HYDROmorphone (DILAUDID) 4 MG tablet Take 4 mg by mouth every 6 hours as needed. 8/30/22   Historical Provider, MD   pregabalin (LYRICA) 75 MG capsule Take 75 mg by mouth 3 times daily. 8/30/22   Historical Provider, MD   melatonin 3 MG TABS tablet Take 1 tablet by mouth daily 8/29/22 10/28/22  AYESHA Raza - CNP   nortriptyline (PAMELOR) 10 MG capsule TAKE 1 CAPSULE BY MOUTH EVERY NIGHT 6/1/21 8/29/22  Anita Perez DO   ondansetron (ZOFRAN-ODT) 4 MG disintegrating tablet Take 1 tablet by mouth 3 times daily as needed for Nausea or Vomiting 5/14/21   Mona Rodriguez MD   Nutritional Supplements (ENSURE COMPLETE SHAKE) LIQD Take 1 Can by mouth 2 times daily 5/14/21   Mona Rodriguez MD   vitamin D (CHOLECALCIFEROL) 1000 UNIT TABS tablet Take 1,000 Units by mouth daily    Historical Provider, MD   Multiple Vitamins-Minerals (MULTIVITAMIN PO) Take by mouth    Historical Provider, MD        Allergies:   Sulfamethazine and Ancef [cefazolin sodium]    Social History:    reports that she has been smoking cigarettes. She started smoking about 53 years ago. She has a 21.00 pack-year smoking history. She has been exposed to tobacco smoke.  She has never used smokeless tobacco. She reports that she does not drink alcohol and does not use drugs. Family History:   family history includes Other in her mother. PHYSICAL EXAM:    Vitals:  /63   Pulse 88   Temp 98.9 °F (37.2 °C)   Resp 27   Ht 5' 2\" (1.575 m)   Wt 108 lb (49 kg)   LMP  (LMP Unknown)   SpO2 92%   BMI 19.75 kg/m²     Physical Exam  Constitutional:       General: She is not in acute distress. Appearance: Normal appearance. Interventions: Nasal cannula in place. Comments: 3L O2   HENT:      Head: Normocephalic and atraumatic. Mouth/Throat:      Mouth: Mucous membranes are moist.      Pharynx: Oropharynx is clear. Eyes:      Conjunctiva/sclera: Conjunctivae normal.   Cardiovascular:      Rate and Rhythm: Normal rate and regular rhythm. Pulses: Normal pulses. Heart sounds: Normal heart sounds. No murmur heard. Pulmonary:      Breath sounds: Rhonchi present. No wheezing. Comments: Decrease effort  Abdominal:      General: There is no distension. Palpations: Abdomen is soft. Tenderness: There is no abdominal tenderness. Musculoskeletal:      Cervical back: Normal range of motion. Right lower leg: No edema. Left lower leg: No edema. Skin:     General: Skin is warm and dry. Neurological:      General: No focal deficit present. Mental Status: She is alert and oriented to person, place, and time.    Psychiatric:         Attention and Perception: Attention normal.         Mood and Affect: Mood normal.         Behavior: Behavior normal.       LABS:  Recent Results (from the past 24 hour(s))   EKG 12 Lead    Collection Time: 12/27/22  4:57 AM   Result Value Ref Range    Ventricular Rate 95 BPM    Atrial Rate 95 BPM    P-R Interval 124 ms    QRS Duration 68 ms    Q-T Interval 336 ms    QTc Calculation (Bazett) 422 ms    P Axis 61 degrees    R Axis -46 degrees    T Axis 53 degrees   CBC with Auto Differential    Collection Time: 12/27/22  5:24 AM   Result Value Ref Range    WBC 17.5 (H) 4.5 - 11.5 E9/L RBC 4.23 3.50 - 5.50 E12/L    Hemoglobin 13.3 11.5 - 15.5 g/dL    Hematocrit 39.2 34.0 - 48.0 %    MCV 92.7 80.0 - 99.9 fL    MCH 31.4 26.0 - 35.0 pg    MCHC 33.9 32.0 - 34.5 %    RDW 13.3 11.5 - 15.0 fL    Platelets 563 650 - 855 E9/L    MPV 9.5 7.0 - 12.0 fL    Neutrophils % 88.7 (H) 43.0 - 80.0 %    Immature Granulocytes % 0.6 0.0 - 5.0 %    Lymphocytes % 4.1 (L) 20.0 - 42.0 %    Monocytes % 6.2 2.0 - 12.0 %    Eosinophils % 0.2 0.0 - 6.0 %    Basophils % 0.2 0.0 - 2.0 %    Neutrophils Absolute 15.54 (H) 1.80 - 7.30 E9/L    Immature Granulocytes # 0.10 E9/L    Lymphocytes Absolute 0.72 (L) 1.50 - 4.00 E9/L    Monocytes Absolute 1.08 (H) 0.10 - 0.95 E9/L    Eosinophils Absolute 0.04 (L) 0.05 - 0.50 E9/L    Basophils Absolute 0.04 0.00 - 0.20 E9/L   Comprehensive Metabolic Panel w/ Reflex to MG    Collection Time: 12/27/22  5:24 AM   Result Value Ref Range    Sodium 136 132 - 146 mmol/L    Potassium reflex Magnesium 4.0 3.5 - 5.0 mmol/L    Chloride 96 (L) 98 - 107 mmol/L    CO2 29 22 - 29 mmol/L    Anion Gap 11 7 - 16 mmol/L    Glucose 117 (H) 74 - 99 mg/dL    BUN 15 6 - 23 mg/dL    Creatinine 0.5 0.5 - 1.0 mg/dL    Est, Glom Filt Rate >60 >=60 mL/min/1.73    Calcium 9.1 8.6 - 10.2 mg/dL    Total Protein 6.4 6.4 - 8.3 g/dL    Albumin 3.5 3.5 - 5.2 g/dL    Total Bilirubin 0.3 0.0 - 1.2 mg/dL    Alkaline Phosphatase 141 (H) 35 - 104 U/L    ALT 46 (H) 0 - 32 U/L     (H) 0 - 31 U/L   Brain Natriuretic Peptide    Collection Time: 12/27/22  5:24 AM   Result Value Ref Range    Pro- (H) 0 - 125 pg/mL   Troponin    Collection Time: 12/27/22  5:24 AM   Result Value Ref Range    Troponin, High Sensitivity 55 (H) 0 - 9 ng/L   Respiratory Panel, Molecular, with COVID-19 (Restricted: peds pts or suitable admitted adults)    Collection Time: 12/27/22  6:32 AM    Specimen: Nasopharyngeal   Result Value Ref Range    Adenovirus by PCR Not Detected Not Detected    Bordetella parapertussis by PCR Not Detected Not Detected    Bordetella pertussis by PCR Not Detected Not Detected    Chlamydophilia pneumoniae by PCR Not Detected Not Detected    Coronavirus 229E by PCR Not Detected Not Detected    Coronavirus HKU1 by PCR Not Detected Not Detected    Coronavirus NL63 by PCR Not Detected Not Detected    Coronavirus OC43 by PCR Not Detected Not Detected    SARS-CoV-2, PCR Not Detected Not Detected    Human Metapneumovirus by PCR Not Detected Not Detected    Human Rhinovirus/Enterovirus by PCR Not Detected Not Detected    Influenza A by PCR Not Detected Not Detected    Influenza B by PCR Not Detected Not Detected    Mycoplasma pneumoniae by PCR Not Detected Not Detected    Parainfluenza Virus 1 by PCR Not Detected Not Detected    Parainfluenza Virus 2 by PCR Not Detected Not Detected    Parainfluenza Virus 3 by PCR Not Detected Not Detected    Parainfluenza Virus 4 by PCR Not Detected Not Detected    Respiratory Syncytial Virus by PCR Not Detected Not Detected   Urinalysis    Collection Time: 12/27/22  8:41 AM   Result Value Ref Range    Color, UA Yellow Straw/Yellow    Clarity, UA Clear Clear    Glucose, Ur Negative Negative mg/dL    Bilirubin Urine Negative Negative    Ketones, Urine 15 (A) Negative mg/dL    Specific Gravity, UA 1.020 1.005 - 1.030    Blood, Urine Negative Negative    pH, UA 7.0 5.0 - 9.0    Protein, UA Negative Negative mg/dL    Urobilinogen, Urine 0.2 <2.0 E.U./dL    Nitrite, Urine Negative Negative    Leukocyte Esterase, Urine SMALL (A) Negative   Microscopic Urinalysis    Collection Time: 12/27/22  8:41 AM   Result Value Ref Range    WBC, UA 1-3 0 - 5 /HPF    RBC, UA 0-1 0 - 2 /HPF    Epithelial Cells, UA MODERATE /HPF    Bacteria, UA MODERATE (A) None Seen /HPF       CTA PULMONARY W CONTRAST   Final Result   No evidence of pulmonary embolism. Mildly increased consolidations in the lung bases, favoring worsened   atelectasis although difficult to exclude superimposed infection.       Subtle new ground-glass opacities in the right perihilar region, may reflect   mild infectious or inflammatory process.      Background of moderate emphysema.         XR CHEST 1 VIEW   Final Result   No acute cardiopulmonary abnormality.             ASSESSMENT/PLAN:      Active Hospital Problems    Diagnosis Date Noted    COPD exacerbation (HCC) [J44.1] 11/08/2022     Priority: Medium     -COPD exacerbation:   -Patient was recently hospitalized in [facility] [place] 12/23 for hypoxia and pneumonia.  She was having 60% saturation at home.  -Start Levaquin 750 mg every 24 hours.  -Start Solu-Medrol 60 mg IV twice daily.  -DuoNebs  -Incentive spirometer  -Mucinex  -Tessalon  -Consult pulmonology.    Elevated troponin:  -In ER troponin was 55, EKG was unremarkable.  -Repeat troponin  -Telemetry monitoring      Transaminitis:  -Patient has increased alkaline phosphatase, ALT, AST  -Monitor for signs symptoms  -Can consider ultrasound RUQ.     -Leukocytosis:  WBC 17.5, tachycardia.  -CRP  -ESR  -Pro-Oren  -Blood cultures, respiratory cultures pending    Depression/anxiety:  -Continue home medications  -Zoloft 25 mg daily  -Risperidone 1 mg 3 times daily  -Ambien 5 mg nightly    Peripheral neuropathy:  -Continue home Lyrica 75 mg 3 times daily    Nicotine dependence:  Nicotine patch 21 mg    -Diabetes mellitus.  Patient's last HbA1c was 6.1 on 12/2017,  she is not on any medications for diabetes  -Repeat HbA1c  -Monitor BMP      DVT ppx: lovenox  GI ppx: protonix  Code Status: full        Case discussed with attending on call Dr. Neal.    Martha Matos MD  Family Medicine Resident PGY-1  12/27/2022

## 2022-12-27 NOTE — ED PROVIDER NOTES
HPI:  12/27/22, Time: 6:15 AM HECTOR Shafer is a 62 y.o. female presenting to the ED for SOB productive cough reports low pulse ox at home. Hx of COPD NO on supplemental oxygen, beginning 2 days ago.  The complaint has been persistent, severe in severity, and worsened by deep breath, cough.  Patient states he has been coughing up bloody yellow sputum.  Allegedly a pulse ox of 60% at home she was 93 on room air she has shortness of breath with exertion and cough.  She has been using her nebulizer more frequently.  She was recently admitted to the hospital for pneumonia in nov.     Review of Systems:   A complete review of systems was performed and pertinent positives and negatives are stated within HPI, all other systems reviewed and are negative.    --------------------------------------------- PAST HISTORY ---------------------------------------------  Past Medical History:  has a past medical history of Anxiety, Arthritis, Blood circulation, collateral, Cancer ([facility]), Chronic back pain, Colitis, Complex regional pain syndrome type 1 of right lower extremity, COPD (chronic obstructive pulmonary disease) ([facility]), Depression, Diabetes mellitus ([facility]), Elevated liver function tests, GERD (gastroesophageal reflux disease), Headache(784.0), Hyperlipidemia, Kidney stone, Movement disorder, Neuromuscular disorder ([facility]), Osteoarthritis, Other disorders of kidney and ureter in diseases classified elsewhere, Pneumonia, Psychiatric problem, and RSD (reflex sympathetic dystrophy).    Past Surgical History:  has a past surgical history that includes Hysterectomy; laminectomy; Finger amputation; back surgery (2005); other surgical history (09/18/13); other surgical history (N/A, 2/3/2014); fracture surgery; Endoscopy, colon, diagnostic; Carpal tunnel release; Colonoscopy; other surgical history (Right, 11/11/14); other surgical history (04/08/15); Nerve Block (N/A, 8 19 15); Nerve Block (Left, 08 26 2015);  Nerve Block (9 2 15); Nerve Block (Right, 9/17/15); other surgical history (04/25/2018); and pr revise/remove neurostim/ (N/A, 4/25/2018). Social History:  reports that she has been smoking cigarettes. She started smoking about 53 years ago. She has a 21.00 pack-year smoking history. She has been exposed to tobacco smoke. She has never used smokeless tobacco. She reports that she does not drink alcohol and does not use drugs. Family History: family history includes Other in her mother. The patients home medications have been reviewed.     Allergies: Sulfamethazine and Ancef [cefazolin sodium]    -------------------------------------------------- RESULTS -------------------------------------------------  All laboratory and radiology results have been personally reviewed by myself   LABS:  Results for orders placed or performed during the hospital encounter of 12/27/22   Respiratory Panel, Molecular, with COVID-19 (Restricted: peds pts or suitable admitted adults)    Specimen: Nasopharyngeal   Result Value Ref Range    Adenovirus by PCR Not Detected Not Detected    Bordetella parapertussis by PCR Not Detected Not Detected    Bordetella pertussis by PCR Not Detected Not Detected    Chlamydophilia pneumoniae by PCR Not Detected Not Detected    Coronavirus 229E by PCR Not Detected Not Detected    Coronavirus HKU1 by PCR Not Detected Not Detected    Coronavirus NL63 by PCR Not Detected Not Detected    Coronavirus OC43 by PCR Not Detected Not Detected    SARS-CoV-2, PCR Not Detected Not Detected    Human Metapneumovirus by PCR Not Detected Not Detected    Human Rhinovirus/Enterovirus by PCR Not Detected Not Detected    Influenza A by PCR Not Detected Not Detected    Influenza B by PCR Not Detected Not Detected    Mycoplasma pneumoniae by PCR Not Detected Not Detected    Parainfluenza Virus 1 by PCR Not Detected Not Detected    Parainfluenza Virus 2 by PCR Not Detected Not Detected    Parainfluenza Virus 3 by PCR Not Detected Not Detected    Parainfluenza Virus 4 by PCR Not Detected Not Detected    Respiratory Syncytial Virus by PCR Not Detected Not Detected   CBC with Auto Differential   Result Value Ref Range    WBC 17.5 (H) 4.5 - 11.5 E9/L    RBC 4.23 3.50 - 5.50 E12/L    Hemoglobin 13.3 11.5 - 15.5 g/dL    Hematocrit 39.2 34.0 - 48.0 %    MCV 92.7 80.0 - 99.9 fL    MCH 31.4 26.0 - 35.0 pg    MCHC 33.9 32.0 - 34.5 %    RDW 13.3 11.5 - 15.0 fL    Platelets 899 004 - 968 E9/L    MPV 9.5 7.0 - 12.0 fL    Neutrophils % 88.7 (H) 43.0 - 80.0 %    Immature Granulocytes % 0.6 0.0 - 5.0 %    Lymphocytes % 4.1 (L) 20.0 - 42.0 %    Monocytes % 6.2 2.0 - 12.0 %    Eosinophils % 0.2 0.0 - 6.0 %    Basophils % 0.2 0.0 - 2.0 %    Neutrophils Absolute 15.54 (H) 1.80 - 7.30 E9/L    Immature Granulocytes # 0.10 E9/L    Lymphocytes Absolute 0.72 (L) 1.50 - 4.00 E9/L    Monocytes Absolute 1.08 (H) 0.10 - 0.95 E9/L    Eosinophils Absolute 0.04 (L) 0.05 - 0.50 E9/L    Basophils Absolute 0.04 0.00 - 0.20 E9/L   Comprehensive Metabolic Panel w/ Reflex to MG   Result Value Ref Range    Sodium 136 132 - 146 mmol/L    Potassium reflex Magnesium 4.0 3.5 - 5.0 mmol/L    Chloride 96 (L) 98 - 107 mmol/L    CO2 29 22 - 29 mmol/L    Anion Gap 11 7 - 16 mmol/L    Glucose 117 (H) 74 - 99 mg/dL    BUN 15 6 - 23 mg/dL    Creatinine 0.5 0.5 - 1.0 mg/dL    Est, Glom Filt Rate >60 >=60 mL/min/1.73    Calcium 9.1 8.6 - 10.2 mg/dL    Total Protein 6.4 6.4 - 8.3 g/dL    Albumin 3.5 3.5 - 5.2 g/dL    Total Bilirubin 0.3 0.0 - 1.2 mg/dL    Alkaline Phosphatase 141 (H) 35 - 104 U/L    ALT 46 (H) 0 - 32 U/L     (H) 0 - 31 U/L   Brain Natriuretic Peptide   Result Value Ref Range    Pro- (H) 0 - 125 pg/mL   Troponin   Result Value Ref Range    Troponin, High Sensitivity 55 (H) 0 - 9 ng/L   Urinalysis   Result Value Ref Range    Color, UA Yellow Straw/Yellow    Clarity, UA Clear Clear    Glucose, Ur Negative Negative mg/dL    Bilirubin Urine Negative Negative    Ketones, Urine 15 (A) Negative mg/dL    Specific Gravity, UA 1.020 1.005 - 1.030    Blood, Urine Negative Negative    pH, UA 7.0 5.0 - 9.0    Protein, UA Negative Negative mg/dL    Urobilinogen, Urine 0.2 <2.0 E.U./dL    Nitrite, Urine Negative Negative    Leukocyte Esterase, Urine SMALL (A) Negative   Microscopic Urinalysis   Result Value Ref Range    WBC, UA 1-3 0 - 5 /HPF    RBC, UA 0-1 0 - 2 /HPF    Epithelial Cells, UA MODERATE /HPF    Bacteria, UA MODERATE (A) None Seen /HPF   EKG 12 Lead   Result Value Ref Range    Ventricular Rate 95 BPM    Atrial Rate 95 BPM    P-R Interval 124 ms    QRS Duration 68 ms    Q-T Interval 336 ms    QTc Calculation (Bazett) 422 ms    P Axis 61 degrees    R Axis -46 degrees    T Axis 53 degrees       RADIOLOGY:  Interpreted by Radiologist.  CTA PULMONARY W CONTRAST   Preliminary Result   No evidence of pulmonary embolism. Mildly increased consolidations in the lung bases, favoring worsened   atelectasis although difficult to exclude superimposed infection. Subtle new ground-glass opacities in the right perihilar region, may reflect   mild infectious or inflammatory process. Background of moderate emphysema. XR CHEST 1 VIEW   Final Result   No acute cardiopulmonary abnormality.             ------------------------- NURSING NOTES AND VITALS REVIEWED ---------------------------   The nursing notes within the ED encounter and vital signs as below have been reviewed.    /61   Pulse 84   Temp 98.9 °F (37.2 °C)   Resp 24   Ht 5' 2\" (1.575 m)   Wt 108 lb (49 kg)   LMP  (LMP Unknown)   SpO2 93%   BMI 19.75 kg/m²   Oxygen Saturation Interpretation: Improved after treatment      ---------------------------------------------------PHYSICAL EXAM--------------------------------------      Constitutional/General: Alert and oriented x3, ill in appearance patient appears cachectic  Head: Normocephalic and atraumatic  Eyes: PERRL, EOMI  Mouth: Oropharynx clear, handling secretions, no trismus  Neck: Supple, full ROM,   Pulmonary: Lung sounds demonstrate rhonchi bilaterally. Cardiovascular:  Regular rate and rhythm, no murmurs, gallops, or rubs. 2+ distal pulses  Abdomen: Soft, non tender, non distended,   Extremities: Moves all extremities x 4. Warm and well perfused  Skin: warm and dry without rash  Neurologic: GCS 15, no focal deficits  Psych: Normal Affect      EKG #1:  Interpreted by emergency department attending physician unless otherwise noted. 12/27/22  Time: 0457    Rhythm: normal sinus   Rate: normal  Axis: left  Conduction: left anterior fasciclar block  ST Segments: nonspecific changes  T Waves: non specific changes    Clinical Impression: no acute changes  Comparison to Prior tracings:  Today's ECG is unchanged from previous tracings. ------------------------------ ED COURSE/MEDICAL DECISION MAKING----------------------  Medications   levoFLOXacin (LEVAQUIN) 750 MG/150ML infusion 750 mg (has no administration in time range)   methylPREDNISolone sodium (SOLU-MEDROL) injection 125 mg (125 mg IntraVENous Given 12/27/22 0832)   ipratropium-albuterol (DUONEB) nebulizer solution 1 ampule (1 ampule Inhalation Given 12/27/22 0732)   iopamidol (ISOVUE-370) 76 % injection 75 mL (75 mLs IntraVENous Given 12/27/22 0848)         ED COURSE:       Medical Decision Making:    Presents with hypoxia at home allegedly 60% she was 93% on room air in triage. History of COPD presents with wheezing and coughing up yellow bloody sputum. Patient was sent for a CT scan of the chest which showed no PE she has bilateral infiltrates versus atelectasis. Patient was given Solu-Medrol DuoNeb treatments IV fluids. She will be placed on antibiotics for chronic bronchitis and admitted to the hospital.    Consultation was made with family practice resident for admission. Counseling:    The emergency provider has spoken with the patient and discussed todays results, in addition to providing specific details for the plan of care and counseling regarding the diagnosis and prognosis. Questions are answered at this time and they are agreeable with the plan.      --------------------------------- IMPRESSION AND DISPOSITION ---------------------------------    IMPRESSION  1. Acute exacerbation of chronic bronchitis ([facility] [address]    2. Dyspnea and respiratory abnormalities        DISPOSITION  Disposition: Admit to telemetry  Patient condition is serious      NOTE: This report was transcribed using voice recognition software.  Every effort was made to ensure accuracy; however, inadvertent computerized transcription errors may be present       Sienna Restrepo DO  12/27/22 1014

## 2022-12-28 PROBLEM — R07.89 ATYPICAL CHEST PAIN: Status: ACTIVE | Noted: 2022-12-28

## 2022-12-28 LAB
ALBUMIN SERPL-MCNC: 3.3 G/DL (ref 3.5–5.2)
ALBUMIN SERPL-MCNC: 3.5 G/DL (ref 3.5–5.2)
ALP BLD-CCNC: 113 U/L (ref 35–104)
ALP BLD-CCNC: 120 U/L (ref 35–104)
ALT SERPL-CCNC: 45 U/L (ref 0–32)
ALT SERPL-CCNC: 45 U/L (ref 0–32)
AMPHETAMINE SCREEN, URINE: NOT DETECTED
ANION GAP SERPL CALCULATED.3IONS-SCNC: 10 MMOL/L (ref 7–16)
ANION GAP SERPL CALCULATED.3IONS-SCNC: 12 MMOL/L (ref 7–16)
ANISOCYTOSIS: ABNORMAL
AST SERPL-CCNC: 78 U/L (ref 0–31)
AST SERPL-CCNC: 87 U/L (ref 0–31)
BARBITURATE SCREEN URINE: NOT DETECTED
BASOPHILS ABSOLUTE: 0.02 E9/L (ref 0–0.2)
BASOPHILS RELATIVE PERCENT: 0.2 % (ref 0–2)
BENZODIAZEPINE SCREEN, URINE: NOT DETECTED
BILIRUB SERPL-MCNC: 0.3 MG/DL (ref 0–1.2)
BILIRUB SERPL-MCNC: 0.3 MG/DL (ref 0–1.2)
BUN BLDV-MCNC: 21 MG/DL (ref 6–23)
BUN BLDV-MCNC: 23 MG/DL (ref 6–23)
CALCIUM SERPL-MCNC: 9.1 MG/DL (ref 8.6–10.2)
CALCIUM SERPL-MCNC: 9.3 MG/DL (ref 8.6–10.2)
CANNABINOID SCREEN URINE: NOT DETECTED
CHLORIDE BLD-SCNC: 96 MMOL/L (ref 98–107)
CHLORIDE BLD-SCNC: 97 MMOL/L (ref 98–107)
CO2: 27 MMOL/L (ref 22–29)
CO2: 28 MMOL/L (ref 22–29)
COCAINE METABOLITE SCREEN URINE: NOT DETECTED
CREAT SERPL-MCNC: 0.6 MG/DL (ref 0.5–1)
CREAT SERPL-MCNC: 0.6 MG/DL (ref 0.5–1)
EKG ATRIAL RATE: 73 BPM
EKG ATRIAL RATE: 93 BPM
EKG P AXIS: 71 DEGREES
EKG P AXIS: 74 DEGREES
EKG P-R INTERVAL: 158 MS
EKG P-R INTERVAL: 184 MS
EKG Q-T INTERVAL: 340 MS
EKG Q-T INTERVAL: 374 MS
EKG QRS DURATION: 78 MS
EKG QRS DURATION: 86 MS
EKG QTC CALCULATION (BAZETT): 412 MS
EKG QTC CALCULATION (BAZETT): 422 MS
EKG R AXIS: -39 DEGREES
EKG R AXIS: -44 DEGREES
EKG T AXIS: 51 DEGREES
EKG T AXIS: 56 DEGREES
EKG VENTRICULAR RATE: 73 BPM
EKG VENTRICULAR RATE: 93 BPM
EOSINOPHILS ABSOLUTE: 0 E9/L (ref 0.05–0.5)
EOSINOPHILS RELATIVE PERCENT: 0 % (ref 0–6)
FENTANYL SCREEN, URINE: NOT DETECTED
GFR SERPL CREATININE-BSD FRML MDRD: >60 ML/MIN/1.73
GFR SERPL CREATININE-BSD FRML MDRD: >60 ML/MIN/1.73
GLUCOSE BLD-MCNC: 105 MG/DL (ref 74–99)
GLUCOSE BLD-MCNC: 134 MG/DL (ref 74–99)
HBA1C MFR BLD: 6.9 % (ref 4–5.6)
HCT VFR BLD CALC: 39.4 % (ref 34–48)
HEMOGLOBIN: 12.9 G/DL (ref 11.5–15.5)
IMMATURE GRANULOCYTES #: 0.06 E9/L
IMMATURE GRANULOCYTES %: 0.5 % (ref 0–5)
L. PNEUMOPHILA SEROGP 1 UR AG: NORMAL
LIPASE: 12 U/L (ref 13–60)
LYMPHOCYTES ABSOLUTE: 0.43 E9/L (ref 1.5–4)
LYMPHOCYTES RELATIVE PERCENT: 3.3 % (ref 20–42)
Lab: NORMAL
MCH RBC QN AUTO: 30.8 PG (ref 26–35)
MCHC RBC AUTO-ENTMCNC: 32.7 % (ref 32–34.5)
MCV RBC AUTO: 94 FL (ref 80–99.9)
METER GLUCOSE: 160 MG/DL (ref 74–99)
METER GLUCOSE: 201 MG/DL (ref 74–99)
METER GLUCOSE: 93 MG/DL (ref 74–99)
METHADONE SCREEN, URINE: NOT DETECTED
MONOCYTES ABSOLUTE: 0.18 E9/L (ref 0.1–0.95)
MONOCYTES RELATIVE PERCENT: 1.4 % (ref 2–12)
NEUTROPHILS ABSOLUTE: 12.2 E9/L (ref 1.8–7.3)
NEUTROPHILS RELATIVE PERCENT: 94.6 % (ref 43–80)
OPIATE SCREEN URINE: NOT DETECTED
OXYCODONE URINE: NOT DETECTED
PDW BLD-RTO: 13.4 FL (ref 11.5–15)
PHENCYCLIDINE SCREEN URINE: NOT DETECTED
PLATELET # BLD: 264 E9/L (ref 130–450)
PMV BLD AUTO: 9.8 FL (ref 7–12)
POTASSIUM SERPL-SCNC: 4 MMOL/L (ref 3.5–5)
POTASSIUM SERPL-SCNC: 4.7 MMOL/L (ref 3.5–5)
PROCALCITONIN: 0.19 NG/ML (ref 0–0.08)
RBC # BLD: 4.19 E12/L (ref 3.5–5.5)
SODIUM BLD-SCNC: 135 MMOL/L (ref 132–146)
SODIUM BLD-SCNC: 135 MMOL/L (ref 132–146)
STREP PNEUMONIAE ANTIGEN, URINE: NORMAL
TOTAL PROTEIN: 6.5 G/DL (ref 6.4–8.3)
TOTAL PROTEIN: 6.5 G/DL (ref 6.4–8.3)
TROPONIN, HIGH SENSITIVITY: 32 NG/L (ref 0–9)
TSH SERPL DL<=0.05 MIU/L-ACNC: 0.86 UIU/ML (ref 0.27–4.2)
WBC # BLD: 12.9 E9/L (ref 4.5–11.5)

## 2022-12-28 PROCEDURE — 6370000000 HC RX 637 (ALT 250 FOR IP): Performed by: FAMILY MEDICINE

## 2022-12-28 PROCEDURE — 93010 ELECTROCARDIOGRAM REPORT: CPT | Performed by: INTERNAL MEDICINE

## 2022-12-28 PROCEDURE — 2060000000 HC ICU INTERMEDIATE R&B

## 2022-12-28 PROCEDURE — 6370000000 HC RX 637 (ALT 250 FOR IP): Performed by: STUDENT IN AN ORGANIZED HEALTH CARE EDUCATION/TRAINING PROGRAM

## 2022-12-28 PROCEDURE — 83036 HEMOGLOBIN GLYCOSYLATED A1C: CPT

## 2022-12-28 PROCEDURE — 85025 COMPLETE CBC W/AUTO DIFF WBC: CPT

## 2022-12-28 PROCEDURE — 99232 SBSQ HOSP IP/OBS MODERATE 35: CPT | Performed by: FAMILY MEDICINE

## 2022-12-28 PROCEDURE — 94640 AIRWAY INHALATION TREATMENT: CPT

## 2022-12-28 PROCEDURE — 80307 DRUG TEST PRSMV CHEM ANLYZR: CPT

## 2022-12-28 PROCEDURE — 6360000002 HC RX W HCPCS: Performed by: NURSE PRACTITIONER

## 2022-12-28 PROCEDURE — 6360000002 HC RX W HCPCS: Performed by: INTERNAL MEDICINE

## 2022-12-28 PROCEDURE — 6360000002 HC RX W HCPCS: Performed by: FAMILY MEDICINE

## 2022-12-28 PROCEDURE — 6370000000 HC RX 637 (ALT 250 FOR IP): Performed by: INTERNAL MEDICINE

## 2022-12-28 PROCEDURE — 2580000003 HC RX 258: Performed by: FAMILY MEDICINE

## 2022-12-28 PROCEDURE — 83690 ASSAY OF LIPASE: CPT

## 2022-12-28 PROCEDURE — 6360000002 HC RX W HCPCS

## 2022-12-28 PROCEDURE — 84484 ASSAY OF TROPONIN QUANT: CPT

## 2022-12-28 PROCEDURE — 36415 COLL VENOUS BLD VENIPUNCTURE: CPT

## 2022-12-28 PROCEDURE — 84443 ASSAY THYROID STIM HORMONE: CPT

## 2022-12-28 PROCEDURE — 80053 COMPREHEN METABOLIC PANEL: CPT

## 2022-12-28 PROCEDURE — 82962 GLUCOSE BLOOD TEST: CPT

## 2022-12-28 PROCEDURE — 93005 ELECTROCARDIOGRAM TRACING: CPT

## 2022-12-28 PROCEDURE — 84145 PROCALCITONIN (PCT): CPT

## 2022-12-28 RX ORDER — NITROGLYCERIN 0.4 MG/1
0.4 TABLET SUBLINGUAL ONCE
Status: DISCONTINUED | OUTPATIENT
Start: 2022-12-28 | End: 2022-12-28

## 2022-12-28 RX ORDER — INSULIN LISPRO 100 [IU]/ML
0-4 INJECTION, SOLUTION INTRAVENOUS; SUBCUTANEOUS NIGHTLY
Status: DISCONTINUED | OUTPATIENT
Start: 2022-12-28 | End: 2022-12-30 | Stop reason: HOSPADM

## 2022-12-28 RX ORDER — PANTOPRAZOLE SODIUM 40 MG/1
40 TABLET, DELAYED RELEASE ORAL 2 TIMES DAILY
Status: DISCONTINUED | OUTPATIENT
Start: 2022-12-28 | End: 2022-12-30 | Stop reason: HOSPADM

## 2022-12-28 RX ORDER — ASPIRIN 81 MG/1
324 TABLET, CHEWABLE ORAL
Status: COMPLETED | OUTPATIENT
Start: 2022-12-28 | End: 2022-12-28

## 2022-12-28 RX ORDER — COLCHICINE 0.6 MG/1
0.6 TABLET ORAL DAILY
Status: DISCONTINUED | OUTPATIENT
Start: 2022-12-29 | End: 2022-12-30 | Stop reason: HOSPADM

## 2022-12-28 RX ORDER — INSULIN LISPRO 100 [IU]/ML
0-4 INJECTION, SOLUTION INTRAVENOUS; SUBCUTANEOUS
Status: DISCONTINUED | OUTPATIENT
Start: 2022-12-28 | End: 2022-12-30 | Stop reason: HOSPADM

## 2022-12-28 RX ORDER — LIDOCAINE 4 G/G
1 PATCH TOPICAL DAILY
Status: DISCONTINUED | OUTPATIENT
Start: 2022-12-28 | End: 2022-12-30 | Stop reason: HOSPADM

## 2022-12-28 RX ORDER — COLCHICINE 0.6 MG/1
0.6 TABLET ORAL 2 TIMES DAILY
Status: COMPLETED | OUTPATIENT
Start: 2022-12-28 | End: 2022-12-28

## 2022-12-28 RX ORDER — DEXTROSE MONOHYDRATE 100 MG/ML
INJECTION, SOLUTION INTRAVENOUS CONTINUOUS PRN
Status: DISCONTINUED | OUTPATIENT
Start: 2022-12-28 | End: 2022-12-30 | Stop reason: HOSPADM

## 2022-12-28 RX ORDER — METHYLPREDNISOLONE SODIUM SUCCINATE 40 MG/ML
40 INJECTION, POWDER, LYOPHILIZED, FOR SOLUTION INTRAMUSCULAR; INTRAVENOUS EVERY 12 HOURS
Status: DISCONTINUED | OUTPATIENT
Start: 2022-12-28 | End: 2022-12-29

## 2022-12-28 RX ORDER — IBUPROFEN 800 MG/1
800 TABLET ORAL
Status: DISCONTINUED | OUTPATIENT
Start: 2022-12-28 | End: 2022-12-30 | Stop reason: HOSPADM

## 2022-12-28 RX ORDER — NITROGLYCERIN 0.4 MG/1
0.4 TABLET SUBLINGUAL ONCE
Status: COMPLETED | OUTPATIENT
Start: 2022-12-28 | End: 2022-12-28

## 2022-12-28 RX ADMIN — HYDROMORPHONE HYDROCHLORIDE 4 MG: 2 TABLET ORAL at 14:40

## 2022-12-28 RX ADMIN — COLCHICINE 0.6 MG: 0.6 TABLET ORAL at 20:50

## 2022-12-28 RX ADMIN — RISPERIDONE 1 MG: 1 TABLET ORAL at 10:21

## 2022-12-28 RX ADMIN — PANTOPRAZOLE SODIUM 40 MG: 40 TABLET, DELAYED RELEASE ORAL at 05:16

## 2022-12-28 RX ADMIN — IPRATROPIUM BROMIDE AND ALBUTEROL SULFATE 1 AMPULE: .5; 2.5 SOLUTION RESPIRATORY (INHALATION) at 13:55

## 2022-12-28 RX ADMIN — RISPERIDONE 1 MG: 1 TABLET ORAL at 14:41

## 2022-12-28 RX ADMIN — ZOLPIDEM TARTRATE 5 MG: 5 TABLET ORAL at 20:51

## 2022-12-28 RX ADMIN — BUDESONIDE 500 MCG: 0.5 SUSPENSION RESPIRATORY (INHALATION) at 09:43

## 2022-12-28 RX ADMIN — IPRATROPIUM BROMIDE AND ALBUTEROL SULFATE 1 AMPULE: .5; 2.5 SOLUTION RESPIRATORY (INHALATION) at 09:43

## 2022-12-28 RX ADMIN — SODIUM CHLORIDE, PRESERVATIVE FREE 10 ML: 5 INJECTION INTRAVENOUS at 11:03

## 2022-12-28 RX ADMIN — DIVALPROEX SODIUM 250 MG: 250 TABLET, DELAYED RELEASE ORAL at 10:22

## 2022-12-28 RX ADMIN — IBUPROFEN 800 MG: 800 TABLET, FILM COATED ORAL at 12:20

## 2022-12-28 RX ADMIN — NYSTATIN 500000 UNITS: 500000 SUSPENSION ORAL at 10:21

## 2022-12-28 RX ADMIN — SUCRALFATE 1 G: 1 TABLET ORAL at 14:41

## 2022-12-28 RX ADMIN — SODIUM CHLORIDE, PRESERVATIVE FREE 10 ML: 5 INJECTION INTRAVENOUS at 20:52

## 2022-12-28 RX ADMIN — COLCHICINE 0.6 MG: 0.6 TABLET ORAL at 11:00

## 2022-12-28 RX ADMIN — PANTOPRAZOLE SODIUM 40 MG: 40 TABLET, DELAYED RELEASE ORAL at 20:50

## 2022-12-28 RX ADMIN — SUCRALFATE 1 G: 1 TABLET ORAL at 20:50

## 2022-12-28 RX ADMIN — DIVALPROEX SODIUM 250 MG: 250 TABLET, DELAYED RELEASE ORAL at 20:50

## 2022-12-28 RX ADMIN — RISPERIDONE 1 MG: 1 TABLET ORAL at 20:51

## 2022-12-28 RX ADMIN — ASPIRIN 324 MG: 81 TABLET, CHEWABLE ORAL at 07:17

## 2022-12-28 RX ADMIN — PREGABALIN 75 MG: 75 CAPSULE ORAL at 10:23

## 2022-12-28 RX ADMIN — NYSTATIN 500000 UNITS: 500000 SUSPENSION ORAL at 17:58

## 2022-12-28 RX ADMIN — ARFORMOTEROL TARTRATE 15 MCG: 15 SOLUTION RESPIRATORY (INHALATION) at 21:13

## 2022-12-28 RX ADMIN — METHYLPREDNISOLONE SODIUM SUCCINATE 40 MG: 40 INJECTION, POWDER, FOR SOLUTION INTRAMUSCULAR; INTRAVENOUS at 20:55

## 2022-12-28 RX ADMIN — METHYLPREDNISOLONE SODIUM SUCCINATE 40 MG: 40 INJECTION, POWDER, FOR SOLUTION INTRAMUSCULAR; INTRAVENOUS at 10:20

## 2022-12-28 RX ADMIN — PREGABALIN 75 MG: 75 CAPSULE ORAL at 20:50

## 2022-12-28 RX ADMIN — PREGABALIN 75 MG: 75 CAPSULE ORAL at 14:41

## 2022-12-28 RX ADMIN — DIVALPROEX SODIUM 250 MG: 250 TABLET, DELAYED RELEASE ORAL at 14:41

## 2022-12-28 RX ADMIN — IPRATROPIUM BROMIDE AND ALBUTEROL SULFATE 1 AMPULE: .5; 2.5 SOLUTION RESPIRATORY (INHALATION) at 21:13

## 2022-12-28 RX ADMIN — LEVOFLOXACIN 750 MG: 5 INJECTION, SOLUTION INTRAVENOUS at 11:07

## 2022-12-28 RX ADMIN — NYSTATIN 500000 UNITS: 500000 SUSPENSION ORAL at 20:54

## 2022-12-28 RX ADMIN — HYDROMORPHONE HYDROCHLORIDE 4 MG: 2 TABLET ORAL at 20:51

## 2022-12-28 RX ADMIN — IPRATROPIUM BROMIDE AND ALBUTEROL SULFATE 1 AMPULE: .5; 2.5 SOLUTION RESPIRATORY (INHALATION) at 17:03

## 2022-12-28 RX ADMIN — BUDESONIDE 500 MCG: 0.5 SUSPENSION RESPIRATORY (INHALATION) at 21:13

## 2022-12-28 RX ADMIN — NITROGLYCERIN 0.4 MG: 0.4 TABLET SUBLINGUAL at 14:41

## 2022-12-28 RX ADMIN — SUCRALFATE 1 G: 1 TABLET ORAL at 10:21

## 2022-12-28 RX ADMIN — NYSTATIN 500000 UNITS: 500000 SUSPENSION ORAL at 14:44

## 2022-12-28 RX ADMIN — SERTRALINE HYDROCHLORIDE 25 MG: 50 TABLET ORAL at 10:21

## 2022-12-28 RX ADMIN — ARFORMOTEROL TARTRATE 15 MCG: 15 SOLUTION RESPIRATORY (INHALATION) at 09:44

## 2022-12-28 ASSESSMENT — PAIN DESCRIPTION - ORIENTATION
ORIENTATION: LOWER;MID
ORIENTATION: MID

## 2022-12-28 ASSESSMENT — PAIN DESCRIPTION - DESCRIPTORS
DESCRIPTORS: ACHING;DISCOMFORT;SHARP
DESCRIPTORS: ACHING;DISCOMFORT;STABBING
DESCRIPTORS: ACHING;SHARP

## 2022-12-28 ASSESSMENT — PAIN SCALES - GENERAL
PAINLEVEL_OUTOF10: 5
PAINLEVEL_OUTOF10: 6
PAINLEVEL_OUTOF10: 8
PAINLEVEL_OUTOF10: 8

## 2022-12-28 ASSESSMENT — PAIN - FUNCTIONAL ASSESSMENT: PAIN_FUNCTIONAL_ASSESSMENT: PREVENTS OR INTERFERES SOME ACTIVE ACTIVITIES AND ADLS

## 2022-12-28 ASSESSMENT — PAIN DESCRIPTION - LOCATION
LOCATION: CHEST;ABDOMEN
LOCATION: CHEST;BACK;ABDOMEN
LOCATION: CHEST

## 2022-12-28 ASSESSMENT — PAIN DESCRIPTION - PAIN TYPE: TYPE: CHRONIC PAIN

## 2022-12-28 ASSESSMENT — PAIN DESCRIPTION - FREQUENCY
FREQUENCY: CONTINUOUS
FREQUENCY: CONTINUOUS

## 2022-12-28 ASSESSMENT — PAIN DESCRIPTION - ONSET: ONSET: ON-GOING

## 2022-12-28 NOTE — PROGRESS NOTES
[facility]  Progress Note    Patient seen and examined, sitting comfortably in bed. Patient has 4 L of nasal cannula in place states that the oxygen is helping her with her breathing. Patient states that she still continues to have chest pain which she describes as stabbing and radiating up towards her jaw. Patient describes there is nothing that makes it better or worse. Patient initially states she does not have an appetite however later stated that she was able to finish all of the potatoes and gravy's that her daughter brought in including having two thirds of the lunch provided at the hospital.  Patient also states that she has some abdominal pain, which is non radiating. Patient does describe feeling a little warmer than normal.  Denies any fever, chills, changes in bowel movements or urination. Patient's vitals have remained stable and she is afebrile. Physical exam  CV: S1-S2 regular rate and rhythm without any murmurs. Chest wall tenderness diffusely, reproducible with palpation  Pulm-decreased effort no wheezes or rhonchi appreciated. GI-normal bowel sounds, tender epigastric and left lower quadrant region. Extremities no edema present      Patient has refused the nitroglycerin from earlier today. Dr. Efe Muse talk to patient and she is agreeable to try the nitroglycerin and monitor for any improvement in the chest pain.     Hammad Kennedy MD  Family Medicine [id number]

## 2022-12-28 NOTE — PROGRESS NOTES
Notified Dr. Keisha Peters of Patient's AM Troponin and EKG results.  Patient still complaining of chest discomfort in mid chest.

## 2022-12-28 NOTE — PROGRESS NOTES
Comprehensive Nutrition Assessment    Type and Reason for Visit:  Initial, Positive Nutrition Screen    Nutrition Recommendations/Plan:   Continue current diet, monitor for carb controlled diet prn  Will modify ONS to Magic Cup BID per discussion w/ pt     Malnutrition Assessment:  Malnutrition Status: At risk for malnutrition (Comment) (12/28/22 1348)    Context:  Chronic Illness     Findings of the 6 clinical characteristics of malnutrition:  Energy Intake:  Mild decrease in energy intake (Comment)  Weight Loss:  No significant weight loss     Body Fat Loss:  No significant body fat loss     Muscle Mass Loss:  Mild muscle mass loss Clavicles (pectoralis & deltoids)  Fluid Accumulation:  No significant fluid accumulation     Strength:  Not Performed    Nutrition Assessment:    Pt w/ COPD exacerbation, chest pain vs epigastric pain. Hx DM, colitis, recent hospital admit for CAP. Pt reports poor appetite at this time, will modify ONS per discussion w/ pt & monitor. Nutrition Related Findings:    A&O X4, I&Os WDL, no edema, abd flat/soft, +BS, oral thrush, elevated LFTs, A1c 6.9/Glu 134 Wound Type: None       Current Nutrition Intake & Therapies:    Average Meal Intake:  (poor at this time per pt)  Average Supplements Intake:  (1 Premier Protein from home since admit per pt)  ADULT DIET; Regular  ADULT ORAL NUTRITION SUPPLEMENT; Breakfast, Lunch, Dinner; Standard 4 oz Oral Supplement  Diet NPO    Anthropometric Measures:  Height: 5' 2\" (157.5 cm)  Ideal Body Weight (IBW): 110 lbs (50 kg)    Admission Body Weight: 118 lb (53.5 kg) (12/28 bed)  Current Body Weight: 118 lb (53.5 kg), 107.3 % IBW. Weight Source: Bed Scale (12/28)  Current BMI (kg/m2): 21.6  Usual Body Weight: 96 lb (43.5 kg) (9/2022 actual per EMR)  % Weight Change (Calculated): 22.9  Weight Adjustment For: No Adjustment                 BMI Categories: Normal Weight (BMI 18.5-24. 9)    Estimated Daily Nutrient Needs:  Energy Requirements Based On: Formula  Weight Used for Energy Requirements: Current  Energy (kcal/day):   Weight Used for Protein Requirements: Current  Protein (g/day): 65-75 (1.2-1.4 as tolerated)  Method Used for Fluid Requirements: 1 ml/kcal  Fluid (ml/day):     Nutrition Diagnosis:   Inadequate oral intake related to impaired respiratory function as evidenced by poor intake prior to admission    Nutrition Interventions:   Food and/or Nutrient Delivery: Continue Current Diet, Modify Oral Nutrition Supplement  Nutrition Education/Counseling: Education initiated (discussed ONS w/ pt)  Coordination of Nutrition Care: Continue to monitor while inpatient       Goals:     Goals: PO intake 50% or greater, by next RD assessment       Nutrition Monitoring and Evaluation:      Food/Nutrient Intake Outcomes: Food and Nutrient Intake, Supplement Intake  Physical Signs/Symptoms Outcomes: Biochemical Data, GI Status, Fluid Status or Edema, Nutrition Focused Physical Findings, Skin, Weight    Discharge Planning:     Too soon to determine     Loreto Guevara RD, LD  Contact: [phone]

## 2022-12-28 NOTE — PLAN OF CARE
Problem: Discharge Planning  Goal: Discharge to home or other facility with appropriate resources  Recent Flowsheet Documentation  Taken 12/27/2022 2148 by Patrice Mendiola RN  Discharge to home or other facility with appropriate resources: Identify barriers to discharge with patient and caregiver

## 2022-12-28 NOTE — PROGRESS NOTES
Pulmonary Progress Note    Admit Date: 12/27/2022                            PCP: Tanja Tang MD  Principal Problem:    COPD exacerbation [facility])  Active Problems:    Acute exacerbation of chronic bronchitis (HCC)    Elevated troponin  Resolved Problems:    * No resolved hospital problems.  *      Subjective:  Patient seen on 4 L nasal cannula with pulse ox 97%  She complains that she is still unable to take a deep breath and productive cough, yellow sputum     Medications:   dextrose      sodium chloride          lidocaine  1 patch TransDERmal Daily    colchicine  0.6 mg Oral BID    [START ON 12/29/2022] colchicine  0.6 mg Oral Daily    ibuprofen  800 mg Oral TID WC    nitroGLYCERIN  0.4 mg SubLINGual Once    insulin lispro  0-4 Units SubCUTAneous TID WC    insulin lispro  0-4 Units SubCUTAneous Nightly    pantoprazole  40 mg Oral BID    levofloxacin  750 mg IntraVENous Q24H    divalproex  250 mg Oral TID    ipratropium-albuterol  1 ampule Inhalation Q4H WA    pregabalin  75 mg Oral TID    zolpidem  5 mg Oral Nightly    risperiDONE  1 mg Oral TID    sertraline  25 mg Oral Daily    sucralfate  1 g Oral TID    Vitamin D  1,000 Units Oral Daily    sodium chloride flush  5-40 mL IntraVENous 2 times per day    enoxaparin  30 mg SubCUTAneous Daily    nicotine  1 patch TransDERmal Daily    methylPREDNISolone  40 mg IntraVENous Q8H    budesonide  0.5 mg Nebulization BID    Arformoterol Tartrate  15 mcg Nebulization BID    nystatin  5 mL Oral 4x Daily       Vitals:  VITALS:  /63   Pulse 85   Temp 97.5 °F (36.4 °C) (Oral)   Resp 18   Ht 5' 2\" (1.575 m)   Wt 119 lb 3.2 oz (54.1 kg) Comment: pcd hanging on bed  LMP  (LMP Unknown)   SpO2 97%   BMI 21.80 kg/m²   24HR INTAKE/OUTPUT:    Intake/Output Summary (Last 24 hours) at 12/28/2022 1047  Last data filed at 12/28/2022 0626  Gross per 24 hour   Intake 630 ml   Output --   Net 630 ml     CURRENT PULSE OXIMETRY:  SpO2: 97 %  24HR PULSE OXIMETRY RANGE:  SpO2 Av.2 %  Min: 92 %  Max: 97 %  CVP:    VENT SETTINGS:      Additional Respiratory Assessments  Heart Rate: 85  Resp: 18  SpO2: 97 %      EXAM:  General: No distress. Alert.  4 L  Eyes: No sclera icterus. No conjunctival injection. ENT: No discharge. Pharynx clear. Edentulous   Neck: Trachea midline. Normal thyroid. Resp: No accessory muscle use. No crackles. No wheezing. No rhonchi. Diminished bilaterally  CV: Regular rate. Regular rhythm. No mumur or rub. No edema. ABD: Non-tender. Non-distended. No masses. No organmegaly. Normal bowel sounds. Skin: Warm and dry. No nodule on exposed extremities. No rash on exposed extremities. M/S: No cyanosis. No joint deformity. No clubbing. Neuro: Awake. Follows commands. A&Ox 3    I/O: I/O last 3 completed shifts: In: 56 [P.O.:480; IV Piggyback:150]  Out: -   No intake/output data recorded. Results:  CBC:   Recent Labs     22  0522   WBC 17.5* 12.9*   HGB 13.3 12.9   HCT 39.2 39.4   MCV 92.7 94.0    264     BMP:   Recent Labs     22  0524 22    135 135   K 4.0 4.0 4.7   CL 96* 96* 97*   CO2 29 27 28   BUN 15 21 23   CREATININE 0.5 0.6 0.6     LFT:   Recent Labs     22  0524 22   ALKPHOS 141* 113* 120*   ALT 46* 45* 45*   * 87* 78*   PROT 6.4 6.5 6.5   BILITOT 0.3 0.3 0.3   LABALBU 3.5 3.5 3.3*     PT/INR: No results for input(s): PROTIME, INR in the last 72 hours.   Cultures:  Recent Labs     22  5571   CULTRESP Oral Pharyngeal Cindy present      Latest Reference Range & Units 22 06:32   Influenza A by PCR Not Detected  Not Detected   Influenza B by PCR Not Detected  Not Detected   Adenovirus by PCR Not Detected  Not Detected   Coronavirus 229E by PCR Not Detected  Not Detected   Coronavirus HKU1 by PCR Not Detected  Not Detected   Coronavirus NL63 by PCR Not Detected  Not Detected   Coronavirus OC43 by PCR Not Detected  Not Detected Human Metapneumovirus by PCR Not Detected  Not Detected   Human Rhinovirus/Enterovirus by PCR Not Detected  Not Detected   Parainfluenza Virus 1 by PCR Not Detected  Not Detected   Parainfluenza Virus 2 by PCR Not Detected  Not Detected   Parainfluenza Virus 3 by PCR Not Detected  Not Detected   Parainfluenza Virus 4 by PCR Not Detected  Not Detected   Respiratory Syncytial Virus by PCR Not Detected  Not Detected   Bordetella parapertussis by PCR Not Detected  Not Detected   Chlamydophilia pneumoniae by PCR Not Detected  Not Detected   Mycoplasma pneumoniae by PCR Not Detected  Not Detected   SARS-CoV-2, PCR Not Detected  Not Detected   Bordetella pertussis by PCR Not Detected  Not Detected     ABG:   No results for input(s): PH, PO2, PCO2, HCO3, BE, O2SAT in the last 72 hours. Films:  XR CHEST 1 VIEW    Result Date: 12/27/2022  EXAMINATION: ONE XRAY VIEW OF THE CHEST 12/27/2022 5:15 am COMPARISON: 11/11/2022 HISTORY: ORDERING SYSTEM PROVIDED HISTORY: sob TECHNOLOGIST PROVIDED HISTORY: Reason for exam:->sob FINDINGS: The cardiomediastinal silhouette is within normal limits. The lungs are clear except for bibasilar atelectasis. No pneumothorax or pleural effusion. No acute cardiopulmonary abnormality. CTA PULMONARY W CONTRAST    Result Date: 12/27/2022  EXAMINATION: CTA OF THE CHEST 12/27/2022 9:02 am TECHNIQUE: CTA of the chest was performed after the administration of intravenous contrast.  Multiplanar reformatted images are provided for review. MIP images are provided for review. Automated exposure control, iterative reconstruction, and/or weight based adjustment of the mA/kV was utilized to reduce the radiation dose to as low as reasonably achievable. COMPARISON: Radiograph from same day and CT of the chest from November 7, 2022.  HISTORY: ORDERING SYSTEM PROVIDED HISTORY: SOB low Sao2 at home TECHNOLOGIST PROVIDED HISTORY: Reason for exam:->SOB low Sao2 at home Decision Support Exception - unselect if not a suspected or confirmed emergency medical condition->Emergency Medical Condition (MA) FINDINGS: Pulmonary Arteries: Pulmonary arteries are adequately opacified for evaluation. No evidence of intraluminal filling defect to suggest pulmonary embolism. Main pulmonary artery is normal in caliber. Mediastinum: The heart is borderline in size. No acute findings of the thoracic aorta which is normal in caliber. Borderline size right hilar lymph node, unchanged. Lungs/pleura: Bibasilar consolidations favoring atelectasis. Difficult to exclude superimposed pneumonia. Subtle ground-glass opacities in the right perihilar region and middle lobe, favored to be mild infectious or inflammatory process, and new from prior study. Moderate emphysema. No pneumothorax. Upper Abdomen: Limited images of the upper abdomen are unremarkable. Soft Tissues/Bones: No acute bone or soft tissue abnormality. No evidence of pulmonary embolism. Mildly increased consolidations in the lung bases, favoring worsened atelectasis although difficult to exclude superimposed infection. Subtle new ground-glass opacities in the right perihilar region, may reflect mild infectious or inflammatory process. Background of moderate emphysema. Assessment/plan:  12/28: 4L 97%, IV solumedrol to q12  COPD exacerbation-   Continue Brovana, Pulmicort, and duo nebs inpatient. Not on any inhalers at home. Currently on IV Solu-Medrol 40 mg every 8 hours, will wean this to every 12 hours today. And to transition to oral 12/29  Outpatient PFT  Hypoxia  4 L nasal cannula, 97%  Baseline is room air  Wean to maintain pulse ox greater than 90%  Ambulatory pulse ox prior to discharge  Emphysema noted on CTA 12/27  A1At and IgE pending  Community-acquired pneumonia noted on CTA 12/27  Incentive spirometry  PCT 0.32 > 0.19 (12/28). Afebrile.   WBC 17.5 > 12.9 (12/28)  Continue IV Levaquin  Sputum culture pending   Leukocytosis secondary to CAP  Oral thrush  Continue nystatin x7 days  Elevated LFTs  DVT/PE prophylaxis on lovenox  Recent admissions [facility] 12/13- 12/18: right lung infiltrated treated for CAP with IV rocephin/doxy, supplemental oxygen. D/c with po steroids and abx on room air  11/7-11/8 COPD exacerbation treated with duonebs/azithromycin/ po steriods      Electronically signed by AYESHA Lazo CNP on 12/28/2022 at 10:47 AM       Attending Attestation Note:    Patient seen and examined with Hospital Staff, NP. I have extensively reviewed the chart lab work and imaging. I agree with above. Modifications and amendment of note made as necessary.     In addition, the following apply:    Current Facility-Administered Medications   Medication Dose Route Frequency Provider Last Rate Last Admin    lidocaine 4 % external patch 1 patch  1 patch TransDERmal Daily Vergia Lose V, DO   1 patch at 12/28/22 1023    perflutren lipid microspheres (DEFINITY) injection 1.5 mL  1.5 mL IntraVENous ONCE PRN Vergia Lose V, DO        regadenoson (LEXISCAN) injection 0.4 mg  0.4 mg IntraVENous ONCE PRN Vergia Lose V, DO        colchicine (COLCRYS) tablet 0.6 mg  0.6 mg Oral BID Vergia Lose V, DO   0.6 mg at 12/28/22 1100    [START ON 12/29/2022] colchicine (COLCRYS) tablet 0.6 mg  0.6 mg Oral Daily Vergia Lose V, DO        ibuprofen (ADVIL;MOTRIN) tablet 800 mg  800 mg Oral TID  Jonathan Curry V, DO   800 mg at 12/28/22 1220    insulin lispro (HUMALOG) injection vial 0-4 Units  0-4 Units SubCUTAneous TID WC Cydney Linares MD        insulin lispro (HUMALOG) injection vial 0-4 Units  0-4 Units SubCUTAneous Nightly Cydney Linares MD        glucose chewable tablet 16 g  4 tablet Oral PRN Cydney Linares MD        dextrose bolus 10% 125 mL  125 mL IntraVENous PRN Zehra Christy MD        Or    dextrose bolus 10% 250 mL  250 mL IntraVENous PRN Zehra Christy MD        glucagon (rDNA) injection 1 mg  1 mg SubCUTAneous PRN eZhra Christy MD dextrose 10 % infusion   IntraVENous Continuous PRN Ortiz Carcamo MD        pantoprazole (PROTONIX) tablet 40 mg  40 mg Oral BID Harshal Yung MD        methylPREDNISolone sodium (SOLU-MEDROL) injection 40 mg  40 mg IntraVENous Q12H AYESHA Livingston - JAX        levoFLOXacin (LEVAQUIN) 750 MG/150ML infusion 750 mg  750 mg IntraVENous Q24H Gearldeasukhjinder Dun V, DO   Stopped at 12/28/22 1239    divalproex (DEPAKOTE) DR tablet 250 mg  250 mg Oral TID Gearldean Olga V, DO   250 mg at 12/28/22 1441    guaiFENesin tablet 400 mg  400 mg Oral 4x Daily PRN Gearldean Dun V, DO        HYDROmorphone (DILAUDID) tablet 4 mg  4 mg Oral Q6H PRN [facility], [facility]   4 mg at 12/28/22 1440    ipratropium-albuterol (DUONEB) nebulizer solution 1 ampule  1 ampule Inhalation Q4H [place] [facility] Esp[facility], DO   1 ampule at 12/28/22 1355    pregabalin (LYRICA) capsule 75 mg  75 mg Oral TID Gearldean Olga V, DO   75 mg at 12/28/22 1441    zolpidem (AMBIEN) tablet 5 mg  5 mg Oral Nightly Gearldean Dun V, DO   5 mg at 12/27/22 2047    risperiDONE (RISPERDAL) tablet 1 mg  1 mg Oral TID Gearldn Olga V, DO   1 mg at 12/28/22 1441    sertraline (ZOLOFT) tablet 25 mg  25 mg Oral Daily Gearldean Dun V, DO   25 mg at 12/28/22 1021    sucralfate (CARAFATE) tablet 1 g  1 g Oral TID Gearldean Olga V, DO   1 g at 12/28/22 1441    vitamin D (CHOLECALCIFEROL) tablet 1,000 Units  1,000 Units Oral Daily Gearldean Dun V, DO        sodium chloride flush 0.9 % injection 5-40 mL  5-40 mL IntraVENous 2 times per day Gearldean Dun V, DO   10 mL at 12/28/22 1103    sodium chloride flush 0.9 % injection 5-40 mL  5-40 mL IntraVENous PRN Gearldean Olga V, DO        0.9 % sodium chloride infusion   IntraVENous PRN Jo Espinoza V, DO        enoxaparin Sodium (LOVENOX) injection 30 mg  30 mg SubCUTAneous Daily Jo Espinoza V, DO        ondansetron (ZOFRAN-ODT) disintegrating tablet 4 mg  4 mg Oral Q8H PRN Jo Espinoza V, DO        Or    ondansetron (ZOFRAN) injection 4 mg  4 mg IntraVENous Q6H PRN Azell Slate V, DO        polyethylene glycol (GLYCOLAX) packet 17 g  17 g Oral Daily PRN Azell Slate V, DO        acetaminophen (TYLENOL) tablet 650 mg  650 mg Oral Q6H PRN Azell Slate V, DO        Or    acetaminophen (TYLENOL) suppository 650 mg  650 mg Rectal Q6H PRN Azell Slate V, DO        nicotine (NICODERM CQ) 21 MG/24HR 1 patch  1 patch TransDERmal Daily Azell Slate V, DO   1 patch at 12/28/22 1031    benzonatate (TESSALON) capsule 100 mg  100 mg Oral TID PRN Azell Slate V, DO        naloxone [facility]) injection 0.4 mg  0.4 mg IntraVENous PRN Azell Slate V, DO        budesonide (PULMICORT) nebulizer suspension 500 mcg  0.5 mg Nebulization BID Evangelina Esparza MD   500 mcg at 12/28/22 3367    Arformoterol Tartrate (BROVANA) nebulizer solution 15 mcg  15 mcg Nebulization BID Evangelina Esparza MD   15 mcg at 12/28/22 0944    nystatin (MYCOSTATIN) 951638 UNIT/ML suspension 500,000 Units  5 mL Oral 4x Daily Evangelina Esparza MD   500,000 Units at 12/28/22 1444      - 4L O2 NC, baseline is room air   - outpatient PFT  - await IgE, A1AT  - solu-medrol q 8 hours  - DuoNeb, Pulmicort, Brovana  - steroids, levaquin  - O2, IS  - nystatin S/S due to oral thrush     Marisela Barragan MD  12/28/2022  4:27 PM

## 2022-12-28 NOTE — PLAN OF CARE
Problem: Discharge Planning  Goal: Discharge to home or other facility with appropriate resources  Outcome: Progressing     Problem: Pain  Goal: Verbalizes/displays adequate comfort level or baseline comfort level  Outcome: Progressing     Problem: Chronic Conditions and Co-morbidities  Goal: Patient's chronic conditions and co-morbidity symptoms are monitored and maintained or improved  Outcome: Progressing     Problem: Nutrition Deficit:  Goal: Optimize nutritional status  Outcome: Progressing

## 2022-12-28 NOTE — PROGRESS NOTES
[place] [id number]  Progress Note        Informed about patients chest pain. Patient seen and examined sitting comfortably with a breathing treatment in place. Patient describing having some chest discomfort and pain radiating up her throat. Patient denies any deep chest pain , denies any radiation to her left arm or jaw. Patient states she has not had any dinner and has no appetite. She states that she has been coughing some sputum. Patient denies any fever, chills, nausea, vomiting, denies any changes in urination or bowl movements. Patient states that she takes hydromorphone TID for her back but the last dose she had was yesterday. Patient states that she is feeling a little nervous and wants to feel better. Patient reassured and encouraged to continue using the incentive spirometer. PE:   Consitutional: breathing treatment in place, facial flushing. CV: regular rate and rhythm. Tender to palpation on lower rib cage. Pulm :Rhochi b/l , wheezing   Abdomen: epigastric tenderness to palpation,   LE: pcds in place ,no edema. Assessment and plan   -Patients vitals reviewed and are stable.     -Epigastric pain - Most likely due to GERD, increased acid secondary to not eating. Patient did not take her Carafate this afternoon. GI cocktail ordered - will monitor for response     -Reassuring for noncardiac pain, patients troponin levels is down trending, repeat troponin ordered. - ekg reviewed Normal sinus rhythm with similar changes as in the ED. Repeat EKG in the morning   Will continue to monitor     -Patient did not take her lovenox and states she does not want to. Patient encouraged to do so, pcds ordered. Case discussed with attending on call, Dr. Afsaneh Denis.    Roddy Veloz MD  Family Medicine [id number]

## 2022-12-28 NOTE — PROGRESS NOTES
[address]  Progress Note    Chief complaint :  Chief Complaint   Patient presents with    Shortness of Breath     States she was 60% at home   pt 93% on room air in triage. Pt has Hx COPD, presents to ED with a styrofoam cup with yellow sputum she spit up       Subjective:  Chest pain persisted into the early evening yesterday. Patient initially was refusing nitroglycerin although she did eventually take it with some relief. However, she received Dilaudid at the same time, so unsure of which helped with the discomfort. .    Nursing states that patient responded very well to a single dose of Ativan that she was given. She believes this was a significant factor in decreasing her chest pain and anxiety. This morning, Pt states that she feels much worse than yesterday. She is very anxious about undergoing her stress test.  She continues to complain of chest pain and states that it is more stabbing than it was yesterday. Continues to radiate to her jaw and out towards her shoulders. This morning states she is not getting relief by bending forward. She has been n.p.o. since midnight but states that she had very little to eat yesterday due to lack of appetite. She continues to endorse abdominal pain in the lower quadrants. She is voiding and has not had a bowel movement since yesterday. She reports chills but denies fever. She denies shortness of breath, palpitations. Past medical, surgical, family and social history were reviewed, non-contributory, and unchanged unless otherwise stated. Review of systems: Negative except for stated above. Objective:  /70   Pulse 95   Temp 98.6 °F (37 °C) (Oral)   Resp 18   Ht 5' 2\" (1.575 m)   Wt 124 lb 14.4 oz (56.7 kg)   LMP  (LMP Unknown)   SpO2 97%   BMI 22.84 kg/m²     Physical Exam  Vitals reviewed. Constitutional:       General: She is in acute distress. Appearance: She is ill-appearing. Comments: Patient did appear red and flushed   HENT:      Head: Normocephalic and atraumatic. Nose: Nose normal. No congestion or rhinorrhea. Mouth/Throat:      Mouth: Mucous membranes are moist.      Pharynx: Posterior oropharyngeal erythema present. No oropharyngeal exudate. Comments: Her tongue is beefy red but not swollen. No abnormalities or exudate were noted. Eyes:      Extraocular Movements: Extraocular movements intact. Conjunctiva/sclera: Conjunctivae normal.   Cardiovascular:      Rate and Rhythm: Normal rate and regular rhythm. Heart sounds: Normal heart sounds. No murmur heard. Pulmonary:      Breath sounds: Normal breath sounds. No wheezing. Comments: Poor effort and very minimal air movement so difficult to hear breath sounds. She is not retracting. Abdominal:      General: Abdomen is flat. Bowel sounds are normal.      Palpations: Abdomen is soft. Tenderness: There is no abdominal tenderness. There is no guarding or rebound. Musculoskeletal:         General: No swelling or deformity. Cervical back: Normal range of motion and neck supple. No tenderness. Comments: Diffuse, generalized tenderness across her chest    Skin:     General: Skin is warm and dry. Findings: No rash. Comments: Her face is very erythematous. Neurological:      General: No focal deficit present. Mental Status: She is alert and oriented to person, place, and time. Motor: No weakness.    Psychiatric:         Mood and Affect: Mood normal.         Behavior: Behavior normal.      Comments: Anxious       Labs:  Recent Results (from the past 24 hour(s))   POCT Glucose    Collection Time: 12/28/22 10:56 AM   Result Value Ref Range    Meter Glucose 93 74 - 99 mg/dL   Urine Drug Screen    Collection Time: 12/28/22 12:44 PM   Result Value Ref Range    Amphetamine Screen, Urine NOT DETECTED Negative <1000 ng/mL    Barbiturate Screen, Ur NOT DETECTED Negative < 200 ng/mL Benzodiazepine Screen, Urine NOT DETECTED Negative < 200 ng/mL    Cannabinoid Scrn, Ur NOT DETECTED Negative < 50ng/mL    Cocaine Metabolite Screen, Urine NOT DETECTED Negative < 300 ng/mL    Opiate Scrn, Ur NOT DETECTED Negative < 300ng/mL    PCP Screen, Urine NOT DETECTED Negative < 25 ng/mL    Methadone Screen, Urine NOT DETECTED Negative <300 ng/mL    Oxycodone Urine NOT DETECTED Negative <100 ng/mL    FENTANYL SCREEN, URINE NOT DETECTED Negative <1 ng/mL    Drug Screen Comment: see below    TSH    Collection Time: 12/28/22  4:43 PM   Result Value Ref Range    TSH 0.861 0.270 - 4.200 uIU/mL   POCT Glucose    Collection Time: 12/28/22  4:55 PM   Result Value Ref Range    Meter Glucose 201 (H) 74 - 99 mg/dL   POCT Glucose    Collection Time: 12/28/22  8:10 PM   Result Value Ref Range    Meter Glucose 160 (H) 74 - 99 mg/dL   CBC with Auto Differential    Collection Time: 12/29/22  4:53 AM   Result Value Ref Range    WBC 8.8 4.5 - 11.5 E9/L    RBC 3.87 3.50 - 5.50 E12/L    Hemoglobin 11.8 11.5 - 15.5 g/dL    Hematocrit 36.2 34.0 - 48.0 %    MCV 93.5 80.0 - 99.9 fL    MCH 30.5 26.0 - 35.0 pg    MCHC 32.6 32.0 - 34.5 %    RDW 13.6 11.5 - 15.0 fL    Platelets 472 770 - 430 E9/L    MPV 9.8 7.0 - 12.0 fL    Neutrophils % 80.7 (H) 43.0 - 80.0 %    Immature Granulocytes % 0.5 0.0 - 5.0 %    Lymphocytes % 11.9 (L) 20.0 - 42.0 %    Monocytes % 6.7 2.0 - 12.0 %    Eosinophils % 0.0 0.0 - 6.0 %    Basophils % 0.2 0.0 - 2.0 %    Neutrophils Absolute 7.06 1.80 - 7.30 E9/L    Immature Granulocytes # 0.04 E9/L    Lymphocytes Absolute 1.04 (L) 1.50 - 4.00 E9/L    Monocytes Absolute 0.59 0.10 - 0.95 E9/L    Eosinophils Absolute 0.00 (L) 0.05 - 0.50 E9/L    Basophils Absolute 0.02 0.00 - 0.20 E9/L   POCT Glucose    Collection Time: 12/29/22  6:45 AM   Result Value Ref Range    Meter Glucose 100 (H) 74 - 99 mg/dL       Radiology and other tests reviewed:  CTA PULMONARY W CONTRAST   Final Result   No evidence of pulmonary embolism. Mildly increased consolidations in the lung bases, favoring worsened   atelectasis although difficult to exclude superimposed infection. Subtle new ground-glass opacities in the right perihilar region, may reflect   mild infectious or inflammatory process. Background of moderate emphysema. XR CHEST 1 VIEW   Final Result   No acute cardiopulmonary abnormality. NM Cardiac Stress Test Nuclear Imaging    (Results Pending)       Assessment:  Active Hospital Problems    Diagnosis Date Noted    Atypical chest pain [R07.89] 12/28/2022     Priority: Medium    Acute exacerbation of chronic bronchitis ([address] [J20.9, J42] 12/27/2022     Priority: Medium    Elevated troponin [R77.8] 12/27/2022     Priority: Medium    COPD exacerbation ([facility] [J44.1] 11/08/2022     Priority: Medium       Plan:  Atypical chest pain, differential includes cardiac (unstable angina, pericarditis), GI (GERD, Cholecystitis), and musculoskeletal.  There also may be a component of opioid withdrawal and/or anxiety. Troponins have been cycled twice this admission with a normal delta. EKG x2 have also showed a possible lateral infarct without changes. Chest pain symptoms did resolve however it is unsure if this is due to nitroglycerin and/or Dilaudid. May also be a component of withdrawal as PDMP confirms patient has been picking up her Dilaudid but her U tox was negative  - Continue cholchicine (PO, 0.6mg, qDaily) and ibuprofen (PO, 800mg, TID)  - Stress test, pending. She is n.p.o. for this procedure. - Cardiac Echo pending, scheduled this morning  - Follow-up with Toxicology regarding UDS to confirm or refute presence of Dilaudid  - HIV screen has been added on to morning labs    COPD exacerbation with suspected CAP  Patient was recently hospitalized in [facility] [place] 12/23 for hypoxia and pneumonia.   O2-sat in the 60s at home prior to admission  - Continue levofloxacin, IV, 750mg qDaily (today is day 3 of treatment)  - Continue Solumedrol IV, 40mg BID (day 3 of treatment, s/p single Solu-Medrol dose of 125 mg in the ER and 40mg IV x3 doses)   -------- will convert to p.o. as recommended by pulmonology  - Legionella and strep antigen negative  - Sputum culture with normal oral pharyngeal prashanth  - Blood culture no growth at 24 hours  - A1-aT = pending  - IgE = pending  - DuoNebs every 4 hours  - Continue Brovana and Pulmicort twice daily  - Incentive spirometer use encouraged  - Mucinex, PO, 400mg QID PRN  - Tessalon Pearls, 100mg TID PRN  - Consult: Pulmonology (12/28/22) - Dr. Geovanni Diaz:  Wean oxygen as able. Continue Levaquin due to recent treatment with ceftriaxone and doxycycline. Continue to wean Solu-Medrol, transition to oral 12/29. Check IgE and alpha-1 antitrypsin levels. Outpatient PFTs. Elevated troponin:  Troponins cycled in ER, 55 => 32 (delta = -23)  - Telemetry     Transaminitis:  Alkphos, ALT, and AST all elevated in the ER.  - AST = pending (down from 87 yesterday)  - ALT = pending (same as yesterday)  - AlkPhos = pending (from from 141 yesterday)  - Consider RUQ ultrasound if transaminitis does not resolve or she develops abdominal pain    Leukocytosis:  WBC = 17.5 on admission, and she was tachycardic.  - WBC = 8.8 today (normal, down from 17.5 yesterday)  - CRP/ESR = 14. 5(H)/19  - Procal = 0.32 => 0.19 (elevated but decreasing)  - Follow-up blood cultures and respiratory cultures  - Consider stopping Levofloxacin as CAP less likely     Depression/anxiety:  - Continue home medications  -------- Zoloft 25 mg daily  -------- Risperidone 1 mg 3 times daily  -------- Ambien 5 mg nightly     Peripheral neuropathy:  - Continue home Lyrica 75 mg 3 times daily     Nicotine dependence:  - Nicotine patch 21 mg     Diabetes mellitus  Patient's last HbA1c outpatient was 6.1 on 12/2017,  she is not on any medications for diabetes  - HbA1c = 6.9 on admission  - Low-dose sliding scale insulin  - ACH S fingersticks  - Hypoglycemia protocol  - Monitor glucose with BMP    Oral Thrush  - Continue nystatin x7 days      Pain Control:  Dilaudid, Tylonel  DVT ppx: Lovenox 30mg qDaily  GI ppx: Protonix 40 mg daily  Code Status: Full  Diet: General diet with Oral Nutrition Supplementation      Disposition: Discharge to pending clinical course in 3 day(s)      Sharan Vega MD, PhD   Family Medicine Resident PGY-1  12/29/22   7:14 AM

## 2022-12-28 NOTE — CARE COORDINATION
Transition of Care-Met with patient at the bedside. She lives with her boyfriend in a one story home, four steps to gain entry. Patient reports being independent prior to admission, uses a cane to ambulate. She reports no history of DME other than nebulizer. PCP is Dr. Annelise Booker, preferred pharmacy is [facility] in [place]. Plan for Stress Test today, she is currently requiring 4L NC of oxygen. She DOES NOT HAVE HOME O2, no preference if needed at discharge-nursing to wean. Her significant other will transport home. CM following for potential oxygen needs.     Lety AUSTINN, RN  [facility]

## 2022-12-29 ENCOUNTER — APPOINTMENT (OUTPATIENT)
Dept: NUCLEAR MEDICINE | Age: 62
DRG: 190 | End: 2022-12-29
Payer: MEDICARE

## 2022-12-29 ENCOUNTER — APPOINTMENT (OUTPATIENT)
Dept: NON INVASIVE DIAGNOSTICS | Age: 62
DRG: 190 | End: 2022-12-29
Payer: MEDICARE

## 2022-12-29 PROBLEM — R82.998 OTHER ABNORMAL FINDINGS IN URINE: Status: ACTIVE | Noted: 2022-12-29

## 2022-12-29 LAB
6-MAM, QUANTITATIVE, URINE: <10
ALBUMIN SERPL-MCNC: 3.2 G/DL (ref 3.5–5.2)
ALP BLD-CCNC: 108 U/L (ref 35–104)
ALT SERPL-CCNC: 40 U/L (ref 0–32)
ANION GAP SERPL CALCULATED.3IONS-SCNC: 14 MMOL/L (ref 7–16)
AST SERPL-CCNC: 55 U/L (ref 0–31)
BASOPHILS ABSOLUTE: 0.02 E9/L (ref 0–0.2)
BASOPHILS RELATIVE PERCENT: 0.2 % (ref 0–2)
BILIRUB SERPL-MCNC: <0.2 MG/DL (ref 0–1.2)
BUN BLDV-MCNC: 26 MG/DL (ref 6–23)
CALCIUM SERPL-MCNC: 8.6 MG/DL (ref 8.6–10.2)
CHLORIDE BLD-SCNC: 94 MMOL/L (ref 98–107)
CO2: 24 MMOL/L (ref 22–29)
CODEINE, QUANTITATIVE, URINE: <50
COMMENT: NORMAL
CREAT SERPL-MCNC: 0.5 MG/DL (ref 0.5–1)
CULTURE, RESPIRATORY: NORMAL
EOSINOPHILS ABSOLUTE: 0 E9/L (ref 0.05–0.5)
EOSINOPHILS RELATIVE PERCENT: 0 % (ref 0–6)
GFR SERPL CREATININE-BSD FRML MDRD: >60 ML/MIN/1.73
GLUCOSE BLD-MCNC: 186 MG/DL (ref 74–99)
HCT VFR BLD CALC: 36.2 % (ref 34–48)
HEMOGLOBIN: 11.8 G/DL (ref 11.5–15.5)
HYDROCODONE, QUANTITATIVE, URINE: <50
HYDROMORPHONE, QUANTITATIVE, URINE: 536.8
IMMATURE GRANULOCYTES #: 0.04 E9/L
IMMATURE GRANULOCYTES %: 0.5 % (ref 0–5)
LYMPHOCYTES ABSOLUTE: 1.04 E9/L (ref 1.5–4)
LYMPHOCYTES RELATIVE PERCENT: 11.9 % (ref 20–42)
MCH RBC QN AUTO: 30.5 PG (ref 26–35)
MCHC RBC AUTO-ENTMCNC: 32.6 % (ref 32–34.5)
MCV RBC AUTO: 93.5 FL (ref 80–99.9)
METER GLUCOSE: 100 MG/DL (ref 74–99)
METER GLUCOSE: 106 MG/DL (ref 74–99)
METER GLUCOSE: 164 MG/DL (ref 74–99)
METER GLUCOSE: 170 MG/DL (ref 74–99)
MONOCYTES ABSOLUTE: 0.59 E9/L (ref 0.1–0.95)
MONOCYTES RELATIVE PERCENT: 6.7 % (ref 2–12)
MORPHINE, QUANTITATIVE, URINE: <50
NEUTROPHILS ABSOLUTE: 7.06 E9/L (ref 1.8–7.3)
NEUTROPHILS RELATIVE PERCENT: 80.7 % (ref 43–80)
NORHYDROCODONE, QUANTITATIVE, URINE: <50
NOROXYCODONE, QUANTITATIVE, URINE: <50
OXYCODONE URINE, QUANTITATIVE: <50
OXYMORPHONE, QUANTITATIVE, URINE: <50
PDW BLD-RTO: 13.6 FL (ref 11.5–15)
PLATELET # BLD: 273 E9/L (ref 130–450)
PMV BLD AUTO: 9.8 FL (ref 7–12)
POTASSIUM SERPL-SCNC: 5.3 MMOL/L (ref 3.5–5)
PREALBUMIN: 19 MG/DL (ref 20–40)
RBC # BLD: 3.87 E12/L (ref 3.5–5.5)
SMEAR, RESPIRATORY: NORMAL
SODIUM BLD-SCNC: 132 MMOL/L (ref 132–146)
TOTAL PROTEIN: 6.3 G/DL (ref 6.4–8.3)
WBC # BLD: 8.8 E9/L (ref 4.5–11.5)

## 2022-12-29 PROCEDURE — 93308 TTE F-UP OR LMTD: CPT

## 2022-12-29 PROCEDURE — 6360000002 HC RX W HCPCS: Performed by: FAMILY MEDICINE

## 2022-12-29 PROCEDURE — A9500 TC99M SESTAMIBI: HCPCS | Performed by: RADIOLOGY

## 2022-12-29 PROCEDURE — 93017 CV STRESS TEST TRACING ONLY: CPT

## 2022-12-29 PROCEDURE — 82104 ALPHA-1-ANTITRYPSIN PHENO: CPT

## 2022-12-29 PROCEDURE — 99232 SBSQ HOSP IP/OBS MODERATE 35: CPT | Performed by: FAMILY MEDICINE

## 2022-12-29 PROCEDURE — 6370000000 HC RX 637 (ALT 250 FOR IP): Performed by: FAMILY MEDICINE

## 2022-12-29 PROCEDURE — 93016 CV STRESS TEST SUPVJ ONLY: CPT | Performed by: INTERNAL MEDICINE

## 2022-12-29 PROCEDURE — 84134 ASSAY OF PREALBUMIN: CPT

## 2022-12-29 PROCEDURE — 6370000000 HC RX 637 (ALT 250 FOR IP): Performed by: INTERNAL MEDICINE

## 2022-12-29 PROCEDURE — 6360000002 HC RX W HCPCS

## 2022-12-29 PROCEDURE — 6370000000 HC RX 637 (ALT 250 FOR IP): Performed by: STUDENT IN AN ORGANIZED HEALTH CARE EDUCATION/TRAINING PROGRAM

## 2022-12-29 PROCEDURE — 2700000000 HC OXYGEN THERAPY PER DAY

## 2022-12-29 PROCEDURE — 2060000000 HC ICU INTERMEDIATE R&B

## 2022-12-29 PROCEDURE — 78452 HT MUSCLE IMAGE SPECT MULT: CPT

## 2022-12-29 PROCEDURE — 2580000003 HC RX 258: Performed by: FAMILY MEDICINE

## 2022-12-29 PROCEDURE — 97165 OT EVAL LOW COMPLEX 30 MIN: CPT

## 2022-12-29 PROCEDURE — 82103 ALPHA-1-ANTITRYPSIN TOTAL: CPT

## 2022-12-29 PROCEDURE — 97161 PT EVAL LOW COMPLEX 20 MIN: CPT

## 2022-12-29 PROCEDURE — 82962 GLUCOSE BLOOD TEST: CPT

## 2022-12-29 PROCEDURE — 36415 COLL VENOUS BLD VENIPUNCTURE: CPT

## 2022-12-29 PROCEDURE — 6360000002 HC RX W HCPCS: Performed by: INTERNAL MEDICINE

## 2022-12-29 PROCEDURE — 93018 CV STRESS TEST I&R ONLY: CPT | Performed by: INTERNAL MEDICINE

## 2022-12-29 PROCEDURE — 3430000000 HC RX DIAGNOSTIC RADIOPHARMACEUTICAL: Performed by: RADIOLOGY

## 2022-12-29 PROCEDURE — 94640 AIRWAY INHALATION TREATMENT: CPT

## 2022-12-29 PROCEDURE — 82785 ASSAY OF IGE: CPT

## 2022-12-29 PROCEDURE — 93306 TTE W/DOPPLER COMPLETE: CPT

## 2022-12-29 PROCEDURE — 6370000000 HC RX 637 (ALT 250 FOR IP)

## 2022-12-29 PROCEDURE — 87389 HIV-1 AG W/HIV-1&-2 AB AG IA: CPT

## 2022-12-29 PROCEDURE — 85025 COMPLETE CBC W/AUTO DIFF WBC: CPT

## 2022-12-29 PROCEDURE — 78452 HT MUSCLE IMAGE SPECT MULT: CPT | Performed by: INTERNAL MEDICINE

## 2022-12-29 RX ORDER — DICYCLOMINE HYDROCHLORIDE 10 MG/1
20 CAPSULE ORAL EVERY 6 HOURS PRN
Status: DISCONTINUED | OUTPATIENT
Start: 2022-12-29 | End: 2022-12-30 | Stop reason: HOSPADM

## 2022-12-29 RX ORDER — PREDNISONE 20 MG/1
40 TABLET ORAL DAILY
Status: DISCONTINUED | OUTPATIENT
Start: 2022-12-30 | End: 2022-12-30 | Stop reason: HOSPADM

## 2022-12-29 RX ORDER — HYDROXYZINE PAMOATE 25 MG/1
25 CAPSULE ORAL 3 TIMES DAILY PRN
Status: DISCONTINUED | OUTPATIENT
Start: 2022-12-29 | End: 2022-12-30 | Stop reason: HOSPADM

## 2022-12-29 RX ORDER — TECHNETIUM TC-99M SESTAMIBI 1 MG/10ML
30 INJECTION INTRAVENOUS
Status: COMPLETED | OUTPATIENT
Start: 2022-12-29 | End: 2022-12-29

## 2022-12-29 RX ORDER — LANOLIN ALCOHOL/MO/W.PET/CERES
3 CREAM (GRAM) TOPICAL NIGHTLY
Status: DISCONTINUED | OUTPATIENT
Start: 2022-12-29 | End: 2022-12-30 | Stop reason: HOSPADM

## 2022-12-29 RX ORDER — LOPERAMIDE HYDROCHLORIDE 2 MG/1
2 CAPSULE ORAL 4 TIMES DAILY PRN
Status: DISCONTINUED | OUTPATIENT
Start: 2022-12-29 | End: 2022-12-30 | Stop reason: HOSPADM

## 2022-12-29 RX ORDER — LORAZEPAM 2 MG/ML
1 INJECTION INTRAMUSCULAR ONCE
Status: COMPLETED | OUTPATIENT
Start: 2022-12-29 | End: 2022-12-29

## 2022-12-29 RX ORDER — TECHNETIUM TC-99M SESTAMIBI 1 MG/10ML
10 INJECTION INTRAVENOUS
Status: COMPLETED | OUTPATIENT
Start: 2022-12-29 | End: 2022-12-29

## 2022-12-29 RX ORDER — ENOXAPARIN SODIUM 100 MG/ML
40 INJECTION SUBCUTANEOUS DAILY
Status: DISCONTINUED | OUTPATIENT
Start: 2022-12-30 | End: 2022-12-30 | Stop reason: HOSPADM

## 2022-12-29 RX ORDER — PREDNISONE 20 MG/1
20 TABLET ORAL DAILY
Status: DISCONTINUED | OUTPATIENT
Start: 2023-01-05 | End: 2022-12-30 | Stop reason: HOSPADM

## 2022-12-29 RX ORDER — PREDNISONE 1 MG/1
10 TABLET ORAL DAILY
Status: DISCONTINUED | OUTPATIENT
Start: 2023-01-08 | End: 2022-12-30 | Stop reason: HOSPADM

## 2022-12-29 RX ADMIN — PREGABALIN 75 MG: 75 CAPSULE ORAL at 14:06

## 2022-12-29 RX ADMIN — HYDROXYZINE PAMOATE 25 MG: 25 CAPSULE ORAL at 16:34

## 2022-12-29 RX ADMIN — Medication 10 MILLICURIE: at 09:00

## 2022-12-29 RX ADMIN — IPRATROPIUM BROMIDE AND ALBUTEROL SULFATE 1 AMPULE: .5; 2.5 SOLUTION RESPIRATORY (INHALATION) at 08:16

## 2022-12-29 RX ADMIN — NYSTATIN 500000 UNITS: 500000 SUSPENSION ORAL at 12:26

## 2022-12-29 RX ADMIN — BUDESONIDE 500 MCG: 0.5 SUSPENSION RESPIRATORY (INHALATION) at 08:16

## 2022-12-29 RX ADMIN — RISPERIDONE 1 MG: 1 TABLET ORAL at 08:30

## 2022-12-29 RX ADMIN — NYSTATIN 500000 UNITS: 500000 SUSPENSION ORAL at 08:46

## 2022-12-29 RX ADMIN — SODIUM CHLORIDE, PRESERVATIVE FREE 10 ML: 5 INJECTION INTRAVENOUS at 08:29

## 2022-12-29 RX ADMIN — RISPERIDONE 1 MG: 1 TABLET ORAL at 20:41

## 2022-12-29 RX ADMIN — SALINE NASAL SPRAY 1 SPRAY: 1.5 SOLUTION NASAL at 20:42

## 2022-12-29 RX ADMIN — DIVALPROEX SODIUM 250 MG: 250 TABLET, DELAYED RELEASE ORAL at 20:41

## 2022-12-29 RX ADMIN — SALINE NASAL SPRAY 1 SPRAY: 1.5 SOLUTION NASAL at 12:26

## 2022-12-29 RX ADMIN — ARFORMOTEROL TARTRATE 15 MCG: 15 SOLUTION RESPIRATORY (INHALATION) at 20:36

## 2022-12-29 RX ADMIN — PANTOPRAZOLE SODIUM 40 MG: 40 TABLET, DELAYED RELEASE ORAL at 20:41

## 2022-12-29 RX ADMIN — HYDROMORPHONE HYDROCHLORIDE 4 MG: 2 TABLET ORAL at 13:03

## 2022-12-29 RX ADMIN — NYSTATIN 500000 UNITS: 500000 SUSPENSION ORAL at 20:41

## 2022-12-29 RX ADMIN — SALINE NASAL SPRAY 1 SPRAY: 1.5 SOLUTION NASAL at 16:36

## 2022-12-29 RX ADMIN — ZOLPIDEM TARTRATE 5 MG: 5 TABLET ORAL at 20:41

## 2022-12-29 RX ADMIN — Medication 30 MILLICURIE: at 09:00

## 2022-12-29 RX ADMIN — COLCHICINE 0.6 MG: 0.6 TABLET ORAL at 08:29

## 2022-12-29 RX ADMIN — RISPERIDONE 1 MG: 1 TABLET ORAL at 14:06

## 2022-12-29 RX ADMIN — METHYLPREDNISOLONE SODIUM SUCCINATE 40 MG: 40 INJECTION, POWDER, FOR SOLUTION INTRAMUSCULAR; INTRAVENOUS at 08:27

## 2022-12-29 RX ADMIN — PREGABALIN 75 MG: 75 CAPSULE ORAL at 20:41

## 2022-12-29 RX ADMIN — SUCRALFATE 1 G: 1 TABLET ORAL at 20:41

## 2022-12-29 RX ADMIN — DIVALPROEX SODIUM 250 MG: 250 TABLET, DELAYED RELEASE ORAL at 14:06

## 2022-12-29 RX ADMIN — PREGABALIN 75 MG: 75 CAPSULE ORAL at 08:30

## 2022-12-29 RX ADMIN — SERTRALINE HYDROCHLORIDE 25 MG: 50 TABLET ORAL at 08:30

## 2022-12-29 RX ADMIN — BUDESONIDE 500 MCG: 0.5 SUSPENSION RESPIRATORY (INHALATION) at 20:36

## 2022-12-29 RX ADMIN — REGADENOSON 0.4 MG: 0.08 INJECTION, SOLUTION INTRAVENOUS at 10:13

## 2022-12-29 RX ADMIN — SUCRALFATE 1 G: 1 TABLET ORAL at 16:34

## 2022-12-29 RX ADMIN — NYSTATIN 500000 UNITS: 500000 SUSPENSION ORAL at 16:34

## 2022-12-29 RX ADMIN — DIVALPROEX SODIUM 250 MG: 250 TABLET, DELAYED RELEASE ORAL at 08:30

## 2022-12-29 RX ADMIN — IBUPROFEN 800 MG: 800 TABLET, FILM COATED ORAL at 08:30

## 2022-12-29 RX ADMIN — ARFORMOTEROL TARTRATE 15 MCG: 15 SOLUTION RESPIRATORY (INHALATION) at 08:16

## 2022-12-29 RX ADMIN — SODIUM CHLORIDE, PRESERVATIVE FREE 10 ML: 5 INJECTION INTRAVENOUS at 20:40

## 2022-12-29 RX ADMIN — LEVOFLOXACIN 750 MG: 5 INJECTION, SOLUTION INTRAVENOUS at 08:41

## 2022-12-29 RX ADMIN — PANTOPRAZOLE SODIUM 40 MG: 40 TABLET, DELAYED RELEASE ORAL at 08:29

## 2022-12-29 RX ADMIN — SUCRALFATE 1 G: 1 TABLET ORAL at 08:29

## 2022-12-29 RX ADMIN — SALINE NASAL SPRAY 1 SPRAY: 1.5 SOLUTION NASAL at 18:42

## 2022-12-29 RX ADMIN — SALINE NASAL SPRAY 1 SPRAY: 1.5 SOLUTION NASAL at 14:09

## 2022-12-29 RX ADMIN — Medication 3 MG: at 20:41

## 2022-12-29 RX ADMIN — LORAZEPAM 1 MG: 2 INJECTION INTRAMUSCULAR; INTRAVENOUS at 01:03

## 2022-12-29 RX ADMIN — IPRATROPIUM BROMIDE AND ALBUTEROL SULFATE 1 AMPULE: .5; 2.5 SOLUTION RESPIRATORY (INHALATION) at 16:56

## 2022-12-29 RX ADMIN — IPRATROPIUM BROMIDE AND ALBUTEROL SULFATE 1 AMPULE: .5; 2.5 SOLUTION RESPIRATORY (INHALATION) at 12:39

## 2022-12-29 RX ADMIN — IPRATROPIUM BROMIDE AND ALBUTEROL SULFATE 1 AMPULE: .5; 2.5 SOLUTION RESPIRATORY (INHALATION) at 20:36

## 2022-12-29 ASSESSMENT — PAIN DESCRIPTION - ORIENTATION: ORIENTATION: LOWER;MID;UPPER

## 2022-12-29 ASSESSMENT — PAIN SCALES - GENERAL
PAINLEVEL_OUTOF10: 0
PAINLEVEL_OUTOF10: 9

## 2022-12-29 ASSESSMENT — PAIN DESCRIPTION - DESCRIPTORS: DESCRIPTORS: STABBING;ACHING;DISCOMFORT

## 2022-12-29 ASSESSMENT — PAIN DESCRIPTION - PAIN TYPE: TYPE: CHRONIC PAIN

## 2022-12-29 ASSESSMENT — PAIN DESCRIPTION - LOCATION: LOCATION: ABDOMEN;BACK;CHEST

## 2022-12-29 ASSESSMENT — PAIN - FUNCTIONAL ASSESSMENT: PAIN_FUNCTIONAL_ASSESSMENT: PREVENTS OR INTERFERES SOME ACTIVE ACTIVITIES AND ADLS

## 2022-12-29 ASSESSMENT — PAIN DESCRIPTION - ONSET: ONSET: ON-GOING

## 2022-12-29 ASSESSMENT — PAIN DESCRIPTION - FREQUENCY: FREQUENCY: CONTINUOUS

## 2022-12-29 NOTE — PROGRESS NOTES
Occupational Therapy  OCCUPATIONAL THERAPY INITIAL EVALUATION  [address]    Date: 2022     Patient Name: Ted Bañuelos  MRN: [id number]  : 1960  Room: [address] [place] [address]    Evaluating OT: Aury Treadwell, OTR/L - [id number]    Referring Provider: Balaji Anderson MD  Specific Provider Orders/Date: \"OT eval and treat\" - 2022    Diagnosis: Dyspnea and respiratory abnormalities [R06.00, R06.89]  COPD exacerbation ([facility] [address] [J44.1]  Acute exacerbation of chronic bronchitis ([facility] [J20.9, J42]      Pertinent Medical History: anxiety, cancer, chronic back pain, COPD, RSD, OA, GERD, depression, DM, arthritis     Precautions: fall risk, bed alarm, skin integrity, O2 via nasal cannula    Assessment of Current Deficits:    [x] Functional mobility   [x]ADLs  [x] Strength               [x]Cognition   [x] Functional transfers   [x] IADLs         [x] Safety Awareness   [x]Endurance   [] Fine Coordination              [x] Balance      [] Vision/perception   [x]Sensation    []Gross Motor Coordination  [] ROM  [] Delirium                   [] Motor Control     OT PLAN OF CARE   OT POC is based on physician orders, patient diagnosis, and results of clinical assessment.   Frequency/Duration 2-5 days/week for 2 weeks PRN   Specific OT Treatment Interventions to Include:   * Instruction/training on adapted ADL techniques and AE recommendations to increase functional independence within precautions       * Training on energy conservation strategies, correct breathing pattern and techniques to improve independence/tolerance for self-care routine  * Functional transfer/mobility training/DME recommendations for increased independence, safety, and fall prevention  * Patient/Family education to increase follow through with safety techniques and functional independence  * Recommendation of environmental modifications for increased safety with functional transfers/mobility and ADLs  * Therapeutic exercise to improve motor endurance, ROM, and functional strength for ADLs/functional transfers  * Therapeutic activities to facilitate/challenge dynamic balance, stand tolerance for increased safety and independence with ADLs  * Neuro-muscular re-education: facilitation of righting/equilibrium reactions, midline orientation, scapular stability/mobility, normalization of muscle tone, and facilitation of volitional active controled movement  * Positioning to improve skin integrity, interaction with environment and functional independence    Recommended Adaptive Equipment: TBD     Home Living: Patient lives with her  in a one-floor setup. Bathroom Setup: tub shower (no seat)  Equipment Owned: cane    Prior Level of Function (PLOF): Patient reported that she typically is independent with ADLs, but had been experiencing increased difficulty recently; patient's  has been assisting with IADLs recently. Patient reported that she was using a cane for functional mobility prior to this hospitalization; patient reported multiple falls recently. Pain Level: Patient reported experiencing pain in her R UE and B LEs, but did not rate her pain; no pain behaviors noted throughout this session. Cognition: Patient alert and oriented grossly. WFL command follow demonstrated. Fair insight to current abilities/limitations demonstrated.   Memory: Fair  Sequencing: Fair  Problem Solving: Fair  Judgement/Safety: Fair    Functional Assessment:  AM-PAC Daily Activity Raw Score: 15/24   Initial Eval Status  Date: 12/29/2022 Treatment Status  Date:  Short Term Goals = Long Term Goals   Feeding Setup  Independent   Grooming Min A  Setup (seated/standing at sink)   UB Dressing Min A  Setup   LB Dressing Mod A  SBA - with use of AE, as needed/appropriate   Bathing Mod A  SBA - with use of AE/DME, as needed/appropriate   Toileting Mod A  SBA   Bed Mobility  Supine-to-Sit: Min A  Patient reported experiencing dizziness upon supine-to-sit. Sit-to-Supine: SBA  Independent in order to maximize patient's independence with ADLs, re-positioning, and other functional tasks. Functional Transfers Sit-to-Stand: Not assessed. Patient declined to attempt sit-to-stand secondary to dizziness and nausea. Supervision   Functional Mobility Not assessed. Supervision with functional mobility (with device, as needed/appropriate) in order to maximize independence with ADLs/IADLs and other functional tasks. Balance Sitting: Fair+ (at EOB)    Fair+ dynamic standing balance during completion of ADLs/IADLs and other functional tasks. Activity Tolerance Limited secondary to dizziness and nausea. Patient will demonstrate Good understanding and consistent implementation of energy conservation techniques and work simplification techniques into ADL/IADL routines. Visual/  Perceptual Fair  Patient reported that she typically wears contacts. N/A   B UE Strength 4-/5  Patient will demonstrate 4+/5 B UE strength in order to maximize independence with ADLs/IADLs and functional transfers. Additional Long-Term Goal: Patient will increase functional independence to PLOF in order to allow patient to live in least restrictive environment. ROM: Additional Information:    R UE  Limited active shoulder flexion; distal AROM WFL grossly Digit 2 missing secondary to h/o work injury (1995); patient reported having RSD in R UE as a result of this injury. L UE WFL      Hearing: WFL  Sensation: No complaints numbness/tingling in B UEs. Tone: WFL  Edema: No    Comments: RN approved patient's participation in [address] [place] [address] activities. Upon arrival, patient supine in bed. At end of session, patient supine in bed with call light and phone within reach, bed alarm activated, and all lines and tubes intact.  Patient would benefit from continued skilled OT to increase safety and independence with completion of ADL/IADL tasks for functional independence and quality of life. Patient education provided regardin) importance of having staff assistance with ADLs and other OOB activities to prevent falls/injury during hospitalization. Patient indicated understanding. Rehab Potential: Good for established goals. Patient / Family Goal: No goal stated. Patient and/or family were instructed on functional diagnosis, prognosis/goals, and OT plan of care. Demonstrated Fair understanding. Eval Complexity: Low    Time In: 1450  Time Out: 1505  Total Treatment Time: 15 minutes      Minutes Units   OT Eval Low 06684 15 1   OT Eval Medium 48896     OT Eval High 18430     OT Re-Eval [id number]     Therapeutic Ex 70270     Therapeutic Activities 02702     ADL/Self Care 78118     Orthotic Management 45157     Neuro Re-Ed 76419     Non-Billable Time N/A ---     Evaluation time includes thorough review of current medical information, gathering information on past medical history/social history and prior level of function, completion of standardized testing/informal observation of tasks, assessment of data, and education on plan of care and goals. Page Novoa OTR/L  License Number: [id number]

## 2022-12-29 NOTE — PROCEDURES
Lexiscan Stress EKG Report: Indication: Elevated troponin    Baseline EKG: normal sinus rhythm, nonspecific ST and T waves changes. Stress EKG: No ST-T changes. Arrhythmias: None. Symptoms: None. Summary:  Unremarkable lexiscan stress EKG. See separate report for stress perfusion results.      Erinn Brand MD  Cardiologist  Cardiology, [facility]) Physicians

## 2022-12-29 NOTE — CARE COORDINATION
Transition of Care-Discharge plan is home with significant other. Patient remains on 4L of oxygen at 99% SpO2-Nursing to wean as tolerated. Patient remains on IV Levaquin QD-superimposed infection, as well as IV Solu-Medrol Q 12. Stress test today. CM following for potential home oxygen needs. If patient needs home oxygen-she has no preference for DME provider.     Olivier AUSTINN, RN  [facility]

## 2022-12-29 NOTE — PROGRESS NOTES
Pulmonary Progress Note    Admit Date: 2022                            PCP: Jing Ogden MD  Principal Problem:    COPD exacerbation [facility])  Active Problems:    Acute exacerbation of chronic bronchitis (HCC)    Elevated troponin    Atypical chest pain  Resolved Problems:    * No resolved hospital problems. *      Subjective:  Patient seen on 4 L nasal cannula with pulse ox 99% just returning from nuc med stress test  She still has complaints of unable to take a deep breath.   She denies wheezing or cough    Medications:   dextrose      sodium chloride          [START ON 2022] enoxaparin  40 mg SubCUTAneous Daily    lidocaine  1 patch TransDERmal Daily    colchicine  0.6 mg Oral Daily    ibuprofen  800 mg Oral TID WC    insulin lispro  0-4 Units SubCUTAneous TID WC    insulin lispro  0-4 Units SubCUTAneous Nightly    pantoprazole  40 mg Oral BID    methylPREDNISolone  40 mg IntraVENous Q12H    levofloxacin  750 mg IntraVENous Q24H    divalproex  250 mg Oral TID    ipratropium-albuterol  1 ampule Inhalation Q4H WA    pregabalin  75 mg Oral TID    zolpidem  5 mg Oral Nightly    risperiDONE  1 mg Oral TID    sertraline  25 mg Oral Daily    sucralfate  1 g Oral TID    Vitamin D  1,000 Units Oral Daily    sodium chloride flush  5-40 mL IntraVENous 2 times per day    nicotine  1 patch TransDERmal Daily    budesonide  0.5 mg Nebulization BID    Arformoterol Tartrate  15 mcg Nebulization BID    nystatin  5 mL Oral 4x Daily       Vitals:  VITALS:  /77   Pulse 90   Temp 98.2 °F (36.8 °C) (Oral)   Resp 16   Ht 5' 2\" (1.575 m)   Wt 124 lb 14.4 oz (56.7 kg)   LMP  (LMP Unknown)   SpO2 99%   BMI 22.84 kg/m²   24HR INTAKE/OUTPUT:    Intake/Output Summary (Last 24 hours) at 2022 1129  Last data filed at 2022 0346  Gross per 24 hour   Intake 150.49 ml   Output 200 ml   Net -49.51 ml     CURRENT PULSE OXIMETRY:  SpO2: 99 %  24HR PULSE OXIMETRY RANGE:  SpO2  Av %  Min: 95 %  Max: 99 %  CVP:    VENT SETTINGS:      Additional Respiratory Assessments  Heart Rate: 90  Resp: 16  SpO2: 99 %      EXAM:  General: No distress. Alert.  4 L  Eyes: No sclera icterus. No conjunctival injection. ENT: No discharge. Pharynx clear. Edentulous. Oral thrush (improving)  Neck: Trachea midline. Normal thyroid. Resp: No accessory muscle use. No crackles. No wheezing. No rhonchi. Diminished bilaterally  CV: Regular rate. Regular rhythm. No mumur or rub. No edema. ABD: Non-tender. Non-distended. Normal bowel sounds. Skin: Warm and dry. No nodule on exposed extremities. No rash on exposed extremities. M/S: No cyanosis. No joint deformity. No clubbing. Neuro: Awake. Follows commands. A&Ox 3    I/O: I/O last 3 completed shifts: In: 630.5 [P.O.:480; IV Piggyback:150.5]  Out: 200 [Urine:200]  No intake/output data recorded. Results:  CBC:   Recent Labs     12/27/22  0524 12/28/22  0313 12/29/22  0453   WBC 17.5* 12.9* 8.8   HGB 13.3 12.9 11.8   HCT 39.2 39.4 36.2   MCV 92.7 94.0 93.5    264 273     BMP:   Recent Labs     12/27/22  2256 12/28/22  0313 12/28/22  1643    135 132   K 4.0 4.7 5.3*   CL 96* 97* 94*   CO2 27 28 24   BUN 21 23 26*   CREATININE 0.6 0.6 0.5     LFT:   Recent Labs     12/27/22  2256 12/28/22  0313 12/28/22  1643   ALKPHOS 113* 120* 108*   ALT 45* 45* 40*   AST 87* 78* 55*   PROT 6.5 6.5 6.3*   BILITOT 0.3 0.3 <0.2   LABALBU 3.5 3.3* 3.2*     PT/INR: No results for input(s): PROTIME, INR in the last 72 hours.   Cultures:  Recent Labs     12/27/22  1313   CULTRESP Oral Pharyngeal Cindy present      Latest Reference Range & Units 12/27/22 06:32   Influenza A by PCR Not Detected  Not Detected   Influenza B by PCR Not Detected  Not Detected   Adenovirus by PCR Not Detected  Not Detected   Coronavirus 229E by PCR Not Detected  Not Detected   Coronavirus HKU1 by PCR Not Detected  Not Detected   Coronavirus NL63 by PCR Not Detected  Not Detected   Coronavirus OC43 by PCR Not Detected  Not Detected   Human Metapneumovirus by PCR Not Detected  Not Detected   Human Rhinovirus/Enterovirus by PCR Not Detected  Not Detected   Parainfluenza Virus 1 by PCR Not Detected  Not Detected   Parainfluenza Virus 2 by PCR Not Detected  Not Detected   Parainfluenza Virus 3 by PCR Not Detected  Not Detected   Parainfluenza Virus 4 by PCR Not Detected  Not Detected   Respiratory Syncytial Virus by PCR Not Detected  Not Detected   Bordetella parapertussis by PCR Not Detected  Not Detected   Chlamydophilia pneumoniae by PCR Not Detected  Not Detected   Mycoplasma pneumoniae by PCR Not Detected  Not Detected   SARS-CoV-2, PCR Not Detected  Not Detected   Bordetella pertussis by PCR Not Detected  Not Detected     ABG:   No results for input(s): PH, PO2, PCO2, HCO3, BE, O2SAT in the last 72 hours. Films:  XR CHEST 1 VIEW    Result Date: 12/27/2022  EXAMINATION: ONE XRAY VIEW OF THE CHEST 12/27/2022 5:15 am COMPARISON: 11/11/2022 HISTORY: ORDERING SYSTEM PROVIDED HISTORY: sob TECHNOLOGIST PROVIDED HISTORY: Reason for exam:->sob FINDINGS: The cardiomediastinal silhouette is within normal limits. The lungs are clear except for bibasilar atelectasis. No pneumothorax or pleural effusion. No acute cardiopulmonary abnormality. CTA PULMONARY W CONTRAST    Result Date: 12/27/2022  EXAMINATION: CTA OF THE CHEST 12/27/2022 9:02 am TECHNIQUE: CTA of the chest was performed after the administration of intravenous contrast.  Multiplanar reformatted images are provided for review. MIP images are provided for review. Automated exposure control, iterative reconstruction, and/or weight based adjustment of the mA/kV was utilized to reduce the radiation dose to as low as reasonably achievable. COMPARISON: Radiograph from same day and CT of the chest from November 7, 2022.  HISTORY: ORDERING SYSTEM PROVIDED HISTORY: SOB low Sao2 at home TECHNOLOGIST PROVIDED HISTORY: Reason for exam:->SOB low Sao2 at home Decision Support Exception - unselect if not a suspected or confirmed emergency medical condition->Emergency Medical Condition (MA) FINDINGS: Pulmonary Arteries: Pulmonary arteries are adequately opacified for evaluation. No evidence of intraluminal filling defect to suggest pulmonary embolism. Main pulmonary artery is normal in caliber. Mediastinum: The heart is borderline in size. No acute findings of the thoracic aorta which is normal in caliber. Borderline size right hilar lymph node, unchanged. Lungs/pleura: Bibasilar consolidations favoring atelectasis. Difficult to exclude superimposed pneumonia. Subtle ground-glass opacities in the right perihilar region and middle lobe, favored to be mild infectious or inflammatory process, and new from prior study. Moderate emphysema. No pneumothorax. Upper Abdomen: Limited images of the upper abdomen are unremarkable. Soft Tissues/Bones: No acute bone or soft tissue abnormality. No evidence of pulmonary embolism. Mildly increased consolidations in the lung bases, favoring worsened atelectasis although difficult to exclude superimposed infection. Subtle new ground-glass opacities in the right perihilar region, may reflect mild infectious or inflammatory process. Background of moderate emphysema. Assessment/plan:  12/28: 4L 97%, IV solumedrol to q12  12/19: 4L 99%, start p.o. prednisone 12/30. Nuc med stress pending  COPD exacerbation-   Continue Brovana, Pulmicort, and duo nebs inpatient. Not on any inhalers at home. Currently on IV Solu-Medrol 40 mg every 12 hours.   She received 1 dose this morning so will discontinue and start p.o. prednisone 12/30 as her wheezing has resolved  Outpatient PFT  Hypoxia  4 L nasal cannula, 99%  Baseline is room air  Please wean to maintain pulse ox greater than 90%  Ambulatory pulse ox prior to discharge  Emphysema noted on CTA 12/27  A1At and IgE pending  Community-acquired pneumonia noted on CTA 12/27  Incentive spirometry  PCT 0.32 > 0.19 (12/28). Afebrile. Leukocytosis resolved 12/29  Continue IV Levaquin per primary  Sputum culture pending   Leukocytosis secondary to CAP -resolved 12/29  Oral thrush -improving  Continue nystatin x7 days  Current smoker  Continue nicotine patch  Elevated troponin  12/29 nuc med stress pending  Elevated LFTs  DVT/PE prophylaxis on lovenox  Recent admissions [facility] 12/13- 12/18: right lung infiltrated treated for CAP with IV rocephin/doxy, supplemental oxygen. D/c with po steroids and abx on room air  11/7-11/8 COPD exacerbation treated with duonebs/azithromycin/ po steriods      Electronically signed by AYESHA Mayers CNP on 12/29/2022 at 11:29 AM    Attending Attestation Note:    Patient seen and examined with Hospital Staff, NP. I have extensively reviewed the chart lab work and imaging. I agree with above. Modifications and amendment of note made as necessary.     In addition, the following apply:    Current Facility-Administered Medications   Medication Dose Route Frequency Provider Last Rate Last Admin    [START ON 12/30/2022] enoxaparin (LOVENOX) injection 40 mg  40 mg SubCUTAneous Daily Manuel Nur MD        [place] ON 12/30/2022] sertraline (ZOLOFT) tablet 50 mg  50 mg Oral Daily Gareth Ramos MD        hydrOXYzine pamoate (VISTARIL) capsule 25 mg  25 mg Oral TID PRN Gareth Ramos MD   25 mg at 12/29/22 1634    sodium chloride (OCEAN, BABY AYR) 0.65 % nasal spray 1 spray  1 spray Each Nostril Q2H AYESHA Mayers CNP   1 spray at 12/29/22 1636    [START ON 12/30/2022] predniSONE (DELTASONE) tablet 40 mg  40 mg Oral Daily AYESHA Mayers CNP        Followed by    [place] ON 1/2/2023] predniSONE (DELTASONE) tablet 30 mg  30 mg Oral Daily AYESHA Mayers CNP        Followed by    [place] ON 1/5/2023] predniSONE (DELTASONE) tablet 20 mg  20 mg Oral Daily AYESHA Mayers CNP        Followed by    [place] [facility] ON 1/8/2023] predniSONE (DELTASONE) tablet 10 mg  10 mg Oral Daily Jean Lux, APRN - CNP        loperamide (IMODIUM) capsule 2 mg  2 mg Oral 4x Daily PRN David Cornea V, DO        dicyclomine (BENTYL) capsule 20 mg  20 mg Oral Q6H PRN David Cornea V, DO        melatonin tablet 3 mg  3 mg Oral Nightly David Cornea V, DO        lidocaine 4 % external patch 1 patch  1 patch TransDERmal Daily David Cornea V, DO   1 patch at 12/29/22 3565    perflutren lipid microspheres (DEFINITY) injection 1.5 mL  1.5 mL IntraVENous ONCE PRN David Cornea V, DO        colchicine (COLCRYS) tablet 0.6 mg  0.6 mg Oral Daily David Cornea V, DO   0.6 mg at 12/29/22 0120    ibuprofen (ADVIL;MOTRIN) tablet 800 mg  800 mg Oral TID  Jonathan Curry V, DO   800 mg at 12/29/22 0830    insulin lispro (HUMALOG) injection vial 0-4 Units  0-4 Units SubCUTAneous TID  Cydney Linares MD        insulin lispro (HUMALOG) injection vial 0-4 Units  0-4 Units SubCUTAneous Nightly Cydney Linares MD        glucose chewable tablet 16 g  4 tablet Oral PRN Cydney Linares MD        dextrose bolus 10% 125 mL  125 mL IntraVENous PRN Abisai Em MD        Or    dextrose bolus 10% 250 mL  250 mL IntraVENous PRN Cydney Linares MD        glucagon (rDNA) injection 1 mg  1 mg SubCUTAneous PRN Cydney Linares MD        dextrose 10 % infusion   IntraVENous Continuous PRN Abisai Em MD        pantoprazole (PROTONIX) tablet 40 mg  40 mg Oral BID Jackeline Jolly MD   40 mg at 12/29/22 0829    levoFLOXacin (LEVAQUIN) 750 MG/150ML infusion 750 mg  750 mg IntraVENous Q24H David Cornea V, DO   Stopped at 12/29/22 1045    divalproex (DEPAKOTE) DR tablet 250 mg  250 mg Oral TID David Cornea V, DO   250 mg at 12/29/22 1406    guaiFENesin tablet 400 mg  400 mg Oral 4x Daily PRN David Cornea V, DO        HYDROmorphone (DILAUDID) tablet 4 mg  4 mg Oral Q6H PRN David Cornea V, DO   4 mg at 12/29/22 1303    ipratropium-albuterol (DUONEB) nebulizer solution 1 ampule  1 ampule Inhalation Q4H YONI Hernandez Cornea V, DO   1 ampule at 12/29/22 1656    pregabalin (LYRICA) capsule 75 mg  75 mg Oral TID Oj Odin V, DO   75 mg at 12/29/22 1406    zolpidem (AMBIEN) tablet 5 mg  5 mg Oral Nightly Oj Odin V, DO   5 mg at 12/28/22 2051    risperiDONE (RISPERDAL) tablet 1 mg  1 mg Oral TID Oj Odin V, DO   1 mg at 12/29/22 1406    sucralfate (CARAFATE) tablet 1 g  1 g Oral TID Oj Odin V, DO   1 g at 12/29/22 1634    vitamin D (CHOLECALCIFEROL) tablet 1,000 Units  1,000 Units Oral Daily Oj Odin V, DO        sodium chloride flush 0.9 % injection 5-40 mL  5-40 mL IntraVENous 2 times per day Oj Odin V, DO   10 mL at 12/29/22 0829    sodium chloride flush 0.9 % injection 5-40 mL  5-40 mL IntraVENous PRN Oj Odin V, DO        0.9 % sodium chloride infusion   IntraVENous PRN Oj Odin V, DO        ondansetron (ZOFRAN-ODT) disintegrating tablet 4 mg  4 mg Oral Q8H PRN Oj Odin V, DO        Or    ondansetron (ZOFRAN) injection 4 mg  4 mg IntraVENous Q6H PRN Oj Odin V, DO        polyethylene glycol (GLYCOLAX) packet 17 g  17 g Oral Daily PRN Oj Odin V, DO        acetaminophen (TYLENOL) tablet 650 mg  650 mg Oral Q6H PRN Oj Odin V, DO        Or    acetaminophen (TYLENOL) suppository 650 mg  650 mg Rectal Q6H PRN Oj Odin V, DO        nicotine (NICODERM CQ) 21 MG/24HR 1 patch  1 patch TransDERmal Daily Oj Odin V, DO   1 patch at 12/29/22 8224    benzonatate (TESSALON) capsule 100 mg  100 mg Oral TID PRN Oj Odin V, DO        naloxone [facility]) injection 0.4 mg  0.4 mg IntraVENous PRN Oj Odin V, DO        budesonide (PULMICORT) nebulizer suspension 500 mcg  0.5 mg Nebulization BID Nehal Gifford MD   500 mcg at 12/29/22 0816    Arformoterol Tartrate (BROVANA) nebulizer solution 15 mcg  15 mcg Nebulization BID Nehal Gifford MD   15 mcg at 12/29/22 0816    nystatin (MYCOSTATIN) 901557 UNIT/ML suspension 500,000 Units  5 mL Oral 4x Daily Nehal Gifford MD   500,000 Units at 12/29/22 1634      - 4L O2 NC, baseline is room air   - outpatient PFT  - await IgE, A1AT  - solu-medrol q 8 hours  - DuoNeb, Pulmicort, Brovana  - steroids, levaquin  - O2, IS  - nystatin S/S due to oral thrush   - Trilogy/Astral through [facility], failure of BIPAP    Maggy Lozano MD  12/29/2022  6:05 PM

## 2022-12-29 NOTE — PROGRESS NOTES
[facility]  Progress Note    Chief complaint :  Chief Complaint   Patient presents with    Shortness of Breath     States she was 60% at home   pt 93% on room air in triage. Pt has Hx COPD, presents to ED with a styrofoam cup with yellow sputum she spit up       Subjective:  No acute events overnight. When I entered the room patient was sitting up and snacking. However, patient states this morning that she continues to feel much worse than she was yesterday. She states that her chest pain is still stabbing, radiating to her jaw, and out towards her shoulders which is preventing her from moving much. She also states that she has continued to have lower abdominal pain in the right lower and left lower quadrants that is constant. She continues to say that she is not able to breathe. She reports chills but no fevers. She is voiding but has not had a bowel movement in 2 days. She denies feeling constipated. She has no other concerns at this time      Past medical, surgical, family and social history were reviewed, non-contributory, and unchanged unless otherwise stated. Review of systems: Negative except for stated above. Objective:  /67   Pulse 90   Temp 98.8 °F (37.1 °C) (Oral)   Resp 16   Ht 5' 2\" (1.575 m)   Wt 124 lb 14.4 oz (56.7 kg)   LMP  (LMP Unknown)   SpO2 95%   BMI 22.84 kg/m²     Physical Exam  Vitals reviewed. Constitutional:       General: She is in acute distress. Appearance: She is ill-appearing. Comments: Patient did appear red and flushed   HENT:      Head: Normocephalic and atraumatic. Nose: Nose normal. No congestion or rhinorrhea. Mouth/Throat:      Mouth: Mucous membranes are moist.      Pharynx: Posterior oropharyngeal erythema present. No oropharyngeal exudate. Comments: Her tongue is beefy red but not swollen. No abnormalities or exudate were noted.   Eyes:      Extraocular Movements: Extraocular movements intact. Conjunctiva/sclera: Conjunctivae normal.      Pupils: Pupils are equal, round, and reactive to light. Comments: Extra eye movements were intact but were performed at a very slow rate. Visual fields were assessed by Dr. Emma Manley who reported to me that the patient's left and right lateral visual fields were significantly impaired. Cardiovascular:      Rate and Rhythm: Normal rate and regular rhythm. Heart sounds: Normal heart sounds. No murmur heard. Pulmonary:      Breath sounds: Normal breath sounds. No wheezing. Comments: Poor effort and very minimal air movement so difficult to hear breath sounds. She is not retracting. Abdominal:      General: Abdomen is flat. Bowel sounds are normal.      Palpations: Abdomen is soft. Tenderness: There is abdominal tenderness (Most notable in the lower quadrants). There is no guarding or rebound. Musculoskeletal:         General: No swelling or tenderness. Cervical back: No rigidity or tenderness. Comments: Diffuse, generalized tenderness across her chest    Skin:     General: Skin is warm and dry. Comments: Her face is very erythematous. Neurological:      General: No focal deficit present. Mental Status: She is alert. Motor: No weakness. Comments: Alert and oriented x1. She only knew that [place] was in [place] but did not know her city or hospital.  She knew her birthdate but not her age.    Psychiatric:         Mood and Affect: Mood normal.      Comments: Anxious       Labs:  Recent Results (from the past 24 hour(s))   POCT Glucose    Collection Time: 12/28/22  4:55 PM   Result Value Ref Range    Meter Glucose 201 (H) 74 - 99 mg/dL   POCT Glucose    Collection Time: 12/28/22  8:10 PM   Result Value Ref Range    Meter Glucose 160 (H) 74 - 99 mg/dL   CBC with Auto Differential    Collection Time: 12/29/22  4:53 AM   Result Value Ref Range    WBC 8.8 4.5 - 11.5 E9/L    RBC 3.87 3.50 - 5.50 E12/L Hemoglobin 11.8 11.5 - 15.5 g/dL    Hematocrit 36.2 34.0 - 48.0 %    MCV 93.5 80.0 - 99.9 fL    MCH 30.5 26.0 - 35.0 pg    MCHC 32.6 32.0 - 34.5 %    RDW 13.6 11.5 - 15.0 fL    Platelets 669 873 - 862 E9/L    MPV 9.8 7.0 - 12.0 fL    Neutrophils % 80.7 (H) 43.0 - 80.0 %    Immature Granulocytes % 0.5 0.0 - 5.0 %    Lymphocytes % 11.9 (L) 20.0 - 42.0 %    Monocytes % 6.7 2.0 - 12.0 %    Eosinophils % 0.0 0.0 - 6.0 %    Basophils % 0.2 0.0 - 2.0 %    Neutrophils Absolute 7.06 1.80 - 7.30 E9/L    Immature Granulocytes # 0.04 E9/L    Lymphocytes Absolute 1.04 (L) 1.50 - 4.00 E9/L    Monocytes Absolute 0.59 0.10 - 0.95 E9/L    Eosinophils Absolute 0.00 (L) 0.05 - 0.50 E9/L    Basophils Absolute 0.02 0.00 - 0.20 E9/L   POCT Glucose    Collection Time: 12/29/22  6:45 AM   Result Value Ref Range    Meter Glucose 100 (H) 74 - 99 mg/dL   Prealbumin    Collection Time: 12/29/22 11:20 AM   Result Value Ref Range    Prealbumin 19 (L) 20 - 40 mg/dL   POCT Glucose    Collection Time: 12/29/22 11:31 AM   Result Value Ref Range    Meter Glucose 170 (H) 74 - 99 mg/dL   POCT Glucose    Collection Time: 12/29/22  3:41 PM   Result Value Ref Range    Meter Glucose 106 (H) 74 - 99 mg/dL       Radiology and other tests reviewed:  NM Cardiac Stress Test Nuclear Imaging   Final Result      Significant artifact as described above. No myocardial ischemia. No myocardial infarction. Normal LV systolic function. No wall motion abnormalities. Low risk myocardial perfusion scan. CTA PULMONARY W CONTRAST   Final Result   No evidence of pulmonary embolism. Mildly increased consolidations in the lung bases, favoring worsened   atelectasis although difficult to exclude superimposed infection. Subtle new ground-glass opacities in the right perihilar region, may reflect   mild infectious or inflammatory process. Background of moderate emphysema.          XR CHEST 1 VIEW   Final Result   No acute cardiopulmonary abnormality. Assessment:  Active Hospital Problems    Diagnosis Date Noted    Other abnormal findings in urine [R82.998] 12/29/2022     Priority: Medium    Atypical chest pain [R07.89] 12/28/2022     Priority: Medium    [facility] [J20.9, J42] 12/27/2022     Priority: Medium    Elevated troponin [R77.8] 12/27/2022     Priority: Medium    COP[facility]ation [facility] [J44.1] 11/08/2022     Priority: Medium       Plan:  Atypical chest pain, differential includes cardiac (unstable angina, pericarditis), GI (GERD, Cholecystitis), and musculoskeletal.  There also may be a component of opioid withdrawal and/or anxiety. Troponins have been cycled twice this admission with a normal delta. EKG x2 have also showed a possible lateral infarct without changes however echo and stress test were unremarkable. Chest pain symptoms did resolve however it is unsure if this is due to nitroglycerin and/or Dilaudid. - Continue cholchicine (PO, 0.6mg, qDaily) and ibuprofen (PO, 800mg, TID)  - Normal stress test and echocardiogram  - Confirmatory tox screen confirms presence of Dilaudid and previously negative urine sample  - HIV screen pending  - PT/OT = 19/24 and 15/24 respectively    Declining neural function  - Patient was ANO x1 this morning as opposed to [facility] x3 on previous encounters  - Additionally, there appears to be lateral visual defects bilaterally when assessing visual fields  - Consider CT scan of the head    COPD exacerbation with suspected CAP  Patient was recently hospitalized in [facility] 12/23 for hypoxia and pneumonia. O2-sat in the 60s at home prior to admission  - Currently on 7 L/min of oxygen.   However, per nursing, she had turned herself up to 7 L/min whereas nursing had stopped at 4 L/min wean as tolerated and keep pulse ox greater than 90%  - Continue levofloxacin, IV, 750mg qDaily (today is day 4 of treatment)  - Continue prednisone, PO, 40 mg daily for 3 days followed by stepdown of 10 mg every 3 days. All of these orders are in.  - Legionella and strep antigen negative  - Sputum and blood cultures unremarkable   - A1-aT = pending  - IgE = pending  - DuoNebs every 4 hours  - Continue Brovana and Pulmicort twice daily  - Incentive spirometer use encouraged  - Mucinex, PO, 400mg QID PRN  - Tessalon Pearls, 100mg TID PRN  - Consult: Pulmonology (12/29/22) - Ashley Dallas, APRN:  Wean oxygen as able. Continue levofloxacin. Transition to p.o. steroids. Check IgE and alpha-1 antitrypsin levels. Outpatient PFTs. Elevated troponin:  Troponins cycled in ER, 55 => 32 (delta = -23)  - Telemetry     Transaminitis:  Alkphos, ALT, and AST all elevated in the ER.  - AST = pending   - ALT = pending  - AlkPhos = pending (from from 141 yesterday)  - Consider RUQ ultrasound and/or CT abd/pel as symptoms have not resolved. However there is an issue with us obtaining CMP's so we have no transaminitis levels since 12/28. I will add on a CMP now. Leukocytosis, resolved:  WBC = 17.5 on admission, and she was tachycardic.  - WBC = 7.1 today (normal, down from 17.5 yesterday)  - CRP/ESR = 14. 5(H)/19  - Procal = 0.32 => 0.19 (elevated but decreasing)  - Follow-up blood cultures and respiratory cultures  - Consider stopping Levofloxacin as CAP less likely     Depression/anxiety:  - Continue home medications  -------- Zoloft 50 mg daily  -------- Risperidone 1 mg 3 times daily  -------- Ambien 5 mg nightly  -------- Hydroxyzine 25 mg 3 times daily as needed anxiety     Peripheral neuropathy:  - Continue home Lyrica 75 mg 3 times daily     Nicotine dependence:  - Nicotine patch 21 mg     Diabetes mellitus  Patient's last HbA1c outpatient was 6.1 on 12/2017,  she is not on any medications for diabetes  - HbA1c = 6.9 on admission  - Low-dose sliding scale insulin  - ACH S fingersticks  - Hypoglycemia protocol  - Monitor glucose with BMP    Oral Thrush  - Continue nystatin x7 days      Pain Control:  Dilaudid, Tylonel  DVT ppx: Lovenox 30mg qDaily  GI ppx: Protonix 40 mg daily  Code Status: Full  Diet: General diet with Oral Nutrition Supplementation      Disposition: Discharge to pending clinical course in 3 day(s)      Maryjo Cardona MD, PhD   Family Medicine Resident PGY-1  12/29/22   4:46 PM

## 2022-12-29 NOTE — PROGRESS NOTES
Physical Therapy  Facility/Department: [facility]  Physical Therapy Initial Assessment    Name: Michael John  : 1960  MRN: [id number]  Date of Service: 2022      Patient Diagnosis(es): The primary encounter diagnosis was Acute exacerbation of chronic bronchitis ([address] A diagnosis of Dyspnea and respiratory abnormalities was also pertinent to this visit. Past Medical History:  has a past medical history of Anxiety, Arthritis, Blood circulation, collateral, Cancer (HCC), Chronic back pain, Colitis, Complex regional pain syndrome type 1 of right lower extremity, COPD (chronic obstructive pulmonary disease) ([address] Depression, Diabetes mellitus ([address] Elevated liver function tests, GERD (gastroesophageal reflux disease), Headache(784.0), Hyperlipidemia, Kidney stone, Movement disorder, Neuromuscular disorder ([address] Osteoarthritis, Other disorders of kidney and ureter in diseases classified elsewhere, Pneumonia, Psychiatric problem, and RSD (reflex sympathetic dystrophy). Past Surgical History:  has a past surgical history that includes Hysterectomy; laminectomy; Finger amputation; back surgery (); other surgical history (13); other surgical history (N/A, 2/3/2014); fracture surgery; Endoscopy, colon, diagnostic; Carpal tunnel release; Colonoscopy; other surgical history (Right, 14); other surgical history (04/08/15); Nerve Block (N/A, 8 19 15); Nerve Block (Left, 2015); Nerve Block (9 2 15); Nerve Block (Right, 9/17/15); other surgical history (2018); and pr revj/rmvl implanted spinal neurostim generator (N/A, 2018).     Referring provider:  Gavino Rodriguez MD    PT Order:  PT eval and treat     Evaluating PT:  Baldev Green PT, DPT PT [id number]    Room #:  7767/6192-U  Diagnosis:  Dyspnea and respiratory abnormalities [R06.00, R06.89]  COPD exacerbation ([address] [J44.1]  Acute exacerbation of chronic bronchitis ([address] [J20.9, J42]  Precautions:  fall risk, O2  Equipment Needs:  none. Pt reported owing a cane    SUBJECTIVE:    Pt lives with her  in a 1 story home with 4 stairs to enter and 1 rail. Bed and bath is on first floor. Pt ambulated with cane PTA. Pt reported she has fallen several times at home recently. OBJECTIVE:   Initial Evaluation  Date: 12/29 Treatment Short Term/ Long Term   Goals   Was pt agreeable to Eval/treatment? yes     Does pt have pain? Back and LE pain     Bed Mobility  Rolling: independent  Supine to sit: independent  Sit to supine: independent  Scooting: independent  independent   Transfers Sit to stand: Supervision  Stand to sit: supervision  Stand pivot: supervision  independent   Ambulation    15 feet x 2 with w/w SBA   100 feet with AAD independent    Stair negotiation: ascended and descended  NT   4 steps with 1 rail modified independent   ROM BLE:  WFL     Strength BLE:  grossly 4/5  Grossly 4+/5   Balance Sitting EOB:  independent  Dynamic Standing:  SBA with w/w  Sitting EOB:  independent  Dynamic Standing:  modified independent with AAD   AM-PAC 6 Clicks 72/36       Orientation:  WFL  Sensation:  Pt denies numbness and tingling to extremities      Vitals:  SPO2 after ambulation 98% on O2. Patient education  Pt educated on PT objectives during eval and while in the hospital, calling for assistance    Patient response to education:   Pt verbalized understanding Pt demonstrated skill Pt requires further education in this area   yes With cueing yes     ASSESSMENT:    Conditions Requiring Skilled Therapeutic Intervention:    [x]Decreased strength     []Decreased ROM  [x]Decreased functional mobility  [x]Decreased balance   [x]Decreased endurance   []Decreased posture  []Decreased sensation  []Decreased coordination   []Decreased vision  []Decreased safety awareness   []Increased pain       Comments:  Pt found in bed. No report of dizziness during functional mobility. No LOB during ambulation.   Ambulation distance limited due to SOB and pain. At end of eval, pt left in bed with call light in reach and bed alarm on.      Pt's/ family goals   1. None stated    Prognosis is good for reaching above PT goals. Patient and or family understand(s) diagnosis, prognosis, and plan of care. yes    PHYSICAL THERAPY PLAN OF CARE:    PT POC is established based on physician order and patient diagnosis     Diagnosis:  Dyspnea and respiratory abnormalities [R06.00, R06.89]  COPD exacerbation ([facility]) [J44.1]  Acute exacerbation of chronic bronchitis ([facility] [J20.9, J42]    Current Treatment Recommendations:     [x] Strengthening to improve independence with functional mobility   [] ROM to improve independence with functional mobility   [x] Balance Training to improve static/dynamic balance and to reduce fall risk  [x] Endurance Training to improve activity tolerance during functional mobility   [x] Transfer Training to improve safety and independence with all functional transfers   [x] Gait Training to improve gait mechanics, endurance and assess need for appropriate assistive device  [x] Stair Training in preparation for safe discharge home and/or into the community   [] Positioning to prevent skin breakdown and contractures  [x] Safety and Education Training   [x] Patient/Caregiver Education   [] HEP  [] Other     PT long term treatment goals are located in above grid    Frequency of treatments: 2-5x/week x 1-2 weeks. Time in  1435  Time out  1445    Evaluation Time includes thorough review of current medical information, gathering information on past medical history/social history and prior level of function, completion of standardized testing/informal observation of tasks, assessment of data and education on plan of care and goals.     CPT codes:  [x] Low Complexity PT evaluation 36177  [] Moderate Complexity PT evaluation 93555  [] High Complexity PT evaluation 75485  [] PT Re-evaluation 13491  [] Therapeutic activities 43089 __minutes  [] Therapeutic exercises 24966 __ minutes      Acacia Wright, PT, DPT  PT [id number]

## 2022-12-29 NOTE — PLAN OF CARE
Problem: Discharge Planning  Goal: Discharge to home or other facility with appropriate resources  Outcome: Progressing  Flowsheets (Taken 12/29/2022 0800)  Discharge to home or other facility with appropriate resources: Identify barriers to discharge with patient and caregiver     Problem: Pain  Goal: Verbalizes/displays adequate comfort level or baseline comfort level  Outcome: Progressing     Problem: Chronic Conditions and Co-morbidities  Goal: Patient's chronic conditions and co-morbidity symptoms are monitored and maintained or improved  Outcome: Progressing  Flowsheets (Taken 12/29/2022 0800)  Care Plan - Patient's Chronic Conditions and Co-Morbidity Symptoms are Monitored and Maintained or Improved: Monitor and assess patient's chronic conditions and comorbid symptoms for stability, deterioration, or improvement     Problem: Nutrition Deficit:  Goal: Optimize nutritional status  Outcome: Progressing     Problem: Respiratory - Adult  Goal: Achieves optimal ventilation and oxygenation  Outcome: Progressing     Problem: Skin/Tissue Integrity - Adult  Goal: Skin integrity remains intact  Outcome: Progressing  Flowsheets  Taken 12/29/2022 1319  Skin Integrity Remains Intact:   Monitor for areas of redness and/or skin breakdown   Assess vascular access sites hourly   Every 4-6 hours minimum: Change oxygen saturation probe site   Every 4-6 hours: If on nasal continuous positive airway pressure, respiratory therapy assesses nares and determine need for appliance change or resting period  Taken 12/29/2022 0800  Skin Integrity Remains Intact: Monitor for areas of redness and/or skin breakdown  Goal: Incisions, wounds, or drain sites healing without S/S of infection  Outcome: Progressing

## 2022-12-30 ENCOUNTER — APPOINTMENT (OUTPATIENT)
Dept: CT IMAGING | Age: 62
DRG: 190 | End: 2022-12-30
Payer: MEDICARE

## 2022-12-30 VITALS
WEIGHT: 124.7 LBS | TEMPERATURE: 97.6 F | BODY MASS INDEX: 22.95 KG/M2 | DIASTOLIC BLOOD PRESSURE: 61 MMHG | OXYGEN SATURATION: 92 % | SYSTOLIC BLOOD PRESSURE: 117 MMHG | HEIGHT: 62 IN | RESPIRATION RATE: 18 BRPM | HEART RATE: 95 BPM

## 2022-12-30 PROBLEM — R79.89 ELEVATED TROPONIN: Status: RESOLVED | Noted: 2022-12-27 | Resolved: 2022-12-30

## 2022-12-30 PROBLEM — R77.8 ELEVATED TROPONIN: Status: RESOLVED | Noted: 2022-12-27 | Resolved: 2022-12-30

## 2022-12-30 PROBLEM — J42 ACUTE EXACERBATION OF CHRONIC BRONCHITIS (HCC): Status: RESOLVED | Noted: 2022-12-27 | Resolved: 2022-12-30

## 2022-12-30 PROBLEM — R07.89 ATYPICAL CHEST PAIN: Status: RESOLVED | Noted: 2022-12-28 | Resolved: 2022-12-30

## 2022-12-30 PROBLEM — H53.40 VISUAL FIELD DEFECTS: Status: ACTIVE | Noted: 2022-12-30

## 2022-12-30 PROBLEM — J44.1 COPD EXACERBATION (HCC): Status: RESOLVED | Noted: 2022-11-08 | Resolved: 2022-12-30

## 2022-12-30 PROBLEM — J20.9 ACUTE EXACERBATION OF CHRONIC BRONCHITIS (HCC): Status: RESOLVED | Noted: 2022-12-27 | Resolved: 2022-12-30

## 2022-12-30 LAB
B.E.: 5.8 MMOL/L (ref -3–3)
BASOPHILS ABSOLUTE: 0.03 E9/L (ref 0–0.2)
BASOPHILS RELATIVE PERCENT: 0.4 % (ref 0–2)
COHB: 0.7 % (ref 0–1.5)
CRITICAL: ABNORMAL
DATE ANALYZED: ABNORMAL
DATE OF COLLECTION: ABNORMAL
EOSINOPHILS ABSOLUTE: 0.07 E9/L (ref 0.05–0.5)
EOSINOPHILS RELATIVE PERCENT: 1 % (ref 0–6)
HCO3: 30.5 MMOL/L (ref 22–26)
HCT VFR BLD CALC: 37.9 % (ref 34–48)
HEMOGLOBIN: 12.3 G/DL (ref 11.5–15.5)
HHB: 5.5 % (ref 0–5)
IMMATURE GRANULOCYTES #: 0.06 E9/L
IMMATURE GRANULOCYTES %: 0.8 % (ref 0–5)
LAB: ABNORMAL
LYMPHOCYTES ABSOLUTE: 1.72 E9/L (ref 1.5–4)
LYMPHOCYTES RELATIVE PERCENT: 24.3 % (ref 20–42)
Lab: ABNORMAL
Lab: NORMAL
MCH RBC QN AUTO: 30.4 PG (ref 26–35)
MCHC RBC AUTO-ENTMCNC: 32.5 % (ref 32–34.5)
MCV RBC AUTO: 93.6 FL (ref 80–99.9)
METER GLUCOSE: 210 MG/DL (ref 74–99)
METER GLUCOSE: 99 MG/DL (ref 74–99)
METHB: 0.3 % (ref 0–1.5)
MODE: ABNORMAL
MONOCYTES ABSOLUTE: 0.79 E9/L (ref 0.1–0.95)
MONOCYTES RELATIVE PERCENT: 11.2 % (ref 2–12)
NEUTROPHILS ABSOLUTE: 4.4 E9/L (ref 1.8–7.3)
NEUTROPHILS RELATIVE PERCENT: 62.3 % (ref 43–80)
O2 CONTENT: 18 ML/DL
O2 SATURATION: 94.4 % (ref 92–98.5)
O2HB: 93.5 % (ref 94–97)
OPERATOR ID: 3114
PATIENT TEMP: 37 C
PCO2: 44.4 MMHG (ref 35–45)
PDW BLD-RTO: 13.4 FL (ref 11.5–15)
PH BLOOD GAS: 7.46 (ref 7.35–7.45)
PLATELET # BLD: 304 E9/L (ref 130–450)
PMV BLD AUTO: 9.6 FL (ref 7–12)
PO2: 70.9 MMHG (ref 75–100)
RBC # BLD: 4.05 E12/L (ref 3.5–5.5)
SOURCE, BLOOD GAS: ABNORMAL
THB: 13.7 G/DL (ref 11.5–16.5)
THIS TEST SENT TO: NORMAL
TIME ANALYZED: 1130
WBC # BLD: 7.1 E9/L (ref 4.5–11.5)

## 2022-12-30 PROCEDURE — 6370000000 HC RX 637 (ALT 250 FOR IP): Performed by: FAMILY MEDICINE

## 2022-12-30 PROCEDURE — 97535 SELF CARE MNGMENT TRAINING: CPT

## 2022-12-30 PROCEDURE — 70496 CT ANGIOGRAPHY HEAD: CPT

## 2022-12-30 PROCEDURE — 36415 COLL VENOUS BLD VENIPUNCTURE: CPT

## 2022-12-30 PROCEDURE — 6360000002 HC RX W HCPCS: Performed by: FAMILY MEDICINE

## 2022-12-30 PROCEDURE — 6360000002 HC RX W HCPCS: Performed by: INTERNAL MEDICINE

## 2022-12-30 PROCEDURE — 82962 GLUCOSE BLOOD TEST: CPT

## 2022-12-30 PROCEDURE — 6370000000 HC RX 637 (ALT 250 FOR IP): Performed by: STUDENT IN AN ORGANIZED HEALTH CARE EDUCATION/TRAINING PROGRAM

## 2022-12-30 PROCEDURE — 6360000004 HC RX CONTRAST MEDICATION: Performed by: RADIOLOGY

## 2022-12-30 PROCEDURE — 70498 CT ANGIOGRAPHY NECK: CPT

## 2022-12-30 PROCEDURE — 2580000003 HC RX 258: Performed by: RADIOLOGY

## 2022-12-30 PROCEDURE — 82805 BLOOD GASES W/O2 SATURATION: CPT

## 2022-12-30 PROCEDURE — 2700000000 HC OXYGEN THERAPY PER DAY

## 2022-12-30 PROCEDURE — 99232 SBSQ HOSP IP/OBS MODERATE 35: CPT | Performed by: FAMILY MEDICINE

## 2022-12-30 PROCEDURE — 2580000003 HC RX 258: Performed by: FAMILY MEDICINE

## 2022-12-30 PROCEDURE — 6370000000 HC RX 637 (ALT 250 FOR IP)

## 2022-12-30 PROCEDURE — 6370000000 HC RX 637 (ALT 250 FOR IP): Performed by: INTERNAL MEDICINE

## 2022-12-30 PROCEDURE — 94640 AIRWAY INHALATION TREATMENT: CPT

## 2022-12-30 PROCEDURE — 85025 COMPLETE CBC W/AUTO DIFF WBC: CPT

## 2022-12-30 RX ORDER — LEVOFLOXACIN 750 MG/1
750 TABLET ORAL DAILY
Qty: 3 TABLET | Refills: 0 | Status: SHIPPED | OUTPATIENT
Start: 2022-12-30 | End: 2022-12-30 | Stop reason: SDUPTHER

## 2022-12-30 RX ORDER — ZOLPIDEM TARTRATE 5 MG/1
5 TABLET ORAL NIGHTLY
Qty: 15 TABLET | Refills: 0 | Status: SHIPPED | OUTPATIENT
Start: 2022-12-30 | End: 2022-12-30 | Stop reason: SDUPTHER

## 2022-12-30 RX ORDER — PREDNISONE 20 MG/1
40 TABLET ORAL DAILY
Qty: 4 TABLET | Refills: 0 | Status: SHIPPED | OUTPATIENT
Start: 2022-12-31 | End: 2022-12-30 | Stop reason: SDUPTHER

## 2022-12-30 RX ORDER — PREDNISONE 20 MG/1
40 TABLET ORAL DAILY
Qty: 4 TABLET | Refills: 0 | Status: SHIPPED | OUTPATIENT
Start: 2022-12-31 | End: 2023-01-02

## 2022-12-30 RX ORDER — LEVOFLOXACIN 750 MG/1
750 TABLET ORAL DAILY
Qty: 3 TABLET | Refills: 0 | Status: SHIPPED | OUTPATIENT
Start: 2022-12-30 | End: 2023-01-02

## 2022-12-30 RX ORDER — PANTOPRAZOLE SODIUM 40 MG/1
40 TABLET, DELAYED RELEASE ORAL 2 TIMES DAILY
Qty: 30 TABLET | Refills: 0 | Status: SHIPPED | OUTPATIENT
Start: 2022-12-30

## 2022-12-30 RX ORDER — PREDNISONE 10 MG/1
30 TABLET ORAL DAILY
Qty: 9 TABLET | Refills: 0 | Status: SHIPPED | OUTPATIENT
Start: 2023-01-02 | End: 2022-12-30 | Stop reason: SDUPTHER

## 2022-12-30 RX ORDER — SODIUM CHLORIDE 0.9 % (FLUSH) 0.9 %
10 SYRINGE (ML) INJECTION PRN
Status: COMPLETED | OUTPATIENT
Start: 2022-12-30 | End: 2022-12-30

## 2022-12-30 RX ORDER — PREDNISONE 20 MG/1
20 TABLET ORAL DAILY
Qty: 3 TABLET | Refills: 0 | Status: SHIPPED | OUTPATIENT
Start: 2023-01-05 | End: 2022-12-30 | Stop reason: SDUPTHER

## 2022-12-30 RX ORDER — HYDROXYZINE PAMOATE 25 MG/1
25 CAPSULE ORAL 3 TIMES DAILY PRN
Qty: 45 CAPSULE | Refills: 0 | Status: SHIPPED | OUTPATIENT
Start: 2022-12-30 | End: 2023-01-14

## 2022-12-30 RX ORDER — ZOLPIDEM TARTRATE 5 MG/1
5 TABLET ORAL NIGHTLY
Qty: 15 TABLET | Refills: 0 | Status: SHIPPED | OUTPATIENT
Start: 2022-12-30 | End: 2023-01-14

## 2022-12-30 RX ORDER — PREDNISONE 10 MG/1
30 TABLET ORAL DAILY
Qty: 9 TABLET | Refills: 0 | Status: SHIPPED | OUTPATIENT
Start: 2023-01-02 | End: 2023-01-05

## 2022-12-30 RX ORDER — PANTOPRAZOLE SODIUM 40 MG/1
40 TABLET, DELAYED RELEASE ORAL 2 TIMES DAILY
Qty: 30 TABLET | Refills: 0 | Status: SHIPPED | OUTPATIENT
Start: 2022-12-30 | End: 2022-12-30 | Stop reason: SDUPTHER

## 2022-12-30 RX ORDER — HYDROXYZINE PAMOATE 25 MG/1
25 CAPSULE ORAL 3 TIMES DAILY PRN
Qty: 45 CAPSULE | Refills: 0 | Status: SHIPPED | OUTPATIENT
Start: 2022-12-30 | End: 2022-12-30 | Stop reason: SDUPTHER

## 2022-12-30 RX ORDER — SUCRALFATE 1 G/1
1 TABLET ORAL 3 TIMES DAILY
Qty: 120 TABLET | Refills: 3 | Status: SHIPPED | OUTPATIENT
Start: 2022-12-30 | End: 2022-12-30 | Stop reason: SDUPTHER

## 2022-12-30 RX ORDER — ZOLPIDEM TARTRATE 5 MG/1
5 TABLET ORAL NIGHTLY PRN
Qty: 14 TABLET | Refills: 0 | Status: SHIPPED | OUTPATIENT
Start: 2022-12-30 | End: 2023-01-13

## 2022-12-30 RX ORDER — PREDNISONE 20 MG/1
20 TABLET ORAL DAILY
Qty: 3 TABLET | Refills: 0 | Status: SHIPPED | OUTPATIENT
Start: 2023-01-05 | End: 2023-01-08

## 2022-12-30 RX ORDER — PREDNISONE 10 MG/1
10 TABLET ORAL DAILY
Qty: 3 TABLET | Refills: 0 | Status: SHIPPED | OUTPATIENT
Start: 2023-01-08 | End: 2022-12-30 | Stop reason: SDUPTHER

## 2022-12-30 RX ORDER — PREDNISONE 10 MG/1
10 TABLET ORAL DAILY
Qty: 3 TABLET | Refills: 0 | Status: SHIPPED | OUTPATIENT
Start: 2023-01-08 | End: 2023-01-11

## 2022-12-30 RX ORDER — SUCRALFATE 1 G/1
1 TABLET ORAL 3 TIMES DAILY
Qty: 120 TABLET | Refills: 3 | Status: SHIPPED | OUTPATIENT
Start: 2022-12-30

## 2022-12-30 RX ADMIN — IPRATROPIUM BROMIDE AND ALBUTEROL SULFATE 1 AMPULE: .5; 2.5 SOLUTION RESPIRATORY (INHALATION) at 08:05

## 2022-12-30 RX ADMIN — RISPERIDONE 1 MG: 1 TABLET ORAL at 15:52

## 2022-12-30 RX ADMIN — IOPAMIDOL 75 ML: 755 INJECTION, SOLUTION INTRAVENOUS at 10:53

## 2022-12-30 RX ADMIN — HYDROMORPHONE HYDROCHLORIDE 4 MG: 2 TABLET ORAL at 03:25

## 2022-12-30 RX ADMIN — NYSTATIN 500000 UNITS: 500000 SUSPENSION ORAL at 08:52

## 2022-12-30 RX ADMIN — DIVALPROEX SODIUM 250 MG: 250 TABLET, DELAYED RELEASE ORAL at 15:51

## 2022-12-30 RX ADMIN — PREGABALIN 75 MG: 75 CAPSULE ORAL at 15:51

## 2022-12-30 RX ADMIN — RISPERIDONE 1 MG: 1 TABLET ORAL at 08:50

## 2022-12-30 RX ADMIN — PREGABALIN 75 MG: 75 CAPSULE ORAL at 08:49

## 2022-12-30 RX ADMIN — SERTRALINE 50 MG: 50 TABLET, FILM COATED ORAL at 08:52

## 2022-12-30 RX ADMIN — IPRATROPIUM BROMIDE AND ALBUTEROL SULFATE 1 AMPULE: .5; 2.5 SOLUTION RESPIRATORY (INHALATION) at 12:17

## 2022-12-30 RX ADMIN — SODIUM CHLORIDE, PRESERVATIVE FREE 10 ML: 5 INJECTION INTRAVENOUS at 10:48

## 2022-12-30 RX ADMIN — SALINE NASAL SPRAY 1 SPRAY: 1.5 SOLUTION NASAL at 03:26

## 2022-12-30 RX ADMIN — DIVALPROEX SODIUM 250 MG: 250 TABLET, DELAYED RELEASE ORAL at 08:50

## 2022-12-30 RX ADMIN — IPRATROPIUM BROMIDE AND ALBUTEROL SULFATE 1 AMPULE: .5; 2.5 SOLUTION RESPIRATORY (INHALATION) at 16:49

## 2022-12-30 RX ADMIN — ARFORMOTEROL TARTRATE 15 MCG: 15 SOLUTION RESPIRATORY (INHALATION) at 08:05

## 2022-12-30 RX ADMIN — COLCHICINE 0.6 MG: 0.6 TABLET ORAL at 08:49

## 2022-12-30 RX ADMIN — PREDNISONE 40 MG: 20 TABLET ORAL at 08:51

## 2022-12-30 RX ADMIN — SODIUM CHLORIDE, PRESERVATIVE FREE 10 ML: 5 INJECTION INTRAVENOUS at 08:53

## 2022-12-30 RX ADMIN — HYDROXYZINE PAMOATE 25 MG: 25 CAPSULE ORAL at 06:13

## 2022-12-30 RX ADMIN — BUDESONIDE 500 MCG: 0.5 SUSPENSION RESPIRATORY (INHALATION) at 08:05

## 2022-12-30 RX ADMIN — HYDROMORPHONE HYDROCHLORIDE 4 MG: 2 TABLET ORAL at 15:51

## 2022-12-30 RX ADMIN — LEVOFLOXACIN 750 MG: 5 INJECTION, SOLUTION INTRAVENOUS at 13:22

## 2022-12-30 RX ADMIN — NYSTATIN 500000 UNITS: 500000 SUSPENSION ORAL at 13:16

## 2022-12-30 RX ADMIN — PANTOPRAZOLE SODIUM 40 MG: 40 TABLET, DELAYED RELEASE ORAL at 08:52

## 2022-12-30 RX ADMIN — SALINE NASAL SPRAY 1 SPRAY: 1.5 SOLUTION NASAL at 08:55

## 2022-12-30 ASSESSMENT — PAIN DESCRIPTION - LOCATION
LOCATION: BACK;ARM;LEG
LOCATION: GENERALIZED

## 2022-12-30 ASSESSMENT — PAIN DESCRIPTION - ORIENTATION: ORIENTATION: RIGHT

## 2022-12-30 ASSESSMENT — PAIN - FUNCTIONAL ASSESSMENT: PAIN_FUNCTIONAL_ASSESSMENT: ACTIVITIES ARE NOT PREVENTED

## 2022-12-30 ASSESSMENT — PAIN SCALES - GENERAL
PAINLEVEL_OUTOF10: 0
PAINLEVEL_OUTOF10: 9
PAINLEVEL_OUTOF10: 8
PAINLEVEL_OUTOF10: 8

## 2022-12-30 NOTE — PLAN OF CARE
Problem: Discharge Planning  Goal: Discharge to home or other facility with appropriate resources  Outcome: Adequate for Discharge  Flowsheets (Taken 12/30/2022 1326)  Discharge to home or other facility with appropriate resources: Identify barriers to discharge with patient and caregiver     Problem: Pain  Goal: Verbalizes/displays adequate comfort level or baseline comfort level  Outcome: Adequate for Discharge     Problem: Chronic Conditions and Co-morbidities  Goal: Patient's chronic conditions and co-morbidity symptoms are monitored and maintained or improved  Outcome: Adequate for Discharge  Flowsheets (Taken 12/30/2022 1326)  Care Plan - Patient's Chronic Conditions and Co-Morbidity Symptoms are Monitored and Maintained or Improved: Monitor and assess patient's chronic conditions and comorbid symptoms for stability, deterioration, or improvement     Problem: Nutrition Deficit:  Goal: Optimize nutritional status  Outcome: Adequate for Discharge     Problem: Respiratory - Adult  Goal: Achieves optimal ventilation and oxygenation  Outcome: Adequate for Discharge     Problem: Skin/Tissue Integrity - Adult  Goal: Skin integrity remains intact  Outcome: Adequate for Discharge  Flowsheets (Taken 12/30/2022 1334)  Skin Integrity Remains Intact: Monitor for areas of redness and/or skin breakdown  Goal: Incisions, wounds, or drain sites healing without S/S of infection  Outcome: Adequate for Discharge     Problem: Safety - Adult  Goal: Free from fall injury  Outcome: Adequate for Discharge

## 2022-12-30 NOTE — PROGRESS NOTES
Occupational Therapy  OT BEDSIDE TREATMENT NOTE      Date:2022  Patient Name: Jose Workman  MRN: [id number]  : 1960  Room: [address] [place] [address]     Per OT Eval:      Evaluating OT: Melva Haas, OTR/L - [id number]     Referring Provider: Wendy Sethi MD  Specific Provider Orders/Date: \"OT eval and treat\" - 2022     Diagnosis: Dyspnea and respiratory abnormalities [R06.00, R06.89]  COPD exacerbation ([facility] [J44.1]  Acute exacerbation of chronic [facility]itis ([facility] [J20.9, J42]      Pertinent Medical History: anxiety, cancer, chronic back pain, COPD, RSD, OA, GERD, depression, DM, arthritis      Precautions: fall risk, bed alarm, skin integrity, visual impairment     Assessment of Current Deficits:    [x] Functional mobility             [x]ADLs           [x] Strength                  [x]Cognition   [x] Functional transfers           [x] IADLs         [x] Safety Awareness   [x]Endurance   [] Fine Coordination              [x] Balance      [] Vision/perception   [x]Sensation     []Gross Motor Coordination  [] ROM           [] Delirium                   [] Motor Control      OT PLAN OF CARE   OT POC is based on physician orders, patient diagnosis, and results of clinical assessment.   Frequency/Duration 2-5 days/week for 2 weeks PRN   Specific OT Treatment Interventions to Include:   * Instruction/training on adapted ADL techniques and AE recommendations to increase functional independence within precautions       * Training on energy conservation strategies, correct breathing pattern and techniques to improve independence/tolerance for self-care routine  * Functional transfer/mobility training/DME recommendations for increased independence, safety, and fall prevention  * Patient/Family education to increase follow through with safety techniques and functional independence  * Recommendation of environmental modifications for increased safety with functional transfers/mobility and ADLs  * Therapeutic exercise to improve motor endurance, ROM, and functional strength for ADLs/functional transfers  * Therapeutic activities to facilitate/challenge dynamic balance, stand tolerance for increased safety and independence with ADLs  * Neuro-muscular re-education: facilitation of righting/equilibrium reactions, midline orientation, scapular stability/mobility, normalization of muscle tone, and facilitation of volitional active controled movement  * Positioning to improve skin integrity, interaction with environment and functional independence     Recommended Adaptive Equipment: TBD      Home Living: Patient lives with her  in a one-floor setup. Bathroom Setup: tub shower (no seat)  Equipment Owned: cane     Prior Level of Function (PLOF): Patient reported that she typically is independent with ADLs, but had been experiencing increased difficulty recently; patient's  has been assisting with IADLs recently. Patient reported that she was using a cane for functional mobility prior to this hospitalization; patient reported multiple falls recently. Pain Level: Patient reported experiencing pain, but did not specify the location/intensity of her pain/discomfort. Cognition: Patient alert and oriented grossly. Fair insight to current abilities/limitations demonstrated. Functional Assessment:                  -PAC Daily Activity Raw Score: 16/24    Initial Eval Status  Date: 12/29/2022 Treatment Status  Date: 12/30/2022 Short Term Goals = Long Term Goals   Feeding Setup   Independent   Grooming Min A   Setup (seated/standing at sink)   UB Dressing Min A   Setup   LB Dressing Mod A   SBA - with use of AE, as needed/appropriate   Bathing Mod A   SBA - with use of AE/DME, as needed/appropriate   Toileting Mod A   SBA   Bed Mobility  Supine-to-Sit: Min A  Patient reported experiencing dizziness upon supine-to-sit.   Sit-to-Supine: SBA Sit-to-Supine: SBA  Independent in order to maximize patient's independence with ADLs, re-positioning, and other functional tasks. Functional Transfers Sit-to-Stand: Not assessed. Patient declined to attempt sit-to-stand secondary to dizziness and nausea. Sit-to-Stand: SBA - Supervision  Supervision   Functional Mobility Not assessed. SBA (with walker) within patient's bathroom and room. Cues given to maximize safety with management of walker. Supervision with functional mobility (with device, as needed/appropriate) in order to maximize independence with ADLs/IADLs and other functional tasks. Balance Sitting: Fair+ (at EOB)    Sitting: Good (at EOB)  Standing: Fair  (with walker)  Fair+ dynamic standing balance during completion of ADLs/IADLs and other functional tasks. Activity Tolerance Limited secondary to dizziness and nausea. Limited due to pain. Patient will demonstrate Good understanding and consistent implementation of energy conservation techniques and work simplification techniques into ADL/IADL routines. Visual/  Perceptual Fair  Patient reported that she typically wears contacts. Impaired  Patient reported \"recent\" loss of her peripheral vision. N/A   B UE Strength 4-/5   Patient will demonstrate 4+/5 B UE strength in order to maximize independence with ADLs/IADLs and functional transfers. Additional Long-Term Goal: Patient will increase functional independence to PLOF in order to allow patient to live in least restrictive environment. Comments: RN approved patient's participation in [address] [place] [address] activities. Upon arrival, patient standing in bathroom. At end of session, patient supine in bed, with call light and phone within reach, bed alarm activated, and all lines and tubes intact. Treatment: OT treatment provided this date included:   Instruction/training on safety and adapted techniques for completion of ADLs - particularly techniques to prevent worsening of dizziness. Instruction/training on safe functional mobility/transfer techniques.     Instruction/training on techniques to maximize safety with ADLs, IADLs, and functional mobility due to patient's visual impairment. Further skilled OT treatment indicated to increase patient's safety and independence with completion of ADL/IADL tasks in order to maximize patient's functional independence and quality of life. Education: Patient education provided regardin) importance of having staff assistance with ADLs and other OOB activities to prevent falls/injury during hospitalization. Patient demonstrated [address] understanding. Patient has made progress towards set goals. Continue OT plan of care. Time In: 1150  Time Out: 1210  Total Treatment Time: 20 minutes      Minutes Units   Therapeutic Ex 76395     Therapeutic Activities 30657     ADL/Self Care 15573 20 1   Orthotic Management 56405     Neuro Re-Ed 17896     Non-Billable Time N/A ---       Page Akbar OTR/L  License Number: [id number]

## 2022-12-30 NOTE — CARE COORDINATION
Transition of Care-Patient is requesting oxygen, past what she requires to remain above 90% FiO2. Nursing to wean. Prednisone to start today, remains on IV Levaquin, Nursing to do ambulatory pulse ox testing. Discharge pending Pulmonary recommendations. CM following for potential oxygen need.     Rustam Traore BSN, RN  [facility]

## 2022-12-30 NOTE — PROGRESS NOTES
CLINICAL PHARMACY NOTE: MEDS TO BEDS    Total # of Prescriptions Filled: 6   The following medications were delivered to the patient:  Nystatin   Levofloxacin 750  Protonix 40  Zoloft 50  Hydroxyzine tashi 25 caps   Prednisone 10    Additional Documentation:

## 2022-12-30 NOTE — PROGRESS NOTES
Pulmonary Progress Note    Admit Date: 12/27/2022                            PCP: Donna Cross MD  Principal Problem:    COPD exacerbation [facility])  Active Problems:    Acute exacerbation of chronic bronchitis (HCC)    Elevated troponin    Atypical chest pain    Other abnormal findings in urine  Resolved Problems:    * No resolved hospital problems. *      Subjective:  Patient seen on room air with pulse ox 91-92%. Still has complaints of unable to take a deep breath. She denies wheezing or cough. She is requesting to be discharged home with oxygen.      Medications:   dextrose      sodium chloride          enoxaparin  40 mg SubCUTAneous Daily    sertraline  50 mg Oral Daily    sodium chloride  1 spray Each Nostril Q2H    predniSONE  40 mg Oral Daily    Followed by    Brynn Verma ON 1/2/2023] predniSONE  30 mg Oral Daily    Followed by    Brynn Verma ON 1/5/2023] predniSONE  20 mg Oral Daily    Followed by    Brynn Verma ON 1/8/2023] predniSONE  10 mg Oral Daily    melatonin  3 mg Oral Nightly    lidocaine  1 patch TransDERmal Daily    colchicine  0.6 mg Oral Daily    ibuprofen  800 mg Oral TID WC    insulin lispro  0-4 Units SubCUTAneous TID WC    insulin lispro  0-4 Units SubCUTAneous Nightly    pantoprazole  40 mg Oral BID    levofloxacin  750 mg IntraVENous Q24H    divalproex  250 mg Oral TID    ipratropium-albuterol  1 ampule Inhalation Q4H WA    pregabalin  75 mg Oral TID    zolpidem  5 mg Oral Nightly    risperiDONE  1 mg Oral TID    sucralfate  1 g Oral TID    Vitamin D  1,000 Units Oral Daily    sodium chloride flush  5-40 mL IntraVENous 2 times per day    nicotine  1 patch TransDERmal Daily    budesonide  0.5 mg Nebulization BID    Arformoterol Tartrate  15 mcg Nebulization BID    nystatin  5 mL Oral 4x Daily       Vitals:  VITALS:  /60   Pulse 88   Temp 97.6 °F (36.4 °C) (Oral)   Resp 16   Ht 5' 2\" (1.575 m)   Wt 124 lb 11.2 oz (56.6 kg)   LMP  (LMP Unknown)   SpO2 91%   BMI 22.81 kg/m²   24HR INTAKE/OUTPUT:    Intake/Output Summary (Last 24 hours) at 2022 1116  Last data filed at 2022 0305  Gross per 24 hour   Intake 120 ml   Output 500 ml   Net -380 ml     CURRENT PULSE OXIMETRY:  SpO2: 91 %  24HR PULSE OXIMETRY RANGE:  SpO2  Av %  Min: 91 %  Max: 100 %  CVP:    VENT SETTINGS:      Additional Respiratory Assessments  Heart Rate: 88  Resp: 16  SpO2: 91 %      EXAM:  General: No distress. Alert. On room air  Eyes: No sclera icterus. No conjunctival injection. ENT: No discharge. Pharynx clear. Edentulous. Oral thrush (improving)  Neck: Trachea midline. Normal thyroid. Resp: No accessory muscle use. No crackles. No wheezing. No rhonchi. Diminished bilaterally  CV: Regular rate. Regular rhythm. No mumur or rub. No edema. ABD: Non-tender. Non-distended. Normal bowel sounds. Skin: Warm and dry. No nodule on exposed extremities. No rash on exposed extremities. M/S: No cyanosis. No joint deformity. No clubbing. Neuro: Awake. Follows commands. A&Ox 3    I/O: I/O last 3 completed shifts: In: 120 [P.O.:120]  Out: 700 [Urine:700]  No intake/output data recorded. Results:  CBC:   Recent Labs     22  0453 22  0230   WBC 12.9* 8.8 7.1   HGB 12.9 11.8 12.3   HCT 39.4 36.2 37.9   MCV 94.0 93.5 93.6    273 304     BMP:   Recent Labs     22  0313 22  1643    135 132   K 4.0 4.7 5.3*   CL 96* 97* 94*   CO2  24   BUN  23 26*   CREATININE 0.6 0.6 0.5     LFT:   Recent Labs     223 22  1643   ALKPHOS 113* 120* 108*   ALT 45* 45* 40*   AST 87* 78* 55*   PROT 6.5 6.5 6.3*   BILITOT 0.3 0.3 <0.2   LABALBU 3.5 3.3* 3.2*     PT/INR: No results for input(s): PROTIME, INR in the last 72 hours. Cultures:  No results for input(s): CULTRESP in the last 72 hours.      Latest Reference Range & Units 22 06:32   Influenza A by PCR Not Detected  Not Detected   Influenza B by PCR Not Detected  Not Detected   Adenovirus by PCR Not Detected  Not Detected   Coronavirus 229E by PCR Not Detected  Not Detected   Coronavirus HKU1 by PCR Not Detected  Not Detected   Coronavirus NL63 by PCR Not Detected  Not Detected   Coronavirus OC43 by PCR Not Detected  Not Detected   Human Metapneumovirus by PCR Not Detected  Not Detected   Human Rhinovirus/Enterovirus by PCR Not Detected  Not Detected   Parainfluenza Virus 1 by PCR Not Detected  Not Detected   Parainfluenza Virus 2 by PCR Not Detected  Not Detected   Parainfluenza Virus 3 by PCR Not Detected  Not Detected   Parainfluenza Virus 4 by PCR Not Detected  Not Detected   Respiratory Syncytial Virus by PCR Not Detected  Not Detected   Bordetella parapertussis by PCR Not Detected  Not Detected   Chlamydophilia pneumoniae by PCR Not Detected  Not Detected   Mycoplasma pneumoniae by PCR Not Detected  Not Detected   SARS-CoV-2, PCR Not Detected  Not Detected   Bordetella pertussis by PCR Not Detected  Not Detected     ABG:   No results for input(s): PH, PO2, PCO2, HCO3, BE, O2SAT in the last 72 hours. Films:  XR CHEST 1 VIEW    Result Date: 12/27/2022  EXAMINATION: ONE XRAY VIEW OF THE CHEST 12/27/2022 5:15 am COMPARISON: 11/11/2022 HISTORY: ORDERING SYSTEM PROVIDED HISTORY: sob TECHNOLOGIST PROVIDED HISTORY: Reason for exam:->sob FINDINGS: The cardiomediastinal silhouette is within normal limits. The lungs are clear except for bibasilar atelectasis. No pneumothorax or pleural effusion. No acute cardiopulmonary abnormality. CTA PULMONARY W CONTRAST    Result Date: 12/27/2022  EXAMINATION: CTA OF THE CHEST 12/27/2022 9:02 am TECHNIQUE: CTA of the chest was performed after the administration of intravenous contrast.  Multiplanar reformatted images are provided for review. MIP images are provided for review.  Automated exposure control, iterative reconstruction, and/or weight based adjustment of the mA/kV was utilized to reduce the radiation dose to as low as reasonably achievable. COMPARISON: Radiograph from same day and CT of the chest from November 7, 2022. HISTORY: ORDERING SYSTEM PROVIDED HISTORY: SOB low Sao2 at home TECHNOLOGIST PROVIDED HISTORY: Reason for exam:->SOB low Sao2 at home Decision Support Exception - unselect if not a suspected or confirmed emergency medical condition->Emergency Medical Condition (MA) FINDINGS: Pulmonary Arteries: Pulmonary arteries are adequately opacified for evaluation. No evidence of intraluminal filling defect to suggest pulmonary embolism. Main pulmonary artery is normal in caliber. Mediastinum: The heart is borderline in size. No acute findings of the thoracic aorta which is normal in caliber. Borderline size right hilar lymph node, unchanged. Lungs/pleura: Bibasilar consolidations favoring atelectasis. Difficult to exclude superimposed pneumonia. Subtle ground-glass opacities in the right perihilar region and middle lobe, favored to be mild infectious or inflammatory process, and new from prior study. Moderate emphysema. No pneumothorax. Upper Abdomen: Limited images of the upper abdomen are unremarkable. Soft Tissues/Bones: No acute bone or soft tissue abnormality. No evidence of pulmonary embolism. Mildly increased consolidations in the lung bases, favoring worsened atelectasis although difficult to exclude superimposed infection. Subtle new ground-glass opacities in the right perihilar region, may reflect mild infectious or inflammatory process. Background of moderate emphysema. Assessment/plan:  12/28: 4L 97%, IV solumedrol to q12  12/29: 4L 99%, start p.o. prednisone 12/30. Nuc med stress pending  12/30: on room air sats 91-92%    COPD exacerbation-   Continue Brovana, Pulmicort, and duo nebs inpatient. Not on any inhalers at home.   Continue  p.o. prednisone starting 12/30 40mg x3 days, 30mg x3 days, 20mg x3 days, 10mg x3 days as her wheezing has resolved  Outpatient PFT  Hypoxia  Currentinly on room air sats 91-92%, down from 4 L nasal cannula yesterday   Baseline is room air  Please wean to maintain pulse ox greater than 90%  Ambulatory pulse ox prior to discharge  Emphysema noted on CTA 12/27  A1At and IgE pending  Community-acquired pneumonia noted on CTA 12/27  Incentive spirometry  PCT 0.32 > 0.19 (12/28). Afebrile. Leukocytosis resolved 12/29  Continue IV Levaquin per primary, ok to change to oral abx   Sputum culture pending   Leukocytosis secondary to CAP -resolved 12/29  Oral thrush -improving  Continue nystatin x7 days  Current smoker  Continue nicotine patch  Elevated troponin  12/29 nuc med stress negative EF 68%  Elevated LFTs  DVT/PE prophylaxis on lovenox  New confusion and peripheral vision loss, CTA head/neck per primary pending, will obtain abg's to r/o hypercapnia     Recent admissions [facility] 12/13- 12/18: right lung infiltrated treated for CAP with IV rocephin/doxy, supplemental oxygen. D/c with po steroids and abx on room air  11/7-11/8 COPD exacerbation treated with duonebs/azithromycin/ po AYESHA Mckay - AMANDO  12/30/2022  11:16 AM    Attending Attestation Note:    Patient seen and examined with Hospital Staff, NP. I have extensively reviewed the chart lab work and imaging. I agree with above. Modifications and amendment of note made as necessary. In addition, the following apply:    No current facility-administered medications for this encounter.      Current Outpatient Medications   Medication Sig Dispense Refill    hydrOXYzine pamoate (VISTARIL) 25 MG capsule Take 1 capsule by mouth 3 times daily as needed for Itching 45 capsule 0    [START ON 12/31/2022] sertraline (ZOLOFT) 50 MG tablet Take 1 tablet by mouth daily 30 tablet 0    [START ON 1/2/2023] predniSONE (DELTASONE) 10 MG tablet Take 3 tablets by mouth daily for 3 doses 9 tablet 0    [START ON 1/5/2023] predniSONE (DELTASONE) 20 MG tablet Take 1 tablet by mouth daily for 3 doses 3 tablet 0    [START ON 1/8/2023] predniSONE (DELTASONE) 10 MG tablet Take 1 tablet by mouth daily for 3 doses 3 tablet 0    [START ON 12/31/2022] predniSONE (DELTASONE) 20 MG tablet Take 2 tablets by mouth daily for 2 doses 4 tablet 0    zolpidem (AMBIEN) 5 MG tablet Take 1 tablet by mouth nightly for 15 days. Max Daily Amount: 5 mg 15 tablet 0    nystatin (MYCOSTATIN) 197034 UNIT/ML suspension Take 5 mLs by mouth 4 times daily for 4 days 80 mL 0    sucralfate (CARAFATE) 1 GM tablet Take 1 tablet by mouth in the morning, at noon, and at bedtime 120 tablet 3    levoFLOXacin (LEVAQUIN) 750 MG tablet Take 1 tablet by mouth daily for 3 days 3 tablet 0    pantoprazole (PROTONIX) 40 MG tablet Take 1 tablet by mouth in the morning and at bedtime 30 tablet 0    zolpidem (AMBIEN) 5 MG tablet Take 1 tablet by mouth nightly as needed for Sleep for up to 14 days. Max Daily Amount: 5 mg 14 tablet 0    divalproex (DEPAKOTE) 250 MG DR tablet Take 1 tablet by mouth 3 times daily 90 tablet 3    albuterol sulfate HFA (PROVENTIL;VENTOLIN;PROAIR) 108 (90 Base) MCG/ACT inhaler INHALE 2 PUFFS INTO THE LUNGS FOUR TIMES DAILY AS NEEDED FOR WHEEZING 18 g 0    ipratropium-albuterol (DUONEB) 0.5-2.5 (3) MG/3ML SOLN nebulizer solution Inhale 3 mLs into the lungs every 4 hours (while awake) 360 mL 0    albuterol (PROVENTIL) (2.5 MG/3ML) 0.083% nebulizer solution Take 3 mLs by nebulization every 6 hours as needed for Wheezing 120 each 0    risperiDONE (RISPERDAL) 1 MG tablet Take 1 mg by mouth 3 times daily      nicotine (NICODERM CQ) 21 MG/24HR Place 1 patch onto the skin every 24 hours      HYDROmorphone (DILAUDID) 4 MG tablet Take 4 mg by mouth every 6 hours as needed. pregabalin (LYRICA) 75 MG capsule Take 75 mg by mouth 3 times daily.       melatonin 3 MG TABS tablet Take 1 tablet by mouth daily  3    Nutritional Supplements (ENSURE COMPLETE SHAKE) LIQD Take 1 Can by mouth 2 times daily 60 Bottle 0      - home O2 evaluation  - outpatient PFT  - await IgE, A1AT  - solu-medrol q 8 hours  - DuoNeb, Pulmicort, Brovana  - steroids, levaquin  - O2, IS  - nystatin S/S due to oral thrush   - Trilogy/Astral through [facility], failure of BIPAP  - OK to D/C with follow up in office in 2 weeks time    Denisha Escobar MD  12/30/2022  7:18 PM

## 2022-12-30 NOTE — PROGRESS NOTES
Pulse ox was __94____% on room air at rest.  Ambulated patient on room air. Oxygen saturation was _92-94_____% on room air while ambulating. Recovery pulse ox was ___92___% on ___0____ liters of oxygen while ambulating.     Electronically signed by Mann Gates RN on 12/30/2022 at 12:53 PM

## 2022-12-30 NOTE — PROGRESS NOTES
Patient refusing insulin coverage.     Electronically signed by Olga Merino RN on 12/30/2022 at 1:09 PM

## 2022-12-30 NOTE — DISCHARGE SUMMARY
[address]  Discharge Summary      Patient ID:  Kimberly Rodney  [id number]  72 y.o.  1960    Admit date: 12/27/2022    Discharge date: 12/30/2022    Location of discharge: [facility]     Admitting Physician: Miles Buck MD     Discharge Physician: Cesar Chen MD    Consults: pulmonary    Admission Diagnoses: Dyspnea and respiratory abnormalities [R06.00, R06.89]  COPD exacerbation ([facility] [address] [J44.1]  Acute exacerbation of chronic bronchitis ([address] [J20.9, J42]    Discharge Diagnoses: Principal Problem (Resolved):    COPD exacerbation ([address]  Active Problems:    Other abnormal findings in urine    Visual field defects  Resolved Problems:    Acute exacerbation of chronic bronchitis ([facility] [address]    Elevated troponin    Atypical chest pain      Hospital Course:    Kimberly Rodney  is a 65 y.o. female who presented with shortness of breath and cough and was admitted for COPD exacerbation. Patient was found to have COPD exacerbation, which was treated with IV Levaquin which is being transitioned to oral to complete 3 more days, patient was also treated with steroids and will complete the taper at home for 11 more days. Patient was continued on duo nebs and inhalers and weaned down off of oxygen. Patient was ambulated and does not require any oxygen on discharge. Pulmonology was following the patient as well and her respiratory status has been improving. Patient is strongly encouraged to continue using her incentive spirometer at home. Patient also was found to have atypical chest pain which led to an extensive cardiac work-up and was found to have a negative stress test and normal echocardiogram with an EF of 68%. Patient was initially treated for possible pericarditis.   The cardiac work-up is reassuring for noncardiac causes of the chest pain most likely musculoskeletal. Patient also had an extensive work-up for any possible declining neuronal function, however her CTA of head and neck were both unremarkable. Patient had episodes of abdominal pain which seems to come and go, her labs are reassuring and patient's Protonix has been increased to twice daily, and Carafate is  increased. Patient has been able to tolerate her diet and complete all meals ordered. Throughout admission patient had several episodes of being anxious her Zoloft has been increased to 50 mg and Vistaril has been added as needed. Of note patient was prescribed her home Dilaudid 4 mg every 6 hours as needed and required very minimal amounts. Patient's chronic conditions remained stable. Patient was discharged in an improved and suitable condition. Patient was encouraged to follow-up with PCP. Physical Exam  Vitals reviewed. Constitutional:       General: She is not in acute distress. Appearance: She is not ill-appearing. Comments: Patient did appear red and flushed - improving   HENT:      Head: Normocephalic and atraumatic. Nose: Nose normal. No congestion or rhinorrhea. Mouth/Throat:      Mouth: Mucous membranes are moist.      Pharynx: Posterior oropharyngeal erythema present. No oropharyngeal exudate. Comments: Her tongue is red but not swollen. No abnormalities or exudate were noted. Eyes: Wears eyeglasses but did not have them present during examination. Extraocular Movements: Extraocular movements intact. Conjunctiva/sclera: Conjunctivae normal.      Pupils: Pupils are equal, round, and reactive to light. Comments: Extra eye movements were intact but were performed at a very slow rate. Visual fields were assessed by Dr. Jose Vallejo who reported to me that the patient's left and right lateral visual fields were significantly impaired. Cardiovascular:      Rate and Rhythm: Normal rate and regular rhythm. Heart sounds: Normal heart sounds. No murmur heard. Pulmonary: Sitting comfortably on room air     Breath sounds: Normal breath sounds. No wheezing. Comments: Poor effort and very minimal air movement so difficult to hear breath sounds. She is not retracting. Abdominal:      General: Abdomen is flat. Bowel sounds are normal.      Palpations: Abdomen is soft. Tenderness: There is abdominal tenderness (Most notable in the lower quadrants). There is no guarding or rebound. Musculoskeletal:         General: No swelling or tenderness. Cervical back: No rigidity or tenderness. Comments: Diffuse, generalized tenderness across her chest    Skin:     General: Skin is warm and dry. Comments: Her face is very erythematous. Neurological:      General: No focal deficit present. Mental Status: She is alert. Motor: No weakness. Comments: Alert and oriented x3. She only knew that she was in [place] but did not know her city or hospital.  She knew her birthdate but not her age. Psychiatric:         Mood and Affect: Mood normal.      Comments: Anxious     Post Discharge Follow Up Issues: Possible social work referral, referral to counselor. Referral to ophthalmology    Discharged Condition: good    Significant Diagnostic Studies:   CTA HEAD W CONTRAST   Final Result   Unremarkable CTA of the head and neck. Cervical spinal stimulator device         CTA NECK W CONTRAST   Final Result   Unremarkable CTA of the head and neck. Cervical spinal stimulator device         NM Cardiac Stress Test Nuclear Imaging   Final Result      Significant artifact as described above. No myocardial ischemia. No myocardial infarction. Normal LV systolic function. No wall motion abnormalities. Low risk myocardial perfusion scan. CTA PULMONARY W CONTRAST   Final Result   No evidence of pulmonary embolism. Mildly increased consolidations in the lung bases, favoring worsened   atelectasis although difficult to exclude superimposed infection.       Subtle new ground-glass opacities in the right perihilar region, may reflect   mild infectious or inflammatory process. Background of moderate emphysema. XR CHEST 1 VIEW   Final Result   No acute cardiopulmonary abnormality. Disposition: home    Patient Instructions: Activity: activity as tolerated and ambulate in house  Diet: regular diet    Discharge Medication List:     Medication List        START taking these medications      hydrOXYzine pamoate 25 MG capsule  Commonly known as: VISTARIL  Take 1 capsule by mouth 3 times daily as needed for Itching     levoFLOXacin 750 MG tablet  Commonly known as: Levaquin  Take 1 tablet by mouth daily for 3 days     nystatin 357161 UNIT/ML suspension  Commonly known as: MYCOSTATIN  Take 5 mLs by mouth 4 times daily for 4 days     * predniSONE 20 MG tablet  Commonly known as: DELTASONE  Take 2 tablets by mouth daily for 2 doses  Start taking on: December 31, 2022     * predniSONE 10 MG tablet  Commonly known as: DELTASONE  Take 3 tablets by mouth daily for 3 doses  Start taking on: January 2, 2023     * predniSONE 20 MG tablet  Commonly known as: DELTASONE  Take 1 tablet by mouth daily for 3 doses  Start taking on: January 5, 2023     * predniSONE 10 MG tablet  Commonly known as: DELTASONE  Take 1 tablet by mouth daily for 3 doses  Start taking on: January 8, 2023           * This list has 4 medication(s) that are the same as other medications prescribed for you. Read the directions carefully, and ask your doctor or other care provider to review them with you.                 CHANGE how you take these medications      pantoprazole 40 MG tablet  Commonly known as: PROTONIX  Take 1 tablet by mouth in the morning and at bedtime  What changed: when to take this     sertraline 50 MG tablet  Commonly known as: ZOLOFT  Take 1 tablet by mouth daily  Start taking on: December 31, 2022  What changed:   medication strength  how much to take     zolpidem 5 MG tablet  Commonly known as: AMBIEN  Take 1 tablet by mouth nightly for 15 days. Max Daily Amount: 5 mg  What changed:   medication strength  how much to take  how to take this  when to take this            CONTINUE taking these medications      * albuterol (2.5 MG/3ML) 0.083% nebulizer solution  Commonly known as: PROVENTIL  Take 3 mLs by nebulization every 6 hours as needed for Wheezing     * albuterol sulfate  (90 Base) MCG/ACT inhaler  Commonly known as: PROVENTIL;VENTOLIN;PROAIR  INHALE 2 PUFFS INTO THE LUNGS FOUR TIMES DAILY AS NEEDED FOR WHEEZING     divalproex 250 MG DR tablet  Commonly known as: DEPAKOTE  Take 1 tablet by mouth 3 times daily     Ensure Complete Shake Liqd  Take 1 Can by mouth 2 times daily     HYDROmorphone 4 MG tablet  Commonly known as: DILAUDID     ipratropium-albuterol 0.5-2.5 (3) MG/3ML Soln nebulizer solution  Commonly known as: DUONEB  Inhale 3 mLs into the lungs every 4 hours (while awake)     melatonin 3 MG Tabs tablet  Take 1 tablet by mouth daily     nicotine 21 MG/24HR  Commonly known as: NICODERM CQ     pregabalin 75 MG capsule  Commonly known as: LYRICA     risperiDONE 1 MG tablet  Commonly known as: RISPERDAL     sucralfate 1 GM tablet  Commonly known as: Carafate  Take 1 tablet by mouth in the morning, at noon, and at bedtime           * This list has 2 medication(s) that are the same as other medications prescribed for you. Read the directions carefully, and ask your doctor or other care provider to review them with you.                 STOP taking these medications      guaiFENesin 400 MG tablet     MULTIVITAMIN PO     nortriptyline 10 MG capsule  Commonly known as: PAMELOR     ondansetron 4 MG disintegrating tablet  Commonly known as: ZOFRAN-ODT     vitamin D 1000 UNIT Tabs tablet  Commonly known as: CHOLECALCIFEROL               Where to Get Your Medications        These medications were sent to [facility], [address] [place] [id number] Phone: [phone]   hydrOXYzine pamoate 25 MG capsule  levoFLOXacin 750 MG tablet  nystatin [id number] UNIT/ML suspension  pantoprazole 40 MG tablet  predniSONE 10 MG tablet  predniSONE 10 MG tablet  predniSONE 20 MG tablet  predniSONE 20 MG tablet  sertraline 50 MG tablet  sucralfate 1 GM tablet  zolpidem 5 MG tablet           Clare Tamayo MD  [id number] [phone]    Go on 1/3/2023  Hospital followup 2pm      Signed:  Anay German MD  12/30/2022  2:21 PM

## 2022-12-31 LAB
ALPHA-1 ANTITRYPSIN PHENOTYPE: ABNORMAL
ALPHA-1 ANTITRYPSIN: 222 MG/DL (ref 90–200)
IGE: 73 KU/L

## 2023-01-01 LAB
BLOOD CULTURE, ROUTINE: NORMAL
CULTURE, BLOOD 2: NORMAL

## 2023-01-02 LAB
Lab: NORMAL
REPORT: NORMAL
THIS TEST SENT TO: NORMAL

## 2023-01-10 ENCOUNTER — OFFICE VISIT (OUTPATIENT)
Dept: FAMILY MEDICINE CLINIC | Age: 63
End: 2023-01-10

## 2023-01-10 ENCOUNTER — TELEPHONE (OUTPATIENT)
Dept: FAMILY MEDICINE CLINIC | Age: 63
End: 2023-01-10

## 2023-01-10 VITALS
BODY MASS INDEX: 20.8 KG/M2 | HEIGHT: 62 IN | RESPIRATION RATE: 14 BRPM | HEART RATE: 100 BPM | TEMPERATURE: 98.8 F | SYSTOLIC BLOOD PRESSURE: 110 MMHG | DIASTOLIC BLOOD PRESSURE: 62 MMHG | WEIGHT: 113 LBS | OXYGEN SATURATION: 97 %

## 2023-01-10 DIAGNOSIS — K21.9 GASTROESOPHAGEAL REFLUX DISEASE WITHOUT ESOPHAGITIS: ICD-10-CM

## 2023-01-10 DIAGNOSIS — J44.9 CHRONIC OBSTRUCTIVE PULMONARY DISEASE, UNSPECIFIED COPD TYPE (HCC): Primary | ICD-10-CM

## 2023-01-10 DIAGNOSIS — R07.89 OTHER CHEST PAIN: ICD-10-CM

## 2023-01-10 DIAGNOSIS — Z09 HOSPITAL DISCHARGE FOLLOW-UP: ICD-10-CM

## 2023-01-10 DIAGNOSIS — F41.9 ANXIETY: ICD-10-CM

## 2023-01-10 RX ORDER — ZOLPIDEM TARTRATE 12.5 MG/1
1 TABLET, FILM COATED, EXTENDED RELEASE ORAL
COMMUNITY
Start: 2022-11-30

## 2023-01-10 NOTE — PROGRESS NOTES
[facility]  Precepting Note    Subjective:  HFU  COPD exacerbation. Admitted 12/27, DC 12/30. Sterids, abx, O2.   DC on steroid taper and abx. Completed. Also had cardiac workup for atypical CP. Not believed to be cardiac in nature. Abd pain with Jerzy Small - Protonix was inc to BID and Carafate was added 1g BID. Psychiatric hx. Zoloft was inc and visatril was added. On RA she was at 87% today. Oxygen added and 95% at 3L now. She hasnt been using any of her inhalers at home - says someone told her to stop them while she was in the hospital.   She has had some SOB at night, but with deep breathing, she's able to get them back up. She continues to complain of epigastric pain worse with eating. She has EGD pending. Atypical CP symptoms are improving. ROS otherwise negative     Past medical, surgical, family and social history were reviewed, non-contributory, and unchanged unless otherwise stated. Objective:    /62   Pulse 100   Temp 98.8 °F (37.1 °C) (Temporal)   Resp 14   Ht 5' 2\" (1.575 m)   Wt 113 lb (51.3 kg)   LMP  (LMP Unknown)   SpO2 (!) 87%   BMI 20.67 kg/m²     Exam is as noted by resident with the following changes, additions or corrections:    General:  NAD; alert & oriented x 3   Heart:  RRR, no murmurs, gallops, or rubs. Lungs:  CTA bilaterally, no wheeze, rales or rhonchi  Abd: bowel sounds present, nontender, nondistended, no masses  Extrem:  No clubbing, cyanosis, or edema    Assessment/Plan:  Chronic hypoxic resp failure  O2 sat at 87% at rest today on room air. Hasnt been using home inhalers - resume that  Home oxygen in the meantime given inc sob and low O2 sat. Has not needed to seek care in last 10 days since coming home from hospital so I do not see a reason to admit her in order to get oxygen coordinated right now. Will attempt to get tanks in home today or tomorrow. Seek care at ED if increasing SOB. Attending Physician Statement  I have reviewed the chart, including any radiology or labs. I have discussed the case, including pertinent history and exam findings with the resident. I agree with the assessment, plan and orders as documented by the resident. Please refer to the resident note for additional information.       Electronically signed by Hardeep Bone MD on 1/10/2023 at 9:43 AM

## 2023-01-10 NOTE — PROGRESS NOTES
[facility] PRE-ADMISSION TESTING INSTRUCTIONS    The Preadmission Testing patient is instructed accordingly using the following criteria (check applicable):    ARRIVAL INSTRUCTIONS:  [x] Parking the day of Surgery is located in the Main Entrance lot. Upon entering the door, make an immediate right to the surgery reception desk    [x] Bring photo ID and insurance card    [] Bring in a copy of Living will or Durable Power of  papers. [x] Please be sure to arrange for responsible adult to provide transportation to and from the hospital    [x] Please arrange for responsible adult to be with you for the 24 hour period post procedure due to having anesthesia    [x] If you awake am of surgery not feeling well or have temperature >100 please call [phone]    GENERAL INSTRUCTIONS:    [x] Nothing by mouth after midnight, including gum, candy, mints or water    [x] You may brush your teeth, but do not swallow any water    [x] Take medications as instructed with 1-2 oz of water    [x] Stop herbal supplements and vitamins 5 days prior to procedure    [] Follow preop dosing of blood thinners per physician instructions    [] Take 1/2 dose of evening insulin, but no insulin after midnight    [] No oral diabetic medications after midnight    [] If diabetic and have low blood sugar or feel symptomatic, take 1-2oz apple juice only    [x] Bring inhalers day of surgery    [] Bring C-PAP/ Bi-Pap day of surgery    [] Bring urine specimen day of surgery    [x] Shower or bath with soap, lather and rinse well, AM of Surgery, no lotion, powders or creams to surgical site    [] Follow bowel prep as instructed per surgeon    [x] No tobacco products within 24 hours of surgery     [x] No alcohol or illegal drug use within 24 hours of surgery.     [x] Jewelry, body piercing's, eyeglasses, contact lenses and dentures are not permitted into surgery (bring cases)      [x] Please do not wear any nail polish, make up or hair products on the day of surgery    [x] You can expect a call the business day prior to procedure to notify you if your arrival time changes    [x] If you receive a survey after surgery we would greatly appreciate your comments    [] Parent/guardian of a minor must accompany their child and remain on the premises  the entire time they are under our care     [] Pediatric patients may bring favorite toy, blanket or comfort item with them    [] A caregiver or family member must remain with the patient during their stay if they are mentally handicapped, have dementia, disoriented or unable to use a call light or would be a safety concern if left unattended    [x] Please notify surgeon if you develop any illness between now and time of surgery (cold, cough, sore throat, fever, nausea, vomiting) or any signs of infections  including skin, wounds, and dental.    [x]  The Outpatient Pharmacy is available to fill your prescription here on your day of surgery, ask your preop nurse for details    [] Other instructions    EDUCATIONAL MATERIALS PROVIDED:    [] PAT Preoperative Education Packet/Booklet     [] Medication List    [] Transfusion bracelet applied with instructions    [] Shower with soap, lather and rinse well, and use CHG wipes provided the evening before surgery as instructed    [] Incentive spirometer with instructions
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
## 2023-01-10 NOTE — TELEPHONE ENCOUNTER
Tahir Silvano (daughter) called in to give a place to send Home Oxygen DME order;    Send to [address] [place] [address]  Fax number [phone]    DME order faxed 1/10/23

## 2023-01-11 ASSESSMENT — ENCOUNTER SYMPTOMS
WHEEZING: 0
SHORTNESS OF BREATH: 1
NAUSEA: 0
RHINORRHEA: 0
SORE THROAT: 0
DIARRHEA: 0
EYE PAIN: 0

## 2023-01-13 ENCOUNTER — ANESTHESIA (OUTPATIENT)
Dept: ENDOSCOPY | Age: 63
End: 2023-01-13
Payer: MEDICARE

## 2023-01-13 ENCOUNTER — HOSPITAL ENCOUNTER (OUTPATIENT)
Age: 63
Setting detail: OUTPATIENT SURGERY
Discharge: HOME | End: 2023-01-13
Attending: SURGERY | Admitting: SURGERY
Payer: MEDICARE

## 2023-01-13 ENCOUNTER — ANESTHESIA EVENT (OUTPATIENT)
Dept: ENDOSCOPY | Age: 63
End: 2023-01-13
Payer: MEDICARE

## 2023-01-13 VITALS
DIASTOLIC BLOOD PRESSURE: 54 MMHG | WEIGHT: 105 LBS | OXYGEN SATURATION: 94 % | HEIGHT: 62 IN | SYSTOLIC BLOOD PRESSURE: 105 MMHG | BODY MASS INDEX: 19.32 KG/M2 | HEART RATE: 71 BPM | RESPIRATION RATE: 16 BRPM | TEMPERATURE: 97.6 F

## 2023-01-13 DIAGNOSIS — R10.13 EPIGASTRIC PAIN: ICD-10-CM

## 2023-01-13 DIAGNOSIS — K29.00 ACUTE GASTRITIS: ICD-10-CM

## 2023-01-13 PROCEDURE — 3609012400 HC EGD TRANSORAL BIOPSY SINGLE/MULTIPLE: Performed by: SURGERY

## 2023-01-13 PROCEDURE — 3700000000 HC ANESTHESIA ATTENDED CARE: Performed by: SURGERY

## 2023-01-13 PROCEDURE — 43239 EGD BIOPSY SINGLE/MULTIPLE: CPT | Performed by: SURGERY

## 2023-01-13 PROCEDURE — 2709999900 HC NON-CHARGEABLE SUPPLY: Performed by: SURGERY

## 2023-01-13 PROCEDURE — 2580000003 HC RX 258: Performed by: NURSE ANESTHETIST, CERTIFIED REGISTERED

## 2023-01-13 PROCEDURE — 7100000010 HC PHASE II RECOVERY - FIRST 15 MIN: Performed by: SURGERY

## 2023-01-13 PROCEDURE — 7100000011 HC PHASE II RECOVERY - ADDTL 15 MIN: Performed by: SURGERY

## 2023-01-13 PROCEDURE — 88305 TISSUE EXAM BY PATHOLOGIST: CPT

## 2023-01-13 RX ORDER — PROPOFOL 10 MG/ML
INJECTION, EMULSION INTRAVENOUS PRN
Status: DISCONTINUED | OUTPATIENT
Start: 2023-01-13 | End: 2023-01-13 | Stop reason: SDUPTHER

## 2023-01-13 RX ORDER — SODIUM CHLORIDE 9 MG/ML
INJECTION, SOLUTION INTRAVENOUS CONTINUOUS PRN
Status: DISCONTINUED | OUTPATIENT
Start: 2023-01-13 | End: 2023-01-13 | Stop reason: SDUPTHER

## 2023-01-13 RX ADMIN — PROPOFOL 300 MG: 10 INJECTION, EMULSION INTRAVENOUS at 13:47

## 2023-01-13 RX ADMIN — SODIUM CHLORIDE: 9 INJECTION, SOLUTION INTRAVENOUS at 13:37

## 2023-01-13 ASSESSMENT — ENCOUNTER SYMPTOMS: SHORTNESS OF BREATH: 1

## 2023-01-13 ASSESSMENT — PAIN SCALES - GENERAL: PAINLEVEL_OUTOF10: 0

## 2023-01-13 ASSESSMENT — LIFESTYLE VARIABLES: SMOKING_STATUS: 0

## 2023-01-13 ASSESSMENT — PAIN - FUNCTIONAL ASSESSMENT: PAIN_FUNCTIONAL_ASSESSMENT: 0-10
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
## 2023-01-13 NOTE — ANESTHESIA POSTPROCEDURE EVALUATION
Department of Anesthesiology  Postprocedure Note    Patient: Joon Mail  MRN: [id number]  YOB: 1960  Date of evaluation: 1/13/2023      Procedure Summary     Date: 01/13/23 Room / Location: [facility] / [facility]    Anesthesia Start: 1012 Anesthesia Stop: 8600    Procedure: EGD BIOPSY Diagnosis:       Epigastric pain      Acute gastritis      (Epigastric pain [R10.13])      (Acute gastritis [K29.00])    Surgeons: Kathy Ruiz MD Responsible Provider: Deena Borwn MD    Anesthesia Type: MAC ASA Status: 3          Anesthesia Type: MAC    Kathrin Phase I:      Kathrin Phase II:        Anesthesia Post Evaluation    Patient location during evaluation: PACU  Patient participation: complete - patient participated  Level of consciousness: awake  Airway patency: patent  Nausea & Vomiting: no nausea and no vomiting  Complications: no  Cardiovascular status: hemodynamically stable  Respiratory status: acceptable  Hydration status: euvolemic

## 2023-01-13 NOTE — OP NOTE
EGD Op Note    DATE OF PROCEDURE: 1/13/2023     SURGEON: Angie West MD    PREOPERATIVE DIAGNOSIS: Lack of appetite, food intolerance, possible gastritis    POSTOPERATIVE DIAGNOSIS: Same, small hiatal hernia    OPERATION: Procedure(s):  EGD BIOPSY    ANESTHESIA: Local monitored anesthesia. ESTIMATED BLOOD LOSS: minimal    COMPLICATIONS: None. SPECIMENS:    ID Type Source Tests Collected by Time Destination   A : antral biopsy r/o hpylori Tissue Stomach SURGICAL PATHOLOGY Angie West MD 1/13/2023 1351    B : Duodenal biopsy r/o sprue Tissue Duodenum SURGICAL PATHOLOGY Angie West MD 1/13/2023 1352        HISTORY: The patient is a 65y.o. year old female with history of above preop diagnosis. I recommended esophagogastroduodenoscopy with possible biopsy and I explained the risk, benefits, expected outcome, and alternatives to the procedure. Risks included but are not limited to bleeding, infection, respiratory distress, hypotension, and perforation of the esophagus, stomach, or duodenum. Patient understands and is in agreement. PROCEDURE: The patient was given IV conscious sedation per anesthesia. The patient was given supplemental oxygen by nasal cannula. The gastroscope was inserted orally and advanced under direct vision through the esophagus, through the stomach, through the pylorus, and into the duodenum. Findings:  Duodenum: Slightly abnormal appearance from the periampullary tissue status post biopsies    Stomach: Normal status post biopsies rule out H. pylori    Esophagus: Very small sliding hiatal hernia    Larynx: not examined    The scope was removed and the patient tolerated the procedure well.      IMPRESSION/PLAN:   Follow-up pathology      Angie West MD  01/13/23  1:55 PM

## 2023-01-13 NOTE — H&P
[address] [facility] Note     Assessment/Plan:        Diagnosis Orders   1. Other acute gastritis without hemorrhage  sucralfate (CARAFATE) 1 GM tablet     Recommend continue her PPI. We will add Carafate. We will plan for EGD evaluation. 2. Epigastric pain  US GALLBLADDER RUQ     As above. We will also check a gallbladder ultrasound although it does not sound biliary                Return for EGD. Chief Complaint   Patient presents with    New Patient       GERD         PCP: Vero Fuentes MD     HPI: Robin Cardenas is a 65 y.o. female who presents in consultation for dilation of GI issues. She says there are \"so many foods that I cannot eat. \"  She says \"I think it is a mental issue. \"  She says she can only take mostly just shakes and things like mashed potatoes. She says she does not like \"the taste of stuff like meat and other foods. \"  She denies any nausea or vomiting but says she will \"spit up\" her saliva. She does complain of some epigastric pain. This been ongoing for several months. She says it is \"just there. \"  It is not food related. She is 1/2 pack/day smoker. She is under alcohol. She is never had EGD previously. She denies any prior abdominal surgeries. She does have PPI listed in her medications but she does not know what she is actually taking she states. She says her  gives her her pills. She went to the ER last week she says because she was \"choking on spit. \"  Suggested she try some daily antihistamine. For some reason she had a CT of her abdomen presumably she was complaining of abdominal pain. That was negative on review.         Past Medical History        Past Medical History:   Diagnosis Date    Anxiety      Arthritis      Blood circulation, collateral      Cancer (HCC)       SKIN    Chronic back pain      Colitis       recent    Complex regional pain syndrome type 1 of right lower extremity 9/11/2015    COPD (chronic obstructive pulmonary disease) ([facility] [address]      Depression      Diabetes mellitus ([facility] [address]      Elevated liver function tests 10/17/2014    GERD (gastroesophageal reflux disease)      Headache(784.0)      Hyperlipidemia 10/17/2014    Kidney stone      Movement disorder      Neuromuscular disorder ([facility] [address]      Osteoarthritis      Other disorders of kidney and ureter in diseases classified elsewhere      Pneumonia      Psychiatric problem      RSD (reflex sympathetic dystrophy)              Past Surgical History         Past Surgical History:   Procedure Laterality Date    BACK SURGERY   2005     had cerv SCS at [address] then had staph infec 2003  then it was changed to a dual SCS by Dr Richards Back 2005 approx then has had several battery changes and rechargeable put in 2009    [address]         bryant hands    COLONOSCOPY        ENDOSCOPY, COLON, DIAGNOSTIC        FINGER AMPUTATION         index right hand multiple surgeries    FRACTURE SURGERY        HYSTERECTOMY (CERVIX STATUS UNKNOWN)        LAMINECTOMY         cervical    NERVE BLOCK N/A 8 19 15     cerv facet #1 with iv anesthesia    NERVE BLOCK Left 08 26 2015     left cervical paravertebral facet block #2 c4 5 c5 6 c6 7 under iv sedation    NERVE BLOCK   9 2 15     lumbar parasympathetic #1    NERVE BLOCK Right 9/17/15     right sympathetic block #2    OTHER SURGICAL HISTORY   09/18/13     surgical replacement of medtronic cervical spinal cord stimulator electrode and connect to existing battery right hip    OTHER SURGICAL HISTORY N/A 2/3/2014     Surgical revision spinal cord stimulator scar at anchor site due to wound  dehisence    OTHER SURGICAL HISTORY Right 11/11/14     EXCISION OF CYST RIGHT SHOULDER    OTHER SURGICAL HISTORY   04/08/15     surgical revision medtronic cervical spinal cord stimulator scar at anchor site due to wound dehisance    OTHER SURGICAL HISTORY   04/25/2018     spinal cord stimulator battery replacement due to end of life    ME REVISE/REMOVE NEUROSTIM/ N/A 4/25/2018     SPINAL CORD STIMULATOR BATTERY REPLACEMENT DUE TO END OF LIFE performed by Miranda Mckeon DO at [address] [facility] Medications           Prior to Admission medications    Medication Sig Start Date End Date Taking?  Authorizing Provider   sucralfate (CARAFATE) 1 GM tablet Take 1 tablet by mouth in the morning, at noon, and at bedtime 11/17/22   Yes Aleah Romero MD   divalproex (DEPAKOTE SPRINKLE) 125 MG capsule Take 2 capsules by mouth 2 times daily 11/8/22   Yes Caleb De Leon MD   albuterol (PROVENTIL) (2.5 MG/3ML) 0.083% nebulizer solution Take 3 mLs by nebulization every 6 hours as needed for Wheezing 11/8/22   Yes Caleb De Leon MD   risperiDONE (RISPERDAL) 1 MG tablet Take 1 mg by mouth 2 times daily     Yes Historical Provider, MD   sertraline (ZOLOFT) 25 MG tablet Take 25 mg by mouth daily     Yes Historical Provider, MD   nicotine (NICODERM CQ) 21 MG/24HR Place 1 patch onto the skin every 24 hours     Yes Historical Provider, MD   Respiratory Therapy Supplies (NEBULIZER) TANIKA 1 Device by Does not apply route daily 11/6/22   Yes Mandie Madera DO   Compression Stockings MISC by Does not apply route Below knee, 20 mmHg bilaterally 9/20/22   Yes Eliceo Fitzpatrick MD   HYDROmorphone (DILAUDID) 4 MG tablet TAKE 1 TABLET BY MOUTH EVERY 6 HOURS AS NEEDED FOR PAIN 8/30/22   Yes Historical Provider, MD   pregabalin (LYRICA) 75 MG capsule TAKE 1 CAPSULE BY MOUTH THREE TIMES DAILY 8/30/22   Yes Historical Provider, MD   ondansetron (ZOFRAN-ODT) 4 MG disintegrating tablet Take 1 tablet by mouth 3 times daily as needed for Nausea or Vomiting 5/14/21   Yes Fabrizio Burris MD   Nutritional Supplements (ENSURE COMPLETE SHAKE) LIQD Take 1 Can by mouth 2 times daily 5/14/21   Yes Fabrizio Burirs MD   vitamin D (CHOLECALCIFEROL) 1000 UNIT TABS tablet Take 1,000 Units by mouth daily     Yes Historical Provider, MD   Multiple Vitamins-Minerals (MULTIVITAMIN PO) Take by mouth     Yes Historical Provider, MD   sertraline (ZOLOFT) 100 MG tablet   9/28/22     Historical Provider, MD   zolpidem (AMBIEN) 10 MG tablet   11/4/22     Historical Provider, MD   divalproex (DEPAKOTE) 250 MG DR tablet   11/15/22     Historical Provider, MD   pantoprazole (PROTONIX) 40 MG tablet Take 1 tablet by mouth every morning (before breakfast) 11/18/22     Tabby Hilton MD   albuterol sulfate HFA (VENTOLIN HFA) 108 (90 Base) MCG/ACT inhaler Inhale 2 puffs into the lungs 4 times daily as needed for Wheezing Substitute as needed 11/18/22     Tabby Hilton MD   ipratropium-albuterol (DUONEB) 0.5-2.5 (3) MG/3ML SOLN nebulizer solution Inhale 3 mLs into the lungs every 4 hours (while awake) 11/18/22     Tabby Hilton MD   melatonin 3 MG TABS tablet Take 1 tablet by mouth daily 8/29/22 10/28/22   AYESHA Jean - CNP   nortriptyline (PAMELOR) 10 MG capsule TAKE 1 CAPSULE BY MOUTH EVERY NIGHT 6/1/21 8/29/22   Sintia Perez DO                  Allergies   Allergen Reactions    Sulfamethazine Anaphylaxis    Ancef [Cefazolin Sodium] Other (See Comments)       convulsions         Social History   Social History            Socioeconomic History    Marital status:        Spouse name: Ruddy Jones    Number of children: None    Years of education: 10    Highest education level: None   Tobacco Use    Smoking status: Every Day       Packs/day: 0.50       Years: 42.00       Pack years: 21.00       Types: Cigarettes       Start date: 1/1/1970       Passive exposure: Current    Smokeless tobacco: Never   Vaping Use    Vaping Use: Some days   Substance and Sexual Activity    Alcohol use: No       Alcohol/week: 0.0 standard drinks    Drug use: No    Sexual activity: Not Currently      Social Determinants of Health          Financial Resource Strain: Low Risk     Difficulty of Paying Living Expenses: Not hard at all   Food Insecurity: No Food Insecurity    Worried About Running Out of Food in the Last Year: Never true    Ran Out of Food in the Last Year: Never true            Family History         Family History   Problem Relation Age of Onset    Other Mother              Review of Systems   All other systems reviewed and are negative. Objective:  Vitals       Vitals:     11/17/22 1435   BP: 128/66   Pulse: 74   Resp: 18   Temp: 97.3 °F (36.3 °C)   TempSrc: Temporal   SpO2: 98%   Weight: 112 lb 8 oz (51 kg)   Height: 5' 2\" (1.575 m)            Physical Exam  HENT:      Head: Normocephalic and atraumatic. Eyes:      General:         Right eye: No discharge. Left eye: No discharge. Neck:      Trachea: No tracheal deviation. Cardiovascular:      Rate and Rhythm: Normal rate. Pulmonary:      Effort: Pulmonary effort is normal. No respiratory distress. Abdominal:      General: There is no distension. Palpations: Abdomen is soft. Tenderness: There is no guarding or rebound. Skin:     General: Skin is warm and dry. Neurological:      Mental Status: She is alert and oriented to person, place, and time. John Carlin MD     I have examined the patient and performed the key aspects of physical exam, reviewed the record (including all pertinent and new radiology images and laboratory findings, referring provider notes and results), and discussed the case with the primary and referring provider where applicable. NOTE: This report, in part or full,may have been transcribed using voice recognition software. Every effort was made to ensure accuracy; however, inadvertent computerized transcription errors may be present. Please excuse any transcriptional grammatical or spelling errors that may have escaped my editorial review.      CC: Jose Morrow MD

## 2023-01-21 ENCOUNTER — APPOINTMENT (OUTPATIENT)
Dept: CT IMAGING | Age: 63
End: 2023-01-21
Payer: MEDICARE

## 2023-01-21 ENCOUNTER — HOSPITAL ENCOUNTER (EMERGENCY)
Age: 63
Discharge: HOME | End: 2023-01-21
Attending: EMERGENCY MEDICINE
Payer: MEDICARE

## 2023-01-21 ENCOUNTER — APPOINTMENT (OUTPATIENT)
Dept: GENERAL RADIOLOGY | Age: 63
End: 2023-01-21
Payer: MEDICARE

## 2023-01-21 VITALS
SYSTOLIC BLOOD PRESSURE: 120 MMHG | HEIGHT: 62 IN | HEART RATE: 77 BPM | DIASTOLIC BLOOD PRESSURE: 73 MMHG | OXYGEN SATURATION: 94 % | BODY MASS INDEX: 21.16 KG/M2 | TEMPERATURE: 97.4 F | RESPIRATION RATE: 18 BRPM | WEIGHT: 115 LBS

## 2023-01-21 DIAGNOSIS — R07.9 CHEST PAIN, UNSPECIFIED TYPE: Primary | ICD-10-CM

## 2023-01-21 DIAGNOSIS — J40 BRONCHITIS: ICD-10-CM

## 2023-01-21 DIAGNOSIS — J44.1 COPD EXACERBATION (HCC): ICD-10-CM

## 2023-01-21 DIAGNOSIS — R05.9 COUGH, UNSPECIFIED TYPE: ICD-10-CM

## 2023-01-21 LAB
ALBUMIN SERPL-MCNC: 3.2 G/DL (ref 3.5–5.2)
ALP BLD-CCNC: 93 U/L (ref 35–104)
ALT SERPL-CCNC: 11 U/L (ref 0–32)
ANION GAP SERPL CALCULATED.3IONS-SCNC: 10 MMOL/L (ref 7–16)
AST SERPL-CCNC: 17 U/L (ref 0–31)
BASOPHILS ABSOLUTE: 0.04 E9/L (ref 0–0.2)
BASOPHILS RELATIVE PERCENT: 0.5 % (ref 0–2)
BILIRUB SERPL-MCNC: 0.2 MG/DL (ref 0–1.2)
BUN BLDV-MCNC: 13 MG/DL (ref 6–23)
CALCIUM SERPL-MCNC: 9 MG/DL (ref 8.6–10.2)
CHLORIDE BLD-SCNC: 98 MMOL/L (ref 98–107)
CO2: 26 MMOL/L (ref 22–29)
CREAT SERPL-MCNC: 0.4 MG/DL (ref 0.5–1)
EOSINOPHILS ABSOLUTE: 0.13 E9/L (ref 0.05–0.5)
EOSINOPHILS RELATIVE PERCENT: 1.6 % (ref 0–6)
GFR SERPL CREATININE-BSD FRML MDRD: >60 ML/MIN/1.73
GLUCOSE BLD-MCNC: 116 MG/DL (ref 74–99)
HCT VFR BLD CALC: 36.6 % (ref 34–48)
HEMOGLOBIN: 12.3 G/DL (ref 11.5–15.5)
IMMATURE GRANULOCYTES #: 0.1 E9/L
IMMATURE GRANULOCYTES %: 1.2 % (ref 0–5)
INFLUENZA A: NOT DETECTED
INFLUENZA B: NOT DETECTED
LYMPHOCYTES ABSOLUTE: 1.12 E9/L (ref 1.5–4)
LYMPHOCYTES RELATIVE PERCENT: 13.6 % (ref 20–42)
MCH RBC QN AUTO: 29.9 PG (ref 26–35)
MCHC RBC AUTO-ENTMCNC: 33.6 % (ref 32–34.5)
MCV RBC AUTO: 88.8 FL (ref 80–99.9)
MONOCYTES ABSOLUTE: 0.86 E9/L (ref 0.1–0.95)
MONOCYTES RELATIVE PERCENT: 10.4 % (ref 2–12)
NEUTROPHILS ABSOLUTE: 6.01 E9/L (ref 1.8–7.3)
NEUTROPHILS RELATIVE PERCENT: 72.7 % (ref 43–80)
PDW BLD-RTO: 13 FL (ref 11.5–15)
PLATELET # BLD: 298 E9/L (ref 130–450)
PMV BLD AUTO: 9.5 FL (ref 7–12)
POTASSIUM SERPL-SCNC: 4.2 MMOL/L (ref 3.5–5)
RBC # BLD: 4.12 E12/L (ref 3.5–5.5)
REASON FOR REJECTION: NORMAL
REJECTED TEST: NORMAL
SARS-COV-2 RNA, RT PCR: NOT DETECTED
SODIUM BLD-SCNC: 134 MMOL/L (ref 132–146)
TOTAL PROTEIN: 6.4 G/DL (ref 6.4–8.3)
TROPONIN, HIGH SENSITIVITY: 39 NG/L (ref 0–9)
TROPONIN, HIGH SENSITIVITY: 42 NG/L (ref 0–9)
WBC # BLD: 8.3 E9/L (ref 4.5–11.5)

## 2023-01-21 PROCEDURE — 84484 ASSAY OF TROPONIN QUANT: CPT

## 2023-01-21 PROCEDURE — 94640 AIRWAY INHALATION TREATMENT: CPT

## 2023-01-21 PROCEDURE — 6360000004 HC RX CONTRAST MEDICATION: Performed by: RADIOLOGY

## 2023-01-21 PROCEDURE — 99285 EMERGENCY DEPT VISIT HI MDM: CPT

## 2023-01-21 PROCEDURE — 71045 X-RAY EXAM CHEST 1 VIEW: CPT

## 2023-01-21 PROCEDURE — 6360000002 HC RX W HCPCS: Performed by: STUDENT IN AN ORGANIZED HEALTH CARE EDUCATION/TRAINING PROGRAM

## 2023-01-21 PROCEDURE — 87636 SARSCOV2 & INF A&B AMP PRB: CPT

## 2023-01-21 PROCEDURE — 71275 CT ANGIOGRAPHY CHEST: CPT

## 2023-01-21 PROCEDURE — 80053 COMPREHEN METABOLIC PANEL: CPT

## 2023-01-21 PROCEDURE — 6370000000 HC RX 637 (ALT 250 FOR IP): Performed by: EMERGENCY MEDICINE

## 2023-01-21 PROCEDURE — 93005 ELECTROCARDIOGRAM TRACING: CPT | Performed by: EMERGENCY MEDICINE

## 2023-01-21 PROCEDURE — 85025 COMPLETE CBC W/AUTO DIFF WBC: CPT

## 2023-01-21 PROCEDURE — 96374 THER/PROPH/DIAG INJ IV PUSH: CPT

## 2023-01-21 RX ORDER — IPRATROPIUM BROMIDE AND ALBUTEROL SULFATE 2.5; .5 MG/3ML; MG/3ML
1 SOLUTION RESPIRATORY (INHALATION) ONCE
Status: COMPLETED | OUTPATIENT
Start: 2023-01-21 | End: 2023-01-21

## 2023-01-21 RX ORDER — METHYLPREDNISOLONE SODIUM SUCCINATE 125 MG/2ML
125 INJECTION, POWDER, LYOPHILIZED, FOR SOLUTION INTRAMUSCULAR; INTRAVENOUS ONCE
Status: COMPLETED | OUTPATIENT
Start: 2023-01-21 | End: 2023-01-21

## 2023-01-21 RX ORDER — PREDNISONE 20 MG/1
20 TABLET ORAL 2 TIMES DAILY
Qty: 10 TABLET | Refills: 0 | Status: SHIPPED | OUTPATIENT
Start: 2023-01-21 | End: 2023-01-26

## 2023-01-21 RX ORDER — DOXYCYCLINE HYCLATE 100 MG
100 TABLET ORAL 2 TIMES DAILY
Qty: 20 TABLET | Refills: 0 | Status: SHIPPED | OUTPATIENT
Start: 2023-01-21 | End: 2023-01-31

## 2023-01-21 RX ORDER — SODIUM CHLORIDE 0.9 % (FLUSH) 0.9 %
10 SYRINGE (ML) INJECTION ONCE
Status: DISCONTINUED | OUTPATIENT
Start: 2023-01-21 | End: 2023-01-21 | Stop reason: HOSPADM

## 2023-01-21 RX ADMIN — IOPAMIDOL 75 ML: 755 INJECTION, SOLUTION INTRAVENOUS at 08:50

## 2023-01-21 RX ADMIN — METHYLPREDNISOLONE SODIUM SUCCINATE 125 MG: 125 INJECTION, POWDER, FOR SOLUTION INTRAMUSCULAR; INTRAVENOUS at 10:16

## 2023-01-21 RX ADMIN — IPRATROPIUM BROMIDE AND ALBUTEROL SULFATE 1 AMPULE: .5; 3 SOLUTION RESPIRATORY (INHALATION) at 06:36

## 2023-01-21 ASSESSMENT — LIFESTYLE VARIABLES
HOW MANY STANDARD DRINKS CONTAINING ALCOHOL DO YOU HAVE ON A TYPICAL DAY: PATIENT DOES NOT DRINK
HOW OFTEN DO YOU HAVE A DRINK CONTAINING ALCOHOL: NEVER

## 2023-01-21 ASSESSMENT — PAIN SCALES - GENERAL: PAINLEVEL_OUTOF10: 7

## 2023-01-21 ASSESSMENT — PAIN DESCRIPTION - LOCATION: LOCATION: CHEST

## 2023-01-21 ASSESSMENT — PAIN DESCRIPTION - DESCRIPTORS: DESCRIPTORS: SHOOTING

## 2023-01-21 ASSESSMENT — PAIN DESCRIPTION - PAIN TYPE: TYPE: ACUTE PAIN

## 2023-01-21 ASSESSMENT — PAIN DESCRIPTION - FREQUENCY: FREQUENCY: CONTINUOUS

## 2023-01-21 ASSESSMENT — PAIN DESCRIPTION - ORIENTATION: ORIENTATION: MID

## 2023-01-21 ASSESSMENT — PAIN - FUNCTIONAL ASSESSMENT: PAIN_FUNCTIONAL_ASSESSMENT: 0-10

## 2023-01-21 NOTE — ED PROVIDER NOTES
[facility] ENCOUNTER        Pt Name: Marylene Edison  MRN: [id number]  [place] 1960  Date of evaluation: 1/21/2023  Provider: Laura Winter DO  PCP: Umm Lomas MD  Note Started: 3:04 AM EST 1/21/23    CHIEF COMPLAINT       Chief Complaint   Patient presents with    Chest Pain     Started yesterday around 1300. Given 1 of nitro and 325 aspirin    Shortness of Breath     Was found at 87% on RA by EMS. Placed on 2 LNC. Hx of COPD        HISTORY OF PRESENT ILLNESS: 1 or more Elements        Limitations to history : None    Marylene Edison is a 65 y.o. female who presents for chest pain and shortness of breath. Yesterday afternoon patient began to have a anterior chest pain, no radiation, no exacerbating or remitting factors. Patient has history of COPD and has been feeling more short of breath. She denies any cough or fever. EMS states that patient was hypoxic on room air. However patient states she uses 3 L continuously, but not say why she did not have her oxygen on. She denies any abdominal pain, no nausea/vomiting/diarrhea. Nursing Notes were all reviewed and agreed with or any disagreements were addressed in the HPI. REVIEW OF EXTERNAL NOTE :       Cardiac stress test 12/27/2022 that was low risk, no ischemia or abnormality, EGD 1/13/2023, no abnormality small hiatal hernia    REVIEW OF SYSTEMS :      Positives and Pertinent negatives as per HPI.      SURGICAL HISTORY     Past Surgical History:   Procedure Laterality Date    BACK SURGERY  2005    had cerv SCS at [address] then had staph infec 2003  then it was changed to a dual SCS by Dr Catana Hammans 2005 approx then has had several battery changes and rechargeable put in 2009    [address]      bryant hands    COLONOSCOPY      ENDOSCOPY, COLON, DIAGNOSTIC      FINGER AMPUTATION      index right hand multiple surgeries    HYSTERECTOMY (CERVIX STATUS UNKNOWN)      LAMINECTOMY      cervical    NERVE BLOCK N/A 08/19/2015    cerv facet #1 with iv anesthesia    NERVE BLOCK Left 08/26/2015    left cervical paravertebral facet block #2 c4 5 c5 6 c6 7 under iv sedation    NERVE BLOCK  09/02/2015    lumbar parasympathetic #1    NERVE BLOCK Right 09/17/2015    right sympathetic block #2    OTHER SURGICAL HISTORY  09/18/2013    surgical replacement of medtronic cervical spinal cord stimulator electrode and connect to existing battery right hip    OTHER SURGICAL HISTORY N/A 02/03/2014    Surgical revision spinal cord stimulator scar at anchor site due to wound  dehisence    OTHER SURGICAL HISTORY Right 11/11/2014    EXCISION OF CYST RIGHT SHOULDER    OTHER SURGICAL HISTORY  04/08/2015    surgical revision medtronic cervical spinal cord stimulator scar at anchor site due to wound dehisance    OTHER SURGICAL HISTORY  04/25/2018    spinal cord stimulator battery replacement due to end of life    GA REVJ/RMVL IMPLANTED SPINAL NEUROSTIM GENERATOR N/A 04/25/2018    SPINAL CORD STIMULATOR BATTERY REPLACEMENT DUE TO END OF LIFE performed by Deb Palma DO at [facility] 1/13/2023    EGD BIOPSY performed by Natalia Kingston MD at [address]       Previous Medications    ALBUTEROL (PROVENTIL) (2.5 MG/3ML) 0.083% NEBULIZER SOLUTION    Take 3 mLs by nebulization every 6 hours as needed for Wheezing    ALBUTEROL SULFATE HFA (PROVENTIL;VENTOLIN;PROAIR) 108 (90 BASE) MCG/ACT INHALER    INHALE 2 PUFFS INTO THE LUNGS FOUR TIMES DAILY AS NEEDED FOR WHEEZING    DIVALPROEX (DEPAKOTE) 250 MG DR TABLET    Take 1 tablet by mouth 3 times daily    HYDROMORPHONE (DILAUDID) 4 MG TABLET    Take 4 mg by mouth every 6 hours as needed.  Uses for RSD    MELATONIN 3 MG TABS TABLET    Take 1 tablet by mouth daily    NICOTINE (NICODERM CQ) 21 MG/24HR    Place 1 patch onto the skin every 24 hours    NUTRITIONAL SUPPLEMENTS (ENSURE COMPLETE SHAKE) LIQD    Take 1 Can by mouth 2 times daily    PANTOPRAZOLE (PROTONIX) 40 MG TABLET    Take 1 tablet by mouth in the morning and at bedtime    PREGABALIN (LYRICA) 75 MG CAPSULE    Take 75 mg by mouth 3 times daily. RISPERIDONE (RISPERDAL) 1 MG TABLET    Take 1 mg by mouth 3 times daily    SERTRALINE (ZOLOFT) 50 MG TABLET    Take 1 tablet by mouth daily    SUCRALFATE (CARAFATE) 1 GM TABLET    Take 1 tablet by mouth in the morning, at noon, and at bedtime    ZOLPIDEM (AMBIEN CR) 12.5 MG EXTENDED RELEASE TABLET    Take 1 tablet by mouth nightly. ALLERGIES     Sulfamethazine and Ancef [cefazolin sodium]    FAMILYHISTORY       Family History   Problem Relation Age of Onset    Other Mother         SOCIAL HISTORY       Social History     Tobacco Use    Smoking status: Former     Packs/day: 0.50     Years: 42.00     Pack years: 21.00     Types: Cigarettes     Start date: 1970     Quit date: 2022     Years since quittin.2     Passive exposure: Current    Smokeless tobacco: Never   Vaping Use    Vaping Use: Some days   Substance Use Topics    Alcohol use: No     Alcohol/week: 0.0 standard drinks    Drug use: No       SCREENINGS        [place] Coma Scale  Eye Opening: Spontaneous  Best Verbal Response: Oriented  Best Motor Response: Obeys commands  [place] Coma Scale Score: 15                CIWA Assessment  BP: 101/67  Heart Rate: 79           PHYSICAL EXAM  1 or more Elements     ED Triage Vitals   BP Temp Temp src Pulse Resp SpO2 Height Weight   -- -- -- -- -- -- -- --             Constitutional/General: Alert and oriented x3  Head: Normocephalic and atraumatic  Eyes: PERRL, EOMI, conjunctiva normal, sclera non icteric  ENT:  Oropharynx clear, handling secretions, no trismus, no asymmetry of the posterior oropharynx or uvular edema  Neck: Supple, full ROM, no stridor, no meningeal signs  Respiratory: Lungs clear to auscultation bilaterally, no wheezes, rales, or rhonchi.  Not in respiratory distress  Cardiovascular:  Regular rate. Regular rhythm. No murmurs, no gallops, no rubs. 2+ distal pulses. Equal extremity pulses. Chest: No chest wall tenderness  GI:  Abdomen Soft, Non tender, Non distended. +BS. No rebound, guarding, or rigidity. No pulsatile masses. Musculoskeletal: Moves all extremities x 4. Warm and well perfused, no clubbing, no cyanosis, no edema. Capillary refill <3 seconds  Integument: skin warm and dry. No rashes. Neurologic: GCS 15, no focal deficits, symmetric strength 5/5 in the upper and lower extremities bilaterally  Psychiatric: Normal Affect            DIAGNOSTIC RESULTS   LABS:    Labs Reviewed   TROPONIN - Abnormal; Notable for the following components:       Result Value    Troponin, High Sensitivity 42 (*)     All other components within normal limits   CBC WITH AUTO DIFFERENTIAL - Abnormal; Notable for the following components:    Lymphocytes % 13.6 (*)     Lymphocytes Absolute 1.12 (*)     All other components within normal limits   COMPREHENSIVE METABOLIC PANEL - Abnormal; Notable for the following components:    Glucose 116 (*)     Creatinine 0.4 (*)     Albumin 3.2 (*)     All other components within normal limits   TROPONIN - Abnormal; Notable for the following components:    Troponin, High Sensitivity 39 (*)     All other components within normal limits   COVID-19 & INFLUENZA COMBO   SPECIMEN REJECTION    Narrative:     CALL  [place]  H34 tel. ,  Rejected Test Name/Called to: analy/ vivek sahni rn, 01/21/2023 04:41,  by Mariann Dsouza       As interpreted by me, the above displayed labs are abnormal. All other labs obtained during this visit were within normal range or not returned as of this dictation. EKG: This EKG is signed and interpreted by me. Rate: 76  Rhythm: Sinus rhythm  Interpretation: Left anterior fascicular block, nonspecific T wave abnormality.   No findings suggestive of acute ischemia or injury  Comparison: stable as compared to patient's most recent EKG      RADIOLOGY:   Non-plain film images such as CT, Ultrasound and MRI are read by the radiologist. Plain radiographic images are visualized and preliminarily interpreted by the ED Provider with the findings documented in the ED Course. Interpretation per the Radiologist below, if available at the time of this note:    XR CHEST PORTABLE   Final Result   No acute disease. RECOMMENDATION:   Careful clinical correlation and follow up recommended. CTA PULMONARY W CONTRAST    (Results Pending)     No results found. No results found. PROCEDURES   Unless otherwise noted below, none       CRITICAL CARE TIME (.cct)   None    PAST MEDICAL HISTORY/Chronic Conditions Affecting Care      has a past medical history of Abdominal pain, Anxiety, Arthritis, Blood circulation, collateral, Cancer (HCC), Chronic back pain, Colitis, Complex regional pain syndrome type 1 of right lower extremity (09/11/2015), COPD (chronic obstructive pulmonary disease) ([facility] [address] Depression, GERD (gastroesophageal reflux disease), Headache(784.0), Hyperlipidemia (10/17/2014), Kidney stone, Neuromuscular disorder ([facility] [address] On home O2, Osteoarthritis, Other disorders of kidney and ureter in diseases classified elsewhere, Pneumonia (12/2022), Psychiatric problem, and RSD (reflex sympathetic dystrophy).      EMERGENCY DEPARTMENT COURSE    Vitals:    Vitals:    01/21/23 0310 01/21/23 0411 01/21/23 0636   BP: 101/67     Pulse: 76 79    Resp: 18     Temp: 97.4 °F (36.3 °C)     TempSrc: Oral     SpO2: 94%  94%   Weight: 115 lb (52.2 kg)     Height: 5' 2\" (1.575 m)         Patient was given the following medications:  Medications   ipratropium-albuterol (DUONEB) nebulizer solution 1 ampule (1 ampule Inhalation Given 1/21/23 0636)           Medical Decision Making/Differential Diagnosis:    CC/HPI Summary, Social Determinants of health, Records Reviewed, DDx, testing done/not done, ED Course, Reassessment, disposition considerations/shared decision making with patient, consults, disposition:             Medical Decision Making  58-year-old female with history of COPD on 2-3 L of oxygen continuously, presenting with chest pain and shortness of breath. Patient was given nitro and aspirin by EMS. She arrives hemodynamically stable with no increased work of breathing, clear lungs, no hypoxia. Would have concern for COPD exacerbation, lower suspicion for CHF but possible, ACS possible as well. Would also have concern for infection such as pneumonia, influenza, COVID. Patient had an unremarkable cardiac stress test in late December of last year only a few weeks ago. Troponins are at baseline, 42 and 39 today with baseline 30-55. EKG shows no findings suggestive of acute ischemia or injury, unchanged from prior EKGs. Chest x-ray is clear. COVID and influenza testing are negative. Metabolic panel is within acceptable limits. No leukocytosis or anemia. Patient was given a DuoNeb treatment as she stated she was feeling short of breath, however she had no increased work of breathing or hypoxia. Patient is on her baseline oxygen with no hypoxia. She is resting comfortably. Prior to discharge patient called me into the room where she stated that she coughed up blood. When assessing the tissue there is a clear mucus and a very fine strand of blood. Would have suspicion that this could be related to a bronchitis as her chest x-ray is unremarkable for pneumonia. However she will have CTA of the chest to rule out any threatening pathology. She will be signed out to morning physician Dr. Benny Graham with CTA pending. If this is negative, I see no reason why patient would not be safely discharged with PCP follow-up. Amount and/or Complexity of Data Reviewed  External Data Reviewed: ECG. Labs: ordered. Decision-making details documented in ED Course. Radiology: ordered.  Decision-making details documented in ED Course. ECG/medicine tests: ordered and independent interpretation performed. Decision-making details documented in ED Course. Risk  Prescription drug management. CONSULTS: (Who and What was discussed)  None        I am the Primary Clinician of Record. FINAL IMPRESSION      1.  Chest pain, unspecified type          DISPOSITION/PLAN     DISPOSITION Discharge - Pending Orders Complete 01/21/2023 07:12:00 AM      PATIENT REFERRED TO:  Kavita Boone MD  [facility]     Schedule an appointment as soon as possible for a visit       DISCHARGE MEDICATIONS:  New Prescriptions    No medications on file       DISCONTINUED MEDICATIONS:  Discontinued Medications    No medications on file              (Please note that portions of this note were completed with a voice recognition program.  Efforts were made to edit the dictations but occasionally words are mis-transcribed.)    Josh Moses DO (electronically signed)            Josh Moses DO  01/21/23 [address], DO  01/21/23 [id number]

## 2023-01-21 NOTE — ED NOTES
This RN performed triage assessment and assumed care at this time. Pt present to ED with c/o SOB, mid-sternal CP x1 day. Worsens with exertion. EMS noted pt was 87% on RA, placed on 2LNC with O2 sat back to 93. Hx of COPD on 4LNC at home. EMS gave 1 of nitro SL and aspirin en route. 500cc fluid bolus given for low BP after nitro administration by EMS. Pt ax0x4. In NAD, resting comfortably. Labs drawn at this time. Comfort care provided. All vitals WNL for patient. Will continue to monitor.       Kemi Graham RN  01/21/23 0310

## 2023-01-21 NOTE — PROGRESS NOTES
Patient is well-appearing without any complaints patient did report productive cough does have a history of COPD will be started on doxycycline no wheezing stable

## 2023-01-21 NOTE — ED NOTES
Pt c/o SOB. Sat 96% on 4L NC(home dose) Resp easy non labored. /67 Resp 18. Dr Felix Pat notified.       Liban Crowell, RN  01/21/23 6547

## 2023-01-23 LAB
EKG ATRIAL RATE: 76 BPM
EKG P AXIS: 77 DEGREES
EKG P-R INTERVAL: 170 MS
EKG Q-T INTERVAL: 384 MS
EKG QRS DURATION: 88 MS
EKG QTC CALCULATION (BAZETT): 432 MS
EKG R AXIS: -47 DEGREES
EKG T AXIS: 64 DEGREES
EKG VENTRICULAR RATE: 76 BPM

## 2023-01-23 PROCEDURE — 93010 ELECTROCARDIOGRAM REPORT: CPT | Performed by: INTERNAL MEDICINE

## 2023-02-19 ENCOUNTER — APPOINTMENT (OUTPATIENT)
Dept: CT IMAGING | Age: 63
End: 2023-02-19
Payer: MEDICARE

## 2023-02-19 ENCOUNTER — APPOINTMENT (OUTPATIENT)
Dept: GENERAL RADIOLOGY | Age: 63
End: 2023-02-19
Payer: MEDICARE

## 2023-02-19 ENCOUNTER — HOSPITAL ENCOUNTER (EMERGENCY)
Age: 63
Discharge: HOME | End: 2023-02-19
Attending: EMERGENCY MEDICINE
Payer: MEDICARE

## 2023-02-19 VITALS
TEMPERATURE: 98.6 F | DIASTOLIC BLOOD PRESSURE: 69 MMHG | WEIGHT: 118.3 LBS | HEART RATE: 82 BPM | SYSTOLIC BLOOD PRESSURE: 108 MMHG | BODY MASS INDEX: 21.64 KG/M2 | OXYGEN SATURATION: 94 % | RESPIRATION RATE: 16 BRPM

## 2023-02-19 DIAGNOSIS — T78.40XA ALLERGY, INITIAL ENCOUNTER: ICD-10-CM

## 2023-02-19 DIAGNOSIS — R05.9 COUGH, UNSPECIFIED TYPE: Primary | ICD-10-CM

## 2023-02-19 LAB
ALBUMIN SERPL-MCNC: 3.9 G/DL (ref 3.5–5.2)
ALP BLD-CCNC: 117 U/L (ref 35–104)
ALT SERPL-CCNC: 22 U/L (ref 0–32)
ANION GAP SERPL CALCULATED.3IONS-SCNC: 10 MMOL/L (ref 7–16)
AST SERPL-CCNC: 27 U/L (ref 0–31)
BASOPHILS ABSOLUTE: 0.07 E9/L (ref 0–0.2)
BASOPHILS RELATIVE PERCENT: 0.6 % (ref 0–2)
BILIRUB SERPL-MCNC: 0.3 MG/DL (ref 0–1.2)
BUN BLDV-MCNC: 12 MG/DL (ref 6–23)
CALCIUM SERPL-MCNC: 9.6 MG/DL (ref 8.6–10.2)
CHLORIDE BLD-SCNC: 101 MMOL/L (ref 98–107)
CO2: 29 MMOL/L (ref 22–29)
CREAT SERPL-MCNC: 0.5 MG/DL (ref 0.5–1)
EOSINOPHILS ABSOLUTE: 0.24 E9/L (ref 0.05–0.5)
EOSINOPHILS RELATIVE PERCENT: 2.1 % (ref 0–6)
GFR SERPL CREATININE-BSD FRML MDRD: >60 ML/MIN/1.73
GLUCOSE BLD-MCNC: 106 MG/DL (ref 74–99)
HCT VFR BLD CALC: 40.4 % (ref 34–48)
HEMOGLOBIN: 13.1 G/DL (ref 11.5–15.5)
IMMATURE GRANULOCYTES #: 0.14 E9/L
IMMATURE GRANULOCYTES %: 1.2 % (ref 0–5)
LYMPHOCYTES ABSOLUTE: 1.78 E9/L (ref 1.5–4)
LYMPHOCYTES RELATIVE PERCENT: 15.8 % (ref 20–42)
MCH RBC QN AUTO: 29.4 PG (ref 26–35)
MCHC RBC AUTO-ENTMCNC: 32.4 % (ref 32–34.5)
MCV RBC AUTO: 90.8 FL (ref 80–99.9)
MONOCYTES ABSOLUTE: 1.06 E9/L (ref 0.1–0.95)
MONOCYTES RELATIVE PERCENT: 9.4 % (ref 2–12)
NEUTROPHILS ABSOLUTE: 7.98 E9/L (ref 1.8–7.3)
NEUTROPHILS RELATIVE PERCENT: 70.9 % (ref 43–80)
PDW BLD-RTO: 13.6 FL (ref 11.5–15)
PLATELET # BLD: 286 E9/L (ref 130–450)
PMV BLD AUTO: 10.1 FL (ref 7–12)
POTASSIUM SERPL-SCNC: 4.6 MMOL/L (ref 3.5–5)
RBC # BLD: 4.45 E12/L (ref 3.5–5.5)
SODIUM BLD-SCNC: 140 MMOL/L (ref 132–146)
TOTAL PROTEIN: 7.2 G/DL (ref 6.4–8.3)
WBC # BLD: 11.3 E9/L (ref 4.5–11.5)

## 2023-02-19 PROCEDURE — 6370000000 HC RX 637 (ALT 250 FOR IP): Performed by: EMERGENCY MEDICINE

## 2023-02-19 PROCEDURE — 99285 EMERGENCY DEPT VISIT HI MDM: CPT

## 2023-02-19 PROCEDURE — 94664 DEMO&/EVAL PT USE INHALER: CPT

## 2023-02-19 PROCEDURE — 2580000003 HC RX 258: Performed by: EMERGENCY MEDICINE

## 2023-02-19 PROCEDURE — 6360000004 HC RX CONTRAST MEDICATION: Performed by: RADIOLOGY

## 2023-02-19 PROCEDURE — 80053 COMPREHEN METABOLIC PANEL: CPT

## 2023-02-19 PROCEDURE — 85025 COMPLETE CBC W/AUTO DIFF WBC: CPT

## 2023-02-19 PROCEDURE — 96374 THER/PROPH/DIAG INJ IV PUSH: CPT

## 2023-02-19 PROCEDURE — 70491 CT SOFT TISSUE NECK W/DYE: CPT

## 2023-02-19 PROCEDURE — 71045 X-RAY EXAM CHEST 1 VIEW: CPT

## 2023-02-19 PROCEDURE — A4216 STERILE WATER/SALINE, 10 ML: HCPCS | Performed by: EMERGENCY MEDICINE

## 2023-02-19 PROCEDURE — 96372 THER/PROPH/DIAG INJ SC/IM: CPT

## 2023-02-19 PROCEDURE — 96375 TX/PRO/DX INJ NEW DRUG ADDON: CPT

## 2023-02-19 PROCEDURE — 2500000003 HC RX 250 WO HCPCS: Performed by: EMERGENCY MEDICINE

## 2023-02-19 PROCEDURE — 6360000002 HC RX W HCPCS: Performed by: EMERGENCY MEDICINE

## 2023-02-19 RX ORDER — IPRATROPIUM BROMIDE AND ALBUTEROL SULFATE 2.5; .5 MG/3ML; MG/3ML
1 SOLUTION RESPIRATORY (INHALATION) ONCE
Status: COMPLETED | OUTPATIENT
Start: 2023-02-19 | End: 2023-02-19

## 2023-02-19 RX ORDER — METHYLPREDNISOLONE SODIUM SUCCINATE 125 MG/2ML
125 INJECTION, POWDER, LYOPHILIZED, FOR SOLUTION INTRAMUSCULAR; INTRAVENOUS ONCE
Status: COMPLETED | OUTPATIENT
Start: 2023-02-19 | End: 2023-02-19

## 2023-02-19 RX ORDER — HYDROXYZINE HYDROCHLORIDE 50 MG/ML
25 INJECTION, SOLUTION INTRAMUSCULAR ONCE
Status: COMPLETED | OUTPATIENT
Start: 2023-02-19 | End: 2023-02-19

## 2023-02-19 RX ADMIN — METHYLPREDNISOLONE SODIUM SUCCINATE 125 MG: 125 INJECTION, POWDER, FOR SOLUTION INTRAMUSCULAR; INTRAVENOUS at 07:36

## 2023-02-19 RX ADMIN — IPRATROPIUM BROMIDE AND ALBUTEROL SULFATE 1 AMPULE: .5; 2.5 SOLUTION RESPIRATORY (INHALATION) at 07:50

## 2023-02-19 RX ADMIN — IOPAMIDOL 75 ML: 755 INJECTION, SOLUTION INTRAVENOUS at 11:05

## 2023-02-19 RX ADMIN — FAMOTIDINE 20 MG: 10 INJECTION INTRAVENOUS at 07:34

## 2023-02-19 RX ADMIN — HYDROXYZINE HYDROCHLORIDE 25 MG: 50 INJECTION, SOLUTION INTRAMUSCULAR at 07:36

## 2023-02-19 ASSESSMENT — PAIN - FUNCTIONAL ASSESSMENT: PAIN_FUNCTIONAL_ASSESSMENT: NONE - DENIES PAIN

## 2023-02-19 NOTE — ED NOTES
Report received Rahul Olivera, DARSHANA. Patient just got back from CT scan. Resting quietly in bed. Breathing is unlabored, and no signs of distress. Patient denies any further needs at this time.       Nael Yuen RN  02/19/23 4296

## 2023-02-19 NOTE — ED NOTES
Patient expressed concerns of \"not feeling hungry\" and the need for a feeding tube. Educated patient on health risk of feeding tube and the purpose of feeding tubes. Patient asked this nurse for help to the bathroom. When trying to ambulate patient to bathroom, patient stated she could not possibly walk at this time. Provided patient with bedpan and perineal wipes and attempted to promote patient independence.      Cristine Ahumada RN  02/19/23 0314

## 2023-02-19 NOTE — ED NOTES
Pt states she wears 4L O2 via NC at home \"most of the time\". Pulse ox varies from 91-92% on RA. O2 applied at 4L via NC.      [address], RN  02/19/23 1985

## 2023-02-19 NOTE — ED NOTES
Called and spoke with patient's , Taye Bragg, and updated him on status. Informed him that patient is on discharge. I continued to ask patient's  about patient's ability to ambulate and do ADL's. Patient's  said she walks about their home without any trouble.       Neel Boswell RN  02/19/23 0082

## 2023-02-19 NOTE — ED NOTES
Patient on 4L of O2 via NC. Patient states she is \"usually\" on 3-4L at home. Patient expressed to this nurse that she still feels as if her throat is \"closing up\", but patient is asymptomatic at this time. Currently, SPO2 is 93% on 4L NC.  Breathing is unlabored and equal.      Markos Benites RN  02/19/23 8785

## 2023-02-19 NOTE — ED NOTES
Informed patient that she is on discharge. Asked patient how she ambulates and she stated she had no function of her legs and lays in bed at home. Patient is being uncooperative.       Cary Sanchez RN  02/19/23 5748 no
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
## 2023-03-07 ENCOUNTER — HOSPITAL ENCOUNTER (EMERGENCY)
Age: 63
Discharge: HOME | End: 2023-03-07
Attending: EMERGENCY MEDICINE
Payer: MEDICARE

## 2023-03-07 ENCOUNTER — APPOINTMENT (OUTPATIENT)
Dept: GENERAL RADIOLOGY | Age: 63
End: 2023-03-07
Payer: MEDICARE

## 2023-03-07 VITALS
HEIGHT: 60 IN | BODY MASS INDEX: 21.6 KG/M2 | TEMPERATURE: 98 F | OXYGEN SATURATION: 94 % | HEART RATE: 85 BPM | DIASTOLIC BLOOD PRESSURE: 70 MMHG | WEIGHT: 110 LBS | SYSTOLIC BLOOD PRESSURE: 126 MMHG | RESPIRATION RATE: 16 BRPM

## 2023-03-07 DIAGNOSIS — R07.9 CHEST PAIN, UNSPECIFIED TYPE: ICD-10-CM

## 2023-03-07 DIAGNOSIS — J02.0 STREPTOCOCCAL SORE THROAT: Primary | ICD-10-CM

## 2023-03-07 LAB
ANION GAP SERPL CALCULATED.3IONS-SCNC: 8 MMOL/L (ref 7–16)
BASOPHILS ABSOLUTE: 0.04 E9/L (ref 0–0.2)
BASOPHILS RELATIVE PERCENT: 0.7 % (ref 0–2)
BUN BLDV-MCNC: 14 MG/DL (ref 6–23)
CALCIUM SERPL-MCNC: 9 MG/DL (ref 8.6–10.2)
CHLORIDE BLD-SCNC: 101 MMOL/L (ref 98–107)
CO2: 26 MMOL/L (ref 22–29)
CREAT SERPL-MCNC: 0.5 MG/DL (ref 0.5–1)
EOSINOPHILS ABSOLUTE: 0.12 E9/L (ref 0.05–0.5)
EOSINOPHILS RELATIVE PERCENT: 2 % (ref 0–6)
GFR SERPL CREATININE-BSD FRML MDRD: >60 ML/MIN/1.73
GLUCOSE BLD-MCNC: 121 MG/DL (ref 74–99)
HCT VFR BLD CALC: 39.3 % (ref 34–48)
HEMOGLOBIN: 12.8 G/DL (ref 11.5–15.5)
IMMATURE GRANULOCYTES #: 0.03 E9/L
IMMATURE GRANULOCYTES %: 0.5 % (ref 0–5)
INFLUENZA A BY PCR: NOT DETECTED
INFLUENZA B BY PCR: NOT DETECTED
LIPASE: 27 U/L (ref 13–60)
LYMPHOCYTES ABSOLUTE: 1.1 E9/L (ref 1.5–4)
LYMPHOCYTES RELATIVE PERCENT: 18 % (ref 20–42)
MCH RBC QN AUTO: 30.2 PG (ref 26–35)
MCHC RBC AUTO-ENTMCNC: 32.6 % (ref 32–34.5)
MCV RBC AUTO: 92.7 FL (ref 80–99.9)
MONOCYTES ABSOLUTE: 0.53 E9/L (ref 0.1–0.95)
MONOCYTES RELATIVE PERCENT: 8.7 % (ref 2–12)
NEUTROPHILS ABSOLUTE: 4.28 E9/L (ref 1.8–7.3)
NEUTROPHILS RELATIVE PERCENT: 70.1 % (ref 43–80)
PDW BLD-RTO: 13.8 FL (ref 11.5–15)
PLATELET # BLD: 235 E9/L (ref 130–450)
PMV BLD AUTO: 10.2 FL (ref 7–12)
POTASSIUM SERPL-SCNC: 4.7 MMOL/L (ref 3.5–5)
RBC # BLD: 4.24 E12/L (ref 3.5–5.5)
SARS-COV-2, NAAT: NOT DETECTED
SODIUM BLD-SCNC: 135 MMOL/L (ref 132–146)
STREP GRP A PCR: POSITIVE
TROPONIN, HIGH SENSITIVITY: 19 NG/L (ref 0–9)
TROPONIN, HIGH SENSITIVITY: 23 NG/L (ref 0–9)
WBC # BLD: 6.1 E9/L (ref 4.5–11.5)

## 2023-03-07 PROCEDURE — 87880 STREP A ASSAY W/OPTIC: CPT

## 2023-03-07 PROCEDURE — 93005 ELECTROCARDIOGRAM TRACING: CPT | Performed by: EMERGENCY MEDICINE

## 2023-03-07 PROCEDURE — 80048 BASIC METABOLIC PNL TOTAL CA: CPT

## 2023-03-07 PROCEDURE — 96374 THER/PROPH/DIAG INJ IV PUSH: CPT

## 2023-03-07 PROCEDURE — 99284 EMERGENCY DEPT VISIT MOD MDM: CPT

## 2023-03-07 PROCEDURE — 84484 ASSAY OF TROPONIN QUANT: CPT

## 2023-03-07 PROCEDURE — 83690 ASSAY OF LIPASE: CPT

## 2023-03-07 PROCEDURE — 71045 X-RAY EXAM CHEST 1 VIEW: CPT

## 2023-03-07 PROCEDURE — 6360000002 HC RX W HCPCS: Performed by: EMERGENCY MEDICINE

## 2023-03-07 PROCEDURE — 87502 INFLUENZA DNA AMP PROBE: CPT

## 2023-03-07 PROCEDURE — 87635 SARS-COV-2 COVID-19 AMP PRB: CPT

## 2023-03-07 PROCEDURE — 85025 COMPLETE CBC W/AUTO DIFF WBC: CPT

## 2023-03-07 PROCEDURE — 6370000000 HC RX 637 (ALT 250 FOR IP): Performed by: EMERGENCY MEDICINE

## 2023-03-07 RX ORDER — AMOXICILLIN 250 MG/1
500 CAPSULE ORAL ONCE
Status: COMPLETED | OUTPATIENT
Start: 2023-03-07 | End: 2023-03-07

## 2023-03-07 RX ORDER — LORAZEPAM 2 MG/ML
0.5 INJECTION INTRAMUSCULAR ONCE
Status: COMPLETED | OUTPATIENT
Start: 2023-03-07 | End: 2023-03-07

## 2023-03-07 RX ORDER — AMOXICILLIN 500 MG/1
500 CAPSULE ORAL 2 TIMES DAILY
Qty: 38 CAPSULE | Refills: 0 | Status: SHIPPED | OUTPATIENT
Start: 2023-03-07 | End: 2023-03-07 | Stop reason: SDUPTHER

## 2023-03-07 RX ORDER — AMOXICILLIN 500 MG/1
500 CAPSULE ORAL 2 TIMES DAILY
Qty: 19 CAPSULE | Refills: 0 | Status: ON HOLD | OUTPATIENT
Start: 2023-03-07 | End: 2023-03-17

## 2023-03-07 RX ADMIN — LORAZEPAM 0.5 MG: 2 INJECTION INTRAMUSCULAR; INTRAVENOUS at 11:24

## 2023-03-07 RX ADMIN — AMOXICILLIN 500 MG: 250 CAPSULE ORAL at 10:04

## 2023-03-07 NOTE — ED NOTES
Voided large amt cindy urine and large soft brown formed bm on bedpan     Evelia Castaneda RN  03/07/23 [id number]

## 2023-03-07 NOTE — ED NOTES
called to pick pt up. Pt continues to c/o sore throat. Smacking lips, shaking legs then says she is having a seizure but able to stop shaking when coached.  Dr Hayden Ward in to re examine pt       Ekta Kern RN  03/07/23 0458

## 2023-03-08 LAB
EKG ATRIAL RATE: 79 BPM
EKG P AXIS: 69 DEGREES
EKG P-R INTERVAL: 202 MS
EKG Q-T INTERVAL: 386 MS
EKG QRS DURATION: 88 MS
EKG QTC CALCULATION (BAZETT): 442 MS
EKG R AXIS: -41 DEGREES
EKG T AXIS: 58 DEGREES
EKG VENTRICULAR RATE: 79 BPM

## 2023-03-08 PROCEDURE — 93010 ELECTROCARDIOGRAM REPORT: CPT | Performed by: INTERNAL MEDICINE

## 2023-03-08 ASSESSMENT — ENCOUNTER SYMPTOMS
EYE DISCHARGE: 0
COUGH: 0
EYE REDNESS: 0
ABDOMINAL DISTENTION: 0
NAUSEA: 0
VOMITING: 0
SORE THROAT: 1
EYE PAIN: 0
ABDOMINAL PAIN: 0
VOICE CHANGE: 0
SHORTNESS OF BREATH: 0
WHEEZING: 0
SINUS PRESSURE: 0
DIARRHEA: 0
TROUBLE SWALLOWING: 0
RHINORRHEA: 0
BACK PAIN: 0

## 2023-03-11 ENCOUNTER — APPOINTMENT (OUTPATIENT)
Dept: GENERAL RADIOLOGY | Age: 63
DRG: 191 | End: 2023-03-11
Payer: MEDICARE

## 2023-03-11 ENCOUNTER — HOSPITAL ENCOUNTER (INPATIENT)
Age: 63
LOS: 2 days | Discharge: HOME | DRG: 191 | End: 2023-03-13
Attending: EMERGENCY MEDICINE | Admitting: FAMILY MEDICINE
Payer: MEDICARE

## 2023-03-11 ENCOUNTER — APPOINTMENT (OUTPATIENT)
Dept: CT IMAGING | Age: 63
DRG: 191 | End: 2023-03-11
Payer: MEDICARE

## 2023-03-11 DIAGNOSIS — F41.9 ANXIETY: ICD-10-CM

## 2023-03-11 DIAGNOSIS — K29.50 CHRONIC GASTRITIS WITHOUT BLEEDING, UNSPECIFIED GASTRITIS TYPE: ICD-10-CM

## 2023-03-11 DIAGNOSIS — R47.01 EXPRESSIVE APHASIA: ICD-10-CM

## 2023-03-11 DIAGNOSIS — J44.1 COPD EXACERBATION (HCC): Primary | ICD-10-CM

## 2023-03-11 DIAGNOSIS — R07.9 CHEST PAIN, UNSPECIFIED TYPE: ICD-10-CM

## 2023-03-11 LAB
ACETAMINOPHEN LEVEL: <5 MCG/ML (ref 10–30)
ALBUMIN SERPL-MCNC: 3.8 G/DL (ref 3.5–5.2)
ALP BLD-CCNC: 102 U/L (ref 35–104)
ALT SERPL-CCNC: 17 U/L (ref 0–32)
AMPHETAMINE SCREEN, URINE: NOT DETECTED
ANION GAP SERPL CALCULATED.3IONS-SCNC: 10 MMOL/L (ref 7–16)
AST SERPL-CCNC: 22 U/L (ref 0–31)
BACTERIA: ABNORMAL /HPF
BARBITURATE SCREEN URINE: NOT DETECTED
BASOPHILS ABSOLUTE: 0.06 E9/L (ref 0–0.2)
BASOPHILS RELATIVE PERCENT: 1 % (ref 0–2)
BENZODIAZEPINE SCREEN, URINE: NOT DETECTED
BILIRUB SERPL-MCNC: 0.3 MG/DL (ref 0–1.2)
BILIRUBIN URINE: NEGATIVE
BLOOD, URINE: NEGATIVE
BUN BLDV-MCNC: 13 MG/DL (ref 6–23)
CALCIUM SERPL-MCNC: 9 MG/DL (ref 8.6–10.2)
CANNABINOID SCREEN URINE: NOT DETECTED
CHLORIDE BLD-SCNC: 100 MMOL/L (ref 98–107)
CLARITY: NORMAL
CO2: 27 MMOL/L (ref 22–29)
COCAINE METABOLITE SCREEN URINE: NOT DETECTED
COLOR: YELLOW
CREAT SERPL-MCNC: 0.5 MG/DL (ref 0.5–1)
D DIMER: <200 NG/ML DDU
EOSINOPHILS ABSOLUTE: 0.18 E9/L (ref 0.05–0.5)
EOSINOPHILS RELATIVE PERCENT: 3.1 % (ref 0–6)
ETHANOL: <10 MG/DL (ref 0–0.08)
FENTANYL SCREEN, URINE: NOT DETECTED
GFR SERPL CREATININE-BSD FRML MDRD: >60 ML/MIN/1.73
GLUCOSE BLD-MCNC: 102 MG/DL (ref 74–99)
GLUCOSE URINE: NEGATIVE MG/DL
HCT VFR BLD CALC: 41 % (ref 34–48)
HEMOGLOBIN: 13.1 G/DL (ref 11.5–15.5)
IMMATURE GRANULOCYTES #: 0.02 E9/L
IMMATURE GRANULOCYTES %: 0.3 % (ref 0–5)
KETONES, URINE: NEGATIVE MG/DL
LEUKOCYTE ESTERASE, URINE: NEGATIVE
LYMPHOCYTES ABSOLUTE: 1.94 E9/L (ref 1.5–4)
LYMPHOCYTES RELATIVE PERCENT: 33 % (ref 20–42)
Lab: NORMAL
MCH RBC QN AUTO: 29.6 PG (ref 26–35)
MCHC RBC AUTO-ENTMCNC: 32 % (ref 32–34.5)
MCV RBC AUTO: 92.6 FL (ref 80–99.9)
METER GLUCOSE: 110 MG/DL (ref 74–99)
METHADONE SCREEN, URINE: NOT DETECTED
MONOCYTES ABSOLUTE: 0.52 E9/L (ref 0.1–0.95)
MONOCYTES RELATIVE PERCENT: 8.9 % (ref 2–12)
NEUTROPHILS ABSOLUTE: 3.15 E9/L (ref 1.8–7.3)
NEUTROPHILS RELATIVE PERCENT: 53.7 % (ref 43–80)
NITRITE, URINE: NEGATIVE
OPIATE SCREEN URINE: NOT DETECTED
OXYCODONE URINE: NOT DETECTED
PDW BLD-RTO: 13.2 FL (ref 11.5–15)
PH UA: 8 (ref 5–9)
PHENCYCLIDINE SCREEN URINE: NOT DETECTED
PLATELET # BLD: 226 E9/L (ref 130–450)
PMV BLD AUTO: 10.2 FL (ref 7–12)
POTASSIUM SERPL-SCNC: 4.1 MMOL/L (ref 3.5–5)
PRO-BNP: 245 PG/ML (ref 0–125)
PROTEIN UA: NEGATIVE MG/DL
RBC # BLD: 4.43 E12/L (ref 3.5–5.5)
RBC UA: ABNORMAL /HPF (ref 0–2)
SALICYLATE, SERUM: <0.3 MG/DL (ref 0–30)
SODIUM BLD-SCNC: 137 MMOL/L (ref 132–146)
SPECIFIC GRAVITY UA: 1.01 (ref 1–1.03)
TOTAL PROTEIN: 6.9 G/DL (ref 6.4–8.3)
TRICYCLIC ANTIDEPRESSANTS SCREEN SERUM: NEGATIVE NG/ML
TROPONIN, HIGH SENSITIVITY: 22 NG/L (ref 0–9)
TROPONIN, HIGH SENSITIVITY: 23 NG/L (ref 0–9)
UROBILINOGEN, URINE: 0.2 E.U./DL
VALPROIC ACID LEVEL: 41 MCG/ML (ref 50–100)
VALPROIC ACID LEVEL: 42 MCG/ML (ref 50–100)
WBC # BLD: 5.9 E9/L (ref 4.5–11.5)
WBC UA: ABNORMAL /HPF (ref 0–5)

## 2023-03-11 PROCEDURE — 4A03X5D MEASUREMENT OF ARTERIAL FLOW, INTRACRANIAL, EXTERNAL APPROACH: ICD-10-PCS | Performed by: RADIOLOGY

## 2023-03-11 PROCEDURE — 80179 DRUG ASSAY SALICYLATE: CPT

## 2023-03-11 PROCEDURE — 6370000000 HC RX 637 (ALT 250 FOR IP): Performed by: FAMILY MEDICINE

## 2023-03-11 PROCEDURE — 83880 ASSAY OF NATRIURETIC PEPTIDE: CPT

## 2023-03-11 PROCEDURE — 6360000004 HC RX CONTRAST MEDICATION: Performed by: RADIOLOGY

## 2023-03-11 PROCEDURE — 93005 ELECTROCARDIOGRAM TRACING: CPT | Performed by: EMERGENCY MEDICINE

## 2023-03-11 PROCEDURE — 70498 CT ANGIOGRAPHY NECK: CPT

## 2023-03-11 PROCEDURE — 71275 CT ANGIOGRAPHY CHEST: CPT

## 2023-03-11 PROCEDURE — 71045 X-RAY EXAM CHEST 1 VIEW: CPT

## 2023-03-11 PROCEDURE — 94664 DEMO&/EVAL PT USE INHALER: CPT

## 2023-03-11 PROCEDURE — 6360000002 HC RX W HCPCS

## 2023-03-11 PROCEDURE — 96374 THER/PROPH/DIAG INJ IV PUSH: CPT

## 2023-03-11 PROCEDURE — 80307 DRUG TEST PRSMV CHEM ANLYZR: CPT

## 2023-03-11 PROCEDURE — 6360000002 HC RX W HCPCS: Performed by: PSYCHIATRY & NEUROLOGY

## 2023-03-11 PROCEDURE — 99285 EMERGENCY DEPT VISIT HI MDM: CPT

## 2023-03-11 PROCEDURE — 99222 1ST HOSP IP/OBS MODERATE 55: CPT | Performed by: PSYCHIATRY & NEUROLOGY

## 2023-03-11 PROCEDURE — 85025 COMPLETE CBC W/AUTO DIFF WBC: CPT

## 2023-03-11 PROCEDURE — 36415 COLL VENOUS BLD VENIPUNCTURE: CPT

## 2023-03-11 PROCEDURE — 6360000002 HC RX W HCPCS: Performed by: FAMILY MEDICINE

## 2023-03-11 PROCEDURE — 80143 DRUG ASSAY ACETAMINOPHEN: CPT

## 2023-03-11 PROCEDURE — 84484 ASSAY OF TROPONIN QUANT: CPT

## 2023-03-11 PROCEDURE — 82962 GLUCOSE BLOOD TEST: CPT

## 2023-03-11 PROCEDURE — 81001 URINALYSIS AUTO W/SCOPE: CPT

## 2023-03-11 PROCEDURE — 94640 AIRWAY INHALATION TREATMENT: CPT

## 2023-03-11 PROCEDURE — 85378 FIBRIN DEGRADE SEMIQUANT: CPT

## 2023-03-11 PROCEDURE — 82077 ASSAY SPEC XCP UR&BREATH IA: CPT

## 2023-03-11 PROCEDURE — 2580000003 HC RX 258: Performed by: FAMILY MEDICINE

## 2023-03-11 PROCEDURE — 70450 CT HEAD/BRAIN W/O DYE: CPT

## 2023-03-11 PROCEDURE — 2060000000 HC ICU INTERMEDIATE R&B

## 2023-03-11 PROCEDURE — 70496 CT ANGIOGRAPHY HEAD: CPT

## 2023-03-11 PROCEDURE — 6370000000 HC RX 637 (ALT 250 FOR IP): Performed by: INTERNAL MEDICINE

## 2023-03-11 PROCEDURE — 0042T CT BRAIN PERFUSION: CPT

## 2023-03-11 PROCEDURE — 80164 ASSAY DIPROPYLACETIC ACD TOT: CPT

## 2023-03-11 PROCEDURE — 6370000000 HC RX 637 (ALT 250 FOR IP): Performed by: PSYCHIATRY & NEUROLOGY

## 2023-03-11 PROCEDURE — 80053 COMPREHEN METABOLIC PANEL: CPT

## 2023-03-11 PROCEDURE — 6370000000 HC RX 637 (ALT 250 FOR IP)

## 2023-03-11 RX ORDER — PREGABALIN 75 MG/1
75 CAPSULE ORAL 3 TIMES DAILY
Status: DISCONTINUED | OUTPATIENT
Start: 2023-03-11 | End: 2023-03-13 | Stop reason: HOSPADM

## 2023-03-11 RX ORDER — PREDNISONE 20 MG/1
40 TABLET ORAL DAILY
Status: DISCONTINUED | OUTPATIENT
Start: 2023-03-13 | End: 2023-03-13

## 2023-03-11 RX ORDER — DIVALPROEX SODIUM 250 MG/1
250 TABLET, EXTENDED RELEASE ORAL EVERY MORNING
Status: DISCONTINUED | OUTPATIENT
Start: 2023-03-11 | End: 2023-03-12

## 2023-03-11 RX ORDER — DIVALPROEX SODIUM 500 MG/1
500 TABLET, EXTENDED RELEASE ORAL NIGHTLY
Status: DISCONTINUED | OUTPATIENT
Start: 2023-03-11 | End: 2023-03-12

## 2023-03-11 RX ORDER — ONDANSETRON 2 MG/ML
4 INJECTION INTRAMUSCULAR; INTRAVENOUS EVERY 6 HOURS PRN
Status: DISCONTINUED | OUTPATIENT
Start: 2023-03-11 | End: 2023-03-13 | Stop reason: HOSPADM

## 2023-03-11 RX ORDER — SERTRALINE HYDROCHLORIDE 25 MG/1
50 TABLET, FILM COATED ORAL DAILY
Status: DISCONTINUED | OUTPATIENT
Start: 2023-03-11 | End: 2023-03-11

## 2023-03-11 RX ORDER — VALBENAZINE 40 MG/1
40 CAPSULE ORAL DAILY
COMMUNITY

## 2023-03-11 RX ORDER — KETOROLAC TROMETHAMINE 30 MG/ML
15 INJECTION, SOLUTION INTRAMUSCULAR; INTRAVENOUS EVERY 6 HOURS PRN
Status: DISPENSED | OUTPATIENT
Start: 2023-03-11 | End: 2023-03-12

## 2023-03-11 RX ORDER — POLYETHYLENE GLYCOL 3350 17 G/17G
17 POWDER, FOR SOLUTION ORAL DAILY PRN
Status: DISCONTINUED | OUTPATIENT
Start: 2023-03-11 | End: 2023-03-13 | Stop reason: HOSPADM

## 2023-03-11 RX ORDER — SODIUM CHLORIDE 0.9 % (FLUSH) 0.9 %
5-40 SYRINGE (ML) INJECTION EVERY 12 HOURS SCHEDULED
Status: DISCONTINUED | OUTPATIENT
Start: 2023-03-11 | End: 2023-03-13 | Stop reason: HOSPADM

## 2023-03-11 RX ORDER — DIVALPROEX SODIUM 250 MG/1
250 TABLET, EXTENDED RELEASE ORAL EVERY MORNING
Status: ON HOLD | COMMUNITY
End: 2023-03-13 | Stop reason: HOSPADM

## 2023-03-11 RX ORDER — DIVALPROEX SODIUM 250 MG/1
250 TABLET, EXTENDED RELEASE ORAL EVERY MORNING
Status: DISCONTINUED | OUTPATIENT
Start: 2023-03-12 | End: 2023-03-11

## 2023-03-11 RX ORDER — RISPERIDONE 1 MG/1
1 TABLET ORAL 3 TIMES DAILY
Status: DISCONTINUED | OUTPATIENT
Start: 2023-03-11 | End: 2023-03-11

## 2023-03-11 RX ORDER — DIVALPROEX SODIUM 250 MG/1
250 TABLET, DELAYED RELEASE ORAL 3 TIMES DAILY
Status: DISCONTINUED | OUTPATIENT
Start: 2023-03-11 | End: 2023-03-11

## 2023-03-11 RX ORDER — CLONAZEPAM 0.5 MG/1
0.25 TABLET ORAL NIGHTLY
Status: DISCONTINUED | OUTPATIENT
Start: 2023-03-11 | End: 2023-03-12

## 2023-03-11 RX ORDER — DIVALPROEX SODIUM 500 MG/1
500 TABLET, EXTENDED RELEASE ORAL
Status: ON HOLD | COMMUNITY
End: 2023-03-13 | Stop reason: HOSPADM

## 2023-03-11 RX ORDER — SODIUM CHLORIDE 0.9 % (FLUSH) 0.9 %
5-40 SYRINGE (ML) INJECTION PRN
Status: DISCONTINUED | OUTPATIENT
Start: 2023-03-11 | End: 2023-03-13 | Stop reason: HOSPADM

## 2023-03-11 RX ORDER — ENOXAPARIN SODIUM 100 MG/ML
40 INJECTION SUBCUTANEOUS DAILY
Status: DISCONTINUED | OUTPATIENT
Start: 2023-03-11 | End: 2023-03-13 | Stop reason: HOSPADM

## 2023-03-11 RX ORDER — ZOLPIDEM TARTRATE 5 MG/1
5 TABLET ORAL NIGHTLY PRN
Status: DISCONTINUED | OUTPATIENT
Start: 2023-03-11 | End: 2023-03-13 | Stop reason: HOSPADM

## 2023-03-11 RX ORDER — LANOLIN ALCOHOL/MO/W.PET/CERES
3 CREAM (GRAM) TOPICAL NIGHTLY
Status: DISCONTINUED | OUTPATIENT
Start: 2023-03-11 | End: 2023-03-13 | Stop reason: HOSPADM

## 2023-03-11 RX ORDER — ONDANSETRON 4 MG/1
4 TABLET, ORALLY DISINTEGRATING ORAL EVERY 8 HOURS PRN
Status: DISCONTINUED | OUTPATIENT
Start: 2023-03-11 | End: 2023-03-13 | Stop reason: HOSPADM

## 2023-03-11 RX ORDER — SERTRALINE HYDROCHLORIDE 100 MG/1
100 TABLET, FILM COATED ORAL DAILY
Status: DISCONTINUED | OUTPATIENT
Start: 2023-03-11 | End: 2023-03-13 | Stop reason: HOSPADM

## 2023-03-11 RX ORDER — ACETAMINOPHEN 325 MG/1
650 TABLET ORAL EVERY 6 HOURS PRN
Status: DISCONTINUED | OUTPATIENT
Start: 2023-03-11 | End: 2023-03-13 | Stop reason: HOSPADM

## 2023-03-11 RX ORDER — IPRATROPIUM BROMIDE AND ALBUTEROL SULFATE 2.5; .5 MG/3ML; MG/3ML
3 SOLUTION RESPIRATORY (INHALATION) ONCE
Status: COMPLETED | OUTPATIENT
Start: 2023-03-11 | End: 2023-03-11

## 2023-03-11 RX ORDER — ACETAMINOPHEN 650 MG/1
650 SUPPOSITORY RECTAL EVERY 6 HOURS PRN
Status: DISCONTINUED | OUTPATIENT
Start: 2023-03-11 | End: 2023-03-13 | Stop reason: HOSPADM

## 2023-03-11 RX ORDER — PROPRANOLOL HYDROCHLORIDE 40 MG/1
40 TABLET ORAL 2 TIMES DAILY
Status: ON HOLD | COMMUNITY
End: 2023-03-13 | Stop reason: HOSPADM

## 2023-03-11 RX ORDER — METHYLPREDNISOLONE SODIUM SUCCINATE 125 MG/2ML
125 INJECTION, POWDER, LYOPHILIZED, FOR SOLUTION INTRAMUSCULAR; INTRAVENOUS ONCE
Status: COMPLETED | OUTPATIENT
Start: 2023-03-11 | End: 2023-03-11

## 2023-03-11 RX ORDER — IPRATROPIUM BROMIDE AND ALBUTEROL SULFATE 2.5; .5 MG/3ML; MG/3ML
1 SOLUTION RESPIRATORY (INHALATION)
Status: DISCONTINUED | OUTPATIENT
Start: 2023-03-11 | End: 2023-03-13 | Stop reason: HOSPADM

## 2023-03-11 RX ORDER — PANTOPRAZOLE SODIUM 40 MG/1
40 TABLET, DELAYED RELEASE ORAL 2 TIMES DAILY
Status: DISCONTINUED | OUTPATIENT
Start: 2023-03-11 | End: 2023-03-13 | Stop reason: HOSPADM

## 2023-03-11 RX ORDER — ZOLPIDEM TARTRATE 10 MG/1
10 TABLET ORAL
Status: ON HOLD | COMMUNITY
End: 2023-03-13 | Stop reason: SDUPTHER

## 2023-03-11 RX ORDER — SUCRALFATE 1 G/1
1 TABLET ORAL 4 TIMES DAILY
Status: DISCONTINUED | OUTPATIENT
Start: 2023-03-11 | End: 2023-03-13 | Stop reason: HOSPADM

## 2023-03-11 RX ORDER — RISPERIDONE 1 MG/1
1 TABLET ORAL 3 TIMES DAILY
Status: DISCONTINUED | OUTPATIENT
Start: 2023-03-11 | End: 2023-03-12

## 2023-03-11 RX ORDER — BENZTROPINE MESYLATE 2 MG/1
2 TABLET ORAL DAILY
Status: ON HOLD | COMMUNITY
End: 2023-03-13 | Stop reason: SDUPTHER

## 2023-03-11 RX ORDER — HYDROMORPHONE HYDROCHLORIDE 2 MG/1
4 TABLET ORAL EVERY 6 HOURS PRN
Status: DISCONTINUED | OUTPATIENT
Start: 2023-03-11 | End: 2023-03-13 | Stop reason: HOSPADM

## 2023-03-11 RX ORDER — BENZTROPINE MESYLATE 2 MG/1
2 TABLET ORAL DAILY
Status: DISCONTINUED | OUTPATIENT
Start: 2023-03-11 | End: 2023-03-12

## 2023-03-11 RX ORDER — SUCRALFATE 1 G/1
1 TABLET ORAL 4 TIMES DAILY
COMMUNITY

## 2023-03-11 RX ORDER — BUDESONIDE 0.5 MG/2ML
500 INHALANT ORAL 2 TIMES DAILY
Status: DISCONTINUED | OUTPATIENT
Start: 2023-03-11 | End: 2023-03-13 | Stop reason: HOSPADM

## 2023-03-11 RX ORDER — SODIUM CHLORIDE 9 MG/ML
INJECTION, SOLUTION INTRAVENOUS PRN
Status: DISCONTINUED | OUTPATIENT
Start: 2023-03-11 | End: 2023-03-13 | Stop reason: HOSPADM

## 2023-03-11 RX ORDER — SERTRALINE HYDROCHLORIDE 100 MG/1
100 TABLET, FILM COATED ORAL DAILY
COMMUNITY

## 2023-03-11 RX ORDER — METHYLPREDNISOLONE SODIUM SUCCINATE 40 MG/ML
40 INJECTION, POWDER, LYOPHILIZED, FOR SOLUTION INTRAMUSCULAR; INTRAVENOUS EVERY 6 HOURS
Status: DISCONTINUED | OUTPATIENT
Start: 2023-03-11 | End: 2023-03-13

## 2023-03-11 RX ORDER — SUCRALFATE 1 G/1
1 TABLET ORAL 3 TIMES DAILY
Status: DISCONTINUED | OUTPATIENT
Start: 2023-03-11 | End: 2023-03-11

## 2023-03-11 RX ORDER — ALBUTEROL SULFATE 2.5 MG/3ML
2.5 SOLUTION RESPIRATORY (INHALATION)
Status: DISCONTINUED | OUTPATIENT
Start: 2023-03-11 | End: 2023-03-13 | Stop reason: HOSPADM

## 2023-03-11 RX ADMIN — ASPIRIN 325 MG: 325 TABLET, COATED ORAL at 12:50

## 2023-03-11 RX ADMIN — METHYLPREDNISOLONE SODIUM SUCCINATE 40 MG: 40 INJECTION, POWDER, FOR SOLUTION INTRAMUSCULAR; INTRAVENOUS at 12:48

## 2023-03-11 RX ADMIN — IPRATROPIUM BROMIDE AND ALBUTEROL SULFATE 1 AMPULE: .5; 2.5 SOLUTION RESPIRATORY (INHALATION) at 15:52

## 2023-03-11 RX ADMIN — IPRATROPIUM BROMIDE AND ALBUTEROL SULFATE 1 AMPULE: .5; 2.5 SOLUTION RESPIRATORY (INHALATION) at 11:34

## 2023-03-11 RX ADMIN — IPRATROPIUM BROMIDE AND ALBUTEROL SULFATE 1 AMPULE: .5; 2.5 SOLUTION RESPIRATORY (INHALATION) at 19:29

## 2023-03-11 RX ADMIN — ZOLPIDEM TARTRATE 5 MG: 5 TABLET ORAL at 22:01

## 2023-03-11 RX ADMIN — CLONAZEPAM 0.25 MG: 0.5 TABLET ORAL at 21:13

## 2023-03-11 RX ADMIN — PREGABALIN 75 MG: 75 CAPSULE ORAL at 21:12

## 2023-03-11 RX ADMIN — SUCRALFATE 1 G: 1 TABLET ORAL at 21:12

## 2023-03-11 RX ADMIN — Medication 10 ML: at 21:19

## 2023-03-11 RX ADMIN — Medication 10 ML: at 13:07

## 2023-03-11 RX ADMIN — PANTOPRAZOLE SODIUM 40 MG: 40 TABLET, DELAYED RELEASE ORAL at 12:50

## 2023-03-11 RX ADMIN — KETOROLAC TROMETHAMINE 15 MG: 30 INJECTION, SOLUTION INTRAMUSCULAR; INTRAVENOUS at 23:04

## 2023-03-11 RX ADMIN — METHYLPREDNISOLONE SODIUM SUCCINATE 125 MG: 125 INJECTION, POWDER, FOR SOLUTION INTRAMUSCULAR; INTRAVENOUS at 03:41

## 2023-03-11 RX ADMIN — PANTOPRAZOLE SODIUM 40 MG: 40 TABLET, DELAYED RELEASE ORAL at 21:13

## 2023-03-11 RX ADMIN — RISPERIDONE 1 MG: 1 TABLET ORAL at 16:39

## 2023-03-11 RX ADMIN — DIVALPROEX SODIUM 500 MG: 500 TABLET, EXTENDED RELEASE ORAL at 21:12

## 2023-03-11 RX ADMIN — BUDESONIDE 500 MCG: 0.5 SUSPENSION RESPIRATORY (INHALATION) at 08:46

## 2023-03-11 RX ADMIN — METHYLPREDNISOLONE SODIUM SUCCINATE 40 MG: 40 INJECTION, POWDER, FOR SOLUTION INTRAMUSCULAR; INTRAVENOUS at 21:12

## 2023-03-11 RX ADMIN — MELATONIN 3 MG ORAL TABLET 3 MG: 3 TABLET ORAL at 21:13

## 2023-03-11 RX ADMIN — BUDESONIDE 500 MCG: 0.5 SUSPENSION RESPIRATORY (INHALATION) at 19:29

## 2023-03-11 RX ADMIN — IOPAMIDOL 175 ML: 755 INJECTION, SOLUTION INTRAVENOUS at 03:34

## 2023-03-11 RX ADMIN — ENOXAPARIN SODIUM 40 MG: 100 INJECTION SUBCUTANEOUS at 12:48

## 2023-03-11 RX ADMIN — DIVALPROEX SODIUM 250 MG: 250 TABLET, EXTENDED RELEASE ORAL at 16:39

## 2023-03-11 RX ADMIN — IPRATROPIUM BROMIDE AND ALBUTEROL SULFATE 3 AMPULE: 2.5; .5 SOLUTION RESPIRATORY (INHALATION) at 03:46

## 2023-03-11 RX ADMIN — PREGABALIN 75 MG: 75 CAPSULE ORAL at 12:49

## 2023-03-11 RX ADMIN — HYDROMORPHONE HYDROCHLORIDE 4 MG: 2 TABLET ORAL at 15:06

## 2023-03-11 RX ADMIN — RISPERIDONE 1 MG: 1 TABLET ORAL at 21:12

## 2023-03-11 RX ADMIN — SERTRALINE 100 MG: 100 TABLET, FILM COATED ORAL at 15:06

## 2023-03-11 RX ADMIN — HYDROMORPHONE HYDROCHLORIDE 4 MG: 2 TABLET ORAL at 22:01

## 2023-03-11 RX ADMIN — BENZTROPINE MESYLATE 2 MG: 2 TABLET ORAL at 16:38

## 2023-03-11 RX ADMIN — IPRATROPIUM BROMIDE AND ALBUTEROL SULFATE 1 AMPULE: .5; 2.5 SOLUTION RESPIRATORY (INHALATION) at 08:45

## 2023-03-11 ASSESSMENT — ENCOUNTER SYMPTOMS
WHEEZING: 0
ABDOMINAL DISTENTION: 0
CHEST TIGHTNESS: 1
TROUBLE SWALLOWING: 1
STRIDOR: 0
SHORTNESS OF BREATH: 1
COUGH: 1
ABDOMINAL PAIN: 0
APNEA: 0
SORE THROAT: 1
VOICE CHANGE: 0

## 2023-03-11 ASSESSMENT — PAIN DESCRIPTION - DESCRIPTORS
DESCRIPTORS: ACHING;CRAMPING
DESCRIPTORS: ACHING;SORE
DESCRIPTORS: DISCOMFORT;SORE

## 2023-03-11 ASSESSMENT — PAIN DESCRIPTION - LOCATION
LOCATION: GENERALIZED

## 2023-03-11 ASSESSMENT — PAIN DESCRIPTION - ONSET: ONSET: ON-GOING

## 2023-03-11 ASSESSMENT — PAIN - FUNCTIONAL ASSESSMENT
PAIN_FUNCTIONAL_ASSESSMENT: PREVENTS OR INTERFERES SOME ACTIVE ACTIVITIES AND ADLS
PAIN_FUNCTIONAL_ASSESSMENT: 0-10

## 2023-03-11 ASSESSMENT — PAIN SCALES - GENERAL
PAINLEVEL_OUTOF10: 8
PAINLEVEL_OUTOF10: 8
PAINLEVEL_OUTOF10: 7
PAINLEVEL_OUTOF10: 0
PAINLEVEL_OUTOF10: 8

## 2023-03-11 ASSESSMENT — PAIN DESCRIPTION - ORIENTATION
ORIENTATION: MID
ORIENTATION: MID

## 2023-03-11 ASSESSMENT — PAIN DESCRIPTION - PAIN TYPE: TYPE: CHRONIC PAIN

## 2023-03-11 ASSESSMENT — PAIN SCALES - WONG BAKER: WONGBAKER_NUMERICALRESPONSE: 0

## 2023-03-11 ASSESSMENT — PAIN DESCRIPTION - FREQUENCY: FREQUENCY: CONTINUOUS

## 2023-03-11 NOTE — ED PROVIDER NOTES
I was the primary provider for patient. Patient with history of asthma/COPD as well as anxiety history of acid reflux as well as hyperlipidemia history of psychiatric disease on oxygen history of presenting here because of shortness of breath that started yesterday morning. Patient reports its been much constant. Patient reporting midsternal chest pain that does not radiate. Patient reporting no abdominal pain no vomiting. Patient reporting no fever chills. Patient reporting no leg pain or swelling. Patient reporting no headache she does report productive cough. Patient reportedly having issues with her speech at home. Reports that she went to sleep before 11 PM.  Patient woke up with having difficulty with her speech. Patient reporting feeling weak on the left side patient is awake alert ranging to person and place but not to time heart exam normal lungs are diminished abdomen soft nontender patient able to move all extremities she has no neurological deficit. She has no facial droop. While here in the emergency department patient does have periods of expressive aphasia cannot tell me how old she is and/or what month or year. I do not appreciate a facial droop. Patient was made a  [place]oma Alert  Patient differential includes COPD exacerbation as well as CHF exacerbation as well as pneumonia as well as PE. Other differentials include CVA as well as TIA  EKG showed sinus rhythm rate of 62 there is no ST elevation I did compared to EKG it was compared to March 7, 2023 I do agree with interpretation by resident. Patient old records reviewed and patient was just seen at [facility] and was treated and released she was having chest pains at that time but also had positive strep screen. Patient was placed on antibiotics. Patient patient does report that she quit smoking several months ago.   With her symptoms of having some expressive aphasia as well as being disoriented not knowing the month and/or year.  Patient unable to even tell me her age. Patient at times will stutter but then her speech will be clear she did undergo imaging as far as CTA of her head as well as her neck as well as perfusion all of which were negative as far as any signs of stroke. There is no large vessel occlusion. Patient stroke scale here is a 3. Patient from my standpoint is not a candidate for any anticoagulation. She did undergo CT of her chest due to her shortness of breath as well there is no signs of PE or infiltrate. Patient labs White count was 5.9 hemoglobin was 13.1 sodium was 137 potassium was 4.1 her initial troponin was 22 her second 1 was 23. Patient's proBNP was 245. Chest x-ray showed no infiltrate or effusion. While here in the emergency department was still having periods of stuttering and trouble with her speech at times. But able to move all extremities. She has no upper or lower extremity weakness she has no appreciable facial droop. Posterior pharynx is mildly red on recheck here. Patient was complaining of difficulty swallowing. Patient was ordered a baby aspirin here in the emergency department. She was given DuoNeb treatments. Patient was made aware of findings and plan. We did speak to on-call internal medicine. Patient will be admitted to monitored bed.   Sun Zhang MD  03/11/23 [id number] Jan Samano MD  03/11/23 [id number]

## 2023-03-11 NOTE — ED NOTES
Chest pain and shortness of breath X \"all day\"  Patient admits to hx of COPD with 4L NC oxygen at baseline     Kaushal Acevedo RN  03/11/23 9937

## 2023-03-11 NOTE — H&P
Hospitalist History & Physical      PCP: Pam Randall MD    Date of Service: Pt seen/examined on 3/11/2023     Chief Complaint:  had concerns including Shortness of Breath and Chest Pain (Chest pain and shortness of breath X \"all day\"/Patient admits to hx of COPD with 4L NC oxygen at baseline). History Of Present Illness:    Ms. Tammy Shane, a 65y.o. year old female  who  has a past medical history of Abdominal pain, Anxiety, Arthritis, Blood circulation, collateral, Cancer (HCC), Chronic back pain, Colitis, Complex regional pain syndrome type 1 of right lower extremity, COPD (chronic obstructive pulmonary disease) ([address] Depression, GERD (gastroesophageal reflux disease), Headache(784.0), Hyperlipidemia, Kidney stone, Neuromuscular disorder ([address] On home O2, Osteoarthritis, Other disorders of kidney and ureter in diseases classified elsewhere, Pneumonia, Psychiatric problem, and RSD (reflex sympathetic dystrophy). Patient presented to the emergency department with complaints of shortness of breath and chest discomfort. This began earlier this morning. Vital signs within normal limits and stable. SPO2 100% on 4 L nasal cannula. The patient is afebrile. Laboratory studies demonstrate glucose 102, proBNP 245, troponin 22. Chest x-ray shows no acute changes. Apparently there was some concern for strokelike symptoms while she was in the emergency department. CT head, CTA head/neck/brain perfusion were unremarkable. Patient noted to have wheezes bilaterally. Patient was given breathing treatments and steroids. Medicine was consulted for admission.       Past Medical History:   Diagnosis Date    Abdominal pain     For EGD 1-13-23    Anxiety     Arthritis     Blood circulation, collateral     Cancer (HCC)     SKIN    Chronic back pain     Colitis     recent    Complex regional pain syndrome type 1 of right lower extremity 09/11/2015    COPD (chronic obstructive pulmonary disease) ([address] Depression     GERD (gastroesophageal reflux disease)     Headache(784.0)     Hyperlipidemia 10/17/2014    Kidney stone     Neuromuscular disorder ([address]     On home O2     3 liters nc-Ordered by Dr. Stephani Cuadra 1/10/23, to receive on 1/12/23 per     Osteoarthritis     Other disorders of kidney and ureter in diseases classified elsewhere     Pneumonia 12/2022    Psychiatric problem     RSD (reflex sympathetic dystrophy)        Past Surgical History:   Procedure Laterality Date    BACK SURGERY  2005    had cerv SCS at [address] then had staph infec 2003  then it was changed to a dual SCS by Dr Celestina Chiang 2005 approx then has had several battery changes and rechargeable put in 2009    [address]      bryant hands    COLONOSCOPY      ENDOSCOPY, COLON, DIAGNOSTIC      FINGER AMPUTATION      index right hand multiple surgeries    HYSTERECTOMY (CERVIX STATUS UNKNOWN)      LAMINECTOMY      cervical    NERVE BLOCK N/A 08/19/2015    cerv facet #1 with iv anesthesia    NERVE BLOCK Left 08/26/2015    left cervical paravertebral facet block #2 c4 5 c5 6 c6 7 under iv sedation    NERVE BLOCK  09/02/2015    lumbar parasympathetic #1    NERVE BLOCK Right 09/17/2015    right sympathetic block #2    OTHER SURGICAL HISTORY  09/18/2013    surgical replacement of medtronic cervical spinal cord stimulator electrode and connect to existing battery right hip    OTHER SURGICAL HISTORY N/A 02/03/2014    Surgical revision spinal cord stimulator scar at anchor site due to wound  dehisence    OTHER SURGICAL HISTORY Right 11/11/2014    EXCISION OF CYST RIGHT SHOULDER    OTHER SURGICAL HISTORY  04/08/2015    surgical revision medtronic cervical spinal cord stimulator scar at anchor site due to wound dehisance    OTHER SURGICAL HISTORY  04/25/2018    spinal cord stimulator battery replacement due to end of life    [place] REVJ/RMVL IMPLANTED SPINAL NEUROSTIM GENERATOR N/A 04/25/2018    SPINAL CORD STIMULATOR BATTERY REPLACEMENT DUE TO END OF LIFE performed by Mookie Melara DO at [address],[place] 1/13/2023    EGD BIOPSY performed by Kathy Ruiz MD at [address]       Prior to Admission medications    Medication Sig Start Date End Date Taking? Authorizing Provider   amoxicillin (AMOXIL) 500 MG capsule Take 1 capsule by mouth 2 times daily for 19 doses 3/7/23 3/17/23  Rosita Bates DO   zolpidem (AMBIEN CR) 12.5 MG extended release tablet Take 1 tablet by mouth nightly. 11/30/22   Historical Provider, MD   sertraline (ZOLOFT) 50 MG tablet Take 1 tablet by mouth daily 12/31/22   Priti Salinas MD   sucralfate (CARAFATE) 1 GM tablet Take 1 tablet by mouth in the morning, at noon, and at bedtime 12/30/22   Priti Salinas MD   pantoprazole (PROTONIX) 40 MG tablet Take 1 tablet by mouth in the morning and at bedtime 12/30/22   Priti Salinas MD   divalproex (DEPAKOTE) 250 MG DR tablet Take 1 tablet by mouth 3 times daily 12/18/22   Dianne Hall MD   albuterol sulfate HFA (PROVENTIL;VENTOLIN;PROAIR) 108 (90 Base) MCG/ACT inhaler INHALE 2 PUFFS INTO THE LUNGS FOUR TIMES DAILY AS NEEDED FOR WHEEZING 12/14/22   Pamella Lacy MD   albuterol (PROVENTIL) (2.5 MG/3ML) 0.083% nebulizer solution Take 3 mLs by nebulization every 6 hours as needed for Wheezing 11/8/22   Nan Harmon MD   risperiDONE (RISPERDAL) 1 MG tablet Take 1 mg by mouth 3 times daily    Historical Provider, MD   nicotine (NICODERM CQ) 21 MG/24HR Place 1 patch onto the skin every 24 hours    Historical Provider, MD   HYDROmorphone (DILAUDID) 4 MG tablet Take 4 mg by mouth every 6 hours as needed. Uses for RSD 8/30/22   Historical Provider, MD   pregabalin (LYRICA) 75 MG capsule Take 75 mg by mouth 3 times daily.  8/30/22   Historical Provider, MD   melatonin 3 MG TABS tablet Take 1 tablet by mouth daily  Patient taking differently: Take 3 mg by mouth at bedtime 8/29/22 12/27/22  Tristian Valentine APRN - CNP   nortriptyline (PAMELOR) 10 MG capsule TAKE 1 CAPSULE BY MOUTH EVERY NIGHT 6/1/21 8/29/22  Anita Perez, DO   Nutritional Supplements (ENSURE COMPLETE SHAKE) LIQD Take 1 Can by mouth 2 times daily 5/14/21   Criss Coughlin MD         Allergies:  Sulfamethazine and Ancef [cefazolin sodium]    Social History:    TOBACCO:   reports that she quit smoking about 4 months ago. Her smoking use included cigarettes. She started smoking about 53 years ago. She has a 21.00 pack-year smoking history. She has been exposed to tobacco smoke. She has never used smokeless tobacco.  ETOH:   reports no history of alcohol use.    Family History:    Reviewed in detail and negative for DM, CAD, Cancer, CVA. Positive as follows\"      Problem Relation Age of Onset    Other Mother        REVIEW OF SYSTEMS:   Pertinent positives as noted in the HPI. All other systems reviewed and negative.    PHYSICAL EXAM:  BP (!) 142/76   Pulse 62   Temp 98.7 °F (37.1 °C)   Resp 17   LMP  (LMP Unknown)   SpO2 100%   General appearance: No apparent distress, appears stated age and cooperative.  HEENT: Normal cephalic, atraumatic without obvious deformity. Pupils equal, round, and reactive to light.  Extra ocular muscles intact. Conjunctivae/corneas clear.  Neck: Supple, with full range of motion. No jugular venous distention. Trachea midline.  Respiratory: Scattered wheezes bilaterally  Cardiovascular: Regular rate and rhythm  Abdomen: Soft, nontender, nondistended  Musculoskeletal: No clubbing, cyanosis, edema of bilateral lower extremities. Brisk capillary refill.   Skin: Normal skin color.  No rashes or lesions.  Neurologic:  Neurovascularly intact without any focal sensory/motor deficits. Cranial nerves: II-XII intact, grossly non-focal.    Reviewed EKG and CXR personally      CBC:   Recent Labs     03/11/23  0300   WBC 5.9   RBC 4.43   HGB 13.1   HCT 41.0   MCV 92.6   RDW 13.2        BMP:   Recent Labs     03/11/23  0300      K 4.1      CO2 27   BUN 13  CREATININE 0.5     LFT:  Recent Labs     23  0300   PROT 6.9   ALKPHOS 102   ALT 17   AST 22   BILITOT 0.3     CE:  No results for input(s): Katherine Mauricio in the last 72 hours. PT/INR: No results for input(s): INR, APTT in the last 72 hours. BNP: No results for input(s): BNP in the last 72 hours. ESR:   Lab Results   Component Value Date    SEDRATE 19 2022     CRP:   Lab Results   Component Value Date    CRP 14.5 (H) 2022     D Dimer:   Lab Results   Component Value Date    DDIMER <200 2023      Folate and B12: No results found for: [id number], No results found for: FOLATE  Lactic Acid:   Lab Results   Component Value Date    LACTA 1.6 2022     Thyroid Studies:   Lab Results   Component Value Date    TSH 0.861 2022    I6ZJSWK 90.72 11/10/2022       Oupatient labs:  Lab Results   Component Value Date    CHOL 190 2022    TRIG 96 2022    HDL 76 11/10/2022    LDLCALC 92 11/10/2022    TSH 0.861 2022    INR 1.1 2022    LABA1C 6.9 (H) 2022       Urinalysis:    Lab Results   Component Value Date/Time    NITRU Negative 2022 08:41 AM    WBCUA 1-3 2022 08:41 AM    WBCUA 1-3 2011 10:29 AM    BACTERIA MODERATE 2022 08:41 AM    RBCUA 0-1 2022 08:41 AM    RBCUA 0-1 2014 02:50 PM    BLOODU Negative 2022 08:41 AM    SPECGRAV 1.020 2022 08:41 AM    GLUCOSEU Negative 2022 08:41 AM    GLUCOSEU NEGATIVE 2011 10:29 AM       Imaging:  CT HEAD WO CONTRAST    Result Date: 3/11/2023  Patient MRN:  [id number] : 1960 Age: 65 years Gender: Female Order Date:  3/11/2023 3:08 AM EXAM: CT HEAD WO CONTRAST NUMBER OF IMAGES:  272 INDICATION:  aphasia aphasia What reading provider will be dictating this exam?->[facility] COMPARISON: None Technique: Low-dose CT  acquisition technique included one of following options; 1 . Automated exposure control, 2. Adjustment of MA and or KV according to patient's size or 3.  Use of iterative reconstruction. Multiple CT sections were obtained with sagittal and coronal MPR reconstructions. The ventricles are prominent. The gyri and sulci appear  prominent. The white matter appears  prominent. There is no evidence for hemorrhage. There is no infarct identified. There is no mass effect identified. There is no mass identified. Diffuse atrophy likely age related Findings compatible with small vessel ischemic changes. Findings were called to Dr. Mamadou Kwon     CT SOFT TISSUE NECK W CONTRAST    Result Date: 2/19/2023  EXAMINATION: CT OF THE NECK SOFT TISSUE WITH CONTRAST  2/19/2023 TECHNIQUE: CT of the neck was performed with the administration of intravenous contrast. Multiplanar reformatted images are provided for review. Automated exposure control, iterative reconstruction, and/or weight based adjustment of the mA/kV was utilized to reduce the radiation dose to as low as reasonably achievable. COMPARISON: None. HISTORY: ORDERING SYSTEM PROVIDED HISTORY: subjective thorat closing TECHNOLOGIST PROVIDED HISTORY: Reason for exam:->subjective thorat closing Decision Support Exception - unselect if not a suspected or confirmed emergency medical condition->Emergency Medical Condition ([facility]) Initial evaluation. FINDINGS: PHARYNX/LARYNX:  The visualized upper aerodigestive tract appears symmetric. No discrete mass identified. The epiglottis appears normal. SALIVARY GLANDS/THYROID:  The parotid, submandibular and thyroid glands demonstrate no acute abnormality. LYMPH NODES:  No evidence of cervical lymphadenopathy by size criteria. SOFT TISSUES:  No appreciable soft tissue swelling is seen. BRAIN/ORBITS/SINUSES:  The visualized portion of the intracranial contents appear unremarkable. The visualized portion of the orbits, paranasal sinuses and mastoid air cells demonstrate no acute abnormality.  LUNG APICES/SUPERIOR MEDIASTINUM:  Centrilobular and paraseptal emphysematous changes within the lungs bilaterally. No acute abnormality within the visualized mediastinum. BONES:  No aggressive appearing lytic or blastic bony lesion. No acute osseous abnormality is seen. Cervical spinal stimulator leads are seen spanning the C2-C3 through upper C5 level as well as at the C6 through T1 levels. No acute abnormality of the soft tissue structures of the neck. RECOMMENDATIONS: Unavailable     XR CHEST PORTABLE    Result Date: 3/11/2023  EXAMINATION: ONE XRAY VIEW OF THE CHEST 3/11/2023 3:28 am COMPARISON: None. HISTORY: ORDERING SYSTEM PROVIDED HISTORY: sob TECHNOLOGIST PROVIDED HISTORY: Reason for exam:->sob What reading provider will be dictating this exam?->[facility] FINDINGS: Normal cardiomediastinal silhouette. Lungs clear. No pneumothorax or effusion. Body wall soft tissues unremarkable. Osseous thorax intact. Stimulator leads over the midline cervical and thoracic spine. No acute disease. RECOMMENDATION: Careful clinical correlation and follow up recommended. XR CHEST PORTABLE    Result Date: 3/7/2023  EXAMINATION: ONE XRAY VIEW OF THE CHEST 3/7/2023 8:06 am COMPARISON: 02/19/2023. HISTORY: ORDERING SYSTEM PROVIDED HISTORY: chest pain TECHNOLOGIST PROVIDED HISTORY: Reason for exam:->chest pain FINDINGS: The cardiac silhouette is normal in size. There are emphysematous changes in the upper lungs and chronic-appearing interstitial changes in the lower lungs. There is a focus of atelectasis in the left lung base. No pneumothorax or pleural effusion is seen. Emphysematous changes in the upper lungs and chronic-appearing interstitial changes in the lower lungs. Mild left basilar atelectasis.      XR CHEST PORTABLE    Result Date: 2/19/2023  EXAMINATION: ONE XRAY VIEW OF THE CHEST 2/19/2023 7:45 am COMPARISON: Previous CT of the chest of 12/27/2022 HISTORY: ORDERING SYSTEM PROVIDED HISTORY: cough TECHNOLOGIST PROVIDED HISTORY: Reason for exam:->cough FINDINGS: The cardiac silhouette is mildly enlarged There is hyperinflation of the lungs and flattening of diaphragms consistent with COPD. There is no pulmonary infiltrate, mass or nodule. There is no pleural effusion. COPD. No findings of failure or pneumonia Emphysematous changes Spinal stimulators are seen at the level of the cervical spine and inferior thoracic spine. CTA NECK W CONTRAST    Result Date: 3/11/2023  Patient MRN:  [id number] : 1960 Age: 65 years Gender: Female Order Date:  3/11/2023 3:08 AM EXAM: CTA NECK W CONTRAST, CTA HEAD W CONTRAST NUMBER OF IMAGES:  1 INDICATION:  justine alert, expressive aphasia justine alert, expressive aphasia Decision Support Exception - unselect if not a suspected or confirmed emergency medical condition->Emergency Medical Condition ([place]) What reading provider will be dictating this exam?->[facility] COMPARISON: None Technique: Low-dose CT  acquisition technique included one of following options; 1 . Automated exposure control, 2. Adjustment of MA and or KV according to patient's size or 3. Use of iterative reconstruction. Contiguous spiral images were obtained in the axial plane, following the administration of intravenous contrast using CT angiographic protocol. Sagittal and coronal images were reconstructed from the axial plane acquisition. Additional MIP reconstructions were presented to aid in the interpretation of this study. Images were obtained from the skull base cranially. There is mild calcified plaque identified in the vessels compatible with atherosclerotic disease.  The right carotid is mildly atherosclerotic without significant stenosis The left carotid is mildly atherosclerotic without significant stenosis The right vertebral artery is mildly atherosclerotic without significant stenosis The left vertebral artery is mildly atherosclerotic without significant stenosis The basilar artery is unremarkable The middle cerebral arteries are unremarkable The anterior cerebral arteries are unremarkable The posterior cerebral arteries are unremarkable     1. Estimated stenosis of the proximal right and left internal carotid artery by NASCET criteria is not hemodynamically significant 2. Mild atherosclerotic disease . 3. No large vessel occlusion identified This study was analyzed by the Viz. ai algorithm. CTA PULMONARY W CONTRAST    Result Date: 3/11/2023  EXAMINATION: CTA OF THE CHEST 3/11/2023 3:11 am TECHNIQUE: CTA of the chest was performed after the administration of intravenous contrast.  Multiplanar reformatted images are provided for review. MIP images are provided for review. Automated exposure control, iterative reconstruction, and/or weight based adjustment of the mA/kV was utilized to reduce the radiation dose to as low as reasonably achievable. COMPARISON: January 21, 2023 HISTORY: ORDERING SYSTEM PROVIDED HISTORY: c/o of chest pain and shortness of breath, concern for pe TECHNOLOGIST PROVIDED HISTORY: Reason for exam:->c/o of chest pain and shortness of breath, concern for pe Decision Support Exception - unselect if not a suspected or confirmed emergency medical condition->Emergency Medical Condition (MA) What reading provider will be dictating this exam?->[facility] FINDINGS: Pulmonary Arteries: Pulmonary arteries are adequately opacified for evaluation. No evidence of intraluminal filling defect to suggest pulmonary embolism. Main pulmonary artery is normal in caliber. Mediastinum: No evidence of mediastinal lymphadenopathy. The heart and pericardium demonstrate no acute abnormality. There is no acute abnormality of the thoracic aorta. Lungs/pleura: The lungs are without acute process. No focal consolidation or pulmonary edema. No evidence of pleural effusion or pneumothorax. Upper Abdomen: Limited images of the upper abdomen are unremarkable. Soft Tissues/Bones: No acute bone or soft tissue abnormality. Thoracic and cervical spinal epidural stimulator leads noted.      No evidence of pulmonary embolism or acute pulmonary abnormality. RECOMMENDATIONS: Careful clinical correlation and follow up recommended. CT BRAIN PERFUSION    Result Date: 3/11/2023  Patient MRN: [id number] : 1960 Age:  65 years Gender: Female Order Date: 3/11/2023 3:08 AM Exam: CT BRAIN PERFUSION Number of Images: views Indication:   expressive aphasia expressive aphasia Decision Support Exception - unselect if not a suspected or confirmed emergency medical condition->Emergency Medical Condition (MA) What reading provider will be dictating this exam?->[facility] Comparison: None. Findings: Perfusion images demonstrate symmetric blood volume Blood flow images demonstrate symmetric blood flow There is no significant ischemic penumbra identified. There is no significant core infarct identified. No significant ischemic penumbra identified This study was analyzed by the Viz. ai algorithm. CTA HEAD W CONTRAST    Result Date: 3/11/2023  Patient MRN:  [id number] : 1960 Age: 65 years Gender: Female Order Date:  3/11/2023 3:08 AM EXAM: CTA NECK W CONTRAST, CTA HEAD W CONTRAST NUMBER OF IMAGES:  1 INDICATION:  justine alert, expressive aphasia justine alert, expressive aphasia Decision Support Exception - unselect if not a suspected or confirmed emergency medical condition->Emergency Medical Condition (MA) What reading provider will be dictating this exam?->[facility] COMPARISON: None Technique: Low-dose CT  acquisition technique included one of following options; 1 . Automated exposure control, 2. Adjustment of MA and or KV according to patient's size or 3. Use of iterative reconstruction. Contiguous spiral images were obtained in the axial plane, following the administration of intravenous contrast using CT angiographic protocol. Sagittal and coronal images were reconstructed from the axial plane acquisition. Additional MIP reconstructions were presented to aid in the interpretation of this study.  Images were obtained from the skull base cranially. There is mild calcified plaque identified in the vessels compatible with atherosclerotic disease. The right carotid is mildly atherosclerotic without significant stenosis The left carotid is mildly atherosclerotic without significant stenosis The right vertebral artery is mildly atherosclerotic without significant stenosis The left vertebral artery is mildly atherosclerotic without significant stenosis The basilar artery is unremarkable The middle cerebral arteries are unremarkable The anterior cerebral arteries are unremarkable The posterior cerebral arteries are unremarkable     1. Estimated stenosis of the proximal right and left internal carotid artery by NASCET criteria is not hemodynamically significant 2. Mild atherosclerotic disease . 3. No large vessel occlusion identified This study was analyzed by the Viz. ai algorithm.        ASSESSMENT:  -COPD exacerbation  -Strokelike symptoms  -Chest discomfort  -Anxiety/depression  -Hyperlipidemia  -GERD      PLAN:  -Admit to medicine  -Consult neurology  -MRI of the brain without contrast  -Consider psychiatric consult  -DuoNebs every 4 hours  -Albuterol as needed  -Pulmicort twice daily  -Methylprednisolone 40 mg IV every 6 hours  -Telemetry  -Continue home medications        Diet: No diet orders on file  Code Status: Prior  Surrogate decision maker confirmed with patient:   Extended Emergency Contact Information  Primary Emergency Contact: [facility]  Address: [address] [place] [address] Phone: [phone]  Mobile Phone: [phone]  Relation: Spouse  Secondary Emergency Contact: mela do  Mobile Phone: [phone]  Relation: Child    DVT Prophylaxis: []Lovenox []Heparin []PCD [] [address] []Encouraged ambulation  Disposition: []Med/Surg [] Intermediate [] ICU/CCU  Admit status: [] Observation [] Inpatient     +++++++++++++++++++++++++++++++++++++++++++++++++  Cristóbal De La Garza DO  +++++++++++++++++++++++++++++++++++++++++++++++++  NOTE: This report was transcribed using voice recognition software. Every effort was made to ensure accuracy; however, inadvertent computerized transcription errors may be present.

## 2023-03-11 NOTE — ED NOTES
Radiology Procedure Waiver   Name: Wanda Young  : 1960  MRN: [id number]    Date:  3/11/23    Time: 3:05 AM EST    Benefits of immediately proceeding with Radiology exam(s) without pre-testing outweigh the risks or are not indicated as specified below and therefore the following is/are being waived:    [] Pregnancy test   [] Patients LMP on-time and regular.   [] Patient had Tubal Ligation or has other Contraception Device. [] Patient  is Menopausal or Premenarcheal.    [] Patient had Full or Partial Hysterectomy. [] Protocol for Iodine allergy    [] MRI Questionnaire     [x] BUN/Creatinine   [] Patient age w/no hx of renal dysfunction. [] Patient on Dialysis. [] Recent Normal Labs.   Electronically signed by Jean Claude Tineo MD on 3/11/23 at 3:05 AM HECTOR Tineo MD  [phone]

## 2023-03-11 NOTE — PROGRESS NOTES
Pepper Gallus was ordered [facility] which is a nonformulary medication. This medication will need to be supplied by the patient as the pharmacy does not carry this non-formulary medication. If the medication has not been administered by 1400 on the following day from the time the order was placed, a pharmacist will follow-up with the nurse of the patient to assess the capability of the patient to bring in the medication. If it is determined that the patient cannot supply the medication and it is not available to be dispensed from the pharmacy, the provider will be notified.     Rafy Bishop PharmD, BCPS 3/11/2023 3:44 PM

## 2023-03-11 NOTE — ED PROVIDER NOTES
Bertram Part 65 y.o. female PHMx of generalized anxiety disorder, osteoarthritis, tobacco user, paranoid behavior, COVID-19, COPD, MDD presents to the ED c/o shortness of breath and chest pain. Onset: Yesterday morning. Location/Radiation: Centralized chest region. Duration: Constant. Characterization: Describes as dull achy with shortness of breath that has not getting better. Aggravating Factors: History of COPD, tobacco user. Relieving Factors: None. Severity: Moderate. Assx Sxs: Shortness of breath, cough, chest pain. She Denies: Fever/chills, abdominal pain, nausea/vomiting, rash. Review of Systems   Constitutional:  Positive for activity change and fatigue. Respiratory:  Positive for cough, chest tightness and shortness of breath. Negative for apnea, wheezing and stridor. Cardiovascular:  Positive for chest pain. Negative for palpitations and leg swelling. Gastrointestinal:  Negative for abdominal distention and abdominal pain. Physical Exam  Constitutional:       General: She is not in acute distress. Appearance: Normal appearance. She is not toxic-appearing. HENT:      Head: Normocephalic and atraumatic. Right Ear: External ear normal.      Left Ear: External ear normal.      Nose: Nose normal.      Mouth/Throat:      Mouth: Mucous membranes are moist.      Pharynx: Oropharynx is clear. Eyes:      Extraocular Movements: Extraocular movements intact. Pupils: Pupils are equal, round, and reactive to light. Cardiovascular:      Rate and Rhythm: Normal rate and regular rhythm. Pulses: Normal pulses. Heart sounds: Normal heart sounds. Pulmonary:      Effort: Pulmonary effort is normal.      Breath sounds: Normal breath sounds. Abdominal:      General: There is no distension. Palpations: Abdomen is soft. Tenderness: There is no abdominal tenderness. Musculoskeletal:         General: Normal range of motion.       Cervical back: Normal range of motion and neck supple. Skin:     General: Skin is warm and dry. Capillary Refill: Capillary refill takes less than 2 seconds. Neurological:      General: No focal deficit present. Mental Status: She is alert and oriented to person, place, and time. Cranial Nerves: No cranial nerve deficit. Motor: No weakness. Psychiatric:         Mood and Affect: Mood normal.         Thought Content: Thought content normal.        Procedures     Medical Decision Making  Denisha Mo 65 y.o. female presented to the ED c/o shortness of breath and chest pain that has been ongoing for the past day. Upon evaluation/physical examination of the Pt she was AOX4, cooperative, normotensive, non hypoxic on room air, CNs II-XII grossly intact, no focal deficits. After initial exam bedside RN noticed patient was having some expressive aphasia and left side weakness. Patient was reexamined her NIH was 3,.alert called last known well was at 11 AM day prior. Emergent CT imaging was completed and showed no evidence of CVA, or penumbra/decrease in brain perfusion. On-call neuro interventionalists, Dr. Atif Morfin, reported that there was no clot or objective signs of stroke. On reexamine the patient she was talking and had resolved of the expressive aphasia symptoms. Differential diagnosis: CVA, PE, COPD exacerbation, ACS, electrolyte derangement, polysubstance use, medical noncompliance, other. Patient's EKG was reassuring and showed no ST segment elevations or signs of ischemia. 45Patient's laboratory evaluation revealed no leukocytosis, normal H&H, normal electrolytes and kidney function, normal hepatic function, D-dimer negative, proBNP, patient's valproic acid level 41, negative serum drug screen. CTA pulm negative for PE. Chest x-ray showed no acute cardiopulmonary process. Patient received DuoNeb breathing treatment and IV steroids.   Her lung fields were improved on auscultation after receiving these treatments. Discussed case with hospitalist who agreed admit the patient for further evaluation of her COPD exacerbation, and advancement of her psychiatric medications. Amount and/or Complexity of Data Reviewed  Independent Historian: EMS  External Data Reviewed: notes. Details: other hospital  Labs: ordered. Decision-making details documented in ED Course. Radiology: ordered. ECG/medicine tests: ordered. Risk  OTC drugs. Prescription drug management. Decision regarding hospitalization. Diagnosis or treatment significantly limited by social determinants of health. Risk Details: Financial Hardship  Loss to follow up            ED Course as of 03/11/23 0454   Sat Mar 11, 2023   0301 EKG: This EKG is signed and interpreted by EP. Rate: 62  Rhythm: Sinus  Interpretation: 1st degree AV block  Comparison: stable as compared to patient's most recent EKG  [JR]   0343 Troponin, High Sensitivity(!): 22 [JR]   0346 Pro-BNP(!): 245 [JR]   0408 IMPRESSION:     No significant ischemic penumbra identified    [JR]   0453   NIH Stroke Scale/Score at time of initial evaluation:  1A: Level of Consciousness 0 - alert; keenly responsive  1B: Ask Month and Age 2 - answers one question correctly  1C:  Tell Patient To Open and Close Eyes, then Hand  Squeeze 0 - performs both tasks correctly  2: Test Horizontal Extraocular Movements 0 - normal  3: Test Visual Fields 0 - no visual loss  4: Test Facial Palsy 0 - normal symmetric movement  5A: Test Left Arm Motor Drift 0 - no drift, limb holds 90 (or 45) degrees for full 10 seconds  5B: Test Right Arm Motor Drift 0 - no drift, limb holds 90 (or 45) degrees for full 10 seconds  6A: Test Left Leg Motor Drift 0 - no drift; leg holds 30 degree position for full 5 seconds  6B: Test Right Leg Motor Drift 0 - no drift; leg holds 30 degree position for full 5 seconds  7: Test Limb Ataxia   (FNF/Heel-Shin) 0 - absent  8: Test Sensation 0 - normal; no sensory loss  9: Test Language/Aphasia 1 - mild to moderate aphasia; some obvious loss of fluency or facility of comprehension without significant limitation on ideas expressed or form of expression. Reduction of speech and/or comprehension, however, makes conversation about provided materials difficult or impossible. For example, in conversation about provided materials, examiner can identify picture or naming card content from patient's response. 10: Test Dysarthria 0 - normal  11: Test Extinction/Inattention 0 - no abnormality  Total 3    [JR]      ED Course User Index  [JR] Bernardo Sutherland DO      ED Course as of 03/11/23 0454   Sat Mar 11, 2023   0301 EKG: This EKG is signed and interpreted by EP. Rate: 62  Rhythm: Sinus  Interpretation: 1st degree AV block  Comparison: stable as compared to patient's most recent EKG  [JR]   0343 Troponin, High Sensitivity(!): 22 [JR]   0346 Pro-BNP(!): 245 [JR]   0408 IMPRESSION:     No significant ischemic penumbra identified    [JR]   0453   NIH Stroke Scale/Score at time of initial evaluation:  1A: Level of Consciousness 0 - alert; keenly responsive  1B: Ask Month and Age 2 - answers one question correctly  1C:  Tell Patient To Open and Close Eyes, then Hand  Squeeze 0 - performs both tasks correctly  2: Test Horizontal Extraocular Movements 0 - normal  3: Test Visual Fields 0 - no visual loss  4: Test Facial Palsy 0 - normal symmetric movement  5A: Test Left Arm Motor Drift 0 - no drift, limb holds 90 (or 45) degrees for full 10 seconds  5B: Test Right Arm Motor Drift 0 - no drift, limb holds 90 (or 45) degrees for full 10 seconds  6A: Test Left Leg Motor Drift 0 - no drift; leg holds 30 degree position for full 5 seconds  6B: Test Right Leg Motor Drift 0 - no drift; leg holds 30 degree position for full 5 seconds  7: Test Limb Ataxia   (FNF/Heel-Shin) 0 - absent  8: Test Sensation 0 - normal; no sensory loss  9: Test Language/Aphasia 1 - mild to moderate aphasia; some obvious loss of fluency or facility of comprehension without significant limitation on ideas expressed or form of expression. Reduction of speech and/or comprehension, however, makes conversation about provided materials difficult or impossible. For example, in conversation about provided materials, examiner can identify picture or naming card content from patient's response. 10: Test Dysarthria 0 - normal  11: Test Extinction/Inattention 0 - no abnormality  Total 3    [JR]      ED Course User Index  [JR] Servando Chen, DO       --------------------------------------------- PAST HISTORY ---------------------------------------------  Past Medical History:  has a past medical history of Abdominal pain, Anxiety, Arthritis, Blood circulation, collateral, Cancer (HCC), Chronic back pain, Colitis, Complex regional pain syndrome type 1 of right lower extremity, COPD (chronic obstructive pulmonary disease) ([facility] [address] Depression, GERD (gastroesophageal reflux disease), Headache(784.0), Hyperlipidemia, Kidney stone, Neuromuscular disorder ([facility] [address] On home O2, Osteoarthritis, Other disorders of kidney and ureter in diseases classified elsewhere, Pneumonia, Psychiatric problem, and RSD (reflex sympathetic dystrophy). Past Surgical History:  has a past surgical history that includes Hysterectomy; laminectomy; Finger amputation; back surgery (2005); other surgical history (09/18/2013); other surgical history (N/A, 02/03/2014); Endoscopy, colon, diagnostic; Carpal tunnel release; Colonoscopy; other surgical history (Right, 11/11/2014); other surgical history (04/08/2015); Nerve Block (N/A, 08/19/2015); Nerve Block (Left, 08/26/2015); Nerve Block (09/02/2015); Nerve Block (Right, 09/17/2015); other surgical history (04/25/2018); pr revj/rmvl implanted spinal neurostim generator (N/A, 04/25/2018); and Upper gastrointestinal endoscopy (N/A, 1/13/2023). Social History:  reports that she quit smoking about 4 months ago.  Her smoking use included cigarettes. She started smoking about 53 years ago. She has a 21.00 pack-year smoking history. She has been exposed to tobacco smoke. She has never used smokeless tobacco. She reports that she does not drink alcohol and does not use drugs. Family History: family history includes Other in her mother. The patients home medications have been reviewed.     Allergies: Sulfamethazine and Ancef [cefazolin sodium]    -------------------------------------------------- RESULTS -------------------------------------------------    LABS:  Results for orders placed or performed during the hospital encounter of 03/11/23   CBC with Auto Differential   Result Value Ref Range    WBC 5.9 4.5 - 11.5 E9/L    RBC 4.43 3.50 - 5.50 E12/L    Hemoglobin 13.1 11.5 - 15.5 g/dL    Hematocrit 41.0 34.0 - 48.0 %    MCV 92.6 80.0 - 99.9 fL    MCH 29.6 26.0 - 35.0 pg    MCHC 32.0 32.0 - 34.5 %    RDW 13.2 11.5 - 15.0 fL    Platelets 977 115 - 066 E9/L    MPV 10.2 7.0 - 12.0 fL    Neutrophils % 53.7 43.0 - 80.0 %    Immature Granulocytes % 0.3 0.0 - 5.0 %    Lymphocytes % 33.0 20.0 - 42.0 %    Monocytes % 8.9 2.0 - 12.0 %    Eosinophils % 3.1 0.0 - 6.0 %    Basophils % 1.0 0.0 - 2.0 %    Neutrophils Absolute 3.15 1.80 - 7.30 E9/L    Immature Granulocytes # 0.02 E9/L    Lymphocytes Absolute 1.94 1.50 - 4.00 E9/L    Monocytes Absolute 0.52 0.10 - 0.95 E9/L    Eosinophils Absolute 0.18 0.05 - 0.50 E9/L    Basophils Absolute 0.06 0.00 - 0.20 E9/L   Comprehensive Metabolic Panel   Result Value Ref Range    Sodium 137 132 - 146 mmol/L    Potassium 4.1 3.5 - 5.0 mmol/L    Chloride 100 98 - 107 mmol/L    CO2 27 22 - 29 mmol/L    Anion Gap 10 7 - 16 mmol/L    Glucose 102 (H) 74 - 99 mg/dL    BUN 13 6 - 23 mg/dL    Creatinine 0.5 0.5 - 1.0 mg/dL    Est, Glom Filt Rate >60 >=60 mL/min/1.73    Calcium 9.0 8.6 - 10.2 mg/dL    Total Protein 6.9 6.4 - 8.3 g/dL    Albumin 3.8 3.5 - 5.2 g/dL    Total Bilirubin 0.3 0.0 - 1.2 mg/dL Alkaline Phosphatase 102 35 - 104 U/L    ALT 17 0 - 32 U/L    AST 22 0 - 31 U/L   Troponin   Result Value Ref Range    Troponin, High Sensitivity 22 (H) 0 - 9 ng/L   Brain Natriuretic Peptide   Result Value Ref Range    Pro- (H) 0 - 125 pg/mL   D-Dimer, Quantitative   Result Value Ref Range    D-Dimer, Quant <200 ng/mL DDU   Valproic Acid Level, Total   Result Value Ref Range    Valproic Acid Lvl 41 (L) 50 - 100 mcg/mL   Serum Drug Screen   Result Value Ref Range    Ethanol Lvl <10 mg/dL    Acetaminophen Level <5.0 (L) 10.0 - 11.5 mcg/mL    Salicylate, Serum <8.9 0.0 - 30.0 mg/dL    TCA Scrn NEGATIVE Cutoff:300 ng/mL   POCT Glucose   Result Value Ref Range    Meter Glucose 110 (H) 74 - 99 mg/dL       RADIOLOGY:  CT HEAD WO CONTRAST   Final Result   Diffuse atrophy likely age related   Findings compatible with small vessel ischemic changes. Findings were called to Dr. Gilma Crook   Final Result   1. Estimated stenosis of the proximal right and left internal carotid   artery by NASCET criteria is not hemodynamically significant   2. Mild atherosclerotic disease . 3. No large vessel occlusion identified            This study was analyzed by the 48[employer]. ai algorithm. CT BRAIN PERFUSION   Final Result      No significant ischemic penumbra identified      This study was analyzed by the [employer]. ai algorithm. CTA HEAD W CONTRAST   Final Result   1. Estimated stenosis of the proximal right and left internal carotid   artery by NASCET criteria is not hemodynamically significant   2. Mild atherosclerotic disease . 3. No large vessel occlusion identified            This study was analyzed by the [employer]. ai algorithm. CTA PULMONARY W CONTRAST   Final Result   No evidence of pulmonary embolism or acute pulmonary abnormality. RECOMMENDATIONS:   Careful clinical correlation and follow up recommended. XR CHEST PORTABLE   Final Result   No acute disease. RECOMMENDATION:   Careful clinical correlation and follow up recommended. ------------------------- NURSING NOTES AND VITALS REVIEWED ---------------------------  Date / Time Roomed:  3/11/2023  2:51 AM  ED Bed Assignment:  23/23    The nursing notes within the ED encounter and vital signs as below have been reviewed. Patient Vitals for the past 24 hrs:   BP Temp Pulse Resp SpO2   03/11/23 0349 -- -- 62 17 100 %   03/11/23 0330 (!) 142/76 -- 62 14 96 %   03/11/23 0300 124/68 -- 61 17 96 %   03/11/23 0253 122/64 98.7 °F (37.1 °C) 63 20 96 %       Oxygen Saturation Interpretation: Normal    ------------------------------------------ PROGRESS NOTES ------------------------------------------  Re-evaluation(s):  Time: 0430  Patients symptoms are improving  Repeat physical examination is improved    Counseling:  I have spoken with the patient and discussed todays results, in addition to providing specific details for the plan of care and counseling regarding the diagnosis and prognosis. Their questions are answered at this time and they are agreeable with the plan of admission.    --------------------------------- ADDITIONAL PROVIDER NOTES ---------------------------------  Consultations:  Time: 0448. Spoke with Dr. Mayte Lundberg. Discussed case. They will admit the patient. This patient's ED course included: a personal history and physicial examination, re-evaluation prior to disposition, multiple bedside re-evaluations, IV medications, cardiac monitoring, and continuous pulse oximetry    This patient has remained hemodynamically stable during their ED course. Diagnosis:  1. COPD exacerbation ([address]    2. Expressive aphasia    3. Chest pain, unspecified type        Disposition:  Patient's disposition: Admit to med/surg floor  Patient's condition is stable.         Gregorio Espinal DO  Resident  03/11/23 1230  Please refer to my separate ED provider note with my attestation and my separate documentation of the encounter.             Taye Jennings MD  03/11/23 1975

## 2023-03-11 NOTE — PLAN OF CARE
Problem: Chronic Conditions and Co-morbidities  Goal: Patient's chronic conditions and co-morbidity symptoms are monitored and maintained or improved  Outcome: Progressing     Problem: Discharge Planning  Goal: Discharge to home or other facility with appropriate resources  Outcome: Progressing  Flowsheets (Taken 3/11/2023 3451)  Discharge to home or other facility with appropriate resources:   Identify barriers to discharge with patient and caregiver   Arrange for needed discharge resources and transportation as appropriate     Problem: Pain  Goal: Verbalizes/displays adequate comfort level or baseline comfort level  Outcome: Progressing     Problem: Safety - Adult  Goal: Free from fall injury  Outcome: Progressing     Problem: ABCDS Injury Assessment  Goal: Absence of physical injury  Outcome: Progressing     Problem: Skin/Tissue Integrity  Goal: Absence of new skin breakdown  Description: 1. Monitor for areas of redness and/or skin breakdown  2. Assess vascular access sites hourly  3. Every 4-6 hours minimum:  Change oxygen saturation probe site  4. Every 4-6 hours:  If on nasal continuous positive airway pressure, respiratory therapy assess nares and determine need for appliance change or resting period.   Outcome: Progressing

## 2023-03-11 NOTE — ED NOTES
Radiology Procedure Waiver   Name: Zane Redd  : 1960  MRN: [id number]    Date:  3/11/23    Time: 3:08 AM EST    Benefits of immediately proceeding with Radiology exam(s) without pre-testing outweigh the risks or are not indicated as specified below and therefore the following is/are being waived:    [x] Pregnancy test   [] Patients LMP on-time and regular.   [] Patient had Tubal Ligation or has other Contraception Device. [] Patient  is Menopausal or Premenarcheal.    [] Patient had Full or Partial Hysterectomy. [] Protocol for Iodine allergy    [] MRI Questionnaire     [x] BUN/Creatinine   [] Patient age w/no hx of renal dysfunction. [] Patient on Dialysis. [] Recent Normal Labs.   Electronically signed by Edmund Madera DO on 3/11/23 at 3:08 AM EST           [id number],   Resident  [phone]

## 2023-03-11 NOTE — ED NOTES
MRI checklist completed. MRI tech notified. Patient does have spinal cord stimulator, which is not charged, she also does not have remote for it. MRI tech said patient needs her remote and for spinal stimulator to be fully charged in order to perform MRI.      Kecia Vizcaino, DARSHANA  03/11/23 0545

## 2023-03-11 NOTE — ED NOTES
Patient c/o difficulty swallowing since she arrived. She said she thinks she is having a stroke. I explained that CTs were unremarkable but we are still going to get MRI. This RN called Dr. Britt Odom to address swallowing and MRI concerns (see previous note about MRI), Dr. Britt Odom said if pt passes swallow screen then she can have PO meds that are ordered.      Halima Pitts RN  03/11/23 8113

## 2023-03-11 NOTE — PROGRESS NOTES
Patient's  brought home medication Ingrezza and Luis Range. Medications brought down to pharmacy for verification.      Kwesi Glaser RN, BSN

## 2023-03-11 NOTE — PROCEDURES
RN to see if family can bring patient remote for stimulator before we can perform MRI, need to check compatibility and ensure in MRI mode. It needs to be charged also.    Mode Hill

## 2023-03-12 ENCOUNTER — APPOINTMENT (OUTPATIENT)
Dept: CT IMAGING | Age: 63
DRG: 191 | End: 2023-03-12
Payer: MEDICARE

## 2023-03-12 PROBLEM — R47.01 EXPRESSIVE APHASIA: Status: ACTIVE | Noted: 2023-03-12

## 2023-03-12 LAB
ALBUMIN SERPL-MCNC: 3.6 G/DL (ref 3.5–5.2)
ALP BLD-CCNC: 90 U/L (ref 35–104)
ALT SERPL-CCNC: 13 U/L (ref 0–32)
ANION GAP SERPL CALCULATED.3IONS-SCNC: 11 MMOL/L (ref 7–16)
AST SERPL-CCNC: 14 U/L (ref 0–31)
BASOPHILS ABSOLUTE: 0.03 E9/L (ref 0–0.2)
BASOPHILS RELATIVE PERCENT: 0.3 % (ref 0–2)
BILIRUB SERPL-MCNC: 0.5 MG/DL (ref 0–1.2)
BUN BLDV-MCNC: 20 MG/DL (ref 6–23)
CALCIUM SERPL-MCNC: 8.9 MG/DL (ref 8.6–10.2)
CHLORIDE BLD-SCNC: 100 MMOL/L (ref 98–107)
CO2: 25 MMOL/L (ref 22–29)
CREAT SERPL-MCNC: 0.6 MG/DL (ref 0.5–1)
EOSINOPHILS ABSOLUTE: 0 E9/L (ref 0.05–0.5)
EOSINOPHILS RELATIVE PERCENT: 0 % (ref 0–6)
GFR SERPL CREATININE-BSD FRML MDRD: >60 ML/MIN/1.73
GLUCOSE BLD-MCNC: 122 MG/DL (ref 74–99)
HCT VFR BLD CALC: 40.3 % (ref 34–48)
HEMOGLOBIN: 13 G/DL (ref 11.5–15.5)
IMMATURE GRANULOCYTES #: 0.04 E9/L
IMMATURE GRANULOCYTES %: 0.5 % (ref 0–5)
LYMPHOCYTES ABSOLUTE: 1.27 E9/L (ref 1.5–4)
LYMPHOCYTES RELATIVE PERCENT: 14.8 % (ref 20–42)
MCH RBC QN AUTO: 30.2 PG (ref 26–35)
MCHC RBC AUTO-ENTMCNC: 32.3 % (ref 32–34.5)
MCV RBC AUTO: 93.5 FL (ref 80–99.9)
MONOCYTES ABSOLUTE: 0.54 E9/L (ref 0.1–0.95)
MONOCYTES RELATIVE PERCENT: 6.3 % (ref 2–12)
NEUTROPHILS ABSOLUTE: 6.72 E9/L (ref 1.8–7.3)
NEUTROPHILS RELATIVE PERCENT: 78.1 % (ref 43–80)
PDW BLD-RTO: 13.6 FL (ref 11.5–15)
PLATELET # BLD: 221 E9/L (ref 130–450)
PMV BLD AUTO: 10.1 FL (ref 7–12)
POTASSIUM REFLEX MAGNESIUM: 4.7 MMOL/L (ref 3.5–5)
RBC # BLD: 4.31 E12/L (ref 3.5–5.5)
SODIUM BLD-SCNC: 136 MMOL/L (ref 132–146)
TOTAL PROTEIN: 6.7 G/DL (ref 6.4–8.3)
WBC # BLD: 8.6 E9/L (ref 4.5–11.5)

## 2023-03-12 PROCEDURE — 6370000000 HC RX 637 (ALT 250 FOR IP): Performed by: FAMILY MEDICINE

## 2023-03-12 PROCEDURE — 6360000002 HC RX W HCPCS: Performed by: FAMILY MEDICINE

## 2023-03-12 PROCEDURE — 6360000004 HC RX CONTRAST MEDICATION: Performed by: RADIOLOGY

## 2023-03-12 PROCEDURE — 31575 DIAGNOSTIC LARYNGOSCOPY: CPT | Performed by: OTOLARYNGOLOGY

## 2023-03-12 PROCEDURE — 6360000002 HC RX W HCPCS: Performed by: PSYCHIATRY & NEUROLOGY

## 2023-03-12 PROCEDURE — 6370000000 HC RX 637 (ALT 250 FOR IP)

## 2023-03-12 PROCEDURE — 6370000000 HC RX 637 (ALT 250 FOR IP): Performed by: PSYCHIATRY & NEUROLOGY

## 2023-03-12 PROCEDURE — 85025 COMPLETE CBC W/AUTO DIFF WBC: CPT

## 2023-03-12 PROCEDURE — 94640 AIRWAY INHALATION TREATMENT: CPT

## 2023-03-12 PROCEDURE — 6370000000 HC RX 637 (ALT 250 FOR IP): Performed by: INTERNAL MEDICINE

## 2023-03-12 PROCEDURE — 70492 CT SFT TSUE NCK W/O & W/DYE: CPT

## 2023-03-12 PROCEDURE — 99222 1ST HOSP IP/OBS MODERATE 55: CPT | Performed by: OTOLARYNGOLOGY

## 2023-03-12 PROCEDURE — 80053 COMPREHEN METABOLIC PANEL: CPT

## 2023-03-12 PROCEDURE — 99232 SBSQ HOSP IP/OBS MODERATE 35: CPT

## 2023-03-12 PROCEDURE — 92526 ORAL FUNCTION THERAPY: CPT

## 2023-03-12 PROCEDURE — 92610 EVALUATE SWALLOWING FUNCTION: CPT

## 2023-03-12 PROCEDURE — 2060000000 HC ICU INTERMEDIATE R&B

## 2023-03-12 PROCEDURE — 36415 COLL VENOUS BLD VENIPUNCTURE: CPT

## 2023-03-12 PROCEDURE — 2580000003 HC RX 258: Performed by: FAMILY MEDICINE

## 2023-03-12 RX ORDER — DIVALPROEX SODIUM 500 MG/1
500 TABLET, DELAYED RELEASE ORAL 2 TIMES DAILY
Status: DISCONTINUED | OUTPATIENT
Start: 2023-03-12 | End: 2023-03-13 | Stop reason: HOSPADM

## 2023-03-12 RX ORDER — METAXALONE 800 MG/1
400 TABLET ORAL 3 TIMES DAILY
Status: DISCONTINUED | OUTPATIENT
Start: 2023-03-12 | End: 2023-03-13

## 2023-03-12 RX ORDER — BENZTROPINE MESYLATE 0.5 MG/1
1 TABLET ORAL 2 TIMES DAILY
Status: DISCONTINUED | OUTPATIENT
Start: 2023-03-12 | End: 2023-03-13 | Stop reason: HOSPADM

## 2023-03-12 RX ORDER — SODIUM CHLORIDE 0.9 % (FLUSH) 0.9 %
10 SYRINGE (ML) INJECTION
Status: ACTIVE | OUTPATIENT
Start: 2023-03-12 | End: 2023-03-13

## 2023-03-12 RX ADMIN — MELATONIN 3 MG ORAL TABLET 3 MG: 3 TABLET ORAL at 21:37

## 2023-03-12 RX ADMIN — ASPIRIN 325 MG: 325 TABLET, COATED ORAL at 09:04

## 2023-03-12 RX ADMIN — BENZTROPINE MESYLATE 1 MG: 0.5 TABLET ORAL at 21:38

## 2023-03-12 RX ADMIN — Medication 1 LOZENGE: at 13:29

## 2023-03-12 RX ADMIN — HYDROMORPHONE HYDROCHLORIDE 4 MG: 2 TABLET ORAL at 11:15

## 2023-03-12 RX ADMIN — SUCRALFATE 1 G: 1 TABLET ORAL at 21:42

## 2023-03-12 RX ADMIN — METHYLPREDNISOLONE SODIUM SUCCINATE 40 MG: 40 INJECTION, POWDER, FOR SOLUTION INTRAMUSCULAR; INTRAVENOUS at 09:04

## 2023-03-12 RX ADMIN — IPRATROPIUM BROMIDE AND ALBUTEROL SULFATE 1 AMPULE: .5; 2.5 SOLUTION RESPIRATORY (INHALATION) at 09:16

## 2023-03-12 RX ADMIN — METHYLPREDNISOLONE SODIUM SUCCINATE 40 MG: 40 INJECTION, POWDER, FOR SOLUTION INTRAMUSCULAR; INTRAVENOUS at 13:17

## 2023-03-12 RX ADMIN — BENZTROPINE MESYLATE 2 MG: 2 TABLET ORAL at 09:04

## 2023-03-12 RX ADMIN — ACETAMINOPHEN 650 MG: 325 TABLET ORAL at 13:18

## 2023-03-12 RX ADMIN — PREGABALIN 75 MG: 75 CAPSULE ORAL at 21:38

## 2023-03-12 RX ADMIN — METAXALONE 400 MG: 800 TABLET ORAL at 21:41

## 2023-03-12 RX ADMIN — BUDESONIDE 500 MCG: 0.5 SUSPENSION RESPIRATORY (INHALATION) at 19:38

## 2023-03-12 RX ADMIN — IPRATROPIUM BROMIDE AND ALBUTEROL SULFATE 1 AMPULE: .5; 2.5 SOLUTION RESPIRATORY (INHALATION) at 13:12

## 2023-03-12 RX ADMIN — DIVALPROEX SODIUM 250 MG: 250 TABLET, EXTENDED RELEASE ORAL at 09:04

## 2023-03-12 RX ADMIN — PREGABALIN 75 MG: 75 CAPSULE ORAL at 13:18

## 2023-03-12 RX ADMIN — METHYLPREDNISOLONE SODIUM SUCCINATE 40 MG: 40 INJECTION, POWDER, FOR SOLUTION INTRAMUSCULAR; INTRAVENOUS at 03:37

## 2023-03-12 RX ADMIN — ZOLPIDEM TARTRATE 5 MG: 5 TABLET ORAL at 21:35

## 2023-03-12 RX ADMIN — METHYLPREDNISOLONE SODIUM SUCCINATE 40 MG: 40 INJECTION, POWDER, FOR SOLUTION INTRAMUSCULAR; INTRAVENOUS at 21:43

## 2023-03-12 RX ADMIN — PANTOPRAZOLE SODIUM 40 MG: 40 TABLET, DELAYED RELEASE ORAL at 09:04

## 2023-03-12 RX ADMIN — SUCRALFATE 1 G: 1 TABLET ORAL at 13:18

## 2023-03-12 RX ADMIN — PREGABALIN 75 MG: 75 CAPSULE ORAL at 09:04

## 2023-03-12 RX ADMIN — IPRATROPIUM BROMIDE AND ALBUTEROL SULFATE 1 AMPULE: .5; 2.5 SOLUTION RESPIRATORY (INHALATION) at 19:38

## 2023-03-12 RX ADMIN — ENOXAPARIN SODIUM 40 MG: 100 INJECTION SUBCUTANEOUS at 09:04

## 2023-03-12 RX ADMIN — BUDESONIDE 500 MCG: 0.5 SUSPENSION RESPIRATORY (INHALATION) at 09:16

## 2023-03-12 RX ADMIN — METAXALONE 400 MG: 800 TABLET ORAL at 13:18

## 2023-03-12 RX ADMIN — KETOROLAC TROMETHAMINE 15 MG: 30 INJECTION, SOLUTION INTRAMUSCULAR; INTRAVENOUS at 09:04

## 2023-03-12 RX ADMIN — PANTOPRAZOLE SODIUM 40 MG: 40 TABLET, DELAYED RELEASE ORAL at 21:37

## 2023-03-12 RX ADMIN — Medication 10 ML: at 21:44

## 2023-03-12 RX ADMIN — IPRATROPIUM BROMIDE AND ALBUTEROL SULFATE 1 AMPULE: .5; 2.5 SOLUTION RESPIRATORY (INHALATION) at 15:52

## 2023-03-12 RX ADMIN — DIVALPROEX SODIUM 500 MG: 500 TABLET, DELAYED RELEASE ORAL at 21:44

## 2023-03-12 RX ADMIN — SUCRALFATE 1 G: 1 TABLET ORAL at 09:05

## 2023-03-12 RX ADMIN — SERTRALINE 100 MG: 100 TABLET, FILM COATED ORAL at 09:04

## 2023-03-12 RX ADMIN — HYDROMORPHONE HYDROCHLORIDE 4 MG: 2 TABLET ORAL at 21:34

## 2023-03-12 RX ADMIN — IOPAMIDOL 75 ML: 755 INJECTION, SOLUTION INTRAVENOUS at 06:29

## 2023-03-12 RX ADMIN — Medication 10 ML: at 09:04

## 2023-03-12 ASSESSMENT — PAIN DESCRIPTION - LOCATION
LOCATION: HEAD
LOCATION: ABDOMEN
LOCATION: CHEST;BACK;ABDOMEN

## 2023-03-12 ASSESSMENT — PAIN - FUNCTIONAL ASSESSMENT
PAIN_FUNCTIONAL_ASSESSMENT: ACTIVITIES ARE NOT PREVENTED
PAIN_FUNCTIONAL_ASSESSMENT: ACTIVITIES ARE NOT PREVENTED

## 2023-03-12 ASSESSMENT — PAIN DESCRIPTION - DESCRIPTORS
DESCRIPTORS: SORE
DESCRIPTORS: ACHING
DESCRIPTORS: ACHING;SORE

## 2023-03-12 ASSESSMENT — PAIN DESCRIPTION - ORIENTATION
ORIENTATION: MID
ORIENTATION: MID

## 2023-03-12 ASSESSMENT — PAIN SCALES - GENERAL
PAINLEVEL_OUTOF10: 3
PAINLEVEL_OUTOF10: 8
PAINLEVEL_OUTOF10: 6

## 2023-03-12 NOTE — PROGRESS NOTES
Markos Jimenez is a 65 y.o. right handed female     Neurology following for difficulty swallowing    PMH of anxiety, arthritis COPD, depression, GERD, hyperlipidemia, kidney stones, osteoarthritis, and RSD    Assessment:     Questionable stomatodynia  Burning sensation in her mouth and throat    Strep throat  Burning in the back of her throat  Taken 5 days of amoxicillin    Possible migraines  Patient has bruxism and grinds her teeth every day. Blurred visions with black dots throughout her visual field with nausea no vomiting    Bruxism  Patient grinds her teeth every day  Has been started on Aida Codding recently  This may be the cause of her headaches    Plan:     Start Skelaxin 400 mg 3 times daily for jaw pain  Discontinue Klonopin  Start Halls lozenges for sore throat  Per ENT Would consider esophagram and/or MBSS per SLP eval and recommendations  Consider treating as a migraine if muscle relaxer does not take headache away  Neurology will follow    Subjective:     Patient presented to the ER on 3/11/2023 after experiencing shortness of breath and chest pain. While in the ER patient was having some expressive aphasia and left-sided weakness. Her NIH was a 3 and a stroke alert was called. Her CT, CTAs and CTP were all negative for infarct or LVO. Patient said she was having difficulty swallowing and having pain in the back of her throat with migraine symptoms. She also complained of blurred vision as well as black dots scattered throughout her visual fields with nausea but no vomiting. She described bilateral monocular diplopia. There is an associated bilateral frontal headache as well. She denies any personal history of headaches or any family history of migraines    The patient's  states she has just been getting treated for her sore throat for laryngitis and is supposedly on day 5 of amoxicillin. He also reports that she is no longer taking risperidone and has started Aida Codding.     Patient sitting up bed awake, alert, oriented x 4. She says she continues to experience shortness of breath and throat pain which is radiating down to her stomach. This pain is described as a burning sensation. She does not look short of breath upon exam.  She is also complaining of a headache on the top of her head which is 8/10 pain is described as a stabbing constant pain. She also says her jaws hurting as she grinds her teeth constantly. She had a scope with ENT yesterday which was negative for stricture. A trial of Klonopin was started last night. She says it was for sleep but did not help with her other complaints. She is not experiencing weakness in her extremities or expressive aphasia.   There is no family at the bedside    No chest pain or palpitations  No coughing or wheezing    No vertigo, lightheadedness or loss of consciousness  No falls, tripping or stumbling  No incontinence of bowels or bladder  No itching or bruising appreciated  No numbness, tingling or focal arm/leg weakness    ROS otherwise negative      Objective:       BP (!) 108/58   Pulse 78   Temp 97.6 °F (36.4 °C) (Oral)   Resp 16   Ht 5' 2\" (1.575 m)   Wt 115 lb 4.8 oz (52.3 kg)   LMP  (LMP Unknown)   SpO2 95%   BMI 21.09 kg/m²       General appearance: alert, appears stated age, cooperative and no distress  Head: normocephalic, without obvious abnormality, atraumatic  Eyes: conjunctivae/corneas clear  Neck: no adenopathy, supple, symmetrical, trachea midline and thyroid not enlarged, symmetric, no tenderness/mass/nodules  Lungs: Regular respirations on nasal cannula  Heart: No chest pain or palpitations  Abdomen: soft, non-tender; bowel sounds normal; no masses,  no organomegaly  Extremities:  normal, atraumatic, no cyanosis or edema, right index finger amputated  Pulses: 2+ and symmetric  Skin: color, texture, turgor normal---no rashes or lesions      Mental Status: Alert, oriented, thought content appropriate, alertness: alert, orientation: time, date, person, place, affect: normal     Appropriate attention/concentration  Intact fundus of knowledge  Repetition intact  Intact memories      Speech: Clear  Language: No aphasias    Cranial Nerves:  I: smell NA   II: visual acuity  NA   II: visual fields Full to confrontation   II: pupils SILVANA   III,VII: ptosis None   III,IV,VI: extraocular muscles  Full ROM   V: mastication    V: facial light touch sensation  Normal   V,VII: corneal reflex     VII: facial muscle function - upper  Normal   VII: facial muscle function - lower Normal   VIII: hearing Normal   IX: soft palate elevation     IX,X: gag reflex    XI: trapezius strength     XI: sternocleidomastoid strength 5/5   XI: neck extension strength  5/5   XII: tongue strength  Normal     Motor:  5/5 throughout  Normal bulk and tone  No drift   No abnormal movements    Sensory:  LT and PP normal  Vibration normal    Coordination:   FN, FFM and NICK normal  HS normal    Gait:  Deferred for safety/fall consideration    DTR:   2+ throughout  No Babinskis  No Banuelos's    No other pathological reflexes    Laboratory/Radiology:     CBC with Differential:    Lab Results   Component Value Date/Time    WBC 8.6 03/12/2023 08:54 AM    RBC 4.31 03/12/2023 08:54 AM    HGB 13.0 03/12/2023 08:54 AM    HCT 40.3 03/12/2023 08:54 AM     03/12/2023 08:54 AM    MCV 93.5 03/12/2023 08:54 AM    MCH 30.2 03/12/2023 08:54 AM    MCHC 32.3 03/12/2023 08:54 AM    RDW 13.6 03/12/2023 08:54 AM    SEGSPCT 69 10/12/2012 04:15 PM    LYMPHOPCT 14.8 03/12/2023 08:54 AM    MONOPCT 6.3 03/12/2023 08:54 AM    BASOPCT 0.3 03/12/2023 08:54 AM    MONOSABS 0.54 03/12/2023 08:54 AM    LYMPHSABS 1.27 03/12/2023 08:54 AM    EOSABS 0.00 03/12/2023 08:54 AM    BASOSABS 0.03 03/12/2023 08:54 AM     CMP:    Lab Results   Component Value Date/Time     03/12/2023 08:54 AM    K 4.7 03/12/2023 08:54 AM     03/12/2023 08:54 AM    CO2 25 03/12/2023 08:54 AM    BUN 20 03/12/2023 08:54 AM    CREATININE 0.6 03/12/2023 08:54 AM    GFRAA >60 10/13/2022 03:07 AM    LABGLOM >60 03/12/2023 08:54 AM    GLUCOSE 122 03/12/2023 08:54 AM    GLUCOSE 130 06/13/2011 09:18 AM    PROT 6.7 03/12/2023 08:54 AM    LABALBU 3.6 03/12/2023 08:54 AM    CALCIUM 8.9 03/12/2023 08:54 AM    BILITOT 0.5 03/12/2023 08:54 AM    ALKPHOS 90 03/12/2023 08:54 AM    AST 14 03/12/2023 08:54 AM    ALT 13 03/12/2023 08:54 AM     CT head  Negative for acute abnormalities    CTA Head/CTA Neck/CTP  1. Estimated stenosis of the proximal right and left internal carotid  artery by NASCET criteria is not hemodynamically significant  2. Mild atherosclerotic disease . 3. No large vessel occlusion identified    CT soft tissue/Neck  No acute abnormality of the soft tissue structures of the neck. Cervical spinal stimulator. Flexible nasopharyngolaryngoscopy  Findings:  Normal nasopharynx, normal epiglottis, normal tongue base, normal pyriform, normal TVC motion and mucosa, no subglottic masses or lesions.  Mild post-cricoid edema and pachydermic changes with pooling of secretions      All labs and imaging studies reviewed independently today         AYESHA Nick CNP  11:26 AM  3/12/2023
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
## 2023-03-12 NOTE — PLAN OF CARE
Problem: Chronic Conditions and Co-morbidities  Goal: Patient's chronic conditions and co-morbidity symptoms are monitored and maintained or improved  3/12/2023 1035 by Marichuy Osei RN  Outcome: Progressing  3/12/2023 0343 by Leroy Cheadle, RN  Outcome: Progressing     Problem: Discharge Planning  Goal: Discharge to home or other facility with appropriate resources  3/12/2023 1035 by Marichuy Osei RN  Outcome: Progressing  3/12/2023 0343 by Leroy Cheadle, RN  Outcome: Progressing     Problem: Pain  Goal: Verbalizes/displays adequate comfort level or baseline comfort level  3/12/2023 1035 by Marichuy Osei RN  Outcome: Progressing  3/12/2023 0343 by Leroy Cheadle, RN  Outcome: Progressing     Problem: Safety - Adult  Goal: Free from fall injury  3/12/2023 1035 by Marichuy Osei RN  Outcome: Progressing  3/12/2023 0343 by Leroy Cheadle, RN  Outcome: Progressing     Problem: ABCDS Injury Assessment  Goal: Absence of physical injury  3/12/2023 1035 by Marichuy Osei RN  Outcome: Progressing  3/12/2023 0343 by Leroy Cheadle, RN  Outcome: Progressing     Problem: Skin/Tissue Integrity  Goal: Absence of new skin breakdown  Description: 1. Monitor for areas of redness and/or skin breakdown  2. Assess vascular access sites hourly  3. Every 4-6 hours minimum:  Change oxygen saturation probe site  4. Every 4-6 hours:  If on nasal continuous positive airway pressure, respiratory therapy assess nares and determine need for appliance change or resting period.   3/12/2023 1035 by Marichuy Osei RN  Outcome: Progressing  3/12/2023 0343 by Leroy Cheadle, RN  Outcome: Progressing

## 2023-03-12 NOTE — PLAN OF CARE
Problem: Chronic Conditions and Co-morbidities  Goal: Patient's chronic conditions and co-morbidity symptoms are monitored and maintained or improved  3/12/2023 0343 by Tania Griffith RN  Outcome: Progressing  3/11/2023 1759 by Mane Ridley RN  Outcome: Progressing     Problem: Discharge Planning  Goal: Discharge to home or other facility with appropriate resources  3/12/2023 0343 by Tania Griffith RN  Outcome: Progressing  3/11/2023 1759 by Mane Ridley RN  Outcome: Progressing  Flowsheets (Taken 3/11/2023 1446)  Discharge to home or other facility with appropriate resources:   Identify barriers to discharge with patient and caregiver   Arrange for needed discharge resources and transportation as appropriate     Problem: Pain  Goal: Verbalizes/displays adequate comfort level or baseline comfort level  3/12/2023 0343 by Tania Griffith RN  Outcome: Progressing  3/11/2023 1759 by Mane Ridley RN  Outcome: Progressing     Problem: Safety - Adult  Goal: Free from fall injury  3/12/2023 0343 by Tania Griffith RN  Outcome: Progressing  3/11/2023 1759 by Mane Ridley RN  Outcome: Progressing     Problem: ABCDS Injury Assessment  Goal: Absence of physical injury  3/12/2023 0343 by Tania Griffith RN  Outcome: Progressing  3/11/2023 1759 by Mane Ridley RN  Outcome: Progressing     Problem: Skin/Tissue Integrity  Goal: Absence of new skin breakdown  3/12/2023 0343 by Tania Griffith RN  Outcome: Progressing  3/11/2023 1759 by Mane Ridley RN  Outcome: Progressing

## 2023-03-12 NOTE — PROGRESS NOTES
Hospitalist Progress Note      SYNOPSIS: Patient admitted on 3/11/2023 for COPD exacerbation [facility])    Ms. Fernando Guillen, a 65y.o. year old female  who  has a past medical history of Abdominal pain, Anxiety, Arthritis, Blood circulation, collateral, Cancer ([address] Chronic back pain, Colitis, Complex regional pain syndrome type 1 of right lower extremity, COPD (chronic obstructive pulmonary disease) ([address] Depression, GERD (gastroesophageal reflux disease), Headache(784.0), Hyperlipidemia, Kidney stone, Neuromuscular disorder ([address] On home O2, Osteoarthritis, Other disorders of kidney and ureter in diseases classified elsewhere, Pneumonia, Psychiatric problem, and RSD (reflex sympathetic dystrophy). Patient presented to the emergency department with complaints of shortness of breath and chest discomfort. Patient noted to have wheezing on presentation. She was given breathing treatments and steroids and admitted for acute COPD exacerbation. She also endorses difficulty swallowing and burning pain in the back of her throat for which Neurology was consulted. They suspected burning mouth syndrome related to her tardive dyskinesia. Psychiatry was consulted and managed psych meds. ENT was also consulted for further evaluation of throat pain and laryngoscopy was done with no acute findings. CT of neck was obtained and was negative. SUBJECTIVE:  Stable overnight. No other overnight issues reported. Patient seen and examined  Records reviewed. Patient continues to report burning sensation in her throat and difficulty swallowing. Temp (24hrs), Av.4 °F (36.3 °C), Min:97.1 °F (36.2 °C), Max:97.6 °F (36.4 °C)    DIET: ADULT ORAL NUTRITION SUPPLEMENT; Breakfast, AM Snack, Lunch, PM Snack; Standard High Calorie/High Protein Oral Supplement  ADULT DIET;  Regular  ADULT ORAL NUTRITION SUPPLEMENT; Breakfast, Lunch, Dinner; Standard High Calorie/High Protein Oral Supplement  CODE: Full Code  No intake or output data in the 24 hours ending 03/12/23 1127    Review of Systems  All bolded are positive; please see HPI  General:  Fever, chills, diaphoresis, fatigue, malaise, night sweats, weight loss  Psychological:  Anxiety, disorientation, hallucinations. ENT:  Epistaxis, headaches, vertigo, visual changes. Cardiovascular:  Chest pain, irregular heartbeats, palpitations, paroxysmal nocturnal dyspnea. Respiratory:  Shortness of breath, coughing, sputum production, hemoptysis, wheezing, orthopnea. Gastrointestinal:  Nausea, vomiting, diarrhea, heartburn, constipation, abdominal pain, hematemesis, hematochezia, melena, acholic stools  Genito-Urinary:  Dysuria, urgency, frequency, hematuria  Musculoskeletal:  Joint pain, joint stiffness, joint swelling, muscle pain  Neurology:  Headache, focal neurological deficits, weakness, numbness, paresthesia  Derm:  Rashes, ulcers, excoriations, bruising  Extremities:  Decreased ROM, peripheral edema, mottling      OBJECTIVE:    BP (!) 108/58   Pulse 78   Temp 97.6 °F (36.4 °C) (Oral)   Resp 16   Ht 5' 2\" (1.575 m)   Wt 115 lb 4.8 oz (52.3 kg)   LMP  (LMP Unknown)   SpO2 95%   BMI 21.09 kg/m²     General appearance:  awake, alert, and oriented to person, place, time, and purpose; appears stated age and cooperative; no apparent distress no labored breathing  HEENT:  Conjunctivae/corneas clear. Neck: Supple. No jugular venous distention. Respiratory: symmetrical; clear to auscultation bilaterally; no wheezes; no rhonchi; no rales  Cardiovascular: rhythm regular; rate controlled; no murmurs  Abdomen: Soft, nontender, nondistended  Extremities:  peripheral pulses present; no peripheral edema; no ulcers  Musculoskeletal: No clubbing, cyanosis, no bilateral lower extremity edema. Brisk capillary refill.    Skin:  No rashes  on visible skin  Neurologic: awake, alert and following commands     ASSESSMENT and PLAN:    Acute COPD exacerbation- improved  Chronic respiratory failure   On baseline oxygen 4L  Continue IV solumedrol   Duonebs   Pullmicort     Dysphagia due to burning sensation in throat   Neurology consulted  SLP consulted    Anxiety and major depressive disorder  Tardive dyskinesia   Appreciate psychiatry consult       DVT Prophylaxis [x] Lovenox, []  Heparin, [] SCDs, [] Ambulation   GI Prophylaxis [x] PPI,  [] H2 Blocker,  [] Carafate,  [] Diet/Tube Feeds   Disposition Patient requires continued admission due to COPD exacerbation, SLP eval, and ongoing dysphagia    MDM [] Low, [x] Moderate,[]  High       Medications:  REVIEWED DAILY    Infusion Medications    sodium chloride       Scheduled Medications    divalproex  500 mg Oral BID    benztropine  1 mg Oral BID    metaxalone  400 mg Oral TID    aspirin  325 mg Oral Daily    melatonin  3 mg Oral Nightly    pantoprazole  40 mg Oral BID    pregabalin  75 mg Oral TID    sodium chloride flush  5-40 mL IntraVENous 2 times per day    enoxaparin  40 mg SubCUTAneous Daily    budesonide  500 mcg Nebulization BID    ipratropium-albuterol  1 ampule Inhalation Q4H WA    methylPREDNISolone  40 mg IntraVENous Q6H    Followed by    Chucky Jalloh ON 3/13/2023] predniSONE  40 mg Oral Daily    sertraline  100 mg Oral Daily    sucralfate  1 g Oral 4x Daily    Lumateperone Tosylate  42 mg Oral Nightly    Valbenazine Tosylate  40 mg Oral Daily     PRN Meds: sodium chloride flush, Menthol, HYDROmorphone, zolpidem, sodium chloride flush, sodium chloride, ondansetron **OR** ondansetron, polyethylene glycol, acetaminophen **OR** acetaminophen, albuterol, ketorolac    Labs:     Recent Labs     03/11/23 0300 03/12/23  0854   WBC 5.9 8.6   HGB 13.1 13.0   HCT 41.0 40.3    221       Recent Labs     03/11/23  0300 03/12/23  0854    136   K 4.1 4.7    100   CO2 27 25   BUN 13 20   CREATININE 0.5 0.6   CALCIUM 9.0 8.9       Recent Labs     03/11/23  0300 03/12/23  0854   PROT 6.9 6.7   ALKPHOS 102 90   ALT 17 13   AST 22 14   BILITOT 0.3 0.5       No results for input(s): INR in the last 72 hours. No results for input(s): [place] in the last 72 hours. Chronic labs:    Lab Results   Component Value Date    CHOL 190 11/08/2022    TRIG 96 11/08/2022    HDL 76 11/10/2022    LDLCALC 92 11/10/2022    TSH 0.861 12/28/2022    INR 1.1 07/24/2022    LABA1C 6.9 (H) 12/28/2022       Radiology: REVIEWED DAILY    +++++++++++++++++++++++++++++++++++++++++++++++++  Petros Lynn MD  [address]  +++++++++++++++++++++++++++++++++++++++++++++++++  NOTE: This report was transcribed using voice recognition software. Every effort was made to ensure accuracy; however, inadvertent computerized transcription errors may be present.

## 2023-03-12 NOTE — CONSULTS
OTOLARYNGOLOGY  CONSULT NOTE  3/11/2023    Physician Consulted: Dr. Rose Perez  Reason for Consult: Dysphagia    HPI  Dulce Maria Hussein is a 65 y.o. female who ENT was consulted for evaluation of dysphagia and sore throat. Patient reports several day history of sore throat, difficulty swallowing and shortness of breath. She apparently was diagnosed with sore throat at outside urgent care and placed on amoxicillin, although strep screen on 3/7/23 was negative. She presented to [facility] with expressive aphasia type symptoms and was admitted for workup. CT head and brain perfusion scans were unremarkable for stroke. She has multiple comorbid psychiatric and chronic pain conditions including complex regional pain syndrome. Per neurology there is concern for tardive dyskinesia and ENT was consulted to rule out upper airway lesion contributing to her symptomatology. She is on 2-4L O2 at home for COPD. States that she quit smoking about 4 months ago. No prior head/neck cancer history. She endorses pain with swallowing, but has been able to tolerate swallowing pills. Denies any changes to her voice. Review of Systems   HENT:  Positive for sore throat and trouble swallowing. Negative for voice change. Respiratory:  Positive for shortness of breath. Negative for wheezing and stridor.       Past Medical History:   Diagnosis Date    Abdominal pain     For EGD 1-13-23    Anxiety     Arthritis     Blood circulation, collateral     Cancer (HCC)     SKIN    Chronic back pain     Colitis     recent    Complex regional pain syndrome type 1 of right lower extremity 09/11/2015    COPD (chronic obstructive pulmonary disease) (HCC)     Depression     GERD (gastroesophageal reflux disease)     Headache(784.0)     Hyperlipidemia 10/17/2014    Kidney stone     Neuromuscular disorder ([address]     On home O2     3 liters nc-Ordered by Dr. Phu Du 1/10/23, to receive on 1/12/23 per     Osteoarthritis     Other disorders of kidney and ureter in diseases classified elsewhere     Pneumonia 12/2022    Psychiatric problem     RSD (reflex sympathetic dystrophy)        Past Surgical History:   Procedure Laterality Date    BACK SURGERY  2005    had cerv SCS at [address] then had staph infec 2003  then it was changed to a dual SCS by Dr Nishi Ellsworth 2005 approx then has had several battery changes and rechargeable put in 2009    [address]      bryant hands    COLONOSCOPY      ENDOSCOPY, COLON, DIAGNOSTIC      FINGER AMPUTATION      index right hand multiple surgeries    HYSTERECTOMY (CERVIX STATUS UNKNOWN)      LAMINECTOMY      cervical    NERVE BLOCK N/A 08/19/2015    cerv facet #1 with iv anesthesia    NERVE BLOCK Left 08/26/2015    left cervical paravertebral facet block #2 c4 5 c5 6 c6 7 under iv sedation    NERVE BLOCK  09/02/2015    lumbar parasympathetic #1    NERVE BLOCK Right 09/17/2015    right sympathetic block #2    OTHER SURGICAL HISTORY  09/18/2013    surgical replacement of medtronic cervical spinal cord stimulator electrode and connect to existing battery right hip    OTHER SURGICAL HISTORY N/A 02/03/2014    Surgical revision spinal cord stimulator scar at anchor site due to wound  dehisence    OTHER SURGICAL HISTORY Right 11/11/2014    EXCISION OF CYST RIGHT SHOULDER    OTHER SURGICAL HISTORY  04/08/2015    surgical revision medtronic cervical spinal cord stimulator scar at anchor site due to wound dehisance    OTHER SURGICAL HISTORY  04/25/2018    spinal cord stimulator battery replacement due to end of life    [place] REVJ/RMVL IMPLANTED SPINAL NEUROSTIM GENERATOR N/A 04/25/2018    SPINAL CORD STIMULATOR BATTERY REPLACEMENT DUE TO END OF LIFE performed by Perez Marie DO at [facility] 1/13/2023    EGD BIOPSY performed by Agustín San MD at [facility] ENDOSCOPY       Medications Prior to Admission:    Prior to Admission medications    Medication Sig Start Date End Date Taking? Authorizing Provider   divalproex (DEPAKOTE ER) 500 MG extended release tablet Take 500 mg by mouth nightly   Yes Historical Provider, MD   sertraline (ZOLOFT) 100 MG tablet Take 100 mg by mouth daily   Yes Historical Provider, MD   sucralfate (CARAFATE) 1 GM tablet Take 1 g by mouth 4 times daily  Patient not taking: Reported on 3/11/2023   Yes Historical Provider, MD   zolpidem (AMBIEN) 10 MG tablet Take 10 mg by mouth nightly.    Yes Historical Provider, MD   propranolol (INDERAL) 40 MG tablet Take 40 mg by mouth 2 times daily   Yes Historical Provider, MD   benztropine (COGENTIN) 2 MG tablet Take 2 mg by mouth daily   Yes Historical Provider, MD   divalproex (DEPAKOTE ER) 250 MG extended release tablet Take 250 mg by mouth every morning   Yes Historical Provider, MD   Lumateperone Tosylate 42 MG CAPS Take 42 mg by mouth nightly   Yes Historical Provider, MD   Valbenazine Tosylate (INGREZZA) 40 MG CAPS Take 40 mg by mouth daily   Yes Historical Provider, MD   amoxicillin (AMOXIL) 500 MG capsule Take 1 capsule by mouth 2 times daily for 19 doses 3/7/23 3/17/23  Rolfe Gowers, DO   pantoprazole (PROTONIX) 40 MG tablet Take 1 tablet by mouth in the morning and at bedtime  Patient not taking: No sig reported 12/30/22   Susan Hummel MD   albuterol sulfate HFA (PROVENTIL;VENTOLIN;PROAIR) 108 (90 Base) MCG/ACT inhaler INHALE 2 PUFFS INTO THE LUNGS FOUR TIMES DAILY AS NEEDED FOR WHEEZING 12/14/22   Anthony Perez MD   albuterol (PROVENTIL) (2.5 MG/3ML) 0.083% nebulizer solution Take 3 mLs by nebulization every 6 hours as needed for Wheezing 11/8/22   Elizabeth Lee MD   risperiDONE (RISPERDAL) 1 MG tablet Take 1 mg by mouth 3 times daily  Patient not taking: No sig reported    Historical Provider, MD   nicotine (NICODERM CQ) 21 MG/24HR Place 1 patch onto the skin every 24 hours  Patient not taking: No sig reported    Historical Provider, MD   HYDROmorphone (DILAUDID) 4 MG tablet Take 4 mg by mouth every 6 hours as needed. Uses for RSD 22   Historical Provider, MD   pregabalin (LYRICA) 75 MG capsule Take 75 mg by mouth 3 times daily. 22   Historical Provider, MD   melatonin 3 MG TABS tablet Take 1 tablet by mouth daily  Patient taking differently: Take 3 mg by mouth at bedtime 22  Kristin Firas, APRN - CNP   nortriptyline (PAMELOR) 10 MG capsule TAKE 1 CAPSULE BY MOUTH EVERY NIGHT 21  Julianna Perez,        Allergies   Allergen Reactions    Sulfamethazine Anaphylaxis    Ancef [Cefazolin Sodium] Other (See Comments)     convulsions       Family History   Problem Relation Age of Onset    Other Mother        Social History     Tobacco Use    Smoking status: Former     Packs/day: 0.50     Years: 42.00     Pack years: 21.00     Types: Cigarettes     Start date: 1970     Quit date: 2022     Years since quittin.3     Passive exposure: Current    Smokeless tobacco: Never   Vaping Use    Vaping Use: Some days   Substance Use Topics    Alcohol use: No     Alcohol/week: 0.0 standard drinks    Drug use: No           PHYSICAL EXAM:    Vitals:    23   BP:    Pulse:    Resp:    Temp:    SpO2: 97%       General Appearance:  Laying in bed, awake, alert, no apparent distress  Head/face:  NC/AT  Eyes: PERRL, EOMI  ENT: Bilateral external ears WNL, Nares patent, Septum midline,   Neck:Supple, no adenopathy, tenderness to palpation bilaterally  Lungs:  Non-labored, good respiratory effort, no stridor  Heart:  RR  Neuro: Facial nerve symmetric and intact. House Brackmann 1/6, bilaterally. Endoscopy Procedure Note    Pre-operative Diagnosis: Dysphagia  Post-operative Diagnosis: same  Indications: Hoarseness, dysphagia or aspiration - not able to be clearly evaluated by indirect laryngoscopy  Anesthesia: Lidocaine 2% and Magdiel-Synephrine 1/2%  Endoscopy Type:  Flexible nasopharyngolaryngoscopy  Procedure Details   The bilateral side(s) of the nose was topically anesthetized with spray. The nasal cavities were inspected to determine which side would be more amenable to the scope being passed through. After waiting an appropriate period of time for anesthesia/ vasoconstriction to become effective, the flexible nasopharyngolaryngoscope was passed through the bilateral side(s) of the nose, and the nose, nasopharynx, oropharynx, hypopharynx and larynx were examined. Examination was performed during quiet respiration and with phonation. The following findings were noted. Findings:  Normal nasopharynx, normal epiglottis, normal tongue base, normal pyriform, normal TVC motion and mucosa, no subglottic masses or lesions. Mild post-cricoid edema and pachydermic changes with pooling of secretions. Condition:  Stable  Complications:  None          LABS:  CBC  Recent Labs     23  0300   WBC 5.9   HGB 13.1   HCT 41.0          RADIOLOGY  CT HEAD WO CONTRAST    Result Date: 3/11/2023  Patient MRN:  [id number] : 1960 Age: 65 years Gender: Female Order Date:  3/11/2023 3:08 AM EXAM: CT HEAD WO CONTRAST NUMBER OF IMAGES:  1 INDICATION:  aphasia aphasia What reading provider will be dictating this exam?->[facility] COMPARISON: None Technique: Low-dose CT  acquisition technique included one of following options; 1 . Automated exposure control, 2. Adjustment of MA and or KV according to patient's size or 3. Use of iterative reconstruction. Multiple CT sections were obtained with sagittal and coronal MPR reconstructions. The ventricles are prominent. The gyri and sulci appear  prominent. The white matter appears  prominent. There is no evidence for hemorrhage. There is no infarct identified. There is no mass effect identified. There is no mass identified. Diffuse atrophy likely age related Findings compatible with small vessel ischemic changes.  Findings were called to Dr. Satish Leiva    Result Date: 3/11/2023  EXAMINATION: ONE XRAY VIEW OF THE CHEST 3/11/2023 3:28 am COMPARISON: None. HISTORY: ORDERING SYSTEM PROVIDED HISTORY: sob TECHNOLOGIST PROVIDED HISTORY: Reason for exam:->sob What reading provider will be dictating this exam?->[facility] FINDINGS: Normal cardiomediastinal silhouette. Lungs clear. No pneumothorax or effusion. Body wall soft tissues unremarkable. Osseous thorax intact. Stimulator leads over the midline cervical and thoracic spine. No acute disease. RECOMMENDATION: Careful clinical correlation and follow up recommended. CTA NECK W CONTRAST    Result Date: 3/11/2023  Patient MRN:  [id number] : 1960 Age: 65 years Gender: Female Order Date:  3/11/2023 3:08 AM EXAM: CTA NECK W CONTRAST, CTA HEAD W CONTRAST NUMBER OF IMAGES:  1 INDICATION:  justine alert, expressive aphasia justine alert, expressive aphasia Decision Support Exception - unselect if not a suspected or confirmed emergency medical condition->Emergency Medical Condition (MA) What reading provider will be dictating this exam?->[facility] COMPARISON: None Technique: Low-dose CT  acquisition technique included one of following options; 1 . Automated exposure control, 2. Adjustment of MA and or KV according to patient's size or 3. Use of iterative reconstruction. Contiguous spiral images were obtained in the axial plane, following the administration of intravenous contrast using CT angiographic protocol. Sagittal and coronal images were reconstructed from the axial plane acquisition. Additional MIP reconstructions were presented to aid in the interpretation of this study. Images were obtained from the skull base cranially. There is mild calcified plaque identified in the vessels compatible with atherosclerotic disease.  The right carotid is mildly atherosclerotic without significant stenosis The left carotid is mildly atherosclerotic without significant stenosis The right vertebral artery is mildly atherosclerotic without significant stenosis The left vertebral artery is mildly atherosclerotic without significant stenosis The basilar artery is unremarkable The middle cerebral arteries are unremarkable The anterior cerebral arteries are unremarkable The posterior cerebral arteries are unremarkable     1. Estimated stenosis of the proximal right and left internal carotid artery by NASCET criteria is not hemodynamically significant 2. Mild atherosclerotic disease . 3. No large vessel occlusion identified This study was analyzed by the Viz[employer] ai algorithm. CTA PULMONARY W CONTRAST    Result Date: 3/11/2023  EXAMINATION: CTA OF THE CHEST 3/11/2023 3:11 am TECHNIQUE: CTA of the chest was performed after the administration of intravenous contrast.  Multiplanar reformatted images are provided for review. MIP images are provided for review. Automated exposure control, iterative reconstruction, and/or weight based adjustment of the mA/kV was utilized to reduce the radiation dose to as low as reasonably achievable. COMPARISON: January 21, 2023 HISTORY: ORDERING SYSTEM PROVIDED HISTORY: c/o of chest pain and shortness of breath, concern for pe TECHNOLOGIST PROVIDED HISTORY: Reason for exam:->c/o of chest pain and shortness of breath, concern for pe Decision Support Exception - unselect if not a suspected or confirmed emergency medical condition->Emergency Medical Condition (MA) What reading provider will be dictating this exam?->[facility] FINDINGS: Pulmonary Arteries: Pulmonary arteries are adequately opacified for evaluation. No evidence of intraluminal filling defect to suggest pulmonary embolism. Main pulmonary artery is normal in caliber. Mediastinum: No evidence of mediastinal lymphadenopathy. The heart and pericardium demonstrate no acute abnormality. There is no acute abnormality of the thoracic aorta. Lungs/pleura: The lungs are without acute process. No focal consolidation or pulmonary edema. No evidence of pleural effusion or pneumothorax. Upper Abdomen: Limited images of the upper abdomen are unremarkable.  Soft Tissues/Bones: No acute bone or soft tissue abnormality. Thoracic and cervical spinal epidural stimulator leads noted. No evidence of pulmonary embolism or acute pulmonary abnormality. RECOMMENDATIONS: Careful clinical correlation and follow up recommended. CT BRAIN PERFUSION    Result Date: 3/11/2023  Patient MRN: [id number] : 1960 Age:  65 years Gender: Female Order Date: 3/11/2023 3:08 AM Exam: CT BRAIN PERFUSION Number of Images: views Indication:   expressive aphasia expressive aphasia Decision Support Exception - unselect if not a suspected or confirmed emergency medical condition->Emergency Medical Condition (MA) What reading provider will be dictating this exam?->[facility] Comparison: None. Findings: Perfusion images demonstrate symmetric blood volume Blood flow images demonstrate symmetric blood flow There is no significant ischemic penumbra identified. There is no significant core infarct identified. No significant ischemic penumbra identified This study was analyzed by the Viz[employer] ai algorithm. CTA HEAD W CONTRAST    Result Date: 3/11/2023  Patient MRN:  [id number] : 1960 Age: 65 years Gender: Female Order Date:  3/11/2023 3:08 AM EXAM: CTA NECK W CONTRAST, CTA HEAD W CONTRAST NUMBER OF IMAGES:  1 INDICATION:  justine alert, expressive aphasia justine alert, expressive aphasia Decision Support Exception - unselect if not a suspected or confirmed emergency medical condition->Emergency Medical Condition (MA) What reading provider will be dictating this exam?->[facility] COMPARISON: None Technique: Low-dose CT  acquisition technique included one of following options; 1 . Automated exposure control, 2. Adjustment of MA and or KV according to patient's size or 3. Use of iterative reconstruction. Contiguous spiral images were obtained in the axial plane, following the administration of intravenous contrast using CT angiographic protocol.  Sagittal and coronal images were reconstructed from the axial plane acquisition. Additional MIP reconstructions were presented to aid in the interpretation of this study. Images were obtained from the skull base cranially. There is mild calcified plaque identified in the vessels compatible with atherosclerotic disease. The right carotid is mildly atherosclerotic without significant stenosis The left carotid is mildly atherosclerotic without significant stenosis The right vertebral artery is mildly atherosclerotic without significant stenosis The left vertebral artery is mildly atherosclerotic without significant stenosis The basilar artery is unremarkable The middle cerebral arteries are unremarkable The anterior cerebral arteries are unremarkable The posterior cerebral arteries are unremarkable     1. Estimated stenosis of the proximal right and left internal carotid artery by NASCET criteria is not hemodynamically significant 2. Mild atherosclerotic disease . 3. No large vessel occlusion identified This study was analyzed by the Viz[employer] ai algorithm. ASSESSMENT:  65 y.o. female with history of chronic complex regional pain syndrome as well as multiple other psychiatric co-morbidities. She reports subjective odynophagia/dysphagia, however there is no organic evidence such as a mass/lesion or infectious process to explain her symptoms. PLAN:  Scoped at the bedside with findings noted above - airway widely patent no masses or lesions appreciated  No acute ENT surgical intervention at this time  Would consider esophagram and/or MBSS per SLP eval and recommendations  Patient may also benefit from trial of PPI/H2 blocker for possible LPR  CT soft tissue neck pending - will review once completed  Continue medical management per primary team    Patient seen and examined by resident. Will discuss plan with attending on call, Dr. Evangelina Rose.      Electronically signed by Brenda Pardo DO on 3/11/23 at 8:41 PM EST

## 2023-03-12 NOTE — PROGRESS NOTES
Both home medications of Charles and Lynn Mckeon have been verified and are in patient medication  med room.  Will continue to monitor

## 2023-03-12 NOTE — CONSULTS
Reason for consult: medication adjustment, \"burning mouth syndrome\"    65 y.o. female with past history of chronic pain and depression initially came to the hospital complaining of shortness of breath, chest pain and burning sensation in her mouth. UDS and Alcohol level are negative. Patient currently on the medical floor being treated for dysphagia. Psychiatry was consulted due to burning mouth syndrome and medication adjustment. Patient had a scope per ENT and findings are within normal limits. I saw the patient today. She was calm and cooperative says that her burning mouth started about 2 days ago. Patient denies any recent medication adjustments. Patient says that she started caplyta several months ago and she seems to think that it helps her. She denies taking Risperdal.  Patient said that she took Risperdal in the past not currently taking. Patient says she also takes Depakote and Zoloft. Patient says that she does not really want to change any of her medication and she does not believe that her medications are causing her current symptoms of dysphagia and mouth pain. Patient states that she lives at home with her  who helps her medications. Somewhat of a poor historian regarding her prior history. Patient says that she had 1 previous psychiatric hospitalization in August 2022 and this was verified. Patient says she does not know when she was hospitalized at that however according to the record and mentioned that she was having acute psychosis at that time. Currently denies any suicidal ideations, homicidal ideations, auditory or visual hallucinations or paranoia at this time. She is alert and oriented x4. Patient denies any prior suicide attempts. Says that she is not really interested in changing any of her medications. No history is obtained from his previous hospitalization in August.       Psychiatric history:  Dx of MDD.  Patient denies any history of suicide attempts or self-injurious behaviors. 1 previous psychiatric hospitalization in 2022 for acute psychosis. She is prescribed Zoloft, Depakote, Klonopin and caplyta. Says that none of her medications were changed recently and Lowella Crooked was started about 2 months ago and she feels it helped     Family psychiatric history:  Denies knowledge of any suicides in her family, reports that her brother has unspecified mental illness. Substance abuse history:  Denies any illicit substance abuse, denies alcohol abuse. Patient reports klonopin prescribed and she was on opiates in the past for chronic pain no longer taking. Personal, family social history:  raised by her mother who is now . has 3 brothers who she has good relationship with, she completed the 10th grade, she has 3 children and 5 grandchildren. She does not work, and is with her  They have their own house and he is the provider for the family. Denies abuse or trauma     MSE:   Appearance: discheveled, age appropriate,thin  Behavior: calm, cooperative, fair eye contact  Mood: \"OK\"  Affect:congruent   Thought process: linear  Thougth content: Denies suicidal ideation, homicidal ideations, paranoia  Perceptual distrubance: Denies Auditory, visual hallucinations  Insight: fair  Judgment: fair  Cognition: alert and oriented x4      DX: MDD     A/P: Patient states that she wants to continue capltya and that this was started about 2 months ago and she does not believe that it is the cause of her current symptoms of mouth pain. Patient does not want to change any of her other medications I discussed with her about changing the dosing of the Depakote and changing and into DR instead of extended release tablets and she was okay with that. Also change Cogentin  dosing to 1 mg twice daily in case EPS from caplyta. Finally, will discontinue Risperdal as patient does not take this medication at home.      Denies any suicidal ideations, homicidal ideations, auditory or visual hallucinations or paranoid she is calm and pleasant alert and oriented. She does not meet criteria for inpatient hospitalization      Let us know if we can assist any further in this case.

## 2023-03-13 VITALS
HEIGHT: 62 IN | BODY MASS INDEX: 21.22 KG/M2 | DIASTOLIC BLOOD PRESSURE: 65 MMHG | OXYGEN SATURATION: 97 % | TEMPERATURE: 97.4 F | WEIGHT: 115.3 LBS | RESPIRATION RATE: 18 BRPM | HEART RATE: 72 BPM | SYSTOLIC BLOOD PRESSURE: 117 MMHG

## 2023-03-13 PROBLEM — G43.909 MIGRAINE: Status: ACTIVE | Noted: 2023-03-13

## 2023-03-13 LAB
EKG ATRIAL RATE: 62 BPM
EKG P AXIS: 57 DEGREES
EKG P-R INTERVAL: 210 MS
EKG Q-T INTERVAL: 410 MS
EKG QRS DURATION: 84 MS
EKG QTC CALCULATION (BAZETT): 416 MS
EKG R AXIS: -42 DEGREES
EKG T AXIS: 48 DEGREES
EKG VENTRICULAR RATE: 62 BPM

## 2023-03-13 PROCEDURE — 6370000000 HC RX 637 (ALT 250 FOR IP): Performed by: PSYCHIATRY & NEUROLOGY

## 2023-03-13 PROCEDURE — 6370000000 HC RX 637 (ALT 250 FOR IP): Performed by: FAMILY MEDICINE

## 2023-03-13 PROCEDURE — 2580000003 HC RX 258: Performed by: FAMILY MEDICINE

## 2023-03-13 PROCEDURE — 6370000000 HC RX 637 (ALT 250 FOR IP): Performed by: INTERNAL MEDICINE

## 2023-03-13 PROCEDURE — 99232 SBSQ HOSP IP/OBS MODERATE 35: CPT

## 2023-03-13 PROCEDURE — 6360000002 HC RX W HCPCS: Performed by: FAMILY MEDICINE

## 2023-03-13 PROCEDURE — 94640 AIRWAY INHALATION TREATMENT: CPT

## 2023-03-13 PROCEDURE — 93010 ELECTROCARDIOGRAM REPORT: CPT | Performed by: INTERNAL MEDICINE

## 2023-03-13 RX ORDER — ZOLPIDEM TARTRATE 10 MG/1
10 TABLET ORAL NIGHTLY PRN
Qty: 30 TABLET | Refills: 0
Start: 2023-03-13 | End: 2023-04-12

## 2023-03-13 RX ORDER — PREDNISONE 20 MG/1
40 TABLET ORAL DAILY
Status: DISCONTINUED | OUTPATIENT
Start: 2023-03-13 | End: 2023-03-13 | Stop reason: HOSPADM

## 2023-03-13 RX ORDER — DIVALPROEX SODIUM 500 MG/1
500 TABLET, DELAYED RELEASE ORAL 2 TIMES DAILY
Qty: 60 TABLET | Refills: 0 | Status: SHIPPED | OUTPATIENT
Start: 2023-03-13

## 2023-03-13 RX ORDER — PREDNISONE 20 MG/1
40 TABLET ORAL DAILY
Qty: 8 TABLET | Refills: 0 | Status: SHIPPED | OUTPATIENT
Start: 2023-03-14 | End: 2023-03-18

## 2023-03-13 RX ORDER — KETOROLAC TROMETHAMINE 30 MG/ML
30 INJECTION, SOLUTION INTRAMUSCULAR; INTRAVENOUS EVERY 8 HOURS SCHEDULED
Status: DISCONTINUED | OUTPATIENT
Start: 2023-03-13 | End: 2023-03-13 | Stop reason: HOSPADM

## 2023-03-13 RX ORDER — PREDNISONE 20 MG/1
40 TABLET ORAL DAILY
Status: DISCONTINUED | OUTPATIENT
Start: 2023-03-13 | End: 2023-03-13

## 2023-03-13 RX ORDER — PROCHLORPERAZINE EDISYLATE 5 MG/ML
10 INJECTION INTRAMUSCULAR; INTRAVENOUS EVERY 8 HOURS SCHEDULED
Status: DISCONTINUED | OUTPATIENT
Start: 2023-03-13 | End: 2023-03-13

## 2023-03-13 RX ORDER — PROCHLORPERAZINE EDISYLATE 5 MG/ML
10 INJECTION INTRAMUSCULAR; INTRAVENOUS EVERY 8 HOURS SCHEDULED
Status: DISCONTINUED | OUTPATIENT
Start: 2023-03-13 | End: 2023-03-13 | Stop reason: HOSPADM

## 2023-03-13 RX ORDER — BENZTROPINE MESYLATE 2 MG/1
1 TABLET ORAL 2 TIMES DAILY
Qty: 60 TABLET | Refills: 0
Start: 2023-03-13

## 2023-03-13 RX ADMIN — PREDNISONE 40 MG: 20 TABLET ORAL at 09:46

## 2023-03-13 RX ADMIN — SUCRALFATE 1 G: 1 TABLET ORAL at 09:46

## 2023-03-13 RX ADMIN — BUDESONIDE 500 MCG: 0.5 SUSPENSION RESPIRATORY (INHALATION) at 10:37

## 2023-03-13 RX ADMIN — PANTOPRAZOLE SODIUM 40 MG: 40 TABLET, DELAYED RELEASE ORAL at 09:46

## 2023-03-13 RX ADMIN — METHYLPREDNISOLONE SODIUM SUCCINATE 40 MG: 40 INJECTION, POWDER, FOR SOLUTION INTRAMUSCULAR; INTRAVENOUS at 03:34

## 2023-03-13 RX ADMIN — PREGABALIN 75 MG: 75 CAPSULE ORAL at 09:47

## 2023-03-13 RX ADMIN — IPRATROPIUM BROMIDE AND ALBUTEROL SULFATE 1 AMPULE: .5; 2.5 SOLUTION RESPIRATORY (INHALATION) at 10:36

## 2023-03-13 RX ADMIN — ENOXAPARIN SODIUM 40 MG: 100 INJECTION SUBCUTANEOUS at 09:47

## 2023-03-13 RX ADMIN — BENZTROPINE MESYLATE 1 MG: 0.5 TABLET ORAL at 09:47

## 2023-03-13 RX ADMIN — DIVALPROEX SODIUM 500 MG: 500 TABLET, DELAYED RELEASE ORAL at 09:46

## 2023-03-13 RX ADMIN — SERTRALINE 100 MG: 100 TABLET, FILM COATED ORAL at 09:46

## 2023-03-13 RX ADMIN — Medication 10 ML: at 09:46

## 2023-03-13 RX ADMIN — ASPIRIN 325 MG: 325 TABLET, COATED ORAL at 09:47

## 2023-03-13 ASSESSMENT — PAIN SCALES - GENERAL: PAINLEVEL_OUTOF10: 0

## 2023-03-13 NOTE — PLAN OF CARE
Problem: Chronic Conditions and Co-morbidities  Goal: Patient's chronic conditions and co-morbidity symptoms are monitored and maintained or improved  Outcome: Completed     Problem: Discharge Planning  Goal: Discharge to home or other facility with appropriate resources  Outcome: Completed     Problem: Pain  Goal: Verbalizes/displays adequate comfort level or baseline comfort level  Outcome: Completed     Problem: Safety - Adult  Goal: Free from fall injury  Outcome: Completed     Problem: ABCDS Injury Assessment  Goal: Absence of physical injury  Outcome: Completed     Problem: Skin/Tissue Integrity  Goal: Absence of new skin breakdown  Description: 1. Monitor for areas of redness and/or skin breakdown  2. Assess vascular access sites hourly  3. Every 4-6 hours minimum:  Change oxygen saturation probe site  4. Every 4-6 hours:  If on nasal continuous positive airway pressure, respiratory therapy assess nares and determine need for appliance change or resting period.   Outcome: Completed

## 2023-03-13 NOTE — PROGRESS NOTES
Per patient's , patient does not need transported with oxygen. 2 home medications (valbenazine tosylate and lumateperone tosylate) were returned to patient.

## 2023-03-13 NOTE — PROGRESS NOTES
Spoke with , Sim Lockett, informed him of patient's discharge. States he can be here within a half an hour.

## 2023-03-13 NOTE — PROGRESS NOTES
Talat Kamara is a 65 y.o. right handed female     Neurology following for difficulty swallowing    PMH of anxiety, arthritis COPD, depression, GERD, hyperlipidemia, kidney stones, osteoarthritis, and RSD    Assessment:     Strep throat  Burning in the back of her throat  Taken 5 days of amoxicillin    Possible migraines  Patient has bruxism and grinds her teeth every day. Blurred visions with black dots throughout her visual field with nausea no vomiting  Upon exam patient continues to experience headaches 8/10. Will try migraine cocktail with Compazine and Toradol, patient already on Depakote. Bruxism  Patient grinds her teeth every day  Has been started on [place] recently  This may be the cause of her headaches    Plan:     Stop Skelaxin 400 mg 3 times daily for jaw pain  Discontinue Klonopin  Continue Halls lozenges for sore throat  Per ENT Would consider esophagram and/or MBSS per SLP eval and recommendations  We will treat as a migraine because muscle relaxer did not take her headache away  Start Compazine 10 mg and Toradol 30 mg every 8 hours x 3 doses  Neurology will sign off call if needed  Follow-up with neurology after discharge    Subjective:     Patient presented to the ER on 3/11/2023 after experiencing shortness of breath and chest pain. While in the ER patient was having some expressive aphasia and left-sided weakness. Her NIH was a 3 and a stroke alert was called. Her CT, CTAs and CTP were all negative for infarct or LVO. Patient said she was having difficulty swallowing and having pain in the back of her throat with migraine symptoms. She also complained of blurred vision as well as black dots scattered throughout her visual fields with nausea but no vomiting. She described bilateral monocular diplopia. There is an associated bilateral frontal headache as well.  She denies any personal history of headaches or any family history of migraines    The patient's  states she has just been getting treated for her sore throat for laryngitis and is supposedly on day 5 of amoxicillin. He also reports that she is no longer taking risperidone and has started [employer]. Patient sitting up in bed awake and alert and oriented x 4. She continues to experience a headache and jaw pain. She says the muscle relaxers that were prescribed yesterday did not take her pain away. Her pain is still 8/10. We will treat this as a migraine we will give her a migraine cocktail. She says the Halls lozenges help decrease the pain and burning in her throat when she is sucking on them.       No chest pain or palpitations  No coughing or wheezing    No vertigo, lightheadedness or loss of consciousness  No falls, tripping or stumbling  No incontinence of bowels or bladder  No itching or bruising appreciated  No numbness, tingling or focal arm/leg weakness    ROS otherwise negative      Objective:       /65   Pulse 72   Temp 97.4 °F (36.3 °C) (Temporal)   Resp 18   Ht 5' 2\" (1.575 m)   Wt 115 lb 4.8 oz (52.3 kg)   LMP  (LMP Unknown)   SpO2 95%   BMI 21.09 kg/m²       General appearance: alert, appears stated age, cooperative and no distress  Head: normocephalic, without obvious abnormality, atraumatic  Eyes: conjunctivae/corneas clear  Neck: no adenopathy, supple, symmetrical, trachea midline and thyroid not enlarged, symmetric, no tenderness/mass/nodules  Lungs: Regular respirations on nasal cannula  Heart: No chest pain or palpitations  Abdomen: soft, non-tender; bowel sounds normal; no masses,  no organomegaly  Extremities:  normal, atraumatic, no cyanosis or edema, right index finger amputated  Pulses: 2+ and symmetric  Skin: color, texture, turgor normal---no rashes or lesions      Mental Status: Alert, oriented, thought content appropriate, alertness: alert, orientation: time, date, person, place, affect: normal     Appropriate attention/concentration  Intact fundus of knowledge  Repetition intact  Intact memories      Speech: Clear  Language: No aphasias    Cranial Nerves:  I: smell NA   II: visual acuity  NA   II: visual fields Full to confrontation   II: pupils SILVANA   III,VII: ptosis None   III,IV,VI: extraocular muscles  Full ROM   V: mastication    V: facial light touch sensation  Normal   V,VII: corneal reflex     VII: facial muscle function - upper  Normal   VII: facial muscle function - lower Normal   VIII: hearing Normal   IX: soft palate elevation     IX,X: gag reflex    XI: trapezius strength     XI: sternocleidomastoid strength 5/5   XI: neck extension strength  5/5   XII: tongue strength  Normal     Motor:  5/5 throughout  Normal bulk and tone  No drift   No abnormal movements    Sensory:  LT and PP normal  Vibration normal    Coordination:   FN, FFM and NICK normal  HS normal    Gait:  Deferred for safety/fall consideration    DTR:   2+ throughout  No Babinskis  No Banuelos's    No other pathological reflexes    Laboratory/Radiology:     CBC with Differential:    Lab Results   Component Value Date/Time    WBC 8.6 03/12/2023 08:54 AM    RBC 4.31 03/12/2023 08:54 AM    HGB 13.0 03/12/2023 08:54 AM    HCT 40.3 03/12/2023 08:54 AM     03/12/2023 08:54 AM    MCV 93.5 03/12/2023 08:54 AM    MCH 30.2 03/12/2023 08:54 AM    MCHC 32.3 03/12/2023 08:54 AM    RDW 13.6 03/12/2023 08:54 AM    SEGSPCT 69 10/12/2012 04:15 PM    LYMPHOPCT 14.8 03/12/2023 08:54 AM    MONOPCT 6.3 03/12/2023 08:54 AM    BASOPCT 0.3 03/12/2023 08:54 AM    MONOSABS 0.54 03/12/2023 08:54 AM    LYMPHSABS 1.27 03/12/2023 08:54 AM    EOSABS 0.00 03/12/2023 08:54 AM    BASOSABS 0.03 03/12/2023 08:54 AM     CMP:    Lab Results   Component Value Date/Time     03/12/2023 08:54 AM    K 4.7 03/12/2023 08:54 AM     03/12/2023 08:54 AM    CO2 25 03/12/2023 08:54 AM    BUN 20 03/12/2023 08:54 AM    CREATININE 0.6 03/12/2023 08:54 AM    GFRAA >60 10/13/2022 03:07 AM    LABGLOM >60 03/12/2023 08:54 AM    GLUCOSE 122 03/12/2023 08:54 AM    GLUCOSE 130 06/13/2011 09:18 AM    PROT 6.7 03/12/2023 08:54 AM    LABALBU 3.6 03/12/2023 08:54 AM    CALCIUM 8.9 03/12/2023 08:54 AM    BILITOT 0.5 03/12/2023 08:54 AM    ALKPHOS 90 03/12/2023 08:54 AM    AST 14 03/12/2023 08:54 AM    ALT 13 03/12/2023 08:54 AM     CT head  Negative for acute abnormalities    CTA Head/CTA Neck/CTP  1. Estimated stenosis of the proximal right and left internal carotid  artery by NASCET criteria is not hemodynamically significant  2. Mild atherosclerotic disease . 3. No large vessel occlusion identified    CT soft tissue/Neck  No acute abnormality of the soft tissue structures of the neck. Cervical spinal stimulator. Flexible nasopharyngolaryngoscopy  Findings:  Normal nasopharynx, normal epiglottis, normal tongue base, normal pyriform, normal TVC motion and mucosa, no subglottic masses or lesions.  Mild post-cricoid edema and pachydermic changes with pooling of secretions      All labs and imaging studies reviewed independently today         Tasha Cabello, AYESHA - CNP  9:08 AM  3/13/2023

## 2023-03-13 NOTE — DISCHARGE INSTRUCTIONS
STOP taking the Depakote you have at home. You have a new prescription for 500 mg twice daily of Depakote DR sent to pharmacy which you should start taking.      Take 4 more days of oral prednisone 40 mg daily for COPD exacerbation

## 2023-03-13 NOTE — DISCHARGE SUMMARY
Hospital Medicine Discharge Summary    Patient ID: Markos Jimenez      Patient's PCP: Michelle Olivier MD    Admit Date: 3/11/2023     Discharge Date:   03/13/23    Admitting Physician: Jolene Higginbotham DO     Discharge Physician: Nori Guadarrama MD     Discharge Diagnoses: Active Hospital Problems    Diagnosis Date Noted    Migraine [G43.909] 03/13/2023     Priority: Medium    Expressive aphasia [R47.01] 03/12/2023     Priority: Medium    COPD exacerbation ([address] [J44.1] 03/11/2023     Priority: Medium    Episode of recurrent major depressive disorder ([address] [F33.9] 01/06/2016       The patient was seen and examined on day of discharge and this discharge summary is in conjunction with any daily progress note from day of discharge. Hospital Course:     Ms. Markos Jimenez, a 65y.o. year old female  who  has a past medical history of Abdominal pain, Anxiety, Arthritis, Blood circulation, collateral, Cancer ([address] Chronic back pain, Colitis, Complex regional pain syndrome type 1 of right lower extremity, COPD (chronic obstructive pulmonary disease) ([address] Depression, GERD (gastroesophageal reflux disease), Headache(784.0), Hyperlipidemia, Kidney stone, Neuromuscular disorder ([address] On home O2, Osteoarthritis, Other disorders of kidney and ureter in diseases classified elsewhere, Pneumonia, Psychiatric problem, and RSD (reflex sympathetic dystrophy). Patient presented to the emergency department with complaints of shortness of breath and chest discomfort. Patient noted to have wheezing on presentation. She was given breathing treatments and steroids and admitted for acute COPD exacerbation. She also endorses difficulty swallowing and burning pain in the back of her throat for which Neurology was consulted. They suspected burning mouth syndrome related to her tardive dyskinesia, but this was ruled out and thought to be due to recent strep throat infection.  Psychiatry was consulted and managed psych meds. ENT was also consulted for further evaluation of throat pain and laryngoscopy was done with no acute findings. CT of neck was obtained and was negative. Skelaxin was started by Neurology for jaw pain but did not help so was stopped. SLP recommended a soft and bite sized diet. Patient was deemed stable for discharge and sent home in stable condition with plans to follow up in Neurology office for migraine management. Exam:     /65   Pulse 72   Temp 97.4 °F (36.3 °C) (Temporal)   Resp 18   Ht 5' 2\" (1.575 m)   Wt 115 lb 4.8 oz (52.3 kg)   LMP  (LMP Unknown)   SpO2 95%   BMI 21.09 kg/m²     General appearance: No apparent distress, appears stated age and cooperative. HEENT: Pupils equal, round, and reactive to light. Conjunctivae/corneas clear. Neck: Supple, with full range of motion. No jugular venous distention. Trachea midline. Respiratory:  Normal respiratory effort. Clear to auscultation, bilaterally without Rales/Wheezes/Rhonchi. Cardiovascular: Regular rate and rhythm with normal S1/S2 without murmurs, rubs or gallops. Abdomen: Soft, non-tender, non-distended with normal bowel sounds. Musculoskeletal: No clubbing, cyanosis or edema bilaterally. Full range of motion without deformity. Skin: Skin color, texture, turgor normal.  No rashes or lesions. Neurologic:  Neurovascularly intact without any focal sensory/motor deficits.  Cranial nerves: II-XII intact, grossly non-focal.  Psychiatric: Alert and oriented, thought content appropriate, normal insight      Consults:     IP CONSULT TO INTERNAL MEDICINE  IP CONSULT TO NEUROLOGY  IP CONSULT TO OTOLARYNGOLOGY  IP CONSULT TO PSYCHIATRY    Significant Diagnostic Studies:     CT HEAD WO CONTRAST    Result Date: 3/11/2023  Patient MRN:  [id number] : 1960 Age: 65 years Gender: Female Order Date:  3/11/2023 3:08 AM EXAM: CT HEAD WO CONTRAST NUMBER OF IMAGES:  1 INDICATION:  aphasia aphasia What reading provider will be dictating this exam?->[facility] COMPARISON: None Technique: Low-dose CT  acquisition technique included one of following options; 1 . Automated exposure control, 2. Adjustment of MA and or KV according to patient's size or 3. Use of iterative reconstruction. Multiple CT sections were obtained with sagittal and coronal MPR reconstructions. The ventricles are prominent. The gyri and sulci appear  prominent. The white matter appears  prominent. There is no evidence for hemorrhage. There is no infarct identified. There is no mass effect identified. There is no mass identified. Diffuse atrophy likely age related Findings compatible with small vessel ischemic changes. Findings were called to Dr. Nino Pham     CT SOFT TISSUE NECK W CONTRAST    Result Date: 2/19/2023  EXAMINATION: CT OF THE NECK SOFT TISSUE WITH CONTRAST  2/19/2023 TECHNIQUE: CT of the neck was performed with the administration of intravenous contrast. Multiplanar reformatted images are provided for review. Automated exposure control, iterative reconstruction, and/or weight based adjustment of the mA/kV was utilized to reduce the radiation dose to as low as reasonably achievable. COMPARISON: None. HISTORY: ORDERING SYSTEM PROVIDED HISTORY: subjective thorat closing TECHNOLOGIST PROVIDED HISTORY: Reason for exam:->subjective thorat closing Decision Support Exception - unselect if not a suspected or confirmed emergency medical condition->Emergency Medical Condition (MA) Initial evaluation. FINDINGS: PHARYNX/LARYNX:  The visualized upper aerodigestive tract appears symmetric. No discrete mass identified. The epiglottis appears normal. SALIVARY GLANDS/THYROID:  The parotid, submandibular and thyroid glands demonstrate no acute abnormality. LYMPH NODES:  No evidence of cervical lymphadenopathy by size criteria. SOFT TISSUES:  No appreciable soft tissue swelling is seen. BRAIN/ORBITS/SINUSES:  The visualized portion of the intracranial contents appear unremarkable.   The visualized portion of the orbits, paranasal sinuses and mastoid air cells demonstrate no acute abnormality. LUNG APICES/SUPERIOR MEDIASTINUM:  Centrilobular and paraseptal emphysematous changes within the lungs bilaterally. No acute abnormality within the visualized mediastinum. BONES:  No aggressive appearing lytic or blastic bony lesion. No acute osseous abnormality is seen. Cervical spinal stimulator leads are seen spanning the C2-C3 through upper C5 level as well as at the C6 through T1 levels. No acute abnormality of the soft tissue structures of the neck. RECOMMENDATIONS: Unavailable     CT SOFT TISSUE NECK W WO CONTRAST    Result Date: 3/12/2023  EXAMINATION: CT OF THE NECK WITHOUT AND WITH CONTRAST  3/12/2023 TECHNIQUE: CT of the neck was performed without and with the administration of intravenous contrast. Multiplanar reformatted images are provided for review. Automated exposure control, iterative reconstruction, and/or weight based adjustment of the mA/kV was utilized to reduce the radiation dose to as low as reasonably achievable. COMPARISON: None. HISTORY: ORDERING SYSTEM PROVIDED HISTORY: burning pain in throat, hx of tardive dyskinesia TECHNOLOGIST PROVIDED HISTORY: Reason for exam:->burning pain in throat, hx of tardive dyskinesia What reading provider will be dictating this exam?->[facility] FINDINGS: PHARYNX/LARYNX:  The palatine tonsils are normal in appearance. The tongue is normal in appearance. The valleculae, epiglottis, aryepiglottic folds and pyriform sinuses appear unremarkable. The true and false vocal cords are normal in appearance. No mass or abscess is seen. SALIVARY GLANDS/THYROID:  The parotid and submandibular glands appear unremarkable. The thyroid gland appears unremarkable. LYMPH NODES:  No cervical or supraclavicular lymphadenopathy is seen. SOFT TISSUES:  No appreciable soft tissue swelling or mass is seen.  BRAIN/ORBITS/SINUSES:  The visualized portion of the intracranial contents appear unremarkable. The visualized portion of the orbits, paranasal sinuses and mastoid air cells demonstrate no acute abnormality. LUNG APICES/SUPERIOR MEDIASTINUM:  No focal consolidation is seen within the visualized lung apices. No superior mediastinal lymphadenopathy or mass. The visualized portion of the trachea appears unremarkable. BONES:  No aggressive appearing lytic or blastic bony lesion. Cervical spinal stimulator device is noted. No acute abnormality of the soft tissue structures of the neck. Cervical spinal stimulator. XR CHEST PORTABLE    Result Date: 3/11/2023  EXAMINATION: ONE XRAY VIEW OF THE CHEST 3/11/2023 3:28 am COMPARISON: None. HISTORY: ORDERING SYSTEM PROVIDED HISTORY: sob TECHNOLOGIST PROVIDED HISTORY: Reason for exam:->sob What reading provider will be dictating this exam?->[facility] FINDINGS: Normal cardiomediastinal silhouette. Lungs clear. No pneumothorax or effusion. Body wall soft tissues unremarkable. Osseous thorax intact. Stimulator leads over the midline cervical and thoracic spine. No acute disease. RECOMMENDATION: Careful clinical correlation and follow up recommended. XR CHEST PORTABLE    Result Date: 3/7/2023  EXAMINATION: ONE XRAY VIEW OF THE CHEST 3/7/2023 8:06 am COMPARISON: 02/19/2023. HISTORY: ORDERING SYSTEM PROVIDED HISTORY: chest pain TECHNOLOGIST PROVIDED HISTORY: Reason for exam:->chest pain FINDINGS: The cardiac silhouette is normal in size. There are emphysematous changes in the upper lungs and chronic-appearing interstitial changes in the lower lungs. There is a focus of atelectasis in the left lung base. No pneumothorax or pleural effusion is seen. Emphysematous changes in the upper lungs and chronic-appearing interstitial changes in the lower lungs. Mild left basilar atelectasis.      XR CHEST PORTABLE    Result Date: 2/19/2023  EXAMINATION: ONE XRAY VIEW OF THE CHEST 2/19/2023 7:45 am COMPARISON: Previous CT of the chest of 2022 HISTORY: ORDERING SYSTEM PROVIDED HISTORY: cough TECHNOLOGIST PROVIDED HISTORY: Reason for exam:->cough FINDINGS: The cardiac silhouette is mildly enlarged There is hyperinflation of the lungs and flattening of diaphragms consistent with COPD. There is no pulmonary infiltrate, mass or nodule. There is no pleural effusion. COPD. No findings of failure or pneumonia Emphysematous changes Spinal stimulators are seen at the level of the cervical spine and inferior thoracic spine. CTA NECK W CONTRAST    Result Date: 3/11/2023  Patient MRN:  [id number] : 1960 Age: 65 years Gender: Female Order Date:  3/11/2023 3:08 AM EXAM: CTA NECK W CONTRAST, CTA HEAD W CONTRAST NUMBER OF IMAGES:  1 INDICATION:  justine alert, expressive aphasia justine alert, expressive aphasia Decision Support Exception - unselect if not a suspected or confirmed emergency medical condition->Emergency Medical Condition (MA) What reading provider will be dictating this exam?->[facility] COMPARISON: None Technique: Low-dose CT  acquisition technique included one of following options; 1 . Automated exposure control, 2. Adjustment of MA and or KV according to patient's size or 3. Use of iterative reconstruction. Contiguous spiral images were obtained in the axial plane, following the administration of intravenous contrast using CT angiographic protocol. Sagittal and coronal images were reconstructed from the axial plane acquisition. Additional MIP reconstructions were presented to aid in the interpretation of this study. Images were obtained from the skull base cranially. There is mild calcified plaque identified in the vessels compatible with atherosclerotic disease.  The right carotid is mildly atherosclerotic without significant stenosis The left carotid is mildly atherosclerotic without significant stenosis The right vertebral artery is mildly atherosclerotic without significant stenosis The left vertebral artery is mildly atherosclerotic without significant stenosis The basilar artery is unremarkable The middle cerebral arteries are unremarkable The anterior cerebral arteries are unremarkable The posterior cerebral arteries are unremarkable     1. Estimated stenosis of the proximal right and left internal carotid artery by NASCET criteria is not hemodynamically significant 2. Mild atherosclerotic disease . 3. No large vessel occlusion identified This study was analyzed by the Viz[employer] ai algorithm. CTA PULMONARY W CONTRAST    Result Date: 3/11/2023  EXAMINATION: CTA OF THE CHEST 3/11/2023 3:11 am TECHNIQUE: CTA of the chest was performed after the administration of intravenous contrast.  Multiplanar reformatted images are provided for review. MIP images are provided for review. Automated exposure control, iterative reconstruction, and/or weight based adjustment of the mA/kV was utilized to reduce the radiation dose to as low as reasonably achievable. COMPARISON: January 21, 2023 HISTORY: ORDERING SYSTEM PROVIDED HISTORY: c/o of chest pain and shortness of breath, concern for pe TECHNOLOGIST PROVIDED HISTORY: Reason for exam:->c/o of chest pain and shortness of breath, concern for pe Decision Support Exception - unselect if not a suspected or confirmed emergency medical condition->Emergency Medical Condition (MA) What reading provider will be dictating this exam?->[facility] FINDINGS: Pulmonary Arteries: Pulmonary arteries are adequately opacified for evaluation. No evidence of intraluminal filling defect to suggest pulmonary embolism. Main pulmonary artery is normal in caliber. Mediastinum: No evidence of mediastinal lymphadenopathy. The heart and pericardium demonstrate no acute abnormality. There is no acute abnormality of the thoracic aorta. Lungs/pleura: The lungs are without acute process. No focal consolidation or pulmonary edema. No evidence of pleural effusion or pneumothorax.  Upper Abdomen: Limited images of the upper abdomen are unremarkable. Soft Tissues/Bones: No acute bone or soft tissue abnormality. Thoracic and cervical spinal epidural stimulator leads noted. No evidence of pulmonary embolism or acute pulmonary abnormality. RECOMMENDATIONS: Careful clinical correlation and follow up recommended. CT BRAIN PERFUSION    Result Date: 3/11/2023  Patient MRN: [id number] : 1960 Age:  65 years Gender: Female Order Date: 3/11/2023 3:08 AM Exam: CT BRAIN PERFUSION Number of Images: views Indication:   expressive aphasia expressive aphasia Decision Support Exception - unselect if not a suspected or confirmed emergency medical condition->Emergency Medical Condition (MA) What reading provider will be dictating this exam?->[facility] Comparison: None. Findings: Perfusion images demonstrate symmetric blood volume Blood flow images demonstrate symmetric blood flow There is no significant ischemic penumbra identified. There is no significant core infarct identified. No significant ischemic penumbra identified This study was analyzed by the V[employer] ai algorithm. CTA HEAD W CONTRAST    Result Date: 3/11/2023  Patient MRN:  [id number] : 1960 Age: 65 years Gender: Female Order Date:  3/11/2023 3:08 AM EXAM: CTA NECK W CONTRAST, CTA HEAD W CONTRAST NUMBER OF IMAGES:  1 INDICATION:  justine alert, expressive aphasia justine alert, expressive aphasia Decision Support Exception - unselect if not a suspected or confirmed emergency medical condition->Emergency Medical Condition (MA) What reading provider will be dictating this exam?->[facility] COMPARISON: None Technique: Low-dose CT  acquisition technique included one of following options; 1 . Automated exposure control, 2. Adjustment of MA and or KV according to patient's size or 3. Use of iterative reconstruction. Contiguous spiral images were obtained in the axial plane, following the administration of intravenous contrast using CT angiographic protocol.  Sagittal and coronal images were reconstructed from the axial plane acquisition. Additional MIP reconstructions were presented to aid in the interpretation of this study. Images were obtained from the skull base cranially. There is mild calcified plaque identified in the vessels compatible with atherosclerotic disease. The right carotid is mildly atherosclerotic without significant stenosis The left carotid is mildly atherosclerotic without significant stenosis The right vertebral artery is mildly atherosclerotic without significant stenosis The left vertebral artery is mildly atherosclerotic without significant stenosis The basilar artery is unremarkable The middle cerebral arteries are unremarkable The anterior cerebral arteries are unremarkable The posterior cerebral arteries are unremarkable     1. Estimated stenosis of the proximal right and left internal carotid artery by NASCET criteria is not hemodynamically significant 2. Mild atherosclerotic disease . 3. No large vessel occlusion identified This study was analyzed by the V[employer] algorithm.        Disposition:  Home     Discharge Instructions/Follow-up:    STOP taking the Depakote you have at home. You have a new prescription for 500 mg twice daily of Depakote DR sent to pharmacy which you should start taking.     Take 4 more days of oral prednisone 40 mg daily for COPD exacerbation     Code Status:  Full Code     Activity: activity as tolerated    Diet:  Soft and bite sized     Labs: For convenience and continuity at follow-up the following most recent labs are provided:      CBC:    Lab Results   Component Value Date/Time    WBC 8.6 03/12/2023 08:54 AM    HGB 13.0 03/12/2023 08:54 AM    HCT 40.3 03/12/2023 08:54 AM     03/12/2023 08:54 AM       Renal:    Lab Results   Component Value Date/Time     03/12/2023 08:54 AM    K 4.7 03/12/2023 08:54 AM     03/12/2023 08:54 AM    CO2 25 03/12/2023 08:54 AM    BUN 20 03/12/2023 08:54 AM    CREATININE 0.6 03/12/2023 08:54 AM     CALCIUM 8.9 03/12/2023 08:54 AM       Discharge Medications:     Current Discharge Medication List             Details   predniSONE (DELTASONE) 20 MG tablet Take 2 tablets by mouth daily for 4 days  Qty: 8 tablet, Refills: 0                Details   benztropine (COGENTIN) 2 MG tablet Take 0.5 tablets by mouth 2 times daily  Qty: 60 tablet, Refills: 0      zolpidem (AMBIEN) 10 MG tablet Take 1 tablet by mouth nightly as needed for Sleep for up to 30 days. Max Daily Amount: 10 mg  Qty: 30 tablet, Refills: 0    Associated Diagnoses: Anxiety      divalproex (DEPAKOTE) 500 MG DR tablet Take 1 tablet by mouth in the morning and at bedtime  Qty: 60 tablet, Refills: 0                Details   sertraline (ZOLOFT) 100 MG tablet Take 100 mg by mouth daily      sucralfate (CARAFATE) 1 GM tablet Take 1 g by mouth 4 times daily      Lumateperone Tosylate 42 MG CAPS Take 42 mg by mouth nightly      Valbenazine Tosylate (INGREZZA) 40 MG CAPS Take 40 mg by mouth daily      pantoprazole (PROTONIX) 40 MG tablet Take 1 tablet by mouth in the morning and at bedtime  Qty: 30 tablet, Refills: 0    Associated Diagnoses: Chronic gastritis without bleeding, unspecified gastritis type      albuterol sulfate HFA (PROVENTIL;VENTOLIN;PROAIR) 108 (90 Base) MCG/ACT inhaler INHALE 2 PUFFS INTO THE LUNGS FOUR TIMES DAILY AS NEEDED FOR WHEEZING  Qty: 18 g, Refills: 0    Associated Diagnoses: Chronic obstructive pulmonary disease, unspecified COPD type ([facility] [address] [facility] Smoking greater than 40 pack years      albuterol (PROVENTIL) (2.5 MG/3ML) 0.083% nebulizer solution Take 3 mLs by nebulization every 6 hours as needed for Wheezing  Qty: 120 each, Refills: 0      nicotine (NICODERM CQ) 21 MG/24HR Place 1 patch onto the skin every 24 hours      HYDROmorphone (DILAUDID) 4 MG tablet Take 4 mg by mouth every 6 hours as needed. Uses for RSD      pregabalin (LYRICA) 75 MG capsule Take 75 mg by mouth 3 times daily.       melatonin 3 MG TABS tablet Take 1 tablet by mouth daily  Refills: 3             Time Spent on discharge is more than 45 minutes in the examination, evaluation, counseling and review of medications and discharge plan.       Signed:    Ewa Parnell MD   3/13/2023

## 2023-04-05 ENCOUNTER — CARE COORDINATION (OUTPATIENT)
Dept: CARE COORDINATION | Age: 63
End: 2023-04-05

## 2023-04-05 NOTE — CARE COORDINATION
-Attempted to reach pt to offer enrollment into care coordination program, however no answer.  -Detailed HIPAA compliant VM left introducing self, reason for call, and brief explanation of program.  -Left ACM's contact information, requesting call back.   -Mailed Intro Letter via [address] along with education: COPD, Fall Precautions, Oxygen Safety, [address] Referral

## 2023-04-24 ENCOUNTER — CARE COORDINATION (OUTPATIENT)
Dept: CARE COORDINATION | Age: 63
End: 2023-04-24

## 2023-04-24 NOTE — CARE COORDINATION
Care Coordination Services explained to patient and . Verbalizes understanding, but declines at this time. Encouraged to call PCP office with any questions/concerns/updates and to make an appt. Declines assistance. Last PCP appt noted 1/10/23 and was to follow up in 3 weeks. Pt has had ER visits and an admission since then.  ACM to route to PCP Office  Nahum DUONG, RN, [id number] [place] [address]  Cell: [phone]

## 2023-06-28 NOTE — PROGRESS NOTES
[facility]  Outpatient Speech Therapy  Phone: [phone] Fax: [phone]     SPEECH/LANGUAGE PATHOLOGY  DAILY PROGRESS NOTE      SUMMARY OF SESSION:  Session in minutes:  61        Family present/Observed:          Home practice given:  Continue with easy onset, diaphragmatic breathing, laryngeal message    SUBJECTIVE:   Good motivation and cooperation. OBJECTIVE:   Training and instruction provided to improve speech fluency and cognition. Patient is making gains towards short term goals. Patient demonstrates improving spontaneous speech as a result of skilled intervention as evidenced by her ability to produce some short phrases and single words without stuttering or with decreased stuttering. . Education provided on how to better identify what the pt is doing to allow for this improvement in fluency for casual conversation but not in naming or structured tasks. Difficulty continues for decreasing laryngeal tension. Cognitive tasks for following directions were good for level 1. Level 2 was inconsistent and often needed repetition. Recommended continued skilled therapy to return patient to prior level of functioning and/or maximum level of rehabilitation potential prior to d/c.       ASSESSMENT:   Client continues to make progress toward achieving goals. PLAN:   Continue plan of care.      Edward Ren M.Ed   Speech Language Pathologist    CPT CODE:       52700  speech/language tx Anesthesia Type: 1% lidocaine with epinephrine and a 1:10 solution of 8.4% sodium bicarbonate

## 2023-09-14 ENCOUNTER — OFFICE VISIT (OUTPATIENT)
Dept: FAMILY MEDICINE CLINIC | Age: 63
End: 2023-09-14
Payer: MEDICARE

## 2023-09-14 VITALS
BODY MASS INDEX: 25.76 KG/M2 | DIASTOLIC BLOOD PRESSURE: 64 MMHG | TEMPERATURE: 97.6 F | SYSTOLIC BLOOD PRESSURE: 108 MMHG | OXYGEN SATURATION: 96 % | HEIGHT: 62 IN | WEIGHT: 140 LBS | HEART RATE: 70 BPM | RESPIRATION RATE: 20 BRPM

## 2023-09-14 DIAGNOSIS — R30.0 DYSURIA: ICD-10-CM

## 2023-09-14 DIAGNOSIS — R31.9 URINARY TRACT INFECTION WITH HEMATURIA, SITE UNSPECIFIED: Primary | ICD-10-CM

## 2023-09-14 DIAGNOSIS — N39.0 URINARY TRACT INFECTION WITH HEMATURIA, SITE UNSPECIFIED: Primary | ICD-10-CM

## 2023-09-14 LAB
BILIRUBIN, POC: NORMAL
BLOOD URINE, POC: NORMAL
CLARITY, POC: NORMAL
COLOR, POC: YELLOW
GLUCOSE URINE, POC: NORMAL
KETONES, POC: NORMAL
LEUKOCYTE EST, POC: NORMAL
NITRITE, POC: POSITIVE
PH, POC: 6.5
PROTEIN, POC: 30
SPECIFIC GRAVITY, POC: >=1.03
UROBILINOGEN, POC: 0.2

## 2023-09-14 PROCEDURE — G8427 DOCREV CUR MEDS BY ELIG CLIN: HCPCS | Performed by: PHYSICIAN ASSISTANT

## 2023-09-14 PROCEDURE — 99214 OFFICE O/P EST MOD 30 MIN: CPT | Performed by: PHYSICIAN ASSISTANT

## 2023-09-14 PROCEDURE — 3017F COLORECTAL CA SCREEN DOC REV: CPT | Performed by: PHYSICIAN ASSISTANT

## 2023-09-14 PROCEDURE — 1036F TOBACCO NON-USER: CPT | Performed by: PHYSICIAN ASSISTANT

## 2023-09-14 PROCEDURE — 81002 URINALYSIS NONAUTO W/O SCOPE: CPT | Performed by: PHYSICIAN ASSISTANT

## 2023-09-14 PROCEDURE — G8419 CALC BMI OUT NRM PARAM NOF/U: HCPCS | Performed by: PHYSICIAN ASSISTANT

## 2023-09-14 RX ORDER — CIPROFLOXACIN 500 MG/1
500 TABLET, FILM COATED ORAL 2 TIMES DAILY
Qty: 20 TABLET | Refills: 0 | Status: SHIPPED | OUTPATIENT
Start: 2023-09-14 | End: 2023-09-24

## 2023-09-14 NOTE — PROGRESS NOTES
23  Melba Gorman : 1960 Sex: female  Age 59 y.o. Subjective:  Chief Complaint   Patient presents with    Dysuria         HPI:   HPI  Melba Gorman , 59 y.o. female presents to express care for evaluation of urinary tract infection. The patient believes that she has possible UTI starting couple of days ago. The patient has had some frequency, urgency and burning with urination. She has had a couple of incontinent episodes. The patient states that she has not had a UTI in several years but this is fairly common presentation. The patient Thiry back pain, flank pain. Does note some pressure in the suprapubic area. The patient Thiry vaginal bleeding or vaginal discharge. Not currently on any antibiotics. ROS:   Unless otherwise stated in this report the patient's positive and negative responses for review of systems for constitutional, eyes, ENT, cardiovascular, respiratory, gastrointestinal, neurological, , musculoskeletal, and integument systems and related systems to the presenting problem are either stated in the history of present illness or were not pertinent or were negative for the symptoms and/or complaints related to the presenting medical problem. Positives and pertinent negatives as per HPI. All others reviewed and are negative.       PMH:     Past Medical History:   Diagnosis Date    Abdominal pain     For EGD 23    Anxiety     Arthritis     Blood circulation, collateral     Cancer (HCC)     SKIN    Chronic back pain     Colitis     recent    Complex regional pain syndrome type 1 of right lower extremity 2015    COPD (chronic obstructive pulmonary disease) (HCC)     Depression     GERD (gastroesophageal reflux disease)     Headache(784.0)     Hyperlipidemia 10/17/2014    Kidney stone     Neuromuscular disorder ([address])     On home O2     3 liters nc-Ordered by Dr. Alexander Lozano 1/10/23, to receive on 23 per     Osteoarthritis     Other disorders

## 2023-09-16 LAB
CULTURE: ABNORMAL
SPECIMEN DESCRIPTION: ABNORMAL

## 2023-09-18 LAB
CULTURE: ABNORMAL
SPECIMEN DESCRIPTION: ABNORMAL

## 2023-11-02 LAB
AVERAGE GLUCOSE: NORMAL
CHOLESTEROL, TOTAL: 222 MG/DL
CHOLESTEROL/HDL RATIO: 3.6
HBA1C MFR BLD: 6.4 %
HDLC SERPL-MCNC: 62 MG/DL (ref 35–70)
LDL CHOLESTEROL CALCULATED: 134 MG/DL (ref 0–160)
NONHDLC SERPL-MCNC: 160 MG/DL
TRIGL SERPL-MCNC: 132 MG/DL
VLDLC SERPL CALC-MCNC: NORMAL MG/DL

## 2023-11-09 ENCOUNTER — OFFICE VISIT (OUTPATIENT)
Dept: FAMILY MEDICINE CLINIC | Age: 63
End: 2023-11-09
Payer: MEDICARE

## 2023-11-09 VITALS
RESPIRATION RATE: 16 BRPM | DIASTOLIC BLOOD PRESSURE: 65 MMHG | HEART RATE: 86 BPM | HEIGHT: 62 IN | BODY MASS INDEX: 26.5 KG/M2 | TEMPERATURE: 98.2 F | SYSTOLIC BLOOD PRESSURE: 93 MMHG | WEIGHT: 144 LBS | OXYGEN SATURATION: 92 %

## 2023-11-09 DIAGNOSIS — Z23 NEED FOR INFLUENZA VACCINATION: Primary | ICD-10-CM

## 2023-11-09 DIAGNOSIS — R19.7 DIARRHEA, UNSPECIFIED TYPE: ICD-10-CM

## 2023-11-09 DIAGNOSIS — F23 BRIEF PSYCHOTIC DISORDER (HCC): ICD-10-CM

## 2023-11-09 DIAGNOSIS — E46 PROTEIN MALNUTRITION (HCC): ICD-10-CM

## 2023-11-09 DIAGNOSIS — G40.89 OTHER SEIZURES (HCC): ICD-10-CM

## 2023-11-09 PROBLEM — R56.9 UNSPECIFIED CONVULSIONS (HCC): Status: ACTIVE | Noted: 2023-11-09

## 2023-11-09 PROCEDURE — G8484 FLU IMMUNIZE NO ADMIN: HCPCS

## 2023-11-09 PROCEDURE — G8427 DOCREV CUR MEDS BY ELIG CLIN: HCPCS

## 2023-11-09 PROCEDURE — 3017F COLORECTAL CA SCREEN DOC REV: CPT

## 2023-11-09 PROCEDURE — G8419 CALC BMI OUT NRM PARAM NOF/U: HCPCS

## 2023-11-09 PROCEDURE — G0008 ADMIN INFLUENZA VIRUS VAC: HCPCS | Performed by: STUDENT IN AN ORGANIZED HEALTH CARE EDUCATION/TRAINING PROGRAM

## 2023-11-09 PROCEDURE — 99214 OFFICE O/P EST MOD 30 MIN: CPT

## 2023-11-09 PROCEDURE — 90694 VACC AIIV4 NO PRSRV 0.5ML IM: CPT | Performed by: STUDENT IN AN ORGANIZED HEALTH CARE EDUCATION/TRAINING PROGRAM

## 2023-11-09 PROCEDURE — 1036F TOBACCO NON-USER: CPT

## 2023-11-09 RX ORDER — ZOLPIDEM TARTRATE 10 MG/1
10 TABLET ORAL
COMMUNITY
Start: 2023-10-16

## 2023-11-09 ASSESSMENT — COLUMBIA-SUICIDE SEVERITY RATING SCALE - C-SSRS
4. HAVE YOU HAD THESE THOUGHTS AND HAD SOME INTENTION OF ACTING ON THEM?: NO
5. HAVE YOU STARTED TO WORK OUT OR WORKED OUT THE DETAILS OF HOW TO KILL YOURSELF? DO YOU INTEND TO CARRY OUT THIS PLAN?: NO
3. HAVE YOU BEEN THINKING ABOUT HOW YOU MIGHT KILL YOURSELF?: NO
7. DID THIS OCCUR IN THE LAST THREE MONTHS: NO

## 2023-11-09 ASSESSMENT — PATIENT HEALTH QUESTIONNAIRE - PHQ9
7. TROUBLE CONCENTRATING ON THINGS, SUCH AS READING THE NEWSPAPER OR WATCHING TELEVISION: 0
SUM OF ALL RESPONSES TO PHQ9 QUESTIONS 1 & 2: 0
5. POOR APPETITE OR OVEREATING: 0
3. TROUBLE FALLING OR STAYING ASLEEP: 0
9. THOUGHTS THAT YOU WOULD BE BETTER OFF DEAD, OR OF HURTING YOURSELF: 0
SUM OF ALL RESPONSES TO PHQ QUESTIONS 1-9: 0
4. FEELING TIRED OR HAVING LITTLE ENERGY: 0
SUM OF ALL RESPONSES TO PHQ QUESTIONS 1-9: 0
6. FEELING BAD ABOUT YOURSELF - OR THAT YOU ARE A FAILURE OR HAVE LET YOURSELF OR YOUR FAMILY DOWN: 0
SUM OF ALL RESPONSES TO PHQ QUESTIONS 1-9: 0
1. LITTLE INTEREST OR PLEASURE IN DOING THINGS: 0
SUM OF ALL RESPONSES TO PHQ QUESTIONS 1-9: 0
10. IF YOU CHECKED OFF ANY PROBLEMS, HOW DIFFICULT HAVE THESE PROBLEMS MADE IT FOR YOU TO DO YOUR WORK, TAKE CARE OF THINGS AT HOME, OR GET ALONG WITH OTHER PEOPLE: 0
2. FEELING DOWN, DEPRESSED OR HOPELESS: 0
8. MOVING OR SPEAKING SO SLOWLY THAT OTHER PEOPLE COULD HAVE NOTICED. OR THE OPPOSITE, BEING SO FIGETY OR RESTLESS THAT YOU HAVE BEEN MOVING AROUND A LOT MORE THAN USUAL: 0

## 2023-11-10 ENCOUNTER — TELEPHONE (OUTPATIENT)
Dept: FAMILY MEDICINE CLINIC | Age: 63
End: 2023-11-10

## 2023-11-12 ASSESSMENT — ENCOUNTER SYMPTOMS
SORE THROAT: 0
DIARRHEA: 1
RHINORRHEA: 0
WHEEZING: 0
EYE PAIN: 0
NAUSEA: 0
SHORTNESS OF BREATH: 0

## 2024-03-19 ENCOUNTER — OFFICE VISIT (OUTPATIENT)
Dept: FAMILY MEDICINE CLINIC | Age: 64
End: 2024-03-19
Payer: MEDICARE

## 2024-03-19 VITALS
BODY MASS INDEX: 26.5 KG/M2 | TEMPERATURE: 96.9 F | SYSTOLIC BLOOD PRESSURE: 110 MMHG | HEART RATE: 69 BPM | WEIGHT: 144 LBS | HEIGHT: 62 IN | RESPIRATION RATE: 16 BRPM | DIASTOLIC BLOOD PRESSURE: 69 MMHG | OXYGEN SATURATION: 90 %

## 2024-03-19 DIAGNOSIS — M54.50 CHRONIC LOW BACK PAIN, UNSPECIFIED BACK PAIN LATERALITY, UNSPECIFIED WHETHER SCIATICA PRESENT: ICD-10-CM

## 2024-03-19 DIAGNOSIS — M25.562 ACUTE PAIN OF BOTH KNEES: Primary | ICD-10-CM

## 2024-03-19 DIAGNOSIS — M25.561 ACUTE PAIN OF BOTH KNEES: Primary | ICD-10-CM

## 2024-03-19 DIAGNOSIS — G89.29 CHRONIC LOW BACK PAIN, UNSPECIFIED BACK PAIN LATERALITY, UNSPECIFIED WHETHER SCIATICA PRESENT: ICD-10-CM

## 2024-03-19 PROCEDURE — 99213 OFFICE O/P EST LOW 20 MIN: CPT

## 2024-03-19 RX ORDER — TIZANIDINE 2 MG/1
2 TABLET ORAL EVERY 6 HOURS PRN
COMMUNITY
Start: 2024-02-29

## 2024-03-19 RX ORDER — ARIPIPRAZOLE 10 MG/1
10 TABLET ORAL NIGHTLY
COMMUNITY
Start: 2024-01-21

## 2024-03-19 SDOH — ECONOMIC STABILITY: FOOD INSECURITY: WITHIN THE PAST 12 MONTHS, YOU WORRIED THAT YOUR FOOD WOULD RUN OUT BEFORE YOU GOT MONEY TO BUY MORE.: NEVER TRUE

## 2024-03-19 SDOH — ECONOMIC STABILITY: FOOD INSECURITY: WITHIN THE PAST 12 MONTHS, THE FOOD YOU BOUGHT JUST DIDN'T LAST AND YOU DIDN'T HAVE MONEY TO GET MORE.: NEVER TRUE

## 2024-03-19 SDOH — ECONOMIC STABILITY: HOUSING INSECURITY
IN THE LAST 12 MONTHS, WAS THERE A TIME WHEN YOU DID NOT HAVE A STEADY PLACE TO SLEEP OR SLEPT IN A SHELTER (INCLUDING NOW)?: NO

## 2024-03-19 SDOH — ECONOMIC STABILITY: INCOME INSECURITY: HOW HARD IS IT FOR YOU TO PAY FOR THE VERY BASICS LIKE FOOD, HOUSING, MEDICAL CARE, AND HEATING?: NOT HARD AT ALL

## 2024-03-19 ASSESSMENT — PATIENT HEALTH QUESTIONNAIRE - PHQ9
2. FEELING DOWN, DEPRESSED OR HOPELESS: NOT AT ALL
SUM OF ALL RESPONSES TO PHQ9 QUESTIONS 1 & 2: 0
SUM OF ALL RESPONSES TO PHQ QUESTIONS 1-9: 0
7. TROUBLE CONCENTRATING ON THINGS, SUCH AS READING THE NEWSPAPER OR WATCHING TELEVISION: NOT AT ALL
5. POOR APPETITE OR OVEREATING: NOT AT ALL
SUM OF ALL RESPONSES TO PHQ QUESTIONS 1-9: 0
4. FEELING TIRED OR HAVING LITTLE ENERGY: NOT AT ALL
3. TROUBLE FALLING OR STAYING ASLEEP: NOT AT ALL
SUM OF ALL RESPONSES TO PHQ QUESTIONS 1-9: 0
8. MOVING OR SPEAKING SO SLOWLY THAT OTHER PEOPLE COULD HAVE NOTICED. OR THE OPPOSITE, BEING SO FIGETY OR RESTLESS THAT YOU HAVE BEEN MOVING AROUND A LOT MORE THAN USUAL: NOT AT ALL
9. THOUGHTS THAT YOU WOULD BE BETTER OFF DEAD, OR OF HURTING YOURSELF: NOT AT ALL
6. FEELING BAD ABOUT YOURSELF - OR THAT YOU ARE A FAILURE OR HAVE LET YOURSELF OR YOUR FAMILY DOWN: NOT AT ALL
SUM OF ALL RESPONSES TO PHQ QUESTIONS 1-9: 0
10. IF YOU CHECKED OFF ANY PROBLEMS, HOW DIFFICULT HAVE THESE PROBLEMS MADE IT FOR YOU TO DO YOUR WORK, TAKE CARE OF THINGS AT HOME, OR GET ALONG WITH OTHER PEOPLE: NOT DIFFICULT AT ALL
1. LITTLE INTEREST OR PLEASURE IN DOING THINGS: NOT AT ALL

## 2024-03-19 NOTE — PROGRESS NOTES
S: 63 y.o. female with   Chief Complaint   Patient presents with    Leg Swelling     Both legs, feels like they have a weight to them, has been going on for about 2 months        64 yo female here for 2 months of bilateral knee pain and swelling. Using walker over past 2-3 weeks due to this. No trauma. No F/C. No   Has spinal cord stimulator for chronic back pain.  Has RSD (reflex sympathetic dystrophy) - seeing pain clinic for this. On hydromorphine 4 mg.    O: VS:  height is 1.575 m (5' 2.01\") and weight is 65.3 kg (144 lb). Her temperature is 96.9 °F (36.1 °C). Her blood pressure is 110/69 and her pulse is 69. Her respiration is 16 and oxygen saturation is 90%.   BP Readings from Last 3 Encounters:   03/19/24 110/69   11/09/23 93/65   09/14/23 108/64     See resident note  Unable to stand d/t pain. Passive ROM bilateral knees intact, but unable to bear weight on them. No edema of distal LE.    Impression/Plan:   1) Bilateral Knee Pain - etiology? - get X-Ray, Labs (CBC, SANGEETHA, RF, Sed Rate, C-RP)  2) Chronic Back Pain - continue FU with Pain clinic  3) RSD - continue FU with Pain clinic      Health Maintenance Due   Topic Date Due    Shingles vaccine (1 of 2) Never done    Low dose CT lung screening &/or counseling  Never done    Pneumococcal 0-64 years Vaccine (2 of 2 - PPSV23 or PCV20) 11/13/2015    Breast cancer screen  04/25/2018    Respiratory Syncytial Virus (RSV) Pregnant or age 60 yrs+ (1 - 1-dose 60+ series) Never done    DTaP/Tdap/Td vaccine (2 - Td or Tdap) 01/23/2023    COVID-19 Vaccine (3 - 2023-24 season) 09/01/2023    Annual Wellness Visit (Medicare Advantage)  Never done         Attending Physician Statement  I have discussed the case, including pertinent history and exam findings with the resident. I agree with the documented assessment and plan.      Luis Carlos Magallanes MD  
and sore throat.    Respiratory:  Negative for cough, chest tightness and shortness of breath.    Cardiovascular:  Negative for chest pain and palpitations.   Gastrointestinal:  Negative for abdominal pain, constipation, diarrhea, nausea and vomiting.   Genitourinary:  Negative for dysuria and frequency.   Musculoskeletal:  Positive for arthralgias.   Neurological:  Negative for dizziness and light-headedness.   All other systems reviewed and are negative.      PHYSICAL EXAM   Vitals: /69   Pulse 69   Temp 96.9 °F (36.1 °C)   Resp 16   Ht 1.575 m (5' 2.01\")   Wt 65.3 kg (144 lb)   LMP  (LMP Unknown)   SpO2 90%   BMI 26.33 kg/m²   Physical Exam  Vitals reviewed.   Constitutional:       Appearance: Normal appearance.   HENT:      Head: Normocephalic and atraumatic.      Nose: Nose normal.      Mouth/Throat:      Mouth: Mucous membranes are moist.   Eyes:      Pupils: Pupils are equal, round, and reactive to light.   Cardiovascular:      Rate and Rhythm: Normal rate and regular rhythm.      Pulses: Normal pulses.      Heart sounds: Normal heart sounds.   Pulmonary:      Effort: Pulmonary effort is normal.      Breath sounds: Normal breath sounds.   Abdominal:      General: Bowel sounds are normal. There is no distension.      Palpations: Abdomen is soft.      Tenderness: There is no abdominal tenderness. There is no guarding.   Musculoskeletal:         General: Normal range of motion.      Cervical back: Normal range of motion. No rigidity.      Right lower leg: No edema.      Left lower leg: No edema.      Comments: Tenderness appreciated on bilateral knees.  Range of motion was normal   Skin:     General: Skin is warm.   Neurological:      General: No focal deficit present.      Mental Status: She is alert and oriented to person, place, and time.   Psychiatric:         Mood and Affect: Mood normal.           ASSESSMENT AND PLAN   1. Acute pain of both knees  Patient is having acute on chronic knee pain.

## 2024-03-20 ENCOUNTER — HOSPITAL ENCOUNTER (OUTPATIENT)
Age: 64
Discharge: HOME | End: 2024-03-22
Payer: MEDICARE

## 2024-03-20 ENCOUNTER — HOSPITAL ENCOUNTER (OUTPATIENT)
Age: 64
Discharge: HOME | End: 2024-03-20
Payer: MEDICARE

## 2024-03-20 ENCOUNTER — HOSPITAL ENCOUNTER (OUTPATIENT)
Dept: GENERAL RADIOLOGY | Age: 64
Discharge: HOME | End: 2024-03-22
Payer: MEDICARE

## 2024-03-20 DIAGNOSIS — M25.562 ACUTE PAIN OF BOTH KNEES: ICD-10-CM

## 2024-03-20 DIAGNOSIS — M25.561 ACUTE PAIN OF BOTH KNEES: ICD-10-CM

## 2024-03-20 LAB
ERYTHROCYTE [DISTWIDTH] IN BLOOD BY AUTOMATED COUNT: 13.5 % (ref 11.5–15)
ERYTHROCYTE [SEDIMENTATION RATE] IN BLOOD BY WESTERGREN METHOD: 32 MM/HR (ref 0–20)
HCT VFR BLD AUTO: 42.7 % (ref 34–48)
HGB BLD-MCNC: 13.3 G/DL (ref 11.5–15.5)
MCH RBC QN AUTO: 28.1 PG (ref 26–35)
MCHC RBC AUTO-ENTMCNC: 31.1 G/DL (ref 32–34.5)
MCV RBC AUTO: 90.1 FL (ref 80–99.9)
PLATELET # BLD AUTO: 274 K/UL (ref 130–450)
PMV BLD AUTO: 11.1 FL (ref 7–12)
RBC # BLD AUTO: 4.74 M/UL (ref 3.5–5.5)
RHEUMATOID FACT SER NEPH-ACNC: 10 IU/ML (ref 0–13)
WBC OTHER # BLD: 6.3 K/UL (ref 4.5–11.5)

## 2024-03-20 PROCEDURE — 86140 C-REACTIVE PROTEIN: CPT

## 2024-03-20 PROCEDURE — 36415 COLL VENOUS BLD VENIPUNCTURE: CPT

## 2024-03-20 PROCEDURE — 86039 ANTINUCLEAR ANTIBODIES (ANA): CPT

## 2024-03-20 PROCEDURE — 73562 X-RAY EXAM OF KNEE 3: CPT

## 2024-03-20 PROCEDURE — 86038 ANTINUCLEAR ANTIBODIES: CPT

## 2024-03-20 PROCEDURE — 86431 RHEUMATOID FACTOR QUANT: CPT

## 2024-03-20 PROCEDURE — 85652 RBC SED RATE AUTOMATED: CPT

## 2024-03-20 PROCEDURE — 85027 COMPLETE CBC AUTOMATED: CPT

## 2024-03-21 LAB
ANA SER QL IA: NEGATIVE
CRP SERPL HS-MCNC: 43 MG/L (ref 0–5)

## 2024-03-22 ENCOUNTER — TELEPHONE (OUTPATIENT)
Dept: EMERGENCY DEPT | Age: 64
End: 2024-03-22

## 2024-03-22 ENCOUNTER — TELEPHONE (OUTPATIENT)
Dept: FAMILY MEDICINE CLINIC | Age: 64
End: 2024-03-22

## 2024-03-22 NOTE — TELEPHONE ENCOUNTER
Called pt and discussed lab s and xray results with her. Advised her to use Voltaren gel, Advil, heating pad. But in case of worsening in symptoms, make an appointment. Pt agrees with plan.

## 2024-04-15 ENCOUNTER — OFFICE VISIT (OUTPATIENT)
Dept: FAMILY MEDICINE CLINIC | Age: 64
End: 2024-04-15
Payer: MEDICARE

## 2024-04-15 VITALS
TEMPERATURE: 97.4 F | RESPIRATION RATE: 16 BRPM | OXYGEN SATURATION: 97 % | DIASTOLIC BLOOD PRESSURE: 63 MMHG | WEIGHT: 140 LBS | BODY MASS INDEX: 25.76 KG/M2 | SYSTOLIC BLOOD PRESSURE: 108 MMHG | HEIGHT: 62 IN | HEART RATE: 80 BPM

## 2024-04-15 DIAGNOSIS — M19.90 ARTHRITIS: Primary | ICD-10-CM

## 2024-04-15 PROCEDURE — G8419 CALC BMI OUT NRM PARAM NOF/U: HCPCS

## 2024-04-15 PROCEDURE — 3017F COLORECTAL CA SCREEN DOC REV: CPT

## 2024-04-15 PROCEDURE — 99213 OFFICE O/P EST LOW 20 MIN: CPT

## 2024-04-15 PROCEDURE — 1036F TOBACCO NON-USER: CPT

## 2024-04-15 PROCEDURE — G8427 DOCREV CUR MEDS BY ELIG CLIN: HCPCS

## 2024-04-15 NOTE — PROGRESS NOTES
[facility]  [facility]    Subjective:  F/u of knee and hand pain  Left hand: cannot open fully,, painful    ROS otherwise negative     Past medical, surgical, family and social history were reviewed, non-contributory, and unchanged unless otherwise stated.    Objective:    /63   Pulse 80   Temp 97.4 °F (36.3 °C) (Temporal)   Resp 16   Ht 1.575 m (5' 2.01\")   Wt 63.5 kg (140 lb)   LMP  (LMP Unknown)   SpO2 97%   BMI 25.60 kg/m²     Exam is as noted by resident     Assessment/Plan:  L hand pain: increased inflammatory markers., contractures, refer to rheumatology  Knee pain: OA analgesic, PT     Attending Physician Statement  I have reviewed the chart, including any radiology or labs. I have discussed the case, including pertinent history and exam findings with the resident.  I agree with the assessment, plan and orders as documented by the resident.  Please refer to the resident note for additional information.      Electronically signed by VON LOAIZA MD on 4/15/2024 at 3:11 PM   
(ESR, CRP), but normal SANGEETHA, RF. There is some concern of underlying process. I will send a referral to Rheumatology. Will follow peripherally. DDX include lupus, infectious etiology, dermatomyositis.        Counseled regarding above diagnosis, including possible risks and complications,  especially if left uncontrolled. Counseled regarding the possible side effects, risks, benefits and alternatives to treatment;patient and/or guardian verbalizes understanding, agrees, feels comfortable with and wishes to proceed with above treatment plan.    Call or go to ED immediately if symptoms worsen or persist. Advised patient to call with any new medication issues, and, as applicable, read all Rx info from pharmacy to assure aware of all possible risks and side effects of medicationbefore taking.    Patient and/or guardian given opportunity to ask questions/raise concerns.  The patient verbalized comfort and understanding ofinstructions.    Reviewed age and gender appropriate health screening exams and vaccinations.  Advised patient regarding importance of keeping up with recommended health maintenance and to schedule as soon as possible if overdue, as this is important in assessing for undiagnosed pathology, especially cancer, as well as protecting against potentially harmful/life threatening disease.      I encourage further reading and education about your health conditions .Information on many healthconditions is provided by the American Academy of Family Physicians: https://familydoctor.org/  Please bring any questions to me at your next visit.    This document may have been prepared at least partially through the use of voice recognition software. Although effort is taken to assure the accuracy of this document, it is possible that grammatical, syntax,  or spelling errors may occur.    Return in about 2 months (around 6/15/2024).    Electronically signed by Srakis Kessler MD on 4/14/24 at 6:05 PM EDT

## 2024-04-17 ASSESSMENT — ENCOUNTER SYMPTOMS
NAUSEA: 0
DIARRHEA: 0
SORE THROAT: 0
SHORTNESS OF BREATH: 0
RHINORRHEA: 0
WHEEZING: 0
EYE PAIN: 0

## 2024-05-13 ENCOUNTER — PREP FOR PROCEDURE (OUTPATIENT)
Dept: PAIN MANAGEMENT | Age: 64
End: 2024-05-13

## 2024-05-13 DIAGNOSIS — Z79.01 MONITORING FOR ANTICOAGULANT USE: Primary | ICD-10-CM

## 2024-05-13 DIAGNOSIS — R30.0 BURNING WITH URINATION: ICD-10-CM

## 2024-05-13 DIAGNOSIS — Z51.81 MONITORING FOR ANTICOAGULANT USE: Primary | ICD-10-CM

## 2024-05-14 ENCOUNTER — HOSPITAL ENCOUNTER (OUTPATIENT)
Dept: NON INVASIVE DIAGNOSTICS | Age: 64
Discharge: HOME | End: 2024-05-14
Payer: COMMERCIAL

## 2024-05-14 ENCOUNTER — ANESTHESIA EVENT (OUTPATIENT)
Dept: OPERATING ROOM | Age: 64
End: 2024-05-14
Payer: COMMERCIAL

## 2024-05-14 ENCOUNTER — HOSPITAL ENCOUNTER (OUTPATIENT)
Age: 64
Discharge: HOME | End: 2024-05-14
Payer: COMMERCIAL

## 2024-05-14 DIAGNOSIS — Z79.01 MONITORING FOR ANTICOAGULANT USE: ICD-10-CM

## 2024-05-14 DIAGNOSIS — R30.0 BURNING WITH URINATION: ICD-10-CM

## 2024-05-14 DIAGNOSIS — G90.521 COMPLEX REGIONAL PAIN SYNDROME TYPE 1 OF RIGHT LOWER EXTREMITY: Chronic | ICD-10-CM

## 2024-05-14 DIAGNOSIS — Z51.81 MONITORING FOR ANTICOAGULANT USE: ICD-10-CM

## 2024-05-14 LAB
BACTERIA URNS QL MICRO: ABNORMAL
BASOPHILS # BLD: 0.05 K/UL (ref 0–0.2)
BASOPHILS NFR BLD: 1 % (ref 0–2)
BILIRUB UR QL STRIP: NEGATIVE
CLARITY UR: CLEAR
COLOR UR: YELLOW
EKG ATRIAL RATE: 75 BPM
EKG P AXIS: 79 DEGREES
EKG P-R INTERVAL: 202 MS
EKG Q-T INTERVAL: 368 MS
EKG QRS DURATION: 84 MS
EKG QTC CALCULATION (BAZETT): 410 MS
EKG R AXIS: -49 DEGREES
EKG T AXIS: 69 DEGREES
EKG VENTRICULAR RATE: 75 BPM
EOSINOPHIL # BLD: 0.12 K/UL (ref 0.05–0.5)
EOSINOPHILS RELATIVE PERCENT: 2 % (ref 0–6)
EPI CELLS #/AREA URNS HPF: ABNORMAL /HPF
ERYTHROCYTE [DISTWIDTH] IN BLOOD BY AUTOMATED COUNT: 15.3 % (ref 11.5–15)
GLUCOSE UR STRIP-MCNC: NEGATIVE MG/DL
HCT VFR BLD AUTO: 44.3 % (ref 34–48)
HGB BLD-MCNC: 13.6 G/DL (ref 11.5–15.5)
HGB UR QL STRIP.AUTO: NEGATIVE
IMM GRANULOCYTES # BLD AUTO: <0.03 K/UL (ref 0–0.58)
IMM GRANULOCYTES NFR BLD: 0 % (ref 0–5)
INR PPP: 1.1
KETONES UR STRIP-MCNC: NEGATIVE MG/DL
LEUKOCYTE ESTERASE UR QL STRIP: ABNORMAL
LYMPHOCYTES NFR BLD: 1.34 K/UL (ref 1.5–4)
LYMPHOCYTES RELATIVE PERCENT: 24 % (ref 20–42)
MCH RBC QN AUTO: 27.6 PG (ref 26–35)
MCHC RBC AUTO-ENTMCNC: 30.7 G/DL (ref 32–34.5)
MCV RBC AUTO: 89.9 FL (ref 80–99.9)
MONOCYTES NFR BLD: 0.29 K/UL (ref 0.1–0.95)
MONOCYTES NFR BLD: 5 % (ref 2–12)
NEUTROPHILS NFR BLD: 67 % (ref 43–80)
NEUTS SEG NFR BLD: 3.76 K/UL (ref 1.8–7.3)
NITRITE UR QL STRIP: NEGATIVE
PARTIAL THROMBOPLASTIN TIME: 31.4 SEC (ref 24.5–35.1)
PH UR STRIP: 7.5 [PH] (ref 5–9)
PLATELET # BLD AUTO: 272 K/UL (ref 130–450)
PMV BLD AUTO: 11.2 FL (ref 7–12)
PROT UR STRIP-MCNC: NEGATIVE MG/DL
PROTHROMBIN TIME: 11.9 SEC (ref 9.3–12.4)
RBC # BLD AUTO: 4.93 M/UL (ref 3.5–5.5)
RBC #/AREA URNS HPF: ABNORMAL /HPF
SP GR UR STRIP: 1.02 (ref 1–1.03)
UROBILINOGEN UR STRIP-ACNC: 0.2 EU/DL (ref 0–1)
WBC #/AREA URNS HPF: ABNORMAL /HPF
WBC OTHER # BLD: 5.6 K/UL (ref 4.5–11.5)

## 2024-05-14 PROCEDURE — 85610 PROTHROMBIN TIME: CPT

## 2024-05-14 PROCEDURE — 87086 URINE CULTURE/COLONY COUNT: CPT

## 2024-05-14 PROCEDURE — 36415 COLL VENOUS BLD VENIPUNCTURE: CPT

## 2024-05-14 PROCEDURE — 81001 URINALYSIS AUTO W/SCOPE: CPT

## 2024-05-14 PROCEDURE — 85025 COMPLETE CBC W/AUTO DIFF WBC: CPT

## 2024-05-14 PROCEDURE — 85730 THROMBOPLASTIN TIME PARTIAL: CPT

## 2024-05-14 ASSESSMENT — LIFESTYLE VARIABLES: SMOKING_STATUS: 0

## 2024-05-14 ASSESSMENT — ENCOUNTER SYMPTOMS: SHORTNESS OF BREATH: 1

## 2024-05-14 NOTE — ANESTHESIA PRE PROCEDURE
03/20/2024 09:38 AM    RDW 13.5 03/20/2024 09:38 AM     03/20/2024 09:38 AM       CMP:   Lab Results   Component Value Date/Time     03/12/2023 08:54 AM    K 4.7 03/12/2023 08:54 AM     03/12/2023 08:54 AM    CO2 25 03/12/2023 08:54 AM    BUN 20 03/12/2023 08:54 AM    CREATININE 0.6 03/12/2023 08:54 AM    GFRAA >60 10/13/2022 03:07 AM    LABGLOM >60 03/12/2023 08:54 AM    GLUCOSE 122 03/12/2023 08:54 AM    GLUCOSE 130 06/13/2011 09:18 AM    CALCIUM 8.9 03/12/2023 08:54 AM    BILITOT 0.5 03/12/2023 08:54 AM    ALKPHOS 90 03/12/2023 08:54 AM    AST 14 03/12/2023 08:54 AM    ALT 13 03/12/2023 08:54 AM       POC Tests: No results for input(s): \"POCGLU\", \"POCNA\", \"POCK\", \"POCCL\", \"POCBUN\", \"POCHEMO\", \"POCHCT\" in the last 72 hours.    Coags:   Lab Results   Component Value Date/Time    PROTIME 12.3 07/24/2022 05:15 AM    PROTIME 12.1 03/11/2011 02:20 PM    INR 1.1 07/24/2022 05:15 AM    APTT 29.5 07/24/2022 05:15 AM       HCG (If Applicable):   Lab Results   Component Value Date    PREGTESTUR NEGATIVE 05/18/2014        ABGs: No results found for: \"PHART\", \"PO2ART\", \"WPN3BCI\", \"ECO5FZM\", \"BEART\", \"F6GPYBBZ\"     Type & Screen (If Applicable):  No results found for: \"LABABO\"    Drug/Infectious Status (If Applicable):  No results found for: \"HIV\", \"HEPCAB\"    COVID-19 Screening (If Applicable):   Lab Results   Component Value Date/Time    COVID19 Not Detected 03/07/2023 07:48 AM    COVID19 NOT DETECTED 01/21/2023 04:42 AM    COVID19 Not Detected 12/27/2022 06:32 AM           Anesthesia Evaluation  Patient summary reviewed and Nursing notes reviewed   no history of anesthetic complications:   Airway: Mallampati: III  TM distance: >3 FB   Neck ROM: limited  Mouth opening: > = 3 FB   Dental:    (+) upper dentures and lower dentures      Pulmonary: breath sounds clear to auscultation  (+) pneumonia: resolved,  COPD:  shortness of breath:             (-) not a current smoker                          ROS comment:

## 2024-05-15 ENCOUNTER — ANESTHESIA (OUTPATIENT)
Dept: OPERATING ROOM | Age: 64
End: 2024-05-15
Payer: COMMERCIAL

## 2024-05-15 ENCOUNTER — HOSPITAL ENCOUNTER (OUTPATIENT)
Age: 64
Setting detail: OUTPATIENT SURGERY
Discharge: HOME | End: 2024-05-15
Attending: ANESTHESIOLOGY | Admitting: ANESTHESIOLOGY
Payer: COMMERCIAL

## 2024-05-15 ENCOUNTER — HOSPITAL ENCOUNTER (OUTPATIENT)
Dept: OPERATING ROOM | Age: 64
Setting detail: OUTPATIENT SURGERY
Discharge: HOME | End: 2024-05-15

## 2024-05-15 VITALS
RESPIRATION RATE: 16 BRPM | HEART RATE: 86 BPM | DIASTOLIC BLOOD PRESSURE: 58 MMHG | OXYGEN SATURATION: 93 % | TEMPERATURE: 97.5 F | WEIGHT: 140 LBS | SYSTOLIC BLOOD PRESSURE: 107 MMHG | BODY MASS INDEX: 25.76 KG/M2 | HEIGHT: 62 IN

## 2024-05-15 DIAGNOSIS — G56.81 OTHER SPECIFIED MONONEUROPATHIES OF RIGHT UPPER LIMB: ICD-10-CM

## 2024-05-15 DIAGNOSIS — G90.521 COMPLEX REGIONAL PAIN SYNDROME TYPE 1 OF RIGHT LOWER EXTREMITY: Primary | Chronic | ICD-10-CM

## 2024-05-15 LAB
MICROORGANISM SPEC CULT: NO GROWTH
SPECIMEN DESCRIPTION: NORMAL

## 2024-05-15 PROCEDURE — 87070 CULTURE OTHR SPECIMN AEROBIC: CPT

## 2024-05-15 PROCEDURE — 6360000002 HC RX W HCPCS: Performed by: NURSE ANESTHETIST, CERTIFIED REGISTERED

## 2024-05-15 PROCEDURE — 6360000002 HC RX W HCPCS: Performed by: ANESTHESIOLOGY

## 2024-05-15 PROCEDURE — 3700000001 HC ADD 15 MINUTES (ANESTHESIA): Performed by: ANESTHESIOLOGY

## 2024-05-15 PROCEDURE — 3700000000 HC ANESTHESIA ATTENDED CARE: Performed by: ANESTHESIOLOGY

## 2024-05-15 PROCEDURE — 2709999900 HC NON-CHARGEABLE SUPPLY: Performed by: ANESTHESIOLOGY

## 2024-05-15 PROCEDURE — 2500000003 HC RX 250 WO HCPCS: Performed by: ANESTHESIOLOGY

## 2024-05-15 PROCEDURE — 2580000003 HC RX 258: Performed by: ANESTHESIOLOGY

## 2024-05-15 PROCEDURE — 2580000003 HC RX 258: Performed by: NURSE ANESTHETIST, CERTIFIED REGISTERED

## 2024-05-15 PROCEDURE — 7100000011 HC PHASE II RECOVERY - ADDTL 15 MIN: Performed by: ANESTHESIOLOGY

## 2024-05-15 PROCEDURE — 2500000003 HC RX 250 WO HCPCS: Performed by: NURSE ANESTHETIST, CERTIFIED REGISTERED

## 2024-05-15 PROCEDURE — 3600000005 HC SURGERY LEVEL 5 BASE: Performed by: ANESTHESIOLOGY

## 2024-05-15 PROCEDURE — 87205 SMEAR GRAM STAIN: CPT

## 2024-05-15 PROCEDURE — 3600000015 HC SURGERY LEVEL 5 ADDTL 15MIN: Performed by: ANESTHESIOLOGY

## 2024-05-15 PROCEDURE — 7100000010 HC PHASE II RECOVERY - FIRST 15 MIN: Performed by: ANESTHESIOLOGY

## 2024-05-15 RX ORDER — DIPHENHYDRAMINE HYDROCHLORIDE 50 MG/ML
12.5 INJECTION INTRAMUSCULAR; INTRAVENOUS
Status: DISCONTINUED | OUTPATIENT
Start: 2024-05-15 | End: 2024-05-15 | Stop reason: HOSPADM

## 2024-05-15 RX ORDER — SODIUM CHLORIDE 9 MG/ML
INJECTION, SOLUTION INTRAVENOUS PRN
Status: DISCONTINUED | OUTPATIENT
Start: 2024-05-15 | End: 2024-05-15 | Stop reason: HOSPADM

## 2024-05-15 RX ORDER — SODIUM CHLORIDE 0.9 % (FLUSH) 0.9 %
5-40 SYRINGE (ML) INJECTION EVERY 12 HOURS SCHEDULED
Status: DISCONTINUED | OUTPATIENT
Start: 2024-05-15 | End: 2024-05-15 | Stop reason: HOSPADM

## 2024-05-15 RX ORDER — DOXYCYCLINE HYCLATE 100 MG/1
100 CAPSULE ORAL 2 TIMES DAILY
Qty: 14 CAPSULE | Refills: 0 | Status: SHIPPED | OUTPATIENT
Start: 2024-05-15 | End: 2024-05-22

## 2024-05-15 RX ORDER — MORPHINE SULFATE 2 MG/ML
1 INJECTION, SOLUTION INTRAMUSCULAR; INTRAVENOUS EVERY 5 MIN PRN
Status: DISCONTINUED | OUTPATIENT
Start: 2024-05-15 | End: 2024-05-15 | Stop reason: HOSPADM

## 2024-05-15 RX ORDER — DROPERIDOL 2.5 MG/ML
0.62 INJECTION, SOLUTION INTRAMUSCULAR; INTRAVENOUS
Status: DISCONTINUED | OUTPATIENT
Start: 2024-05-15 | End: 2024-05-15 | Stop reason: HOSPADM

## 2024-05-15 RX ORDER — MIDAZOLAM HYDROCHLORIDE 1 MG/ML
INJECTION INTRAMUSCULAR; INTRAVENOUS PRN
Status: DISCONTINUED | OUTPATIENT
Start: 2024-05-15 | End: 2024-05-15 | Stop reason: SDUPTHER

## 2024-05-15 RX ORDER — DIPHENHYDRAMINE HYDROCHLORIDE 50 MG/ML
INJECTION INTRAMUSCULAR; INTRAVENOUS PRN
Status: DISCONTINUED | OUTPATIENT
Start: 2024-05-15 | End: 2024-05-15 | Stop reason: SDUPTHER

## 2024-05-15 RX ORDER — SODIUM CHLORIDE 0.9 % (FLUSH) 0.9 %
5-40 SYRINGE (ML) INJECTION PRN
Status: DISCONTINUED | OUTPATIENT
Start: 2024-05-15 | End: 2024-05-15 | Stop reason: HOSPADM

## 2024-05-15 RX ORDER — GLYCOPYRROLATE 0.2 MG/ML
INJECTION INTRAMUSCULAR; INTRAVENOUS PRN
Status: DISCONTINUED | OUTPATIENT
Start: 2024-05-15 | End: 2024-05-15 | Stop reason: SDUPTHER

## 2024-05-15 RX ORDER — DEXTROSE MONOHYDRATE 50 MG/ML
INJECTION, SOLUTION INTRAVENOUS CONTINUOUS PRN
Status: DISCONTINUED | OUTPATIENT
Start: 2024-05-15 | End: 2024-05-15 | Stop reason: SDUPTHER

## 2024-05-15 RX ORDER — NALOXONE HYDROCHLORIDE 0.4 MG/ML
INJECTION, SOLUTION INTRAMUSCULAR; INTRAVENOUS; SUBCUTANEOUS PRN
Status: DISCONTINUED | OUTPATIENT
Start: 2024-05-15 | End: 2024-05-15 | Stop reason: HOSPADM

## 2024-05-15 RX ORDER — PHENYLEPHRINE HYDROCHLORIDE 10 MG/ML
INJECTION INTRAVENOUS PRN
Status: DISCONTINUED | OUTPATIENT
Start: 2024-05-15 | End: 2024-05-15 | Stop reason: SDUPTHER

## 2024-05-15 RX ORDER — MEPERIDINE HYDROCHLORIDE 25 MG/ML
12.5 INJECTION INTRAMUSCULAR; INTRAVENOUS; SUBCUTANEOUS ONCE
Status: DISCONTINUED | OUTPATIENT
Start: 2024-05-15 | End: 2024-05-15 | Stop reason: HOSPADM

## 2024-05-15 RX ORDER — PROPOFOL 10 MG/ML
INJECTION, EMULSION INTRAVENOUS CONTINUOUS PRN
Status: DISCONTINUED | OUTPATIENT
Start: 2024-05-15 | End: 2024-05-15 | Stop reason: SDUPTHER

## 2024-05-15 RX ORDER — FENTANYL CITRATE 0.05 MG/ML
50 INJECTION, SOLUTION INTRAMUSCULAR; INTRAVENOUS EVERY 5 MIN PRN
Status: DISCONTINUED | OUTPATIENT
Start: 2024-05-15 | End: 2024-05-15 | Stop reason: HOSPADM

## 2024-05-15 RX ORDER — LIDOCAINE HYDROCHLORIDE 10 MG/ML
INJECTION, SOLUTION EPIDURAL; INFILTRATION; INTRACAUDAL; PERINEURAL PRN
Status: DISCONTINUED | OUTPATIENT
Start: 2024-05-15 | End: 2024-05-15 | Stop reason: HOSPADM

## 2024-05-15 RX ORDER — ONDANSETRON 2 MG/ML
INJECTION INTRAMUSCULAR; INTRAVENOUS PRN
Status: DISCONTINUED | OUTPATIENT
Start: 2024-05-15 | End: 2024-05-15 | Stop reason: SDUPTHER

## 2024-05-15 RX ORDER — OXYCODONE HYDROCHLORIDE AND ACETAMINOPHEN 5; 325 MG/1; MG/1
1 TABLET ORAL EVERY 4 HOURS PRN
Status: DISCONTINUED | OUTPATIENT
Start: 2024-05-15 | End: 2024-05-15 | Stop reason: HOSPADM

## 2024-05-15 RX ORDER — SODIUM CHLORIDE, SODIUM LACTATE, POTASSIUM CHLORIDE, CALCIUM CHLORIDE 600; 310; 30; 20 MG/100ML; MG/100ML; MG/100ML; MG/100ML
INJECTION, SOLUTION INTRAVENOUS CONTINUOUS
Status: DISCONTINUED | OUTPATIENT
Start: 2024-05-15 | End: 2024-05-15 | Stop reason: HOSPADM

## 2024-05-15 RX ORDER — FENTANYL CITRATE 50 UG/ML
INJECTION, SOLUTION INTRAMUSCULAR; INTRAVENOUS PRN
Status: DISCONTINUED | OUTPATIENT
Start: 2024-05-15 | End: 2024-05-15 | Stop reason: SDUPTHER

## 2024-05-15 RX ADMIN — DIPHENHYDRAMINE HYDROCHLORIDE 12.5 MG: 50 INJECTION INTRAMUSCULAR; INTRAVENOUS at 07:47

## 2024-05-15 RX ADMIN — FENTANYL CITRATE 50 MCG: 50 INJECTION, SOLUTION INTRAMUSCULAR; INTRAVENOUS at 07:42

## 2024-05-15 RX ADMIN — FENTANYL CITRATE 50 MCG: 50 INJECTION, SOLUTION INTRAMUSCULAR; INTRAVENOUS at 08:13

## 2024-05-15 RX ADMIN — MIDAZOLAM 2 MG: 1 INJECTION INTRAMUSCULAR; INTRAVENOUS at 07:38

## 2024-05-15 RX ADMIN — ONDANSETRON 4 MG: 2 INJECTION INTRAMUSCULAR; INTRAVENOUS at 07:47

## 2024-05-15 RX ADMIN — PHENYLEPHRINE HYDROCHLORIDE 100 MCG: 10 INJECTION INTRAVENOUS at 08:24

## 2024-05-15 RX ADMIN — PHENYLEPHRINE HYDROCHLORIDE 100 MCG: 10 INJECTION INTRAVENOUS at 08:09

## 2024-05-15 RX ADMIN — SODIUM CHLORIDE, POTASSIUM CHLORIDE, SODIUM LACTATE AND CALCIUM CHLORIDE: 600; 310; 30; 20 INJECTION, SOLUTION INTRAVENOUS at 07:02

## 2024-05-15 RX ADMIN — DEXMEDETOMIDINE HYDROCHLORIDE 0.5 MCG/KG/HR: 100 INJECTION, SOLUTION INTRAVENOUS at 07:47

## 2024-05-15 RX ADMIN — FENTANYL CITRATE 50 MCG: 50 INJECTION, SOLUTION INTRAMUSCULAR; INTRAVENOUS at 07:50

## 2024-05-15 RX ADMIN — VANCOMYCIN HYDROCHLORIDE 1000 MG: 1 INJECTION, POWDER, LYOPHILIZED, FOR SOLUTION INTRAVENOUS at 07:41

## 2024-05-15 RX ADMIN — GLYCOPYRROLATE 0.4 MG: 0.2 INJECTION INTRAMUSCULAR; INTRAVENOUS at 08:44

## 2024-05-15 RX ADMIN — DEXTROSE MONOHYDRATE: 50 INJECTION, SOLUTION INTRAVENOUS at 07:41

## 2024-05-15 RX ADMIN — PHENYLEPHRINE HYDROCHLORIDE 100 MCG: 10 INJECTION INTRAVENOUS at 08:42

## 2024-05-15 RX ADMIN — PROPOFOL INJECTABLE EMULSION 50 MCG/KG/MIN: 10 INJECTION, EMULSION INTRAVENOUS at 07:47

## 2024-05-15 ASSESSMENT — PAIN - FUNCTIONAL ASSESSMENT
PAIN_FUNCTIONAL_ASSESSMENT: NONE - DENIES PAIN
PAIN_FUNCTIONAL_ASSESSMENT: NONE - DENIES PAIN
PAIN_FUNCTIONAL_ASSESSMENT: 0-10
PAIN_FUNCTIONAL_ASSESSMENT: NONE - DENIES PAIN

## 2024-05-15 ASSESSMENT — PAIN DESCRIPTION - DESCRIPTORS: DESCRIPTORS: ACHING

## 2024-05-15 NOTE — H&P
Update History & Physical    The patient's History and Physical of 05/09/2024 was reviewed with the patient and there were no significant changes.    I examined the patient and there were no significant changes from the previous History and Physical.    Plan: The risk, benefits, expected outcome, and alternative to the recommended procedure have been discussed with the patient.  Patient understands and wants to proceed with the procedure.      Electronically signed by Halima Rosario DO on 5/15/24 at 6:56 AM EDT

## 2024-05-15 NOTE — BRIEF OP NOTE
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] Lumbar spinal cord stimulator with wound dehiscence over battery site    Post-Op Diagnosis: Same       Procedure(s):  SURGICAL EXPLANT OF SPINAL CORD STIMULATOR DUE TO WOUND DEHISCENCE AND INFECTION (CPT 00885)( BATTERY RIGHT LUMBAR AREA)    Surgeon(s):  Halima Rosario DO    Assistant:  First Assistant: Teena Rosado RN    Anesthesia: Monitor Anesthesia Care    Estimated Blood Loss (mL): less than 50     Complications: None    Specimens:   ID Type Source Tests Collected by Time Destination   1 : culture right back wound dehiscence Specimen Back CULTURE, SURGICAL Halima Rosario DO 5/15/2024 0751        Implants:  Implant Name Type Inv. Item Serial No.  Lot No. LRB No. Used Action   STIMULATOR [employer] ADAPTIVE STIM MRI - [id number] Spine:Stimulator DEVICE NEUROSTIMULATOR 13.9CC W1.9XH2.2IN 29.1GM RECHRG [id number] [employer] [facility] [employer]  Right 1 Explanted         Drains: None    Findings:  Infection Present At Time Of Surgery (PATOS) (choose all levels that have infection present):  - Superficial Infection (skin/subcutaneous) present as evidenced by pus  Other Findings: See operative dictation    Electronically signed by Halima Rosario DO on 5/15/2024 at 9:28 AM

## 2024-05-15 NOTE — DISCHARGE INSTRUCTIONS
condition or this instruction, always ask your healthcare professional. [employer] disclaims any warranty or liability for your use of this information.

## 2024-05-15 NOTE — ANESTHESIA POSTPROCEDURE EVALUATION
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])    Surgeons: Halima Rosario DO Responsible Provider: Joseph Germain MD    Anesthesia Type: MAC ASA Status: 3            Anesthesia Type: MAC    Kathrin Phase I: Kathrin Score: 10    Kathrin Phase II: Kathrin Score: 10    Anesthesia Post Evaluation    Patient location during evaluation: PACU  Patient participation: complete - patient participated  Level of consciousness: awake and alert  Airway patency: patent  Nausea & Vomiting: no nausea and no vomiting  Cardiovascular status: hemodynamically stable  Respiratory status: room air and spontaneous ventilation  Hydration status: stable    No notable events documented.

## 2024-05-15 NOTE — OP NOTE
[address] [address]                            OPERATIVE REPORT      PATIENT NAME: CHRISTY DICKENS           : 1960  MED REC NO: [id number]                        ROOM: [facility]  ACCOUNT NO: [id number]                       ADMIT DATE: 05/15/2024  PROVIDER: Halima Rosario DO      DATE OF PROCEDURE:  05/15/2024    SURGEON:  Halima Rosario DO    PREPROCEDURE DIAGNOSIS:  Lumbar spinal cord stimulator [employer] with wound dehiscence over battery site in right lower back.    POSTOPERATIVE DIAGNOSIS:  Lumbar spinal cord stimulator [employer] with wound dehiscence over battery site in right lower back with possible secondary wound infection.    PROCEDURES:    1. Surgical removal of spinal cord stimulator generator.  2. Surgical removal of spinal cord stimulator electrode.  3. Wound debridement 4 cm scar.  4. Analyzed and reprogrammed spinal cord stimulator in surgery with physician.  5. Direct x-ray guidance of the spine.    COMPLICATIONS:  None.    ASSISTANTS:  None    BLOOD LOSS:  Less than 20 mL.    IV ANESTHESIA:  Per Department of Anesthesia, local per Dr. Rosario.    INDICATIONS:  The patient comes in today, May 15, 2024, to the [facility] operating room #4 via [facility] to have her spinal cord stimulator battery removed.    The patient is a 63-year-old obese white female who suffers with chronic pain and had a spinal cord stimulator implanted many years ago.    The patient recently started noticing a mass-like effect over her battery site and her  examined it and noticed that the battery was starting to stick out through the skin.  Prior to this, she had no previous symptoms, no signs of infection, and no other problems.    The patient was seen in the [facility] and it was clear that there was a wound dehiscence and the battery was visible to the naked eye.    The

## 2024-05-17 LAB
MICROORGANISM SPEC CULT: NO GROWTH
MICROORGANISM/AGENT SPEC: NORMAL
SPECIMEN DESCRIPTION: NORMAL

## 2024-05-24 ENCOUNTER — HOSPITAL ENCOUNTER (EMERGENCY)
Age: 64
Discharge: HOME | End: 2024-05-24
Attending: EMERGENCY MEDICINE
Payer: COMMERCIAL

## 2024-05-24 ENCOUNTER — APPOINTMENT (OUTPATIENT)
Dept: CT IMAGING | Age: 64
End: 2024-05-24
Payer: COMMERCIAL

## 2024-05-24 VITALS
WEIGHT: 140 LBS | TEMPERATURE: 97.9 F | HEART RATE: 96 BPM | OXYGEN SATURATION: 100 % | BODY MASS INDEX: 25.76 KG/M2 | DIASTOLIC BLOOD PRESSURE: 68 MMHG | HEIGHT: 62 IN | RESPIRATION RATE: 18 BRPM | SYSTOLIC BLOOD PRESSURE: 130 MMHG

## 2024-05-24 DIAGNOSIS — S31.819D WOUND OF GLUTEAL CLEFT, RIGHT, SUBSEQUENT ENCOUNTER: Primary | ICD-10-CM

## 2024-05-24 DIAGNOSIS — K57.90 DIVERTICULOSIS: ICD-10-CM

## 2024-05-24 DIAGNOSIS — N20.0 RENAL CALCULI: ICD-10-CM

## 2024-05-24 DIAGNOSIS — R74.01 TRANSAMINITIS: ICD-10-CM

## 2024-05-24 LAB
ALBUMIN SERPL-MCNC: 4 G/DL (ref 3.5–5.2)
ALP SERPL-CCNC: 176 U/L (ref 35–104)
ALT SERPL-CCNC: 55 U/L (ref 0–32)
ANION GAP SERPL CALCULATED.3IONS-SCNC: 10 MMOL/L (ref 7–16)
AST SERPL-CCNC: 38 U/L (ref 0–31)
BASOPHILS # BLD: 0.07 K/UL (ref 0–0.2)
BASOPHILS NFR BLD: 1 % (ref 0–2)
BILIRUB SERPL-MCNC: 0.2 MG/DL (ref 0–1.2)
BUN SERPL-MCNC: 19 MG/DL (ref 6–23)
CALCIUM SERPL-MCNC: 9.2 MG/DL (ref 8.6–10.2)
CHLORIDE SERPL-SCNC: 104 MMOL/L (ref 98–107)
CO2 SERPL-SCNC: 26 MMOL/L (ref 22–29)
CREAT SERPL-MCNC: 0.9 MG/DL (ref 0.5–1)
EOSINOPHIL # BLD: 0.28 K/UL (ref 0.05–0.5)
EOSINOPHILS RELATIVE PERCENT: 4 % (ref 0–6)
ERYTHROCYTE [DISTWIDTH] IN BLOOD BY AUTOMATED COUNT: 15.9 % (ref 11.5–15)
GFR, ESTIMATED: 77 ML/MIN/1.73M2
GLUCOSE SERPL-MCNC: 107 MG/DL (ref 74–99)
HCT VFR BLD AUTO: 38 % (ref 34–48)
HGB BLD-MCNC: 11.7 G/DL (ref 11.5–15.5)
IMM GRANULOCYTES # BLD AUTO: 0.03 K/UL (ref 0–0.58)
IMM GRANULOCYTES NFR BLD: 1 % (ref 0–5)
LACTATE BLDV-SCNC: 1.4 MMOL/L (ref 0.5–2.2)
LYMPHOCYTES NFR BLD: 1.65 K/UL (ref 1.5–4)
LYMPHOCYTES RELATIVE PERCENT: 25 % (ref 20–42)
MCH RBC QN AUTO: 27.9 PG (ref 26–35)
MCHC RBC AUTO-ENTMCNC: 30.8 G/DL (ref 32–34.5)
MCV RBC AUTO: 90.7 FL (ref 80–99.9)
MONOCYTES NFR BLD: 0.48 K/UL (ref 0.1–0.95)
MONOCYTES NFR BLD: 7 % (ref 2–12)
NEUTROPHILS NFR BLD: 62 % (ref 43–80)
NEUTS SEG NFR BLD: 4.08 K/UL (ref 1.8–7.3)
PLATELET # BLD AUTO: 311 K/UL (ref 130–450)
PMV BLD AUTO: 10.3 FL (ref 7–12)
POTASSIUM SERPL-SCNC: 4.7 MMOL/L (ref 3.5–5)
PROT SERPL-MCNC: 7.3 G/DL (ref 6.4–8.3)
RBC # BLD AUTO: 4.19 M/UL (ref 3.5–5.5)
SODIUM SERPL-SCNC: 140 MMOL/L (ref 132–146)
WBC OTHER # BLD: 6.6 K/UL (ref 4.5–11.5)

## 2024-05-24 PROCEDURE — 85025 COMPLETE CBC W/AUTO DIFF WBC: CPT

## 2024-05-24 PROCEDURE — 80053 COMPREHEN METABOLIC PANEL: CPT

## 2024-05-24 PROCEDURE — 99284 EMERGENCY DEPT VISIT MOD MDM: CPT

## 2024-05-24 PROCEDURE — 6370000000 HC RX 637 (ALT 250 FOR IP)

## 2024-05-24 PROCEDURE — 74176 CT ABD & PELVIS W/O CONTRAST: CPT

## 2024-05-24 PROCEDURE — 83605 ASSAY OF LACTIC ACID: CPT

## 2024-05-24 RX ORDER — OXYCODONE HYDROCHLORIDE AND ACETAMINOPHEN 5; 325 MG/1; MG/1
1 TABLET ORAL ONCE
Status: COMPLETED | OUTPATIENT
Start: 2024-05-24 | End: 2024-05-24

## 2024-05-24 RX ADMIN — OXYCODONE HYDROCHLORIDE AND ACETAMINOPHEN 1 TABLET: 5; 325 TABLET ORAL at 18:19

## 2024-05-24 ASSESSMENT — ENCOUNTER SYMPTOMS
CONSTIPATION: 0
BACK PAIN: 1
DIARRHEA: 0
BLOOD IN STOOL: 0
VOMITING: 0
ABDOMINAL PAIN: 0
NAUSEA: 0
SHORTNESS OF BREATH: 0
COUGH: 0
CHEST TIGHTNESS: 0

## 2024-05-24 ASSESSMENT — PAIN SCALES - GENERAL: PAINLEVEL_OUTOF10: 8

## 2024-05-24 NOTE — ED TRIAGE NOTES
FIRST PROVIDER CONTACT ASSESSMENT NOTE       Department of Emergency Medicine                 First Provider Note            24  4:49 PM EDT    Date of Encounter: No admission date for patient encounter.    Patient Name: Karen Shafer  : 1960  MRN: [id number]    Chief Complaint: Wound Check (Infected spinal cord stimulator removed  and now wound is bleeding )      History of Present Illness:   Karen Shafer is a 63 y.o. female who presents to the ED for as above.   Bleeding from site.   States she has \"open wound\"     Focused Physical Exam:  VS:    ED Triage Vitals   BP Temp Temp src Pulse Resp SpO2 Height Weight   -- -- -- -- -- -- -- --        Physical Ex: Constitutional: Alert and non-toxic.    Medical History:  has a past medical history of Abdominal pain, Anxiety, Arthritis, Blood circulation, collateral, Cancer ([facility]), Chronic back pain, Colitis, Complex regional pain syndrome type 1 of right lower extremity, COPD (chronic obstructive pulmonary disease) ([facility]), COVID-19, Depression, GERD (gastroesophageal reflux disease), Headache(784.0), Hyperlipidemia, Kidney stone, Neuromuscular disorder ([facility]), On home O2, Osteoarthritis, Pneumonia, Psychiatric problem, and RSD (reflex sympathetic dystrophy).  Surgical History:  has a past surgical history that includes Hysterectomy; laminectomy; Finger amputation; back surgery (); other surgical history (2013); other surgical history (N/A, 2014); Endoscopy, colon, diagnostic; Carpal tunnel release; Colonoscopy; other surgical history (Right, 2014); other surgical history (2015); Nerve Block (N/A, 2015); Nerve Block (Left, 2015); Nerve Block (2015); Nerve Block (Right, 2015); other surgical history (2018); pr revj/rmvl implanted spinal neurostim generator (N/A, 2018); Upper gastrointestinal endoscopy (N/A, 2023); and Stimulator Surgery (N/A, 5/15/2024).  Social History:  reports that

## 2024-05-24 NOTE — ED PROVIDER NOTES
hospitalization or inpatient management.  They have remained hemodynamically stable throughout their entire ED visit and are stable for discharge with outpatient follow-up.     The plan has been discussed in detail and they are aware of the specific conditions for emergent return, as well as the importance of follow-up.      New Prescriptions    No medications on file       Diagnosis:  1. Wound of gluteal cleft, right, subsequent encounter    2. Renal calculi    3. Diverticulosis    4. Transaminitis        Disposition:  Patient's disposition: Discharge to home  Patient's condition is stable.

## 2024-05-31 NOTE — DISCHARGE INSTRUCTIONS
Visit Discharge/Physician Orders    Discharge condition: {STABLE/UNSTABLE:081790897}    Assessment of pain at discharge:    Anesthetic used:     Discharge to: {[facility] Wound Discharge To:20937}    Left via:{Left Via:20938}    Accompanied by: accompanied by {:[id number]}    ECF/HHA:     Dressing Orders:    Treatment Orders:    [facility] followup visit _____________________________  (Please note your next appointment above and if you are unable to keep, kindly give a 24 hour notice. Thank you.)    Physician signature:__________________________      If you experience any of the following, please call the Wound Care Center during business hours:    * Increase in Pain  * Temperature over 101  * Increase in drainage from your wound  * Drainage with a foul odor  * Bleeding  * Increase in swelling  * Need for compression bandage changes due to slippage, breakthrough drainage.    If you need medical attention outside of the business hours of the Wound Care Centers please contact your PCP or go to the nearest emergency room.

## 2024-06-05 ENCOUNTER — HOSPITAL ENCOUNTER (OUTPATIENT)
Dept: WOUND CARE | Age: 64
Discharge: HOME | End: 2024-06-05

## 2024-06-27 ENCOUNTER — HOSPITAL ENCOUNTER (EMERGENCY)
Age: 64
Discharge: HOME | End: 2024-06-27
Attending: EMERGENCY MEDICINE
Payer: COMMERCIAL

## 2024-06-27 VITALS
OXYGEN SATURATION: 90 % | WEIGHT: 140 LBS | SYSTOLIC BLOOD PRESSURE: 97 MMHG | DIASTOLIC BLOOD PRESSURE: 56 MMHG | HEART RATE: 64 BPM | RESPIRATION RATE: 18 BRPM | BODY MASS INDEX: 25.76 KG/M2 | TEMPERATURE: 97.9 F | HEIGHT: 62 IN

## 2024-06-27 DIAGNOSIS — Z51.89 VISIT FOR WOUND CHECK: Primary | ICD-10-CM

## 2024-06-27 PROCEDURE — 99283 EMERGENCY DEPT VISIT LOW MDM: CPT

## 2024-06-27 PROCEDURE — 87070 CULTURE OTHR SPECIMN AEROBIC: CPT

## 2024-06-27 PROCEDURE — 87077 CULTURE AEROBIC IDENTIFY: CPT

## 2024-06-27 PROCEDURE — 87205 SMEAR GRAM STAIN: CPT

## 2024-06-27 ASSESSMENT — PAIN SCALES - GENERAL
PAINLEVEL_OUTOF10: 8
PAINLEVEL_OUTOF10: 8

## 2024-06-27 ASSESSMENT — PAIN - FUNCTIONAL ASSESSMENT: PAIN_FUNCTIONAL_ASSESSMENT: 0-10

## 2024-06-27 ASSESSMENT — PAIN DESCRIPTION - LOCATION
LOCATION: HIP
LOCATION: HIP

## 2024-06-27 NOTE — ED PROVIDER NOTES
npg/rcvr [facility] connj eltrd ra (N/A, 04/25/2018); Upper gastrointestinal endoscopy (N/A, 1/13/2023); and Stimulator Surgery (N/A, 5/15/2024).    Social History:  reports that she quit smoking about 19 months ago. Her smoking use included cigarettes. She started smoking about 54 years ago. She has a 26.4 pack-year smoking history. She has been exposed to tobacco smoke. She has never used smokeless tobacco. She reports that she does not drink alcohol and does not use drugs.    Family History: family history includes Other in her mother.     The patient’s home medications have been reviewed.    Allergies: Sulfamethazine and Ancef [cefazolin sodium]    -------------------------------------------------- RESULTS -------------------------------------------------  All laboratory and radiology results have been personally reviewed by myself   LABS:  No results found for this visit on 06/27/24.    RADIOLOGY:  Interpreted by Radiologist.  No orders to display       ------------------------- NURSING NOTES AND VITALS REVIEWED ---------------------------   The nursing notes within the ED encounter and vital signs as below have been reviewed.   BP 98/63   Pulse 74   Temp 97.9 °F (36.6 °C) (Oral)   Resp 16   Ht 1.575 m (5' 2\")   Wt 63.5 kg (140 lb)   LMP  (LMP Unknown)   SpO2 92%   BMI 25.61 kg/m²   Oxygen Saturation Interpretation: Normal      ---------------------------------------------------PHYSICAL EXAM--------------------------------------      Constitutional/General: Alert and oriented x3, well appearing, non toxic in NAD  Back:  No tenderness to palpation on the cervical or thoracic or lumbar spine  Extremities: Moves all extremities x 4. Warm and well perfused  Skin: warm and dry without rash.  Open wound on the right upper gluteal/lower flank region.  Dressing in place as well as packing.  No skin induration around the wound.  There is mild erythema on the internal portion of the wound.  No discharge and no foul

## 2024-06-29 LAB
MICROORGANISM SPEC CULT: ABNORMAL
MICROORGANISM SPEC CULT: ABNORMAL
MICROORGANISM/AGENT SPEC: ABNORMAL
SPECIMEN DESCRIPTION: ABNORMAL

## 2024-07-01 ENCOUNTER — TELEPHONE (OUTPATIENT)
Dept: FAMILY MEDICINE CLINIC | Age: 64
End: 2024-07-01

## 2024-07-04 ENCOUNTER — APPOINTMENT (OUTPATIENT)
Dept: CT IMAGING | Age: 64
End: 2024-07-04
Payer: COMMERCIAL

## 2024-07-04 ENCOUNTER — HOSPITAL ENCOUNTER (EMERGENCY)
Age: 64
Discharge: HOME | End: 2024-07-04
Attending: EMERGENCY MEDICINE
Payer: COMMERCIAL

## 2024-07-04 VITALS
OXYGEN SATURATION: 92 % | HEART RATE: 64 BPM | DIASTOLIC BLOOD PRESSURE: 66 MMHG | TEMPERATURE: 97.7 F | RESPIRATION RATE: 18 BRPM | SYSTOLIC BLOOD PRESSURE: 116 MMHG

## 2024-07-04 DIAGNOSIS — T81.41XA INFECTION OF SUPERFICIAL INCISIONAL SURGICAL SITE AFTER PROCEDURE, INITIAL ENCOUNTER: Primary | ICD-10-CM

## 2024-07-04 LAB
ALBUMIN SERPL-MCNC: 4.2 G/DL (ref 3.5–5.2)
ALP SERPL-CCNC: 223 U/L (ref 35–104)
ALT SERPL-CCNC: 33 U/L (ref 0–32)
ANION GAP SERPL CALCULATED.3IONS-SCNC: 8 MMOL/L (ref 7–16)
AST SERPL-CCNC: 30 U/L (ref 0–31)
BASOPHILS # BLD: 0.08 K/UL (ref 0–0.2)
BASOPHILS NFR BLD: 1 % (ref 0–2)
BILIRUB SERPL-MCNC: 0.2 MG/DL (ref 0–1.2)
BUN SERPL-MCNC: 24 MG/DL (ref 6–23)
CALCIUM SERPL-MCNC: 9.2 MG/DL (ref 8.6–10.2)
CHLORIDE SERPL-SCNC: 102 MMOL/L (ref 98–107)
CO2 SERPL-SCNC: 29 MMOL/L (ref 22–29)
CREAT SERPL-MCNC: 0.7 MG/DL (ref 0.5–1)
CRP SERPL HS-MCNC: 21 MG/L (ref 0–5)
EOSINOPHIL # BLD: 0.16 K/UL (ref 0.05–0.5)
EOSINOPHILS RELATIVE PERCENT: 3 % (ref 0–6)
ERYTHROCYTE [DISTWIDTH] IN BLOOD BY AUTOMATED COUNT: 15.5 % (ref 11.5–15)
ERYTHROCYTE [SEDIMENTATION RATE] IN BLOOD BY WESTERGREN METHOD: 40 MM/HR (ref 0–20)
GFR, ESTIMATED: >90 ML/MIN/1.73M2
GLUCOSE SERPL-MCNC: 92 MG/DL (ref 74–99)
HCT VFR BLD AUTO: 38.7 % (ref 34–48)
HGB BLD-MCNC: 12 G/DL (ref 11.5–15.5)
IMM GRANULOCYTES # BLD AUTO: <0.03 K/UL (ref 0–0.58)
IMM GRANULOCYTES NFR BLD: 0 % (ref 0–5)
LACTATE BLDV-SCNC: 1.1 MMOL/L (ref 0.5–1.9)
LYMPHOCYTES NFR BLD: 1.76 K/UL (ref 1.5–4)
LYMPHOCYTES RELATIVE PERCENT: 30 % (ref 20–42)
MCH RBC QN AUTO: 28.6 PG (ref 26–35)
MCHC RBC AUTO-ENTMCNC: 31 G/DL (ref 32–34.5)
MCV RBC AUTO: 92.1 FL (ref 80–99.9)
MONOCYTES NFR BLD: 0.6 K/UL (ref 0.1–0.95)
MONOCYTES NFR BLD: 10 % (ref 2–12)
NEUTROPHILS NFR BLD: 55 % (ref 43–80)
NEUTS SEG NFR BLD: 3.22 K/UL (ref 1.8–7.3)
PLATELET # BLD AUTO: 272 K/UL (ref 130–450)
PMV BLD AUTO: 10.7 FL (ref 7–12)
POTASSIUM SERPL-SCNC: 4.5 MMOL/L (ref 3.5–5)
PROCALCITONIN SERPL-MCNC: 0.03 NG/ML (ref 0–0.08)
PROT SERPL-MCNC: 7.7 G/DL (ref 6.4–8.3)
RBC # BLD AUTO: 4.2 M/UL (ref 3.5–5.5)
SODIUM SERPL-SCNC: 139 MMOL/L (ref 132–146)
WBC OTHER # BLD: 5.8 K/UL (ref 4.5–11.5)

## 2024-07-04 PROCEDURE — 99285 EMERGENCY DEPT VISIT HI MDM: CPT

## 2024-07-04 PROCEDURE — 87205 SMEAR GRAM STAIN: CPT

## 2024-07-04 PROCEDURE — 85652 RBC SED RATE AUTOMATED: CPT

## 2024-07-04 PROCEDURE — 87070 CULTURE OTHR SPECIMN AEROBIC: CPT

## 2024-07-04 PROCEDURE — 83605 ASSAY OF LACTIC ACID: CPT

## 2024-07-04 PROCEDURE — 74177 CT ABD & PELVIS W/CONTRAST: CPT

## 2024-07-04 PROCEDURE — 86140 C-REACTIVE PROTEIN: CPT

## 2024-07-04 PROCEDURE — 6360000004 HC RX CONTRAST MEDICATION: Performed by: RADIOLOGY

## 2024-07-04 PROCEDURE — 87075 CULTR BACTERIA EXCEPT BLOOD: CPT

## 2024-07-04 PROCEDURE — 80053 COMPREHEN METABOLIC PANEL: CPT

## 2024-07-04 PROCEDURE — 84145 PROCALCITONIN (PCT): CPT

## 2024-07-04 PROCEDURE — 87040 BLOOD CULTURE FOR BACTERIA: CPT

## 2024-07-04 PROCEDURE — 87077 CULTURE AEROBIC IDENTIFY: CPT

## 2024-07-04 PROCEDURE — 85025 COMPLETE CBC W/AUTO DIFF WBC: CPT

## 2024-07-04 PROCEDURE — 6370000000 HC RX 637 (ALT 250 FOR IP): Performed by: EMERGENCY MEDICINE

## 2024-07-04 RX ORDER — LEVOFLOXACIN 750 MG/1
750 TABLET, FILM COATED ORAL DAILY
Qty: 10 TABLET | Refills: 0 | Status: SHIPPED | OUTPATIENT
Start: 2024-07-04 | End: 2024-07-14

## 2024-07-04 RX ORDER — SERTRALINE HYDROCHLORIDE 100 MG/1
100 TABLET, FILM COATED ORAL DAILY
Status: CANCELLED | OUTPATIENT
Start: 2024-07-05

## 2024-07-04 RX ORDER — PREGABALIN 75 MG/1
75 CAPSULE ORAL 3 TIMES DAILY
Status: CANCELLED | OUTPATIENT
Start: 2024-07-04

## 2024-07-04 RX ORDER — ZOLPIDEM TARTRATE 5 MG/1
10 TABLET ORAL NIGHTLY
Status: CANCELLED | OUTPATIENT
Start: 2024-07-04

## 2024-07-04 RX ORDER — DIVALPROEX SODIUM 250 MG/1
500 TABLET, DELAYED RELEASE ORAL 2 TIMES DAILY
Status: CANCELLED | OUTPATIENT
Start: 2024-07-04

## 2024-07-04 RX ORDER — PREGABALIN 75 MG/1
75 CAPSULE ORAL ONCE
Status: COMPLETED | OUTPATIENT
Start: 2024-07-04 | End: 2024-07-04

## 2024-07-04 RX ORDER — TIZANIDINE 4 MG/1
2 TABLET ORAL EVERY 6 HOURS PRN
Status: CANCELLED | OUTPATIENT
Start: 2024-07-04

## 2024-07-04 RX ORDER — HYDROMORPHONE HYDROCHLORIDE 2 MG/1
4 TABLET ORAL ONCE
Status: COMPLETED | OUTPATIENT
Start: 2024-07-04 | End: 2024-07-04

## 2024-07-04 RX ORDER — ARIPIPRAZOLE 10 MG/1
10 TABLET ORAL NIGHTLY
Status: CANCELLED | OUTPATIENT
Start: 2024-07-04

## 2024-07-04 RX ORDER — HYDROMORPHONE HYDROCHLORIDE 2 MG/1
4 TABLET ORAL EVERY 6 HOURS PRN
Status: CANCELLED | OUTPATIENT
Start: 2024-07-04

## 2024-07-04 RX ADMIN — HYDROMORPHONE HYDROCHLORIDE 4 MG: 2 TABLET ORAL at 14:34

## 2024-07-04 RX ADMIN — PREGABALIN 75 MG: 75 CAPSULE ORAL at 14:34

## 2024-07-04 RX ADMIN — IOPAMIDOL 75 ML: 755 INJECTION, SOLUTION INTRAVENOUS at 10:51

## 2024-07-04 RX ADMIN — LEVOFLOXACIN 750 MG: 500 TABLET, FILM COATED ORAL at 14:33

## 2024-07-04 ASSESSMENT — ENCOUNTER SYMPTOMS
SINUS PRESSURE: 0
ABDOMINAL DISTENTION: 0
EYE PAIN: 0
VOMITING: 0
NAUSEA: 0
EYE REDNESS: 0
EYE DISCHARGE: 0
SORE THROAT: 0
BACK PAIN: 0
DIARRHEA: 0
SHORTNESS OF BREATH: 0
WHEEZING: 0
COUGH: 0

## 2024-07-04 ASSESSMENT — PAIN DESCRIPTION - LOCATION: LOCATION: BACK

## 2024-07-04 ASSESSMENT — PAIN SCALES - GENERAL: PAINLEVEL_OUTOF10: 8

## 2024-07-04 ASSESSMENT — PAIN DESCRIPTION - ORIENTATION: ORIENTATION: LEFT;LOWER

## 2024-07-04 NOTE — CONSULTS
Current Medications:   Scheduled Meds:  Continuous Infusions:  PRN Meds:    Allergies:  Sulfamethazine and Ancef [cefazolin sodium]    Social History:   Social History     Socioeconomic History    Marital status:      Spouse name: Antonio    Years of education: 10   Tobacco Use    Smoking status: Former     Current packs/day: 0.00     Average packs/day: 0.5 packs/day for 52.8 years (26.4 ttl pk-yrs)     Types: Cigarettes     Start date: 1970     Quit date: 2022     Years since quittin.6     Passive exposure: Current    Smokeless tobacco: Never   Vaping Use    Vaping Use: Never used   Substance and Sexual Activity    Alcohol use: No     Alcohol/week: 0.0 standard drinks of alcohol    Drug use: No     Social Determinants of Health     Financial Resource Strain: Low Risk  (3/19/2024)    Overall Financial Resource Strain (CARDIA)     Difficulty of Paying Living Expenses: Not hard at all   Food Insecurity: No Food Insecurity (3/19/2024)    Hunger Vital Sign     Worried About Running Out of Food in the Last Year: Never true     Ran Out of Food in the Last Year: Never true   Transportation Needs: Unknown (3/19/2024)    PRAPARE - Transportation     Lack of Transportation (Non-Medical): No   Housing Stability: Unknown (3/19/2024)    Housing Stability Vital Sign     Unstable Housing in the Last Year: No       Family History:       Problem Relation Age of Onset    Other Mother    . Otherwise non-pertinent to the chief complaint.    REVIEW OF SYSTEMS:    As mentioned in HPI, all other systems negative.       PHYSICAL EXAM:    Vitals:    /65   Pulse 63   Temp 97.7 °F (36.5 °C) (Oral)   Resp 16   LMP  (LMP Unknown)   SpO2 96%   Constitutional: The patient is awake, alert, and oriented.   Skin: Warm and dry. No rashes were noted. No jaundice.  HEENT: Eyes show round, and reactive pupils. Moist mucous membranes, no ulcerations, no thrush.   Neck: Supple to movements. No lymphadenopathy.   Chest: No

## 2024-07-04 NOTE — ED PROVIDER NOTES
dystrophy).    Past Surgical History:  has a past surgical history that includes Hysterectomy; laminectomy; Finger amputation; back surgery (2005); other surgical history (09/18/2013); other surgical history (N/A, 02/03/2014); Endoscopy, colon, diagnostic; Carpal tunnel release; Colonoscopy; other surgical history (Right, 11/11/2014); other surgical history (04/08/2015); Nerve Block (N/A, 08/19/2015); Nerve Block (Left, 08/26/2015); Nerve Block (09/02/2015); Nerve Block (Right, 09/17/2015); other surgical history (04/25/2018); pr revj/rmvl impl spi npg/rcvr [facility] hao clay ra (N/A, 04/25/2018); Upper gastrointestinal endoscopy (N/A, 1/13/2023); and Stimulator Surgery (N/A, 5/15/2024).    Social History:  reports that she quit smoking about 20 months ago. Her smoking use included cigarettes. She started smoking about 54 years ago. She has a 26.4 pack-year smoking history. She has been exposed to tobacco smoke. She has never used smokeless tobacco. She reports that she does not drink alcohol and does not use drugs.    Family History: family history includes Other in her mother.     The patient’s home medications have been reviewed.    Allergies: Sulfamethazine and Ancef [cefazolin sodium]    -------------------------------------------------- RESULTS -------------------------------------------------    LABS:  Results for orders placed or performed during the hospital encounter of 07/04/24   CBC with Auto Differential   Result Value Ref Range    WBC 5.8 4.5 - 11.5 k/uL    RBC 4.20 3.50 - 5.50 m/uL    Hemoglobin 12.0 11.5 - 15.5 g/dL    Hematocrit 38.7 34.0 - 48.0 %    MCV 92.1 80.0 - 99.9 fL    MCH 28.6 26.0 - 35.0 pg    MCHC 31.0 (L) 32.0 - 34.5 g/dL    RDW 15.5 (H) 11.5 - 15.0 %    Platelets 272 130 - 450 k/uL    MPV 10.7 7.0 - 12.0 fL    Neutrophils % 55 43.0 - 80.0 %    Lymphocytes % 30 20.0 - 42.0 %    Monocytes % 10 2.0 - 12.0 %    Eosinophils % 3 0 - 6 %    Basophils % 1 0.0 - 2.0 %    Immature Granulocytes % 0

## 2024-07-06 LAB
MICROORGANISM SPEC CULT: ABNORMAL
MICROORGANISM SPEC CULT: NORMAL
MICROORGANISM/AGENT SPEC: ABNORMAL
SPECIMEN DESCRIPTION: ABNORMAL
SPECIMEN DESCRIPTION: NORMAL

## 2024-07-07 LAB
MICROORGANISM SPEC CULT: NORMAL
MICROORGANISM SPEC CULT: NORMAL
SERVICE CMNT-IMP: NORMAL
SERVICE CMNT-IMP: NORMAL
SPECIMEN DESCRIPTION: NORMAL
SPECIMEN DESCRIPTION: NORMAL

## 2024-07-15 ENCOUNTER — TELEPHONE (OUTPATIENT)
Dept: FAMILY MEDICINE CLINIC | Age: 64
End: 2024-07-15

## 2024-07-15 NOTE — TELEPHONE ENCOUNTER
Dianne from Dr Correa's office called today about a referral that was sent to him 4/15/24 for this pt. She said Dr thinks this referral should be sent to their PMR office instead.  PMR fax#  [phone]

## 2024-07-17 ENCOUNTER — CARE COORDINATION (OUTPATIENT)
Dept: CARE COORDINATION | Age: 64
End: 2024-07-17

## 2024-07-17 DIAGNOSIS — M19.90 ARTHRITIS: Primary | ICD-10-CM

## 2024-07-17 NOTE — PROGRESS NOTES
Following up on patient encounter 4/15 for arthritis  Sent referral to Rheumatology who suggested patient may benefit from PMR.  Referral sent.     Sarkis Kessler MD  Family Medicine PGY-3

## 2024-07-17 NOTE — CARE COORDINATION
[facility] contacted patient to offer Care Coordination. Patient declined at this time. [facility] encouraged patient to contact [facility] if she feels she needs assistance managing her health care needs.     PLAN:  Patient will be temporarily excluded from Care Coordination d/t declined enrollment.   [facility] sent letter to home explaining some services with contact information.

## 2024-07-17 NOTE — TELEPHONE ENCOUNTER
Patient informed of alternative referral placed. Asked that she let us know if she doesn't hear from them in the next week or so and we can follow up

## 2024-07-31 ENCOUNTER — HOSPITAL ENCOUNTER (EMERGENCY)
Age: 64
Discharge: HOME | End: 2024-07-31
Attending: EMERGENCY MEDICINE
Payer: COMMERCIAL

## 2024-07-31 VITALS
OXYGEN SATURATION: 94 % | DIASTOLIC BLOOD PRESSURE: 80 MMHG | HEIGHT: 62 IN | TEMPERATURE: 97.7 F | WEIGHT: 140 LBS | RESPIRATION RATE: 14 BRPM | SYSTOLIC BLOOD PRESSURE: 113 MMHG | HEART RATE: 70 BPM | BODY MASS INDEX: 25.76 KG/M2

## 2024-07-31 DIAGNOSIS — T14.8XXA WOUND INFECTION: Primary | ICD-10-CM

## 2024-07-31 DIAGNOSIS — L08.9 WOUND INFECTION: Primary | ICD-10-CM

## 2024-07-31 LAB
ALBUMIN SERPL-MCNC: 4.3 G/DL (ref 3.5–5.2)
ALP SERPL-CCNC: 190 U/L (ref 35–104)
ALT SERPL-CCNC: 24 U/L (ref 0–32)
ANION GAP SERPL CALCULATED.3IONS-SCNC: 11 MMOL/L (ref 7–16)
AST SERPL-CCNC: 34 U/L (ref 0–31)
BASOPHILS # BLD: 0.04 K/UL (ref 0–0.2)
BASOPHILS NFR BLD: 1 % (ref 0–2)
BILIRUB SERPL-MCNC: 0.4 MG/DL (ref 0–1.2)
BUN SERPL-MCNC: 23 MG/DL (ref 6–23)
CALCIUM SERPL-MCNC: 9.3 MG/DL (ref 8.6–10.2)
CHLORIDE SERPL-SCNC: 101 MMOL/L (ref 98–107)
CO2 SERPL-SCNC: 25 MMOL/L (ref 22–29)
CREAT SERPL-MCNC: 0.6 MG/DL (ref 0.5–1)
EOSINOPHIL # BLD: 0.12 K/UL (ref 0.05–0.5)
EOSINOPHILS RELATIVE PERCENT: 2 % (ref 0–6)
ERYTHROCYTE [DISTWIDTH] IN BLOOD BY AUTOMATED COUNT: 14.2 % (ref 11.5–15)
ERYTHROCYTE [SEDIMENTATION RATE] IN BLOOD BY WESTERGREN METHOD: 23 MM/HR (ref 0–20)
GFR, ESTIMATED: >90 ML/MIN/1.73M2
GLUCOSE SERPL-MCNC: 102 MG/DL (ref 74–99)
HCT VFR BLD AUTO: 40.3 % (ref 34–48)
HGB BLD-MCNC: 12.6 G/DL (ref 11.5–15.5)
IMM GRANULOCYTES # BLD AUTO: <0.03 K/UL (ref 0–0.58)
IMM GRANULOCYTES NFR BLD: 0 % (ref 0–5)
LACTATE BLDV-SCNC: 1.9 MMOL/L (ref 0.5–2.2)
LYMPHOCYTES NFR BLD: 1.33 K/UL (ref 1.5–4)
LYMPHOCYTES RELATIVE PERCENT: 27 % (ref 20–42)
MCH RBC QN AUTO: 29 PG (ref 26–35)
MCHC RBC AUTO-ENTMCNC: 31.3 G/DL (ref 32–34.5)
MCV RBC AUTO: 92.6 FL (ref 80–99.9)
MONOCYTES NFR BLD: 0.37 K/UL (ref 0.1–0.95)
MONOCYTES NFR BLD: 8 % (ref 2–12)
NEUTROPHILS NFR BLD: 62 % (ref 43–80)
NEUTS SEG NFR BLD: 3.04 K/UL (ref 1.8–7.3)
PLATELET # BLD AUTO: 261 K/UL (ref 130–450)
PMV BLD AUTO: 10.6 FL (ref 7–12)
POTASSIUM SERPL-SCNC: 4.6 MMOL/L (ref 3.5–5)
PROT SERPL-MCNC: 8.3 G/DL (ref 6.4–8.3)
RBC # BLD AUTO: 4.35 M/UL (ref 3.5–5.5)
SODIUM SERPL-SCNC: 137 MMOL/L (ref 132–146)
WBC OTHER # BLD: 4.9 K/UL (ref 4.5–11.5)

## 2024-07-31 PROCEDURE — 36415 COLL VENOUS BLD VENIPUNCTURE: CPT

## 2024-07-31 PROCEDURE — 6370000000 HC RX 637 (ALT 250 FOR IP): Performed by: EMERGENCY MEDICINE

## 2024-07-31 PROCEDURE — 99284 EMERGENCY DEPT VISIT MOD MDM: CPT

## 2024-07-31 PROCEDURE — 96365 THER/PROPH/DIAG IV INF INIT: CPT

## 2024-07-31 PROCEDURE — 85652 RBC SED RATE AUTOMATED: CPT

## 2024-07-31 PROCEDURE — 96368 THER/DIAG CONCURRENT INF: CPT

## 2024-07-31 PROCEDURE — 96366 THER/PROPH/DIAG IV INF ADDON: CPT

## 2024-07-31 PROCEDURE — 87205 SMEAR GRAM STAIN: CPT

## 2024-07-31 PROCEDURE — 6360000002 HC RX W HCPCS: Performed by: EMERGENCY MEDICINE

## 2024-07-31 PROCEDURE — 2500000003 HC RX 250 WO HCPCS: Performed by: EMERGENCY MEDICINE

## 2024-07-31 PROCEDURE — 85025 COMPLETE CBC W/AUTO DIFF WBC: CPT

## 2024-07-31 PROCEDURE — 83605 ASSAY OF LACTIC ACID: CPT

## 2024-07-31 PROCEDURE — 80053 COMPREHEN METABOLIC PANEL: CPT

## 2024-07-31 PROCEDURE — 2580000003 HC RX 258: Performed by: EMERGENCY MEDICINE

## 2024-07-31 PROCEDURE — 87070 CULTURE OTHR SPECIMN AEROBIC: CPT

## 2024-07-31 RX ORDER — LEVOFLOXACIN 5 MG/ML
500 INJECTION, SOLUTION INTRAVENOUS EVERY 24 HOURS
Status: DISCONTINUED | OUTPATIENT
Start: 2024-07-31 | End: 2024-07-31 | Stop reason: HOSPADM

## 2024-07-31 RX ORDER — DOXYCYCLINE HYCLATE 100 MG
100 TABLET ORAL 2 TIMES DAILY
Qty: 20 TABLET | Refills: 0 | Status: SHIPPED | OUTPATIENT
Start: 2024-07-31 | End: 2024-08-10

## 2024-07-31 RX ORDER — HYDROMORPHONE HYDROCHLORIDE 2 MG/1
4 TABLET ORAL ONCE
Status: COMPLETED | OUTPATIENT
Start: 2024-07-31 | End: 2024-07-31

## 2024-07-31 RX ORDER — LEVOFLOXACIN 750 MG/1
750 TABLET, FILM COATED ORAL DAILY
Qty: 10 TABLET | Refills: 0 | Status: SHIPPED | OUTPATIENT
Start: 2024-07-31 | End: 2024-08-10

## 2024-07-31 RX ADMIN — HYDROMORPHONE HYDROCHLORIDE 4 MG: 2 TABLET ORAL at 18:34

## 2024-07-31 RX ADMIN — LEVOFLOXACIN 500 MG: 5 INJECTION, SOLUTION INTRAVENOUS at 19:07

## 2024-07-31 RX ADMIN — DOXYCYCLINE 100 MG: 100 INJECTION, POWDER, LYOPHILIZED, FOR SOLUTION INTRAVENOUS at 17:56

## 2024-07-31 ASSESSMENT — PAIN DESCRIPTION - ORIENTATION
ORIENTATION: RIGHT
ORIENTATION: RIGHT

## 2024-07-31 ASSESSMENT — PAIN DESCRIPTION - DESCRIPTORS: DESCRIPTORS: SHARP

## 2024-07-31 ASSESSMENT — PAIN DESCRIPTION - LOCATION
LOCATION: BUTTOCKS
LOCATION: BUTTOCKS

## 2024-07-31 ASSESSMENT — PAIN SCALES - GENERAL
PAINLEVEL_OUTOF10: 8
PAINLEVEL_OUTOF10: 8

## 2024-07-31 ASSESSMENT — PAIN - FUNCTIONAL ASSESSMENT: PAIN_FUNCTIONAL_ASSESSMENT: 0-10

## 2024-07-31 ASSESSMENT — PAIN DESCRIPTION - PAIN TYPE: TYPE: ACUTE PAIN

## 2024-07-31 NOTE — ED PROVIDER NOTES
Department of Emergency Medicine  FIRST PROVIDER TRIAGE NOTE             In[facility]           7/31/24  3:14 PM EDT    Date of Encounter: 7/31/24   MRN: [id number]      HPI: Karen Shafer is a 64 y.o. female who presents to the ED for Wound Check (States that pcp has sent her here for IV abx. Has been on doxy and has 2 days left. )       Patient reports she had a spinal cord stimulator placed and it got infected and has a chronic wound.  She has been on antibiotics does not know which ones and she is has 2 more days left and her doctor at pain management told her she needs to go to the hospital for admission and IV antibiotics she denies any fever or chills she does have pain in the right lower buttock region.    Vitals:    07/31/24 1456   BP: 113/80   Pulse: 70   Resp: 14   Temp: 97.7 °F (36.5 °C)   SpO2: 94%         ROS: Negative for cp, sob, abd pain, back pain, fever, urinary complaints, rash, headache, or dizziness.    PE: Gen Appearance/Constitutional: alert  HEENT: NC/NT. PERRLA,  Airway patent.  Neck: supple  Pulm: CTA bilat  GI: soft and NT     Initial Plan of Care:  Plan to order/Initiate the following while awaiting opening in ED: labs.   Instructed patient and/or family member with patient if any worsening condition to notify staff.    Provider-Patient relationship only established for Provider In Triage (PIT) secondary to high volume, low staffing, and/or boarding- patient to await bed availability..  Full assessment, HPI and examination not performed.  Discussed with patient that the provider in triage evaluation is not a comprehensive medical screening exam and this is not yet possible at the end of the provider in triage encounter, to state whether or not an emergency medical condition exist  This ends my PIT-Patient relationship.    Electronically signed by AYESHA Ruiz CNP   DD: 7/31/24      Selene Shaffer APRN - CNP  07/31/24 1754    
comfortable and agreeable with discharge plan. Patient in no acute distress and non-toxic in appearance.     Shared decision making was used to determine testing, treatments and disposition.    CBC was ordered as part of my assessment for possible infection, anemia or thrombocytopenia. CMP to assess electrolytes, kidney function, liver function or any metabolic derangements. Lactic acid as a marker of hypoperfusion or ischemia.    Re-Evaluations:  Time: 9:06 PM EDT   Re-evaluation.  Patient’s symptoms show no change  Repeat physical examination is not changed      CONSULTS: (Who and What was discussed)  PharmD, Omar Contreras      Counseling:  The emergency provider has spoken with the patient and discussed today’s results, in addition to providing specific details for the plan of care and counseling regarding the diagnosis and prognosis.  Questions are answered at this time and they are agreeable with the plan.    I am the Primary Clinician of Record.     --------------------------------- IMPRESSION AND DISPOSITION ---------------------------------    IMPRESSION  1. Wound infection        DISPOSITION  Disposition: Discharge to home  Patient condition is stable    PATIENT REFERRED TO:  Sarkis Kessler MD  [address] [address]  [phone]    Call in 1 day      [facility]  [address], [address]  [facility] [place] [id number] [phone]  Call in 1 day      [facility]  [address] [address]  [phone]  Go to   If symptoms worsen      DISCHARGE MEDICATIONS:  New Prescriptions    DOXYCYCLINE HYCLATE (VIBRA-TABS) 100 MG TABLET    Take 1 tablet by mouth 2 times daily for 10 days    LEVOFLOXACIN (LEVAQUIN) 750 MG TABLET    Take 1 tablet by mouth daily for 10 days       DISCONTINUED MEDICATIONS:  Discontinued Medications    No medications on file              (Please note that portions of this note were completed with a voice

## 2024-08-03 LAB
MICROORGANISM SPEC CULT: ABNORMAL
MICROORGANISM/AGENT SPEC: ABNORMAL
SPECIMEN DESCRIPTION: ABNORMAL

## 2024-08-05 ENCOUNTER — OFFICE VISIT (OUTPATIENT)
Dept: FAMILY MEDICINE CLINIC | Age: 64
End: 2024-08-05

## 2024-08-05 VITALS
OXYGEN SATURATION: 93 % | HEIGHT: 62 IN | WEIGHT: 155.87 LBS | TEMPERATURE: 97.3 F | RESPIRATION RATE: 16 BRPM | BODY MASS INDEX: 28.68 KG/M2 | HEART RATE: 70 BPM | DIASTOLIC BLOOD PRESSURE: 74 MMHG | SYSTOLIC BLOOD PRESSURE: 112 MMHG

## 2024-08-05 DIAGNOSIS — S31.829S BUTTOCK WOUND, LEFT, SEQUELA: Primary | ICD-10-CM

## 2024-08-05 PROCEDURE — 3017F COLORECTAL CA SCREEN DOC REV: CPT

## 2024-08-05 PROCEDURE — 1036F TOBACCO NON-USER: CPT

## 2024-08-05 PROCEDURE — G8427 DOCREV CUR MEDS BY ELIG CLIN: HCPCS

## 2024-08-05 PROCEDURE — G8419 CALC BMI OUT NRM PARAM NOF/U: HCPCS

## 2024-08-05 RX ORDER — ARIPIPRAZOLE 15 MG/1
15 TABLET ORAL DAILY
COMMUNITY
Start: 2024-07-27

## 2024-08-05 RX ORDER — DICLOFENAC SODIUM 75 MG/1
75 TABLET, DELAYED RELEASE ORAL 2 TIMES DAILY PRN
COMMUNITY
Start: 2024-07-04

## 2024-08-05 NOTE — PROGRESS NOTES
S: 64 y.o. female with   Chief Complaint   Patient presents with    Wound Infection     R Buttocks  Red, painful, green discharge per home health          R buttock infection - tx with levaquin then levaquin + doxy recently.  Will see wound center next week. Reports pain but overall images seem to show improvement     O: VS:  height is 1.575 m (5' 2\") and weight is 70.7 kg (155 lb 13.8 oz). Her temporal temperature is 97.3 °F (36.3 °C). Her blood pressure is 112/74 and her pulse is 70. Her respiration is 16 and oxygen saturation is 93%.   BP Readings from Last 3 Encounters:   08/05/24 112/74   07/31/24 113/80   07/04/24 116/66     See resident note      Impression/Plan:   1) R buttock infection - will cont current plan and have her see wound center - for now cont with dressing changes.           Health Maintenance Due   Topic Date Due    Shingles vaccine (1 of 2) Never done    Pneumococcal 0-64 years Vaccine (2 of 2 - PPSV23 or PCV20) 11/13/2015    Breast cancer screen  04/25/2018    Lung Cancer Screening &/or Counseling  01/29/2019    Respiratory Syncytial Virus (RSV) Pregnant or age 60 yrs+ (1 - 1-dose 60+ series) Never done    DTaP/Tdap/Td vaccine (2 - Td or Tdap) 01/23/2023    COVID-19 Vaccine (3 - 2023-24 season) 09/01/2023    Annual Wellness Visit (Medicare Advantage)  Never done    Flu vaccine (1) 08/01/2024         Attending Physician Statement  I have discussed the case, including pertinent history and exam findings with the resident.  I agree with the documented assessment and plan.      Dale Yang MD

## 2024-08-05 NOTE — PROGRESS NOTES
Subjective:  Karen Shafer is a 64 y.o. female with chief complaint of Wound Infection (R Buttocks/Red, painful, green discharge per home health /)      HPI:  HPI    Infection on buttocks  Wound infection at surgical site of spinal stimulator removal in May (Dr. Rosario).  +[facility]  Multiple ED visits for concerns for infection  Gram positive GNR [7/31]  normal wbc, LA, ESR 23  Prescribed OP abx from ED    Doxycycline, levaquin x 10 days  Wound care appt 8/12  No new fevers, chills    Today, was getting wound care  Worried about the progression of wound  Pain, burning, stabbing      ROS:  Review of Systems   Constitutional:  Negative for chills and fever.   HENT:  Negative for congestion, rhinorrhea and sore throat.    Eyes:  Negative for pain and visual disturbance.   Respiratory:  Negative for shortness of breath and wheezing.    Cardiovascular:  Negative for chest pain and palpitations.   Gastrointestinal:  Negative for diarrhea and nausea.   Genitourinary:  Negative for dysuria and hematuria.   Musculoskeletal:  Positive for back pain. Negative for arthralgias and myalgias.   Skin:  Positive for wound. Negative for rash.   Neurological:  Positive for weakness. Negative for dizziness and headaches.       Objective:  Vitals:    08/05/24 1316   BP: 112/74   Pulse: 70   Resp: 16   Temp: 97.3 °F (36.3 °C)   TempSrc: Temporal   SpO2: 93%   Weight: 70.7 kg (155 lb 13.8 oz)   Height: 1.575 m (5' 2\")     Physical Exam  Vitals and nursing note reviewed.   Constitutional:       General: She is not in acute distress.     Appearance: Normal appearance.   HENT:      Head: Normocephalic and atraumatic.   Eyes:      General:         Right eye: No discharge.         Left eye: No discharge.   Cardiovascular:      Rate and Rhythm: Normal rate and regular rhythm.      Heart sounds: No murmur heard.  Pulmonary:      Effort: Pulmonary effort is normal.      Breath sounds: No wheezing.   Abdominal:      General: Bowel sounds are

## 2024-08-06 ASSESSMENT — ENCOUNTER SYMPTOMS
DIARRHEA: 0
NAUSEA: 0
BACK PAIN: 1
SORE THROAT: 0
EYE PAIN: 0
RHINORRHEA: 0
SHORTNESS OF BREATH: 0
WHEEZING: 0

## 2024-08-12 ENCOUNTER — HOSPITAL ENCOUNTER (OUTPATIENT)
Dept: WOUND CARE | Age: 64
Discharge: HOME | End: 2024-08-12
Payer: COMMERCIAL

## 2024-08-12 VITALS
DIASTOLIC BLOOD PRESSURE: 61 MMHG | HEIGHT: 62 IN | RESPIRATION RATE: 18 BRPM | TEMPERATURE: 96.7 F | SYSTOLIC BLOOD PRESSURE: 110 MMHG | HEART RATE: 80 BPM | WEIGHT: 140 LBS | BODY MASS INDEX: 25.76 KG/M2

## 2024-08-12 DIAGNOSIS — S31.819A WOUND OF RIGHT BUTTOCK, INITIAL ENCOUNTER: Primary | ICD-10-CM

## 2024-08-12 DIAGNOSIS — T81.31XA POSTOPERATIVE WOUND BREAKDOWN, INITIAL ENCOUNTER: ICD-10-CM

## 2024-08-12 DIAGNOSIS — Z87.891 HISTORY OF TOBACCO USE: ICD-10-CM

## 2024-08-12 PROBLEM — M54.2 NECK PAIN: Status: RESOLVED | Noted: 2021-02-24 | Resolved: 2024-08-12

## 2024-08-12 PROBLEM — J12.82 PNEUMONIA DUE TO COVID-19 VIRUS: Status: RESOLVED | Noted: 2022-08-01 | Resolved: 2024-08-12

## 2024-08-12 PROBLEM — K29.00 ACUTE GASTRITIS: Status: RESOLVED | Noted: 2022-11-23 | Resolved: 2024-08-12
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
## 2024-08-12 PROBLEM — H60.501 ACUTE OTITIS EXTERNA OF RIGHT EAR: Status: RESOLVED | Noted: 2022-09-20 | Resolved: 2024-08-12

## 2024-08-12 PROBLEM — R10.13 EPIGASTRIC PAIN: Status: RESOLVED | Noted: 2022-11-07 | Resolved: 2024-08-12

## 2024-08-12 PROBLEM — R47.01 EXPRESSIVE APHASIA: Status: RESOLVED | Noted: 2023-03-12 | Resolved: 2024-08-12

## 2024-08-12 PROBLEM — H53.40 VISUAL FIELD DEFECTS: Status: RESOLVED | Noted: 2022-12-30 | Resolved: 2024-08-12

## 2024-08-12 PROBLEM — R06.02 SOB (SHORTNESS OF BREATH): Status: RESOLVED | Noted: 2022-12-13 | Resolved: 2024-08-12

## 2024-08-12 PROBLEM — U07.1 PNEUMONIA DUE TO COVID-19 VIRUS: Status: RESOLVED | Noted: 2022-08-01 | Resolved: 2024-08-12

## 2024-08-12 PROBLEM — E46 PROTEIN MALNUTRITION (HCC): Status: RESOLVED | Noted: 2023-11-09 | Resolved: 2024-08-12

## 2024-08-12 PROBLEM — F23 ACUTE PSYCHOSIS (HCC): Status: RESOLVED | Noted: 2022-08-27 | Resolved: 2024-08-12

## 2024-08-12 PROBLEM — J44.1 COPD EXACERBATION (HCC): Status: RESOLVED | Noted: 2023-03-11 | Resolved: 2024-08-12

## 2024-08-12 PROCEDURE — 11042 DBRDMT SUBQ TIS 1ST 20SQCM/<: CPT

## 2024-08-12 PROCEDURE — 99213 OFFICE O/P EST LOW 20 MIN: CPT

## 2024-08-12 RX ORDER — LIDOCAINE HYDROCHLORIDE 20 MG/ML
JELLY TOPICAL ONCE
OUTPATIENT
Start: 2024-08-12 | End: 2024-08-12

## 2024-08-12 RX ORDER — GENTAMICIN SULFATE 1 MG/G
OINTMENT TOPICAL ONCE
OUTPATIENT
Start: 2024-08-12 | End: 2024-08-12

## 2024-08-12 RX ORDER — LEVOFLOXACIN 750 MG/1
750 TABLET, FILM COATED ORAL DAILY
COMMUNITY

## 2024-08-12 RX ORDER — CLOBETASOL PROPIONATE 0.5 MG/G
OINTMENT TOPICAL ONCE
OUTPATIENT
Start: 2024-08-12 | End: 2024-08-12

## 2024-08-12 RX ORDER — IBUPROFEN 200 MG
TABLET ORAL ONCE
OUTPATIENT
Start: 2024-08-12 | End: 2024-08-12

## 2024-08-12 RX ORDER — LIDOCAINE 40 MG/G
CREAM TOPICAL ONCE
OUTPATIENT
Start: 2024-08-12 | End: 2024-08-12

## 2024-08-12 RX ORDER — SODIUM CHLOR/HYPOCHLOROUS ACID 0.033 %
SOLUTION, IRRIGATION IRRIGATION ONCE
OUTPATIENT
Start: 2024-08-12 | End: 2024-08-12

## 2024-08-12 RX ORDER — BACITRACIN ZINC 500 [USP'U]/G
OINTMENT TOPICAL ONCE
OUTPATIENT
Start: 2024-08-12 | End: 2024-08-12

## 2024-08-12 RX ORDER — DOXYCYCLINE HYCLATE 100 MG/1
100 CAPSULE ORAL 2 TIMES DAILY
COMMUNITY

## 2024-08-12 RX ORDER — BETAMETHASONE DIPROPIONATE 0.5 MG/G
CREAM TOPICAL ONCE
OUTPATIENT
Start: 2024-08-12 | End: 2024-08-12

## 2024-08-12 RX ORDER — LIDOCAINE HYDROCHLORIDE 40 MG/ML
SOLUTION TOPICAL ONCE
OUTPATIENT
Start: 2024-08-12 | End: 2024-08-12

## 2024-08-12 RX ORDER — BACITRACIN ZINC AND POLYMYXIN B SULFATE 500; 1000 [USP'U]/G; [USP'U]/G
OINTMENT TOPICAL ONCE
OUTPATIENT
Start: 2024-08-12 | End: 2024-08-12

## 2024-08-12 RX ORDER — LIDOCAINE 50 MG/G
OINTMENT TOPICAL ONCE
OUTPATIENT
Start: 2024-08-12 | End: 2024-08-12

## 2024-08-12 RX ORDER — TRIAMCINOLONE ACETONIDE 1 MG/G
OINTMENT TOPICAL ONCE
OUTPATIENT
Start: 2024-08-12 | End: 2024-08-12

## 2024-08-12 ASSESSMENT — PAIN DESCRIPTION - ORIENTATION: ORIENTATION: RIGHT

## 2024-08-12 ASSESSMENT — PAIN DESCRIPTION - ONSET: ONSET: ON-GOING

## 2024-08-12 ASSESSMENT — PAIN DESCRIPTION - PAIN TYPE: TYPE: CHRONIC PAIN

## 2024-08-12 ASSESSMENT — PAIN DESCRIPTION - LOCATION: LOCATION: HIP

## 2024-08-12 ASSESSMENT — PAIN DESCRIPTION - DESCRIPTORS: DESCRIPTORS: BURNING;STABBING

## 2024-08-12 ASSESSMENT — PAIN - FUNCTIONAL ASSESSMENT: PAIN_FUNCTIONAL_ASSESSMENT: PREVENTS OR INTERFERES SOME ACTIVE ACTIVITIES AND ADLS

## 2024-08-12 ASSESSMENT — PAIN SCALES - GENERAL: PAINLEVEL_OUTOF10: 7

## 2024-08-12 ASSESSMENT — PAIN DESCRIPTION - FREQUENCY: FREQUENCY: INTERMITTENT

## 2024-08-12 NOTE — DISCHARGE INSTRUCTIONS
Visit Discharge/Physician Orders    Discharge condition: Stable  Assessment of pain at discharge: yes  Anesthetic used: lidocaine 4%   Discharge to: Home  Left via:Private automobile  Accompanied by: accompanied by self    ECF/HHA:[facility]/ [facility] - Note new orders *     Dressing Orders: Cleanse wound to Right Hip with normal saline, apply ALGINATE AG rope to wound bed (undermining at 6) and cover with ABD pad - adhere with tape (or boarded gauze), change daily.    Treatment Orders:  EAT DIET WITH PROTEINS AND VITAMIN C- Protein shakes 1-2 times a day   TAKE A MULTIVITAMIN DAILY IF NOT CONTRAINDICATED      [facility] followup visit ________1 week _____________________  (Please note your next appointment above and if you are unable to keep, kindly give a 24 hour notice. Thank you.)      Physician signature:__________________________      If you experience any of the following, please call the Wound Care Center during business hours:    * Increase in Pain  * Temperature over 101  * Increase in drainage from your wound  * Drainage with a foul odor  * Bleeding  * Increase in swelling  * Need for compression bandage changes due to slippage, breakthrough drainage.    If you need medical attention outside of the business hours of the Wound Care Centers please contact your PCP or go to the nearest emergency room.

## 2024-08-12 NOTE — PROGRESS NOTES
Wound Healing Center /Hyperbarics   History and Physical/Consultation  Vascular    Referring Physician : Sarkis Kessler MD  Karen Shafer  MEDICAL RECORD NUMBER:  [id number]  AGE: 64 y.o.   GENDER: female  : 1960  EPISODE DATE:  2024  Subjective:     Chief Complaint   Patient presents with    Wound Check     RIGHT HIP         HISTORY of PRESENT ILLNESS HPI     Karen Shafer is a 64 y.o. female who presents today for wound/ulcer evaluation.   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  This has been treated by her pain specialist in the emergency room and family doctor recently.  On their initial visit to the wound healing center, 24, the patient has noted that the wound has been improving.  The patient has not had similar previous wounds in the past.      Pt is currently on abx.  Levaquin and doxycycline    Patient denies any history of fever or chills    Patient quit smoking few years ago      Wound/Ulcer Pain Timing/Severity: constant  Quality of pain: dull, aching  Severity:  4 / 10   Modifying Factors: Pain worsens with walking  Associated Signs/Symptoms: drainage and pain    Ulcer Identification:  Ulcer Type: non-healing surgical  Contributing Factors: decreased mobility    Diabetic/Pressure/Non Pressure Ulcers only:  Ulcer: N/A    If patient has diabetic lower extremity wounds  Torres Classification of diabetic lower extremity wounds:    Grade Description   []  0 No open wound   []  1 Superficial ulcer involving the full skin thickness   []  2 Deep ulcer involves ligament, tendon, joint capsule, or fascia  No bone involvement or abscess presence   []  3 Deep Ulcer with abcess formation and/or osteomyelitis

## 2024-08-12 NOTE — PLAN OF CARE
Problem: Chronic Conditions and Co-morbidities  Goal: Patient's chronic conditions and co-morbidity symptoms are monitored and maintained or improved  Outcome: Progressing     Problem: Pain  Goal: Verbalizes/displays adequate comfort level or baseline comfort level  Outcome: Progressing     Problem: Wound:  Goal: Will show signs of wound healing; wound closure and no evidence of infection  Description: Will show signs of wound healing; wound closure and no evidence of infection  Reactivated

## 2024-08-17 NOTE — TELEPHONE ENCOUNTER
Called and spoke with the patient  to inform him that a script for pamalor was sent to her pharmacy. Patient  is aware and voiced understanding.
Patient last visit 4-30-21 next visit 7-30-21. Pharmacy requesting refill on noratriptyline 10 mg 1 tab qhs sent 4-30-21 #30 no refills. Will key up enough medication to last till next appointment. Please advise.
Request approved. Please notify patient. Thank you.
2 seconds or less

## 2024-08-19 ENCOUNTER — HOSPITAL ENCOUNTER (OUTPATIENT)
Dept: WOUND CARE | Age: 64
Discharge: HOME | End: 2024-08-19
Payer: MEDICARE

## 2024-08-19 VITALS
WEIGHT: 140 LBS | DIASTOLIC BLOOD PRESSURE: 61 MMHG | SYSTOLIC BLOOD PRESSURE: 144 MMHG | HEIGHT: 62 IN | BODY MASS INDEX: 25.76 KG/M2 | RESPIRATION RATE: 18 BRPM | TEMPERATURE: 96.4 F | HEART RATE: 63 BPM

## 2024-08-19 DIAGNOSIS — T81.31XA POSTOPERATIVE WOUND BREAKDOWN, INITIAL ENCOUNTER: Primary | ICD-10-CM

## 2024-08-19 DIAGNOSIS — S31.819A WOUND OF RIGHT BUTTOCK, INITIAL ENCOUNTER: ICD-10-CM

## 2024-08-19 PROCEDURE — 11042 DBRDMT SUBQ TIS 1ST 20SQCM/<: CPT

## 2024-08-19 PROCEDURE — 11042 DBRDMT SUBQ TIS 1ST 20SQCM/<: CPT | Performed by: SURGERY

## 2024-08-19 RX ORDER — LIDOCAINE HYDROCHLORIDE 20 MG/ML
JELLY TOPICAL ONCE
OUTPATIENT
Start: 2024-08-19 | End: 2024-08-19

## 2024-08-19 RX ORDER — IBUPROFEN 200 MG
TABLET ORAL ONCE
OUTPATIENT
Start: 2024-08-19 | End: 2024-08-19

## 2024-08-19 RX ORDER — GENTAMICIN SULFATE 1 MG/G
OINTMENT TOPICAL ONCE
OUTPATIENT
Start: 2024-08-19 | End: 2024-08-19

## 2024-08-19 RX ORDER — TRIAMCINOLONE ACETONIDE 1 MG/G
OINTMENT TOPICAL ONCE
OUTPATIENT
Start: 2024-08-19 | End: 2024-08-19

## 2024-08-19 RX ORDER — LIDOCAINE 40 MG/G
CREAM TOPICAL ONCE
OUTPATIENT
Start: 2024-08-19 | End: 2024-08-19

## 2024-08-19 RX ORDER — LIDOCAINE HYDROCHLORIDE 40 MG/ML
SOLUTION TOPICAL ONCE
Status: COMPLETED | OUTPATIENT
Start: 2024-08-19 | End: 2024-08-19

## 2024-08-19 RX ORDER — LIDOCAINE HYDROCHLORIDE 40 MG/ML
SOLUTION TOPICAL ONCE
OUTPATIENT
Start: 2024-08-19 | End: 2024-08-19

## 2024-08-19 RX ORDER — SODIUM CHLOR/HYPOCHLOROUS ACID 0.033 %
SOLUTION, IRRIGATION IRRIGATION ONCE
OUTPATIENT
Start: 2024-08-19 | End: 2024-08-19

## 2024-08-19 RX ORDER — BACITRACIN ZINC AND POLYMYXIN B SULFATE 500; 1000 [USP'U]/G; [USP'U]/G
OINTMENT TOPICAL ONCE
OUTPATIENT
Start: 2024-08-19 | End: 2024-08-19

## 2024-08-19 RX ORDER — BETAMETHASONE DIPROPIONATE 0.5 MG/G
CREAM TOPICAL ONCE
OUTPATIENT
Start: 2024-08-19 | End: 2024-08-19

## 2024-08-19 RX ORDER — LIDOCAINE 50 MG/G
OINTMENT TOPICAL ONCE
OUTPATIENT
Start: 2024-08-19 | End: 2024-08-19

## 2024-08-19 RX ORDER — BACITRACIN ZINC 500 [USP'U]/G
OINTMENT TOPICAL ONCE
OUTPATIENT
Start: 2024-08-19 | End: 2024-08-19

## 2024-08-19 RX ORDER — CLOBETASOL PROPIONATE 0.5 MG/G
OINTMENT TOPICAL ONCE
OUTPATIENT
Start: 2024-08-19 | End: 2024-08-19

## 2024-08-19 RX ADMIN — LIDOCAINE HYDROCHLORIDE 5 ML: 40 SOLUTION TOPICAL at 08:25

## 2024-08-19 ASSESSMENT — PAIN DESCRIPTION - LOCATION: LOCATION: HIP

## 2024-08-19 ASSESSMENT — PAIN - FUNCTIONAL ASSESSMENT: PAIN_FUNCTIONAL_ASSESSMENT: PREVENTS OR INTERFERES SOME ACTIVE ACTIVITIES AND ADLS

## 2024-08-19 ASSESSMENT — PAIN SCALES - GENERAL: PAINLEVEL_OUTOF10: 7

## 2024-08-19 ASSESSMENT — PAIN DESCRIPTION - DESCRIPTORS: DESCRIPTORS: BURNING;STABBING

## 2024-08-19 ASSESSMENT — PAIN DESCRIPTION - ORIENTATION: ORIENTATION: RIGHT

## 2024-08-19 NOTE — PROGRESS NOTES
Wound Healing Center Followup Visit Note    Referring Physician : Sarkis Kessler MD  Karen Shafer  MEDICAL RECORD NUMBER:  [id number]  AGE: 64 y.o.   GENDER: female  : 1960  EPISODE DATE:  2024    Subjective:     Chief Complaint   Patient presents with    Wound Check     Right hip      HISTORY of PRESENT ILLNESS HPI   Karen hSafer is a 64 y.o. female who presents today in regards to follow up evaluation and treatment of wound/ulcer.  That patient's past medical, family and social hx were reviewed and changes were made if present.    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  This has been treated by her pain specialist in the emergency room and family doctor recently.  On their initial visit to the wound healing center, 24, the patient has noted that the wound has been improving.  The patient has not had similar previous wounds in the past.       Pt is currently on abx.  Levaquin and doxycycline     Patient denies any history of fever or chills     Patient quit smoking few years ago       2024  Wound, clean, granulating, undermining of the pocket stable       Wound/Ulcer Pain Timing/Severity: constant  Quality of pain: dull, aching  Severity:  4 / 10   Modifying Factors: Pain worsens with walking  Associated Signs/Symptoms: drainage and pain     Ulcer Identification:  Ulcer Type: non-healing surgical  Contributing Factors: decreased mobility     Diabetic/Pressure/Non Pressure Ulcers only:  Ulcer: N/A     If patient has diabetic lower extremity wounds  Torres Classification of diabetic lower extremity wounds:     Grade Description   []  0 No open wound   []  1 Superficial ulcer involving the full skin

## 2024-08-19 NOTE — DISCHARGE INSTRUCTIONS
Visit Discharge/Physician Orders     Discharge condition: Stable  Assessment of pain at discharge: yes  Anesthetic used: lidocaine 4%   Discharge to: Home  Left via:Private automobile  Accompanied by: accompanied by self     ECF/HHA:[facility]/ [facility]      Dressing Orders: Cleanse wound to Right Hip with normal saline, apply ALGINATE AG rope to wound bed (undermining at 6) and cover with ABD pad - adhere with tape (or boarded gauze), change daily.     Treatment Orders:  EAT DIET WITH PROTEINS AND VITAMIN C- Protein shakes 1-2 times a day   TAKE A MULTIVITAMIN DAILY IF NOT CONTRAINDICATED       [facility] followup visit ________1 week _____________________  (Please note your next appointment above and if you are unable to keep, kindly give a 24 hour notice. Thank you.)        Physician signature:__________________________        If you experience any of the following, please call the Wound Care Center during business hours:     * Increase in Pain  * Temperature over 101  * Increase in drainage from your wound  * Drainage with a foul odor  * Bleeding  * Increase in swelling  * Need for compression bandage changes due to slippage, breakthrough drainage.     If you need medical attention outside of the business hours of the Wound Care Centers please contact your PCP or go to the nearest emergency room.

## 2024-08-20 ENCOUNTER — HOSPITAL ENCOUNTER (OUTPATIENT)
Age: 64
Discharge: HOME | End: 2024-08-20
Payer: MEDICARE

## 2024-08-20 LAB
ALBUMIN SERPL-MCNC: 3.9 G/DL (ref 3.5–5.2)
ALP SERPL-CCNC: 159 U/L (ref 35–104)
ALT SERPL-CCNC: 20 U/L (ref 0–32)
ANION GAP SERPL CALCULATED.3IONS-SCNC: 8 MMOL/L (ref 7–16)
AST SERPL-CCNC: 26 U/L (ref 0–31)
BASOPHILS # BLD: 0.05 K/UL (ref 0–0.2)
BASOPHILS NFR BLD: 1 % (ref 0–2)
BILIRUB SERPL-MCNC: 0.2 MG/DL (ref 0–1.2)
BUN SERPL-MCNC: 17 MG/DL (ref 6–23)
CALCIUM SERPL-MCNC: 8.9 MG/DL (ref 8.6–10.2)
CHLORIDE SERPL-SCNC: 108 MMOL/L (ref 98–107)
CO2 SERPL-SCNC: 28 MMOL/L (ref 22–29)
CREAT SERPL-MCNC: 0.6 MG/DL (ref 0.5–1)
EOSINOPHIL # BLD: 0.2 K/UL (ref 0.05–0.5)
EOSINOPHILS RELATIVE PERCENT: 5 % (ref 0–6)
ERYTHROCYTE [DISTWIDTH] IN BLOOD BY AUTOMATED COUNT: 13.6 % (ref 11.5–15)
GFR, ESTIMATED: >90 ML/MIN/1.73M2
GLUCOSE SERPL-MCNC: 114 MG/DL (ref 74–99)
HCT VFR BLD AUTO: 38.5 % (ref 34–48)
HGB BLD-MCNC: 11.8 G/DL (ref 11.5–15.5)
IMM GRANULOCYTES # BLD AUTO: <0.03 K/UL (ref 0–0.58)
IMM GRANULOCYTES NFR BLD: 0 % (ref 0–5)
LYMPHOCYTES NFR BLD: 1.7 K/UL (ref 1.5–4)
LYMPHOCYTES RELATIVE PERCENT: 38 % (ref 20–42)
MCH RBC QN AUTO: 28.7 PG (ref 26–35)
MCHC RBC AUTO-ENTMCNC: 30.6 G/DL (ref 32–34.5)
MCV RBC AUTO: 93.7 FL (ref 80–99.9)
MONOCYTES NFR BLD: 0.34 K/UL (ref 0.1–0.95)
MONOCYTES NFR BLD: 8 % (ref 2–12)
NEUTROPHILS NFR BLD: 48 % (ref 43–80)
NEUTS SEG NFR BLD: 2.15 K/UL (ref 1.8–7.3)
PLATELET # BLD AUTO: 215 K/UL (ref 130–450)
PMV BLD AUTO: 10.8 FL (ref 7–12)
POTASSIUM SERPL-SCNC: 4.1 MMOL/L (ref 3.5–5)
PROT SERPL-MCNC: 6.9 G/DL (ref 6.4–8.3)
RBC # BLD AUTO: 4.11 M/UL (ref 3.5–5.5)
SODIUM SERPL-SCNC: 144 MMOL/L (ref 132–146)
VALPROATE SERPL-MCNC: 20 UG/ML (ref 50–100)
WBC OTHER # BLD: 4.5 K/UL (ref 4.5–11.5)

## 2024-08-20 PROCEDURE — 85025 COMPLETE CBC W/AUTO DIFF WBC: CPT

## 2024-08-20 PROCEDURE — 36415 COLL VENOUS BLD VENIPUNCTURE: CPT

## 2024-08-20 PROCEDURE — 80164 ASSAY DIPROPYLACETIC ACD TOT: CPT

## 2024-08-20 PROCEDURE — 80053 COMPREHEN METABOLIC PANEL: CPT

## 2024-08-26 ENCOUNTER — HOSPITAL ENCOUNTER (OUTPATIENT)
Dept: WOUND CARE | Age: 64
Discharge: HOME | End: 2024-08-26
Payer: MEDICARE

## 2024-08-26 VITALS
HEIGHT: 62 IN | TEMPERATURE: 97.2 F | BODY MASS INDEX: 25.76 KG/M2 | RESPIRATION RATE: 18 BRPM | SYSTOLIC BLOOD PRESSURE: 121 MMHG | WEIGHT: 140 LBS | HEART RATE: 68 BPM | DIASTOLIC BLOOD PRESSURE: 65 MMHG

## 2024-08-26 DIAGNOSIS — S31.819A WOUND OF RIGHT BUTTOCK, INITIAL ENCOUNTER: ICD-10-CM

## 2024-08-26 DIAGNOSIS — T81.31XA POSTOPERATIVE WOUND BREAKDOWN, INITIAL ENCOUNTER: Primary | ICD-10-CM

## 2024-08-26 PROCEDURE — 11042 DBRDMT SUBQ TIS 1ST 20SQCM/<: CPT | Performed by: SURGERY

## 2024-08-26 PROCEDURE — 11042 DBRDMT SUBQ TIS 1ST 20SQCM/<: CPT

## 2024-08-26 RX ORDER — MUPIROCIN 20 MG/G
OINTMENT TOPICAL ONCE
OUTPATIENT
Start: 2024-08-26 | End: 2024-08-26

## 2024-08-26 RX ORDER — LIDOCAINE HYDROCHLORIDE 40 MG/ML
SOLUTION TOPICAL ONCE
Status: COMPLETED | OUTPATIENT
Start: 2024-08-26 | End: 2024-08-26

## 2024-08-26 RX ORDER — LIDOCAINE 40 MG/G
CREAM TOPICAL ONCE
OUTPATIENT
Start: 2024-08-26 | End: 2024-08-26

## 2024-08-26 RX ORDER — BACITRACIN ZINC 500 [USP'U]/G
OINTMENT TOPICAL ONCE
OUTPATIENT
Start: 2024-08-26 | End: 2024-08-26

## 2024-08-26 RX ORDER — TRIAMCINOLONE ACETONIDE 1 MG/G
OINTMENT TOPICAL ONCE
OUTPATIENT
Start: 2024-08-26 | End: 2024-08-26

## 2024-08-26 RX ORDER — SILVER SULFADIAZINE 10 MG/G
CREAM TOPICAL ONCE
OUTPATIENT
Start: 2024-08-26 | End: 2024-08-26

## 2024-08-26 RX ORDER — LIDOCAINE HYDROCHLORIDE 20 MG/ML
JELLY TOPICAL ONCE
OUTPATIENT
Start: 2024-08-26 | End: 2024-08-26

## 2024-08-26 RX ORDER — NEOMYCIN/BACITRACIN/POLYMYXINB 3.5-400-5K
OINTMENT (GRAM) TOPICAL ONCE
OUTPATIENT
Start: 2024-08-26 | End: 2024-08-26

## 2024-08-26 RX ORDER — BETAMETHASONE DIPROPIONATE 0.5 MG/G
CREAM TOPICAL ONCE
OUTPATIENT
Start: 2024-08-26 | End: 2024-08-26

## 2024-08-26 RX ORDER — LIDOCAINE HYDROCHLORIDE 40 MG/ML
SOLUTION TOPICAL ONCE
OUTPATIENT
Start: 2024-08-26 | End: 2024-08-26

## 2024-08-26 RX ORDER — SODIUM CHLOR/HYPOCHLOROUS ACID 0.033 %
SOLUTION, IRRIGATION IRRIGATION ONCE
OUTPATIENT
Start: 2024-08-26 | End: 2024-08-26

## 2024-08-26 RX ORDER — CLOBETASOL PROPIONATE 0.5 MG/G
OINTMENT TOPICAL ONCE
OUTPATIENT
Start: 2024-08-26 | End: 2024-08-26

## 2024-08-26 RX ORDER — BACITRACIN ZINC AND POLYMYXIN B SULFATE 500; 1000 [USP'U]/G; [USP'U]/G
OINTMENT TOPICAL ONCE
OUTPATIENT
Start: 2024-08-26 | End: 2024-08-26

## 2024-08-26 RX ORDER — GENTAMICIN SULFATE 1 MG/G
OINTMENT TOPICAL ONCE
OUTPATIENT
Start: 2024-08-26 | End: 2024-08-26

## 2024-08-26 RX ORDER — LIDOCAINE 50 MG/G
OINTMENT TOPICAL ONCE
OUTPATIENT
Start: 2024-08-26 | End: 2024-08-26

## 2024-08-26 RX ADMIN — LIDOCAINE HYDROCHLORIDE 2.5 ML: 40 SOLUTION TOPICAL at 08:10

## 2024-08-26 ASSESSMENT — PAIN DESCRIPTION - ORIENTATION: ORIENTATION: RIGHT

## 2024-08-26 ASSESSMENT — PAIN DESCRIPTION - DESCRIPTORS: DESCRIPTORS: BURNING;STABBING

## 2024-08-26 ASSESSMENT — PAIN DESCRIPTION - LOCATION: LOCATION: HIP

## 2024-08-26 ASSESSMENT — PAIN SCALES - GENERAL: PAINLEVEL_OUTOF10: 7

## 2024-08-26 ASSESSMENT — PAIN DESCRIPTION - FREQUENCY: FREQUENCY: CONTINUOUS

## 2024-08-26 ASSESSMENT — PAIN - FUNCTIONAL ASSESSMENT: PAIN_FUNCTIONAL_ASSESSMENT: PREVENTS OR INTERFERES SOME ACTIVE ACTIVITIES AND ADLS

## 2024-08-26 NOTE — DISCHARGE INSTRUCTIONS
Visit Discharge/Physician Orders     Discharge condition: Stable  Assessment of pain at discharge: yes  Anesthetic used: lidocaine 4%   Discharge to: Home  Left via:Private automobile  Accompanied by: accompanied by self     ECF/HHA:[facility]/ [facility]      Dressing Orders: Cleanse wound to Right Hip with normal saline, gently pack ALGINATE AG rope to wound bed (undermining at 6) and cover with ABD pad - adhere with tape (or boarded gauze), change daily.     Treatment Orders:  EAT DIET WITH PROTEINS AND VITAMIN C- Protein shakes 1-2 times a day   TAKE A MULTIVITAMIN DAILY IF NOT CONTRAINDICATED       [facility] followup visit ________1 week _____________________  (Please note your next appointment above and if you are unable to keep, kindly give a 24 hour notice. Thank you.)        Physician signature:__________________________        If you experience any of the following, please call the Wound Care Center during business hours:     * Increase in Pain  * Temperature over 101  * Increase in drainage from your wound  * Drainage with a foul odor  * Bleeding  * Increase in swelling  * Need for compression bandage changes due to slippage, breakthrough drainage.     If you need medical attention outside of the business hours of the Wound Care Centers please contact your PCP or go to the nearest emergency room.

## 2024-08-26 NOTE — PROGRESS NOTES
Wound Healing Center Followup Visit Note    Referring Physician : Sarkis Kessler MD  Karne Shafer  MEDICAL RECORD NUMBER:  [id number]  AGE: 64 y.o.   GENDER: female  : 1960  EPISODE DATE:  2024    Subjective:     Chief Complaint   Patient presents with    Wound Check     Wound; refer LDA      HISTORY of PRESENT ILLNESS HPI   Karen Shafer is a 64 y.o. female who presents today in regards to follow up evaluation and treatment of wound/ulcer.  That patient's past medical, family and social hx were reviewed and changes were made if present.    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  This has been treated by her pain specialist in the emergency room and family doctor recently.  On their initial visit to the wound healing center, 24, the patient has noted that the wound has been improving.  The patient has not had similar previous wounds in the past.       Pt is currently on abx.  Levaquin and doxycycline     Patient denies any history of fever or chills     Patient quit smoking few years ago       2024  Wound, clean, granulating, undermining of the pocket stable  2024  Right buttock wound clean, granulating     Wound/Ulcer Pain Timing/Severity: constant  Quality of pain: dull, aching  Severity:  4 / 10   Modifying Factors: Pain worsens with walking  Associated Signs/Symptoms: drainage and pain     Ulcer Identification:  Ulcer Type: non-healing surgical  Contributing Factors: decreased mobility     Diabetic/Pressure/Non Pressure Ulcers only:  Ulcer: N/A     If patient has diabetic lower extremity wounds  Torres Classification of diabetic lower extremity wounds:     Grade Description   []  0 No open wound  DAILY IF NOT CONTRAINDICATED       [facility] followup visit ________1 week _____________________  (Please note your next appointment above and if you are unable to keep, kindly give a 24 hour notice. Thank you.)        Physician signature:__________________________        If you experience any of the following, please call the Wound Care Center during business hours:     * Increase in Pain  * Temperature over 101  * Increase in drainage from your wound  * Drainage with a foul odor  * Bleeding  * Increase in swelling  * Need for compression bandage changes due to slippage, breakthrough drainage.     If you need medical attention outside of the business hours of the Wound Care Centers please contact your PCP or go to the nearest emergency room.        Electronically signed by David Luke MD on 8/26/2024 at 8:23 AM

## 2024-09-09 ENCOUNTER — HOSPITAL ENCOUNTER (OUTPATIENT)
Dept: WOUND CARE | Age: 64
Discharge: HOME | End: 2024-09-09
Payer: MEDICARE

## 2024-09-09 VITALS
BODY MASS INDEX: 25.76 KG/M2 | DIASTOLIC BLOOD PRESSURE: 66 MMHG | SYSTOLIC BLOOD PRESSURE: 144 MMHG | HEART RATE: 74 BPM | RESPIRATION RATE: 18 BRPM | WEIGHT: 140 LBS | HEIGHT: 62 IN | TEMPERATURE: 96.9 F

## 2024-09-09 DIAGNOSIS — T81.31XA POSTOPERATIVE WOUND BREAKDOWN, INITIAL ENCOUNTER: Primary | ICD-10-CM

## 2024-09-09 DIAGNOSIS — S31.819A WOUND OF RIGHT BUTTOCK, INITIAL ENCOUNTER: ICD-10-CM

## 2024-09-09 PROCEDURE — 11042 DBRDMT SUBQ TIS 1ST 20SQCM/<: CPT

## 2024-09-09 PROCEDURE — 11042 DBRDMT SUBQ TIS 1ST 20SQCM/<: CPT | Performed by: SURGERY

## 2024-09-09 RX ORDER — BACITRACIN ZINC AND POLYMYXIN B SULFATE 500; 1000 [USP'U]/G; [USP'U]/G
OINTMENT TOPICAL ONCE
OUTPATIENT
Start: 2024-09-09 | End: 2024-09-09

## 2024-09-09 RX ORDER — LIDOCAINE HYDROCHLORIDE 20 MG/ML
JELLY TOPICAL ONCE
OUTPATIENT
Start: 2024-09-09 | End: 2024-09-09

## 2024-09-09 RX ORDER — LIDOCAINE HYDROCHLORIDE 40 MG/ML
SOLUTION TOPICAL ONCE
Status: COMPLETED | OUTPATIENT
Start: 2024-09-09 | End: 2024-09-09

## 2024-09-09 RX ORDER — CLOBETASOL PROPIONATE 0.5 MG/G
OINTMENT TOPICAL ONCE
OUTPATIENT
Start: 2024-09-09 | End: 2024-09-09

## 2024-09-09 RX ORDER — LIDOCAINE HYDROCHLORIDE 40 MG/ML
SOLUTION TOPICAL ONCE
OUTPATIENT
Start: 2024-09-09 | End: 2024-09-09

## 2024-09-09 RX ORDER — BACITRACIN ZINC 500 [USP'U]/G
OINTMENT TOPICAL ONCE
OUTPATIENT
Start: 2024-09-09 | End: 2024-09-09

## 2024-09-09 RX ORDER — LIDOCAINE 40 MG/G
CREAM TOPICAL ONCE
OUTPATIENT
Start: 2024-09-09 | End: 2024-09-09

## 2024-09-09 RX ORDER — LIDOCAINE 50 MG/G
OINTMENT TOPICAL ONCE
OUTPATIENT
Start: 2024-09-09 | End: 2024-09-09

## 2024-09-09 RX ORDER — BETAMETHASONE DIPROPIONATE 0.5 MG/G
CREAM TOPICAL ONCE
OUTPATIENT
Start: 2024-09-09 | End: 2024-09-09

## 2024-09-09 RX ORDER — SODIUM CHLOR/HYPOCHLOROUS ACID 0.033 %
SOLUTION, IRRIGATION IRRIGATION ONCE
OUTPATIENT
Start: 2024-09-09 | End: 2024-09-09

## 2024-09-09 RX ORDER — SILVER SULFADIAZINE 10 MG/G
CREAM TOPICAL ONCE
OUTPATIENT
Start: 2024-09-09 | End: 2024-09-09

## 2024-09-09 RX ORDER — TRIAMCINOLONE ACETONIDE 1 MG/G
OINTMENT TOPICAL ONCE
OUTPATIENT
Start: 2024-09-09 | End: 2024-09-09

## 2024-09-09 RX ORDER — NEOMYCIN/BACITRACIN/POLYMYXINB 3.5-400-5K
OINTMENT (GRAM) TOPICAL ONCE
OUTPATIENT
Start: 2024-09-09 | End: 2024-09-09

## 2024-09-09 RX ORDER — MUPIROCIN 20 MG/G
OINTMENT TOPICAL ONCE
OUTPATIENT
Start: 2024-09-09 | End: 2024-09-09

## 2024-09-09 RX ORDER — GENTAMICIN SULFATE 1 MG/G
OINTMENT TOPICAL ONCE
OUTPATIENT
Start: 2024-09-09 | End: 2024-09-09

## 2024-09-09 RX ADMIN — LIDOCAINE HYDROCHLORIDE: 40 SOLUTION TOPICAL at 08:16

## 2024-09-09 ASSESSMENT — PAIN DESCRIPTION - DESCRIPTORS: DESCRIPTORS: BURNING;STABBING

## 2024-09-09 ASSESSMENT — PAIN DESCRIPTION - ORIENTATION: ORIENTATION: RIGHT

## 2024-09-09 ASSESSMENT — PAIN DESCRIPTION - FREQUENCY: FREQUENCY: CONTINUOUS

## 2024-09-09 ASSESSMENT — PAIN - FUNCTIONAL ASSESSMENT: PAIN_FUNCTIONAL_ASSESSMENT: PREVENTS OR INTERFERES SOME ACTIVE ACTIVITIES AND ADLS

## 2024-09-09 ASSESSMENT — PAIN SCALES - GENERAL: PAINLEVEL_OUTOF10: 7

## 2024-09-09 ASSESSMENT — PAIN DESCRIPTION - LOCATION: LOCATION: HIP

## 2024-09-09 ASSESSMENT — PAIN DESCRIPTION - ONSET: ONSET: ON-GOING

## 2024-09-16 ENCOUNTER — HOSPITAL ENCOUNTER (OUTPATIENT)
Dept: WOUND CARE | Age: 64
Discharge: HOME | End: 2024-09-16
Attending: SURGERY
Payer: MEDICARE

## 2024-09-16 VITALS
HEART RATE: 83 BPM | TEMPERATURE: 96.5 F | DIASTOLIC BLOOD PRESSURE: 73 MMHG | SYSTOLIC BLOOD PRESSURE: 125 MMHG | RESPIRATION RATE: 20 BRPM

## 2024-09-16 DIAGNOSIS — S31.819A WOUND OF RIGHT BUTTOCK, INITIAL ENCOUNTER: ICD-10-CM

## 2024-09-16 DIAGNOSIS — L23.1 ALLERGIC CONTACT DERMATITIS DUE TO ADHESIVES: ICD-10-CM

## 2024-09-16 DIAGNOSIS — T81.31XA POSTOPERATIVE WOUND BREAKDOWN, INITIAL ENCOUNTER: Primary | ICD-10-CM

## 2024-09-16 PROCEDURE — 86403 PARTICLE AGGLUT ANTBDY SCRN: CPT

## 2024-09-16 PROCEDURE — 11042 DBRDMT SUBQ TIS 1ST 20SQCM/<: CPT

## 2024-09-16 PROCEDURE — 87205 SMEAR GRAM STAIN: CPT

## 2024-09-16 PROCEDURE — 11042 DBRDMT SUBQ TIS 1ST 20SQCM/<: CPT | Performed by: SURGERY

## 2024-09-16 PROCEDURE — 87070 CULTURE OTHR SPECIMN AEROBIC: CPT

## 2024-09-16 RX ORDER — MUPIROCIN 20 MG/G
OINTMENT TOPICAL ONCE
OUTPATIENT
Start: 2024-09-16 | End: 2024-09-16

## 2024-09-16 RX ORDER — NEOMYCIN/BACITRACIN/POLYMYXINB 3.5-400-5K
OINTMENT (GRAM) TOPICAL ONCE
OUTPATIENT
Start: 2024-09-16 | End: 2024-09-16

## 2024-09-16 RX ORDER — BETAMETHASONE DIPROPIONATE 0.5 MG/G
CREAM TOPICAL ONCE
OUTPATIENT
Start: 2024-09-16 | End: 2024-09-16

## 2024-09-16 RX ORDER — LIDOCAINE HYDROCHLORIDE 40 MG/ML
SOLUTION TOPICAL ONCE
OUTPATIENT
Start: 2024-09-16 | End: 2024-09-16

## 2024-09-16 RX ORDER — TRIAMCINOLONE ACETONIDE 1 MG/G
OINTMENT TOPICAL ONCE
OUTPATIENT
Start: 2024-09-16 | End: 2024-09-16

## 2024-09-16 RX ORDER — CLOBETASOL PROPIONATE 0.5 MG/G
OINTMENT TOPICAL ONCE
OUTPATIENT
Start: 2024-09-16 | End: 2024-09-16

## 2024-09-16 RX ORDER — SODIUM CHLOR/HYPOCHLOROUS ACID 0.033 %
SOLUTION, IRRIGATION IRRIGATION ONCE
OUTPATIENT
Start: 2024-09-16 | End: 2024-09-16

## 2024-09-16 RX ORDER — BACITRACIN ZINC 500 [USP'U]/G
OINTMENT TOPICAL ONCE
OUTPATIENT
Start: 2024-09-16 | End: 2024-09-16

## 2024-09-16 RX ORDER — SILVER SULFADIAZINE 10 MG/G
CREAM TOPICAL ONCE
OUTPATIENT
Start: 2024-09-16 | End: 2024-09-16

## 2024-09-16 RX ORDER — LIDOCAINE 40 MG/G
CREAM TOPICAL ONCE
OUTPATIENT
Start: 2024-09-16 | End: 2024-09-16

## 2024-09-16 RX ORDER — LIDOCAINE HYDROCHLORIDE 20 MG/ML
JELLY TOPICAL ONCE
OUTPATIENT
Start: 2024-09-16 | End: 2024-09-16

## 2024-09-16 RX ORDER — TRIAMCINOLONE ACETONIDE 0.25 MG/G
CREAM TOPICAL
Qty: 1 EACH | Refills: 5 | Status: SHIPPED | OUTPATIENT
Start: 2024-09-16

## 2024-09-16 RX ORDER — LIDOCAINE 50 MG/G
OINTMENT TOPICAL ONCE
OUTPATIENT
Start: 2024-09-16 | End: 2024-09-16

## 2024-09-16 RX ORDER — BACITRACIN ZINC AND POLYMYXIN B SULFATE 500; 1000 [USP'U]/G; [USP'U]/G
OINTMENT TOPICAL ONCE
OUTPATIENT
Start: 2024-09-16 | End: 2024-09-16

## 2024-09-16 RX ORDER — GENTAMICIN SULFATE 1 MG/G
OINTMENT TOPICAL ONCE
OUTPATIENT
Start: 2024-09-16 | End: 2024-09-16

## 2024-09-16 RX ORDER — LIDOCAINE HYDROCHLORIDE 40 MG/ML
SOLUTION TOPICAL ONCE
Status: COMPLETED | OUTPATIENT
Start: 2024-09-16 | End: 2024-09-16

## 2024-09-16 RX ADMIN — LIDOCAINE HYDROCHLORIDE: 40 SOLUTION TOPICAL at 08:02

## 2024-09-16 ASSESSMENT — PAIN SCALES - GENERAL: PAINLEVEL_OUTOF10: 6

## 2024-09-16 ASSESSMENT — PAIN DESCRIPTION - ORIENTATION: ORIENTATION: RIGHT

## 2024-09-16 ASSESSMENT — PAIN DESCRIPTION - DESCRIPTORS: DESCRIPTORS: BURNING

## 2024-09-16 ASSESSMENT — PAIN DESCRIPTION - LOCATION: LOCATION: HIP

## 2024-09-19 LAB
MICROORGANISM SPEC CULT: ABNORMAL
MICROORGANISM/AGENT SPEC: ABNORMAL
SERVICE CMNT-IMP: ABNORMAL
SPECIMEN DESCRIPTION: ABNORMAL

## 2024-09-19 RX ORDER — DOXYCYCLINE HYCLATE 100 MG
100 TABLET ORAL 2 TIMES DAILY
Qty: 20 TABLET | Refills: 0 | Status: SHIPPED | OUTPATIENT
Start: 2024-09-19 | End: 2024-09-29

## 2024-09-23 ENCOUNTER — HOSPITAL ENCOUNTER (OUTPATIENT)
Dept: WOUND CARE | Age: 64
Discharge: HOME | End: 2024-09-23
Attending: SURGERY
Payer: MEDICARE

## 2024-09-23 VITALS
HEART RATE: 87 BPM | RESPIRATION RATE: 18 BRPM | SYSTOLIC BLOOD PRESSURE: 145 MMHG | DIASTOLIC BLOOD PRESSURE: 71 MMHG | TEMPERATURE: 96.7 F

## 2024-09-23 DIAGNOSIS — S31.819A WOUND OF RIGHT BUTTOCK, INITIAL ENCOUNTER: ICD-10-CM

## 2024-09-23 DIAGNOSIS — T81.31XA POSTOPERATIVE WOUND BREAKDOWN, INITIAL ENCOUNTER: Primary | ICD-10-CM

## 2024-09-23 PROCEDURE — 11042 DBRDMT SUBQ TIS 1ST 20SQCM/<: CPT | Performed by: SURGERY

## 2024-09-23 PROCEDURE — 11042 DBRDMT SUBQ TIS 1ST 20SQCM/<: CPT

## 2024-09-23 RX ORDER — GENTAMICIN SULFATE 1 MG/G
OINTMENT TOPICAL ONCE
OUTPATIENT
Start: 2024-09-23 | End: 2024-09-23

## 2024-09-23 RX ORDER — CLOBETASOL PROPIONATE 0.5 MG/G
OINTMENT TOPICAL ONCE
OUTPATIENT
Start: 2024-09-23 | End: 2024-09-23

## 2024-09-23 RX ORDER — LIDOCAINE 40 MG/G
CREAM TOPICAL ONCE
OUTPATIENT
Start: 2024-09-23 | End: 2024-09-23

## 2024-09-23 RX ORDER — BETAMETHASONE DIPROPIONATE 0.5 MG/G
CREAM TOPICAL ONCE
OUTPATIENT
Start: 2024-09-23 | End: 2024-09-23

## 2024-09-23 RX ORDER — SODIUM CHLOR/HYPOCHLOROUS ACID 0.033 %
SOLUTION, IRRIGATION IRRIGATION ONCE
OUTPATIENT
Start: 2024-09-23 | End: 2024-09-23

## 2024-09-23 RX ORDER — NEOMYCIN/BACITRACIN/POLYMYXINB 3.5-400-5K
OINTMENT (GRAM) TOPICAL ONCE
OUTPATIENT
Start: 2024-09-23 | End: 2024-09-23

## 2024-09-23 RX ORDER — LIDOCAINE HYDROCHLORIDE 20 MG/ML
JELLY TOPICAL ONCE
OUTPATIENT
Start: 2024-09-23 | End: 2024-09-23

## 2024-09-23 RX ORDER — TRIAMCINOLONE ACETONIDE 1 MG/G
OINTMENT TOPICAL ONCE
OUTPATIENT
Start: 2024-09-23 | End: 2024-09-23

## 2024-09-23 RX ORDER — BACITRACIN ZINC 500 [USP'U]/G
OINTMENT TOPICAL ONCE
OUTPATIENT
Start: 2024-09-23 | End: 2024-09-23

## 2024-09-23 RX ORDER — LIDOCAINE HYDROCHLORIDE 40 MG/ML
SOLUTION TOPICAL ONCE
OUTPATIENT
Start: 2024-09-23 | End: 2024-09-23

## 2024-09-23 RX ORDER — LIDOCAINE 50 MG/G
OINTMENT TOPICAL ONCE
OUTPATIENT
Start: 2024-09-23 | End: 2024-09-23

## 2024-09-23 RX ORDER — MUPIROCIN 20 MG/G
OINTMENT TOPICAL ONCE
OUTPATIENT
Start: 2024-09-23 | End: 2024-09-23

## 2024-09-23 RX ORDER — SILVER SULFADIAZINE 10 MG/G
CREAM TOPICAL ONCE
OUTPATIENT
Start: 2024-09-23 | End: 2024-09-23

## 2024-09-23 RX ORDER — LIDOCAINE HYDROCHLORIDE 40 MG/ML
SOLUTION TOPICAL ONCE
Status: COMPLETED | OUTPATIENT
Start: 2024-09-23 | End: 2024-09-23

## 2024-09-23 RX ORDER — BACITRACIN ZINC AND POLYMYXIN B SULFATE 500; 1000 [USP'U]/G; [USP'U]/G
OINTMENT TOPICAL ONCE
OUTPATIENT
Start: 2024-09-23 | End: 2024-09-23

## 2024-09-23 RX ADMIN — LIDOCAINE HYDROCHLORIDE 5 ML: 40 SOLUTION TOPICAL at 08:25

## 2024-09-23 ASSESSMENT — PAIN DESCRIPTION - ONSET: ONSET: ON-GOING

## 2024-09-23 ASSESSMENT — PAIN DESCRIPTION - LOCATION: LOCATION: HIP

## 2024-09-23 ASSESSMENT — PAIN DESCRIPTION - DESCRIPTORS: DESCRIPTORS: BURNING;STABBING

## 2024-09-23 ASSESSMENT — PAIN DESCRIPTION - FREQUENCY: FREQUENCY: CONTINUOUS

## 2024-09-23 ASSESSMENT — PAIN DESCRIPTION - ORIENTATION: ORIENTATION: RIGHT

## 2024-09-23 ASSESSMENT — PAIN - FUNCTIONAL ASSESSMENT: PAIN_FUNCTIONAL_ASSESSMENT: PREVENTS OR INTERFERES SOME ACTIVE ACTIVITIES AND ADLS

## 2024-09-23 ASSESSMENT — PAIN SCALES - GENERAL: PAINLEVEL_OUTOF10: 7

## 2024-09-24 NOTE — DISCHARGE INSTRUCTIONS
Visit Discharge/Physician Orders     Discharge condition: Stable  Assessment of pain at discharge: yes  Anesthetic used: lidocaine 4%   Discharge to: Home  Left via:Private automobile  Accompanied by: accompanied by self     ECF/HHA:[facility]/ [facility]      Dressing Orders: Cleanse wound to Right Hip with normal saline, gently pack ALGINATE AG rope to wound bed and cover with ABD pad - adhere with tape (or boarded gauze), change daily.     Treatment Orders: 9/16 Steroid cream sent to pharmacy- use around the wound  9/16 Culture taken - take antibiotic as prescribed   EAT DIET WITH PROTEINS AND VITAMIN C- Protein shakes 1-2 times a day   TAKE A MULTIVITAMIN DAILY IF NOT CONTRAINDICATED       [facility] followup visit ________1 week _____________________  (Please note your next appointment above and if you are unable to keep, kindly give a 24 hour notice. Thank you.)        Physician signature:__________________________        If you experience any of the following, please call the Wound Care Center during business hours:     * Increase in Pain  * Temperature over 101  * Increase in drainage from your wound  * Drainage with a foul odor  * Bleeding  * Increase in swelling  * Need for compression bandage changes due to slippage, breakthrough drainage.     If you need medical attention outside of the business hours of the Wound Care Centers please contact your PCP or go to the nearest emergency room.

## 2024-09-30 ENCOUNTER — HOSPITAL ENCOUNTER (OUTPATIENT)
Dept: WOUND CARE | Age: 64
Discharge: HOME | End: 2024-09-30
Attending: SURGERY
Payer: COMMERCIAL

## 2024-09-30 VITALS
HEART RATE: 88 BPM | TEMPERATURE: 96.9 F | DIASTOLIC BLOOD PRESSURE: 58 MMHG | SYSTOLIC BLOOD PRESSURE: 99 MMHG | RESPIRATION RATE: 18 BRPM

## 2024-09-30 DIAGNOSIS — T81.31XA POSTOPERATIVE WOUND BREAKDOWN, INITIAL ENCOUNTER: Primary | ICD-10-CM

## 2024-09-30 DIAGNOSIS — S31.819A WOUND OF RIGHT BUTTOCK, INITIAL ENCOUNTER: ICD-10-CM

## 2024-09-30 PROCEDURE — 11042 DBRDMT SUBQ TIS 1ST 20SQCM/<: CPT

## 2024-09-30 RX ORDER — LIDOCAINE HYDROCHLORIDE 40 MG/ML
SOLUTION TOPICAL ONCE
OUTPATIENT
Start: 2024-09-30 | End: 2024-09-30

## 2024-09-30 RX ORDER — MUPIROCIN 20 MG/G
OINTMENT TOPICAL ONCE
OUTPATIENT
Start: 2024-09-30 | End: 2024-09-30

## 2024-09-30 RX ORDER — BACITRACIN ZINC AND POLYMYXIN B SULFATE 500; 1000 [USP'U]/G; [USP'U]/G
OINTMENT TOPICAL ONCE
OUTPATIENT
Start: 2024-09-30 | End: 2024-09-30

## 2024-09-30 RX ORDER — SILVER SULFADIAZINE 10 MG/G
CREAM TOPICAL ONCE
OUTPATIENT
Start: 2024-09-30 | End: 2024-09-30

## 2024-09-30 RX ORDER — LIDOCAINE 40 MG/G
CREAM TOPICAL ONCE
OUTPATIENT
Start: 2024-09-30 | End: 2024-09-30

## 2024-09-30 RX ORDER — BACITRACIN ZINC 500 [USP'U]/G
OINTMENT TOPICAL ONCE
OUTPATIENT
Start: 2024-09-30 | End: 2024-09-30

## 2024-09-30 RX ORDER — GENTAMICIN SULFATE 1 MG/G
OINTMENT TOPICAL ONCE
OUTPATIENT
Start: 2024-09-30 | End: 2024-09-30

## 2024-09-30 RX ORDER — CLOBETASOL PROPIONATE 0.5 MG/G
OINTMENT TOPICAL ONCE
OUTPATIENT
Start: 2024-09-30 | End: 2024-09-30

## 2024-09-30 RX ORDER — NEOMYCIN/BACITRACIN/POLYMYXINB 3.5-400-5K
OINTMENT (GRAM) TOPICAL ONCE
OUTPATIENT
Start: 2024-09-30 | End: 2024-09-30

## 2024-09-30 RX ORDER — LIDOCAINE HYDROCHLORIDE 20 MG/ML
JELLY TOPICAL ONCE
OUTPATIENT
Start: 2024-09-30 | End: 2024-09-30

## 2024-09-30 RX ORDER — BETAMETHASONE DIPROPIONATE 0.5 MG/G
CREAM TOPICAL ONCE
OUTPATIENT
Start: 2024-09-30 | End: 2024-09-30

## 2024-09-30 RX ORDER — LIDOCAINE 50 MG/G
OINTMENT TOPICAL ONCE
OUTPATIENT
Start: 2024-09-30 | End: 2024-09-30

## 2024-09-30 RX ORDER — LIDOCAINE HYDROCHLORIDE 40 MG/ML
SOLUTION TOPICAL ONCE
Status: COMPLETED | OUTPATIENT
Start: 2024-09-30 | End: 2024-09-30

## 2024-09-30 RX ORDER — TRIAMCINOLONE ACETONIDE 1 MG/G
OINTMENT TOPICAL ONCE
OUTPATIENT
Start: 2024-09-30 | End: 2024-09-30

## 2024-09-30 RX ORDER — SODIUM CHLOR/HYPOCHLOROUS ACID 0.033 %
SOLUTION, IRRIGATION IRRIGATION ONCE
OUTPATIENT
Start: 2024-09-30 | End: 2024-09-30

## 2024-09-30 RX ADMIN — LIDOCAINE HYDROCHLORIDE 5 ML: 40 SOLUTION TOPICAL at 08:24

## 2024-09-30 ASSESSMENT — PAIN - FUNCTIONAL ASSESSMENT: PAIN_FUNCTIONAL_ASSESSMENT: PREVENTS OR INTERFERES SOME ACTIVE ACTIVITIES AND ADLS

## 2024-09-30 ASSESSMENT — PAIN DESCRIPTION - FREQUENCY: FREQUENCY: CONTINUOUS

## 2024-09-30 ASSESSMENT — PAIN DESCRIPTION - ONSET: ONSET: ON-GOING

## 2024-09-30 ASSESSMENT — PAIN DESCRIPTION - ORIENTATION: ORIENTATION: RIGHT

## 2024-09-30 ASSESSMENT — PAIN DESCRIPTION - DESCRIPTORS: DESCRIPTORS: BURNING;SHARP

## 2024-09-30 ASSESSMENT — PAIN SCALES - GENERAL: PAINLEVEL_OUTOF10: 6

## 2024-09-30 ASSESSMENT — PAIN DESCRIPTION - LOCATION: LOCATION: HIP

## 2024-09-30 NOTE — PROGRESS NOTES
Wound Healing Center Followup Visit Note    Referring Physician : Sarkis Kessler MD  Karen Shafer  MEDICAL RECORD NUMBER:  [id number]  AGE: 64 y.o.   GENDER: female  : 1960  EPISODE DATE:  2024    Subjective:     Chief Complaint   Patient presents with    Wound Check     Right hip      HISTORY of PRESENT ILLNESS HPI   Karen Shafer is a 64 y.o. female who presents today in regards to follow up evaluation and treatment of wound/ulcer.  That patient's past medical, family and social hx were reviewed and changes were made if present.    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  This has been treated by her pain specialist in the emergency room and family doctor recently.  On their initial visit to the wound healing center, 24, the patient has noted that the wound has been improving.  The patient has not had similar previous wounds in the past.       Pt is currently on abx.  Levaquin and doxycycline     Patient denies any history of fever or chills     Patient quit smoking few years ago       2024  Wound, clean, granulating, undermining of the pocket stable  2024  Right buttock wound clean, granulating  2024  Right buttock wound overall clean and granulating, discussed with the patient, continue offloading, importance of nutrition, vitamin and protein supplement were discussed  2024  Patient complains of burning around the wound, concerned about infection, wanted the wound to be cultured  On examination, irritation dermatitis of the skin noted around the wound, probably contact dermatitis, recommended topical steroid cream twice a day for couple of weeks, wound itself is improved  2024  Wound

## 2024-10-07 ENCOUNTER — HOSPITAL ENCOUNTER (OUTPATIENT)
Dept: WOUND CARE | Age: 64
Discharge: HOME | End: 2024-10-07
Attending: SURGERY
Payer: COMMERCIAL

## 2024-10-07 VITALS
TEMPERATURE: 97 F | RESPIRATION RATE: 18 BRPM | DIASTOLIC BLOOD PRESSURE: 55 MMHG | HEART RATE: 73 BPM | SYSTOLIC BLOOD PRESSURE: 94 MMHG

## 2024-10-07 DIAGNOSIS — T81.31XA POSTOPERATIVE WOUND BREAKDOWN, INITIAL ENCOUNTER: Primary | ICD-10-CM

## 2024-10-07 DIAGNOSIS — S31.819A WOUND OF RIGHT BUTTOCK, INITIAL ENCOUNTER: ICD-10-CM

## 2024-10-07 PROCEDURE — 11042 DBRDMT SUBQ TIS 1ST 20SQCM/<: CPT | Performed by: SURGERY

## 2024-10-07 PROCEDURE — 11042 DBRDMT SUBQ TIS 1ST 20SQCM/<: CPT

## 2024-10-07 RX ORDER — LIDOCAINE HYDROCHLORIDE 40 MG/ML
SOLUTION TOPICAL ONCE
Status: COMPLETED | OUTPATIENT
Start: 2024-10-07 | End: 2024-10-07

## 2024-10-07 RX ORDER — LIDOCAINE HYDROCHLORIDE 20 MG/ML
JELLY TOPICAL ONCE
OUTPATIENT
Start: 2024-10-07 | End: 2024-10-07

## 2024-10-07 RX ORDER — BACITRACIN ZINC 500 [USP'U]/G
OINTMENT TOPICAL ONCE
OUTPATIENT
Start: 2024-10-07 | End: 2024-10-07

## 2024-10-07 RX ORDER — MUPIROCIN 20 MG/G
OINTMENT TOPICAL ONCE
OUTPATIENT
Start: 2024-10-07 | End: 2024-10-07

## 2024-10-07 RX ORDER — CLOBETASOL PROPIONATE 0.5 MG/G
OINTMENT TOPICAL ONCE
OUTPATIENT
Start: 2024-10-07 | End: 2024-10-07

## 2024-10-07 RX ORDER — BACITRACIN ZINC AND POLYMYXIN B SULFATE 500; 1000 [USP'U]/G; [USP'U]/G
OINTMENT TOPICAL ONCE
OUTPATIENT
Start: 2024-10-07 | End: 2024-10-07

## 2024-10-07 RX ORDER — BETAMETHASONE DIPROPIONATE 0.5 MG/G
CREAM TOPICAL ONCE
OUTPATIENT
Start: 2024-10-07 | End: 2024-10-07

## 2024-10-07 RX ORDER — LIDOCAINE HYDROCHLORIDE 40 MG/ML
SOLUTION TOPICAL ONCE
OUTPATIENT
Start: 2024-10-07 | End: 2024-10-07

## 2024-10-07 RX ORDER — GENTAMICIN SULFATE 1 MG/G
OINTMENT TOPICAL ONCE
OUTPATIENT
Start: 2024-10-07 | End: 2024-10-07

## 2024-10-07 RX ORDER — LIDOCAINE 40 MG/G
CREAM TOPICAL ONCE
OUTPATIENT
Start: 2024-10-07 | End: 2024-10-07

## 2024-10-07 RX ORDER — TRIAMCINOLONE ACETONIDE 1 MG/G
OINTMENT TOPICAL ONCE
OUTPATIENT
Start: 2024-10-07 | End: 2024-10-07

## 2024-10-07 RX ORDER — LIDOCAINE 50 MG/G
OINTMENT TOPICAL ONCE
OUTPATIENT
Start: 2024-10-07 | End: 2024-10-07

## 2024-10-07 RX ORDER — SODIUM CHLOR/HYPOCHLOROUS ACID 0.033 %
SOLUTION, IRRIGATION IRRIGATION ONCE
OUTPATIENT
Start: 2024-10-07 | End: 2024-10-07

## 2024-10-07 RX ORDER — SILVER SULFADIAZINE 10 MG/G
CREAM TOPICAL ONCE
OUTPATIENT
Start: 2024-10-07 | End: 2024-10-07

## 2024-10-07 RX ORDER — NEOMYCIN/BACITRACIN/POLYMYXINB 3.5-400-5K
OINTMENT (GRAM) TOPICAL ONCE
OUTPATIENT
Start: 2024-10-07 | End: 2024-10-07

## 2024-10-07 RX ADMIN — LIDOCAINE HYDROCHLORIDE: 40 SOLUTION TOPICAL at 08:17

## 2024-10-07 ASSESSMENT — PAIN DESCRIPTION - ORIENTATION: ORIENTATION: RIGHT

## 2024-10-07 ASSESSMENT — PAIN DESCRIPTION - DESCRIPTORS: DESCRIPTORS: SORE

## 2024-10-07 ASSESSMENT — PAIN SCALES - GENERAL: PAINLEVEL_OUTOF10: 5

## 2024-10-07 ASSESSMENT — PAIN DESCRIPTION - LOCATION: LOCATION: HIP

## 2024-10-07 NOTE — PLAN OF CARE
Problem: Pain  Goal: Verbalizes/displays adequate comfort level or baseline comfort level  Outcome: Progressing     Problem: Wound:  Goal: Will show signs of wound healing; wound closure and no evidence of infection  Description: Will show signs of wound healing; wound closure and no evidence of infection  Outcome: Progressing     Problem: Pain  Goal: Verbalizes/displays adequate comfort level or baseline comfort level  Outcome: Progressing

## 2024-10-07 NOTE — PROGRESS NOTES
Wound Healing Center Followup Visit Note    Referring Physician : Sarkis Kessler MD  Karen Shafer  MEDICAL RECORD NUMBER:  [id number]  AGE: 64 y.o.   GENDER: female  : 1960  EPISODE DATE:  10/7/2024    Subjective:     Chief Complaint   Patient presents with    Wound Check      HISTORY of PRESENT ILLNESS HPI   Karen Shafer is a 64 y.o. female who presents today in regards to follow up evaluation and treatment of wound/ulcer.  That patient's past medical, family and social hx were reviewed and changes were made if present.    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  This has been treated by her pain specialist in the emergency room and family doctor recently.  On their initial visit to the wound healing center, 24, the patient has noted that the wound has been improving.  The patient has not had similar previous wounds in the past.       Pt is currently on abx.  Levaquin and doxycycline     Patient denies any history of fever or chills     Patient quit smoking few years ago       2024  Wound, clean, granulating, undermining of the pocket stable  2024  Right buttock wound clean, granulating  2024  Right buttock wound overall clean and granulating, discussed with the patient, continue offloading, importance of nutrition, vitamin and protein supplement were discussed  2024  Patient complains of burning around the wound, concerned about infection, wanted the wound to be cultured  On examination, irritation dermatitis of the skin noted around the wound, probably contact dermatitis, recommended topical steroid cream twice a day for couple of weeks, wound itself is improved  2024  Wound improving a 
No

## 2024-10-07 NOTE — DISCHARGE INSTRUCTIONS
Visit Discharge/Physician Orders     Discharge condition: Stable  Assessment of pain at discharge: yes  Anesthetic used: lidocaine 4%   Discharge to: Home  Left via:Private automobile  Accompanied by: accompanied by self     ECF/HHA:[facility]/ [facility] - *note new order 10/7     Dressing Orders: Cleanse wound to Right Hip with normal saline, gently pack ALGINATE AG rope to wound bed and cover with *silicone border due to skin irritation *, change daily.     Treatment Orders: 9/16 Steroid cream sent to pharmacy- use around the wound  9/16 Culture taken - take antibiotic as prescribed   EAT DIET WITH PROTEINS AND VITAMIN C- Protein shakes 1-2 times a day   TAKE A MULTIVITAMIN DAILY IF NOT CONTRAINDICATED       [facility] followup visit ________2 weeks _____________________  (Please note your next appointment above and if you are unable to keep, kindly give a 24 hour notice. Thank you.)        Physician signature:__________________________        If you experience any of the following, please call the Wound Care Center during business hours:     * Increase in Pain  * Temperature over 101  * Increase in drainage from your wound  * Drainage with a foul odor  * Bleeding  * Increase in swelling  * Need for compression bandage changes due to slippage, breakthrough drainage.     If you need medical attention outside of the business hours of the Wound Care Centers please contact your PCP or go to the nearest emergency room.

## 2024-10-21 ENCOUNTER — HOSPITAL ENCOUNTER (OUTPATIENT)
Dept: WOUND CARE | Age: 64
Discharge: HOME | End: 2024-10-21
Attending: SURGERY
Payer: COMMERCIAL

## 2024-10-21 VITALS
SYSTOLIC BLOOD PRESSURE: 103 MMHG | HEART RATE: 74 BPM | RESPIRATION RATE: 18 BRPM | TEMPERATURE: 96.9 F | DIASTOLIC BLOOD PRESSURE: 61 MMHG

## 2024-10-21 DIAGNOSIS — T81.31XA POSTOPERATIVE WOUND BREAKDOWN, INITIAL ENCOUNTER: Primary | ICD-10-CM

## 2024-10-21 DIAGNOSIS — S31.819A WOUND OF RIGHT BUTTOCK, INITIAL ENCOUNTER: ICD-10-CM

## 2024-10-21 PROCEDURE — 11042 DBRDMT SUBQ TIS 1ST 20SQCM/<: CPT

## 2024-10-21 PROCEDURE — 11042 DBRDMT SUBQ TIS 1ST 20SQCM/<: CPT | Performed by: SURGERY

## 2024-10-21 RX ORDER — LIDOCAINE HYDROCHLORIDE 40 MG/ML
SOLUTION TOPICAL ONCE
Status: DISCONTINUED | OUTPATIENT
Start: 2024-10-21 | End: 2024-10-22 | Stop reason: HOSPADM

## 2024-10-21 RX ORDER — LIDOCAINE HYDROCHLORIDE 20 MG/ML
JELLY TOPICAL ONCE
OUTPATIENT
Start: 2024-10-21 | End: 2024-10-21

## 2024-10-21 RX ORDER — NEOMYCIN/BACITRACIN/POLYMYXINB 3.5-400-5K
OINTMENT (GRAM) TOPICAL ONCE
OUTPATIENT
Start: 2024-10-21 | End: 2024-10-21

## 2024-10-21 RX ORDER — LIDOCAINE 50 MG/G
OINTMENT TOPICAL ONCE
OUTPATIENT
Start: 2024-10-21 | End: 2024-10-21

## 2024-10-21 RX ORDER — TRIAMCINOLONE ACETONIDE 1 MG/G
OINTMENT TOPICAL ONCE
OUTPATIENT
Start: 2024-10-21 | End: 2024-10-21

## 2024-10-21 RX ORDER — SODIUM CHLOR/HYPOCHLOROUS ACID 0.033 %
SOLUTION, IRRIGATION IRRIGATION ONCE
OUTPATIENT
Start: 2024-10-21 | End: 2024-10-21

## 2024-10-21 RX ORDER — CLOBETASOL PROPIONATE 0.5 MG/G
OINTMENT TOPICAL ONCE
OUTPATIENT
Start: 2024-10-21 | End: 2024-10-21

## 2024-10-21 RX ORDER — GENTAMICIN SULFATE 1 MG/G
OINTMENT TOPICAL ONCE
OUTPATIENT
Start: 2024-10-21 | End: 2024-10-21

## 2024-10-21 RX ORDER — BETAMETHASONE DIPROPIONATE 0.5 MG/G
CREAM TOPICAL ONCE
OUTPATIENT
Start: 2024-10-21 | End: 2024-10-21

## 2024-10-21 RX ORDER — MUPIROCIN 20 MG/G
OINTMENT TOPICAL ONCE
OUTPATIENT
Start: 2024-10-21 | End: 2024-10-21

## 2024-10-21 RX ORDER — LIDOCAINE HYDROCHLORIDE 40 MG/ML
SOLUTION TOPICAL ONCE
OUTPATIENT
Start: 2024-10-21 | End: 2024-10-21

## 2024-10-21 RX ORDER — SILVER SULFADIAZINE 10 MG/G
CREAM TOPICAL ONCE
OUTPATIENT
Start: 2024-10-21 | End: 2024-10-21

## 2024-10-21 RX ORDER — LIDOCAINE 40 MG/G
CREAM TOPICAL ONCE
OUTPATIENT
Start: 2024-10-21 | End: 2024-10-21

## 2024-10-21 RX ORDER — BACITRACIN ZINC 500 [USP'U]/G
OINTMENT TOPICAL ONCE
OUTPATIENT
Start: 2024-10-21 | End: 2024-10-21

## 2024-10-21 RX ORDER — BACITRACIN ZINC AND POLYMYXIN B SULFATE 500; 1000 [USP'U]/G; [USP'U]/G
OINTMENT TOPICAL ONCE
OUTPATIENT
Start: 2024-10-21 | End: 2024-10-21

## 2024-10-21 ASSESSMENT — PAIN DESCRIPTION - ONSET: ONSET: ON-GOING

## 2024-10-21 ASSESSMENT — PAIN DESCRIPTION - DESCRIPTORS: DESCRIPTORS: DISCOMFORT

## 2024-10-21 ASSESSMENT — PAIN SCALES - GENERAL: PAINLEVEL_OUTOF10: 6

## 2024-10-21 ASSESSMENT — PAIN DESCRIPTION - LOCATION: LOCATION: HIP

## 2024-10-21 ASSESSMENT — PAIN DESCRIPTION - ORIENTATION: ORIENTATION: RIGHT

## 2024-10-21 ASSESSMENT — PAIN DESCRIPTION - FREQUENCY: FREQUENCY: CONTINUOUS

## 2024-10-21 NOTE — DISCHARGE INSTRUCTIONS
Visit Discharge/Physician Orders     Discharge condition: Stable  Assessment of pain at discharge: yes  Anesthetic used: lidocaine 4%   Discharge to: Home  Left via:Private automobile  Accompanied by: accompanied by self  ECF/HHA:[facility]/ [employer] [facility] *note new order 10/21     Dressing Orders: Cleanse wound to Right Hip with normal saline, apply SERGIO to wound bed and cover with silicone border due to skin irritation , change daily. -may apply Aquaphor to dry skin      Treatment Orders: 9/16 Steroid cream sent to pharmacy- use around the wound  9/16 Culture taken - take antibiotic as prescribed   EAT DIET WITH PROTEINS AND VITAMIN C- Protein shakes 1-2 times a day   TAKE A MULTIVITAMIN DAILY IF NOT CONTRAINDICATED       [facility] followup visit ________1 week _____________________  (Please note your next appointment above and if you are unable to keep, kindly give a 24 hour notice. Thank you.)        Physician signature:__________________________        If you experience any of the following, please call the Wound Care Center during business hours:     * Increase in Pain  * Temperature over 101  * Increase in drainage from your wound  * Drainage with a foul odor  * Bleeding  * Increase in swelling  * Need for compression bandage changes due to slippage, breakthrough drainage.     If you need medical attention outside of the business hours of the Wound Care Centers please contact your PCP or go to the nearest emergency room.

## 2024-10-21 NOTE — PROGRESS NOTES
evidence of wound infection of the spinal cord stimulator area        8/12/2024     Discussed the patient regarding all options, risks benefits and alternatives, importance of offloading the area, nutrition multivitamin protein supplement were discussed  We will dress the wound daily with Ag rope  All her questions were answered             PAST MEDICAL HISTORY      Diagnosis Date    Abdominal pain     For EGD 1-13-23    Allergic contact dermatitis due to adhesives 09/16/2024    Anxiety     Arthritis     Blood circulation, collateral     Cancer ([facility])     SKIN    Chronic back pain     Colitis     recent    Complex regional pain syndrome type 1 of right lower extremity 09/11/2015    COPD (chronic obstructive pulmonary disease) ([facility])     COVID-19     x3   last time was in 2023   never hospitalized    Depression     GERD (gastroesophageal reflux disease)     Headache(784.0)     History of tobacco use 08/12/2024    Quit smoking, 2022      Hyperlipidemia 10/17/2014    Kidney stone     Neuromuscular disorder ([facility])     On home O2     stopped using in may 2023    Osteoarthritis     Pneumonia 12/2022    Postoperative wound breakdown, initial encounter 08/12/2024    Right hip buttock area      Psychiatric problem     RSD (reflex sympathetic dystrophy)     Wound of right buttock 08/12/2024    Fat layer exposed, postop wound       Past Surgical History:   Procedure Laterality Date    BACK SURGERY  2005    had cerv SCS at [facility] 2000 then had staph infec 2003  then it was changed to a dual SCS by Dr schwartz 2005 approx then has had several battery changes and rechargeable put in 2009    CARPAL TUNNEL RELEASE      bryant hands    COLONOSCOPY      ENDOSCOPY, COLON, DIAGNOSTIC      FINGER AMPUTATION      index right hand multiple surgeries    HYSTERECTOMY (CERVIX STATUS UNKNOWN)      LAMINECTOMY      cervical    NERVE BLOCK N/A 08/19/2015    cerv facet #1 with iv anesthesia    NERVE BLOCK Left 08/26/2015    left cervical paravertebral

## 2024-10-22 NOTE — DISCHARGE INSTRUCTIONS
Visit Discharge/Physician Orders     Discharge condition: Stable  Assessment of pain at discharge: yes  Anesthetic used: lidocaine 4%   Discharge to: Home  Left via:Private automobile  Accompanied by: accompanied by self  ECF/HHA:[facility]/ [employer]      Dressing Orders: Cleanse wound to Right Hip with normal saline, apply SERGIO to wound bed and cover with silicone border due to skin irritation , change daily. -may apply Aquaphor to dry skin      Treatment Orders: 9/16 Steroid cream sent to pharmacy- use around the wound  9/16 Culture taken - take antibiotic as prescribed   EAT DIET WITH PROTEINS AND VITAMIN C- Protein shakes 1-2 times a day   TAKE A MULTIVITAMIN DAILY IF NOT CONTRAINDICATED       [facility] followup visit ________2 weeks _____________________  (Please note your next appointment above and if you are unable to keep, kindly give a 24 hour notice. Thank you.)        Physician signature:__________________________        If you experience any of the following, please call the Wound Care Center during business hours:     * Increase in Pain  * Temperature over 101  * Increase in drainage from your wound  * Drainage with a foul odor  * Bleeding  * Increase in swelling  * Need for compression bandage changes due to slippage, breakthrough drainage.     If you need medical attention outside of the business hours of the Wound Care Centers please contact your PCP or go to the nearest emergency room.

## 2024-10-28 ENCOUNTER — HOSPITAL ENCOUNTER (OUTPATIENT)
Dept: WOUND CARE | Age: 64
Discharge: HOME | End: 2024-10-28
Attending: SURGERY
Payer: COMMERCIAL

## 2024-10-28 VITALS
TEMPERATURE: 97 F | DIASTOLIC BLOOD PRESSURE: 71 MMHG | HEART RATE: 88 BPM | SYSTOLIC BLOOD PRESSURE: 125 MMHG | RESPIRATION RATE: 18 BRPM

## 2024-10-28 DIAGNOSIS — T81.31XA POSTOPERATIVE WOUND BREAKDOWN, INITIAL ENCOUNTER: Primary | ICD-10-CM

## 2024-10-28 DIAGNOSIS — S31.819A WOUND OF RIGHT BUTTOCK, INITIAL ENCOUNTER: ICD-10-CM

## 2024-10-28 PROCEDURE — 11042 DBRDMT SUBQ TIS 1ST 20SQCM/<: CPT | Performed by: SURGERY

## 2024-10-28 PROCEDURE — 11042 DBRDMT SUBQ TIS 1ST 20SQCM/<: CPT

## 2024-10-28 RX ORDER — BACITRACIN ZINC 500 [USP'U]/G
OINTMENT TOPICAL ONCE
OUTPATIENT
Start: 2024-10-28 | End: 2024-10-28

## 2024-10-28 RX ORDER — LIDOCAINE HYDROCHLORIDE 40 MG/ML
SOLUTION TOPICAL ONCE
OUTPATIENT
Start: 2024-10-28 | End: 2024-10-28

## 2024-10-28 RX ORDER — MUPIROCIN 20 MG/G
OINTMENT TOPICAL ONCE
OUTPATIENT
Start: 2024-10-28 | End: 2024-10-28

## 2024-10-28 RX ORDER — NEOMYCIN/BACITRACIN/POLYMYXINB 3.5-400-5K
OINTMENT (GRAM) TOPICAL ONCE
OUTPATIENT
Start: 2024-10-28 | End: 2024-10-28

## 2024-10-28 RX ORDER — LIDOCAINE HYDROCHLORIDE 20 MG/ML
JELLY TOPICAL ONCE
OUTPATIENT
Start: 2024-10-28 | End: 2024-10-28

## 2024-10-28 RX ORDER — BACITRACIN ZINC AND POLYMYXIN B SULFATE 500; 1000 [USP'U]/G; [USP'U]/G
OINTMENT TOPICAL ONCE
OUTPATIENT
Start: 2024-10-28 | End: 2024-10-28

## 2024-10-28 RX ORDER — BETAMETHASONE DIPROPIONATE 0.5 MG/G
CREAM TOPICAL ONCE
OUTPATIENT
Start: 2024-10-28 | End: 2024-10-28

## 2024-10-28 RX ORDER — LIDOCAINE 50 MG/G
OINTMENT TOPICAL ONCE
OUTPATIENT
Start: 2024-10-28 | End: 2024-10-28

## 2024-10-28 RX ORDER — GENTAMICIN SULFATE 1 MG/G
OINTMENT TOPICAL ONCE
OUTPATIENT
Start: 2024-10-28 | End: 2024-10-28

## 2024-10-28 RX ORDER — SILVER SULFADIAZINE 10 MG/G
CREAM TOPICAL ONCE
OUTPATIENT
Start: 2024-10-28 | End: 2024-10-28

## 2024-10-28 RX ORDER — LIDOCAINE 40 MG/G
CREAM TOPICAL ONCE
OUTPATIENT
Start: 2024-10-28 | End: 2024-10-28

## 2024-10-28 RX ORDER — TRIAMCINOLONE ACETONIDE 1 MG/G
OINTMENT TOPICAL ONCE
OUTPATIENT
Start: 2024-10-28 | End: 2024-10-28

## 2024-10-28 RX ORDER — CLOBETASOL PROPIONATE 0.5 MG/G
OINTMENT TOPICAL ONCE
OUTPATIENT
Start: 2024-10-28 | End: 2024-10-28

## 2024-10-28 RX ORDER — LIDOCAINE HYDROCHLORIDE 40 MG/ML
SOLUTION TOPICAL ONCE
Status: COMPLETED | OUTPATIENT
Start: 2024-10-28 | End: 2024-10-28

## 2024-10-28 RX ORDER — SODIUM CHLOR/HYPOCHLOROUS ACID 0.033 %
SOLUTION, IRRIGATION IRRIGATION ONCE
OUTPATIENT
Start: 2024-10-28 | End: 2024-10-28

## 2024-10-28 RX ADMIN — LIDOCAINE HYDROCHLORIDE 10 ML: 40 SOLUTION TOPICAL at 08:41

## 2024-10-28 ASSESSMENT — PAIN DESCRIPTION - LOCATION: LOCATION: HIP

## 2024-10-28 ASSESSMENT — PAIN DESCRIPTION - ORIENTATION: ORIENTATION: RIGHT

## 2024-10-28 ASSESSMENT — PAIN SCALES - GENERAL: PAINLEVEL_OUTOF10: 5

## 2024-10-28 ASSESSMENT — PAIN DESCRIPTION - DESCRIPTORS: DESCRIPTORS: DISCOMFORT

## 2024-10-28 NOTE — PROGRESS NOTES
Wound Healing Center Followup Visit Note    Referring Physician : Sarkis Kessler MD  Karen Shafer  MEDICAL RECORD NUMBER:  [id number]  AGE: 64 y.o.   GENDER: female  : 1960  EPISODE DATE:  10/28/2024    Subjective:     Chief Complaint   Patient presents with    Wound Check     Right hip       HISTORY of PRESENT ILLNESS HPI   Karen Shafer is a 64 y.o. female who presents today in regards to follow up evaluation and treatment of wound/ulcer.  That patient's past medical, family and social hx were reviewed and changes were made if present.    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  This has been treated by her pain specialist in the emergency room and family doctor recently.  On their initial visit to the wound healing center, 24, the patient has noted that the wound has been improving.  The patient has not had similar previous wounds in the past.       Pt is currently on abx.  Levaquin and doxycycline     Patient denies any history of fever or chills     Patient quit smoking few years ago       2024  Wound, clean, granulating, undermining of the pocket stable  2024  Right buttock wound clean, granulating  2024  Right buttock wound overall clean and granulating, discussed with the patient, continue offloading, importance of nutrition, vitamin and protein supplement were discussed  2024  Patient complains of burning around the wound, concerned about infection, wanted the wound to be cultured  On examination, irritation dermatitis of the skin noted around the wound, probably contact dermatitis, recommended topical steroid cream twice a day for couple of weeks, wound itself is

## 2024-11-06 NOTE — DISCHARGE INSTRUCTIONS
Visit Discharge/Physician Orders     Discharge condition: Stable  Assessment of pain at discharge: yes  Anesthetic used: lidocaine 4%   Discharge to: Home  Left via:Private automobile  Accompanied by: accompanied by self  ECF/HHA:[facility]/ [employer]      Dressing Orders: Cleanse wound to Right Hip with normal saline, apply SERGIO to wound bed and cover with silicone border due to skin irritation , change daily. -may apply Aquaphor to dry skin      Treatment Orders: 9/16 Steroid cream sent to pharmacy- use around the wound  9/16 Culture taken - take antibiotic as prescribed   EAT DIET WITH PROTEINS AND VITAMIN C- Protein shakes 1-2 times a day   TAKE A MULTIVITAMIN DAILY IF NOT CONTRAINDICATED       [facility] followup visit ________2 weeks _____________________  (Please note your next appointment above and if you are unable to keep, kindly give a 24 hour notice. Thank you.)        Physician signature:__________________________        If you experience any of the following, please call the Wound Care Center during business hours:     * Increase in Pain  * Temperature over 101  * Increase in drainage from your wound  * Drainage with a foul odor  * Bleeding  * Increase in swelling  * Need for compression bandage changes due to slippage, breakthrough drainage.     If you need medical attention outside of the business hours of the Wound Care Centers please contact your PCP or go to the nearest emergency room.

## 2024-11-11 ENCOUNTER — HOSPITAL ENCOUNTER (OUTPATIENT)
Dept: WOUND CARE | Age: 64
Discharge: HOME | End: 2024-11-11
Attending: SURGERY
Payer: COMMERCIAL

## 2024-11-11 VITALS
BODY MASS INDEX: 25.76 KG/M2 | WEIGHT: 140 LBS | TEMPERATURE: 96.6 F | HEIGHT: 62 IN | DIASTOLIC BLOOD PRESSURE: 47 MMHG | RESPIRATION RATE: 18 BRPM | SYSTOLIC BLOOD PRESSURE: 104 MMHG | HEART RATE: 75 BPM

## 2024-11-11 DIAGNOSIS — S31.819A WOUND OF RIGHT BUTTOCK, INITIAL ENCOUNTER: ICD-10-CM

## 2024-11-11 DIAGNOSIS — T81.31XA POSTOPERATIVE WOUND BREAKDOWN, INITIAL ENCOUNTER: ICD-10-CM

## 2024-11-11 DIAGNOSIS — S31.819D WOUND OF RIGHT BUTTOCK, SUBSEQUENT ENCOUNTER: Primary | ICD-10-CM

## 2024-11-11 PROCEDURE — 11042 DBRDMT SUBQ TIS 1ST 20SQCM/<: CPT

## 2024-11-11 PROCEDURE — 11042 DBRDMT SUBQ TIS 1ST 20SQCM/<: CPT | Performed by: SURGERY

## 2024-11-11 RX ORDER — LIDOCAINE HYDROCHLORIDE 20 MG/ML
JELLY TOPICAL ONCE
OUTPATIENT
Start: 2024-11-11 | End: 2024-11-11

## 2024-11-11 RX ORDER — LIDOCAINE HYDROCHLORIDE 40 MG/ML
SOLUTION TOPICAL ONCE
OUTPATIENT
Start: 2024-11-11 | End: 2024-11-11

## 2024-11-11 RX ORDER — LIDOCAINE 50 MG/G
OINTMENT TOPICAL ONCE
OUTPATIENT
Start: 2024-11-11 | End: 2024-11-11

## 2024-11-11 RX ORDER — BACITRACIN ZINC 500 [USP'U]/G
OINTMENT TOPICAL ONCE
OUTPATIENT
Start: 2024-11-11 | End: 2024-11-11

## 2024-11-11 RX ORDER — SODIUM CHLOR/HYPOCHLOROUS ACID 0.033 %
SOLUTION, IRRIGATION IRRIGATION ONCE
OUTPATIENT
Start: 2024-11-11 | End: 2024-11-11

## 2024-11-11 RX ORDER — SILVER SULFADIAZINE 10 MG/G
CREAM TOPICAL ONCE
OUTPATIENT
Start: 2024-11-11 | End: 2024-11-11

## 2024-11-11 RX ORDER — BACITRACIN ZINC AND POLYMYXIN B SULFATE 500; 1000 [USP'U]/G; [USP'U]/G
OINTMENT TOPICAL ONCE
OUTPATIENT
Start: 2024-11-11 | End: 2024-11-11

## 2024-11-11 RX ORDER — LIDOCAINE 40 MG/G
CREAM TOPICAL ONCE
OUTPATIENT
Start: 2024-11-11 | End: 2024-11-11

## 2024-11-11 RX ORDER — BETAMETHASONE DIPROPIONATE 0.5 MG/G
CREAM TOPICAL ONCE
OUTPATIENT
Start: 2024-11-11 | End: 2024-11-11

## 2024-11-11 RX ORDER — CLOBETASOL PROPIONATE 0.5 MG/G
OINTMENT TOPICAL ONCE
OUTPATIENT
Start: 2024-11-11 | End: 2024-11-11

## 2024-11-11 RX ORDER — LIDOCAINE HYDROCHLORIDE 40 MG/ML
SOLUTION TOPICAL ONCE
Status: COMPLETED | OUTPATIENT
Start: 2024-11-11 | End: 2024-11-11

## 2024-11-11 RX ORDER — GENTAMICIN SULFATE 1 MG/G
OINTMENT TOPICAL ONCE
OUTPATIENT
Start: 2024-11-11 | End: 2024-11-11

## 2024-11-11 RX ORDER — TRIAMCINOLONE ACETONIDE 1 MG/G
OINTMENT TOPICAL ONCE
OUTPATIENT
Start: 2024-11-11 | End: 2024-11-11

## 2024-11-11 RX ORDER — NEOMYCIN/BACITRACIN/POLYMYXINB 3.5-400-5K
OINTMENT (GRAM) TOPICAL ONCE
OUTPATIENT
Start: 2024-11-11 | End: 2024-11-11

## 2024-11-11 RX ORDER — MUPIROCIN 20 MG/G
OINTMENT TOPICAL ONCE
OUTPATIENT
Start: 2024-11-11 | End: 2024-11-11

## 2024-11-11 RX ADMIN — LIDOCAINE HYDROCHLORIDE 5 ML: 40 SOLUTION TOPICAL at 08:37

## 2024-11-11 ASSESSMENT — PAIN DESCRIPTION - DESCRIPTORS: DESCRIPTORS: STABBING;BURNING

## 2024-11-11 ASSESSMENT — PAIN DESCRIPTION - LOCATION: LOCATION: HIP

## 2024-11-11 ASSESSMENT — PAIN DESCRIPTION - PAIN TYPE: TYPE: CHRONIC PAIN

## 2024-11-11 ASSESSMENT — PAIN DESCRIPTION - ORIENTATION: ORIENTATION: RIGHT

## 2024-11-11 ASSESSMENT — PAIN - FUNCTIONAL ASSESSMENT: PAIN_FUNCTIONAL_ASSESSMENT: PREVENTS OR INTERFERES SOME ACTIVE ACTIVITIES AND ADLS

## 2024-11-11 ASSESSMENT — PAIN SCALES - GENERAL: PAINLEVEL_OUTOF10: 5

## 2024-11-11 NOTE — PLAN OF CARE
Problem: Pain  Goal: Verbalizes/displays adequate comfort level or baseline comfort level  Outcome: Progressing     Problem: Pain  Goal: Verbalizes/displays adequate comfort level or baseline comfort level  Outcome: Progressing     Problem: Wound:  Goal: Will show signs of wound healing; wound closure and no evidence of infection  Description: Will show signs of wound healing; wound closure and no evidence of infection  Outcome: Progressing     Problem: Pain  Goal: Verbalizes/displays adequate comfort level or baseline comfort level  Outcome: Progressing

## 2024-11-11 NOTE — PROGRESS NOTES
Wound Healing Center Followup Visit Note    Referring Physician : Sarkis Kessler MD  Karen Shafer  MEDICAL RECORD NUMBER:  [id number]  AGE: 64 y.o.   GENDER: female  : 1960  EPISODE DATE:  2024    Subjective:     Chief Complaint   Patient presents with    Wound Check     R hip      HISTORY of PRESENT ILLNESS HPI   Karen Shafer is a 64 y.o. female who presents today in regards to follow up evaluation and treatment of wound/ulcer.  That patient's past medical, family and social hx were reviewed and changes were made if present.    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  This has been treated by her pain specialist in the emergency room and family doctor recently.  On their initial visit to the wound healing center, 24, the patient has noted that the wound has been improving.  The patient has not had similar previous wounds in the past.       Pt is currently on abx.  Levaquin and doxycycline     Patient denies any history of fever or chills     Patient quit smoking few years ago       2024  Wound, clean, granulating, undermining of the pocket stable  2024  Right buttock wound clean, granulating  2024  Right buttock wound overall clean and granulating, discussed with the patient, continue offloading, importance of nutrition, vitamin and protein supplement were discussed  2024  Patient complains of burning around the wound, concerned about infection, wanted the wound to be cultured  On examination, irritation dermatitis of the skin noted around the wound, probably contact dermatitis, recommended topical steroid cream twice a day for couple of weeks, wound itself is improved  2024  Wound

## 2024-11-25 ENCOUNTER — HOSPITAL ENCOUNTER (OUTPATIENT)
Dept: WOUND CARE | Age: 64
Discharge: HOME | End: 2024-11-25
Attending: SURGERY
Payer: COMMERCIAL

## 2024-11-25 VITALS
RESPIRATION RATE: 18 BRPM | HEART RATE: 80 BPM | BODY MASS INDEX: 25.76 KG/M2 | SYSTOLIC BLOOD PRESSURE: 144 MMHG | HEIGHT: 62 IN | DIASTOLIC BLOOD PRESSURE: 76 MMHG | WEIGHT: 140 LBS | TEMPERATURE: 96.9 F

## 2024-11-25 DIAGNOSIS — T81.31XA POSTOPERATIVE WOUND BREAKDOWN, INITIAL ENCOUNTER: ICD-10-CM

## 2024-11-25 DIAGNOSIS — S31.819D WOUND OF RIGHT BUTTOCK, SUBSEQUENT ENCOUNTER: Primary | ICD-10-CM

## 2024-11-25 DIAGNOSIS — S31.819A WOUND OF RIGHT BUTTOCK, INITIAL ENCOUNTER: ICD-10-CM

## 2024-11-25 PROCEDURE — 99212 OFFICE O/P EST SF 10 MIN: CPT

## 2024-11-25 RX ORDER — TRIAMCINOLONE ACETONIDE 1 MG/G
OINTMENT TOPICAL ONCE
OUTPATIENT
Start: 2024-11-25 | End: 2024-11-25

## 2024-11-25 RX ORDER — LIDOCAINE HYDROCHLORIDE 20 MG/ML
JELLY TOPICAL ONCE
OUTPATIENT
Start: 2024-11-25 | End: 2024-11-25

## 2024-11-25 RX ORDER — CLOBETASOL PROPIONATE 0.5 MG/G
OINTMENT TOPICAL ONCE
OUTPATIENT
Start: 2024-11-25 | End: 2024-11-25

## 2024-11-25 RX ORDER — LIDOCAINE HYDROCHLORIDE 40 MG/ML
SOLUTION TOPICAL ONCE
Status: COMPLETED | OUTPATIENT
Start: 2024-11-25 | End: 2024-11-25

## 2024-11-25 RX ORDER — LIDOCAINE 40 MG/G
CREAM TOPICAL ONCE
OUTPATIENT
Start: 2024-11-25 | End: 2024-11-25

## 2024-11-25 RX ORDER — NEOMYCIN/BACITRACIN/POLYMYXINB 3.5-400-5K
OINTMENT (GRAM) TOPICAL ONCE
OUTPATIENT
Start: 2024-11-25 | End: 2024-11-25

## 2024-11-25 RX ORDER — BACITRACIN ZINC AND POLYMYXIN B SULFATE 500; 1000 [USP'U]/G; [USP'U]/G
OINTMENT TOPICAL ONCE
OUTPATIENT
Start: 2024-11-25 | End: 2024-11-25

## 2024-11-25 RX ORDER — SILVER SULFADIAZINE 10 MG/G
CREAM TOPICAL ONCE
OUTPATIENT
Start: 2024-11-25 | End: 2024-11-25

## 2024-11-25 RX ORDER — MUPIROCIN 20 MG/G
OINTMENT TOPICAL ONCE
OUTPATIENT
Start: 2024-11-25 | End: 2024-11-25

## 2024-11-25 RX ORDER — LIDOCAINE HYDROCHLORIDE 40 MG/ML
SOLUTION TOPICAL ONCE
OUTPATIENT
Start: 2024-11-25 | End: 2024-11-25

## 2024-11-25 RX ORDER — LIDOCAINE 50 MG/G
OINTMENT TOPICAL ONCE
OUTPATIENT
Start: 2024-11-25 | End: 2024-11-25

## 2024-11-25 RX ORDER — BETAMETHASONE DIPROPIONATE 0.5 MG/G
CREAM TOPICAL ONCE
OUTPATIENT
Start: 2024-11-25 | End: 2024-11-25

## 2024-11-25 RX ORDER — BACITRACIN ZINC 500 [USP'U]/G
OINTMENT TOPICAL ONCE
OUTPATIENT
Start: 2024-11-25 | End: 2024-11-25

## 2024-11-25 RX ORDER — SODIUM CHLOR/HYPOCHLOROUS ACID 0.033 %
SOLUTION, IRRIGATION IRRIGATION ONCE
OUTPATIENT
Start: 2024-11-25 | End: 2024-11-25

## 2024-11-25 RX ORDER — GENTAMICIN SULFATE 1 MG/G
OINTMENT TOPICAL ONCE
OUTPATIENT
Start: 2024-11-25 | End: 2024-11-25

## 2024-11-25 RX ADMIN — LIDOCAINE HYDROCHLORIDE 5 ML: 40 SOLUTION TOPICAL at 08:43

## 2024-11-25 ASSESSMENT — PAIN - FUNCTIONAL ASSESSMENT: PAIN_FUNCTIONAL_ASSESSMENT: PREVENTS OR INTERFERES SOME ACTIVE ACTIVITIES AND ADLS

## 2024-11-25 ASSESSMENT — PAIN DESCRIPTION - ORIENTATION: ORIENTATION: RIGHT

## 2024-11-25 ASSESSMENT — PAIN DESCRIPTION - DESCRIPTORS: DESCRIPTORS: DISCOMFORT

## 2024-11-25 ASSESSMENT — PAIN SCALES - GENERAL: PAINLEVEL_OUTOF10: 4

## 2024-11-25 ASSESSMENT — PAIN DESCRIPTION - LOCATION: LOCATION: HIP

## 2024-11-25 NOTE — PROGRESS NOTES
exposure: Current    Smokeless tobacco: Never   Vaping Use    Vaping status: Never Used   Substance Use Topics    Alcohol use: No     Alcohol/week: 0.0 standard drinks of alcohol    Drug use: No     Allergies   Allergen Reactions    Sulfamethazine Anaphylaxis    Ancef [Cefazolin Sodium] Other (See Comments)     convulsions     Current Outpatient Medications on File Prior to Encounter   Medication Sig Dispense Refill    triamcinolone (KENALOG) 0.025 % cream Apply to the area of rash, right hip area topically 2 times daily x 2 weeks. 1 each 5    ARIPiprazole (ABILIFY) 15 MG tablet Take 1 tablet by mouth daily      diclofenac (VOLTAREN) 75 MG EC tablet Take 1 tablet by mouth 2 times daily as needed for Pain      sertraline (ZOLOFT) 50 MG tablet Take 1 tablet by mouth daily      tiZANidine (ZANAFLEX) 2 MG tablet Take 1 tablet by mouth every 6 hours as needed      zolpidem (AMBIEN) 10 MG tablet Take 1 tablet by mouth. at bedtime      HYDROmorphone (DILAUDID) 4 MG tablet Take 1 tablet by mouth every 6 hours as needed. Uses for RSD      pregabalin (LYRICA) 75 MG capsule Take 1 capsule by mouth 3 times daily.      [DISCONTINUED] nortriptyline (PAMELOR) 10 MG capsule TAKE 1 CAPSULE BY MOUTH EVERY NIGHT 30 capsule 1     No current facility-administered medications on file prior to encounter.       REVIEW OF SYSTEMS See HPI    Objective:    BP (!) 144/76   Pulse 80   Temp 96.9 °F (36.1 °C) (Temporal)   Resp 18   Ht 1.575 m (5' 2\")   Wt 63.5 kg (140 lb)   LMP  (LMP Unknown)   BMI 25.61 kg/m²   Wt Readings from Last 3 Encounters:   11/25/24 63.5 kg (140 lb)   11/11/24 63.5 kg (140 lb)   09/09/24 63.5 kg (140 lb)     PHYSICAL EXAM  CONSTITUTIONAL:   Awake, alert, cooperative   EYES:  lids and lashes normal   ENT: external ears and nose without lesions   NECK:  supple, symmetrical, trachea midline   SKIN:  Open wound Present    Assessment:     Problem List Items Addressed This Visit       Wound of right buttock - Primary

## 2024-11-25 NOTE — DISCHARGE INSTRUCTIONS
Visit Discharge/Physician Orders     Discharge condition: Stable  Assessment of pain at discharge: yes  Anesthetic used: lidocaine 4%   Discharge to: Home  Left via:Private automobile  Accompanied by: accompanied by self  ECF/HHA:[facility]/ [facility]      Dressing Orders: Cleanse wound to Right Hip with normal saline, apply SERGIO to wound bed and cover with silicone border due to skin irritation , change daily. -may apply Aquaphor to dry skin      Treatment Orders: 9/16 Steroid cream sent to pharmacy- use around the wound  9/16 Culture taken - take antibiotic as prescribed   EAT DIET WITH PROTEINS AND VITAMIN C- Protein shakes 1-2 times a day   TAKE A MULTIVITAMIN DAILY IF NOT CONTRAINDICATED       [facility] followup visit ________3 weeks _____________________  (Please note your next appointment above and if you are unable to keep, kindly give a 24 hour notice. Thank you.)        Physician signature:__________________________        If you experience any of the following, please call the Wound Care Center during business hours:     * Increase in Pain  * Temperature over 101  * Increase in drainage from your wound  * Drainage with a foul odor  * Bleeding  * Increase in swelling  * Need for compression bandage changes due to slippage, breakthrough drainage.     If you need medical attention outside of the business hours of the Wound Care Centers please contact your PCP or go to the nearest emergency room.

## 2024-12-12 NOTE — DISCHARGE INSTRUCTIONS
Visit Discharge/Physician Orders     Discharge condition: Stable  Assessment of pain at discharge: yes  Anesthetic used: lidocaine 4%   Discharge to: Home  Left via:Private automobile  Accompanied by: accompanied by self  ECF/HHA:[employer] [facility] - p:[phone]      Dressing Orders: Cleanse wound to Right Hip with normal saline, apply alginate ag stitched  to wound bed and cover with silicone border due to skin irritation , change daily. -may apply Aquaphor to dry skin      Treatment Orders: 12/16 Culture taken   9/16 Steroid cream sent to pharmacy- use around the wound  EAT DIET WITH PROTEINS AND VITAMIN C- Protein shakes 1-2 times a day   TAKE A MULTIVITAMIN DAILY IF NOT CONTRAINDICATED       [facility] followup visit ________1 week _____________________  (Please note your next appointment above and if you are unable to keep, kindly give a 24 hour notice. Thank you.)        Physician signature:__________________________        If you experience any of the following, please call the Wound Care Center during business hours:     * Increase in Pain  * Temperature over 101  * Increase in drainage from your wound  * Drainage with a foul odor  * Bleeding  * Increase in swelling  * Need for compression bandage changes due to slippage, breakthrough drainage.     If you need medical attention outside of the business hours of the Wound Care Centers please contact your PCP or go to the nearest emergency room.

## 2024-12-16 ENCOUNTER — HOSPITAL ENCOUNTER (OUTPATIENT)
Dept: WOUND CARE | Age: 64
Discharge: HOME | End: 2024-12-16
Attending: SURGERY
Payer: COMMERCIAL

## 2024-12-16 VITALS
WEIGHT: 140 LBS | DIASTOLIC BLOOD PRESSURE: 62 MMHG | TEMPERATURE: 97.1 F | HEART RATE: 80 BPM | RESPIRATION RATE: 18 BRPM | HEIGHT: 62 IN | BODY MASS INDEX: 25.76 KG/M2 | SYSTOLIC BLOOD PRESSURE: 115 MMHG

## 2024-12-16 DIAGNOSIS — T81.31XA POSTOPERATIVE WOUND BREAKDOWN, INITIAL ENCOUNTER: Primary | ICD-10-CM

## 2024-12-16 DIAGNOSIS — S31.819A WOUND OF RIGHT BUTTOCK, INITIAL ENCOUNTER: ICD-10-CM

## 2024-12-16 PROCEDURE — 87070 CULTURE OTHR SPECIMN AEROBIC: CPT

## 2024-12-16 PROCEDURE — 11042 DBRDMT SUBQ TIS 1ST 20SQCM/<: CPT

## 2024-12-16 PROCEDURE — 87205 SMEAR GRAM STAIN: CPT

## 2024-12-16 PROCEDURE — 87077 CULTURE AEROBIC IDENTIFY: CPT

## 2024-12-16 RX ORDER — LIDOCAINE 50 MG/G
OINTMENT TOPICAL ONCE
OUTPATIENT
Start: 2024-12-16 | End: 2024-12-16

## 2024-12-16 RX ORDER — GENTAMICIN SULFATE 1 MG/G
OINTMENT TOPICAL ONCE
OUTPATIENT
Start: 2024-12-16 | End: 2024-12-16

## 2024-12-16 RX ORDER — TRIAMCINOLONE ACETONIDE 1 MG/G
OINTMENT TOPICAL ONCE
OUTPATIENT
Start: 2024-12-16 | End: 2024-12-16

## 2024-12-16 RX ORDER — LIDOCAINE HYDROCHLORIDE 40 MG/ML
SOLUTION TOPICAL ONCE
OUTPATIENT
Start: 2024-12-16 | End: 2024-12-16

## 2024-12-16 RX ORDER — CLOBETASOL PROPIONATE 0.5 MG/G
OINTMENT TOPICAL ONCE
OUTPATIENT
Start: 2024-12-16 | End: 2024-12-16

## 2024-12-16 RX ORDER — BACITRACIN ZINC 500 [USP'U]/G
OINTMENT TOPICAL ONCE
OUTPATIENT
Start: 2024-12-16 | End: 2024-12-16

## 2024-12-16 RX ORDER — SODIUM CHLOR/HYPOCHLOROUS ACID 0.033 %
SOLUTION, IRRIGATION IRRIGATION ONCE
OUTPATIENT
Start: 2024-12-16 | End: 2024-12-16

## 2024-12-16 RX ORDER — LIDOCAINE 40 MG/G
CREAM TOPICAL ONCE
OUTPATIENT
Start: 2024-12-16 | End: 2024-12-16

## 2024-12-16 RX ORDER — LIDOCAINE HYDROCHLORIDE 20 MG/ML
JELLY TOPICAL ONCE
OUTPATIENT
Start: 2024-12-16 | End: 2024-12-16

## 2024-12-16 RX ORDER — BACITRACIN ZINC AND POLYMYXIN B SULFATE 500; 1000 [USP'U]/G; [USP'U]/G
OINTMENT TOPICAL ONCE
OUTPATIENT
Start: 2024-12-16 | End: 2024-12-16

## 2024-12-16 RX ORDER — MUPIROCIN 20 MG/G
OINTMENT TOPICAL ONCE
OUTPATIENT
Start: 2024-12-16 | End: 2024-12-16

## 2024-12-16 RX ORDER — SILVER SULFADIAZINE 10 MG/G
CREAM TOPICAL ONCE
OUTPATIENT
Start: 2024-12-16 | End: 2024-12-16

## 2024-12-16 RX ORDER — BETAMETHASONE DIPROPIONATE 0.5 MG/G
CREAM TOPICAL ONCE
OUTPATIENT
Start: 2024-12-16 | End: 2024-12-16

## 2024-12-16 RX ORDER — LIDOCAINE HYDROCHLORIDE 40 MG/ML
SOLUTION TOPICAL ONCE
Status: COMPLETED | OUTPATIENT
Start: 2024-12-16 | End: 2024-12-16

## 2024-12-16 RX ORDER — NEOMYCIN/BACITRACIN/POLYMYXINB 3.5-400-5K
OINTMENT (GRAM) TOPICAL ONCE
OUTPATIENT
Start: 2024-12-16 | End: 2024-12-16

## 2024-12-16 RX ADMIN — LIDOCAINE HYDROCHLORIDE 10 ML: 40 SOLUTION TOPICAL at 08:28

## 2024-12-16 ASSESSMENT — PAIN DESCRIPTION - DESCRIPTORS: DESCRIPTORS: DISCOMFORT

## 2024-12-16 ASSESSMENT — PAIN DESCRIPTION - LOCATION: LOCATION: HIP

## 2024-12-16 ASSESSMENT — PAIN SCALES - GENERAL: PAINLEVEL_OUTOF10: 7

## 2024-12-16 ASSESSMENT — PAIN DESCRIPTION - ORIENTATION: ORIENTATION: RIGHT

## 2024-12-16 ASSESSMENT — PAIN DESCRIPTION - PAIN TYPE: TYPE: CHRONIC PAIN

## 2024-12-16 ASSESSMENT — PAIN - FUNCTIONAL ASSESSMENT: PAIN_FUNCTIONAL_ASSESSMENT: PREVENTS OR INTERFERES SOME ACTIVE ACTIVITIES AND ADLS

## 2024-12-16 ASSESSMENT — PAIN DESCRIPTION - FREQUENCY: FREQUENCY: CONTINUOUS

## 2024-12-16 ASSESSMENT — PAIN DESCRIPTION - ONSET: ONSET: ON-GOING

## 2024-12-16 NOTE — PROGRESS NOTES
Wound Care Supplies      Supply Company:     [employer] [facility] [employer] [place], [place]  p:[phone] f: [phone]     Ordering Center:     [facility]  [address] [facility] [address]  [phone]  WOUND CARE Dept: [phone]   FAX NUMBER [phone]    Patient Information:      Karen Shafer  [address]  [facility] [address]   [phone]   : 1960  AGE: 64 y.o.     GENDER: female   EPISODE DATE: 2024    Insurance:      PRIMARY INSURANCE: WORKERS COMP   Plan: [id number]  Coverage: SHEAKLEY Dr[address] TATTOFFOMP  Effective Date: 1995  Group Number: [unfilled]  Subscriber Number: [id number] - (Worker's Comp)    Payer/Plan Subscr  Sex Relation Sub. Ins. ID Effective Group Num   1. [employer]* KAREN SHAFER* 1960 Female Employee [id number] 1995                                    [address]   2. [employer]* KAREN SHAFER* 1960 Female Self [id number]                                   [address] [address]       Patient Wound Information:      Problem List Items Addressed This Visit          Other    Wound of right buttock    Relevant Orders    Initiate Outpatient Wound Care Protocol    Postoperative wound breakdown, initial encounter - Primary    Relevant Orders    Initiate Outpatient Wound Care Protocol       WOUNDS REQUIRING DRESSING SUPPLIES:     Wound 24 Hip Right #1 (Active)   Wound Image   24 0823   Wound Etiology Non-Healing Surgical 24 0823   Dressing Status New dressing applied 24 0910   Wound Cleansed Cleansed with saline 24 0910   Dressing/Treatment Collagen with Ag;Silicone border 24 0910   Offloading for Diabetic Foot Ulcers Offloading ordered 10/28/24 0902   Wound Length (cm) 0.7 cm 24 0823   Wound Width (cm) 2 cm 24 0823   Wound Depth (cm) 1.2 cm 24 0823   Wound Surface Area (cm^2) 1.4 cm^2 24 0823   Change in Wound Size % (l*w) -50.54 24 
Wound Care Center during business hours:     * Increase in Pain  * Temperature over 101  * Increase in drainage from your wound  * Drainage with a foul odor  * Bleeding  * Increase in swelling  * Need for compression bandage changes due to slippage, breakthrough drainage.     If you need medical attention outside of the business hours of the Wound Care Centers please contact your PCP or go to the nearest emergency room.        Electronically signed by David Luke MD on 12/16/2024 at 8:36 AM

## 2024-12-16 NOTE — PLAN OF CARE
Problem: Wound:  Goal: Will show signs of wound healing; wound closure and no evidence of infection  Description: Will show signs of wound healing; wound closure and no evidence of infection  Outcome: Not Progressing     Problem: Pain  Goal: Verbalizes/displays adequate comfort level or baseline comfort level  Outcome: Progressing     Problem: Wound:  Goal: Will show signs of wound healing; wound closure and no evidence of infection  Description: Will show signs of wound healing; wound closure and no evidence of infection  Outcome: Not Progressing

## 2024-12-17 NOTE — DISCHARGE INSTRUCTIONS
Visit Discharge/Physician Orders     Discharge condition: Stable  Assessment of pain at discharge: yes  Anesthetic used: lidocaine 4%   Discharge to: Home  Left via:Private automobile  Accompanied by: accompanied by self  ECF/HHA:[facility] - p:[phone]      Dressing Orders: Cleanse wound to Right Hip with normal saline, apply alginate ag stitched  to wound bed and cover with silicone border due to skin irritation , change daily. -may apply Aquaphor to dry skin      Treatment Orders: 12/16 Culture taken - 12/19 Levaquin sent to pharmacy take as prescribed   9/16 Steroid cream sent to pharmacy- use around the wound  EAT DIET WITH PROTEINS AND VITAMIN C- Protein shakes 1-2 times a day   TAKE A MULTIVITAMIN DAILY IF NOT CONTRAINDICATED       [facility] followup visit ________2 weeks _____________________  (Please note your next appointment above and if you are unable to keep, kindly give a 24 hour notice. Thank you.)        Physician signature:__________________________        If you experience any of the following, please call the Wound Care Center during business hours:     * Increase in Pain  * Temperature over 101  * Increase in drainage from your wound  * Drainage with a foul odor  * Bleeding  * Increase in swelling  * Need for compression bandage changes due to slippage, breakthrough drainage.     If you need medical attention outside of the business hours of the Wound Care Centers please contact your PCP or go to the nearest emergency room.

## 2024-12-19 LAB
MICROORGANISM SPEC CULT: ABNORMAL
MICROORGANISM SPEC CULT: ABNORMAL
MICROORGANISM/AGENT SPEC: ABNORMAL
SERVICE CMNT-IMP: ABNORMAL
SPECIMEN DESCRIPTION: ABNORMAL

## 2024-12-19 RX ORDER — LEVOFLOXACIN 500 MG/1
500 TABLET, FILM COATED ORAL DAILY
Qty: 10 TABLET | Refills: 0 | Status: SHIPPED | OUTPATIENT
Start: 2024-12-19 | End: 2024-12-29

## 2024-12-19 NOTE — PROGRESS NOTES
Discussed with the patient's , Antonio, prescription sent for Levaquin 5 mg p.o. daily for 10 days, as patient has allergy to cephalosporin and sulfa

## 2024-12-23 ENCOUNTER — HOSPITAL ENCOUNTER (OUTPATIENT)
Dept: WOUND CARE | Age: 64
Discharge: HOME | End: 2024-12-23
Attending: SURGERY
Payer: COMMERCIAL

## 2024-12-23 VITALS
RESPIRATION RATE: 18 BRPM | BODY MASS INDEX: 25.76 KG/M2 | DIASTOLIC BLOOD PRESSURE: 68 MMHG | HEIGHT: 62 IN | HEART RATE: 76 BPM | SYSTOLIC BLOOD PRESSURE: 120 MMHG | TEMPERATURE: 97.8 F | WEIGHT: 140 LBS

## 2024-12-23 DIAGNOSIS — S31.819D WOUND OF RIGHT BUTTOCK, SUBSEQUENT ENCOUNTER: Primary | ICD-10-CM

## 2024-12-23 DIAGNOSIS — T81.31XA POSTOPERATIVE WOUND BREAKDOWN, INITIAL ENCOUNTER: ICD-10-CM

## 2024-12-23 DIAGNOSIS — S31.819A WOUND OF RIGHT BUTTOCK, INITIAL ENCOUNTER: ICD-10-CM

## 2024-12-23 PROCEDURE — 11042 DBRDMT SUBQ TIS 1ST 20SQCM/<: CPT

## 2024-12-23 PROCEDURE — 11042 DBRDMT SUBQ TIS 1ST 20SQCM/<: CPT | Performed by: SURGERY

## 2024-12-23 RX ORDER — SILVER SULFADIAZINE 10 MG/G
CREAM TOPICAL ONCE
OUTPATIENT
Start: 2024-12-23 | End: 2024-12-23

## 2024-12-23 RX ORDER — LIDOCAINE 50 MG/G
OINTMENT TOPICAL ONCE
OUTPATIENT
Start: 2024-12-23 | End: 2024-12-23

## 2024-12-23 RX ORDER — LIDOCAINE HYDROCHLORIDE 40 MG/ML
SOLUTION TOPICAL ONCE
OUTPATIENT
Start: 2024-12-23 | End: 2024-12-23

## 2024-12-23 RX ORDER — LIDOCAINE HYDROCHLORIDE 20 MG/ML
JELLY TOPICAL ONCE
OUTPATIENT
Start: 2024-12-23 | End: 2024-12-23

## 2024-12-23 RX ORDER — CLOBETASOL PROPIONATE 0.5 MG/G
OINTMENT TOPICAL ONCE
OUTPATIENT
Start: 2024-12-23 | End: 2024-12-23

## 2024-12-23 RX ORDER — SODIUM CHLOR/HYPOCHLOROUS ACID 0.033 %
SOLUTION, IRRIGATION IRRIGATION ONCE
OUTPATIENT
Start: 2024-12-23 | End: 2024-12-23

## 2024-12-23 RX ORDER — MUPIROCIN 20 MG/G
OINTMENT TOPICAL ONCE
OUTPATIENT
Start: 2024-12-23 | End: 2024-12-23

## 2024-12-23 RX ORDER — GENTAMICIN SULFATE 1 MG/G
OINTMENT TOPICAL ONCE
OUTPATIENT
Start: 2024-12-23 | End: 2024-12-23

## 2024-12-23 RX ORDER — TRIAMCINOLONE ACETONIDE 1 MG/G
OINTMENT TOPICAL ONCE
OUTPATIENT
Start: 2024-12-23 | End: 2024-12-23

## 2024-12-23 RX ORDER — BACITRACIN ZINC 500 [USP'U]/G
OINTMENT TOPICAL ONCE
OUTPATIENT
Start: 2024-12-23 | End: 2024-12-23

## 2024-12-23 RX ORDER — LIDOCAINE HYDROCHLORIDE 40 MG/ML
SOLUTION TOPICAL ONCE
Status: DISCONTINUED | OUTPATIENT
Start: 2024-12-23 | End: 2024-12-24 | Stop reason: HOSPADM

## 2024-12-23 RX ORDER — BACITRACIN ZINC AND POLYMYXIN B SULFATE 500; 1000 [USP'U]/G; [USP'U]/G
OINTMENT TOPICAL ONCE
OUTPATIENT
Start: 2024-12-23 | End: 2024-12-23

## 2024-12-23 RX ORDER — LIDOCAINE 40 MG/G
CREAM TOPICAL ONCE
OUTPATIENT
Start: 2024-12-23 | End: 2024-12-23

## 2024-12-23 RX ORDER — NEOMYCIN/BACITRACIN/POLYMYXINB 3.5-400-5K
OINTMENT (GRAM) TOPICAL ONCE
OUTPATIENT
Start: 2024-12-23 | End: 2024-12-23

## 2024-12-23 RX ORDER — BETAMETHASONE DIPROPIONATE 0.5 MG/G
CREAM TOPICAL ONCE
OUTPATIENT
Start: 2024-12-23 | End: 2024-12-23

## 2024-12-23 ASSESSMENT — PAIN DESCRIPTION - PAIN TYPE: TYPE: CHRONIC PAIN

## 2024-12-23 ASSESSMENT — PAIN DESCRIPTION - DESCRIPTORS: DESCRIPTORS: BURNING;SHARP

## 2024-12-23 ASSESSMENT — PAIN DESCRIPTION - ORIENTATION: ORIENTATION: RIGHT

## 2024-12-23 ASSESSMENT — PAIN - FUNCTIONAL ASSESSMENT: PAIN_FUNCTIONAL_ASSESSMENT: ACTIVITIES ARE NOT PREVENTED

## 2024-12-23 ASSESSMENT — PAIN SCALES - GENERAL: PAINLEVEL_OUTOF10: 5

## 2024-12-23 ASSESSMENT — PAIN DESCRIPTION - LOCATION: LOCATION: HIP

## 2024-12-23 ASSESSMENT — PAIN DESCRIPTION - ONSET: ONSET: ON-GOING

## 2024-12-23 NOTE — PROGRESS NOTES
Wound Healing Center Followup Visit Note    Referring Physician : Sarkis Kessler MD  Karen Shafer  MEDICAL RECORD NUMBER:  [id number]  AGE: 64 y.o.   GENDER: female  : 1960  EPISODE DATE:  2024    Subjective:     Chief Complaint   Patient presents with    Wound Check     Right hip/back       HISTORY of PRESENT ILLNESS HPI   Karen Shafer is a 64 y.o. female who presents today in regards to follow up evaluation and treatment of wound/ulcer.  That patient's past medical, family and social hx were reviewed and changes were made if present.    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  This has been treated by her pain specialist in the emergency room and family doctor recently.  On their initial visit to the wound healing center, 24, the patient has noted that the wound has been improving.  The patient has not had similar previous wounds in the past.       Pt is currently on abx.  Levaquin and doxycycline     Patient denies any history of fever or chills     Patient quit smoking few years ago       2024  Wound, clean, granulating, undermining of the pocket stable  2024  Right buttock wound clean, granulating  2024  Right buttock wound overall clean and granulating, discussed with the patient, continue offloading, importance of nutrition, vitamin and protein supplement were discussed  2024  Patient complains of burning around the wound, concerned about infection, wanted the wound to be cultured  On examination, irritation dermatitis of the skin noted around the wound, probably contact dermatitis, recommended topical steroid cream twice a day for couple of weeks, wound itself is

## 2025-01-06 ENCOUNTER — HOSPITAL ENCOUNTER (OUTPATIENT)
Dept: WOUND CARE | Age: 65
Discharge: HOME | End: 2025-01-06
Attending: SURGERY
Payer: MEDICARE

## 2025-01-06 ENCOUNTER — TELEPHONE (OUTPATIENT)
Dept: WOUND CARE | Age: 65
End: 2025-01-06

## 2025-01-06 ENCOUNTER — APPOINTMENT (OUTPATIENT)
Dept: CT IMAGING | Age: 65
End: 2025-01-06
Payer: COMMERCIAL

## 2025-01-06 ENCOUNTER — HOSPITAL ENCOUNTER (EMERGENCY)
Age: 65
Discharge: ANOTHER HOSPITAL | End: 2025-01-07
Attending: STUDENT IN AN ORGANIZED HEALTH CARE EDUCATION/TRAINING PROGRAM
Payer: COMMERCIAL

## 2025-01-06 VITALS
SYSTOLIC BLOOD PRESSURE: 125 MMHG | RESPIRATION RATE: 19 BRPM | TEMPERATURE: 97.4 F | HEIGHT: 62 IN | DIASTOLIC BLOOD PRESSURE: 76 MMHG | WEIGHT: 140 LBS | HEART RATE: 76 BPM | BODY MASS INDEX: 25.76 KG/M2

## 2025-01-06 DIAGNOSIS — S31.819D WOUND OF RIGHT BUTTOCK, SUBSEQUENT ENCOUNTER: Primary | ICD-10-CM

## 2025-01-06 DIAGNOSIS — T85.192A MALFUNCTION OF SPINAL CORD STIMULATOR, INITIAL ENCOUNTER (HCC): Primary | ICD-10-CM

## 2025-01-06 DIAGNOSIS — L02.91 PHLEGMON: ICD-10-CM

## 2025-01-06 DIAGNOSIS — T81.31XA POSTOPERATIVE WOUND BREAKDOWN, INITIAL ENCOUNTER: Primary | ICD-10-CM

## 2025-01-06 DIAGNOSIS — T81.31XA POSTOPERATIVE WOUND BREAKDOWN, INITIAL ENCOUNTER: ICD-10-CM

## 2025-01-06 DIAGNOSIS — S31.819A WOUND OF RIGHT BUTTOCK, INITIAL ENCOUNTER: ICD-10-CM

## 2025-01-06 LAB
ALBUMIN SERPL-MCNC: 4.1 G/DL (ref 3.5–5.2)
ALP SERPL-CCNC: 142 U/L (ref 35–104)
ALT SERPL-CCNC: 14 U/L (ref 0–32)
ANION GAP SERPL CALCULATED.3IONS-SCNC: 6 MMOL/L (ref 7–16)
AST SERPL-CCNC: 20 U/L (ref 0–31)
BASOPHILS # BLD: 0.04 K/UL (ref 0–0.2)
BASOPHILS NFR BLD: 1 % (ref 0–2)
BILIRUB SERPL-MCNC: 0.4 MG/DL (ref 0–1.2)
BUN SERPL-MCNC: 13 MG/DL (ref 6–23)
CALCIUM SERPL-MCNC: 9.5 MG/DL (ref 8.6–10.2)
CHLORIDE SERPL-SCNC: 100 MMOL/L (ref 98–107)
CO2 SERPL-SCNC: 30 MMOL/L (ref 22–29)
CREAT SERPL-MCNC: 0.6 MG/DL (ref 0.5–1)
EOSINOPHIL # BLD: 0.1 K/UL (ref 0.05–0.5)
EOSINOPHILS RELATIVE PERCENT: 2 % (ref 0–6)
ERYTHROCYTE [DISTWIDTH] IN BLOOD BY AUTOMATED COUNT: 13.7 % (ref 11.5–15)
GFR, ESTIMATED: >90 ML/MIN/1.73M2
GLUCOSE SERPL-MCNC: 103 MG/DL (ref 74–99)
HCT VFR BLD AUTO: 38.9 % (ref 34–48)
HGB BLD-MCNC: 12 G/DL (ref 11.5–15.5)
IMM GRANULOCYTES # BLD AUTO: <0.03 K/UL (ref 0–0.58)
IMM GRANULOCYTES NFR BLD: 0 % (ref 0–5)
LACTATE BLDV-SCNC: 0.8 MMOL/L (ref 0.5–1.9)
LYMPHOCYTES NFR BLD: 1.27 K/UL (ref 1.5–4)
LYMPHOCYTES RELATIVE PERCENT: 19 % (ref 20–42)
MCH RBC QN AUTO: 28.2 PG (ref 26–35)
MCHC RBC AUTO-ENTMCNC: 30.8 G/DL (ref 32–34.5)
MCV RBC AUTO: 91.5 FL (ref 80–99.9)
MONOCYTES NFR BLD: 0.33 K/UL (ref 0.1–0.95)
MONOCYTES NFR BLD: 5 % (ref 2–12)
NEUTROPHILS NFR BLD: 73 % (ref 43–80)
NEUTS SEG NFR BLD: 4.79 K/UL (ref 1.8–7.3)
PLATELET # BLD AUTO: 259 K/UL (ref 130–450)
PMV BLD AUTO: 10.9 FL (ref 7–12)
POTASSIUM SERPL-SCNC: 4.3 MMOL/L (ref 3.5–5)
PROT SERPL-MCNC: 7.9 G/DL (ref 6.4–8.3)
RBC # BLD AUTO: 4.25 M/UL (ref 3.5–5.5)
SODIUM SERPL-SCNC: 136 MMOL/L (ref 132–146)
WBC OTHER # BLD: 6.6 K/UL (ref 4.5–11.5)

## 2025-01-06 PROCEDURE — 72132 CT LUMBAR SPINE W/DYE: CPT

## 2025-01-06 PROCEDURE — 11042 DBRDMT SUBQ TIS 1ST 20SQCM/<: CPT

## 2025-01-06 PROCEDURE — 2500000003 HC RX 250 WO HCPCS: Performed by: STUDENT IN AN ORGANIZED HEALTH CARE EDUCATION/TRAINING PROGRAM

## 2025-01-06 PROCEDURE — 11042 DBRDMT SUBQ TIS 1ST 20SQCM/<: CPT | Performed by: SURGERY

## 2025-01-06 PROCEDURE — 80053 COMPREHEN METABOLIC PANEL: CPT

## 2025-01-06 PROCEDURE — 2580000003 HC RX 258: Performed by: STUDENT IN AN ORGANIZED HEALTH CARE EDUCATION/TRAINING PROGRAM

## 2025-01-06 PROCEDURE — 83605 ASSAY OF LACTIC ACID: CPT

## 2025-01-06 PROCEDURE — 6360000002 HC RX W HCPCS

## 2025-01-06 PROCEDURE — 96375 TX/PRO/DX INJ NEW DRUG ADDON: CPT

## 2025-01-06 PROCEDURE — 96366 THER/PROPH/DIAG IV INF ADDON: CPT

## 2025-01-06 PROCEDURE — 99285 EMERGENCY DEPT VISIT HI MDM: CPT

## 2025-01-06 PROCEDURE — 85025 COMPLETE CBC W/AUTO DIFF WBC: CPT

## 2025-01-06 PROCEDURE — 6360000004 HC RX CONTRAST MEDICATION: Performed by: RADIOLOGY

## 2025-01-06 PROCEDURE — 87040 BLOOD CULTURE FOR BACTERIA: CPT

## 2025-01-06 PROCEDURE — 6360000002 HC RX W HCPCS: Performed by: STUDENT IN AN ORGANIZED HEALTH CARE EDUCATION/TRAINING PROGRAM

## 2025-01-06 PROCEDURE — 74177 CT ABD & PELVIS W/CONTRAST: CPT

## 2025-01-06 PROCEDURE — 96365 THER/PROPH/DIAG IV INF INIT: CPT

## 2025-01-06 RX ORDER — LIDOCAINE HYDROCHLORIDE 40 MG/ML
SOLUTION TOPICAL ONCE
OUTPATIENT
Start: 2025-01-06 | End: 2025-01-06

## 2025-01-06 RX ORDER — LIDOCAINE HYDROCHLORIDE 20 MG/ML
JELLY TOPICAL ONCE
OUTPATIENT
Start: 2025-01-06 | End: 2025-01-06

## 2025-01-06 RX ORDER — IOPAMIDOL 755 MG/ML
75 INJECTION, SOLUTION INTRAVASCULAR
Status: COMPLETED | OUTPATIENT
Start: 2025-01-06 | End: 2025-01-06

## 2025-01-06 RX ORDER — LIDOCAINE 50 MG/G
OINTMENT TOPICAL ONCE
OUTPATIENT
Start: 2025-01-06 | End: 2025-01-06

## 2025-01-06 RX ORDER — CLOBETASOL PROPIONATE 0.5 MG/G
OINTMENT TOPICAL ONCE
OUTPATIENT
Start: 2025-01-06 | End: 2025-01-06

## 2025-01-06 RX ORDER — BETAMETHASONE DIPROPIONATE 0.5 MG/G
CREAM TOPICAL ONCE
OUTPATIENT
Start: 2025-01-06 | End: 2025-01-06

## 2025-01-06 RX ORDER — LIDOCAINE 40 MG/G
CREAM TOPICAL ONCE
OUTPATIENT
Start: 2025-01-06 | End: 2025-01-06

## 2025-01-06 RX ORDER — NEOMYCIN/BACITRACIN/POLYMYXINB 3.5-400-5K
OINTMENT (GRAM) TOPICAL ONCE
OUTPATIENT
Start: 2025-01-06 | End: 2025-01-06

## 2025-01-06 RX ORDER — BACITRACIN ZINC 500 [USP'U]/G
OINTMENT TOPICAL ONCE
OUTPATIENT
Start: 2025-01-06 | End: 2025-01-06

## 2025-01-06 RX ORDER — GENTAMICIN SULFATE 1 MG/G
OINTMENT TOPICAL ONCE
OUTPATIENT
Start: 2025-01-06 | End: 2025-01-06

## 2025-01-06 RX ORDER — BACITRACIN ZINC AND POLYMYXIN B SULFATE 500; 1000 [USP'U]/G; [USP'U]/G
OINTMENT TOPICAL ONCE
OUTPATIENT
Start: 2025-01-06 | End: 2025-01-06

## 2025-01-06 RX ORDER — DIPHENHYDRAMINE HYDROCHLORIDE 50 MG/ML
INJECTION INTRAMUSCULAR; INTRAVENOUS
Status: COMPLETED
Start: 2025-01-06 | End: 2025-01-06

## 2025-01-06 RX ORDER — MUPIROCIN 20 MG/G
OINTMENT TOPICAL ONCE
OUTPATIENT
Start: 2025-01-06 | End: 2025-01-06

## 2025-01-06 RX ORDER — TRIAMCINOLONE ACETONIDE 1 MG/G
OINTMENT TOPICAL ONCE
OUTPATIENT
Start: 2025-01-06 | End: 2025-01-06

## 2025-01-06 RX ORDER — SODIUM CHLOR/HYPOCHLOROUS ACID 0.033 %
SOLUTION, IRRIGATION IRRIGATION ONCE
OUTPATIENT
Start: 2025-01-06 | End: 2025-01-06

## 2025-01-06 RX ORDER — LIDOCAINE HYDROCHLORIDE 40 MG/ML
SOLUTION TOPICAL ONCE
Status: COMPLETED | OUTPATIENT
Start: 2025-01-06 | End: 2025-01-06

## 2025-01-06 RX ORDER — SILVER SULFADIAZINE 10 MG/G
CREAM TOPICAL ONCE
OUTPATIENT
Start: 2025-01-06 | End: 2025-01-06

## 2025-01-06 RX ADMIN — LIDOCAINE HYDROCHLORIDE 5 ML: 40 SOLUTION TOPICAL at 08:16

## 2025-01-06 RX ADMIN — WATER 2000 MG: 1 INJECTION INTRAMUSCULAR; INTRAVENOUS; SUBCUTANEOUS at 18:26

## 2025-01-06 RX ADMIN — VANCOMYCIN HYDROCHLORIDE 2000 MG: 10 INJECTION, POWDER, LYOPHILIZED, FOR SOLUTION INTRAVENOUS at 19:23

## 2025-01-06 RX ADMIN — IOPAMIDOL 75 ML: 755 INJECTION, SOLUTION INTRAVENOUS at 15:32

## 2025-01-06 RX ADMIN — DIPHENHYDRAMINE HYDROCHLORIDE 25 MG: 50 INJECTION INTRAMUSCULAR; INTRAVENOUS at 20:44

## 2025-01-06 RX ADMIN — IOPAMIDOL 75 ML: 755 INJECTION, SOLUTION INTRAVENOUS at 19:01

## 2025-01-06 ASSESSMENT — ENCOUNTER SYMPTOMS
BACK PAIN: 0
VOMITING: 0
SINUS PRESSURE: 0
EYE REDNESS: 0
COUGH: 0
SORE THROAT: 0
SHORTNESS OF BREATH: 0
NAUSEA: 0
EYE PAIN: 0
WHEEZING: 0
EYE DISCHARGE: 0
ABDOMINAL DISTENTION: 0
DIARRHEA: 0

## 2025-01-06 ASSESSMENT — PAIN DESCRIPTION - DESCRIPTORS: DESCRIPTORS: DISCOMFORT

## 2025-01-06 ASSESSMENT — PAIN SCALES - GENERAL
PAINLEVEL_OUTOF10: 8
PAINLEVEL_OUTOF10: 6

## 2025-01-06 ASSESSMENT — PAIN - FUNCTIONAL ASSESSMENT: PAIN_FUNCTIONAL_ASSESSMENT: 0-10

## 2025-01-06 ASSESSMENT — LIFESTYLE VARIABLES
HOW OFTEN DO YOU HAVE A DRINK CONTAINING ALCOHOL: NEVER
HOW MANY STANDARD DRINKS CONTAINING ALCOHOL DO YOU HAVE ON A TYPICAL DAY: PATIENT DOES NOT DRINK

## 2025-01-06 ASSESSMENT — PAIN DESCRIPTION - ONSET: ONSET: ON-GOING

## 2025-01-06 ASSESSMENT — PAIN DESCRIPTION - FREQUENCY: FREQUENCY: CONTINUOUS

## 2025-01-06 ASSESSMENT — PAIN DESCRIPTION - LOCATION: LOCATION: BACK

## 2025-01-06 ASSESSMENT — PAIN DESCRIPTION - PAIN TYPE: TYPE: ACUTE PAIN

## 2025-01-06 NOTE — ED NOTES
Department of Emergency Medicine  FIRST PROVIDER TRIAGE NOTE             Independent MLP           1/6/25  12:16 PM EST    Date of Encounter: 1/6/25   MRN: [id number]      HPI: Karen Shafer is a 64 y.o. female who presents to the ED for Wound Check (spinal cord stimulator caused infection (since may) in right lower back, lead wire hanging out of incision now)  Recent infection to spinal cord stimulator and wire popped out of skin.  Sent in by wound care.    ROS: Negative for cp or sob.    PE: Gen Appearance/Constitutional: alert  Musculoskeletal: moves all extremities x 4     Initial Plan of Care: All treatment areas with department are currently occupied. Plan to order/Initiate the following while awaiting opening in ED:  Initiate Treatment-Testing, Proceed toTreatment Area When Bed Available for ED Attending/MLP to Continue Care    Provider-Patient relationship only established for Provider In Triage (PIT).  Per employer/facility request for DANIEL to initiate contact and input an assessment note in triage during high volume surges.  Full assessment, HPI and examination not performed.  Secondary to high volume, low staffing, and/or boarding- patient to await bed availability.  This ends my PIT-Patient relationship.    Electronically signed by AYESHA Martinez NP   DD: 1/6/25       Luis Anguiano APRN - NP  01/06/25 2828

## 2025-01-06 NOTE — ED PROVIDER NOTES
Department of Emergency Medicine   ED  Provider Note  Admit Date/RoomTime: 1/6/2025 12:19 PM  ED Room: [facility]/[address]     Karen Shafer is a 64 y.o. female with a PMHx significant for  RSD, complex regional pain syndrome, COPD  who presents for evaluation of wound check, beginning prior to arrival.  The complaint has been persistent, moderate in severity, and worsened by nothing.   The patient states that she had a spine stimulator placed years ago by Dr. Rosario.  Notes that in May of this year it started become infected and started protruding through the skin.  States that she had it surgically removed, she has been following wound care ever since.  She was at wound care earlier today when they noticed a lead wire protruding through the wound, they were in the pression that at [place] removed, they sent her in for further evaluation treatment.  Notes the wound has been draining green purulent drainage that is malodorous.  Denies any fevers, chills, chest pain, shortness of breath, nausea, vomiting, diarrhea..     Review of Systems   Constitutional:  Negative for chills and fever.   HENT:  Negative for ear pain, sinus pressure and sore throat.    Eyes:  Negative for pain, discharge and redness.   Respiratory:  Negative for cough, shortness of breath and wheezing.    Cardiovascular:  Negative for chest pain.   Gastrointestinal:  Negative for abdominal distention, diarrhea, nausea and vomiting.   Genitourinary:  Negative for dysuria and frequency.   Musculoskeletal:  Negative for arthralgias and back pain.   Skin:  Positive for wound. Negative for rash.   Neurological:  Negative for weakness and headaches.   Hematological:  Negative for adenopathy.   All other systems reviewed and are negative.       Physical Exam  Vitals and nursing note reviewed.   Constitutional:       General: She is not in acute distress.     Appearance: Normal appearance. She is well-developed. She is not ill-appearing.

## 2025-01-06 NOTE — TELEPHONE ENCOUNTER
Karen called in to wound care to state that she called pain doctor and she stated that they told her he will not do surgery of wire sticking out of wound because he did not do original surgery. This nurse told patient that Dr Luke would like it expedited to get surgery so patient may need to go to ER to get evaluated to get wire removed due to risk of infection and complications. Patient states she understands.

## 2025-01-06 NOTE — PROGRESS NOTES
Modifying Factors: Pain worsens with walking  Associated Signs/Symptoms: drainage and pain     Ulcer Identification:  Ulcer Type: non-healing surgical  Contributing Factors: decreased mobility     Diabetic/Pressure/Non Pressure Ulcers only:  Ulcer: N/A     If patient has diabetic lower extremity wounds  Torres Classification of diabetic lower extremity wounds:     Grade Description   []  0 No open wound   []  1 Superficial ulcer involving the full skin thickness   []  2 Deep ulcer involves ligament, tendon, joint capsule, or fascia  No bone involvement or abscess presence   []  3 Deep Ulcer with abcess formation and/or osteomyelitis   []  4 Localized gangrene   []  5 Extensive gangrene of the foot      Wound: The patient does have a wound, over the right hip buttock area with some undermining but overall fairly clean without any purulent drainage or cellulitis     Other pertinent information:     The op notes, from the removal of spinal cord stimulator reviewed  Recent hospital records including emergency room notes was reviewed  Lab work, from a July 2024, CBC CMP no major abnormal findings other than minimally elevated blood glucose level  Recent CT scan abdomen pelvis revealed evidence of wound infection of the spinal cord stimulator area        8/12/2024     Discussed the patient regarding all options, risks benefits and alternatives, importance of offloading the area, nutrition multivitamin protein supplement were discussed  We will dress the wound daily with Ag rope  All her questions were answered             PAST MEDICAL HISTORY      Diagnosis Date    Abdominal pain     For EGD 1-13-23    Allergic contact dermatitis due to adhesives 09/16/2024    Anxiety     Arthritis     Blood circulation, collateral     Cancer ([facility])     SKIN    Chronic back pain     Colitis     recent    Complex regional pain syndrome type 1 of right lower extremity 09/11/2015    COPD (chronic obstructive pulmonary disease) ([facility])

## 2025-01-06 NOTE — PROGRESS NOTES
@7725 access center notified of consult and tranfer to [facility]  Spinal cord stimulator protruding from wound in right flank  Consult will neurosurgery

## 2025-01-06 NOTE — DISCHARGE INSTRUCTIONS
Visit Discharge/Physician Orders     Discharge condition: Stable  Assessment of pain at discharge: yes  Anesthetic used: lidocaine 4%   Discharge to: Home  Left via:Private automobile  Accompanied by: accompanied by self  ECF/HHA:[facility] - p:[phone]      Dressing Orders: Cleanse wound to Right Hip with normal saline, apply alginate ag stitched  to wound bed and cover with silicone border due to skin irritation , change daily. -may apply Aquaphor to dry skin      Treatment Orders: 12/16 Culture taken - 12/19 Levaquin sent to pharmacy take as prescribed   9/16 Steroid cream sent to pharmacy- use around the wound  EAT DIET WITH PROTEINS AND VITAMIN C- Protein shakes 1-2 times a day   TAKE A MULTIVITAMIN DAILY IF NOT CONTRAINDICATED    1/6/25 NEED TO SEE Pain surgeon  ASAP  [phone]      [facility] followup visit ________1 week_____________________  (Please note your next appointment above and if you are unable to keep, kindly give a 24 hour notice. Thank you.)        Physician signature:__________________________        If you experience any of the following, please call the Wound Care Center during business hours:     * Increase in Pain  * Temperature over 101  * Increase in drainage from your wound  * Drainage with a foul odor  * Bleeding  * Increase in swelling  * Need for compression bandage changes due to slippage, breakthrough drainage.     If you need medical attention outside of the business hours of the Wound Care Centers please contact your PCP or go to the nearest emergency room.

## 2025-01-06 NOTE — PLAN OF CARE
Problem: Pain  Goal: Verbalizes/displays adequate comfort level or baseline comfort level  Outcome: Progressing     Problem: Pain  Goal: Verbalizes/displays adequate comfort level or baseline comfort level  Outcome: Progressing     Problem: Wound:  Goal: Will show signs of wound healing; wound closure and no evidence of infection  Description: Will show signs of wound healing; wound closure and no evidence of infection  Outcome: Not Progressing

## 2025-01-07 ENCOUNTER — APPOINTMENT (OUTPATIENT)
Dept: RADIOLOGY | Facility: HOSPITAL | Age: 65
DRG: 029 | End: 2025-01-07
Payer: MEDICARE

## 2025-01-07 ENCOUNTER — HOSPITAL ENCOUNTER (INPATIENT)
Facility: HOSPITAL | Age: 65
End: 2025-01-07
Attending: STUDENT IN AN ORGANIZED HEALTH CARE EDUCATION/TRAINING PROGRAM | Admitting: NEUROLOGICAL SURGERY
Payer: MEDICARE

## 2025-01-07 ENCOUNTER — ANESTHESIA (OUTPATIENT)
Dept: OPERATING ROOM | Facility: HOSPITAL | Age: 65
End: 2025-01-07
Payer: MEDICARE

## 2025-01-07 ENCOUNTER — ANESTHESIA EVENT (OUTPATIENT)
Dept: OPERATING ROOM | Facility: HOSPITAL | Age: 65
End: 2025-01-07
Payer: MEDICARE

## 2025-01-07 ENCOUNTER — APPOINTMENT (OUTPATIENT)
Dept: CARDIOLOGY | Facility: HOSPITAL | Age: 65
DRG: 029 | End: 2025-01-07
Payer: MEDICARE

## 2025-01-07 ENCOUNTER — HOSPITAL ENCOUNTER (INPATIENT)
Dept: NEUROLOGY | Facility: HOSPITAL | Age: 65
Discharge: HOME | DRG: 029 | End: 2025-01-07
Payer: MEDICARE

## 2025-01-07 VITALS
BODY MASS INDEX: 25.76 KG/M2 | RESPIRATION RATE: 15 BRPM | SYSTOLIC BLOOD PRESSURE: 138 MMHG | HEART RATE: 69 BPM | TEMPERATURE: 97.5 F | DIASTOLIC BLOOD PRESSURE: 67 MMHG | OXYGEN SATURATION: 93 % | HEIGHT: 62 IN | WEIGHT: 140 LBS

## 2025-01-07 DIAGNOSIS — Z74.09 IMPAIRED FUNCTIONAL MOBILITY AND ENDURANCE: ICD-10-CM

## 2025-01-07 DIAGNOSIS — T85.733A INFECTION OF SPINAL CORD STIMULATOR, INITIAL ENCOUNTER (CMS-HCC): Primary | ICD-10-CM

## 2025-01-07 DIAGNOSIS — G89.18 POST-OP PAIN: ICD-10-CM

## 2025-01-07 DIAGNOSIS — T81.49XA POSTOPERATIVE WOUND INFECTION: ICD-10-CM

## 2025-01-07 DIAGNOSIS — K59.03 CONSTIPATION DUE TO PAIN MEDICATION: ICD-10-CM

## 2025-01-07 LAB
ABO GROUP (TYPE) IN BLOOD: NORMAL
ANION GAP SERPL CALC-SCNC: 16 MMOL/L (ref 10–20)
ANTIBODY SCREEN: NORMAL
APPEARANCE UR: CLEAR
APTT PPP: 36 SECONDS (ref 27–38)
BASOPHILS # BLD AUTO: 0.05 X10*3/UL (ref 0–0.1)
BASOPHILS NFR BLD AUTO: 0.5 %
BILIRUB UR STRIP.AUTO-MCNC: NEGATIVE MG/DL
BUN SERPL-MCNC: 10 MG/DL (ref 6–23)
CALCIUM SERPL-MCNC: 9.8 MG/DL (ref 8.6–10.6)
CHLORIDE SERPL-SCNC: 100 MMOL/L (ref 98–107)
CO2 SERPL-SCNC: 26 MMOL/L (ref 21–32)
COLOR UR: ABNORMAL
CREAT SERPL-MCNC: 0.64 MG/DL (ref 0.5–1.05)
CRP SERPL-MCNC: 1.77 MG/DL
EGFRCR SERPLBLD CKD-EPI 2021: >90 ML/MIN/1.73M*2
EOSINOPHIL # BLD AUTO: 0.09 X10*3/UL (ref 0–0.7)
EOSINOPHIL NFR BLD AUTO: 1 %
ERYTHROCYTE [DISTWIDTH] IN BLOOD BY AUTOMATED COUNT: 13.3 % (ref 11.5–14.5)
ERYTHROCYTE [SEDIMENTATION RATE] IN BLOOD BY WESTERGREN METHOD: 70 MM/H (ref 0–30)
GLUCOSE SERPL-MCNC: 86 MG/DL (ref 74–99)
GLUCOSE UR STRIP.AUTO-MCNC: NORMAL MG/DL
HCG UR QL IA.RAPID: NEGATIVE
HCT VFR BLD AUTO: 41.1 % (ref 36–46)
HGB BLD-MCNC: 13.1 G/DL (ref 12–16)
IMM GRANULOCYTES # BLD AUTO: 0.02 X10*3/UL (ref 0–0.7)
IMM GRANULOCYTES NFR BLD AUTO: 0.2 % (ref 0–0.9)
INR PPP: 1.2 (ref 0.9–1.1)
KETONES UR STRIP.AUTO-MCNC: ABNORMAL MG/DL
LEUKOCYTE ESTERASE UR QL STRIP.AUTO: ABNORMAL
LYMPHOCYTES # BLD AUTO: 1.09 X10*3/UL (ref 1.2–4.8)
LYMPHOCYTES NFR BLD AUTO: 11.9 %
MCH RBC QN AUTO: 27.8 PG (ref 26–34)
MCHC RBC AUTO-ENTMCNC: 31.9 G/DL (ref 32–36)
MCV RBC AUTO: 87 FL (ref 80–100)
MONOCYTES # BLD AUTO: 0.6 X10*3/UL (ref 0.1–1)
MONOCYTES NFR BLD AUTO: 6.6 %
MUCOUS THREADS #/AREA URNS AUTO: NORMAL /LPF
NEUTROPHILS # BLD AUTO: 7.31 X10*3/UL (ref 1.2–7.7)
NEUTROPHILS NFR BLD AUTO: 79.8 %
NITRITE UR QL STRIP.AUTO: NEGATIVE
NRBC BLD-RTO: 0 /100 WBCS (ref 0–0)
PH UR STRIP.AUTO: 6 [PH]
PLATELET # BLD AUTO: 273 X10*3/UL (ref 150–450)
POTASSIUM SERPL-SCNC: 3.9 MMOL/L (ref 3.5–5.3)
PROT UR STRIP.AUTO-MCNC: NEGATIVE MG/DL
PROTHROMBIN TIME: 13.2 SECONDS (ref 9.8–12.8)
RBC # BLD AUTO: 4.72 X10*6/UL (ref 4–5.2)
RBC # UR STRIP.AUTO: NEGATIVE /UL
RBC #/AREA URNS AUTO: NORMAL /HPF
RH FACTOR (ANTIGEN D): NORMAL
SODIUM SERPL-SCNC: 138 MMOL/L (ref 136–145)
SP GR UR STRIP.AUTO: 1.03
SQUAMOUS #/AREA URNS AUTO: NORMAL /HPF
UROBILINOGEN UR STRIP.AUTO-MCNC: NORMAL MG/DL
WBC # BLD AUTO: 9.2 X10*3/UL (ref 4.4–11.3)
WBC #/AREA URNS AUTO: NORMAL /HPF

## 2025-01-07 PROCEDURE — 86140 C-REACTIVE PROTEIN: CPT | Performed by: STUDENT IN AN ORGANIZED HEALTH CARE EDUCATION/TRAINING PROGRAM

## 2025-01-07 PROCEDURE — 87070 CULTURE OTHR SPECIMN AEROBIC: CPT | Performed by: STUDENT IN AN ORGANIZED HEALTH CARE EDUCATION/TRAINING PROGRAM

## 2025-01-07 PROCEDURE — 99285 EMERGENCY DEPT VISIT HI MDM: CPT | Performed by: STUDENT IN AN ORGANIZED HEALTH CARE EDUCATION/TRAINING PROGRAM

## 2025-01-07 PROCEDURE — 85730 THROMBOPLASTIN TIME PARTIAL: CPT | Performed by: STUDENT IN AN ORGANIZED HEALTH CARE EDUCATION/TRAINING PROGRAM

## 2025-01-07 PROCEDURE — 11042 DBRDMT SUBQ TIS 1ST 20SQCM/<: CPT | Performed by: NEUROLOGICAL SURGERY

## 2025-01-07 PROCEDURE — 99221 1ST HOSP IP/OBS SF/LOW 40: CPT | Performed by: NEUROLOGICAL SURGERY

## 2025-01-07 PROCEDURE — 71045 X-RAY EXAM CHEST 1 VIEW: CPT

## 2025-01-07 PROCEDURE — 2780000003 HC OR 278 NO HCPCS: Performed by: NEUROLOGICAL SURGERY

## 2025-01-07 PROCEDURE — 93005 ELECTROCARDIOGRAM TRACING: CPT

## 2025-01-07 PROCEDURE — 2500000002 HC RX 250 W HCPCS SELF ADMINISTERED DRUGS (ALT 637 FOR MEDICARE OP, ALT 636 FOR OP/ED): Performed by: NEUROLOGICAL SURGERY

## 2025-01-07 PROCEDURE — 2500000004 HC RX 250 GENERAL PHARMACY W/ HCPCS (ALT 636 FOR OP/ED)

## 2025-01-07 PROCEDURE — 2550000001 HC RX 255 CONTRASTS: Performed by: NEUROLOGICAL SURGERY

## 2025-01-07 PROCEDURE — 2500000004 HC RX 250 GENERAL PHARMACY W/ HCPCS (ALT 636 FOR OP/ED): Performed by: NEUROLOGICAL SURGERY

## 2025-01-07 PROCEDURE — 87040 BLOOD CULTURE FOR BACTERIA: CPT | Performed by: STUDENT IN AN ORGANIZED HEALTH CARE EDUCATION/TRAINING PROGRAM

## 2025-01-07 PROCEDURE — 81001 URINALYSIS AUTO W/SCOPE: CPT

## 2025-01-07 PROCEDURE — 0HB5XZZ EXCISION OF CHEST SKIN, EXTERNAL APPROACH: ICD-10-PCS | Performed by: NEUROLOGICAL SURGERY

## 2025-01-07 PROCEDURE — 85652 RBC SED RATE AUTOMATED: CPT | Performed by: STUDENT IN AN ORGANIZED HEALTH CARE EDUCATION/TRAINING PROGRAM

## 2025-01-07 PROCEDURE — 3700000001 HC GENERAL ANESTHESIA TIME - INITIAL BASE CHARGE: Performed by: NEUROLOGICAL SURGERY

## 2025-01-07 PROCEDURE — 87075 CULTR BACTERIA EXCEPT BLOOD: CPT | Performed by: STUDENT IN AN ORGANIZED HEALTH CARE EDUCATION/TRAINING PROGRAM

## 2025-01-07 PROCEDURE — 36415 COLL VENOUS BLD VENIPUNCTURE: CPT | Performed by: STUDENT IN AN ORGANIZED HEALTH CARE EDUCATION/TRAINING PROGRAM

## 2025-01-07 PROCEDURE — 93010 ELECTROCARDIOGRAM REPORT: CPT | Performed by: INTERNAL MEDICINE

## 2025-01-07 PROCEDURE — 71045 X-RAY EXAM CHEST 1 VIEW: CPT | Performed by: RADIOLOGY

## 2025-01-07 PROCEDURE — 87801 DETECT AGNT MULT DNA AMPLI: CPT | Performed by: STUDENT IN AN ORGANIZED HEALTH CARE EDUCATION/TRAINING PROGRAM

## 2025-01-07 PROCEDURE — 1100000001 HC PRIVATE ROOM DAILY

## 2025-01-07 PROCEDURE — 72040 X-RAY EXAM NECK SPINE 2-3 VW: CPT | Performed by: STUDENT IN AN ORGANIZED HEALTH CARE EDUCATION/TRAINING PROGRAM

## 2025-01-07 PROCEDURE — 3600000008 HC OR TIME - EACH INCREMENTAL 1 MINUTE - PROCEDURE LEVEL THREE: Performed by: NEUROLOGICAL SURGERY

## 2025-01-07 PROCEDURE — 72129 CT CHEST SPINE W/DYE: CPT | Performed by: RADIOLOGY

## 2025-01-07 PROCEDURE — 85025 COMPLETE CBC W/AUTO DIFF WBC: CPT | Performed by: STUDENT IN AN ORGANIZED HEALTH CARE EDUCATION/TRAINING PROGRAM

## 2025-01-07 PROCEDURE — 86901 BLOOD TYPING SEROLOGIC RH(D): CPT | Performed by: STUDENT IN AN ORGANIZED HEALTH CARE EDUCATION/TRAINING PROGRAM

## 2025-01-07 PROCEDURE — 3600000003 HC OR TIME - INITIAL BASE CHARGE - PROCEDURE LEVEL THREE: Performed by: NEUROLOGICAL SURGERY

## 2025-01-07 PROCEDURE — 2500000001 HC RX 250 WO HCPCS SELF ADMINISTERED DRUGS (ALT 637 FOR MEDICARE OP): Performed by: NEUROLOGICAL SURGERY

## 2025-01-07 PROCEDURE — 2500000005 HC RX 250 GENERAL PHARMACY W/O HCPCS: Performed by: NEUROLOGICAL SURGERY

## 2025-01-07 PROCEDURE — 3700000002 HC GENERAL ANESTHESIA TIME - EACH INCREMENTAL 1 MINUTE: Performed by: NEUROLOGICAL SURGERY

## 2025-01-07 PROCEDURE — 2500000004 HC RX 250 GENERAL PHARMACY W/ HCPCS (ALT 636 FOR OP/ED): Performed by: STUDENT IN AN ORGANIZED HEALTH CARE EDUCATION/TRAINING PROGRAM

## 2025-01-07 PROCEDURE — 95955 EEG DURING SURGERY: CPT

## 2025-01-07 PROCEDURE — 2500000001 HC RX 250 WO HCPCS SELF ADMINISTERED DRUGS (ALT 637 FOR MEDICARE OP)

## 2025-01-07 PROCEDURE — 63662 REMOVE SPINE ELTRD PLATE: CPT | Performed by: NEUROLOGICAL SURGERY

## 2025-01-07 PROCEDURE — 72040 X-RAY EXAM NECK SPINE 2-3 VW: CPT

## 2025-01-07 PROCEDURE — 63661 REMOVE SPINE ELTRD PERQ ARAY: CPT | Performed by: NEUROLOGICAL SURGERY

## 2025-01-07 PROCEDURE — 7100000002 HC RECOVERY ROOM TIME - EACH INCREMENTAL 1 MINUTE: Performed by: NEUROLOGICAL SURGERY

## 2025-01-07 PROCEDURE — 7100000001 HC RECOVERY ROOM TIME - INITIAL BASE CHARGE: Performed by: NEUROLOGICAL SURGERY

## 2025-01-07 PROCEDURE — 96366 THER/PROPH/DIAG IV INF ADDON: CPT

## 2025-01-07 PROCEDURE — 81025 URINE PREGNANCY TEST: CPT

## 2025-01-07 PROCEDURE — 72129 CT CHEST SPINE W/DYE: CPT

## 2025-01-07 PROCEDURE — 00PU0MZ REMOVAL OF NEUROSTIMULATOR LEAD FROM SPINAL CANAL, OPEN APPROACH: ICD-10-PCS | Performed by: NEUROLOGICAL SURGERY

## 2025-01-07 PROCEDURE — 80048 BASIC METABOLIC PNL TOTAL CA: CPT | Performed by: STUDENT IN AN ORGANIZED HEALTH CARE EDUCATION/TRAINING PROGRAM

## 2025-01-07 PROCEDURE — 72170 X-RAY EXAM OF PELVIS: CPT

## 2025-01-07 PROCEDURE — 2720000007 HC OR 272 NO HCPCS: Performed by: NEUROLOGICAL SURGERY

## 2025-01-07 PROCEDURE — 85610 PROTHROMBIN TIME: CPT | Performed by: STUDENT IN AN ORGANIZED HEALTH CARE EDUCATION/TRAINING PROGRAM

## 2025-01-07 PROCEDURE — 72170 X-RAY EXAM OF PELVIS: CPT | Performed by: RADIOLOGY

## 2025-01-07 RX ORDER — BACITRACIN 500 [USP'U]/G
OINTMENT TOPICAL AS NEEDED
Status: DISCONTINUED | OUTPATIENT
Start: 2025-01-07 | End: 2025-01-07 | Stop reason: HOSPADM

## 2025-01-07 RX ORDER — OXYCODONE HYDROCHLORIDE 5 MG/1
5 TABLET ORAL EVERY 4 HOURS PRN
Status: DISCONTINUED | OUTPATIENT
Start: 2025-01-07 | End: 2025-01-07 | Stop reason: HOSPADM

## 2025-01-07 RX ORDER — ONDANSETRON HYDROCHLORIDE 2 MG/ML
4 INJECTION, SOLUTION INTRAVENOUS EVERY 8 HOURS PRN
Status: DISCONTINUED | OUTPATIENT
Start: 2025-01-07 | End: 2025-01-13 | Stop reason: HOSPADM

## 2025-01-07 RX ORDER — CEFTRIAXONE 2 G/50ML
2 INJECTION, SOLUTION INTRAVENOUS EVERY 24 HOURS
Status: DISCONTINUED | OUTPATIENT
Start: 2025-01-08 | End: 2025-01-08

## 2025-01-07 RX ORDER — HYDRALAZINE HYDROCHLORIDE 20 MG/ML
5 INJECTION INTRAMUSCULAR; INTRAVENOUS EVERY 30 MIN PRN
Status: DISCONTINUED | OUTPATIENT
Start: 2025-01-07 | End: 2025-01-07 | Stop reason: HOSPADM

## 2025-01-07 RX ORDER — ARIPIPRAZOLE 15 MG/1
1 TABLET ORAL
COMMUNITY
Start: 2024-12-19

## 2025-01-07 RX ORDER — HYDROMORPHONE HYDROCHLORIDE 4 MG/1
4 TABLET ORAL 4 TIMES DAILY PRN
COMMUNITY
End: 2025-01-13 | Stop reason: HOSPADM

## 2025-01-07 RX ORDER — ESMOLOL HYDROCHLORIDE 10 MG/ML
INJECTION INTRAVENOUS AS NEEDED
Status: DISCONTINUED | OUTPATIENT
Start: 2025-01-07 | End: 2025-01-07

## 2025-01-07 RX ORDER — ARIPIPRAZOLE 15 MG/1
15 TABLET ORAL
Status: DISCONTINUED | OUTPATIENT
Start: 2025-01-07 | End: 2025-01-13 | Stop reason: HOSPADM

## 2025-01-07 RX ORDER — VANCOMYCIN HYDROCHLORIDE 1 G/20ML
INJECTION, POWDER, LYOPHILIZED, FOR SOLUTION INTRAVENOUS DAILY PRN
Status: DISCONTINUED | OUTPATIENT
Start: 2025-01-07 | End: 2025-01-13 | Stop reason: HOSPADM

## 2025-01-07 RX ORDER — OXYCODONE HYDROCHLORIDE 5 MG/1
5 TABLET ORAL EVERY 4 HOURS PRN
Status: DISCONTINUED | OUTPATIENT
Start: 2025-01-07 | End: 2025-01-08

## 2025-01-07 RX ORDER — KETOROLAC TROMETHAMINE 30 MG/ML
INJECTION, SOLUTION INTRAMUSCULAR; INTRAVENOUS AS NEEDED
Status: DISCONTINUED | OUTPATIENT
Start: 2025-01-07 | End: 2025-01-07

## 2025-01-07 RX ORDER — ONDANSETRON HYDROCHLORIDE 2 MG/ML
4 INJECTION, SOLUTION INTRAVENOUS ONCE AS NEEDED
Status: DISCONTINUED | OUTPATIENT
Start: 2025-01-07 | End: 2025-01-07 | Stop reason: HOSPADM

## 2025-01-07 RX ORDER — ROCURONIUM BROMIDE 10 MG/ML
INJECTION, SOLUTION INTRAVENOUS AS NEEDED
Status: DISCONTINUED | OUTPATIENT
Start: 2025-01-07 | End: 2025-01-07

## 2025-01-07 RX ORDER — ONDANSETRON 4 MG/1
4 TABLET, FILM COATED ORAL EVERY 8 HOURS PRN
Status: DISCONTINUED | OUTPATIENT
Start: 2025-01-07 | End: 2025-01-13 | Stop reason: HOSPADM

## 2025-01-07 RX ORDER — PHENYLEPHRINE HCL IN 0.9% NACL 0.4MG/10ML
SYRINGE (ML) INTRAVENOUS AS NEEDED
Status: DISCONTINUED | OUTPATIENT
Start: 2025-01-07 | End: 2025-01-07

## 2025-01-07 RX ORDER — DESVENLAFAXINE 50 MG/1
50 TABLET, EXTENDED RELEASE ORAL
Status: DISCONTINUED | OUTPATIENT
Start: 2025-01-07 | End: 2025-01-13 | Stop reason: HOSPADM

## 2025-01-07 RX ORDER — CEFTRIAXONE 2 G/1
INJECTION, POWDER, FOR SOLUTION INTRAMUSCULAR; INTRAVENOUS AS NEEDED
Status: DISCONTINUED | OUTPATIENT
Start: 2025-01-07 | End: 2025-01-07

## 2025-01-07 RX ORDER — LIDOCAINE HYDROCHLORIDE 10 MG/ML
0.1 INJECTION, SOLUTION INFILTRATION; PERINEURAL ONCE
Status: DISCONTINUED | OUTPATIENT
Start: 2025-01-07 | End: 2025-01-07 | Stop reason: HOSPADM

## 2025-01-07 RX ORDER — DESVENLAFAXINE 50 MG/1
1 TABLET, FILM COATED, EXTENDED RELEASE ORAL
COMMUNITY
Start: 2024-12-12

## 2025-01-07 RX ORDER — NALOXONE HYDROCHLORIDE 0.4 MG/ML
0.2 INJECTION, SOLUTION INTRAMUSCULAR; INTRAVENOUS; SUBCUTANEOUS EVERY 5 MIN PRN
Status: DISCONTINUED | OUTPATIENT
Start: 2025-01-07 | End: 2025-01-13 | Stop reason: HOSPADM

## 2025-01-07 RX ORDER — TIZANIDINE 4 MG/1
2 TABLET ORAL EVERY 8 HOURS PRN
Status: DISCONTINUED | OUTPATIENT
Start: 2025-01-07 | End: 2025-01-13 | Stop reason: HOSPADM

## 2025-01-07 RX ORDER — ALBUTEROL SULFATE 0.83 MG/ML
2.5 SOLUTION RESPIRATORY (INHALATION) ONCE AS NEEDED
Status: DISCONTINUED | OUTPATIENT
Start: 2025-01-07 | End: 2025-01-07 | Stop reason: HOSPADM

## 2025-01-07 RX ORDER — LABETALOL HYDROCHLORIDE 5 MG/ML
5 INJECTION, SOLUTION INTRAVENOUS ONCE AS NEEDED
Status: DISCONTINUED | OUTPATIENT
Start: 2025-01-07 | End: 2025-01-07 | Stop reason: HOSPADM

## 2025-01-07 RX ORDER — OXYCODONE HYDROCHLORIDE 5 MG/1
2.5 TABLET ORAL EVERY 4 HOURS PRN
Status: DISCONTINUED | OUTPATIENT
Start: 2025-01-07 | End: 2025-01-08

## 2025-01-07 RX ORDER — LIDOCAINE HCL/PF 100 MG/5ML
SYRINGE (ML) INTRAVENOUS AS NEEDED
Status: DISCONTINUED | OUTPATIENT
Start: 2025-01-07 | End: 2025-01-07

## 2025-01-07 RX ORDER — PRAZOSIN HYDROCHLORIDE 1 MG/1
1 CAPSULE ORAL NIGHTLY
COMMUNITY
Start: 2025-01-02

## 2025-01-07 RX ORDER — MIDAZOLAM HYDROCHLORIDE 1 MG/ML
INJECTION INTRAMUSCULAR; INTRAVENOUS AS NEEDED
Status: DISCONTINUED | OUTPATIENT
Start: 2025-01-07 | End: 2025-01-07

## 2025-01-07 RX ORDER — LIDOCAINE HYDROCHLORIDE AND EPINEPHRINE 5; 5 MG/ML; UG/ML
INJECTION, SOLUTION INFILTRATION; PERINEURAL AS NEEDED
Status: DISCONTINUED | OUTPATIENT
Start: 2025-01-07 | End: 2025-01-07 | Stop reason: HOSPADM

## 2025-01-07 RX ORDER — ZOLPIDEM TARTRATE 10 MG/1
10 TABLET ORAL NIGHTLY
COMMUNITY
Start: 2025-01-02

## 2025-01-07 RX ORDER — FENTANYL CITRATE 50 UG/ML
INJECTION, SOLUTION INTRAMUSCULAR; INTRAVENOUS AS NEEDED
Status: DISCONTINUED | OUTPATIENT
Start: 2025-01-07 | End: 2025-01-07

## 2025-01-07 RX ORDER — PREGABALIN 75 MG/1
75 CAPSULE ORAL 3 TIMES DAILY
Status: DISCONTINUED | OUTPATIENT
Start: 2025-01-07 | End: 2025-01-13 | Stop reason: HOSPADM

## 2025-01-07 RX ORDER — DICLOFENAC SODIUM 75 MG/1
75 TABLET, DELAYED RELEASE ORAL 2 TIMES DAILY
Status: ON HOLD | COMMUNITY
End: 2025-01-10

## 2025-01-07 RX ORDER — ACETAMINOPHEN 325 MG/1
650 TABLET ORAL EVERY 6 HOURS PRN
Status: DISCONTINUED | OUTPATIENT
Start: 2025-01-07 | End: 2025-01-13 | Stop reason: HOSPADM

## 2025-01-07 RX ORDER — PRAZOSIN HYDROCHLORIDE 1 MG/1
1 CAPSULE ORAL NIGHTLY
Status: DISCONTINUED | OUTPATIENT
Start: 2025-01-07 | End: 2025-01-13 | Stop reason: HOSPADM

## 2025-01-07 RX ORDER — POLYETHYLENE GLYCOL 3350 17 G/17G
17 POWDER, FOR SOLUTION ORAL DAILY
Status: DISCONTINUED | OUTPATIENT
Start: 2025-01-07 | End: 2025-01-13 | Stop reason: HOSPADM

## 2025-01-07 RX ORDER — VANCOMYCIN HYDROCHLORIDE 1 G/20ML
INJECTION, POWDER, LYOPHILIZED, FOR SOLUTION INTRAVENOUS AS NEEDED
Status: DISCONTINUED | OUTPATIENT
Start: 2025-01-07 | End: 2025-01-07

## 2025-01-07 RX ORDER — HYDROMORPHONE HYDROCHLORIDE 1 MG/ML
INJECTION, SOLUTION INTRAMUSCULAR; INTRAVENOUS; SUBCUTANEOUS AS NEEDED
Status: DISCONTINUED | OUTPATIENT
Start: 2025-01-07 | End: 2025-01-07

## 2025-01-07 RX ORDER — PROPOFOL 10 MG/ML
INJECTION, EMULSION INTRAVENOUS AS NEEDED
Status: DISCONTINUED | OUTPATIENT
Start: 2025-01-07 | End: 2025-01-07

## 2025-01-07 RX ORDER — TIZANIDINE 2 MG/1
2 TABLET ORAL EVERY 8 HOURS PRN
COMMUNITY
Start: 2024-02-29

## 2025-01-07 RX ORDER — PREGABALIN 75 MG/1
75 CAPSULE ORAL 3 TIMES DAILY
COMMUNITY

## 2025-01-07 RX ORDER — VANCOMYCIN HYDROCHLORIDE 1 G/200ML
1000 INJECTION, SOLUTION INTRAVENOUS EVERY 12 HOURS
Status: DISCONTINUED | OUTPATIENT
Start: 2025-01-07 | End: 2025-01-09 | Stop reason: DRUGHIGH

## 2025-01-07 RX ORDER — ZOLPIDEM TARTRATE 5 MG/1
10 TABLET ORAL NIGHTLY
Status: DISCONTINUED | OUTPATIENT
Start: 2025-01-07 | End: 2025-01-13 | Stop reason: HOSPADM

## 2025-01-07 RX ORDER — AMOXICILLIN 250 MG
2 CAPSULE ORAL 2 TIMES DAILY
Status: DISCONTINUED | OUTPATIENT
Start: 2025-01-07 | End: 2025-01-13 | Stop reason: HOSPADM

## 2025-01-07 RX ORDER — FENTANYL CITRATE 50 UG/ML
50 INJECTION, SOLUTION INTRAMUSCULAR; INTRAVENOUS EVERY 5 MIN PRN
Status: DISCONTINUED | OUTPATIENT
Start: 2025-01-07 | End: 2025-01-07 | Stop reason: HOSPADM

## 2025-01-07 RX ADMIN — ROCURONIUM 50 MG: 50 INJECTION, SOLUTION INTRAVENOUS at 14:03

## 2025-01-07 RX ADMIN — FENTANYL CITRATE 100 MCG: 50 INJECTION, SOLUTION INTRAMUSCULAR; INTRAVENOUS at 14:03

## 2025-01-07 RX ADMIN — HYDROMORPHONE HYDROCHLORIDE 0.5 MG: 1 INJECTION, SOLUTION INTRAMUSCULAR; INTRAVENOUS; SUBCUTANEOUS at 17:45

## 2025-01-07 RX ADMIN — CEFTRIAXONE SODIUM 1 G: 2 INJECTION, POWDER, FOR SOLUTION INTRAMUSCULAR; INTRAVENOUS at 14:24

## 2025-01-07 RX ADMIN — SUGAMMADEX 200 MG: 100 INJECTION, SOLUTION INTRAVENOUS at 16:44

## 2025-01-07 RX ADMIN — OXYCODONE HYDROCHLORIDE 5 MG: 5 TABLET ORAL at 08:38

## 2025-01-07 RX ADMIN — HYDROMORPHONE HYDROCHLORIDE 0.5 MG: 1 INJECTION, SOLUTION INTRAMUSCULAR; INTRAVENOUS; SUBCUTANEOUS at 16:25

## 2025-01-07 RX ADMIN — PROPOFOL 200 MG: 10 INJECTION, EMULSION INTRAVENOUS at 14:03

## 2025-01-07 RX ADMIN — ESMOLOL HYDROCHLORIDE 20 MG: 10 INJECTION, SOLUTION INTRAVENOUS at 16:25

## 2025-01-07 RX ADMIN — PROPOFOL 30 MG: 10 INJECTION, EMULSION INTRAVENOUS at 15:01

## 2025-01-07 RX ADMIN — Medication 80 MCG: at 14:14

## 2025-01-07 RX ADMIN — PREGABALIN 75 MG: 75 CAPSULE ORAL at 20:06

## 2025-01-07 RX ADMIN — VANCOMYCIN HYDROCHLORIDE 1000 MG: 1 INJECTION, SOLUTION INTRAVENOUS at 21:28

## 2025-01-07 RX ADMIN — PROPOFOL 75 MCG/KG/MIN: 10 INJECTION, EMULSION INTRAVENOUS at 14:26

## 2025-01-07 RX ADMIN — SENNOSIDES AND DOCUSATE SODIUM 2 TABLET: 50; 8.6 TABLET ORAL at 20:06

## 2025-01-07 RX ADMIN — ACETAMINOPHEN 650 MG: 325 TABLET ORAL at 20:07

## 2025-01-07 RX ADMIN — ESMOLOL HYDROCHLORIDE 30 MG: 10 INJECTION, SOLUTION INTRAVENOUS at 14:08

## 2025-01-07 RX ADMIN — HYDROMORPHONE HYDROCHLORIDE 0.5 MG: 1 INJECTION, SOLUTION INTRAMUSCULAR; INTRAVENOUS; SUBCUTANEOUS at 17:11

## 2025-01-07 RX ADMIN — HYDROMORPHONE HYDROCHLORIDE 0.5 MG: 1 INJECTION, SOLUTION INTRAMUSCULAR; INTRAVENOUS; SUBCUTANEOUS at 15:28

## 2025-01-07 RX ADMIN — MIDAZOLAM HYDROCHLORIDE 2 MG: 1 INJECTION, SOLUTION INTRAMUSCULAR; INTRAVENOUS at 13:55

## 2025-01-07 RX ADMIN — KETOROLAC TROMETHAMINE 30 MG: 30 INJECTION, SOLUTION INTRAMUSCULAR; INTRAVENOUS at 15:27

## 2025-01-07 RX ADMIN — PROPOFOL 30 MG: 10 INJECTION, EMULSION INTRAVENOUS at 14:22

## 2025-01-07 RX ADMIN — OXYCODONE HYDROCHLORIDE 5 MG: 5 TABLET ORAL at 20:06

## 2025-01-07 RX ADMIN — HYDROMORPHONE HYDROCHLORIDE 0.5 MG: 1 INJECTION, SOLUTION INTRAMUSCULAR; INTRAVENOUS; SUBCUTANEOUS at 17:22

## 2025-01-07 RX ADMIN — ZOLPIDEM TARTRATE 10 MG: 5 TABLET, COATED ORAL at 20:06

## 2025-01-07 RX ADMIN — IOHEXOL 80 ML: 350 INJECTION, SOLUTION INTRAVENOUS at 07:50

## 2025-01-07 RX ADMIN — DEXAMETHASONE SODIUM PHOSPHATE 10 MG: 4 INJECTION INTRA-ARTICULAR; INTRALESIONAL; INTRAMUSCULAR; INTRAVENOUS; SOFT TISSUE at 14:25

## 2025-01-07 RX ADMIN — VANCOMYCIN HYDROCHLORIDE 1 G: 1025 INJECTION, POWDER, FOR SOLUTION INTRAVENOUS; ORAL at 14:24

## 2025-01-07 RX ADMIN — SODIUM CHLORIDE, SODIUM LACTATE, POTASSIUM CHLORIDE, AND CALCIUM CHLORIDE: 600; 310; 30; 20 INJECTION, SOLUTION INTRAVENOUS at 13:57

## 2025-01-07 RX ADMIN — ONDANSETRON 4 MG: 2 INJECTION INTRAMUSCULAR; INTRAVENOUS at 15:25

## 2025-01-07 RX ADMIN — ESMOLOL HYDROCHLORIDE 20 MG: 10 INJECTION, SOLUTION INTRAVENOUS at 15:28

## 2025-01-07 RX ADMIN — LIDOCAINE HYDROCHLORIDE 100 MG: 20 INJECTION INTRAVENOUS at 14:03

## 2025-01-07 SDOH — SOCIAL STABILITY: SOCIAL INSECURITY: HAVE YOU HAD ANY THOUGHTS OF HARMING ANYONE ELSE?: NO

## 2025-01-07 SDOH — ECONOMIC STABILITY: HOUSING INSECURITY: AT ANY TIME IN THE PAST 12 MONTHS, WERE YOU HOMELESS OR LIVING IN A SHELTER (INCLUDING NOW)?: NO

## 2025-01-07 SDOH — ECONOMIC STABILITY: INCOME INSECURITY: IN THE PAST 12 MONTHS HAS THE ELECTRIC, GAS, OIL, OR WATER COMPANY THREATENED TO SHUT OFF SERVICES IN YOUR HOME?: NO

## 2025-01-07 SDOH — ECONOMIC STABILITY: HOUSING INSECURITY: IN THE PAST 12 MONTHS, HOW MANY TIMES HAVE YOU MOVED WHERE YOU WERE LIVING?: 0

## 2025-01-07 SDOH — HEALTH STABILITY: PHYSICAL HEALTH: ON AVERAGE, HOW MANY DAYS PER WEEK DO YOU ENGAGE IN MODERATE TO STRENUOUS EXERCISE (LIKE A BRISK WALK)?: 7 DAYS

## 2025-01-07 SDOH — SOCIAL STABILITY: SOCIAL INSECURITY: ARE YOU MARRIED, WIDOWED, DIVORCED, SEPARATED, NEVER MARRIED, OR LIVING WITH A PARTNER?: MARRIED

## 2025-01-07 SDOH — HEALTH STABILITY: MENTAL HEALTH: HOW MANY DRINKS CONTAINING ALCOHOL DO YOU HAVE ON A TYPICAL DAY WHEN YOU ARE DRINKING?: PATIENT DOES NOT DRINK

## 2025-01-07 SDOH — HEALTH STABILITY: MENTAL HEALTH: HOW OFTEN DO YOU HAVE SIX OR MORE DRINKS ON ONE OCCASION?: NEVER

## 2025-01-07 SDOH — ECONOMIC STABILITY: HOUSING INSECURITY: IN THE LAST 12 MONTHS, WAS THERE A TIME WHEN YOU WERE NOT ABLE TO PAY THE MORTGAGE OR RENT ON TIME?: NO

## 2025-01-07 SDOH — SOCIAL STABILITY: SOCIAL NETWORK: HOW OFTEN DO YOU ATTEND CHURCH OR RELIGIOUS SERVICES?: 1 TO 4 TIMES PER YEAR

## 2025-01-07 SDOH — ECONOMIC STABILITY: FOOD INSECURITY: WITHIN THE PAST 12 MONTHS, YOU WORRIED THAT YOUR FOOD WOULD RUN OUT BEFORE YOU GOT THE MONEY TO BUY MORE.: NEVER TRUE

## 2025-01-07 SDOH — SOCIAL STABILITY: SOCIAL NETWORK: HOW OFTEN DO YOU ATTEND CHURCH OR RELIGIOUS SERVICES?: NEVER

## 2025-01-07 SDOH — ECONOMIC STABILITY: TRANSPORTATION INSECURITY: IN THE PAST 12 MONTHS, HAS LACK OF TRANSPORTATION KEPT YOU FROM MEDICAL APPOINTMENTS OR FROM GETTING MEDICATIONS?: NO

## 2025-01-07 SDOH — HEALTH STABILITY: PHYSICAL HEALTH: ON AVERAGE, HOW MANY MINUTES DO YOU ENGAGE IN EXERCISE AT THIS LEVEL?: 120 MIN

## 2025-01-07 SDOH — SOCIAL STABILITY: SOCIAL INSECURITY
WITHIN THE LAST YEAR, HAVE YOU BEEN KICKED, HIT, SLAPPED, OR OTHERWISE PHYSICALLY HURT BY YOUR PARTNER OR EX-PARTNER?: NO

## 2025-01-07 SDOH — SOCIAL STABILITY: SOCIAL INSECURITY
WITHIN THE LAST YEAR, HAVE YOU BEEN RAPED OR FORCED TO HAVE ANY KIND OF SEXUAL ACTIVITY BY YOUR PARTNER OR EX-PARTNER?: NO

## 2025-01-07 SDOH — SOCIAL STABILITY: SOCIAL INSECURITY: WITHIN THE LAST YEAR, HAVE YOU BEEN HUMILIATED OR EMOTIONALLY ABUSED IN OTHER WAYS BY YOUR PARTNER OR EX-PARTNER?: NO

## 2025-01-07 SDOH — SOCIAL STABILITY: SOCIAL INSECURITY: WITHIN THE LAST YEAR, HAVE YOU BEEN AFRAID OF YOUR PARTNER OR EX-PARTNER?: NO

## 2025-01-07 SDOH — SOCIAL STABILITY: SOCIAL NETWORK
DO YOU BELONG TO ANY CLUBS OR ORGANIZATIONS SUCH AS CHURCH GROUPS, UNIONS, FRATERNAL OR ATHLETIC GROUPS, OR SCHOOL GROUPS?: NO

## 2025-01-07 SDOH — HEALTH STABILITY: MENTAL HEALTH
DO YOU FEEL STRESS - TENSE, RESTLESS, NERVOUS, OR ANXIOUS, OR UNABLE TO SLEEP AT NIGHT BECAUSE YOUR MIND IS TROUBLED ALL THE TIME - THESE DAYS?: NOT AT ALL

## 2025-01-07 SDOH — ECONOMIC STABILITY: FOOD INSECURITY: HOW HARD IS IT FOR YOU TO PAY FOR THE VERY BASICS LIKE FOOD, HOUSING, MEDICAL CARE, AND HEATING?: NOT HARD AT ALL

## 2025-01-07 SDOH — SOCIAL STABILITY: SOCIAL INSECURITY: DO YOU FEEL UNSAFE GOING BACK TO THE PLACE WHERE YOU ARE LIVING?: NO

## 2025-01-07 SDOH — HEALTH STABILITY: MENTAL HEALTH: HOW OFTEN DO YOU HAVE A DRINK CONTAINING ALCOHOL?: NEVER

## 2025-01-07 SDOH — SOCIAL STABILITY: SOCIAL NETWORK: HOW OFTEN DO YOU ATTEND MEETINGS OF THE CLUBS OR ORGANIZATIONS YOU BELONG TO?: NEVER

## 2025-01-07 SDOH — SOCIAL STABILITY: SOCIAL NETWORK: HOW OFTEN DO YOU GET TOGETHER WITH FRIENDS OR RELATIVES?: MORE THAN THREE TIMES A WEEK

## 2025-01-07 SDOH — HEALTH STABILITY: PHYSICAL HEALTH
HOW OFTEN DO YOU NEED TO HAVE SOMEONE HELP YOU WHEN YOU READ INSTRUCTIONS, PAMPHLETS, OR OTHER WRITTEN MATERIAL FROM YOUR DOCTOR OR PHARMACY?: RARELY

## 2025-01-07 SDOH — HEALTH STABILITY: PHYSICAL HEALTH
HOW OFTEN DO YOU NEED TO HAVE SOMEONE HELP YOU WHEN YOU READ INSTRUCTIONS, PAMPHLETS, OR OTHER WRITTEN MATERIAL FROM YOUR DOCTOR OR PHARMACY?: NEVER

## 2025-01-07 SDOH — SOCIAL STABILITY: SOCIAL NETWORK
IN A TYPICAL WEEK, HOW MANY TIMES DO YOU TALK ON THE PHONE WITH FAMILY, FRIENDS, OR NEIGHBORS?: MORE THAN THREE TIMES A WEEK

## 2025-01-07 SDOH — SOCIAL STABILITY: SOCIAL INSECURITY: DO YOU FEEL ANYONE HAS EXPLOITED OR TAKEN ADVANTAGE OF YOU FINANCIALLY OR OF YOUR PERSONAL PROPERTY?: NO

## 2025-01-07 SDOH — SOCIAL STABILITY: SOCIAL INSECURITY: HAVE YOU HAD THOUGHTS OF HARMING ANYONE ELSE?: YES

## 2025-01-07 SDOH — ECONOMIC STABILITY: FOOD INSECURITY: WITHIN THE PAST 12 MONTHS, THE FOOD YOU BOUGHT JUST DIDN'T LAST AND YOU DIDN'T HAVE MONEY TO GET MORE.: NEVER TRUE

## 2025-01-07 SDOH — SOCIAL STABILITY: SOCIAL INSECURITY: ARE YOU OR HAVE YOU BEEN THREATENED OR ABUSED PHYSICALLY, EMOTIONALLY, OR SEXUALLY BY ANYONE?: NO

## 2025-01-07 SDOH — SOCIAL STABILITY: SOCIAL INSECURITY: ARE THERE ANY APPARENT SIGNS OF INJURIES/BEHAVIORS THAT COULD BE RELATED TO ABUSE/NEGLECT?: NO

## 2025-01-07 SDOH — SOCIAL STABILITY: SOCIAL INSECURITY: DOES ANYONE TRY TO KEEP YOU FROM HAVING/CONTACTING OTHER FRIENDS OR DOING THINGS OUTSIDE YOUR HOME?: NO

## 2025-01-07 SDOH — HEALTH STABILITY: MENTAL HEALTH: EXPERIENCED ANY OF THE FOLLOWING LIFE EVENTS: OTHER (COMMENT)

## 2025-01-07 SDOH — SOCIAL STABILITY: SOCIAL INSECURITY: ABUSE: ADULT

## 2025-01-07 SDOH — SOCIAL STABILITY: SOCIAL INSECURITY: HAS ANYONE EVER THREATENED TO HURT YOUR FAMILY OR YOUR PETS?: NO

## 2025-01-07 SDOH — SOCIAL STABILITY: SOCIAL INSECURITY: WERE YOU ABLE TO COMPLETE ALL THE BEHAVIORAL HEALTH SCREENINGS?: YES

## 2025-01-07 ASSESSMENT — LIFESTYLE VARIABLES
AUDIT-C TOTAL SCORE: 0
SKIP TO QUESTIONS 9-10: 1
AUDIT-C TOTAL SCORE: 0
SKIP TO QUESTIONS 9-10: 1
AUDIT-C TOTAL SCORE: 0
SKIP TO QUESTIONS 9-10: 1
PRESCIPTION_ABUSE_PAST_12_MONTHS: NO
HOW OFTEN DO YOU HAVE 6 OR MORE DRINKS ON ONE OCCASION: NEVER
SUBSTANCE_ABUSE_PAST_12_MONTHS: NO
AUDIT-C TOTAL SCORE: 0
HOW MANY STANDARD DRINKS CONTAINING ALCOHOL DO YOU HAVE ON A TYPICAL DAY: PATIENT DOES NOT DRINK
HOW OFTEN DO YOU HAVE A DRINK CONTAINING ALCOHOL: NEVER

## 2025-01-07 ASSESSMENT — COGNITIVE AND FUNCTIONAL STATUS - GENERAL
MOVING FROM LYING ON BACK TO SITTING ON SIDE OF FLAT BED WITH BEDRAILS: A LITTLE
PATIENT BASELINE BEDBOUND: NO
TURNING FROM BACK TO SIDE WHILE IN FLAT BAD: A LITTLE
MOBILITY SCORE: 18
TURNING FROM BACK TO SIDE WHILE IN FLAT BAD: A LITTLE
DAILY ACTIVITIY SCORE: 24
HELP NEEDED FOR BATHING: A LITTLE
CLIMB 3 TO 5 STEPS WITH RAILING: A LITTLE
CLIMB 3 TO 5 STEPS WITH RAILING: A LITTLE
MOVING FROM LYING ON BACK TO SITTING ON SIDE OF FLAT BED WITH BEDRAILS: A LITTLE
MOVING TO AND FROM BED TO CHAIR: A LITTLE
HELP NEEDED FOR BATHING: A LITTLE
TOILETING: A LITTLE
CLIMB 3 TO 5 STEPS WITH RAILING: A LITTLE
WALKING IN HOSPITAL ROOM: A LITTLE
PERSONAL GROOMING: A LITTLE
TOILETING: A LITTLE
MOVING TO AND FROM BED TO CHAIR: A LITTLE
DAILY ACTIVITIY SCORE: 18
WALKING IN HOSPITAL ROOM: A LITTLE
DAILY ACTIVITIY SCORE: 18
EATING MEALS: A LITTLE
WALKING IN HOSPITAL ROOM: A LITTLE
DRESSING REGULAR UPPER BODY CLOTHING: A LITTLE
MOBILITY SCORE: 18
PERSONAL GROOMING: A LITTLE
DRESSING REGULAR LOWER BODY CLOTHING: A LITTLE
MOBILITY SCORE: 20
STANDING UP FROM CHAIR USING ARMS: A LITTLE
EATING MEALS: A LITTLE
MOBILITY SCORE: 23
DRESSING REGULAR LOWER BODY CLOTHING: A LITTLE
MOVING TO AND FROM BED TO CHAIR: A LITTLE
DRESSING REGULAR UPPER BODY CLOTHING: A LITTLE
STANDING UP FROM CHAIR USING ARMS: A LITTLE
STANDING UP FROM CHAIR USING ARMS: A LITTLE
CLIMB 3 TO 5 STEPS WITH RAILING: A LITTLE

## 2025-01-07 ASSESSMENT — PAIN DESCRIPTION - DESCRIPTORS: DESCRIPTORS: ACHING

## 2025-01-07 ASSESSMENT — PAIN - FUNCTIONAL ASSESSMENT
PAIN_FUNCTIONAL_ASSESSMENT: 0-10

## 2025-01-07 ASSESSMENT — ACTIVITIES OF DAILY LIVING (ADL)
TOILETING: NEEDS ASSISTANCE
HEARING - RIGHT EAR: FUNCTIONAL
LACK_OF_TRANSPORTATION: NO
FEEDING YOURSELF: INDEPENDENT
BATHING: NEEDS ASSISTANCE
HEARING - RIGHT EAR: FUNCTIONAL
HEARING - LEFT EAR: FUNCTIONAL
GROOMING: INDEPENDENT
GROOMING: NEEDS ASSISTANCE
ADEQUATE_TO_COMPLETE_ADL: YES
JUDGMENT_ADEQUATE_SAFELY_COMPLETE_DAILY_ACTIVITIES: YES
PATIENT'S MEMORY ADEQUATE TO SAFELY COMPLETE DAILY ACTIVITIES?: YES
WALKS IN HOME: NEEDS ASSISTANCE
JUDGMENT_ADEQUATE_SAFELY_COMPLETE_DAILY_ACTIVITIES: YES
BATHING: NEEDS ASSISTANCE
FEEDING YOURSELF: NEEDS ASSISTANCE
DRESSING YOURSELF: INDEPENDENT
PATIENT'S MEMORY ADEQUATE TO SAFELY COMPLETE DAILY ACTIVITIES?: YES
DRESSING YOURSELF: NEEDS ASSISTANCE
ADEQUATE_TO_COMPLETE_ADL: YES
LACK_OF_TRANSPORTATION: NO
WALKS IN HOME: INDEPENDENT
TOILETING: NEEDS ASSISTANCE

## 2025-01-07 ASSESSMENT — PAIN DESCRIPTION - LOCATION
LOCATION: ABDOMEN
LOCATION: BACK

## 2025-01-07 ASSESSMENT — PAIN SCALES - GENERAL
PAINLEVEL_OUTOF10: 7
PAINLEVEL_OUTOF10: 4
PAINLEVEL_OUTOF10: 8
PAINLEVEL_OUTOF10: 6
PAINLEVEL_OUTOF10: 0 - NO PAIN
PAINLEVEL_OUTOF10: 8
PAINLEVEL_OUTOF10: 8
PAINLEVEL_OUTOF10: 7
PAINLEVEL_OUTOF10: 8
PAINLEVEL_OUTOF10: 6
PAINLEVEL_OUTOF10: 8

## 2025-01-07 ASSESSMENT — COLUMBIA-SUICIDE SEVERITY RATING SCALE - C-SSRS
2. HAVE YOU ACTUALLY HAD ANY THOUGHTS OF KILLING YOURSELF?: NO
6. HAVE YOU EVER DONE ANYTHING, STARTED TO DO ANYTHING, OR PREPARED TO DO ANYTHING TO END YOUR LIFE?: NO
1. IN THE PAST MONTH, HAVE YOU WISHED YOU WERE DEAD OR WISHED YOU COULD GO TO SLEEP AND NOT WAKE UP?: NO

## 2025-01-07 ASSESSMENT — PAIN DESCRIPTION - ONSET: ONSET: ONGOING

## 2025-01-07 ASSESSMENT — PATIENT HEALTH QUESTIONNAIRE - PHQ9
2. FEELING DOWN, DEPRESSED OR HOPELESS: NOT AT ALL
SUM OF ALL RESPONSES TO PHQ9 QUESTIONS 1 & 2: 0
1. LITTLE INTEREST OR PLEASURE IN DOING THINGS: NOT AT ALL

## 2025-01-07 ASSESSMENT — PAIN DESCRIPTION - PAIN TYPE: TYPE: ACUTE PAIN

## 2025-01-07 ASSESSMENT — PAIN DESCRIPTION - PROGRESSION: CLINICAL_PROGRESSION: NOT CHANGED

## 2025-01-07 ASSESSMENT — PAIN SCALES - WONG BAKER: WONGBAKER_NUMERICALRESPONSE: NO HURT

## 2025-01-07 ASSESSMENT — PAIN DESCRIPTION - FREQUENCY: FREQUENCY: CONSTANT/CONTINUOUS

## 2025-01-07 NOTE — ANESTHESIA PREPROCEDURE EVALUATION
"Patient: Janie Arguelles \"Sis\"    Procedure Information       Date/Time: 01/07/25 1311    Procedure: Removal of Right ELECTRODE LEAD, NEUROSTIMULATOR, SPINAL (Right)    Location: [facility] OR 24 / [facility] OR    Surgeons: Lanette Cleary MD            Relevant Problems   Anesthesia (within normal limits)      ID   (+) Infection of spinal cord stimulator, initial encounter (CMS-[facility])   (+) Postoperative wound infection       Clinical information reviewed:    Allergies  Meds               NPO Detail:  No data recorded     Physical Exam    Airway  Mallampati: II  TM distance: >3 FB  Neck ROM: full     Cardiovascular   Rhythm: regular  Rate: normal     Dental   (+) upper dentures, lower dentures     Pulmonary   Breath sounds clear to auscultation     Abdominal   Abdomen: soft       Other findings: Two facial piercings in place (lip, eyebrow), unable to be removed. Counseled regarding possible complications from keeping them in place during case, patient expressed understanding and willing to proceed.          Anesthesia Plan    History of general anesthesia?: yes  History of complications of general anesthesia?: no    ASA 3     general     intravenous induction   Postoperative administration of opioids is intended.  Anesthetic plan and risks discussed with patient.  Use of blood products discussed with patient who consented to blood products.    Plan discussed with CAA and attending.      "

## 2025-01-07 NOTE — ED PROVIDER NOTES
HPI: The patient is a 64-year-old with history of RSD who had a stimulator placed in her spine 15 years ago that eventually became infected.  She thought that all the wires were removed and had been going to wound care for treatment of her chronic right lower back wound however, they noted yesterday that the wire was exposed so she is sent to an outside hospital to be assessed.  CTs were obtained and she was given IV Rocephin and vancomycin and was transferred here for service is not available locally.  Patient reports that her neurosurgeon no longer practices so she is being sent here to establish care with a new neurosurgeon to get the remaining hardware removed.  Patient denies any fevers or chills or any new numbness tingling or weakness.     PAST MEDICAL HISTORY:  as per HPI  ALLERGIES:  as per HPI  MEDICATIONS:  as per HPI  FAMILY HISTORY: as per HPI  SURGICAL HISTORY: as per HPI  SOCIAL HISTORY: as per HPI     PHYSICAL EXAM:  VITAL SIGNS: Nursing notes reviewed.  GENERAL:  Alert and interactive  EYES:   Eyes track.  ENT:  Airway patent.  RESPIRATORY:  Nonlabored breathing.  CARDIOVASCULAR:  [Regular rate.] [Regular rhythm.]  GASTROINTESTINAL:  No distension.  MUSCULOSKELETAL:  No deformity.   NEUROLOGICAL:  Awake.  SKIN:  Dry.  BACK: Right low back wound without any exposed hardware or wires.  No purulent drainage.          MEDICAL DECISION MAKING (MDM):     DIAGNOSTIC STUDIES  Labs: Coagulation screen, BNP, CBC, ESR, CRP, blood cultures  Radiology: CT T-spine       REVIEW OF OLD RECORDS: Reviewed the transfer documents that came with the patient.     SUMMARY:  The patient is admitted to the Emergency Department for evaluation of above. Complete history and physical examination was performed by me.  Patient presents with exposed hardware from a previous neurosurgical surgery.  Patient is hemodynamically stable and well-appearing overall.  Patient does not appear septic.  No symptoms to indicate cord  compression syndrome.  Patient was transferred here to be evaluated by neurosurgery so I consulted neurosurgery and they came by to assess the patient.  They requested that I order coagulation screen, blood cultures ESR and CRP as well as a CT of the T-spine given that the patient already had CT of the L-spine and CT on pelvis at the previous facility.  Neurosurgery reports that they will admit the patient to their service under the care of Dr. Cleary for ongoing evaluation care.     DIAGNOSIS:    Infection of spinal cord stimulator     DISPOSITION:    1) admission     Roderick Ramírez MD  01/07/25 4498

## 2025-01-07 NOTE — CARE PLAN
The patient's goals for the shift include  safety  Problem: Safety - Adult  Goal: Free from fall injury  Outcome: Progressing       The clinical goals for the shift include To remain safe and free of injury  safety

## 2025-01-07 NOTE — OP NOTE
"Removal of cervical spinal cord stimulator electrodes (R) Operative Note     Date: 2025  OR Location: [facility] OR    Name: Janie Arguelles \"Sis\", : 1960, Age: 64 y.o., MRN: [id number], Sex: female    Diagnosis  Pre-op Diagnosis      * Postoperative wound infection [T81.49XA] Post-op Diagnosis     * Postoperative wound infection [T81.49XA]     Procedures  Removal of cervical spinal neurostimulator electrode paddle placed via laminectomy  Removal of cervical-thoracic spinal neurostimulator percutaneous electrode  Debridement of right flank IPG pocket (subcutaneous tissue including epidermis and dermis, 20 sq cm)    Surgeons      * Alton Todd - Primary    Resident/Fellow/Other Assistant:  Surgeons and Role:     * Dennis Maher MD - Resident - Assisting    Staff:   Relief Circulator: Susie  Circulator: Alton  Circulator: Renate  Scrguy Person: Choco Baptiste Person: Stefan Arevalo Circulator: Betty    Anesthesia Staff: Anesthesiologist: Roberta Jackman MD  C-AA: CHRISTELLE Gallardo; CHRISTELLE Gray    Procedure Summary  Anesthesia: General  ASA: III  Estimated Blood Loss: 50 mL  Intra-op Medications:   Administrations occurring from 1311 to 1606 on 25:   Medication Name Total Dose   lidocaine-epinephrine (Xylocaine W/EPI) 0.5 %-1:200,000 injection 7 mL   bacitracin ointment 1 Application   polymyxin B 500,000 Units in sodium chloride 0.9% 1,000 mL irrigation 500,000 Units   cefTRIAXone (Rocephin) 2 g 1 g   dexAMETHasone (Decadron) injection 4 mg/mL 10 mg   esmolol 10 mg/mL 50 mg   fentaNYL (Sublimaze) injection 50 mcg/mL 100 mcg   HYDROmorphone (Dilaudid) injection 1 mg/mL 0.5 mg   ketorolac (Toradol) injection 30 mg 30 mg   LR bolus Cannot be calculated   lidocaine (cardiac) injection 2% prefilled syringe 100 mg   midazolam PF (Versed) injection 1 mg/mL 2 mg   phenylephrine 40 mcg/mL syringe 10 mL 80 mcg   propofol (Diprivan) injection 10 mg/mL 526.7 mg   rocuronium (ZeMuron) " "50 mg/5 mL injection 50 mg   vancomycin 1 g 1 g              Anesthesia Record               Intraprocedure I/O Totals          Intake    LR bolus 600.00 mL    Total Intake 600 mL       Output    Est. Blood Loss 10 mL    Total Output 10 mL       Net    Net Volume 590 mL          Specimen:   ID Type Source Tests Collected by Time   A : right flank generator swab Swab ABSCESS TISSUE/WOUND CULTURE/SMEAR Alton Todd MD 1/7/2025 [id number]     Findings: chronically infected generator site wound, complete removal of cervical paddle and extension leads, thoracic percutaneous lead    Indications: Janie Arguelles \"Sis\" is an 64 y.o. female who is having surgery for Postoperative wound infection [T81.49XA].     The patient was seen in the preoperative area. The risks, benefits, complications, treatment options, non-operative alternatives, expected recovery and outcomes were discussed with the patient. The possibilities of reaction to medication, pulmonary aspiration, injury to surrounding structures, bleeding, recurrent infection, the need for additional procedures, failure to diagnose a condition, and creating a complication requiring transfusion or operation were discussed with the patient. The patient concurred with the proposed plan, giving informed consent.  The site of surgery was properly noted/marked if necessary per policy. The patient has been actively warmed in preoperative area. Preoperative antibiotics have been ordered and given within 1 hours of incision. Venous thrombosis prophylaxis have been ordered including bilateral sequential compression devices    DESCRIPTION OF THE OPERATION:   The patient was brought to the operating room theater. A verbal huddle was performed confirming the patient by name, date of birth, medical record number, site of surgery. After all team members were in agreement, she underwent anesthesia induction without complication. Two large bore IVs were placed as well as endotracheal " tube. Perioperative antibiotic administration was confirmed. Patient's head was placed into a garcia headholder and the patient was flipped prone onto socorro table with pads and their neck along with prior generator site was prepped and draped in a sterile fashion.     Using fluoroscopy we confirmed the location of the paddle, percutaneous lead, and anchors for removal.     The skin was then infiltrated with lidocaine with epinephrine and next began procedure by using a 10 blade to open over the percutaneous lead anchor. Electrocautery and blunt dissection was used to isolate the anchor which was dissected and the percutaneous lead was removed without issue. The lead was then secured to be removed distally through the generator pocket.     The separate cervical paddle incision was then infiltrated with lidocaine with epinephrine and opened with a 10 blade. The wire was followed to the base of the paddle electrode, which was dissected and removed without issue. There was noted to be no connection of the cervical paddle to the distal generator pocket so this extension lead along with the paddle was removed without issue completely.     Using a separate set of instruments, the right flank generator site was opened bluntly to expose the percutaneous lead distally. This was pulled from this incision and the entirety of the lead was able to be removed without issue in its entirety. A culture was taken in this deep tissue.    Using the previous set of instruments and a new pair of sterile gloves, the cervical incisions were then irrigated copiously with antibiotic impregnated normal saline and then closed in a layered fashion with 0 vicryl suture for the fascia, 2-0 vicryl suture for the dermis, and 3-0 monocryl for the skin.     The generator site incision was debrided and irrigated extensively with antibiotic impregated normal saline as well as Irrisept (subcutaneous tissues, epidermis and dermis). This incision was  then closed in a layered fashion with 0 vicryl suture for the deep tissues, 2-0 vicryl suture for the dermis, and 2-0 prolene for the skin.     All incisions were covered with sterile dressings and the patient was flipped into the supine position and removed from the [place] headholder and they were extubated and returned to PACU in stable condition.     Complications:  None; patient tolerated the procedure well.    Disposition: PACU - hemodynamically stable.  Condition: stable     Additional Details: none    Attending Attestation: I was present for the critical portions of the procedure.    Alton Todd  Phone Number: [phone]

## 2025-01-07 NOTE — PROGRESS NOTES
Pharmacy Medication History Review    Janie Arguelles is a 64 y.o. female admitted for Postoperative wound infection. Pharmacy reviewed the patient's oezwg-nl-eviiansvl medications and allergies for accuracy.    The list below reflects the updated PTA list.   Prior to Admission Medications   Prescriptions Last Dose Informant   ARIPiprazole (Abilify) 15 mg tablet  Self   Sig: Take 1 tablet (15 mg) by mouth early in the morning..   HYDROmorphone (Dilaudid) 4 mg tablet  Self   Sig: Take 1 tablet (4 mg) by mouth 4 times a day as needed.   desvenlafaxine 50 mg 24 hr tablet  Self   Sig: Take 1 tablet (50 mg) by mouth early in the morning..   diclofenac (Voltaren) 75 mg EC tablet  Self   Sig: Take 1 tablet (75 mg) by mouth 2 times a day.   multivit-min/folic acid/mqb717 (ALIVE WOMEN'S GUMMY VITAMIN ORAL)  Self   Sig: Take 1 each by mouth once daily.   prazosin (Minipress) 1 mg capsule  Self   Sig: Take 1 capsule (1 mg) by mouth once daily at bedtime.   pregabalin (Lyrica) 75 mg capsule  Self   Sig: Take 1 capsule (75 mg) by mouth 3 times a day.   tiZANidine (Zanaflex) 2 mg tablet  Self   Sig: Take 1 tablet (2 mg) by mouth every 8 hours if needed for muscle spasms.   zolpidem (Ambien) 10 mg tablet  Self   Sig: Take 1 tablet (10 mg) by mouth once daily at bedtime.      Facility-Administered Medications: None        The list below reflects the updated allergy list. Please review each documented allergy for additional clarification and justification.  Allergies  Reviewed by Zulma Leone, PharmD on 1/7/2025        Severity Reactions Comments    Ancef [cefazolin] Not Specified Other     Sulfa (sulfonamide Antibiotics) Not Specified Other             Patient accepts M2B at discharge.     Sources used to complete the med history include:   [facility]    Pharmacy dispense history - [facility]  Patient interview Good historian  Care Everywhere    Medications ADDED:  All medications  Medications CHANGED:  none  Medications REMOVED:    none    Zulma Leone PharmD  Transitions of Care Pharmacist  [facility]  Please reach out via Secure Chat for questions, or if no response call [phone] or vocera [facility]

## 2025-01-07 NOTE — ANESTHESIA PROCEDURE NOTES
Airway  Date/Time: 1/7/2025 2:07 PM  Urgency: elective      Staffing  Performed: MATILDE   Authorized by: Roberta Jackman MD    Performed by: CHRISTELLE Gallardo  Patient location during procedure: OR    Indications and Patient Condition  Indications for airway management: anesthesia  Spontaneous ventilation: present  Sedation level: deep  Preoxygenated: yes  Mask difficulty assessment: 1 - vent by mask    Final Airway Details  Final airway type: endotracheal airway      Successful airway: ETT  Cuffed: yes   Successful intubation technique: direct laryngoscopy  Blade size: #3  ETT size (mm): 7.0  Cormack-Lehane Classification: grade I - full view of glottis  Measured from: lips  ETT to lips (cm): 21  Number of attempts at approach: 1  Ventilation between attempts: BVM

## 2025-01-07 NOTE — H&P
"History Of Present Illness  Janie Arguelles is a 64 y.o. female presenting with SCS lead exposed.    Patient with SCS placed 15 years ago at [facility] for back pain. In may the battery eroded through the pocket and was therefore removed. However this was complicated by wound infection requiring rounds of antibiotics, wound being managed by wound care and packed with aquacel for persistent drainage. Patient was sent to ED for lead exposure by wound care. Denied any blood thinners.    Patient's imaging was personally reviewed and notable for  CT AP exposed lead with overlying inflammatory changes in R flank    Past Medical History  She has no past medical history on file.    Surgical History  She has no past surgical history on file.     Social History  She has no history on file for tobacco use, alcohol use, and drug use.     Allergies  Ancef [cefazolin] and Sulfa (sulfonamide antibiotics)    Medications  (Not in a hospital admission)      Review of Systems negative other than listed in HPI     Physical Exam  EXAM    Constitutional: No acute distress  Resp: breathing comfortably  Cardio: well perfused  GI: nondistended  MSK: full range of motion  Neuro: Awake, Ox3, RUE finger amputation o/w intact  Psych: appropriate  Skin: no obvious lesions    Last Recorded Vitals  Blood pressure 158/77, pulse 79, temperature 36.5 °C (97.7 °F), temperature source Temporal, resp. rate 16, height 1.575 m (5' 2\"), weight 63.5 kg (140 lb), SpO2 (!) 92%.    Relevant Results    Assessment/Plan   Assessment & Plan      64yF h/o SCS (placed 15 yrs ago at [facility], placing physician retired), c/b erosion, s/p removal 5/2024, c/b persistent drainage, followed by wound care and has been on and off antibiotics, p/w exposed SCS lead, CT AP exposed lead with overlying inflammatory changes in R flank, s/p CTX/Vanc at OSH    PLAN  Admit to neurosurgery floor  Q4 neurochecks and vitals  CT T spine  CBC/RFP/coag/T&S/UA/UPT (if applicable)/EKG/CXR  ESR, " CRP, Bld Cx  OR today for wound revision and lead removal  NPO      Ghulam Velázquez MD  Note authored by resident on neurosurgery team, with all questions or to contact team please page at [phone]

## 2025-01-07 NOTE — CONSULTS
Pharmacy to Dose Vancomycin:  Janie Arguelles is a 64 y.o. female ordered pharmacy to dose vancomycin for complicated skin and soft tissue infection. Today is day 1 of therapy.  Goal: -600mg/L*hr  Results from last 7 days   Lab Units 01/07/25  0512   BUN mg/dL 10   CREATININE mg/dL 0.64   WBC AUTO x10*3/uL 9.2      Blood Culture   Date/Time Value Ref Range Status   01/07/2025 05:12 AM Loaded on Instrument - Culture in progress  Preliminary   01/07/2025 05:12 AM Loaded on Instrument - Culture in progress  Preliminary      No results found for the last 90 days.    Antibiotics: Rocephin (Day 1).  Pertinent Medications: none.  Renal function remains stable.    Plan:  Start vanco 1GQ12H.  The above dosing regimen is predicted by [email or web address] to result in the following pharmacokinetic parameters:  Loading dose: N/A  Regimen: 1000 mg IV every 12 hours.  Start time: 02:24 on 01/08/2025  Exposure target: AUC24 (range)400-600 mg/L.hr   RFE92-10: 484 mg/L.hr  AUC24,ss: 551 mg/L.hr  Probability of AUC24 > 400: 81 %  Ctrough,ss: 16.9 mg/L  Probability of Ctrough,ss > 20: 36 %  Follow-up level ordered for 1/9 AM unless clinically indicated sooner.  Pharmacy will continue daily vancomycin monitoring. Please contact the pharmacy with any questions.    Thank you,  Chu Weir RP

## 2025-01-07 NOTE — ED TRIAGE NOTES
Patient transferred from [facility] for neuro consult. Patient has a history of RSD and had a neuro stimulator placed >15 years ago, states it became infected and was removed and now has exposed electrode wires. Patient is awake in bed A&Ox3, respirations even and unlabored, VSS.

## 2025-01-07 NOTE — ED NOTES
Patient SPO2 90-91% on RA. Patient states she use to wear oxygen as needed due to history of COPD. Placed patient on 2 liters of O2 via nasal cannula, SPO2 97%.

## 2025-01-07 NOTE — ED NOTES
Patient was signed out to me pending consult with  neurosurgeon.  I spoke with Dr. Conrad, neurosurgery, who states that the patient to be transferred to their emergency department for evaluation to see if she is a surgical candidate at this time.  Once patient was signed out to me I was told that there was no abscess which was relayed to the oncoming physician but after further reviewing the CT scans there was no sign of abscess but there is a small phlegmon.  Patient did receive IV Vancomyocin and IV Rocephin prior to me taking over the patient for a soft tissue phlegmon. No signs of abscess on imaging. Spoke with Dr. Mota, ED physician at  who accepted for transfer for further evaluation.      Debora Munoz,   01/06/25 [id number]

## 2025-01-07 NOTE — HOSPITAL COURSE
Janie Arguelles is a 64 y.o. female with a past medical history of SCS (placed 15 yrs ago at [facility], placing physician retired), c/b erosion, s/p removal 5/2024, c/b persistent drainage, followed by wound care and has been on and off antibiotics, p/w exposed SCS lead, CT AP exposed lead with overlying inflammatory changes in R flank, s/p CTX/Vanc at OSH. She was transferred to [facility] under Neurosurgery for further evaluation and management.   1/7 s/p removal of cervical paddle and lead, generator site revision   1/8 ID consulted, continuing vanco and starting zosyn  1/10 ID rec for continuing banco, stopping zosyn and starting CTX with an estimated stop date of 2/17, PICC placed    PT/OT evaluated patient and recommended low level of therapy.  Home care ordered for need for IV antibiotics.     On the day of discharge, the patient was seen and evaluated by the neurosurgery team and deemed suitable for discharge. The patient was given detailed discharge instructions and were scheduled to follow up as an outpatient.

## 2025-01-08 LAB
ATRIAL RATE: 67 BPM
ATRIAL RATE: 79 BPM
P AXIS: 54 DEGREES
P AXIS: 78 DEGREES
P OFFSET: 174 MS
P OFFSET: 177 MS
P ONSET: 121 MS
P ONSET: 126 MS
PR INTERVAL: 176 MS
PR INTERVAL: 180 MS
Q ONSET: 211 MS
Q ONSET: 214 MS
QRS COUNT: 10 BEATS
QRS COUNT: 13 BEATS
QRS DURATION: 82 MS
QRS DURATION: 90 MS
QT INTERVAL: 390 MS
QT INTERVAL: 404 MS
QTC CALCULATION(BAZETT): 426 MS
QTC CALCULATION(BAZETT): 447 MS
QTC FREDERICIA: 419 MS
QTC FREDERICIA: 427 MS
R AXIS: -44 DEGREES
R AXIS: -49 DEGREES
T AXIS: 40 DEGREES
T AXIS: 78 DEGREES
T OFFSET: 409 MS
T OFFSET: 413 MS
VENTRICULAR RATE: 67 BPM
VENTRICULAR RATE: 79 BPM

## 2025-01-08 PROCEDURE — 2500000004 HC RX 250 GENERAL PHARMACY W/ HCPCS (ALT 636 FOR OP/ED)

## 2025-01-08 PROCEDURE — 2500000001 HC RX 250 WO HCPCS SELF ADMINISTERED DRUGS (ALT 637 FOR MEDICARE OP): Performed by: NEUROLOGICAL SURGERY

## 2025-01-08 PROCEDURE — 2500000001 HC RX 250 WO HCPCS SELF ADMINISTERED DRUGS (ALT 637 FOR MEDICARE OP)

## 2025-01-08 PROCEDURE — G0378 HOSPITAL OBSERVATION PER HR: HCPCS

## 2025-01-08 PROCEDURE — 99254 IP/OBS CNSLTJ NEW/EST MOD 60: CPT | Performed by: STUDENT IN AN ORGANIZED HEALTH CARE EDUCATION/TRAINING PROGRAM

## 2025-01-08 PROCEDURE — 2500000004 HC RX 250 GENERAL PHARMACY W/ HCPCS (ALT 636 FOR OP/ED): Performed by: NEUROLOGICAL SURGERY

## 2025-01-08 PROCEDURE — 2500000002 HC RX 250 W HCPCS SELF ADMINISTERED DRUGS (ALT 637 FOR MEDICARE OP, ALT 636 FOR OP/ED): Performed by: NEUROLOGICAL SURGERY

## 2025-01-08 PROCEDURE — 2500000002 HC RX 250 W HCPCS SELF ADMINISTERED DRUGS (ALT 637 FOR MEDICARE OP, ALT 636 FOR OP/ED)

## 2025-01-08 PROCEDURE — 1100000001 HC PRIVATE ROOM DAILY

## 2025-01-08 RX ORDER — HYDROMORPHONE HYDROCHLORIDE 2 MG/1
4 TABLET ORAL EVERY 6 HOURS PRN
Status: DISCONTINUED | OUTPATIENT
Start: 2025-01-08 | End: 2025-01-13 | Stop reason: HOSPADM

## 2025-01-08 RX ORDER — HEPARIN SODIUM 5000 [USP'U]/ML
5000 INJECTION, SOLUTION INTRAVENOUS; SUBCUTANEOUS EVERY 8 HOURS
Status: DISCONTINUED | OUTPATIENT
Start: 2025-01-08 | End: 2025-01-13 | Stop reason: HOSPADM

## 2025-01-08 RX ORDER — HYDROMORPHONE HYDROCHLORIDE 2 MG/1
2 TABLET ORAL EVERY 4 HOURS PRN
Status: DISCONTINUED | OUTPATIENT
Start: 2025-01-08 | End: 2025-01-13 | Stop reason: HOSPADM

## 2025-01-08 RX ADMIN — PIPERACILLIN SODIUM AND TAZOBACTAM SODIUM 4.5 G: 4; .5 INJECTION, SOLUTION INTRAVENOUS at 21:26

## 2025-01-08 RX ADMIN — TIZANIDINE 2 MG: 4 TABLET ORAL at 10:02

## 2025-01-08 RX ADMIN — PREGABALIN 75 MG: 75 CAPSULE ORAL at 15:43

## 2025-01-08 RX ADMIN — ARIPIPRAZOLE 15 MG: 15 TABLET ORAL at 05:50

## 2025-01-08 RX ADMIN — ZOLPIDEM TARTRATE 10 MG: 5 TABLET, COATED ORAL at 20:29

## 2025-01-08 RX ADMIN — DESVENLAFAXINE 50 MG: 50 TABLET, FILM COATED, EXTENDED RELEASE ORAL at 05:50

## 2025-01-08 RX ADMIN — HYDROMORPHONE HYDROCHLORIDE 2 MG: 2 TABLET ORAL at 19:41

## 2025-01-08 RX ADMIN — PREGABALIN 75 MG: 75 CAPSULE ORAL at 08:12

## 2025-01-08 RX ADMIN — HYDROMORPHONE HYDROCHLORIDE 4 MG: 2 TABLET ORAL at 15:44

## 2025-01-08 RX ADMIN — ACETAMINOPHEN 650 MG: 325 TABLET ORAL at 19:41

## 2025-01-08 RX ADMIN — VANCOMYCIN HYDROCHLORIDE 1000 MG: 1 INJECTION, SOLUTION INTRAVENOUS at 08:12

## 2025-01-08 RX ADMIN — ACETAMINOPHEN 650 MG: 325 TABLET ORAL at 05:50

## 2025-01-08 RX ADMIN — POLYETHYLENE GLYCOL 3350 17 G: 17 POWDER, FOR SOLUTION ORAL at 08:12

## 2025-01-08 RX ADMIN — PREGABALIN 75 MG: 75 CAPSULE ORAL at 20:29

## 2025-01-08 RX ADMIN — OXYCODONE HYDROCHLORIDE 5 MG: 5 TABLET ORAL at 00:05

## 2025-01-08 RX ADMIN — SENNOSIDES AND DOCUSATE SODIUM 2 TABLET: 50; 8.6 TABLET ORAL at 20:29

## 2025-01-08 RX ADMIN — SENNOSIDES AND DOCUSATE SODIUM 2 TABLET: 50; 8.6 TABLET ORAL at 08:12

## 2025-01-08 RX ADMIN — PIPERACILLIN SODIUM AND TAZOBACTAM SODIUM 4.5 G: 4; .5 INJECTION, SOLUTION INTRAVENOUS at 15:43

## 2025-01-08 RX ADMIN — VANCOMYCIN HYDROCHLORIDE 1000 MG: 1 INJECTION, SOLUTION INTRAVENOUS at 20:28

## 2025-01-08 RX ADMIN — OXYCODONE HYDROCHLORIDE 5 MG: 5 TABLET ORAL at 05:50

## 2025-01-08 RX ADMIN — CEFTRIAXONE SODIUM 2 G: 2 INJECTION, SOLUTION INTRAVENOUS at 10:03

## 2025-01-08 RX ADMIN — HYDROMORPHONE HYDROCHLORIDE 4 MG: 2 TABLET ORAL at 10:02

## 2025-01-08 ASSESSMENT — ENCOUNTER SYMPTOMS
ABDOMINAL PAIN: 0
FEVER: 0
VOMITING: 0
COUGH: 0
DIARRHEA: 0
NECK PAIN: 1
NAUSEA: 0
CHILLS: 0

## 2025-01-08 ASSESSMENT — ACTIVITIES OF DAILY LIVING (ADL): LACK_OF_TRANSPORTATION: NO

## 2025-01-08 ASSESSMENT — PAIN SCALES - GENERAL
PAINLEVEL_OUTOF10: 8
PAINLEVEL_OUTOF10: 7

## 2025-01-08 ASSESSMENT — PAIN - FUNCTIONAL ASSESSMENT: PAIN_FUNCTIONAL_ASSESSMENT: 0-10

## 2025-01-08 NOTE — SIGNIFICANT EVENT
Rapid Response Nurse Note: RADAR alert: 6    Pager time: 10  Arrival time: 30  Event end time: 40  Location: T4-72  [x] Triage by phone or secure messaging    Rapid response initiated by:  [] Rapid response RN [] Family [] Nursing Supervisor [] Physician   [x] RADAR auto page [] Sepsis auto-page [] RN [] RT   [] NP/PA [] Other:     Primary reason for call:   [] BAT [] New CPAP/BiPAP [] Bleeding [] Change in mental status   [] Chest pain [] Code blue [] FiO2 >/= 50% [] HR </= 40 bpm   [] HR >/= 130 bpm [] Hyperglycemia [] Hypoglycemia [x] RADAR    [] RR </= 8 bpm [] RR >/= 30 bpm [] SBP </= 90 mmHg [] SpO2 < 90%   [] Seizure [] Sepsis [] Shortness of breath  [] Staff concern: see comments     Initial VS and/or RADAR VS: T 36 °C; HR 83; RR 17; /68; SPO2 93%.    Providers present at bedside (if applicable): bedside RN    Name of ICU Provider contacted (if applicable): na    Interventions:  [x] None [] ABG/VBG [] Assist w/ICU transfer [] BAT paged    [] Bag mask [] Blood [] Cardioversion [] Code Blue   [] Code blue for intubation [] Code status changed [] Chest x-ray [] EKG   [] IV fluid/bolus [] KUB x-ray [] Labs/cultures [] Medication   [] Nebulizer treatment [] NIPPV (CPAP/BiPAP) [] Oxygen [] Oral airway   [] Peripheral IV [] Palliative care consult [] CT/MRI [] Sepsis protocol    [] Suctioned [] Other:     Outcome:  [] Coded and  [] Code blue for intubation [] Coded and transferred to ICU []  on division   [x] Remained on division (no change) [] Remained on division + additional monitoring [] Remained in ED [] Transferred to ED   [] Transferred to ICU [] Transferred to inpatient status [] Transferred for interventions (procedure) [] Transferred to ICU stepdown    [] Transferred to surgery [] Transferred to telemetry [] Sepsis protocol [] STEMI protocol   [] Stroke protocol [x] Bedside nurse instructed to page rapid response for any concerns or acute change in condition/VS     Additional  Comments: Radar=6 paged 1/8@0010; Called & spoke with RN who stated pt had just returned from PACU & was sleeping at the time VS's taken & had gotten pain meds; No acute concerns @ this time per RN

## 2025-01-08 NOTE — PROGRESS NOTES
"Janie Arguelles \"Kristen\" is a 64 y.o. female on day 1 of admission presenting with Postoperative wound infection.    Subjective   No acute events overnight    Objective     Physical Exam    Last Recorded Vitals  Blood pressure 107/68, pulse 83, temperature 36 °C (96.8 °F), temperature source Temporal, resp. rate 17, height 1.575 m (5' 2\"), weight 63.5 kg (140 lb), SpO2 93%.  Intake/Output last 3 Shifts:  I/O last 3 completed shifts:  In: 600 (9.4 mL/kg) [IV Piggyback:600]  Out: 10 (0.2 mL/kg) [Blood:10]  Weight: 63.5 kg     Relevant Results  EXAM   RUE finger amputation o/w intact  incision x3     Assessment/Plan   Assessment & Plan  Postoperative wound infection    Infection of spinal cord stimulator, initial encounter ([facility])    64yF h/o SCS (placed 15 yrs ago at [facility], placing physician retired), c/b erosion, s/p removal 5/2024, c/b persistent drainage, followed by wound care and has been on and off antibiotics, p/w exposed SCS lead, CT AP exposed lead with overlying inflammatory changes in R flank, s/p CTX/Vanc at OSH, 1/7 CT TS cervical paddle and lead, CS xray leads in pos'n, 1/7 s/p removal of cervical paddle and lead, generator site revision    PLAN  Floor  ID c/s  Medicine c/s for LUCIO  OR Cx  Bld Cx  PTOT      Ghulam Velázquez MD      "

## 2025-01-08 NOTE — CONSULTS
"Inpatient consult to Infectious Diseases  Consult performed by: Ailyn Sheth PA-C  Consult ordered by: Lanette Cleary MD        Referred by Dr. Lanette Cleary      Primary MD: No Assigned PCP Generic Provider, MD    Reason For Consult  Spinal cord stimulator wound infection    History Of Present Illness  Janie Arguelles \"Kristen\" is a 64 y.o. female with a PMHx of reflex sympathetic dystrophy who had a spinal cord stimulator placed about 15 years ago. Battery eroded through the pocket and was removed in May 2024. Complicated by wound infection and required several rounds of antibiotics. Most recent antibiotic that patient recalls is Levaquin. Wound managed by wound care.  At wound care appointment, an exposed lead was noted. Patient sent to OSH ED for management. Pt given IV ceftriaxone and vancomycin and transferred to [facility] on 1/07 for further management as service is not available locally.   CT thoracic spine showed exposed spinal cord stimulator lead w/ overlying inflammatory changes. CRP 1.77 and ESR 70. Pt taken to the OR on 1/07 for removal of cervical spinal paddle and percutaneous electrode and debridement of R flank pocket. One intra-op culture in progress.  Pt currently on vancomycin and ceftriaxone.   Pt evaluated bedside today. Endorses neck pain and R flank pain at site of debridement. Denies fever, chills, cough, chest pain, abdominal pain, N/V/D.     Past Medical History  She has a past medical history of Chronic pain disorder.    Surgical History  She has a past surgical history that includes Spinal cord stimulator implant; Hysterectomy; and Carpal tunnel release.     Social History     Occupational History    Not on file   Tobacco Use    Smoking status: Former     Types: Cigarettes    Smokeless tobacco: Never   Vaping Use    Vaping status: Not on file   Substance and Sexual Activity    Alcohol use: Not Currently    Drug use: Not on file    Sexual activity: Not on file     Travel History   " Travel since 12/08/24    No documented travel since 12/08/24                Family History  No family history on file.  Allergies  Ancef [cefazolin] and Sulfa (sulfonamide antibiotics)     Immunization History   Administered Date(s) Administered    COVID-19, mRNA, LNP-S, PF, 30 mcg/0.3 mL dose 03/05/2021, 03/31/2021     Medications  Home medications:  Medications Prior to Admission   Medication Sig Dispense Refill Last Dose/Taking    ARIPiprazole (Abilify) 15 mg tablet Take 1 tablet (15 mg) by mouth early in the morning..   Taking    desvenlafaxine 50 mg 24 hr tablet Take 1 tablet (50 mg) by mouth early in the morning..   Taking    prazosin (Minipress) 1 mg capsule Take 1 capsule (1 mg) by mouth once daily at bedtime.   Taking    tiZANidine (Zanaflex) 2 mg tablet Take 1 tablet (2 mg) by mouth every 8 hours if needed for muscle spasms.   Taking As Needed    zolpidem (Ambien) 10 mg tablet Take 1 tablet (10 mg) by mouth once daily at bedtime.   Taking    diclofenac (Voltaren) 75 mg EC tablet Take 1 tablet (75 mg) by mouth 2 times a day.       HYDROmorphone (Dilaudid) 4 mg tablet Take 1 tablet (4 mg) by mouth 4 times a day as needed.       multivit-min/folic acid/swe963 (ALIVE WOMEN'S GUMMY VITAMIN ORAL) Take 1 each by mouth once daily.       pregabalin (Lyrica) 75 mg capsule Take 1 capsule (75 mg) by mouth 3 times a day.        Current medications:  Scheduled medications  ARIPiprazole, 15 mg, oral, Daily  cefTRIAXone, 2 g, intravenous, q24h  desvenlafaxine, 50 mg, oral, Daily  heparin (porcine), 5,000 Units, subcutaneous, q8h  polyethylene glycol, 17 g, oral, Daily  [Held by provider] prazosin, 1 mg, oral, Nightly  pregabalin, 75 mg, oral, TID  sennosides-docusate sodium, 2 tablet, oral, BID  vancomycin, 1,000 mg, intravenous, q12h  zolpidem, 10 mg, oral, Nightly      Continuous medications     PRN medications  PRN medications: acetaminophen, HYDROmorphone, HYDROmorphone, naloxone, ondansetron **OR** ondansetron,  oxygen, tiZANidine, vancomycin    Review of Systems   Constitutional:  Negative for chills and fever.   Respiratory:  Negative for cough.    Cardiovascular:  Negative for chest pain.   Gastrointestinal:  Negative for abdominal pain, diarrhea, nausea and vomiting.   Musculoskeletal:  Positive for neck pain.          Objective  Range of Vitals (last 24 hours)  Heart Rate:  []   Temp:  [36 °C (96.8 °F)-36.4 °C (97.5 °F)]   Resp:  [10-23]   BP: ()/()   SpO2:  [91 %-95 %]   Daily Weight  01/07/25 : 63.5 kg (140 lb)    Body mass index is 25.61 kg/m².     Physical Exam  Constitutional:       Appearance: Normal appearance.   HENT:      Head: Normocephalic and atraumatic.   Eyes:      Extraocular Movements: Extraocular movements intact.   Cardiovascular:      Rate and Rhythm: Normal rate and regular rhythm.   Pulmonary:      Effort: Pulmonary effort is normal.      Breath sounds: Normal breath sounds.   Abdominal:      General: Abdomen is flat.      Palpations: Abdomen is soft.   Musculoskeletal:         General: Normal range of motion.      Right lower leg: No edema.      Left lower leg: No edema.      Comments: Cervical and upper thoracic post-surgical dressings with no strikethrough bleeding   Neurological:      Mental Status: She is alert and oriented to person, place, and time.   Psychiatric:         Mood and Affect: Mood normal.         Behavior: Behavior normal.            Relevant Results  Outside Hospital Results  Yes -   Labs  Results from last 72 hours   Lab Units 01/07/25  0512   WBC AUTO x10*3/uL 9.2   HEMOGLOBIN g/dL 13.1   HEMATOCRIT % 41.1   PLATELETS AUTO x10*3/uL 273   NEUTROS PCT AUTO % 79.8   LYMPHS PCT AUTO % 11.9   MONOS PCT AUTO % 6.6   EOS PCT AUTO % 1.0     Results from last 72 hours   Lab Units 01/07/25  0512   SODIUM mmol/L 138   POTASSIUM mmol/L 3.9   CHLORIDE mmol/L 100   CO2 mmol/L 26   BUN mg/dL 10   CREATININE mg/dL 0.64   GLUCOSE mg/dL 86   CALCIUM mg/dL 9.8   ANION GAP  "mmol/L 16   EGFR mL/min/1.73m*2 >90         Estimated Creatinine Clearance: 78.8 mL/min (by C-G formula based on SCr of 0.64 mg/dL).  C-Reactive Protein   Date Value Ref Range Status   01/07/2025 1.77 (H) <1.00 mg/dL Final     Sedimentation Rate   Date Value Ref Range Status   01/07/2025 70 (H) 0 - 30 mm/h Final     No results found for: \"HIV1X2\", \"HIVCONF\", \"PYOYIJ6AY\"  No results found for: \"HEPCABINIT\", \"HEPCAB\", \"HCVPCRQUANT\"  Microbiology  No results found for the last 90 days.    1/07/24 Intra-op culture- in progress  1/07/24 Blood cultures- NGTD  May 2024 Wound culture (at OSH)- No growth    Imaging  XR CERVICAL SPINE 2-3 VIEWS; ; 1/7/2025 12:21 pm   FINDINGS:  Two views of the cervical spine.      No acute fractures. No focal subluxations. Laminectomy changes.  Moderate multilevel discogenic, uncovertebral, and facet degenerative  change. Spinal stimulator leads.      IMPRESSION:  Postsurgical changes, as above. No evidence of hardware complication.    XR CHEST 1 VIEW; 1/7/2025 9:03 am   IMPRESSION:  1. No evidence of acute cardiopulmonary process.    CT THORACIC SPINE W IV CONTRAST; 1/7/2025 7:54 am  IMPRESSION:  1. Nonspecific soft tissue thickening and suspected inflammatory  changes along the course of the left stimulator lead within the  subcutaneous soft tissues and extending to the skin surface. There is  also subtle thickening and loss of fat soft tissue planes along the  posterior paraspinous musculature in the region of the lead track. No  definite, well-defined rim enhancing fluid collection is identified  noting assessment is limited due to streak artifact. There is  abnormal lucency and sclerosis of the posterior elements of T1 and T2  where the spinal cord stimulator lead enters the spinal canal.  Osteomyelitis not excluded.  2. Other chronic findings as above.     Assessment/Plan   Janie Arguelles \"Sis\" is a 64 y.o. female with a PMHx of reflex sympathetic dystrophy who had a spinal cord " stimulator placed about 15 years ago. Generator removed in May 2024. Called [facility] and culture from that surgery had no growth. Complicated by wound infection and required several rounds of antibiotics. Most recent antibiotic that patient recalls is Levaquin.     CT showing inflammatory changes and pt with elevated inflammatory markers.  Pt is now s/p OR on 1/07 for removal of leads and debridement of R flank pocket.  One intra-op culture is pending. Pt currently on vancomycin and ceftriaxone. Since pt was recently on Levaquin, want to make sure the antibiotics cover Pseudomonas. Recommend discontinuing ceftriaxone and starting Zosyn 4.5g q6h and continuing vancomycin.    Recommendations:  1) Discontinue ceftriaxone  2) Start Zosyn 4.5g IV q6h  3) Continue vancomycin, dosed with pharmacy support  4) Follow 1/07 intra-op culture      I spent 60 minutes in the professional and overall care of this patient.      Ailyn Sheth PA-C  Infectious Disease Team B  EPIC chat preferred

## 2025-01-08 NOTE — CARE PLAN
The patient's goals for the shift include  safety  Problem: Safety - Adult  Goal: Free from fall injury  1/7/2025 1917 by Dianne Moreau RN  Outcome: Progressing  1/7/2025 1850 by Dianne Moreau RN  Outcome: Progressing       The clinical goals for the shift include To remain safe and free of injury    Problem: Safety - Adult  Goal: Free from fall injury  1/7/2025 1917 by Dianne Moreau RN  Outcome: Progressing  1/7/2025 1850 by Dianne Moreau RN  Outcome: Progressing

## 2025-01-08 NOTE — ANESTHESIA POSTPROCEDURE EVALUATION
"Patient: Janie Arguelles \"Sis\"    Procedure Summary       Date: 01/07/25 Room / Location: [facility] OR 24 / Virtual [facility]    Anesthesia Start: 1357 Anesthesia Stop: 1704    Procedure: Removal of Right ELECTRODE LEAD, NEUROSTIMULATOR, SPINAL (Right) Diagnosis:       Postoperative wound infection      (Postoperative wound infection [T81.49XA])    Surgeons: Alton Todd MD Responsible Provider: Roberta Jackman MD    Anesthesia Type: general ASA Status: 3            Anesthesia Type: general    Vitals Value Taken Time   /74 01/07/25 1815   Temp 36 °C (96.8 °F) 01/07/25 1815   Pulse 99 01/07/25 1820   Resp 29 01/07/25 1820   SpO2 96 % 01/07/25 1818   Vitals shown include unfiled device data.    Anesthesia Post Evaluation    Patient location during evaluation: PACU  Patient participation: complete - patient participated  Level of consciousness: awake and alert  Pain management: adequate  Airway patency: patent  Cardiovascular status: acceptable  Respiratory status: acceptable  Hydration status: acceptable  Postoperative Nausea and Vomiting: none        There were no known notable events for this encounter.    "

## 2025-01-09 LAB
ALBUMIN SERPL BCP-MCNC: 3.6 G/DL (ref 3.4–5)
ANION GAP SERPL CALC-SCNC: 13 MMOL/L (ref 10–20)
BUN SERPL-MCNC: 18 MG/DL (ref 6–23)
CALCIUM SERPL-MCNC: 8.9 MG/DL (ref 8.6–10.6)
CHLORIDE SERPL-SCNC: 104 MMOL/L (ref 98–107)
CO2 SERPL-SCNC: 26 MMOL/L (ref 21–32)
CREAT SERPL-MCNC: 0.71 MG/DL (ref 0.5–1.05)
EGFRCR SERPLBLD CKD-EPI 2021: >90 ML/MIN/1.73M*2
ERYTHROCYTE [DISTWIDTH] IN BLOOD BY AUTOMATED COUNT: 13.8 % (ref 11.5–14.5)
GLUCOSE SERPL-MCNC: 90 MG/DL (ref 74–99)
HCT VFR BLD AUTO: 35.1 % (ref 36–46)
HGB BLD-MCNC: 11.1 G/DL (ref 12–16)
MCH RBC QN AUTO: 28.8 PG (ref 26–34)
MCHC RBC AUTO-ENTMCNC: 31.6 G/DL (ref 32–36)
MCV RBC AUTO: 91 FL (ref 80–100)
NRBC BLD-RTO: 0 /100 WBCS (ref 0–0)
PHOSPHATE SERPL-MCNC: 3.7 MG/DL (ref 2.5–4.9)
PLATELET # BLD AUTO: 218 X10*3/UL (ref 150–450)
POTASSIUM SERPL-SCNC: 4 MMOL/L (ref 3.5–5.3)
RBC # BLD AUTO: 3.85 X10*6/UL (ref 4–5.2)
SODIUM SERPL-SCNC: 139 MMOL/L (ref 136–145)
VANCOMYCIN SERPL-MCNC: 21.5 UG/ML (ref 5–20)
WBC # BLD AUTO: 8.2 X10*3/UL (ref 4.4–11.3)

## 2025-01-09 PROCEDURE — 2500000001 HC RX 250 WO HCPCS SELF ADMINISTERED DRUGS (ALT 637 FOR MEDICARE OP)

## 2025-01-09 PROCEDURE — 97165 OT EVAL LOW COMPLEX 30 MIN: CPT | Mod: GO

## 2025-01-09 PROCEDURE — 36415 COLL VENOUS BLD VENIPUNCTURE: CPT

## 2025-01-09 PROCEDURE — 97161 PT EVAL LOW COMPLEX 20 MIN: CPT | Mod: GP

## 2025-01-09 PROCEDURE — 97530 THERAPEUTIC ACTIVITIES: CPT | Mod: GP

## 2025-01-09 PROCEDURE — 80202 ASSAY OF VANCOMYCIN: CPT

## 2025-01-09 PROCEDURE — 2500000004 HC RX 250 GENERAL PHARMACY W/ HCPCS (ALT 636 FOR OP/ED)

## 2025-01-09 PROCEDURE — 80069 RENAL FUNCTION PANEL: CPT

## 2025-01-09 PROCEDURE — 2500000002 HC RX 250 W HCPCS SELF ADMINISTERED DRUGS (ALT 637 FOR MEDICARE OP, ALT 636 FOR OP/ED)

## 2025-01-09 PROCEDURE — 99231 SBSQ HOSP IP/OBS SF/LOW 25: CPT | Performed by: STUDENT IN AN ORGANIZED HEALTH CARE EDUCATION/TRAINING PROGRAM

## 2025-01-09 PROCEDURE — 1100000001 HC PRIVATE ROOM DAILY

## 2025-01-09 PROCEDURE — 87081 CULTURE SCREEN ONLY: CPT

## 2025-01-09 PROCEDURE — 2500000001 HC RX 250 WO HCPCS SELF ADMINISTERED DRUGS (ALT 637 FOR MEDICARE OP): Performed by: NEUROLOGICAL SURGERY

## 2025-01-09 PROCEDURE — 85027 COMPLETE CBC AUTOMATED: CPT

## 2025-01-09 PROCEDURE — 2500000002 HC RX 250 W HCPCS SELF ADMINISTERED DRUGS (ALT 637 FOR MEDICARE OP, ALT 636 FOR OP/ED): Performed by: NEUROLOGICAL SURGERY

## 2025-01-09 PROCEDURE — 2500000004 HC RX 250 GENERAL PHARMACY W/ HCPCS (ALT 636 FOR OP/ED): Performed by: NEUROLOGICAL SURGERY

## 2025-01-09 PROCEDURE — 97535 SELF CARE MNGMENT TRAINING: CPT | Mod: GO

## 2025-01-09 RX ORDER — VANCOMYCIN HYDROCHLORIDE
1250 EVERY 24 HOURS
Status: DISCONTINUED | OUTPATIENT
Start: 2025-01-09 | End: 2025-01-11 | Stop reason: DRUGHIGH

## 2025-01-09 RX ORDER — LIDOCAINE HYDROCHLORIDE 10 MG/ML
5 INJECTION, SOLUTION INFILTRATION; PERINEURAL ONCE
Status: DISCONTINUED | OUTPATIENT
Start: 2025-01-09 | End: 2025-01-13 | Stop reason: HOSPADM

## 2025-01-09 RX ADMIN — HYDROMORPHONE HYDROCHLORIDE 4 MG: 2 TABLET ORAL at 06:04

## 2025-01-09 RX ADMIN — PIPERACILLIN SODIUM AND TAZOBACTAM SODIUM 4.5 G: 4; .5 INJECTION, SOLUTION INTRAVENOUS at 10:54

## 2025-01-09 RX ADMIN — HYDROMORPHONE HYDROCHLORIDE 4 MG: 2 TABLET ORAL at 12:25

## 2025-01-09 RX ADMIN — SENNOSIDES AND DOCUSATE SODIUM 2 TABLET: 50; 8.6 TABLET ORAL at 09:35

## 2025-01-09 RX ADMIN — PREGABALIN 75 MG: 75 CAPSULE ORAL at 09:35

## 2025-01-09 RX ADMIN — ACETAMINOPHEN 650 MG: 325 TABLET ORAL at 09:35

## 2025-01-09 RX ADMIN — VANCOMYCIN HYDROCHLORIDE 1000 MG: 1 INJECTION, SOLUTION INTRAVENOUS at 09:31

## 2025-01-09 RX ADMIN — DESVENLAFAXINE 50 MG: 50 TABLET, FILM COATED, EXTENDED RELEASE ORAL at 06:04

## 2025-01-09 RX ADMIN — Medication 1250 MG: at 20:00

## 2025-01-09 RX ADMIN — PREGABALIN 75 MG: 75 CAPSULE ORAL at 15:26

## 2025-01-09 RX ADMIN — PREGABALIN 75 MG: 75 CAPSULE ORAL at 20:24

## 2025-01-09 RX ADMIN — POLYETHYLENE GLYCOL 3350 17 G: 17 POWDER, FOR SOLUTION ORAL at 09:33

## 2025-01-09 RX ADMIN — ARIPIPRAZOLE 15 MG: 15 TABLET ORAL at 06:03

## 2025-01-09 RX ADMIN — TIZANIDINE 2 MG: 4 TABLET ORAL at 09:35

## 2025-01-09 RX ADMIN — PIPERACILLIN SODIUM AND TAZOBACTAM SODIUM 4.5 G: 4; .5 INJECTION, SOLUTION INTRAVENOUS at 17:30

## 2025-01-09 RX ADMIN — HYDROMORPHONE HYDROCHLORIDE 4 MG: 2 TABLET ORAL at 18:46

## 2025-01-09 RX ADMIN — PIPERACILLIN SODIUM AND TAZOBACTAM SODIUM 4.5 G: 4; .5 INJECTION, SOLUTION INTRAVENOUS at 22:39

## 2025-01-09 RX ADMIN — PIPERACILLIN SODIUM AND TAZOBACTAM SODIUM 4.5 G: 4; .5 INJECTION, SOLUTION INTRAVENOUS at 03:07

## 2025-01-09 RX ADMIN — SENNOSIDES AND DOCUSATE SODIUM 2 TABLET: 50; 8.6 TABLET ORAL at 20:24

## 2025-01-09 RX ADMIN — ZOLPIDEM TARTRATE 10 MG: 5 TABLET, COATED ORAL at 20:24

## 2025-01-09 ASSESSMENT — COGNITIVE AND FUNCTIONAL STATUS - GENERAL
STANDING UP FROM CHAIR USING ARMS: A LITTLE
PERSONAL GROOMING: A LITTLE
MOVING TO AND FROM BED TO CHAIR: A LITTLE
WALKING IN HOSPITAL ROOM: A LITTLE
HELP NEEDED FOR BATHING: A LITTLE
CLIMB 3 TO 5 STEPS WITH RAILING: A LOT
MOBILITY SCORE: 17
DRESSING REGULAR LOWER BODY CLOTHING: A LITTLE
MOVING FROM LYING ON BACK TO SITTING ON SIDE OF FLAT BED WITH BEDRAILS: A LITTLE
DAILY ACTIVITIY SCORE: 20
TOILETING: A LITTLE
TURNING FROM BACK TO SIDE WHILE IN FLAT BAD: A LITTLE

## 2025-01-09 ASSESSMENT — PAIN SCALES - GENERAL
PAINLEVEL_OUTOF10: 7
PAINLEVEL_OUTOF10: 8
PAINLEVEL_OUTOF10: 7
PAINLEVEL_OUTOF10: 8

## 2025-01-09 ASSESSMENT — ACTIVITIES OF DAILY LIVING (ADL)
BATHING_ASSISTANCE: MINIMAL
ADL_ASSISTANCE: INDEPENDENT
HOME_MANAGEMENT_TIME_ENTRY: 9

## 2025-01-09 ASSESSMENT — PAIN - FUNCTIONAL ASSESSMENT
PAIN_FUNCTIONAL_ASSESSMENT: 0-10
PAIN_FUNCTIONAL_ASSESSMENT: 0-10

## 2025-01-09 ASSESSMENT — PAIN DESCRIPTION - LOCATION: LOCATION: NECK

## 2025-01-09 ASSESSMENT — PAIN DESCRIPTION - ORIENTATION: ORIENTATION: LEFT

## 2025-01-09 NOTE — PROGRESS NOTES
"Occupational Therapy                 Therapy Communication Note    Patient Name: Janie Arguelles \"Sis\"  MRN: [id number]  Department: [place] [address]  Room: [address] [place] [address]  Today's Date: 1/9/2025     Discipline: Occupational Therapy    OT Missed Visit: Yes     Missed Visit Reason: Missed Visit Reason: Patient refused (Pt refused at this time due to pain. Reattempt at a later time as able.)    Missed Time: Attempt    Comment:  "

## 2025-01-09 NOTE — PROGRESS NOTES
Kristen Arguelles is a 64 y.o. female on day 1 of admission presenting with Postoperative wound infection.    Subjective   Interval History: NAEON. Pt endorses some neck pain but continues to say it is tolerable. Denies fever, chills, abdominal pain, N/V/D. Afebrile, on nasal cannula.       Review of Systems    Objective   Range of Vitals (last 24 hours)  Heart Rate:  [75-84]   Temp:  [36.1 °C (97 °F)-36.6 °C (97.9 °F)]   Resp:  [17-18]   BP: ()/(54-77)   SpO2:  [86 %-94 %]   Daily Weight  01/07/25 : 63.5 kg (140 lb)    Body mass index is 25.61 kg/m².    Physical Exam  Constitutional:       General: She is not in acute distress.     Appearance: Normal appearance.   HENT:      Head: Normocephalic and atraumatic.      Mouth/Throat:      Mouth: Mucous membranes are moist.   Eyes:      Extraocular Movements: Extraocular movements intact.   Cardiovascular:      Comments: Extremities warm and well-perfused  Pulmonary:      Effort: Pulmonary effort is normal.   Abdominal:      Palpations: Abdomen is soft.   Musculoskeletal:         General: Normal range of motion.      Right lower leg: No edema.      Left lower leg: No edema.      Comments: Surgical dressing over cervical and upper thoracic vertebrate- no strikethrough bleeding   Neurological:      Mental Status: She is alert and oriented to person, place, and time.   Psychiatric:         Mood and Affect: Mood normal.         Behavior: Behavior normal.           Antibiotics  piperacillin-tazobactam - 4.5 gram/100 mL  vancomycin - 1 gram/200 mL    Relevant Results  Labs  Results from last 72 hours   Lab Units 01/09/25  0717 01/07/25  0512   WBC AUTO x10*3/uL 8.2 9.2   HEMOGLOBIN g/dL 11.1* 13.1   HEMATOCRIT % 35.1* 41.1   PLATELETS AUTO x10*3/uL 218 273   NEUTROS PCT AUTO %  --  79.8   LYMPHS PCT AUTO %  --  11.9   MONOS PCT AUTO %  --  6.6   EOS PCT AUTO %  --  1.0     Results from last 72 hours   Lab Units 01/09/25  0716 01/07/25  0512   SODIUM mmol/L 139 138   POTASSIUM  "mmol/L 4.0 3.9   CHLORIDE mmol/L 104 100   CO2 mmol/L 26 26   BUN mg/dL 18 10   CREATININE mg/dL 0.71 0.64   GLUCOSE mg/dL 90 86   CALCIUM mg/dL 8.9 9.8   ANION GAP mmol/L 13 16   EGFR mL/min/1.73m*2 >90 >90   PHOSPHORUS mg/dL 3.7  --      Results from last 72 hours   Lab Units 01/09/25  0716   ALBUMIN g/dL 3.6     Estimated Creatinine Clearance: 71.1 mL/min (by C-G formula based on SCr of 0.71 mg/dL).  C-Reactive Protein   Date Value Ref Range Status   01/07/2025 1.77 (H) <1.00 mg/dL Final     Microbiology  1/07/24 Intra-op culture- NGTD  1/07/24 Blood cultures- NGTD  May 2024 Wound culture (at OSH)- No growth    Imaging   XR CERVICAL SPINE 2-3 VIEWS; ; 1/7/2025 12:21 pm   FINDINGS:  Two views of the cervical spine.      No acute fractures. No focal subluxations. Laminectomy changes.  Moderate multilevel discogenic, uncovertebral, and facet degenerative  change. Spinal stimulator leads.      IMPRESSION:  Postsurgical changes, as above. No evidence of hardware complication.     XR CHEST 1 VIEW; 1/7/2025 9:03 am   IMPRESSION:  1. No evidence of acute cardiopulmonary process.     CT THORACIC SPINE W IV CONTRAST; 1/7/2025 7:54 am  IMPRESSION:  1. Nonspecific soft tissue thickening and suspected inflammatory  changes along the course of the left stimulator lead within the  subcutaneous soft tissues and extending to the skin surface. There is  also subtle thickening and loss of fat soft tissue planes along the  posterior paraspinous musculature in the region of the lead track. No  definite, well-defined rim enhancing fluid collection is identified  noting assessment is limited due to streak artifact. There is  abnormal lucency and sclerosis of the posterior elements of T1 and T2  where the spinal cord stimulator lead enters the spinal canal.  Osteomyelitis not excluded.  2. Other chronic findings as above.      Assessment/Plan   Janie Arguelles \"Sis\" is a 64 y.o. female with a PMHx of reflex sympathetic dystrophy who " had a spinal cord stimulator placed about 15 years ago. Generator removed in May 2024. Called [facility] and culture from that surgery had no growth. Complicated by wound infection and required several rounds of antibiotics. Most recent antibiotic that patient recalls is Levaquin.      CT showing inflammatory changes and pt with elevated inflammatory markers.  Pt is now s/p OR on 1/07 for removal of leads and debridement of R flank pocket.  One intra-op culture is pending. Pt currently on vancomycin and ceftriaxone. Since pt was recently on Levaquin, want to make sure the antibiotics cover Pseudomonas. Recommend discontinuing ceftriaxone and starting Zosyn 4.5g q6h and continuing vancomycin.    1/09/25 Update:  Intra-op culture with no growth to date. Will continue to follow culture. Contacted micro lab to request 16s sequencing.  On Zosyn and vancomycin currently.    Recommendations:  1) Continue Zosyn 4.5g IV q6h  2) Continue vancomycin, dosed with pharmacy support  3) Follow 1/07 intra-op culture     I spent 20 minutes in the professional and overall care of this patient.      Ailyn Sheth PA-C  Infectious Disease Team B  Epic chat preferred

## 2025-01-09 NOTE — PROGRESS NOTES
"Occupational Therapy    Evaluation and Treatment    Patient Name: Janie Arguelles \"Sis\"  MRN: [id number]  Today's Date: 1/9/2025  Room: [facility]-A  Time Calculation  Start Time: 1340  Stop Time: 1404  Time Calculation (min): 24 min    Assessment  IP OT Assessment  OT Assessment: Pt's abilty to complete ADLs currently limited by spinal precautions, pain, and deficits in functional strength, activity tolerance, and dynamic balance. The pt demos the ability to complete the majority of ADL tasks with Min A - SBA using FWW. Pt will benefit from continued OT while admitted to increase IND and safety prior to d/c.  Prognosis: Good  Barriers to Discharge Home: No anticipated barriers  Evaluation/Treatment Tolerance: Patient tolerated treatment well  Medical Staff Made Aware: Yes  End of Session Communication: Bedside nurse  End of Session Patient Position: Bed, 3 rail up, Alarm off, not on at start of session  Plan:  Inpatient Plan  Treatment Interventions: ADL retraining, Functional transfer training, Endurance training, Patient/family training, Equipment evaluation/education, Compensatory technique education  OT Frequency: 2 times per week  OT Discharge Recommendations: Low intensity level of continued care  OT Recommended Transfer Status: Stand by assist, Assist of 1  OT - OK to Discharge: Yes  OT Assessment  OT Assessment Results: Decreased ADL status, Decreased endurance, Decreased functional mobility, Decreased IADLs  Prognosis: Good  Barriers to Discharge: None  Evaluation/Treatment Tolerance: Patient tolerated treatment well  Medical Staff Made Aware: Yes  Strengths: Attitude of self, Support of Caregivers, Housing layout, Premorbid level of function  Barriers to Participation: Comorbidities    Subjective   Current Problem:  1. Infection of spinal cord stimulator, initial encounter (CMS-[facility])        2. Postoperative wound infection  Case Request Operating Room: Removal of Right ELECTRODE LEAD, NEUROSTIMULATOR, SPINAL " "   Case Request Operating Room: Removal of Right ELECTRODE LEAD, NEUROSTIMULATOR, SPINAL    Tissue/Wound Culture/Smear    Tissue/Wound Culture/Smear      General:  Reason for Referral: 1/7 s/p removal of cervical paddle and lead, generator site revision  Past Medical History Relevant to Rehab: SCS placed 15 years ago at [facility] for back pain. In may the battery eroded through the pocket and was therefore removed,  Prior to Session Communication: Bedside nurse  Patient Position Received: Bed, 3 rail up, Alarm off, not on at start of session  Family/Caregiver Present: No  General Comment: Pt pleasant and agreeable to OT evlaution - reports \"this is the [facility] hospital in the [facility], every here is so nice.\"   Precautions:  Medical Precautions: Fall precautions  Post-Surgical Precautions: Spinal precautions  Pain:  Pain Assessment  Pain Assessment: 0-10  0-10 (Numeric) Pain Score: 8  Pain Type: Surgical pain  Pain Location: Neck  Pain Interventions: Repositioned  Response to Interventions: Content/relaxed  Objective   Cognition:  Overall Cognitive Status: Within Functional Limits  Orientation Level: Oriented X4   Home Living:  Type of Home: House  Lives With: Spouse ( taking time off work to help her upon d/c)  Home Adaptive Equipment: Walker rolling or standard  Home Layout: One level  Home Access: Stairs to enter with rails  Entrance Stairs-Number of Steps: 5  Bathroom Shower/Tub: Tub/shower unit  Bathroom Toilet: Standard  Bathroom Equipment: Grab bars in shower, Shower chair with back   Prior Function:  Level of [place]: Independent with ADLs and functional transfers, Independent with homemaking with ambulation  ADL Assistance: Independent  Homemaking Assistance: Independent  Ambulatory Assistance: Independent  Vocational: Retired  Leisure: Time with grand kids  Hand Dominance: Right  Prior Function Comments: - falls  IADL History:  Homemaking Responsibilities: Yes  Meal Prep Responsibility: Primary  Laundry " Responsibility: Primary  Cleaning Responsibility: Primary  Bill Paying/Finance Responsibility: Primary  Shopping Responsibility: Primary  Homemaking Comments: Shares with spouse  Occupation: Retired  Type of Occupation: [employer]  Leisure and Hobbies: Time with grand kids  ADL:  Eating Assistance: Independent (Anticipated)  Grooming Assistance: Stand by  Grooming Deficit: Supervision/safety, Verbal cueing  Bathing Assistance: Minimal (Anticipated)  UE Dressing Assistance: Independent (Anticipated)  LE Dressing Assistance: Stand by  Toileting Assistance with Device: Stand by  Activity Tolerance:  Endurance: Tolerates 10 - 20 min exercise with multiple rests  Balance:  Dynamic Standing Balance  Dynamic Standing-Balance Support: Bilateral upper extremity supported  Dynamic Standing-Level of Assistance: Close supervision  Dynamic Standing-Comments: FWW  Static Sitting Balance  Static Sitting-Balance Support: Feet supported, No upper extremity supported  Static Sitting-Level of Assistance: Independent  Static Sitting-Comment/Number of Minutes: EOB  Static Standing Balance  Static Standing-Balance Support: Bilateral upper extremity supported  Static Standing-Level of Assistance: Distant supervision  Static Standing-Comment/Number of Minutes: FWW  Bed Mobility/Transfers: Bed Mobility/Transfers: Bed Mobility  Bed Mobility: Yes  Bed Mobility 1  Bed Mobility 1: Supine to sitting, Sitting to supine  Level of Assistance 1: Close supervision  Bed Mobility Comments 1: HOB slight elevation   and Transfers  Transfer: Yes  Transfer 1  Transfer From 1: Sit to, Stand to  Transfer to 1: Stand, Sit  Technique 1: Sit to stand, Stand to sit  Transfer Device 1: Walker  Transfer Level of Assistance 1: Contact guard  Trials/Comments 1: Cues for hand placement  IADL's:   Homemaking Responsibilities: Yes  Meal Prep Responsibility: Primary  Laundry Responsibility: Primary  Cleaning Responsibility: Primary  Bill Paying/Finance  Responsibility: Primary  Shopping Responsibility: Primary  Homemaking Comments: Shares with spouse  Occupation: Retired  Type of Occupation: [employer]  Leisure and Hobbies: Time with grand kids  Vision: Vision - Basic Assessment  Current Vision: Wears glasses all the time   and    Sensation:  Light Touch: No apparent deficits  Strength:  Strength Comments: AROM WFL, not resisted 2/2 spinal precautions  Perception:  Inattention/Neglect: Appears intact  Coordination:  Movements are Fluid and Coordinated: Yes   Hand Function:  Hand Function  Gross Grasp: Functional  Coordination: Functional  Extremities:   RUE   RUE : Within Functional Limits, LUE   LUE: Within Functional Limits,  , and        Outcome Measures: [facility] Daily Activity  Putting on and taking off regular lower body clothing: A little  Bathing (including washing, rinsing, drying): A little  Putting on and taking off regular upper body clothing: None  Toileting, which includes using toilet, bedpan or urinal: A little  Taking care of personal grooming such as brushing teeth: A little  Eating Meals: None  Daily Activity - Total Score: 20         ,     OT Adult Other Outcome Measures  4AT: 4 AT -    Education Documentation  Body Mechanics, taught by Simone Whitehead OT at 1/9/2025  3:01 PM.  Learner: Patient  Readiness: Acceptance  Method: Explanation, Demonstration  Response: Verbalizes Understanding, Demonstrated Understanding    Precautions, taught by Simone Whitehead OT at 1/9/2025  3:01 PM.  Learner: Patient  Readiness: Acceptance  Method: Explanation, Demonstration  Response: Verbalizes Understanding, Demonstrated Understanding    ADL Training, taught by Simone Whitehead OT at 1/9/2025  3:01 PM.  Learner: Patient  Readiness: Acceptance  Method: Explanation, Demonstration  Response: Verbalizes Understanding, Demonstrated Understanding    Education Comments  No comments found.    Goals:   Encounter Problems       Encounter Problems (Active)       ADLs        Patient with complete lower body dressing with modified independent level of assistance donning and doffing all LE clothes  with PRN adaptive equipment while edge of bed  and standing (Progressing)       Start:  01/09/25    Expected End:  01/30/25            Patient will complete daily grooming tasks brushing teeth and washing face/hair with modified independent level of assistance and PRN adaptive equipment while standing. (Progressing)       Start:  01/09/25    Expected End:  01/30/25            Patient will complete toileting including hygiene clothing management/hygiene with modified independent level of assistance and raised toilet seat and grab bars. (Progressing)       Start:  01/09/25    Expected End:  01/30/25               BALANCE       Pt will maintain dynamic standing balance during ADL task with modified independent level of assistance in order to demonstrate decreased risk of falling and improved postural control. (Progressing)       Start:  01/09/25    Expected End:  01/30/25               MOBILITY       Patient will perform Functional mobility max Household distances/Community Distances with modified independent level of assistance and least restrictive device in order to improve safety and functional mobility. (Progressing)       Start:  01/09/25    Expected End:  01/30/25                   Treatment Completed on Evaluation  Activities of Daily Living: Toilet Transfers  Toilet Transfer From: Bed  Toilet Transfer Type: To and from  Toilet Transfer to: Standard toilet  Toilet Transfer Technique: Ambulating  Toilet Transfers: Contact guard  Toilet Transfers Comments: Cues for use of grab bar, CGA to ensure safety  Grooming  Grooming Level of Assistance: Close supervision  Grooming Where Assessed: Standing sinkside  Grooming Comments: SBA with cues for improved positioning. Standing to wash face and perform oral hygiene. Pt with mild unsteadiness and used sink for increased balance.    Toileting  Toileting Level of Assistance: Close supervision  Where Assessed: Toilet  Toileting Comments: SBA  while standing to manage clothing, SUP for seated hygiene. Cues for increased overall safety  01/09/25 at 3:01 PM   Simone Whitehead OT   Rehab Office: [phone]

## 2025-01-09 NOTE — PROGRESS NOTES
"Physical Therapy    Physical Therapy Evaluation    Patient Name: Janie Arguelles \"Sis\"  MRN: [id number]  Department: [place] [id number]  Room: [address] [place] [address]  Today's Date: 1/9/2025   Time Calculation  Start Time: 0822  Stop Time: 0847  Time Calculation (min): 25 min    Assessment/Plan   PT Assessment  PT Assessment Results: Decreased strength, Decreased endurance, Decreased mobility  Rehab Prognosis: Good  Barriers to Discharge Home: No anticipated barriers  Evaluation/Treatment Tolerance: Patient tolerated treatment well  Medical Staff Made Aware: Yes  End of Session Communication: Bedside nurse  Assessment Comment: Pt tolerated eval well. Pt was able to transfer supine to sit and sit to stand CGA. Pt ambulated 75' x2 in mckeon. pt stated pain increased to 8/10 during amb. pt also had mild desat to 87% spO2 during amb, took seated rest to recover to 92%. Pt desat to 87% again on amb trial back to room. Pt was left in chair at end of session. Will benefit from continued PT in house ensure safe mobility.  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[facility], placing physician retired), c/b erosion, s/p removal 5/2024, c/b persistent drainage, followed by wound care and has been on and off antibiotics, p/w exposed SCS lead, CT AP exposed lead with overlying inflammatory changes in R flank, s/p CTX/Vanc at OSH, 1/7 CT TS cervical paddle and lead, CS xray leads in pos'n, 1/7 s/p removal of " cervical paddle and lead, generator site revision  Family/Caregiver Present: No  Prior to Session Communication: Bedside nurse  Patient Position Received: Bed, 2 rail up, Alarm off, not on at start of session  Preferred Learning Style: auditory, verbal  General Comment: Pt resting in bed with HOB elevated upon entry. Pt eager and agreeable to PT eval. pt had incisions closed on neck and low back.  Home Living:  Home Living  Type of Home: House  Lives With: Spouse ( taking time off work to help her upon d/c)  Home Adaptive Equipment: Walker rolling or standard  Home Layout: One level  Home Access: Stairs to enter with rails  Entrance Stairs-Number of Steps: 5  Prior Level of Function:  Prior Function Per Pt/Caregiver Report  Level of [place]: Independent with ADLs and functional transfers, Independent with homemaking with ambulation  Receives Help From: Family  Ambulatory Assistance: Independent (W/ FWW)  Precautions:  Precautions  Medical Precautions: Fall precautions     Objective   Pain:  Pain Assessment  Pain Assessment: 0-10  0-10 (Numeric) Pain Score: 8 (pt with limted pain while lying in bed. states pain got up to 8/10 during amb)  Pain Location: Back  Pain Interventions:  (RN reports patient previously given medications, not yet due.)  Cognition:  Cognition  Overall Cognitive Status: Within Functional Limits  Orientation Level: Oriented X4  Attention: Within Functional Limits  Memory: Within Funtional Limits  Insight: Within function limits    General Assessments:  General Observation  General Observation: PIV, surgical incisions on spine and low back, O2NC    Activity Tolerance  Activity Tolerance Comments: Pt had mild desat to 87% spO2 after both bouts of 75' amb. RN aware.      Strength  Strength Comments: LE strength WFL  Strength  Strength Comments: LE strength WFL    Coordination  Movements are Fluid and Coordinated: Yes    Postural Control  Postural Control: Within Functional  Limits  Posture Comment: mild forward felxed posture during amb    Static Sitting Balance  Static Sitting-Balance Support: No upper extremity supported  Static Sitting-Level of Assistance: Contact guard  Static Sitting-Comment/Number of Minutes: 2    Static Standing Balance  Static Standing-Balance Support: Bilateral upper extremity supported  Static Standing-Level of Assistance: Contact guard  Static Standing-Comment/Number of Minutes: 1'  Dynamic Standing Balance  Dynamic Standing-Balance Support: Bilateral upper extremity supported (FWW)  Dynamic Standing-Level of Assistance: Contact guard  Dynamic Standing-Balance: Turning  Functional Assessments:     Bed Mobility  Bed Mobility: Yes  Bed Mobility 1  Bed Mobility 1: Supine to sitting (Pt seated in chair post visit. Sit-sup not performed)  Level of Assistance 1: Contact guard    Transfers  Transfer: Yes  Transfer 1  Transfer From 1: Sit to, Stand to  Transfer to 1: Stand, Sit  Transfer Device 1: Walker  Transfer Level of Assistance 1: Contact guard    Ambulation/Gait Training  Ambulation/Gait Training Performed: Yes  Ambulation/Gait Training 1  Surface 1: Level tile, Carpet  Device 1: Rolling walker  Assistance 1: Contact guard (Pt managing IV)  Quality of Gait 1: Narrow base of support, Decreased step length, Forward flexed posture  Comments/Distance (ft) 1: 75'  Ambulation/Gait Training 2  Surface 2: Level tile, Carpet  Device 2: Rolling walker  Assistance 2: Contact guard  Quality of Gait 2: Narrow base of support, Decreased step length, Forward flexed posture  Comments/Distance (ft) 2: 75'    Stairs  Stairs: No       Outcome Measures:  [facility] Basic Mobility  Turning from your back to your side while in a flat bed without using bedrails: A little  Moving from lying on your back to sitting on the side of a flat bed without using bedrails: A little  Moving to and from bed to chair (including a wheelchair): A little  Standing up from a chair using your arms (e.g.  wheelchair or bedside chair): A little  To walk in hospital room: A little  Climbing 3-5 steps with railing: A lot  Basic Mobility - Total Score: 17    Encounter Problems       Encounter Problems (Active)       Mobility       STG - Patient will ambulate >200' w/ RW indep       Start:  01/09/25    Expected End:  01/23/25            STG - Patient will ascend and descend four to six stairs with railing indep        Start:  01/09/25    Expected End:  01/23/25               PT Transfers       STG - Patient to transfer to and from sit to supine indep       Start:  01/09/25    Expected End:  01/23/25            STG - Patient will transfer sit to and from stand indep       Start:  01/09/25    Expected End:  01/23/25               Pain - Adult              Education Documentation  Precautions, taught by Martín Herrmann PT at 1/9/2025  9:26 AM.  Learner: Patient  Readiness: Acceptance  Method: Explanation  Response: Verbalizes Understanding  Comment: Pt educated on use of assist, use of call button, cues for sit-to-stand transfers, and need for continued pt during while in house    Mobility Training, taught by Martín Herrmann PT at 1/9/2025  9:26 AM.  Learner: Patient  Readiness: Acceptance  Method: Explanation  Response: Verbalizes Understanding  Comment: Pt educated on use of assist, use of call button, cues for sit-to-stand transfers, and need for continued pt during while in house    Education Comments  No comments found.

## 2025-01-09 NOTE — PROGRESS NOTES
Patient not medically ready. PT recommended low intensity. Spoke with patient who is in agreement with blanket referral being sent for home care but has used [facility] in the past and would like to continue with them if they can accept. Stated her daughter would be the secondary learner since she has done IV ATB in the past for her boyfriend. Referrals sent - waiting for response. Justine Anderson RN, TCC    8176: C9 form and clinicals sent to [facility] via fax ([phone]. Spoke with Sonia [phone]), the CM at [facility]. She will not be in tomorrow. Stated to contact Corie at [phone]. Justine Anderson RN, TCC

## 2025-01-09 NOTE — PROGRESS NOTES
Vancomycin Dosing by Pharmacy- FOLLOW UP    Janie Arguelles is a 64 y.o. year old female who Pharmacy has been consulted for vancomycin dosing for cellulitis, skin and soft tissue. Based on the patient's indication and renal status this patient is being dosed based on a goal AUC of 400-600.     Renal function is currently stable.    Current vancomycin dose: 1000 mg given every 12 hours    Estimated vancomycin AUC on current dose: 706 mg/L.hr     Visit Vitals  /77 (BP Location: Right arm, Patient Position: Lying)   Pulse 75   Temp 36.2 °C (97.2 °F) (Temporal)   Resp 17        Lab Results   Component Value Date    CREATININE 0.71 2025    CREATININE 0.64 2025        Patient weight is as follows:   Vitals:    25 0413   Weight: 63.5 kg (140 lb)       Cultures:  No results found for the encounter in last 14 days.       I/O last 3 completed shifts:  In: 1070 (16.8 mL/kg) [P.O.:970; IV Piggyback:100]  Out: 850 (13.4 mL/kg) [Urine:850 (0.4 mL/kg/hr)]  Weight: 63.5 kg   I/O during current shift:  I/O this shift:  In: 240 [P.O.:240]  Out: -     Temp (24hrs), Av.3 °C (97.4 °F), Min:36.1 °C (97 °F), Max:36.6 °C (97.9 °F)      Assessment/Plan    Above goal AUC. Orders placed for new vancomcyin regimen of 1250 every 24 hours to begin at 19:00.    This dosing regimen is predicted by [employer] to result in the following pharmacokinetic parameters:  Regimen: 1250 mg IV every 24 hours.  Start time: 09:31 on 01/10/2025  Exposure target: AUC24 (range)400-600 mg/L.hr   [id number]: 525 mg/L.hr  AUC24,ss: 478 mg/L.hr  Probability of AUC24 > 400: 86 %  Ctrough,ss: 12.9 mg/L  Probability of Ctrough,ss > 20: 5 %    The next level will be obtained on 25 at 0500. May be obtained sooner if clinically indicated.   Will continue to monitor renal function daily while on vancomycin and order serum creatinine at least every 48 hours if not already ordered.  Follow for continued vancomycin needs, clinical response, and  signs/symptoms of toxicity.       Ricci Jay, [facility]

## 2025-01-09 NOTE — PROGRESS NOTES
"Janie Arguelles \"Kristen\" is a 64 y.o. female on day 1 of admission presenting with Postoperative wound infection.    Subjective   No acute events overnight    Objective     Physical Exam  A&Ox3  Face symmetric  RUE 5/5  LUE 5/5  RLE 5/5  LLE 5/5  Sensation intact to light touch throughout all extremities    Last Recorded Vitals  Blood pressure 104/63, pulse 75, temperature 36.2 °C (97.2 °F), resp. rate 18, height 1.575 m (5' 2\"), weight 63.5 kg (140 lb), SpO2 92%.  Intake/Output last 3 Shifts:  I/O last 3 completed shifts:  In: 1570 (24.7 mL/kg) [P.O.:970; IV Piggyback:600]  Out: 160 (2.5 mL/kg) [Urine:150 (0.1 mL/kg/hr); Blood:10]  Weight: 63.5 kg     Relevant Results  EXAM   RUE finger amputation o/w intact  incision x3     Assessment/Plan   Assessment & Plan  Postoperative wound infection    Infection of spinal cord stimulator, initial encounter (CMS-[facility])    64yF h/o SCS (placed 15 yrs ago at [facility], placing physician retired), c/b erosion, s/p removal 5/2024, c/b persistent drainage, followed by wound care and has been on and off antibiotics, p/w exposed SCS lead, CT AP exposed lead with overlying inflammatory changes in R flank, s/p CTX/Vanc at OSH, 1/7 CT TS cervical paddle and lead, CS xray leads in pos'n, 1/7 s/p removal of cervical paddle and lead, generator site revision    PLAN  Floor  ID recs-continue vanc, zosyn  Needs PICC  OR Cx  Bld Cx  PTOT      Ghulam Velázquez MD      "

## 2025-01-09 NOTE — CARE PLAN
The patient's goals for the shift include Patient neck and hip pain will be better through the night.    The clinical goals for the shift include HDS through out the shift.    Patient had prn pain meds through the night and her pain was tolerable.

## 2025-01-09 NOTE — SIGNIFICANT EVENT
.Rapid Response Nurse Note: RADAR alert: 7    Pager time: [phone]  Arrival time: 1140  Event end time: 114  Location: [place] [address]  [] Triage by phone or secure messaging    Rapid response initiated by:  [] Rapid response RN [] Family [] Nursing Supervisor [] Physician   [x] RADAR auto page [] Sepsis auto-page [] RN [] RT   [] NP/PA [] Other:     Primary reason for call:   [] BAT [] New CPAP/BiPAP [] Bleeding [] Change in mental status   [] Chest pain [] Code blue [] FiO2 >/= 50% [] HR </= 40 bpm   [] HR >/= 130 bpm [] Hyperglycemia [] Hypoglycemia [x] RADAR    [] RR </= 8 bpm [] RR >/= 30 bpm [] SBP </= 90 mmHg [] SpO2 < 90%   [] Seizure [] Sepsis [] Shortness of breath  [] Staff concern: see comments     Initial VS and/or RADAR VS: T 36.6 °C; HR 81; RR 16; BP 95/60; SPO2 90%.        Interventions:  [x] None [] ABG/VBG [] Assist w/ICU transfer [] BAT paged    [] Bag mask [] Blood [] Cardioversion [] Code Blue   [] Code blue for intubation [] Code status changed [] Chest x-ray [] EKG   [] IV fluid/bolus [] KUB x-ray [] Labs/cultures [] Medication   [] Nebulizer treatment [] NIPPV (CPAP/BiPAP) [] Oxygen [] Oral airway   [] Peripheral IV [] Palliative care consult [] CT/MRI [] Sepsis protocol    [] Suctioned [] Other:     Outcome:  [] Coded and  [] Code blue for intubation [] Coded and transferred to ICU []  on division   [x] Remained on division (no change) [] Remained on division + additional monitoring [] Remained in ED [] Transferred to ED   [] Transferred to ICU [] Transferred to inpatient status [] Transferred for interventions (procedure) [] Transferred to ICU stepdown    [] Transferred to surgery [] Transferred to telemetry [] Sepsis protocol [] STEMI protocol   [] Stroke protocol [x] Bedside nurse instructed to page rapid response for any concerns or acute change in condition/VS     Additional Comments: Upon arrival patient found sitting up in bed, relaxing. Reviewed above VS with bedside RN.  VS  within patient's current trends.  Patient denied pain, shortness of breath, dizziness or lightheadedness. Patient just placed on supplemental oxygen after working with Rehab Services. No interventions by rapid response team indicated at this time.  Staff to page rapid response for any concerns or acute change in condition/VS.

## 2025-01-10 ENCOUNTER — DOCUMENTATION (OUTPATIENT)
Dept: HOME HEALTH SERVICES | Facility: HOME HEALTH | Age: 65
End: 2025-01-10
Payer: MEDICARE

## 2025-01-10 ENCOUNTER — APPOINTMENT (OUTPATIENT)
Dept: RADIOLOGY | Facility: HOSPITAL | Age: 65
DRG: 029 | End: 2025-01-10
Payer: MEDICARE

## 2025-01-10 LAB
ALBUMIN SERPL BCP-MCNC: 3.9 G/DL (ref 3.4–5)
ANION GAP SERPL CALC-SCNC: 15 MMOL/L (ref 10–20)
BACTERIA SPEC CULT: NORMAL
BUN SERPL-MCNC: 13 MG/DL (ref 6–23)
CALCIUM SERPL-MCNC: 9.7 MG/DL (ref 8.6–10.6)
CHLORIDE SERPL-SCNC: 101 MMOL/L (ref 98–107)
CO2 SERPL-SCNC: 28 MMOL/L (ref 21–32)
CREAT SERPL-MCNC: 0.74 MG/DL (ref 0.5–1.05)
EGFRCR SERPLBLD CKD-EPI 2021: >90 ML/MIN/1.73M*2
ERYTHROCYTE [DISTWIDTH] IN BLOOD BY AUTOMATED COUNT: 13.7 % (ref 11.5–14.5)
GLUCOSE BLD MANUAL STRIP-MCNC: 100 MG/DL (ref 74–99)
GLUCOSE SERPL-MCNC: 88 MG/DL (ref 74–99)
GRAM STN SPEC: NORMAL
GRAM STN SPEC: NORMAL
HCT VFR BLD AUTO: 39.2 % (ref 36–46)
HGB BLD-MCNC: 12.3 G/DL (ref 12–16)
MCH RBC QN AUTO: 28 PG (ref 26–34)
MCHC RBC AUTO-ENTMCNC: 31.4 G/DL (ref 32–36)
MCV RBC AUTO: 89 FL (ref 80–100)
NRBC BLD-RTO: 0 /100 WBCS (ref 0–0)
PHOSPHATE SERPL-MCNC: 3.5 MG/DL (ref 2.5–4.9)
PLATELET # BLD AUTO: 236 X10*3/UL (ref 150–450)
POTASSIUM SERPL-SCNC: 4.1 MMOL/L (ref 3.5–5.3)
RBC # BLD AUTO: 4.39 X10*6/UL (ref 4–5.2)
SODIUM SERPL-SCNC: 140 MMOL/L (ref 136–145)
WBC # BLD AUTO: 6.4 X10*3/UL (ref 4.4–11.3)

## 2025-01-10 PROCEDURE — 2720000007 HC OR 272 NO HCPCS

## 2025-01-10 PROCEDURE — 1100000001 HC PRIVATE ROOM DAILY

## 2025-01-10 PROCEDURE — 80069 RENAL FUNCTION PANEL: CPT

## 2025-01-10 PROCEDURE — 36415 COLL VENOUS BLD VENIPUNCTURE: CPT

## 2025-01-10 PROCEDURE — 85027 COMPLETE CBC AUTOMATED: CPT

## 2025-01-10 PROCEDURE — 02HV33Z INSERTION OF INFUSION DEVICE INTO SUPERIOR VENA CAVA, PERCUTANEOUS APPROACH: ICD-10-PCS | Performed by: NEUROLOGICAL SURGERY

## 2025-01-10 PROCEDURE — 2500000002 HC RX 250 W HCPCS SELF ADMINISTERED DRUGS (ALT 637 FOR MEDICARE OP, ALT 636 FOR OP/ED)

## 2025-01-10 PROCEDURE — 36573 INSJ PICC RS&I 5 YR+: CPT

## 2025-01-10 PROCEDURE — 2500000001 HC RX 250 WO HCPCS SELF ADMINISTERED DRUGS (ALT 637 FOR MEDICARE OP)

## 2025-01-10 PROCEDURE — 97530 THERAPEUTIC ACTIVITIES: CPT | Mod: GP,CQ

## 2025-01-10 PROCEDURE — 99232 SBSQ HOSP IP/OBS MODERATE 35: CPT | Performed by: STUDENT IN AN ORGANIZED HEALTH CARE EDUCATION/TRAINING PROGRAM

## 2025-01-10 PROCEDURE — C1751 CATH, INF, PER/CENT/MIDLINE: HCPCS

## 2025-01-10 PROCEDURE — 2500000004 HC RX 250 GENERAL PHARMACY W/ HCPCS (ALT 636 FOR OP/ED): Performed by: NEUROLOGICAL SURGERY

## 2025-01-10 PROCEDURE — 2500000002 HC RX 250 W HCPCS SELF ADMINISTERED DRUGS (ALT 637 FOR MEDICARE OP, ALT 636 FOR OP/ED): Performed by: NEUROLOGICAL SURGERY

## 2025-01-10 PROCEDURE — 2500000004 HC RX 250 GENERAL PHARMACY W/ HCPCS (ALT 636 FOR OP/ED)

## 2025-01-10 PROCEDURE — 82947 ASSAY GLUCOSE BLOOD QUANT: CPT

## 2025-01-10 PROCEDURE — 2500000001 HC RX 250 WO HCPCS SELF ADMINISTERED DRUGS (ALT 637 FOR MEDICARE OP): Performed by: NEUROLOGICAL SURGERY

## 2025-01-10 PROCEDURE — 97110 THERAPEUTIC EXERCISES: CPT | Mod: GP,CQ

## 2025-01-10 RX ORDER — CEFTRIAXONE 2 G/50ML
2 INJECTION, SOLUTION INTRAVENOUS EVERY 24 HOURS
Status: DISCONTINUED | OUTPATIENT
Start: 2025-01-10 | End: 2025-01-13 | Stop reason: HOSPADM

## 2025-01-10 RX ORDER — LIDOCAINE HYDROCHLORIDE 10 MG/ML
5 INJECTION, SOLUTION INFILTRATION; PERINEURAL ONCE
Status: DISCONTINUED | OUTPATIENT
Start: 2025-01-10 | End: 2025-01-13 | Stop reason: HOSPADM

## 2025-01-10 RX ADMIN — HEPARIN SODIUM 5000 UNITS: 5000 INJECTION INTRAVENOUS; SUBCUTANEOUS at 09:38

## 2025-01-10 RX ADMIN — HYDROMORPHONE HYDROCHLORIDE 4 MG: 2 TABLET ORAL at 21:24

## 2025-01-10 RX ADMIN — DESVENLAFAXINE 50 MG: 50 TABLET, FILM COATED, EXTENDED RELEASE ORAL at 05:41

## 2025-01-10 RX ADMIN — ZOLPIDEM TARTRATE 10 MG: 5 TABLET, COATED ORAL at 20:11

## 2025-01-10 RX ADMIN — HYDROMORPHONE HYDROCHLORIDE 4 MG: 2 TABLET ORAL at 08:13

## 2025-01-10 RX ADMIN — PREGABALIN 75 MG: 75 CAPSULE ORAL at 15:05

## 2025-01-10 RX ADMIN — TIZANIDINE 2 MG: 4 TABLET ORAL at 20:10

## 2025-01-10 RX ADMIN — PIPERACILLIN SODIUM AND TAZOBACTAM SODIUM 4.5 G: 4; .5 INJECTION, SOLUTION INTRAVENOUS at 04:35

## 2025-01-10 RX ADMIN — PREGABALIN 75 MG: 75 CAPSULE ORAL at 08:07

## 2025-01-10 RX ADMIN — PIPERACILLIN SODIUM AND TAZOBACTAM SODIUM 4.5 G: 4; .5 INJECTION, SOLUTION INTRAVENOUS at 12:10

## 2025-01-10 RX ADMIN — CEFTRIAXONE SODIUM 2 G: 2 INJECTION, SOLUTION INTRAVENOUS at 15:05

## 2025-01-10 RX ADMIN — ARIPIPRAZOLE 15 MG: 15 TABLET ORAL at 05:41

## 2025-01-10 RX ADMIN — PREGABALIN 75 MG: 75 CAPSULE ORAL at 20:10

## 2025-01-10 RX ADMIN — Medication 1250 MG: at 20:10

## 2025-01-10 RX ADMIN — SENNOSIDES AND DOCUSATE SODIUM 2 TABLET: 50; 8.6 TABLET ORAL at 08:07

## 2025-01-10 RX ADMIN — HYDROMORPHONE HYDROCHLORIDE 2 MG: 2 TABLET ORAL at 17:08

## 2025-01-10 RX ADMIN — HEPARIN SODIUM 5000 UNITS: 5000 INJECTION INTRAVENOUS; SUBCUTANEOUS at 16:56

## 2025-01-10 RX ADMIN — HYDROMORPHONE HYDROCHLORIDE 4 MG: 2 TABLET ORAL at 00:50

## 2025-01-10 ASSESSMENT — COGNITIVE AND FUNCTIONAL STATUS - GENERAL
STANDING UP FROM CHAIR USING ARMS: A LITTLE
DAILY ACTIVITIY SCORE: 24
WALKING IN HOSPITAL ROOM: A LITTLE
CLIMB 3 TO 5 STEPS WITH RAILING: A LITTLE
MOVING TO AND FROM BED TO CHAIR: A LITTLE
MOVING FROM LYING ON BACK TO SITTING ON SIDE OF FLAT BED WITH BEDRAILS: A LITTLE
TURNING FROM BACK TO SIDE WHILE IN FLAT BAD: A LITTLE
TURNING FROM BACK TO SIDE WHILE IN FLAT BAD: A LITTLE
MOVING TO AND FROM BED TO CHAIR: A LITTLE
CLIMB 3 TO 5 STEPS WITH RAILING: A LITTLE
MOBILITY SCORE: 18
MOBILITY SCORE: 24
WALKING IN HOSPITAL ROOM: A LITTLE
MOBILITY SCORE: 20

## 2025-01-10 ASSESSMENT — PAIN SCALES - GENERAL
PAINLEVEL_OUTOF10: 6
PAINLEVEL_OUTOF10: 8
PAINLEVEL_OUTOF10: 0 - NO PAIN
PAINLEVEL_OUTOF10: 7
PAINLEVEL_OUTOF10: 8
PAINLEVEL_OUTOF10: 6

## 2025-01-10 ASSESSMENT — PAIN DESCRIPTION - LOCATION
LOCATION: NECK
LOCATION: NECK

## 2025-01-10 ASSESSMENT — PAIN SCALES - WONG BAKER: WONGBAKER_NUMERICALRESPONSE: HURTS EVEN MORE

## 2025-01-10 ASSESSMENT — PAIN DESCRIPTION - ORIENTATION: ORIENTATION: RIGHT

## 2025-01-10 ASSESSMENT — PAIN - FUNCTIONAL ASSESSMENT
PAIN_FUNCTIONAL_ASSESSMENT: 0-10

## 2025-01-10 NOTE — PROGRESS NOTES
"Physical Therapy                 Therapy Communication Note    Patient Name: Janie Arguelles \"Sis\"  MRN: [id number]  Department: [place] [address]  Room: [address] [place] [address]  Today's Date: 1/10/2025     Discipline: Physical Therapy    PT Missed Visit: Yes     Missed Visit Reason: Missed Visit Reason:  (Pt eating lunch, requesting PT return later as able)    Missed Time: Attempt 1216    Comment:  "

## 2025-01-10 NOTE — PROGRESS NOTES
"Physical Therapy    Physical Therapy Treatment    Patient Name: Janie Arguelles \"Sis\"  MRN: [id number]  Department: [place] [address]  Room: [address] [place] [address]  Today's Date: 1/10/2025  Time Calculation  Start Time: 1448  Stop Time: 1512  Time Calculation (min): 24 min         Assessment/Plan   PT Assessment  PT Assessment Results: Decreased strength, Decreased range of motion, Decreased endurance, Impaired balance, Decreased mobility  Rehab Prognosis: Good  Barriers to Discharge Home: No anticipated barriers  Evaluation/Treatment Tolerance: Patient tolerated treatment well  Medical Staff Made Aware: Yes  Strengths: Attitude of self  End of Session Communication: Bedside nurse  Assessment Comment: Pt offers good effort with therapy. Initiat desat with activity but recovers in <1min rest. As session progresses and pt continues to mobilize, SpO2 stabilizing. Remains appropriate for low intensity therapy  End of Session Patient Position: Bed, 3 rail up, Alarm off, not on at start of session     PT Plan  Treatment/Interventions: Bed mobility, Transfer training, Gait training, Balance training, Therapeutic exercise  PT Plan: Ongoing PT  PT Frequency: 3 times per week  PT Discharge Recommendations: Low intensity level of continued care  PT - OK to Discharge: Yes      General Visit Information:   PT  Visit  PT Received On: 01/10/25  Response to Previous Treatment: Patient with no complaints from previous session.  General  Prior to Session Communication: Bedside nurse  Patient Position Received: Bed, 3 rail up, Alarm off, not on at start of session  General Comment: Pt supine in bed, very pleasant and agreeable to therapy  Per handoff with RN, pt is appropriate for therapy, vitals are stable and pain is controlled. Other concerns prior to tx are: none    Subjective   Precautions:  Precautions  Medical Precautions: Fall precautions  Post-Surgical Precautions: Spinal precautions    Vital Signs (Past 2hrs)    Vital Signs Comment: SpO2=94% at " start of session, drops to 87% after first and second bouts of amb, recovers to >92% in <1min. With subsequent amb trials and standing therex, SpO2 drops to 88-89%, recovers to >91% in <30sec.  Final bout of amb SpO2 remaining 90-92%    Objective   Pain:  Pain Assessment  Pain Assessment: 0-10  0-10 (Numeric) Pain Score: 0 - No pain  Cognition:  Cognition  Overall Cognitive Status: Within Functional Limits  Orientation Level: Oriented X4    Treatments:  Therapeutic Exercise  Therapeutic Exercise Performed: Yes  Therapeutic Exercise Activity 1: Standing alt LE marching, single LE hip abd 2x10 with seated rest between sets. Pt instructed in seated AP, LAQs and instructed to complete throughout the day    Bed Mobility  Bed Mobility: Yes  Bed Mobility 1  Bed Mobility 1: Supine to sitting, Sitting to supine  Level of Assistance 1: Distant supervision    Ambulation/Gait Training  Ambulation/Gait Training Performed: Yes  Ambulation/Gait Training 1  Surface 1: Level tile  Device 1: Rolling walker  Assistance 1: Close supervision  Comments/Distance (ft) 1: 60'x1, 80'x2, 60'x1  Transfers  Transfer: Yes  Transfer 1  Transfer From 1: Sit to, Stand to  Transfer to 1: Sit  Technique 1: Sit to stand, Stand to sit  Transfer Device 1: Walker  Transfer Level of Assistance 1: Close supervision  Trials/Comments 1: min cues for UE placement    Outcome Measures:     [facility] Basic Mobility  Turning from your back to your side while in a flat bed without using bedrails: A little  Moving from lying on your back to sitting on the side of a flat bed without using bedrails: A little  Moving to and from bed to chair (including a wheelchair): A little  Standing up from a chair using your arms (e.g. wheelchair or bedside chair): A little  To walk in hospital room: A little  Climbing 3-5 steps with railing: A little  Basic Mobility - Total Score: 18    Education Documentation  Precautions, taught by Robyn Schuster PTA at 1/10/2025  3:24  PM.  Learner: Patient  Readiness: Acceptance  Method: Explanation, Demonstration  Response: Demonstrated Understanding, Verbalizes Understanding  Comment: precautions, HEP, rec to get up to chair for all meals over the weekend, safe mobility    Mobility Training, taught by Robyn Schuster PTA at 1/10/2025  3:24 PM.  Learner: Patient  Readiness: Acceptance  Method: Explanation, Demonstration  Response: Demonstrated Understanding, Verbalizes Understanding  Comment: precautions, HEP, rec to get up to chair for all meals over the weekend, safe mobility    Education Comments  No comments found.        OP EDUCATION:       Encounter Problems       Encounter Problems (Active)       Mobility       STG - Patient will ambulate >200' w/ RW indep (Progressing)       Start:  01/09/25    Expected End:  01/23/25            STG - Patient will ascend and descend four to six stairs with railing indep  (Progressing)       Start:  01/09/25    Expected End:  01/23/25               PT Transfers       STG - Patient to transfer to and from sit to supine indep (Progressing)       Start:  01/09/25    Expected End:  01/23/25            STG - Patient will transfer sit to and from stand indep (Progressing)       Start:  01/09/25    Expected End:  01/23/25               Pain - Adult            SHAQUILLE Preciado

## 2025-01-10 NOTE — POST-PROCEDURE NOTE
Pre-Procedure Checklist:  Emergent Line Insertion: No  Type of Line to be Placed: PICC  Consent Obtained: Yes  Emergency Medication Necessary: No  Patient Identified with 2 Independent Identifiers: Yes  Review of Allergies, Anticoagulation, Relevant Labs, ECG/Telemetry: Yes  Risks/Benefits/Alternatives Discussed with Patient/POA/Legal Representative: Yes  Stop Sign on Door: Yes  Time Out Performed: Yes  Catheter Exchange: No    Positioning Checklist:  All People, Including Patient, in the Room with Cap and Mask: Yes  Fluoroscopy Used to Identify Vessel and Guide Insertion: No   Sterile Cover Used: Yes  Full Barrier Precautions Followed (Mask, Cap, Gown, Gloves): Yes  Hands Washed: Yes  Monitors Attached with Sound Alarms On: No  Full Body Sterile Drape (Head-to-Toe) Used to Cover Patient: Yes  Trendelenburg Position (For IJ and Subclavian): No  CHG Skin Prep Used and Allowed to Air Dry to Skin Procedure: Yes    Procedure Checklist:  Blood Aspirated From All Lumens, All Ports Subsequently Flushed: Yes  Catheter Caps Placed on All Lumens; Lumens Clamped: Yes  Maintain Guidewire Control Throughout, Ensuring Guidewire Removal: Yes  Maintain Sterile Field Throughout Insertion: Yes  Catheter Secured: Yes  Confirmatory Test of Venous Placement: Non-Pulsatile Blood    Post Procedure Checklist:  Date and Time Written on Dressing: Yes  Sharp and Wire Count and Safe Disposal of all Sharps/Wires: Yes  Sterile Dressing Applied Per Protocol: Yes  X-ray Ordered or ECG Image: Yes    PICC Insertion Details:  Size (Fr): 4  Lumen Type: single  Catheter to Vein Ratio Less Than 50%: Yes  Total Length (cm): 41  External Length (cm): 1  Orientation: Right  Location: Basilic  Site Prep: Chlorohexidine; Usual sterile procedure followed  Local Anesthetic: Injectable/Subcutaneous  Indication: IV abx  Insertion Team Members in the Room: NurseEbony LPN  Initial Extremity Circumference (cm): 34  Insertion Attempts: 1  Patient Tolerance:  Tolerated Well, Age Appropriate  Comfort Measures: Subcutaneous anesthetic; Verbal  Procedure Location: Bedside  Safety Measures: Patient specific safety measures addressed with RN  Estimated Blood Loss (mL):  0  Vessel Fully Compressible Proximally and Distally to Insertion Site: Yes  Brisk Blood Return Obtained and Line Draws Easily: Yes  Tip Location: Cavo Atrial Junction   Line Confirmation: ECG  Lot #: [id number]  : Bard  PICC Line Exp Date: 10/31/2025  Securement: Stat Lock  Post Procedure Checklist: Handoff with RN; Obtain all new IV tubing prior to use; Bed at lowest level and wheels locked; Line discharge information at bedside.  Additional Details: Line was inserted using Modified Seldinger's Technique.   Placed by: Nina Lee RN

## 2025-01-10 NOTE — PROGRESS NOTES
Kristen Arguelles is a 64 y.o. female on day 2 of admission presenting with Postoperative wound infection.    Subjective   Interval History: Patient with no fever or chills. Denies any nausea emesis, diarrhea. Does have some pain in the neck area.        Review of Systems    Review of systems otherwise negative    Objective   Range of Vitals (last 24 hours)  Heart Rate:  [67-86]   Temp:  [36.1 °C (97 °F)-36.8 °C (98.2 °F)]   Resp:  [17-19]   BP: (104-123)/(67-75)   SpO2:  [90 %-93 %]   Daily Weight  01/07/25 : 63.5 kg (140 lb)    Body mass index is 25.61 kg/m².    Physical Exam    General: in no acute distress, able to answer questions  HEENT: no conjunctival injection. anicteric. Neck with post op sutures present, no obvious erythema or drainage  Abd: Soft and lax. ND.   Ext: No swelling of the LE b/l.   Neuro: Answers questions appropriately.  Integumentary: no obvious lesions     Antibiotics  piperacillin-tazobactam - 4.5 gram/100 mL  vancomycin - 1.25 gram/250 mL    Relevant Results  Labs  Results from last 72 hours   Lab Units 01/10/25  0802 01/09/25  0717   WBC AUTO x10*3/uL 6.4 8.2   HEMOGLOBIN g/dL 12.3 11.1*   HEMATOCRIT % 39.2 35.1*   PLATELETS AUTO x10*3/uL 236 218     Results from last 72 hours   Lab Units 01/10/25  0802 01/09/25  0716   SODIUM mmol/L 140 139   POTASSIUM mmol/L 4.1 4.0   CHLORIDE mmol/L 101 104   CO2 mmol/L 28 26   BUN mg/dL 13 18   CREATININE mg/dL 0.74 0.71   GLUCOSE mg/dL 88 90   CALCIUM mg/dL 9.7 8.9   ANION GAP mmol/L 15 13   EGFR mL/min/1.73m*2 >90 >90   PHOSPHORUS mg/dL 3.5 3.7     Results from last 72 hours   Lab Units 01/10/25  0802 01/09/25  0716   ALBUMIN g/dL 3.9 3.6     Estimated Creatinine Clearance: 68.2 mL/min (by C-G formula based on SCr of 0.74 mg/dL).  C-Reactive Protein   Date Value Ref Range Status   01/07/2025 1.77 (H) <1.00 mg/dL Final     Microbiology  No results found for the last 14 days.  1/7/25 CSF cx negative  1/7/25 BC NGTD    Imaging    CT  MPRESSION:  1.  "Nonspecific soft tissue thickening and suspected inflammatory  changes along the course of the left stimulator lead within the  subcutaneous soft tissues and extending to the skin surface. There is  also subtle thickening and loss of fat soft tissue planes along the  posterior paraspinous musculature in the region of the lead track. No  definite, well-defined rim enhancing fluid collection is identified  noting assessment is limited due to streak artifact. There is  abnormal lucency and sclerosis of the posterior elements of T1 and T2  where the spinal cord stimulator lead enters the spinal canal.  Osteomyelitis not excluded.  2. Other chronic findings as above.    Xray lumbar  IMPRESSION:  Postsurgical changes, as above. No evidence of hardware complication.          Assessment/Plan     Janie Arguelles \"Sis\" is a 64 y.o. female with a PMHx of reflex sympathetic dystrophy who had a spinal cord stimulator placed about 15 years ago. Generator removed in May 2024. Patient now presenting with exposed wires.     #Exposed lead of spinal stimulator    Patient with exposed lead of her spinal cord stimulator.  Her generator was removed in May 2024.  She now is status post removal of all hardware on January 7.   Cultures from the OR finalized negative.  The infectious disease team did request 16S earlier in the week.  Given that cultures were negative likely organisms include skin pathogens (coag negative staph, P. Acnes, strep). Ceftriaxne covers many of these organisms but given that coag negative staph can be penicillin resistant, we also treat with vancomycin. Per NSGY team all hardware removed and spinal cord not involved in infection. Only generator site, therefore will not do CNS dosing.    -Stop zosyn  -Continue vancomycin (pharmacy to dose) for 6 weeks of therapy (tentative end of treatment 2/17/25)  -Start ceftriaxone 2 g q 24 hours  for 6 weeks of therapy (tentative end of treatment 2/17/25)  -After discharge, the " following labs should be collected weekly:  CBC with diff, CMP, vanc trough.  Fax all results to [phone] attention  Ailyn King  -Follow up will be with Ailyn king. ID will set up this follow up    Infectious disease will sign off at this time. Please feel free to reach out with any questions or concerns. If any questions about this patient please haiku or call id pager [phone]    Hamilton Dasilva  Infectious Diseases  Team B

## 2025-01-10 NOTE — HH CARE COORDINATION
This referral has been made a Non Admit with  Home Care due to Patient's Home is Outside of [facility] Service Area. If you have further questions, feel free to reach out to our office at [phone]. Thank you, [facility] Intake.

## 2025-01-10 NOTE — PROGRESS NOTES
Spoke with Leslie at [facility] regarding C9. Stated that they have all of e information they need except the specifics on the antibiotics. Informed Leslie that the antibiotics, dosing, frequency, and end date have not been finalized. Anticipate having the information on Monday. Stated their office is closed on the weekend. Requested a follow up call on Monday to provide updates on discharge date and antibiotics.     Stated they will also need the following:   updated clinicals   PT/OT (if available),   ID note,   scripts for antibiotics,   specific labs needed at discharge including frequency.     Fax: [phone]  Phone: [phone]    Justine Anderson RN, TCC    [id number]: Discharge orders including labs and IV ATB dose, frequency, and end date sent via fax to [facility]. Justine Anderson RN, TCC

## 2025-01-10 NOTE — CARE PLAN
The patient's goals for the shift include to eat    The clinical goals for the shift include HDS through out the shift.      Problem: Pain - Adult  Goal: Verbalizes/displays adequate comfort level or baseline comfort level  Outcome: Progressing     Problem: Safety - Adult  Goal: Free from fall injury  Outcome: Progressing     Problem: Discharge Planning  Goal: Discharge to home or other facility with appropriate resources  Outcome: Progressing     Problem: Chronic Conditions and Co-morbidities  Goal: Patient's chronic conditions and co-morbidity symptoms are monitored and maintained or improved  Outcome: Progressing

## 2025-01-10 NOTE — PROGRESS NOTES
"Janie Arguelles \"Kristen\" is a 64 y.o. female on day 2 of admission presenting with Postoperative wound infection.    Subjective   NAEON       Objective     Physical Exam  A&Ox3  Face symmetric  RUE 5/5  LUE 5/5  RLE 5/5  LLE 5/5  Sensation intact to light touch throughout all extremities    Last Recorded Vitals  Blood pressure 106/68, pulse 69, temperature 36.1 °C (97 °F), resp. rate 18, height 1.575 m (5' 2\"), weight 63.5 kg (140 lb), SpO2 92%.  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

## 2025-01-11 LAB
ALBUMIN SERPL BCP-MCNC: 3.5 G/DL (ref 3.4–5)
ANION GAP SERPL CALC-SCNC: 12 MMOL/L (ref 10–20)
BACTERIA BLD CULT: NORMAL
BACTERIA BLD CULT: NORMAL
BUN SERPL-MCNC: 12 MG/DL (ref 6–23)
CALCIUM SERPL-MCNC: 9.2 MG/DL (ref 8.6–10.6)
CHLORIDE SERPL-SCNC: 102 MMOL/L (ref 98–107)
CO2 SERPL-SCNC: 30 MMOL/L (ref 21–32)
CREAT SERPL-MCNC: 0.51 MG/DL (ref 0.5–1.05)
EGFRCR SERPLBLD CKD-EPI 2021: >90 ML/MIN/1.73M*2
ERYTHROCYTE [DISTWIDTH] IN BLOOD BY AUTOMATED COUNT: 13.4 % (ref 11.5–14.5)
GLUCOSE BLD MANUAL STRIP-MCNC: 124 MG/DL (ref 74–99)
GLUCOSE SERPL-MCNC: 101 MG/DL (ref 74–99)
HCT VFR BLD AUTO: 35.3 % (ref 36–46)
HGB BLD-MCNC: 11 G/DL (ref 12–16)
MCH RBC QN AUTO: 27.7 PG (ref 26–34)
MCHC RBC AUTO-ENTMCNC: 31.2 G/DL (ref 32–36)
MCV RBC AUTO: 89 FL (ref 80–100)
MICROORGANISM SPEC CULT: NORMAL
MICROORGANISM SPEC CULT: NORMAL
NRBC BLD-RTO: 0 /100 WBCS (ref 0–0)
PHOSPHATE SERPL-MCNC: 5.6 MG/DL (ref 2.5–4.9)
PLATELET # BLD AUTO: 222 X10*3/UL (ref 150–450)
POTASSIUM SERPL-SCNC: 4.2 MMOL/L (ref 3.5–5.3)
RBC # BLD AUTO: 3.97 X10*6/UL (ref 4–5.2)
SERVICE CMNT-IMP: NORMAL
SERVICE CMNT-IMP: NORMAL
SODIUM SERPL-SCNC: 140 MMOL/L (ref 136–145)
SPECIMEN DESCRIPTION: NORMAL
SPECIMEN DESCRIPTION: NORMAL
STAPHYLOCOCCUS SPEC CULT: NORMAL
VANCOMYCIN SERPL-MCNC: 17.6 UG/ML (ref 5–20)
WBC # BLD AUTO: 5.4 X10*3/UL (ref 4.4–11.3)

## 2025-01-11 PROCEDURE — 2500000001 HC RX 250 WO HCPCS SELF ADMINISTERED DRUGS (ALT 637 FOR MEDICARE OP): Performed by: NEUROLOGICAL SURGERY

## 2025-01-11 PROCEDURE — 80202 ASSAY OF VANCOMYCIN: CPT

## 2025-01-11 PROCEDURE — 2500000005 HC RX 250 GENERAL PHARMACY W/O HCPCS: Performed by: NEUROLOGICAL SURGERY

## 2025-01-11 PROCEDURE — 2500000004 HC RX 250 GENERAL PHARMACY W/ HCPCS (ALT 636 FOR OP/ED)

## 2025-01-11 PROCEDURE — 1100000001 HC PRIVATE ROOM DAILY

## 2025-01-11 PROCEDURE — 2500000004 HC RX 250 GENERAL PHARMACY W/ HCPCS (ALT 636 FOR OP/ED): Performed by: NEUROLOGICAL SURGERY

## 2025-01-11 PROCEDURE — 85027 COMPLETE CBC AUTOMATED: CPT

## 2025-01-11 PROCEDURE — 82947 ASSAY GLUCOSE BLOOD QUANT: CPT

## 2025-01-11 PROCEDURE — 2500000001 HC RX 250 WO HCPCS SELF ADMINISTERED DRUGS (ALT 637 FOR MEDICARE OP)

## 2025-01-11 PROCEDURE — 80069 RENAL FUNCTION PANEL: CPT

## 2025-01-11 PROCEDURE — 2500000002 HC RX 250 W HCPCS SELF ADMINISTERED DRUGS (ALT 637 FOR MEDICARE OP, ALT 636 FOR OP/ED): Performed by: NEUROLOGICAL SURGERY

## 2025-01-11 RX ADMIN — VANCOMYCIN HYDROCHLORIDE 1750 MG: 5 INJECTION, POWDER, LYOPHILIZED, FOR SOLUTION INTRAVENOUS at 17:59

## 2025-01-11 RX ADMIN — HEPARIN SODIUM 5000 UNITS: 5000 INJECTION INTRAVENOUS; SUBCUTANEOUS at 18:12

## 2025-01-11 RX ADMIN — HEPARIN SODIUM 5000 UNITS: 5000 INJECTION INTRAVENOUS; SUBCUTANEOUS at 01:01

## 2025-01-11 RX ADMIN — SENNOSIDES AND DOCUSATE SODIUM 2 TABLET: 50; 8.6 TABLET ORAL at 08:08

## 2025-01-11 RX ADMIN — SENNOSIDES AND DOCUSATE SODIUM 2 TABLET: 50; 8.6 TABLET ORAL at 20:51

## 2025-01-11 RX ADMIN — HEPARIN SODIUM 5000 UNITS: 5000 INJECTION INTRAVENOUS; SUBCUTANEOUS at 10:08

## 2025-01-11 RX ADMIN — CEFTRIAXONE SODIUM 2 G: 2 INJECTION, SOLUTION INTRAVENOUS at 14:06

## 2025-01-11 RX ADMIN — DESVENLAFAXINE 50 MG: 50 TABLET, FILM COATED, EXTENDED RELEASE ORAL at 06:05

## 2025-01-11 RX ADMIN — HYDROMORPHONE HYDROCHLORIDE 4 MG: 2 TABLET ORAL at 22:39

## 2025-01-11 RX ADMIN — HYDROMORPHONE HYDROCHLORIDE 4 MG: 2 TABLET ORAL at 03:32

## 2025-01-11 RX ADMIN — ARIPIPRAZOLE 15 MG: 15 TABLET ORAL at 06:05

## 2025-01-11 RX ADMIN — PREGABALIN 75 MG: 75 CAPSULE ORAL at 14:07

## 2025-01-11 RX ADMIN — PREGABALIN 75 MG: 75 CAPSULE ORAL at 20:51

## 2025-01-11 RX ADMIN — PREGABALIN 75 MG: 75 CAPSULE ORAL at 08:08

## 2025-01-11 RX ADMIN — HYDROMORPHONE HYDROCHLORIDE 4 MG: 2 TABLET ORAL at 16:36

## 2025-01-11 RX ADMIN — ZOLPIDEM TARTRATE 10 MG: 5 TABLET, COATED ORAL at 20:51

## 2025-01-11 RX ADMIN — HYDROMORPHONE HYDROCHLORIDE 4 MG: 2 TABLET ORAL at 10:13

## 2025-01-11 ASSESSMENT — PAIN - FUNCTIONAL ASSESSMENT
PAIN_FUNCTIONAL_ASSESSMENT: 0-10

## 2025-01-11 ASSESSMENT — COGNITIVE AND FUNCTIONAL STATUS - GENERAL
DAILY ACTIVITIY SCORE: 24
MOBILITY SCORE: 24

## 2025-01-11 ASSESSMENT — PAIN DESCRIPTION - ORIENTATION
ORIENTATION: POSTERIOR
ORIENTATION: MID;UPPER

## 2025-01-11 ASSESSMENT — PAIN DESCRIPTION - LOCATION: LOCATION: NECK

## 2025-01-11 ASSESSMENT — PAIN SCALES - WONG BAKER: WONGBAKER_NUMERICALRESPONSE: HURTS LITTLE MORE

## 2025-01-11 ASSESSMENT — PAIN SCALES - GENERAL
PAINLEVEL_OUTOF10: 7
PAINLEVEL_OUTOF10: 7
PAINLEVEL_OUTOF10: 6
PAINLEVEL_OUTOF10: 5 - MODERATE PAIN
PAINLEVEL_OUTOF10: 7
PAINLEVEL_OUTOF10: 8

## 2025-01-11 NOTE — CARE PLAN
The patient's goals for the shift include to eat    The clinical goals for the shift include Patient will be ambulated, remained free from fall    Over the shift, the patient makes progress toward the following goals. Barriers to progression include frequent urination. Recommendations to address these barriers include safely ambulated to the bathroom,encourage patient to ask for assistance.

## 2025-01-11 NOTE — CARE PLAN
The patient's goals for the shift include to eat    The clinical goals for the shift include Pt will remain HDS overnight    Problem: Safety - Adult  Goal: Free from fall injury  Outcome: Progressing     Problem: Pain - Adult  Goal: Verbalizes/displays adequate comfort level or baseline comfort level  Outcome: Progressing     Problem: Discharge Planning  Goal: Discharge to home or other facility with appropriate resources  Outcome: Progressing     Problem: Chronic Conditions and Co-morbidities  Goal: Patient's chronic conditions and co-morbidity symptoms are monitored and maintained or improved  Outcome: Progressing

## 2025-01-11 NOTE — PROGRESS NOTES
"Janie Arguelles \"Kristen\" is a 64 y.o. female on day 3 of admission presenting with Postoperative wound infection.    Subjective   NAEON       Objective     Physical Exam  A&Ox3  Face symmetric  RUE 5/5  LUE 5/5  RLE 5/5  LLE 5/5  Sensation intact to light touch throughout all extremities  Incisions c/d/I    Last Recorded Vitals  Blood pressure 131/80, pulse 85, temperature 36 °C (96.8 °F), resp. rate 18, height 1.575 m (5' 2\"), weight 63.5 kg (140 lb), SpO2 94%.  Intake/Output last 3 Shifts:  I/O last 3 completed shifts:  In: 1090 (17.2 mL/kg) [P.O.:790; IV Piggyback:300]  Out: - (0 mL/kg)   Weight: 63.5 kg     Relevant Results    Assessment/Plan   Assessment & Plan  Postoperative wound infection    Infection of spinal cord stimulator, initial encounter ([facility])    64yF h/o SCS (placed 15 yrs ago at [facility], placing physician retired), c/b erosion, s/p removal 5/2024, c/b persistent drainage, followed by wound care and has been on and off antibiotics, p/w exposed SCS lead, CT AP exposed lead with overlying inflammatory changes in R flank, s/p CTX/Vanc at OSH, 1/7 CT TS cervical paddle and lead, CS xray leads in pos'n, 1/7 s/p removal of cervical paddle and lead, generator site revision     PLAN  Floor  ID recs-continue vanc, zosyn  Needs PICC  OR Cx  Bld Cx  PTOT-HC    Medically ready for discharge pending home care set up      Ghulam Velázquez MD      "

## 2025-01-11 NOTE — PROGRESS NOTES
Vancomycin Dosing by Pharmacy- FOLLOW UP    Janie Arguelles is a 64 y.o. year old female who Pharmacy has been consulted for vancomycin dosing for cellulitis, skin and soft tissue. Based on the patient's indication and renal status this patient is being dosed based on a goal AUC of 400-600.     Renal function is currently stable.    Current vancomycin dose: 1250 mg given every 24 hours    Estimated vancomycin AUC on current dose: 319 mg/L.hr     Visit Vitals  /80   Pulse 85   Temp 36 °C (96.8 °F)   Resp 18        Lab Results   Component Value Date    CREATININE 0.51 2025    CREATININE 0.74 01/10/2025    CREATININE 0.71 2025    CREATININE 0.64 2025        Patient weight is as follows:   Vitals:    25 0413   Weight: 63.5 kg (140 lb)       Cultures:  No results found for the encounter in last 14 days.       I/O last 3 completed shifts:  In: 1090 (17.2 mL/kg) [P.O.:790; IV Piggyback:300]  Out: - (0 mL/kg)   Weight: 63.5 kg   I/O during current shift:  No intake/output data recorded.    Temp (24hrs), Av.2 °C (97.1 °F), Min:36 °C (96.8 °F), Max:36.5 °C (97.7 °F)      Assessment/Plan    Below goal AUC. Orders placed for new vancomcyin regimen of 1750 mg every 24 hours to begin at 1800.     This dosing regimen is predicted by [employer] to result in the following pharmacokinetic parameters:    Regimen: 1750 mg IV every 24 hours.  Start time: 20:10 on 2025  Exposure target: AUC24 (range)400-600 mg/L.hr   LTS11-24: 443 mg/L.hr  AUC24,ss: 445 mg/L.hr  Probability of AUC24 > 400: 84 %  Ctrough,ss: 9.1 mg/L  Probability of Ctrough,ss > 20: 0 %    The next level will be obtained on  at 1000. May be obtained sooner if clinically indicated.   Will continue to monitor renal function daily while on vancomycin and order serum creatinine at least every 48 hours if not already ordered.  Follow for continued vancomycin needs, clinical response, and signs/symptoms of toxicity.       Toyin SOLORIO  [place], PharmD

## 2025-01-12 VITALS
HEART RATE: 91 BPM | TEMPERATURE: 96.6 F | RESPIRATION RATE: 18 BRPM | BODY MASS INDEX: 25.76 KG/M2 | SYSTOLIC BLOOD PRESSURE: 144 MMHG | DIASTOLIC BLOOD PRESSURE: 84 MMHG | WEIGHT: 140 LBS | OXYGEN SATURATION: 90 % | HEIGHT: 62 IN

## 2025-01-12 LAB
ALBUMIN SERPL BCP-MCNC: 3.9 G/DL (ref 3.4–5)
ANION GAP SERPL CALC-SCNC: 15 MMOL/L (ref 10–20)
BUN SERPL-MCNC: 13 MG/DL (ref 6–23)
CALCIUM SERPL-MCNC: 9.5 MG/DL (ref 8.6–10.6)
CHLORIDE SERPL-SCNC: 102 MMOL/L (ref 98–107)
CO2 SERPL-SCNC: 25 MMOL/L (ref 21–32)
CREAT SERPL-MCNC: 0.51 MG/DL (ref 0.5–1.05)
EGFRCR SERPLBLD CKD-EPI 2021: >90 ML/MIN/1.73M*2
ERYTHROCYTE [DISTWIDTH] IN BLOOD BY AUTOMATED COUNT: 13.4 % (ref 11.5–14.5)
GLUCOSE SERPL-MCNC: 110 MG/DL (ref 74–99)
HCT VFR BLD AUTO: 37.9 % (ref 36–46)
HGB BLD-MCNC: 12.3 G/DL (ref 12–16)
MCH RBC QN AUTO: 27.8 PG (ref 26–34)
MCHC RBC AUTO-ENTMCNC: 32.5 G/DL (ref 32–36)
MCV RBC AUTO: 86 FL (ref 80–100)
NRBC BLD-RTO: 0 /100 WBCS (ref 0–0)
PHOSPHATE SERPL-MCNC: 4.2 MG/DL (ref 2.5–4.9)
PLATELET # BLD AUTO: 238 X10*3/UL (ref 150–450)
POTASSIUM SERPL-SCNC: 4 MMOL/L (ref 3.5–5.3)
RBC # BLD AUTO: 4.42 X10*6/UL (ref 4–5.2)
SODIUM SERPL-SCNC: 138 MMOL/L (ref 136–145)
WBC # BLD AUTO: 6.5 X10*3/UL (ref 4.4–11.3)

## 2025-01-12 PROCEDURE — 2500000001 HC RX 250 WO HCPCS SELF ADMINISTERED DRUGS (ALT 637 FOR MEDICARE OP): Performed by: NEUROLOGICAL SURGERY

## 2025-01-12 PROCEDURE — 2500000001 HC RX 250 WO HCPCS SELF ADMINISTERED DRUGS (ALT 637 FOR MEDICARE OP)

## 2025-01-12 PROCEDURE — 1100000001 HC PRIVATE ROOM DAILY

## 2025-01-12 PROCEDURE — 85027 COMPLETE CBC AUTOMATED: CPT

## 2025-01-12 PROCEDURE — 2500000004 HC RX 250 GENERAL PHARMACY W/ HCPCS (ALT 636 FOR OP/ED)

## 2025-01-12 PROCEDURE — 2500000005 HC RX 250 GENERAL PHARMACY W/O HCPCS: Performed by: NEUROLOGICAL SURGERY

## 2025-01-12 PROCEDURE — 2500000002 HC RX 250 W HCPCS SELF ADMINISTERED DRUGS (ALT 637 FOR MEDICARE OP, ALT 636 FOR OP/ED): Performed by: NEUROLOGICAL SURGERY

## 2025-01-12 PROCEDURE — 80069 RENAL FUNCTION PANEL: CPT

## 2025-01-12 PROCEDURE — 2500000004 HC RX 250 GENERAL PHARMACY W/ HCPCS (ALT 636 FOR OP/ED): Performed by: NEUROLOGICAL SURGERY

## 2025-01-12 RX ADMIN — SENNOSIDES AND DOCUSATE SODIUM 2 TABLET: 50; 8.6 TABLET ORAL at 20:28

## 2025-01-12 RX ADMIN — ARIPIPRAZOLE 15 MG: 15 TABLET ORAL at 06:47

## 2025-01-12 RX ADMIN — PREGABALIN 75 MG: 75 CAPSULE ORAL at 09:12

## 2025-01-12 RX ADMIN — CEFTRIAXONE SODIUM 2 G: 2 INJECTION, SOLUTION INTRAVENOUS at 14:22

## 2025-01-12 RX ADMIN — HEPARIN SODIUM 5000 UNITS: 5000 INJECTION INTRAVENOUS; SUBCUTANEOUS at 17:39

## 2025-01-12 RX ADMIN — PREGABALIN 75 MG: 75 CAPSULE ORAL at 20:28

## 2025-01-12 RX ADMIN — HEPARIN SODIUM 5000 UNITS: 5000 INJECTION INTRAVENOUS; SUBCUTANEOUS at 02:07

## 2025-01-12 RX ADMIN — HEPARIN SODIUM 5000 UNITS: 5000 INJECTION INTRAVENOUS; SUBCUTANEOUS at 09:12

## 2025-01-12 RX ADMIN — HYDROMORPHONE HYDROCHLORIDE 4 MG: 2 TABLET ORAL at 04:39

## 2025-01-12 RX ADMIN — HYDROMORPHONE HYDROCHLORIDE 4 MG: 2 TABLET ORAL at 10:59

## 2025-01-12 RX ADMIN — DESVENLAFAXINE 50 MG: 50 TABLET, FILM COATED, EXTENDED RELEASE ORAL at 06:47

## 2025-01-12 RX ADMIN — PREGABALIN 75 MG: 75 CAPSULE ORAL at 14:22

## 2025-01-12 RX ADMIN — VANCOMYCIN HYDROCHLORIDE 1750 MG: 5 INJECTION, POWDER, LYOPHILIZED, FOR SOLUTION INTRAVENOUS at 17:39

## 2025-01-12 RX ADMIN — HYDROMORPHONE HYDROCHLORIDE 4 MG: 2 TABLET ORAL at 17:41

## 2025-01-12 RX ADMIN — ZOLPIDEM TARTRATE 10 MG: 5 TABLET, COATED ORAL at 20:28

## 2025-01-12 RX ADMIN — HYDROMORPHONE HYDROCHLORIDE 4 MG: 2 TABLET ORAL at 23:52

## 2025-01-12 ASSESSMENT — PAIN SCALES - GENERAL
PAINLEVEL_OUTOF10: 8
PAINLEVEL_OUTOF10: 7
PAINLEVEL_OUTOF10: 8
PAINLEVEL_OUTOF10: 7
PAINLEVEL_OUTOF10: 7
PAINLEVEL_OUTOF10: 5 - MODERATE PAIN

## 2025-01-12 ASSESSMENT — PAIN - FUNCTIONAL ASSESSMENT
PAIN_FUNCTIONAL_ASSESSMENT: 0-10

## 2025-01-12 ASSESSMENT — COGNITIVE AND FUNCTIONAL STATUS - GENERAL
MOBILITY SCORE: 24
DAILY ACTIVITIY SCORE: 24

## 2025-01-12 ASSESSMENT — PAIN DESCRIPTION - LOCATION
LOCATION: NECK
LOCATION: NECK

## 2025-01-12 NOTE — DOCUMENTATION CLARIFICATION NOTE
"    PATIENT:               SUZAN KAMINSKI  ACCT #:                  [id number]  MRN:                       [id number]  :                       1960  ADMIT DATE:       2025 4:04 AM  DISCH DATE:  RESPONDING PROVIDER #:        [id number]          PROVIDER RESPONSE TEXT:    Excisional debridement to and including skin    CDI QUERY TEXT:    Clarification    Instruction:    Based on your assessment of the patient and the clinical information, please provide the requested documentation by clicking on the appropriate radio button and enter any additional information if prompted.    Question: Please further clarify the debridement procedure as    When answering this query, please exercise your independent professional judgment. The fact that a question is being asked, does not imply that any particular answer is desired or expected.    The patient's clinical indicators include:  Clinical Information: This query refers to the procedure performed on 2025 and documented as \" The generator site incision was debrided and irrigated extensively with antibiotic impregated normal saline as well as Irrisept (subcutaneous tissues, epidermis and dermis) \"  Options provided:  -- Excisional debridement to and including skin  -- Excisional debridement down to and including subcutaneous tissue and fascia  -- Excisional debridement down to and including muscle  -- Non-excisional debridement to and including skin  -- Non-excisional debridement down to and including subcutaneous tissue and fascia  -- Non-excisional debridement down to and including muscle  -- Other - I will add my own diagnosis  -- Refer to Clinical Documentation Reviewer    Query created by: Nora Miranda on 1/10/2025 9:08 AM      Electronically signed by:  ORTIZ TORIBIO MD 2025 6:54 AM          "

## 2025-01-12 NOTE — PROGRESS NOTES
"Janie Arguelles \"Kristen\" is a 64 y.o. female on day 4 of admission presenting with Postoperative wound infection.    Subjective   No acute events    Objective     Physical Exam  A&Ox3  Face symmetric  RUE 5/5  LUE 5/5  RLE 5/5  LLE 5/5  Sensation intact to light touch throughout all extremities  Incisions c/d/I    Last Recorded Vitals  Blood pressure 137/76, pulse 80, temperature 36.2 °C (97.2 °F), resp. rate 18, height 1.575 m (5' 2\"), weight 63.5 kg (140 lb), SpO2 93%.  Intake/Output last 3 Shifts:  I/O last 3 completed shifts:  In: 1490 (23.5 mL/kg) [P.O.:1340; IV Piggyback:150]  Out: - (0 mL/kg)   Weight: 63.5 kg     Relevant Results    Assessment/Plan   Assessment & Plan  Postoperative wound infection    Infection of spinal cord stimulator, initial encounter (CMS-[facility])    64yF h/o SCS (placed 15 yrs ago at [facility], placing physician retired), c/b erosion, s/p removal 5/2024, c/b persistent drainage, followed by wound care and has been on and off antibiotics, p/w exposed SCS lead, CT AP exposed lead with overlying inflammatory changes in R flank, s/p CTX/Vanc at OSH, 1/7 CT TS cervical paddle and lead, CS xray leads in pos'n, 1/7 s/p removal of cervical paddle and lead, generator site revision    1/10 s/p PICC     PLAN  Floor  ID recs-continue vanc, zosyn  OR Cx  Bld Cx  PTOT-HC    Medically ready for discharge pending home care set up, likely Monday 1/13    Ghulam Velázquez MD      "

## 2025-01-12 NOTE — CARE PLAN
The patient's goals for the shift include to eat    The clinical goals for the shift include Patient's pain will be controlled throughout shift.    Over the shift, the patient did not make progress toward the following goals. Barriers to progression include timing of doses. Recommendations to address these barriers include keep patient on pain medication regimen.

## 2025-01-13 ENCOUNTER — APPOINTMENT (OUTPATIENT)
Dept: RADIOLOGY | Facility: HOSPITAL | Age: 65
DRG: 029 | End: 2025-01-13
Payer: MEDICARE

## 2025-01-13 VITALS
HEART RATE: 101 BPM | BODY MASS INDEX: 25.76 KG/M2 | DIASTOLIC BLOOD PRESSURE: 76 MMHG | WEIGHT: 140 LBS | OXYGEN SATURATION: 95 % | RESPIRATION RATE: 18 BRPM | SYSTOLIC BLOOD PRESSURE: 120 MMHG | TEMPERATURE: 97.2 F | HEIGHT: 62 IN

## 2025-01-13 LAB
ALBUMIN SERPL BCP-MCNC: 4 G/DL (ref 3.4–5)
ANION GAP SERPL CALC-SCNC: 17 MMOL/L (ref 10–20)
BUN SERPL-MCNC: 15 MG/DL (ref 6–23)
CALCIUM SERPL-MCNC: 9.7 MG/DL (ref 8.6–10.6)
CHLORIDE SERPL-SCNC: 103 MMOL/L (ref 98–107)
CO2 SERPL-SCNC: 23 MMOL/L (ref 21–32)
CREAT SERPL-MCNC: 0.5 MG/DL (ref 0.5–1.05)
EGFRCR SERPLBLD CKD-EPI 2021: >90 ML/MIN/1.73M*2
ERYTHROCYTE [DISTWIDTH] IN BLOOD BY AUTOMATED COUNT: 13.2 % (ref 11.5–14.5)
GLUCOSE SERPL-MCNC: 100 MG/DL (ref 74–99)
HCT VFR BLD AUTO: 38.5 % (ref 36–46)
HGB BLD-MCNC: 12.7 G/DL (ref 12–16)
MCH RBC QN AUTO: 28.3 PG (ref 26–34)
MCHC RBC AUTO-ENTMCNC: 33 G/DL (ref 32–36)
MCV RBC AUTO: 86 FL (ref 80–100)
NRBC BLD-RTO: 0 /100 WBCS (ref 0–0)
PHOSPHATE SERPL-MCNC: 4.3 MG/DL (ref 2.5–4.9)
PLATELET # BLD AUTO: 258 X10*3/UL (ref 150–450)
POTASSIUM SERPL-SCNC: 4.1 MMOL/L (ref 3.5–5.3)
RBC # BLD AUTO: 4.48 X10*6/UL (ref 4–5.2)
SODIUM SERPL-SCNC: 139 MMOL/L (ref 136–145)
VANCOMYCIN SERPL-MCNC: 10.9 UG/ML (ref 5–20)
WBC # BLD AUTO: 7.3 X10*3/UL (ref 4.4–11.3)

## 2025-01-13 PROCEDURE — 2500000005 HC RX 250 GENERAL PHARMACY W/O HCPCS: Performed by: NEUROLOGICAL SURGERY

## 2025-01-13 PROCEDURE — 80202 ASSAY OF VANCOMYCIN: CPT | Performed by: NEUROLOGICAL SURGERY

## 2025-01-13 PROCEDURE — 2500000004 HC RX 250 GENERAL PHARMACY W/ HCPCS (ALT 636 FOR OP/ED): Performed by: NEUROLOGICAL SURGERY

## 2025-01-13 PROCEDURE — 97530 THERAPEUTIC ACTIVITIES: CPT | Mod: GP,CQ

## 2025-01-13 PROCEDURE — 2500000004 HC RX 250 GENERAL PHARMACY W/ HCPCS (ALT 636 FOR OP/ED)

## 2025-01-13 PROCEDURE — 71045 X-RAY EXAM CHEST 1 VIEW: CPT

## 2025-01-13 PROCEDURE — 2500000001 HC RX 250 WO HCPCS SELF ADMINISTERED DRUGS (ALT 637 FOR MEDICARE OP)

## 2025-01-13 PROCEDURE — 99239 HOSP IP/OBS DSCHRG MGMT >30: CPT

## 2025-01-13 PROCEDURE — 2500000001 HC RX 250 WO HCPCS SELF ADMINISTERED DRUGS (ALT 637 FOR MEDICARE OP): Performed by: NEUROLOGICAL SURGERY

## 2025-01-13 PROCEDURE — 2500000002 HC RX 250 W HCPCS SELF ADMINISTERED DRUGS (ALT 637 FOR MEDICARE OP, ALT 636 FOR OP/ED)

## 2025-01-13 PROCEDURE — 97116 GAIT TRAINING THERAPY: CPT | Mod: GP,CQ

## 2025-01-13 PROCEDURE — 80069 RENAL FUNCTION PANEL: CPT

## 2025-01-13 PROCEDURE — 71045 X-RAY EXAM CHEST 1 VIEW: CPT | Performed by: RADIOLOGY

## 2025-01-13 PROCEDURE — 85027 COMPLETE CBC AUTOMATED: CPT

## 2025-01-13 RX ORDER — HYDROMORPHONE HYDROCHLORIDE 4 MG/1
4 TABLET ORAL EVERY 6 HOURS PRN
Qty: 20 TABLET | Refills: 0 | Status: SHIPPED
Start: 2025-01-13 | End: 2025-01-13

## 2025-01-13 RX ORDER — AMOXICILLIN 250 MG
2 CAPSULE ORAL 2 TIMES DAILY
Qty: 28 TABLET | Refills: 0 | Status: SHIPPED | OUTPATIENT
Start: 2025-01-13 | End: 2025-01-13

## 2025-01-13 RX ORDER — CEFTRIAXONE 2 G/50ML
2 INJECTION, SOLUTION INTRAVENOUS EVERY 24 HOURS
Qty: 1500 ML | Refills: 1 | Status: SHIPPED
Start: 2025-01-13 | End: 2025-01-13

## 2025-01-13 RX ORDER — HYDROMORPHONE HYDROCHLORIDE 4 MG/1
4 TABLET ORAL EVERY 6 HOURS PRN
Qty: 20 TABLET | Refills: 0 | Status: SHIPPED | OUTPATIENT
Start: 2025-01-13

## 2025-01-13 RX ORDER — ACETAMINOPHEN 325 MG/1
650 TABLET ORAL EVERY 6 HOURS PRN
Start: 2025-01-13

## 2025-01-13 RX ORDER — POLYETHYLENE GLYCOL 3350 17 G/17G
17 POWDER, FOR SOLUTION ORAL DAILY
Qty: 14 EACH | Refills: 0 | Status: SHIPPED | OUTPATIENT
Start: 2025-01-13

## 2025-01-13 RX ORDER — DICLOFENAC SODIUM 75 MG/1
75 TABLET, DELAYED RELEASE ORAL 2 TIMES DAILY
Start: 2025-01-13

## 2025-01-13 RX ORDER — AMOXICILLIN 250 MG
2 CAPSULE ORAL 2 TIMES DAILY
Qty: 28 TABLET | Refills: 0 | Status: SHIPPED | OUTPATIENT
Start: 2025-01-13 | End: 2025-02-05 | Stop reason: SDUPTHER

## 2025-01-13 RX ORDER — CEFTRIAXONE 2 G/50ML
2 INJECTION, SOLUTION INTRAVENOUS EVERY 24 HOURS
Qty: 1500 ML | Refills: 1 | Status: SHIPPED | OUTPATIENT
Start: 2025-01-13 | End: 2025-01-13

## 2025-01-13 RX ORDER — POLYETHYLENE GLYCOL 3350 17 G/17G
17 POWDER, FOR SOLUTION ORAL DAILY
Qty: 14 EACH | Refills: 0 | Status: SHIPPED | OUTPATIENT
Start: 2025-01-13 | End: 2025-01-13

## 2025-01-13 RX ORDER — HYDROMORPHONE HYDROCHLORIDE 4 MG/1
4 TABLET ORAL EVERY 4 HOURS PRN
Status: CANCELLED
Start: 2025-01-13

## 2025-01-13 RX ORDER — CEFTRIAXONE 2 G/50ML
2 INJECTION, SOLUTION INTRAVENOUS EVERY 24 HOURS
Qty: 1500 ML | Refills: 1 | Status: SHIPPED | OUTPATIENT
Start: 2025-01-13 | End: 2025-02-17

## 2025-01-13 RX ADMIN — SENNOSIDES AND DOCUSATE SODIUM 2 TABLET: 50; 8.6 TABLET ORAL at 08:40

## 2025-01-13 RX ADMIN — TIZANIDINE 2 MG: 4 TABLET ORAL at 08:40

## 2025-01-13 RX ADMIN — DESVENLAFAXINE 50 MG: 50 TABLET, FILM COATED, EXTENDED RELEASE ORAL at 05:52

## 2025-01-13 RX ADMIN — HEPARIN SODIUM 5000 UNITS: 5000 INJECTION INTRAVENOUS; SUBCUTANEOUS at 02:09

## 2025-01-13 RX ADMIN — PREGABALIN 75 MG: 75 CAPSULE ORAL at 14:50

## 2025-01-13 RX ADMIN — HYDROMORPHONE HYDROCHLORIDE 4 MG: 2 TABLET ORAL at 12:34

## 2025-01-13 RX ADMIN — PREGABALIN 75 MG: 75 CAPSULE ORAL at 08:40

## 2025-01-13 RX ADMIN — CEFTRIAXONE SODIUM 2 G: 2 INJECTION, SOLUTION INTRAVENOUS at 14:50

## 2025-01-13 RX ADMIN — VANCOMYCIN HYDROCHLORIDE 1750 MG: 5 INJECTION, POWDER, LYOPHILIZED, FOR SOLUTION INTRAVENOUS at 17:03

## 2025-01-13 RX ADMIN — HEPARIN SODIUM 5000 UNITS: 5000 INJECTION INTRAVENOUS; SUBCUTANEOUS at 08:45

## 2025-01-13 RX ADMIN — HYDROMORPHONE HYDROCHLORIDE 4 MG: 2 TABLET ORAL at 05:52

## 2025-01-13 RX ADMIN — ARIPIPRAZOLE 15 MG: 15 TABLET ORAL at 05:52

## 2025-01-13 ASSESSMENT — PAIN SCALES - GENERAL
PAINLEVEL_OUTOF10: 6
PAINLEVEL_OUTOF10: 7
PAINLEVEL_OUTOF10: 6
PAINLEVEL_OUTOF10: 7
PAINLEVEL_OUTOF10: 6
PAINLEVEL_OUTOF10: 5 - MODERATE PAIN

## 2025-01-13 ASSESSMENT — PAIN DESCRIPTION - LOCATION: LOCATION: NECK

## 2025-01-13 ASSESSMENT — PAIN - FUNCTIONAL ASSESSMENT
PAIN_FUNCTIONAL_ASSESSMENT: 0-10

## 2025-01-13 ASSESSMENT — COGNITIVE AND FUNCTIONAL STATUS - GENERAL
MOVING TO AND FROM BED TO CHAIR: A LITTLE
WALKING IN HOSPITAL ROOM: A LITTLE
STANDING UP FROM CHAIR USING ARMS: A LITTLE
MOBILITY SCORE: 20
CLIMB 3 TO 5 STEPS WITH RAILING: A LITTLE

## 2025-01-13 ASSESSMENT — PAIN DESCRIPTION - ORIENTATION: ORIENTATION: POSTERIOR

## 2025-01-13 NOTE — PROGRESS NOTES
Spoke with Leslie at [facility] to inquire on status of C9. Stated she has the items she needs. Is waiting for a return call from [facility]. Provided intake number for [facility] to Leslie: [phone] ext [phone]. Faxed updated PT/OT notes per request to [facility] ([phone]). Per medical team, patient is medically ready. Pending approved C9 form to provide to [facility] and [facility]. Justine Anedrson RN, TCC    1250: Requested clinicals and scripts sent to [facility]. [facility] to [facility] update on progress of C[facility] and [facility]. Inquired on SOC date. Justine Anderson RN, TCC    1301: [facility] received approved C9 and can start care on 1/14. Medical team and [facility] notified. Justine Anderson RN, TCC    1321: Per [facility], only the Vancomycin was on the C9. Vanco and Ceftriazone verified multiple times during phone calls with Leslie. Called Leslie at [facility] but is at lunch. Called the number for [facility] to speak with another rep. Stated the precert was approved for both the Vancomycin and Ceftriaxone. [facility] the [facility] to show both IV ATB were approved. Justine Anderson RN, TCC    1335: Confirmed dosing with medical team of 1.75g for the Vancomycin. [facility] needs C9 to reflect that dosing. Called [facility] and faxed them the updated scripts reflecting the updated dosing. Stated they would correct/update the C9 and resend to [facility]. Justine Anderson RN, TCC    1503: [facility] confirmed SOC on 1/14. [facility] confirmed deliver at end of day today. Justine Anderson RN, TCC    1529: Patient's  will leave his house in [place] at 4pm to  the patient from the hospital. Medical team and nurse aware. Patient updated on discharge plans, pharmacy, and home care agency. In agreement. Justine Anderson RN, TCC

## 2025-01-13 NOTE — PROGRESS NOTES
"Janie Arguelles \"Kristen\" is a 64 y.o. female on day 5 of admission presenting with Postoperative wound infection.    Subjective   No acute events    Objective     Physical Exam  A&Ox3  Face symmetric  RUE 5/5  LUE 5/5  RLE 5/5  LLE 5/5  Sensation intact to light touch throughout all extremities  Incisions c/d/I    Last Recorded Vitals  Blood pressure 127/82, pulse 90, temperature 36.1 °C (97 °F), resp. rate 18, height 1.575 m (5' 2\"), weight 63.5 kg (140 lb), SpO2 90%.  Intake/Output last 3 Shifts:  I/O last 3 completed shifts:  In: 770 (12.1 mL/kg) [P.O.:670; IV Piggyback:100]  Out: - (0 mL/kg)   Weight: 63.5 kg     Relevant Results    Assessment/Plan   Assessment & Plan  Postoperative wound infection    Infection of spinal cord stimulator, initial encounter (CMS-HCC)    64yF h/o SCS (placed 15 yrs ago at [facility], placing physician retired), c/b erosion, s/p removal 5/2024, c/b persistent drainage, followed by wound care and has been on and off antibiotics, p/w exposed SCS lead, CT AP exposed lead with overlying inflammatory changes in R flank, s/p CTX/Vanc at OSH, 1/7 CT TS cervical paddle and lead, CS xray leads in pos'n, 1/7 s/p removal of cervical paddle and lead, generator site revision    1/10 s/p PICC     PLAN  Floor  ID recs-continue vanc, zosyn  Confirm HC set up, and discharge  PTOT-HC    Medically ready for discharge pending home care set up, likely today, 1/13    Alton Carias MD      "

## 2025-01-13 NOTE — CARE PLAN
The patient's goals for the shift include to eat    The clinical goals for the shift include Patient's pain will be controlled throughout shift.    Over the shift, the patient did not make progress toward the following goals. Barriers to progression include no pain regimen. Recommendations to address these barriers include medicate as prescribed.

## 2025-01-13 NOTE — PROGRESS NOTES
Vancomycin Dosing by Pharmacy- FOLLOW UP    Janie Arguelles is a 64 y.o. year old female who Pharmacy has been consulted for vancomycin dosing for cellulitis, skin and soft tissue. Based on the patient's indication and renal status this patient is being dosed based on a goal AUC of 400-600.     Renal function is currently stable.    Current vancomycin dose: 1750 mg given every 24 hours    Estimated vancomycin AUC on current dose: 405 mg/L.hr     Visit Vitals  /76   Pulse 87   Temp 36.2 °C (97.2 °F)   Resp 18        Lab Results   Component Value Date    CREATININE 0.50 2025    CREATININE 0.51 2025    CREATININE 0.51 2025    CREATININE 0.74 01/10/2025        Patient weight is as follows:   Vitals:    25 0413   Weight: 63.5 kg (140 lb)       Cultures:  No results found for the encounter in last 14 days.       I/O last 3 completed shifts:  In: 170 (2.7 mL/kg) [P.O.:120; IV Piggyback:50]  Out: - (0 mL/kg)   Weight: 63.5 kg   I/O during current shift:  I/O this shift:  In: 240 [P.O.:240]  Out: -     Temp (24hrs), Av °C (96.8 °F), Min:35.7 °C (96.3 °F), Max:36.2 °C (97.2 °F)      Assessment/Plan    Within goal AUC range. Continue current vancomycin regimen.    This dosing regimen is predicted by [employer] to result in the following pharmacokinetic parameters:    Regimen: 1750 mg IV every 24 hours.  Start time: 17:39 on 2025  Exposure target: AUC24 (range)400-600 mg/L.hr   HLF23-13: 406 mg/L.hr  AUC24,ss: 405 mg/L.hr  Probability of AUC24 > 400: 55 %  Ctrough,ss: 7.7 mg/L  Probability of Ctrough,ss > 20: 0 %      The next level will be obtained on  at am lab draw. May be obtained sooner if clinically indicated.   Will continue to monitor renal function daily while on vancomycin and order serum creatinine at least every 48 hours if not already ordered.  Follow for continued vancomycin needs, clinical response, and signs/symptoms of toxicity.       Greyson Jimenez, PharmD

## 2025-01-13 NOTE — PROGRESS NOTES
"Physical Therapy    Physical Therapy Treatment    Patient Name: Janie Arguelles \"Sis\"  MRN: [id number]  Department: [place] [address]  Room: [address] [place] [address]  Today's Date: 1/13/2025  Time Calculation  Start Time: 1458  Stop Time: 1521  Time Calculation (min): 23 min         Assessment/Plan   PT Assessment  PT Assessment Results: Decreased strength, Decreased range of motion, Decreased endurance, Impaired balance, Decreased mobility, Orthopedic restrictions  Rehab Prognosis: Good  Barriers to Discharge Home: No anticipated barriers  Evaluation/Treatment Tolerance: Patient tolerated treatment well  Medical Staff Made Aware: Yes  End of Session Communication: Bedside nurse  Assessment Comment: Pt offers good effort with therapy, stable vitals this session and safe mobility throughout. Remains appropriate for low intensity therapy  End of Session Patient Position: Bed, 3 rail up, Alarm off, not on at start of session     PT Plan  Treatment/Interventions: Bed mobility, Transfer training, Gait training, Balance training, Therapeutic exercise  PT Plan: Ongoing PT  PT Frequency: 3 times per week  PT Discharge Recommendations: Low intensity level of continued care  PT - OK to Discharge: Yes      General Visit Information:   PT  Visit  PT Received On: 01/13/25  Response to Previous Treatment: Patient with no complaints from previous session.  General  Prior to Session Communication: Bedside nurse  Patient Position Received: Bed, 3 rail up, Alarm off, not on at start of session  General Comment: Pt supine in bed, pleasant and agreeable to therapy  Per handoff with RN, pt is appropriate for therapy, vitals are stable and pain is controlled. Other concerns prior to tx are: none    Subjective   Precautions:  Precautions  Medical Precautions: Fall precautions  Post-Surgical Precautions: Spinal precautions    Vital Signs (Past 2hrs)        Date/Time Vitals Session Patient Position Pulse Resp SpO2 BP MAP (mmHg)    01/13/25 1458 --  Sitting  101  " "--  95 %  --  --    After amb 180'          Objective   Pain:  Pain Assessment  Pain Assessment: 0-10  0-10 (Numeric) Pain Score: 5 - Moderate pain  Pain Type: Surgical pain  Pain Location: Neck  Cognition:  Cognition  Overall Cognitive Status: Within Functional Limits  Orientation Level: Oriented X4    Treatments:  Therapeutic Exercise  Therapeutic Exercise Performed: Yes  Therapeutic Exercise Activity 1: Alt LE tapping 6\" step 10x/LE with B UE support    Bed Mobility  Bed Mobility: Yes  Bed Mobility 1  Bed Mobility 1: Supine to sitting, Sitting to supine  Level of Assistance 1: Modified independent    Ambulation/Gait Training  Ambulation/Gait Training Performed: Yes  Ambulation/Gait Training 1  Surface 1: Level tile  Device 1: Rolling walker  Assistance 1: Distant supervision  Quality of Gait 1: Narrow base of support, Decreased step length  Comments/Distance (ft) 1: 60'x2, 180'x1  Ambulation/Gait Training 2  Surface 2: Level tile, Carpet  Device 2: No device  Assistance 2: Close supervision  Quality of Gait 2: Narrow base of support, Decreased step length  Comments/Distance (ft) 2: 60'x1  Transfers  Transfer: Yes  Transfer 1  Transfer From 1: Sit to, Stand to  Transfer to 1: Sit  Technique 1: Sit to stand, Stand to sit  Transfer Device 1: Walker (vs no AD)  Transfer Level of Assistance 1: Distant supervision    Stairs  Stairs: Yes  Stairs  Rails 1: Left  Device 1: Railing  Assistance 1: Close supervision  Comment/Number of Steps 1: up/down 4 steps 2x with B UE on single rail, ascending/descending sideways with non-recip pattern with no LOB    Outcome Measures:     [facility] Basic Mobility  Turning from your back to your side while in a flat bed without using bedrails: None  Moving from lying on your back to sitting on the side of a flat bed without using bedrails: None  Moving to and from bed to chair (including a wheelchair): A little  Standing up from a chair using your arms (e.g. wheelchair or bedside chair): A " little  To walk in hospital room: A little  Climbing 3-5 steps with railing: A little  Basic Mobility - Total Score: 20    Education Documentation  Precautions, taught by Robyn Schuster PTA at 1/13/2025  3:43 PM.  Learner: Patient  Readiness: Acceptance  Method: Explanation, Demonstration  Response: Verbalizes Understanding, Demonstrated Understanding  Comment: precautions, HEP, safe mobility    Mobility Training, taught by Robyn Schuster PTA at 1/13/2025  3:43 PM.  Learner: Patient  Readiness: Acceptance  Method: Explanation, Demonstration  Response: Verbalizes Understanding, Demonstrated Understanding  Comment: precautions, HEP, safe mobility    Education Comments  No comments found.        OP EDUCATION:       Encounter Problems       Encounter Problems (Active)       Mobility       STG - Patient will ambulate >200' w/ RW indep (Progressing)       Start:  01/09/25    Expected End:  01/23/25            STG - Patient will ascend and descend four to six stairs with railing indep  (Progressing)       Start:  01/09/25    Expected End:  01/23/25               PT Transfers       STG - Patient to transfer to and from sit to supine indep (Progressing)       Start:  01/09/25    Expected End:  01/23/25            STG - Patient will transfer sit to and from stand indep (Progressing)       Start:  01/09/25    Expected End:  01/23/25               Pain - Adult            SHAQUILLE Preciado

## 2025-01-13 NOTE — DISCHARGE SUMMARY
Discharge Diagnosis  Postoperative wound infection    Issues Requiring Follow-Up  Standard post op care  IV antibiotics and PICC line- follow up with Infectious Disease    Test Results Pending At Discharge  Pending Labs       Order Current Status    Broad Range PCR-Bacteria In process            Hospital Course  Janie Arguelles is a 64 y.o. female with a past medical history of SCS (placed 15 yrs ago at [facility], placing physician retired), c/b erosion, s/p removal 5/2024, c/b persistent drainage, followed by wound care and has been on and off antibiotics, p/w exposed SCS lead, CT AP exposed lead with overlying inflammatory changes in R flank, s/p CTX/Vanc at OSH. She was transferred to [facility] under Neurosurgery for further evaluation and management.   1/7 s/p removal of cervical paddle and lead, generator site revision   1/8 ID consulted, continuing vanco and starting zosyn  1/10 ID rec for continuing banco, stopping zosyn and starting CTX with an estimated stop date of 2/17, PICC placed    PT/OT evaluated patient and recommended low level of therapy.  Home care ordered for need for IV antibiotics.     On the day of discharge, the patient was seen and evaluated by the neurosurgery team and deemed suitable for discharge. The patient was given detailed discharge instructions and were scheduled to follow up as an outpatient.      Pertinent Physical Exam At Time of Discharge  Physical Exam  HENT:      Head: Normocephalic.   Eyes:      Pupils: Pupils are equal, round, and reactive to light.   Cardiovascular:      Pulses: Normal pulses.   Pulmonary:      Effort: Pulmonary effort is normal.   Abdominal:      Palpations: Abdomen is soft.   Musculoskeletal:         General: Normal range of motion.      Cervical back: Normal range of motion.   Skin:     General: Skin is warm.      Comments: Incision C/D/I  PICC in place   Neurological:      Mental Status: She is alert and oriented to person, place, and time.      Comments: REGINA  finger amputation o/w intact  Fcx4   FS x4 5/5         Home Medications     Medication List      START taking these medications     acetaminophen 325 mg tablet; Commonly known as: Tylenol; Take 2 tablets   (650 mg) by mouth every 6 hours if needed for mild pain (1 - 3), fever   (temp greater than 38.0 C) or headaches.   cefTRIAXone 2 gram/50 mL IV; Commonly known as: Rocephin; Infuse 50 mL   (2 g) at 100 mL/hr over 30 minutes into a venous catheter once every 24   hours. Tentative end date 2/17/25   polyethylene glycol 17 gram packet; Commonly known as: Glycolax,   Miralax; Take 17 g by mouth once daily. FOR CONSTIPATION WHILE TAKING PAIN   MEDICATION   sennosides-docusate sodium 8.6-50 mg tablet; Commonly known as:   Petra-Colace; Take 2 tablets by mouth 2 times a day. FOR CONSTIPATION WHILE   TAKING PAIN MEDICATION   vancomycin 1.75 g in dextrose 5% 250 mL IVPB; Infuse 1.75 g at 166.7   mL/hr over 90 minutes into a venous catheter once every 24 hours.   Tentative end date 2/17/25     CHANGE how you take these medications     diclofenac 75 mg EC tablet; Commonly known as: Voltaren; Take 1 tablet   (75 mg) by mouth 2 times a day. Discuss with Neurosurgery about restart   plan; What changed: additional instructions   HYDROmorphone 4 mg tablet; Commonly known as: Dilaudid; Take 1 tablet (4   mg) by mouth every 6 hours if needed for severe pain (7 - 10).; What   changed: when to take this, reasons to take this     CONTINUE taking these medications     ALIVE WOMEN'S GUMMY VITAMIN ORAL   ARIPiprazole 15 mg tablet; Commonly known as: Abilify   desvenlafaxine 50 mg 24 hr tablet; Commonly known as: Pristiq   prazosin 1 mg capsule; Commonly known as: Minipress   pregabalin 75 mg capsule; Commonly known as: Lyrica   tiZANidine 2 mg tablet; Commonly known as: Zanaflex   zolpidem 10 mg tablet; Commonly known as: Ambien       Outpatient Follow-Up  Future Appointments   Date Time Provider Department Center   1/22/2025  2:30 PM  Alton Todd MD [id number] [facility]   2/17/2025  1:40 PM Ailyn Sheth PA-C [id number] [facility]       JAVI Kong-CNP  Total face to face time spent with patient/family of 30 minutes, with >50% of the time spent discussing plan of care/management, counseling/educating on disease processes, explaining results of diagnostic testing.

## 2025-01-15 NOTE — DISCHARGE INSTRUCTIONS
Visit Discharge/Physician Orders     Discharge condition: Stable  Assessment of pain at discharge: yes  Anesthetic used: lidocaine 4%   Discharge to: Home  Left via:Private automobile  Accompanied by: accompanied by self  ECF/HHA:[facility] - p:[phone]      Dressing Orders: Cleanse wound to Right Hip with normal saline, apply alginate ag stitched  to wound bed and cover with silicone border due to skin irritation , change daily. -may apply Aquaphor to dry skin      Treatment Orders: 12/16 Culture taken - 12/19 Levaquin sent to pharmacy take as prescribed   9/16 Steroid cream sent to pharmacy- use around the wound  EAT DIET WITH PROTEINS AND VITAMIN C- Protein shakes 1-2 times a day   TAKE A MULTIVITAMIN DAILY IF NOT CONTRAINDICATED    1/6/25 NEED TO SEE Pain surgeon- Juan ELIZALDE  [phone]      [facility] followup visit ________1 week_____________________  (Please note your next appointment above and if you are unable to keep, kindly give a 24 hour notice. Thank you.)        Physician signature:__________________________        If you experience any of the following, please call the Wound Care Center during business hours:     * Increase in Pain  * Temperature over 101  * Increase in drainage from your wound  * Drainage with a foul odor  * Bleeding  * Increase in swelling  * Need for compression bandage changes due to slippage, breakthrough drainage.     If you need medical attention outside of the business hours of the Wound Care Centers please contact your PCP or go to the nearest emergency room.

## 2025-01-16 LAB
ALBUMIN SERPL-MCNC: 4.1 G/DL (ref 3.5–5.2)
ALP SERPL-CCNC: 145 U/L (ref 35–104)
ALT SERPL-CCNC: 14 U/L (ref 0–32)
ANION GAP SERPL CALCULATED.3IONS-SCNC: 9 MMOL/L (ref 7–16)
AST SERPL-CCNC: 18 U/L (ref 0–31)
BASOPHILS # BLD: 0.07 K/UL (ref 0–0.2)
BASOPHILS NFR BLD: 1 % (ref 0–2)
BILIRUB SERPL-MCNC: <0.2 MG/DL (ref 0–1.2)
BUN SERPL-MCNC: 13 MG/DL (ref 6–23)
CALCIUM SERPL-MCNC: 9.1 MG/DL (ref 8.6–10.2)
CHLORIDE SERPL-SCNC: 105 MMOL/L (ref 98–107)
CO2 SERPL-SCNC: 26 MMOL/L (ref 22–29)
CREAT SERPL-MCNC: 0.6 MG/DL (ref 0.5–1)
EOSINOPHIL # BLD: 0.36 K/UL (ref 0.05–0.5)
EOSINOPHILS RELATIVE PERCENT: 6 % (ref 0–6)
ERYTHROCYTE [DISTWIDTH] IN BLOOD BY AUTOMATED COUNT: 13.7 % (ref 11.5–15)
GFR, ESTIMATED: >90 ML/MIN/1.73M2
GLUCOSE SERPL-MCNC: 91 MG/DL (ref 74–99)
HCT VFR BLD AUTO: 36.6 % (ref 34–48)
HGB BLD-MCNC: 11.4 G/DL (ref 11.5–15.5)
IMM GRANULOCYTES # BLD AUTO: <0.03 K/UL (ref 0–0.58)
IMM GRANULOCYTES NFR BLD: 0 % (ref 0–5)
LYMPHOCYTES NFR BLD: 1.31 K/UL (ref 1.5–4)
LYMPHOCYTES RELATIVE PERCENT: 22 % (ref 20–42)
MCH RBC QN AUTO: 28.7 PG (ref 26–35)
MCHC RBC AUTO-ENTMCNC: 31.1 G/DL (ref 32–34.5)
MCV RBC AUTO: 92.2 FL (ref 80–99.9)
MONOCYTES NFR BLD: 0.55 K/UL (ref 0.1–0.95)
MONOCYTES NFR BLD: 9 % (ref 2–12)
NEUTROPHILS NFR BLD: 62 % (ref 43–80)
NEUTS SEG NFR BLD: 3.76 K/UL (ref 1.8–7.3)
PLATELET # BLD AUTO: 243 K/UL (ref 130–450)
PMV BLD AUTO: 11.3 FL (ref 7–12)
POTASSIUM SERPL-SCNC: 4.4 MMOL/L (ref 3.5–5)
PROT SERPL-MCNC: 7 G/DL (ref 6.4–8.3)
RBC # BLD AUTO: 3.97 M/UL (ref 3.5–5.5)
SODIUM SERPL-SCNC: 140 MMOL/L (ref 132–146)
WBC OTHER # BLD: 6.1 K/UL (ref 4.5–11.5)

## 2025-01-18 ENCOUNTER — TELEPHONE (OUTPATIENT)
Dept: PHARMACY | Facility: HOSPITAL | Age: 65
End: 2025-01-18
Payer: MEDICARE

## 2025-01-18 ENCOUNTER — TELEPHONE (OUTPATIENT)
Dept: INFECTIOUS DISEASES | Facility: HOSPITAL | Age: 65
End: 2025-01-18
Payer: MEDICARE

## 2025-01-18 LAB — SCAN RESULT: ABNORMAL

## 2025-01-20 ENCOUNTER — HOSPITAL ENCOUNTER (OUTPATIENT)
Dept: WOUND CARE | Age: 65
Discharge: HOME | End: 2025-01-20
Attending: SURGERY

## 2025-01-20 ENCOUNTER — TELEPHONE (OUTPATIENT)
Dept: WOUND CARE | Age: 65
End: 2025-01-20

## 2025-01-20 DIAGNOSIS — T81.31XA POSTOPERATIVE WOUND BREAKDOWN, INITIAL ENCOUNTER: ICD-10-CM

## 2025-01-20 DIAGNOSIS — S31.819S WOUND OF RIGHT BUTTOCK, SEQUELA: Primary | ICD-10-CM

## 2025-01-20 LAB
ALBUMIN SERPL-MCNC: 4.3 G/DL (ref 3.5–5.2)
ALP SERPL-CCNC: 153 U/L (ref 35–104)
ALT SERPL-CCNC: 16 U/L (ref 0–32)
ANION GAP SERPL CALCULATED.3IONS-SCNC: 15 MMOL/L (ref 7–16)
AST SERPL-CCNC: 20 U/L (ref 0–31)
BASOPHILS # BLD: 0.09 K/UL (ref 0–0.2)
BASOPHILS NFR BLD: 1 % (ref 0–2)
BILIRUB SERPL-MCNC: 0.2 MG/DL (ref 0–1.2)
BUN SERPL-MCNC: 14 MG/DL (ref 6–23)
CALCIUM SERPL-MCNC: 9.6 MG/DL (ref 8.6–10.2)
CHLORIDE SERPL-SCNC: 101 MMOL/L (ref 98–107)
CO2 SERPL-SCNC: 25 MMOL/L (ref 22–29)
CREAT SERPL-MCNC: 0.6 MG/DL (ref 0.5–1)
DATE LAST DOSE: NORMAL
EOSINOPHIL # BLD: 0.29 K/UL (ref 0.05–0.5)
EOSINOPHILS RELATIVE PERCENT: 4 % (ref 0–6)
ERYTHROCYTE [DISTWIDTH] IN BLOOD BY AUTOMATED COUNT: 13.7 % (ref 11.5–15)
GFR, ESTIMATED: >90 ML/MIN/1.73M2
GLUCOSE SERPL-MCNC: 129 MG/DL (ref 74–99)
HCT VFR BLD AUTO: 38.8 % (ref 34–48)
HGB BLD-MCNC: 12.3 G/DL (ref 11.5–15.5)
IMM GRANULOCYTES # BLD AUTO: 0.03 K/UL (ref 0–0.58)
IMM GRANULOCYTES NFR BLD: 0 % (ref 0–5)
LYMPHOCYTES NFR BLD: 0.99 K/UL (ref 1.5–4)
LYMPHOCYTES RELATIVE PERCENT: 14 % (ref 20–42)
MCH RBC QN AUTO: 28.6 PG (ref 26–35)
MCHC RBC AUTO-ENTMCNC: 31.7 G/DL (ref 32–34.5)
MCV RBC AUTO: 90.2 FL (ref 80–99.9)
MONOCYTES NFR BLD: 0.49 K/UL (ref 0.1–0.95)
MONOCYTES NFR BLD: 7 % (ref 2–12)
NEUTROPHILS NFR BLD: 73 % (ref 43–80)
NEUTS SEG NFR BLD: 5.22 K/UL (ref 1.8–7.3)
PLATELET # BLD AUTO: 225 K/UL (ref 130–450)
PMV BLD AUTO: 12.2 FL (ref 7–12)
POTASSIUM SERPL-SCNC: 4.5 MMOL/L (ref 3.5–5)
PROT SERPL-MCNC: 7.1 G/DL (ref 6.4–8.3)
RBC # BLD AUTO: 4.3 M/UL (ref 3.5–5.5)
SODIUM SERPL-SCNC: 141 MMOL/L (ref 132–146)
TME LAST DOSE: NORMAL H
VANCOMYCIN DOSE: NORMAL MG
VANCOMYCIN TROUGH SERPL-MCNC: 8.9 UG/ML (ref 5–16)
WBC OTHER # BLD: 7.1 K/UL (ref 4.5–11.5)

## 2025-01-20 NOTE — PLAN OF CARE
Problem: Wound:  Goal: Will show signs of wound healing; wound closure and no evidence of infection  Description: Will show signs of wound healing; wound closure and no evidence of infection  Outcome: Adequate for Discharge     Problem: Pain  Goal: Verbalizes/displays adequate comfort level or baseline comfort level  Outcome: Adequate for Discharge

## 2025-01-20 NOTE — TELEPHONE ENCOUNTER
This nurse called patient to check in due to missed appointment and sent to ER at last appt. Patient stated that she got transferred out and her back stimulator was removed and she no longer has an open wound since they stitched her back after surgery. Patient states she is doing well. This nurse let patient know if she needs any further care to call back. Patient states she understands.

## 2025-01-22 ENCOUNTER — OFFICE VISIT (OUTPATIENT)
Dept: NEUROSURGERY | Facility: HOSPITAL | Age: 65
End: 2025-01-22
Payer: MEDICARE

## 2025-01-22 VITALS
RESPIRATION RATE: 18 BRPM | OXYGEN SATURATION: 95 % | DIASTOLIC BLOOD PRESSURE: 72 MMHG | WEIGHT: 160.8 LBS | SYSTOLIC BLOOD PRESSURE: 122 MMHG | HEART RATE: 82 BPM | TEMPERATURE: 96.4 F | BODY MASS INDEX: 29.41 KG/M2

## 2025-01-22 DIAGNOSIS — T85.733A INFECTION OF SPINAL CORD STIMULATOR, INITIAL ENCOUNTER (CMS-HCC): Primary | ICD-10-CM

## 2025-01-22 DIAGNOSIS — T81.49XA POSTOPERATIVE WOUND INFECTION: ICD-10-CM

## 2025-01-22 PROCEDURE — 99211 OFF/OP EST MAY X REQ PHY/QHP: CPT | Performed by: NEUROLOGICAL SURGERY

## 2025-01-22 PROCEDURE — 1036F TOBACCO NON-USER: CPT | Performed by: NEUROLOGICAL SURGERY

## 2025-01-22 ASSESSMENT — PAIN SCALES - GENERAL: PAINLEVEL_OUTOF10: 6

## 2025-01-22 NOTE — PROGRESS NOTES
[facility]   Neurosurgery    Diagnosis  Diagnoses and all orders for this visit:  Infection of spinal cord stimulator, initial encounter (CMS-[facility])  Postoperative wound infection      Patient Discussion/Summary  Janie recently presented with a chronically infected and opened incision.  She had underwent implantation of a cervical spinal cord stimulator approximately 15 years ago.  This consisted of both a cervical paddle as well as single percutaneous lead.  This was a [employer] model.    Last May, her battery pocket had eroded through the incision.  This was removed although the leads were left in situ.  Unfortunately, this was complicated by persistent infection of the IPG site.  This was managed conservatively by wound care with multiple rounds of packing and antibiotics, however with persistent drainage.  It is unclear why definitive management in terms of total hardware removal was not undertaken.  Regardless, she was transferred from an outside hospital for further management.    She was brought to the operating room on January 7.  At that time, we removed all of her stimulator hardware, including both leads in her neck, and also revised her IPG incision.  She was seen by the infectious disease service and a PICC line was placed.    In follow-up, she has been healing well.  She remains on long-term antibiotics, to be continued into February.  She will follow-up with the infectious disease service at that time.    We explained that, with all of her hardware removed, she may have MRI scans again.    Otherwise, we do not require any further follow-up in clinic.      History of Present Illness  Chief Complaint: No chief complaint on file.        HPI: Janie Arguelles is a 64 y.o. female with a past medical history of SCS (placed 15 yrs ago at [facility], placing physician retired, c/b erosion, s/p removal 5/2024, c/b persistent drainage, followed by wound care and has been on and off antibiotics, p/w  exposed SCS lead, CT AP exposed lead with overlying inflammatory changes in R flank, s/p CTX/Vanc at OSH. She was transferred to [facility] under Neurosurgery. 1/7 s/p removal of cervical paddle and lead, generator site revision. 1/10 ID rec for continuing banco, stopping zosyn and starting CTX with an estimated stop date of 2/17, PICC placed. PT/OT recommended low level of therapy and discharged home with home care. Patient presents today for her post op visit for wound evaluation and discuss progress since surgery.        Patient is here today with her . Right buttock sutures removed in clean fashion. Pt tolerated it well. Cervical incision is healing well. Patient denies fall, fevers or chills. Still with PICC line for IV abx therapy.       ROS: As noted in HPI.      Previous History  Past Medical History:   Diagnosis Date    Chronic pain disorder      Past Surgical History:   Procedure Laterality Date    CARPAL TUNNEL RELEASE      HYSTERECTOMY      SPINAL CORD STIMULATOR IMPLANT       Social History     Tobacco Use    Smoking status: Former     Types: Cigarettes    Smokeless tobacco: Never   Substance Use Topics    Alcohol use: Not Currently     No family history on file.  Allergies   Allergen Reactions    Ancef [Cefazolin] Other    Sulfa (Sulfonamide Antibiotics) Other     Current Outpatient Medications   Medication Instructions    acetaminophen (TYLENOL) 650 mg, oral, Every 6 hours PRN    ARIPiprazole (Abilify) 15 mg tablet 1 tablet, oral, Daily (0630)    cefTRIAXone (Rocephin) 2 gram/50 mL IV 2 g, intravenous, Every 24 hours, Tentative end date 2/17/25    desvenlafaxine 50 mg 24 hr tablet 1 tablet, oral, Daily (0630)    diclofenac (VOLTAREN) 75 mg, oral, 2 times daily, Discuss with Neurosurgery about restart plan    HYDROmorphone (DILAUDID) 4 mg, oral, Every 6 hours PRN    multivit-min/folic acid/[id number] (ALIVE WOMEN'S GUMMY VITAMIN ORAL) 1 each, oral, Daily    polyethylene glycol (GLYCOLAX, MIRALAX) 17 g,  oral, Daily, FOR CONSTIPATION WHILE TAKING PAIN MEDICATION    prazosin (MINIPRESS) 1 mg, oral, Nightly    pregabalin (LYRICA) 75 mg, oral, 3 times daily    sennosides-docusate sodium (Petra-Colace) 8.6-50 mg tablet 2 tablets, oral, 2 times daily, FOR CONSTIPATION WHILE TAKING PAIN MEDICATION    tiZANidine (ZANAFLEX) 2 mg, oral, Every 8 hours PRN    vancomycin 1.75 g in dextrose 5% 250 mL IVPB 1.75 g, intravenous, Every 24 hours, Tentative end date 2/17/25    zolpidem (AMBIEN) 10 mg, oral, Nightly         Vitals  /72 (BP Location: Right arm, Patient Position: Sitting, BP Cuff Size: Adult)   Pulse 82   Temp 35.8 °C (96.4 °F) (Temporal)   Resp 18   Wt 72.9 kg (160 lb 12.8 oz)   SpO2 95%   BMI 29.41 kg/m²       Physical Exam  The incisions were inspected, and these were well-healed.  The right flank Prolene sutures were removed without complication.

## 2025-01-23 ENCOUNTER — DOCUMENTATION (OUTPATIENT)
Dept: INFECTIOUS DISEASES | Facility: HOSPITAL | Age: 65
End: 2025-01-23
Payer: MEDICARE

## 2025-01-23 NOTE — PROGRESS NOTES
Received message 1/23 about low vanc trough 8.9 from administrative staff, called [facility] pharmacy on 1/23, spoke w/ clinical pharmacist and we discussed increasing pt dose from 1750mg q24H to 1gm q12H. Would favor increasing the dose since her identified organism is Corynebacterium which needs treated by vanc. Pt is currently at home therefore pharmacist planned to call her and confirm that the patient would be able to complete twice daily infusions. Gave pharmacist my cell phone number for contacting me to confirm the new dose.     1/24 called [facility] back to follow up on vanc dose change, LMOM.     -Spoke with pharmacist later this morning, she spoke w/ the pt's  and they would like to try to increase her daily dosing first before going to twice daily dosing. Current plan is to increase to 2gm q24H starting tomorrow 1/25, then she will get a repeat trough on Monday 1/27 before her third dose. If trough still low pt's  reported to pharmacy that they would then be open to doing to twice daily dosing at that time.     Lucrecia Romo, DO

## 2025-01-27 LAB
ALBUMIN SERPL-MCNC: 4.1 G/DL (ref 3.5–5.2)
ALP SERPL-CCNC: 148 U/L (ref 35–104)
ALT SERPL-CCNC: 17 U/L (ref 0–32)
ANION GAP SERPL CALCULATED.3IONS-SCNC: 14 MMOL/L (ref 7–16)
AST SERPL-CCNC: 21 U/L (ref 0–31)
BASOPHILS # BLD: 0.08 K/UL (ref 0–0.2)
BASOPHILS NFR BLD: 2 % (ref 0–2)
BILIRUB SERPL-MCNC: 0.3 MG/DL (ref 0–1.2)
BUN SERPL-MCNC: 16 MG/DL (ref 6–23)
CALCIUM SERPL-MCNC: 9.1 MG/DL (ref 8.6–10.2)
CHLORIDE SERPL-SCNC: 106 MMOL/L (ref 98–107)
CO2 SERPL-SCNC: 26 MMOL/L (ref 22–29)
CREAT SERPL-MCNC: 0.7 MG/DL (ref 0.5–1)
DATE LAST DOSE: NORMAL
EOSINOPHIL # BLD: 0.39 K/UL (ref 0.05–0.5)
EOSINOPHILS RELATIVE PERCENT: 8 % (ref 0–6)
ERYTHROCYTE [DISTWIDTH] IN BLOOD BY AUTOMATED COUNT: 13.9 % (ref 11.5–15)
GFR, ESTIMATED: >90 ML/MIN/1.73M2
GLUCOSE SERPL-MCNC: 90 MG/DL (ref 74–99)
HCT VFR BLD AUTO: 38.2 % (ref 34–48)
HGB BLD-MCNC: 11.8 G/DL (ref 11.5–15.5)
IMM GRANULOCYTES # BLD AUTO: <0.03 K/UL (ref 0–0.58)
IMM GRANULOCYTES NFR BLD: 0 % (ref 0–5)
LYMPHOCYTES NFR BLD: 0.98 K/UL (ref 1.5–4)
LYMPHOCYTES RELATIVE PERCENT: 21 % (ref 20–42)
MCH RBC QN AUTO: 28.2 PG (ref 26–35)
MCHC RBC AUTO-ENTMCNC: 30.9 G/DL (ref 32–34.5)
MCV RBC AUTO: 91.4 FL (ref 80–99.9)
MONOCYTES NFR BLD: 0.38 K/UL (ref 0.1–0.95)
MONOCYTES NFR BLD: 8 % (ref 2–12)
NEUTROPHILS NFR BLD: 61 % (ref 43–80)
NEUTS SEG NFR BLD: 2.91 K/UL (ref 1.8–7.3)
PLATELET # BLD AUTO: 197 K/UL (ref 130–450)
PMV BLD AUTO: 10.9 FL (ref 7–12)
POTASSIUM SERPL-SCNC: 3.7 MMOL/L (ref 3.5–5)
PROT SERPL-MCNC: 7.1 G/DL (ref 6.4–8.3)
RBC # BLD AUTO: 4.18 M/UL (ref 3.5–5.5)
SODIUM SERPL-SCNC: 146 MMOL/L (ref 132–146)
TME LAST DOSE: NORMAL H
VANCOMYCIN DOSE: NORMAL MG
VANCOMYCIN TROUGH SERPL-MCNC: 12.5 UG/ML (ref 5–16)
WBC OTHER # BLD: 4.8 K/UL (ref 4.5–11.5)

## 2025-02-03 LAB
ALBUMIN SERPL-MCNC: 4 G/DL (ref 3.5–5.2)
ALP SERPL-CCNC: 144 U/L (ref 35–104)
ALT SERPL-CCNC: 16 U/L (ref 0–32)
ANION GAP SERPL CALCULATED.3IONS-SCNC: 16 MMOL/L (ref 7–16)
AST SERPL-CCNC: 24 U/L (ref 0–31)
BASOPHILS # BLD: 0.06 K/UL (ref 0–0.2)
BASOPHILS NFR BLD: 1 % (ref 0–2)
BILIRUB SERPL-MCNC: 0.4 MG/DL (ref 0–1.2)
BUN SERPL-MCNC: 13 MG/DL (ref 6–23)
CALCIUM SERPL-MCNC: 9.5 MG/DL (ref 8.6–10.2)
CHLORIDE SERPL-SCNC: 102 MMOL/L (ref 98–107)
CO2 SERPL-SCNC: 23 MMOL/L (ref 22–29)
CREAT SERPL-MCNC: 0.6 MG/DL (ref 0.5–1)
CRP SERPL HS-MCNC: 30 MG/L (ref 0–5)
DATE LAST DOSE: NORMAL
EOSINOPHIL # BLD: 0.29 K/UL (ref 0.05–0.5)
EOSINOPHILS RELATIVE PERCENT: 6 % (ref 0–6)
ERYTHROCYTE [DISTWIDTH] IN BLOOD BY AUTOMATED COUNT: 14.1 % (ref 11.5–15)
ERYTHROCYTE [SEDIMENTATION RATE] IN BLOOD BY WESTERGREN METHOD: 35 MM/HR (ref 0–20)
GFR, ESTIMATED: >90 ML/MIN/1.73M2
GLUCOSE SERPL-MCNC: 106 MG/DL (ref 74–99)
HCT VFR BLD AUTO: 36.7 % (ref 34–48)
HGB BLD-MCNC: 11.6 G/DL (ref 11.5–15.5)
IMM GRANULOCYTES # BLD AUTO: <0.03 K/UL (ref 0–0.58)
IMM GRANULOCYTES NFR BLD: 0 % (ref 0–5)
LYMPHOCYTES NFR BLD: 0.92 K/UL (ref 1.5–4)
LYMPHOCYTES RELATIVE PERCENT: 18 % (ref 20–42)
MCH RBC QN AUTO: 28.5 PG (ref 26–35)
MCHC RBC AUTO-ENTMCNC: 31.6 G/DL (ref 32–34.5)
MCV RBC AUTO: 90.2 FL (ref 80–99.9)
MONOCYTES NFR BLD: 0.51 K/UL (ref 0.1–0.95)
MONOCYTES NFR BLD: 10 % (ref 2–12)
NEUTROPHILS NFR BLD: 65 % (ref 43–80)
NEUTS SEG NFR BLD: 3.37 K/UL (ref 1.8–7.3)
PLATELET # BLD AUTO: 203 K/UL (ref 130–450)
PMV BLD AUTO: 11.6 FL (ref 7–12)
POTASSIUM SERPL-SCNC: 4.1 MMOL/L (ref 3.5–5)
PROT SERPL-MCNC: 7.1 G/DL (ref 6.4–8.3)
RBC # BLD AUTO: 4.07 M/UL (ref 3.5–5.5)
SODIUM SERPL-SCNC: 141 MMOL/L (ref 132–146)
TME LAST DOSE: NORMAL H
VANCOMYCIN DOSE: NORMAL MG
VANCOMYCIN TROUGH SERPL-MCNC: 11.9 UG/ML (ref 5–16)
WBC OTHER # BLD: 5.2 K/UL (ref 4.5–11.5)

## 2025-02-05 DIAGNOSIS — K59.03 CONSTIPATION DUE TO PAIN MEDICATION: ICD-10-CM

## 2025-02-05 RX ORDER — AMOXICILLIN 250 MG
2 CAPSULE ORAL 2 TIMES DAILY PRN
Qty: 28 TABLET | Refills: 0 | Status: SHIPPED | OUTPATIENT
Start: 2025-02-05

## 2025-02-10 LAB
ALBUMIN SERPL-MCNC: 4 G/DL (ref 3.5–5.2)
ALP SERPL-CCNC: 181 U/L (ref 35–104)
ALT SERPL-CCNC: 15 U/L (ref 0–32)
ANION GAP SERPL CALCULATED.3IONS-SCNC: 20 MMOL/L (ref 7–16)
AST SERPL-CCNC: 21 U/L (ref 0–31)
BASOPHILS # BLD: 0.06 K/UL (ref 0–0.2)
BASOPHILS NFR BLD: 1 % (ref 0–2)
BILIRUB SERPL-MCNC: 0.2 MG/DL (ref 0–1.2)
BUN SERPL-MCNC: 16 MG/DL (ref 6–23)
CALCIUM SERPL-MCNC: 8.9 MG/DL (ref 8.6–10.2)
CHLORIDE SERPL-SCNC: 108 MMOL/L (ref 98–107)
CO2 SERPL-SCNC: 20 MMOL/L (ref 22–29)
CREAT SERPL-MCNC: 1.6 MG/DL (ref 0.5–1)
CRP SERPL HS-MCNC: 84 MG/L (ref 0–5)
DATE LAST DOSE: ABNORMAL
EOSINOPHIL # BLD: 0.18 K/UL (ref 0.05–0.5)
EOSINOPHILS RELATIVE PERCENT: 3 % (ref 0–6)
ERYTHROCYTE [DISTWIDTH] IN BLOOD BY AUTOMATED COUNT: 14.1 % (ref 11.5–15)
ERYTHROCYTE [SEDIMENTATION RATE] IN BLOOD BY WESTERGREN METHOD: 60 MM/HR (ref 0–20)
GFR, ESTIMATED: 36 ML/MIN/1.73M2
GLUCOSE SERPL-MCNC: 108 MG/DL (ref 74–99)
HCT VFR BLD AUTO: 37.7 % (ref 34–48)
HGB BLD-MCNC: 11.4 G/DL (ref 11.5–15.5)
IMM GRANULOCYTES # BLD AUTO: <0.03 K/UL (ref 0–0.58)
IMM GRANULOCYTES NFR BLD: 0 % (ref 0–5)
LYMPHOCYTES NFR BLD: 0.66 K/UL (ref 1.5–4)
LYMPHOCYTES RELATIVE PERCENT: 12 % (ref 20–42)
MCH RBC QN AUTO: 27.9 PG (ref 26–35)
MCHC RBC AUTO-ENTMCNC: 30.2 G/DL (ref 32–34.5)
MCV RBC AUTO: 92.4 FL (ref 80–99.9)
MONOCYTES NFR BLD: 0.56 K/UL (ref 0.1–0.95)
MONOCYTES NFR BLD: 10 % (ref 2–12)
NEUTROPHILS NFR BLD: 74 % (ref 43–80)
NEUTS SEG NFR BLD: 4.25 K/UL (ref 1.8–7.3)
PLATELET # BLD AUTO: 248 K/UL (ref 130–450)
PMV BLD AUTO: 12.2 FL (ref 7–12)
POTASSIUM SERPL-SCNC: 4.5 MMOL/L (ref 3.5–5)
PROT SERPL-MCNC: 6.7 G/DL (ref 6.4–8.3)
RBC # BLD AUTO: 4.08 M/UL (ref 3.5–5.5)
SODIUM SERPL-SCNC: 148 MMOL/L (ref 132–146)
TME LAST DOSE: ABNORMAL H
VANCOMYCIN DOSE: ABNORMAL MG
VANCOMYCIN TROUGH SERPL-MCNC: 40.5 UG/ML (ref 5–16)
WBC OTHER # BLD: 5.7 K/UL (ref 4.5–11.5)

## 2025-02-11 ENCOUNTER — TELEPHONE (OUTPATIENT)
Dept: INFECTIOUS DISEASES | Facility: HOSPITAL | Age: 65
End: 2025-02-11
Payer: MEDICARE

## 2025-02-11 ENCOUNTER — DOCUMENTATION (OUTPATIENT)
Dept: INFECTIOUS DISEASES | Facility: HOSPITAL | Age: 65
End: 2025-02-11
Payer: MEDICARE

## 2025-02-11 NOTE — TELEPHONE ENCOUNTER
Betty with [facility] calling to report creatine jumped form .6 to 1.6 and vanco troph 11.9 to 40.5 from last week. [phone] ext [phone]. Please advise

## 2025-02-11 NOTE — PROGRESS NOTES
Called by [facility] that vanc trough is 40 and creatinine went from 1.6 up from 0.6.     First I called [facility] and discussed with pharmacy. It appears to be a true vanc trough and creatinine has taken a jump. We decided to hold vancomycin therapy    I did call patient and patient does not have any complaints. Denies any decreased urination or any complaints at this time. She is okay with holding vancomycin and will continue ceftriaxone for now. We will repeat creatinine and random vancomycin level on Thursday.     Hamilton Dasilva  Infectious Diseases

## 2025-02-13 LAB
CREAT SERPL-MCNC: 1.4 MG/DL (ref 0.5–1)
DATE LAST DOSE: NORMAL
GFR, ESTIMATED: 43 ML/MIN/1.73M2
TME LAST DOSE: NORMAL H
VANCOMYCIN DOSE: NORMAL MG
VANCOMYCIN SERPL-MCNC: 19.2 UG/ML (ref 5–40)

## 2025-02-14 NOTE — TELEPHONE ENCOUNTER
Please advise Elsie with [facility] calling with updated labs. She has 3 days left of therapy she want to know if they should stop and pull or continue.    Please see scanned in levels. Also patient has appointment on 2/17.    [facility] is [phone] ext [phone]

## 2025-02-17 ENCOUNTER — APPOINTMENT (OUTPATIENT)
Dept: INFECTIOUS DISEASES | Facility: CLINIC | Age: 65
End: 2025-02-17
Payer: MEDICARE

## 2025-02-17 VITALS
HEART RATE: 70 BPM | TEMPERATURE: 96.7 F | WEIGHT: 157 LBS | BODY MASS INDEX: 28.89 KG/M2 | DIASTOLIC BLOOD PRESSURE: 76 MMHG | SYSTOLIC BLOOD PRESSURE: 119 MMHG | OXYGEN SATURATION: 94 % | HEIGHT: 62 IN

## 2025-02-17 DIAGNOSIS — T85.733D INFECTION OF SPINAL CORD STIMULATOR, SUBSEQUENT ENCOUNTER: Primary | ICD-10-CM

## 2025-02-17 PROCEDURE — 99214 OFFICE O/P EST MOD 30 MIN: CPT | Performed by: STUDENT IN AN ORGANIZED HEALTH CARE EDUCATION/TRAINING PROGRAM

## 2025-02-17 ASSESSMENT — ENCOUNTER SYMPTOMS
NAUSEA: 0
DIARRHEA: 0
DYSURIA: 0
ABDOMINAL PAIN: 0
CHILLS: 0
DIFFICULTY URINATING: 0
VOMITING: 0
FEVER: 0

## 2025-02-17 ASSESSMENT — PAIN SCALES - GENERAL: PAINLEVEL_OUTOF10: 6

## 2025-02-17 NOTE — PATIENT INSTRUCTIONS
Good to see you again. Once I get the results of the blood tests, I will give you a call (should be by tomorrow) to let you know if you can stop the antibiotics and have the PICC line pulled.  If you need to reach me in the meantime, you can call me at [phone].

## 2025-02-17 NOTE — PROGRESS NOTES
"Infectious Diseases Clinic Follow-up:    Reason for Visit: HFU for spinal cord stimulator infection  Chief Complaint   Patient presents with    Follow-up       History of Current Issue  Janie Arguelles is a 64 y.o. year old female with a PMHx of reflex sympathetic dystrophy who presents as a HFU for a spinal cord stimulator infection.  Spinal cord stimulator was placed about 15 years ago.   Copied from my 1/08/2025 note:    \"Battery eroded through the pocket and was removed in May 2024. Complicated by wound infection and required several rounds of antibiotics. Most recent antibiotic that patient recalls is Levaquin. Wound managed by wound care. At wound care appointment, an exposed lead was noted. Patient sent to OSH ED for management. Pt given IV ceftriaxone and vancomycin and transferred to [facility] on 1/07 for further management as service is not available locally.  CT thoracic spine showed exposed spinal cord stimulator lead w/ overlying inflammatory changes. CRP 1.77 and ESR 70. Pt taken to the OR on 1/07 for removal of cervical spinal paddle and percutaneous electrode and debridement of R flank pocket. \"  All hardware removed and per NSGY team spinal cord not involved in infection. One intra-op culture was negative. 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 Treated with 6 weeks of ceftriaxone, which covers skin pathogens (Coa negative staph, P acnes, Strep_ and vancomycin, which covers Corynebacterium striatum and penicillin resistant coag negative staph.   On 1/23, vanc trough was low at 8.9. Vancomycin dose increased from 1750mg 24h to 2g q24h.  On 2/11/25, vanc trough was 40 and creatinine 1.6 (up from 0.6). Vancomycin therapy held.  Pt presents today to ID clinic with  and granddaughter. Pt states she is urinating adequately and has no complaints in that regards. Denies fever, chills, wound drainage. The generator pocket incision on the R buttock is healing " well. One small scab on lateral portion. No drainage or sign of infection. The incision over the cervical vertebrae is also healing well with no drainage, no signs of infection.            PAST MEDICAL HISTORY:  Past Medical History:   Diagnosis Date    Chronic pain disorder        PAST SURGICAL HISTORY:  Past Surgical History:   Procedure Laterality Date    CARPAL TUNNEL RELEASE      HYSTERECTOMY      SPINAL CORD STIMULATOR IMPLANT         ALLERGIES:    Allergies   Allergen Reactions    Ancef [Cefazolin] Other    Sulfa (Sulfonamide Antibiotics) Other       MEDICATIONS:      Current Outpatient Medications:     acetaminophen (Tylenol) 325 mg tablet, Take 2 tablets (650 mg) by mouth every 6 hours if needed for mild pain (1 - 3), fever (temp greater than 38.0 C) or headaches., Disp: , Rfl:     ARIPiprazole (Abilify) 15 mg tablet, Take 1 tablet (15 mg) by mouth early in the morning.., Disp: , Rfl:     cefTRIAXone (Rocephin) 2 gram/50 mL IV, Infuse 50 mL (2 g) at 100 mL/hr over 30 minutes into a venous catheter once every 24 hours. Tentative end date 2/17/25, Disp: 1500 mL, Rfl: 1    desvenlafaxine 50 mg 24 hr tablet, Take 1 tablet (50 mg) by mouth early in the morning.., Disp: , Rfl:     diclofenac (Voltaren) 75 mg EC tablet, Take 1 tablet (75 mg) by mouth 2 times a day. Discuss with Neurosurgery about restart plan, Disp: , Rfl:     HYDROmorphone (Dilaudid) 4 mg tablet, Take 1 tablet (4 mg) by mouth every 6 hours if needed for severe pain (7 - 10)., Disp: 20 tablet, Rfl: 0    multivit-min/folic acid/ceh077 (ALIVE WOMEN'S GUMMY VITAMIN ORAL), Take 1 each by mouth once daily., Disp: , Rfl:     polyethylene glycol (Glycolax, Miralax) 17 gram packet, Take 17 g by mouth once daily. FOR CONSTIPATION WHILE TAKING PAIN MEDICATION, Disp: 14 each, Rfl: 0    prazosin (Minipress) 1 mg capsule, Take 1 capsule (1 mg) by mouth once daily at bedtime., Disp: , Rfl:     pregabalin (Lyrica) 75 mg capsule, Take 1 capsule (75 mg) by mouth 3  "times a day., Disp: , Rfl:     sennosides-docusate sodium (Petra-Colace) 8.6-50 mg tablet, Take 2 tablets by mouth 2 times a day as needed for constipation. For constipation while taking pain medication., Disp: 28 tablet, Rfl: 0    tiZANidine (Zanaflex) 2 mg tablet, Take 1 tablet (2 mg) by mouth every 8 hours if needed for muscle spasms., Disp: , Rfl:     vancomycin 1.75 g in dextrose 5% 250 mL IVPB, Infuse 1.75 g at 166.7 mL/hr over 90 minutes into a venous catheter once every 24 hours. Tentative end date 2/17/25, Disp: 35 Dose, Rfl: 0    zolpidem (Ambien) 10 mg tablet, Take 1 tablet (10 mg) by mouth once daily at bedtime., Disp: , Rfl:     REVIEW OF SYSTEMS:  Review of Systems   Constitutional:  Negative for chills and fever.   Gastrointestinal:  Negative for abdominal pain, diarrhea, nausea and vomiting.   Genitourinary:  Negative for decreased urine volume, difficulty urinating and dysuria.         PHYSICAL EXAMINATION:       Visit Vitals  /76 (BP Location: Left arm, Patient Position: Sitting, BP Cuff Size: Adult)   Pulse 70   Temp 35.9 °C (96.7 °F) (Temporal)   Ht 1.575 m (5' 2\")   Wt 71.2 kg (157 lb)   SpO2 94%   BMI 28.72 kg/m²   Smoking Status Former   BSA 1.76 m²        EXAM:   Constitutional: alert and oriented, NAD  Cardio: regular rate and rhythm  Pulm: CTAB  Psyc: normal mood and affect  Skin: well-healing incision on R buttock, small 1cm scab on lateral portion, no drainage  Posterior incision over cervical vertebrae- pink, healing well, no drainage, no signs of infection       DATA:  Laboratory Values and Test Results:   1/07/25 CRP 1.77    ESR 70  1/21/25 Cr 0.6    WBC 7.1  2/13/25 Cr 1.4    Vanc trough 19.2        Microbiology:   1/07/25 Intra-op culture- negative  1/07/25 16s Bacterial DNA PCR- Corynebacterium striatum    Imaging Studies:   XR CERVICAL SPINE 2-3 VIEWS; ; 1/7/2025 12:21 pm   FINDINGS:  Two views of the cervical spine.      No acute fractures. No focal subluxations. Laminectomy " changes.  Moderate multilevel discogenic, uncovertebral, and facet degenerative  change. Spinal stimulator leads.      IMPRESSION:  Postsurgical changes, as above. No evidence of hardware complication.    CT THORACIC SPINE W IV CONTRAST; 1/7/2025 7:54 am   IMPRESSION:  1. Nonspecific soft tissue thickening and suspected inflammatory  changes along the course of the left stimulator lead within the  subcutaneous soft tissues and extending to the skin surface. There is  also subtle thickening and loss of fat soft tissue planes along the  posterior paraspinous musculature in the region of the lead track. No  definite, well-defined rim enhancing fluid collection is identified  noting assessment is limited due to streak artifact. There is  abnormal lucency and sclerosis of the posterior elements of T1 and T2  where the spinal cord stimulator lead enters the spinal canal.  Osteomyelitis not excluded.  2. Other chronic findings as above.    ASSESSMENT / RECOMMENDATIONS:   Janie Arguelles is a 64 y.o. year old female with a PMHx of reflex sympathetic dystrophy who presents as a HFU for a spinal cord stimulator infection. Pt hospitalized from 1/07-1/13/25 after an exposed lead of spinal cord stimulator was noted. Generator was removed in May 2024. On 1/07, all hardware removed. Intra-op culture negative. 16s testing showed Corynebacterium striatum, covered by vancomycin. Pt treated with six weeks of vancomycin and ceftriaxone.    Pt had generator pocket infection. This was debrided when hardware was removed on 1/07. The incision where the generator was is healing well and shows no clinical signs of infection. Incision over cervical vertebrae is also healing well with pink scar tissue, no drainage, no signs of infection. Pt has not had repeat CRP and sed rate testing since 1/07 when CRP was elevated at 1.77 and ESR was 70. Will repeat CRP and ESR labs today and then make a decision about antibiotic discontinuation.    I spent  30 minutes in the professional and overall care of this patient.      Ailyn Sheth PA-C

## 2025-02-18 LAB
CRP SERPL-MCNC: 20.6 MG/L
ERYTHROCYTE [SEDIMENTATION RATE] IN BLOOD BY WESTERGREN METHOD: 41 MM/H

## 2025-02-19 ENCOUNTER — DOCUMENTATION (OUTPATIENT)
Dept: INFECTIOUS DISEASES | Facility: CLINIC | Age: 65
End: 2025-02-19
Payer: MEDICARE

## 2025-02-19 NOTE — PROGRESS NOTES
Saw pt in clinic on 2/17/25 for spinal cord stimulator infection. All hardware removed- generator in May 2024 and leads in January 2025. Has been on six weeks of ceftriaxone and vancomycin. Intra-op culture negative. 16s with Corynebacterium striatum.     Surgical incisions healing well with no signs of infection. WBC normal on 1/21. CRP elevated at 20.6 mg/L but sed rate down-trending. CRP not reason enough to continue antibiotics. Will discontinue vancomycin and ceftriaxone at this point and PICC line can be pulled. Left a message for Betty at [facility] that antibiotics can be discontinued and PICC line can be pulled.     Ailyn Sheth PA-C  Infectious Disease

## 2025-03-03 ENCOUNTER — TELEPHONE (OUTPATIENT)
Dept: INFECTIOUS DISEASES | Facility: HOSPITAL | Age: 65
End: 2025-03-03
Payer: MEDICARE

## 2025-03-03 NOTE — TELEPHONE ENCOUNTER
Called Ms. Arguelles and she is still urinating. I asked her if we could get a BMP to ensure that the creatinine had down trended. Patient was okay with it but it is far from her house to come to . I asked her if she could get appt with PCP to do a post hospital follow up and also ensure that creatinine was downtrending. I did notify Ailyn Sheth about the plan.     Hamilton Dasilva  Infectious Diseases

## 2025-07-17 ENCOUNTER — ANTI-COAG VISIT (OUTPATIENT)
Dept: FAMILY MEDICINE CLINIC | Age: 65
End: 2025-07-17

## (undated) DEVICE — HANDLE CVR PATENTED RETENTION DISC STRL LIGHT SHLD

## (undated) DEVICE — MANIFOLD, 4 PORT NEPTUNE STANDARD

## (undated) DEVICE — NEPTUNE E-SEP SMOKE EVACUATION PENCIL, COATED, 70MM BLADE, PUSH BUTTON SWITCH: Brand: NEPTUNE E-SEP

## (undated) DEVICE — MASTISOL ADHESIVE LIQ 2/3ML

## (undated) DEVICE — INTENDED FOR TISSUE SEPARATION, AND OTHER PROCEDURES THAT REQUIRE A SHARP SURGICAL BLADE TO PUNCTURE OR CUT.: Brand: BARD-PARKER ® STAINLESS STEEL BLADES

## (undated) DEVICE — MEDI-VAC NON-CONDUCTIVE SUCTION TUBING: Brand: CARDINAL HEALTH

## (undated) DEVICE — 1530S-1, 1 IN X 1.5 YD (2,5 CM X 1,37 M), UNPACKAGED ROLLS, 100 RL/CARTON, 5 CARTONS/CASE, 500 RL/CA: Brand: 3M™ MICROPORE™

## (undated) DEVICE — DRESSING FOAM W3.5XL6IN POSTOP STRP GENTLE OPTIFOAM AG

## (undated) DEVICE — 3M™ STERI-STRIP™ REINFORCED ADHESIVE SKIN CLOSURES, R1547, 1/2 IN X 4 IN (12 MM X 100 MM), 6 STRIPS/ENVELOPE: Brand: 3M™ STERI-STRIP™

## (undated) DEVICE — MEDI-VAC YANKAUER SUCTION HANDLE W/BULBOUS TIP: Brand: CARDINAL HEALTH

## (undated) DEVICE — BLOCK BITE 60FR RUBBER ADLT DENTAL

## (undated) DEVICE — BLADE CLIPPER GEN PURP NS

## (undated) DEVICE — MARKER, SKIN, RULER AND LABEL PACK, CUSTOM

## (undated) DEVICE — GAUZE,SPONGE,4"X4",16PLY,XRAY,STRL,LF: Brand: MEDLINE

## (undated) DEVICE — SEALANT, HEMOSTATIC, FLOSEAL, 10 ML

## (undated) DEVICE — DRAPE, SHEET, UTILITY, NON ABSORBENT, 18 X 26 IN, LF

## (undated) DEVICE — NEEDLE, ELECTRODE, SUBDERMAL, PAIRED, 2.0 LEAD, DISP

## (undated) DEVICE — ELECTRODE, CORKSCREW NEEDLE 1.5M LENGTH

## (undated) DEVICE — TOWEL OR BLUEE 16X26IN ST 8 PACK ORB08 16X26ORTWL

## (undated) DEVICE — E-Z CLEAN, NON-STICK, PTFE COATED, ELECTROSURGICAL BLADE ELECTRODE, MODIFIED EXTENDED INSULATION, 6.5 INCH (16.5 CM): Brand: MEGADYNE

## (undated) DEVICE — DRAPE SHEET, X-LARGE: Brand: CONVERTORS

## (undated) DEVICE — WOUND SYSTEM, DEBRIDEMENT & CLEANING, O.R DUOPAK

## (undated) DEVICE — 1810 FOAM BLOCK NEEDLE COUNTER: Brand: DEVON

## (undated) DEVICE — SUTURE, MONOCRYL, 4-0, 18 IN, PS2, UNDYED

## (undated) DEVICE — SUTURE MCRYL SZ 3-0 L18IN ABSRB UD L19MM PS-2 3/8 CIR PRIM Y497G

## (undated) DEVICE — SOLUTION IV IRRIG POUR BRL 0.9% SODIUM CHL 2F7124

## (undated) DEVICE — DRAPE 64X41IN RADIOLOGY C ARM EQUIP STER

## (undated) DEVICE — SYRINGE BLB 50CC IRRIG PLIABLE FNGR FLNG GRAD FLSK DISP

## (undated) DEVICE — Device

## (undated) DEVICE — SUTURE PERMA-HAND SZ 1 L30IN NONABSORBABLE BLK SH L26MM 1/2 K835H

## (undated) DEVICE — 3M™ MEDIPORE™ SOFT CLOTH TAPE, 4 INCH X 10 YARDS, 12 ROLLS/CASE, 2964: Brand: 3M™ MEDIPORE™

## (undated) DEVICE — GAUZE,PACKING STRIP,PLAIN,1"X5YD,STRL,LF: Brand: CURAD

## (undated) DEVICE — GOWN SURG XL SMS FAB NONREINFORCED RAGLAN SLV HK LOOP CLSR

## (undated) DEVICE — SPONGE,LAP,12"X12",XR,ST,5/PK,40PK/CS: Brand: MEDLINE

## (undated) DEVICE — GRADUATE TRIANG MEASURE 1000ML BLK PRNT

## (undated) DEVICE — PENCIL ELECSURG 9.5 MMX3 M 22 MM MOLD PLUMEPEN ELITE

## (undated) DEVICE — ELECTRODE PT RET AD L9FT HI MOIST COND ADH HYDRGEL CORDED

## (undated) DEVICE — SPONGE GZ W4XL4IN RAYON POLY FILL CVR W/ NONWOVEN FAB

## (undated) DEVICE — STERILE POLYISOPRENE POWDER-FREE SURGICAL GLOVES: Brand: PROTEXIS

## (undated) DEVICE — TAPE, SILK, DURAPORE, 3 IN X 10 YD, LF

## (undated) DEVICE — DRESSING, NON-ADHERENT, TELFA, OUCHLESS, 3 X 8 IN, STERILE

## (undated) DEVICE — Z DISCONTINUED PER MEDLINE USE 2741942 DRESSING AQUACEL 6 IN ALG W9XL15CM SIL CVR WTRPRF VIR BACT BARR ANTIMIC

## (undated) DEVICE — ADHESIVE, SKIN, DERMABOND ADVANCED, 15CM, PEN-STYLE

## (undated) DEVICE — PROTECTOR, NERVE, ULNAR, PINK

## (undated) DEVICE — ELECTRODE, GROUND PLATE

## (undated) DEVICE — PEN: MARKING STD 100/CS: Brand: MEDICAL ACTION INDUSTRIES

## (undated) DEVICE — BINDER ABD M/L H9IN FOR 46-62IN WHT 3 SLD PNL DSGN HOOP AND

## (undated) DEVICE — E-Z CLEAN, NON-STICK, PTFE COATED, ELECTROSURGICAL BLADE ELECTRODE, 2.75 INCH (7 CM): Brand: MEGADYNE

## (undated) DEVICE — GAUZE,SPONGE,4"X4",16PLY,STRL,LF,10/TRAY: Brand: MEDLINE

## (undated) DEVICE — CHLORAPREP 26ML ORANGE

## (undated) DEVICE — COVER, CART, 45 X 27 X 48 IN, CLEAR

## (undated) DEVICE — NEEDLE HYPO 25GA L1.5IN BLU POLYPR HUB S STL REG BVL STR

## (undated) DEVICE — FORCEPS BX OVL CUP FEN DISPOSABLE CAP L 160CM RAD JAW 4

## (undated) DEVICE — GLOVE SURG 7 LTX TRIFLEX WIDE FINGER PWDR

## (undated) DEVICE — CONTROLLER NEUROSTIMULATOR HND HELD PRGMR WIRELESS PTM FOR

## (undated) DEVICE — APPLICATOR, CHLORAPREP, W/ORANGE TINT, 26ML

## (undated) DEVICE — DRESSING, TRANSPARENT, TEGADERM, 8 X 12 IN

## (undated) DEVICE — PAD ABD CURITY TENDERSORB 5X9IN

## (undated) DEVICE — DRESSING, TRANSPARENT, TEGADERM, 4 X 4-3/4 IN

## (undated) DEVICE — DRAPE, C-ARM 16 X 85

## (undated) DEVICE — GLOVE RAD REDUC 8.5 IN

## (undated) DEVICE — TUBING, SMOKE EVAC, 3/8 X 10 FT

## (undated) DEVICE — BRUSH, SCRUB, W/SPONGE, W/NAIL CLEANER, DRY, LF

## (undated) DEVICE — SYSTEM RECHARGING NEUROSTIMULATOR RECHRG BTTRY PK PWR SUPL

## (undated) DEVICE — DRAPE, CONVERTORS SPLIT SHEET 224CM X 291 CM, W/ADH SPLIT

## (undated) DEVICE — SYRINGE, EPIDURAL, LOSS OF RESISTANCE, 10ML LUER-SLIP